# Patient Record
Sex: FEMALE | Race: WHITE | NOT HISPANIC OR LATINO | Employment: OTHER | URBAN - NONMETROPOLITAN AREA
[De-identification: names, ages, dates, MRNs, and addresses within clinical notes are randomized per-mention and may not be internally consistent; named-entity substitution may affect disease eponyms.]

---

## 2017-01-16 RX ORDER — LOSARTAN POTASSIUM 100 MG/1
TABLET ORAL
Qty: 30 TABLET | Refills: 0 | Status: SHIPPED | OUTPATIENT
Start: 2017-01-16 | End: 2017-01-20 | Stop reason: SDUPTHER

## 2017-01-16 RX ORDER — ATENOLOL 50 MG/1
TABLET ORAL
Qty: 30 TABLET | Refills: 0 | Status: SHIPPED | OUTPATIENT
Start: 2017-01-16 | End: 2017-01-20 | Stop reason: SDUPTHER

## 2017-01-20 ENCOUNTER — OFFICE VISIT (OUTPATIENT)
Dept: FAMILY MEDICINE CLINIC | Facility: CLINIC | Age: 77
End: 2017-01-20

## 2017-01-20 VITALS
WEIGHT: 130.2 LBS | HEART RATE: 74 BPM | TEMPERATURE: 98.2 F | SYSTOLIC BLOOD PRESSURE: 156 MMHG | DIASTOLIC BLOOD PRESSURE: 80 MMHG | HEIGHT: 60 IN | OXYGEN SATURATION: 97 % | BODY MASS INDEX: 25.56 KG/M2

## 2017-01-20 DIAGNOSIS — J06.9 ACUTE URI: ICD-10-CM

## 2017-01-20 DIAGNOSIS — K21.9 GASTROESOPHAGEAL REFLUX DISEASE, ESOPHAGITIS PRESENCE NOT SPECIFIED: ICD-10-CM

## 2017-01-20 DIAGNOSIS — Z12.11 COLON CANCER SCREENING: ICD-10-CM

## 2017-01-20 DIAGNOSIS — I10 ESSENTIAL HYPERTENSION: ICD-10-CM

## 2017-01-20 DIAGNOSIS — E03.9 ACQUIRED HYPOTHYROIDISM: ICD-10-CM

## 2017-01-20 DIAGNOSIS — E78.2 MIXED HYPERLIPIDEMIA: ICD-10-CM

## 2017-01-20 DIAGNOSIS — J43.9 PULMONARY EMPHYSEMA, UNSPECIFIED EMPHYSEMA TYPE (HCC): Primary | ICD-10-CM

## 2017-01-20 PROCEDURE — 99214 OFFICE O/P EST MOD 30 MIN: CPT | Performed by: PHYSICIAN ASSISTANT

## 2017-01-20 RX ORDER — CEFDINIR 300 MG/1
300 CAPSULE ORAL 2 TIMES DAILY
Qty: 20 CAPSULE | Refills: 0 | Status: SHIPPED | OUTPATIENT
Start: 2017-01-20 | End: 2017-05-01

## 2017-01-20 RX ORDER — GUAIFENESIN AND DEXTROMETHORPHAN HYDROBROMIDE 600; 30 MG/1; MG/1
1 TABLET, EXTENDED RELEASE ORAL 2 TIMES DAILY
Qty: 45 TABLET | Refills: 0 | Status: SHIPPED | OUTPATIENT
Start: 2017-01-20 | End: 2017-05-01

## 2017-01-20 RX ORDER — RANITIDINE 150 MG/1
150 TABLET ORAL 2 TIMES DAILY
Qty: 60 TABLET | Refills: 5 | Status: SHIPPED | OUTPATIENT
Start: 2017-01-20 | End: 2017-05-01

## 2017-01-20 RX ORDER — TRIAMCINOLONE ACETONIDE 55 UG/1
SPRAY, METERED NASAL
Qty: 1 BOTTLE | Refills: 11 | Status: ON HOLD | OUTPATIENT
Start: 2017-01-20 | End: 2023-01-06

## 2017-01-20 RX ORDER — ATENOLOL 50 MG/1
50 TABLET ORAL DAILY
Qty: 30 TABLET | Refills: 5 | Status: SHIPPED | OUTPATIENT
Start: 2017-01-20 | End: 2017-08-22 | Stop reason: SDUPTHER

## 2017-01-20 RX ORDER — LOSARTAN POTASSIUM 100 MG/1
100 TABLET ORAL DAILY
Qty: 30 TABLET | Refills: 5 | Status: SHIPPED | OUTPATIENT
Start: 2017-01-20 | End: 2017-08-22 | Stop reason: SDUPTHER

## 2017-01-20 NOTE — PATIENT INSTRUCTIONS
76 YEAR OLD FEMALE WHO PRESENTS TODAY IN FOLLOW UP OF HTN, GERD, SPECIALIST APPT. BP TODAY IS ELEVATED, HOWEVER, SHE HAS BEEN TAKING OTC MUCINEX WITH DECONGESTANT AND WAS OUT OF BP MEDICATION UNTIL YESTERDAY. I ASKED THAT SHE STOP CURRENT MUCINEX AND START MUCINEX DM. SHE SHOULD TAKE MEDICATION AS DIRECTED AND MONITOR BP. TO CALL OR RETURN IF BP REMAINS ELEVATED. 9-1-1 TO ER IF CARDIAC OR NEUROLOGICAL SYMPTOMS/SIGNS. PATIENT REPORTS HER GERD HAS BEEN WORSENING. SHE IS TAKING OMEPRAZOLE AS NEEDED. I DISCUSSED THE POTENTIAL RISKS OF PPI. I WILL HAVE HER USE ZANTAC 150 MG TWICE DAILY AS NEEDED. SHE IS ALSO DUE FOR COLONOSCOPY THIS YEAR. I WILL REFER FOR EGD AND COLONOSCOPY.     PATIENT IS TO CONTINUE FOLLOW UP WITH ENDOCRINOLOGY AND TAKING MEDICATIONS AS PRESCRIBED. SHE HAS NOT BEEN TAKING CRESTOR. SHE HAS NOT CHANGED DIET. PATIENT WAS CONCERNED ABOUT AE OF MEDICATION. SHE WAS CONCERNED ABOUT ALZHEIMER'S MOST. I DISCUSSED RISKS AND BENEFITS OF CRESTOR. PATIENT WILL TAKE AS PRESCRIBED. SHE WILL HAVE LABS WITH ENDOCRINOLOGY FOLLOW UP.     SHE HAS HAD URI WITH SINUS CONGESTION AND COUGH FOR 2 WEEKS. I WILL HAVE HER STOP CURRENT MUCINEX AND TAKE MUCINEX DM TWICE DAILY, NASACORT 2 SPRAYS IN EACH NOSTRIL ONCE DAILY, AND OMNICEF TWICE DAILY. TO FOLLOW UP IF NO IMPROVEMENT OR WORSENING.     PATIENT TO FOLLOW UP HERE IN 6 MONTHS OR SOONER IF NEEDED.

## 2017-01-20 NOTE — MR AVS SNAPSHOT
Dafne M Tyra   1/20/2017 2:45 PM   Office Visit    Dept Phone:  [phone]   Encounter #:  [id number]    Provider:  KAJAL Wood   Department:  [facility]                Your Full Care Plan              Today's Medication Changes          These changes are accurate as of: 1/20/17  3:58 PM.  If you have any questions, ask your nurse or doctor.               New Medication(s)Ordered:     cefdinir 300 MG capsule   Commonly known as:  OMNICEF   Take 1 capsule by mouth 2 (Two) Times a Day.       guaifenesin-dextromethorphan  MG tablet sustained-release 12 hour tablet   Take 1 tablet by mouth 2 (Two) Times a Day.       raNITIdine 150 MG tablet   Commonly known as:  ZANTAC   Take 1 tablet by mouth 2 (Two) Times a Day.       Triamcinolone Acetonide 55 MCG/ACT nasal inhaler   Commonly known as:  NASACORT AQ   2 SPRAYS IN EACH NOSTRIL ONCE DAILY         Medication(s)that have changed:     atenolol 50 MG tablet   Commonly known as:  TENORMIN   Take 1 tablet by mouth Daily.   What changed:  See the new instructions.       losartan 100 MG tablet   Commonly known as:  COZAAR   Take 1 tablet by mouth Daily.   What changed:  See the new instructions.         Stop taking medication(s)listed here:     PARoxetine 10 MG tablet   Commonly known as:  PAXIL                Where to Get Your Medications      These medications were sent to [facility] [address] [place] [address] - [address] [facility] - [phone]  - [phone]   [address] [facility] [address]     Phone:  [phone]     atenolol 50 MG tablet    cefdinir 300 MG capsule    guaifenesin-dextromethorphan  MG tablet sustained-release 12 hour tablet    losartan 100 MG tablet    raNITIdine 150 MG tablet    Triamcinolone Acetonide 55 MCG/ACT nasal inhaler                  Your Updated Medication List          This list is accurate as of: 1/20/17  3:58 PM.  Always use your most recent  med list.                HERNAN THYROID 30 MG tablet   Generic drug:  Thyroid   TAKE ONE TABLET BY MOUTH ONCE DAILY       atenolol 50 MG tablet   Commonly known as:  TENORMIN   Take 1 tablet by mouth Daily.       budesonide-formoterol 160-4.5 MCG/ACT inhaler   Commonly known as:  SYMBICORT       cefdinir 300 MG capsule   Commonly known as:  OMNICEF   Take 1 capsule by mouth 2 (Two) Times a Day.       ergocalciferol 04511 UNITS capsule   Commonly known as:  DRISDOL   Take 1 capsule by mouth 1 (One) Time Per Week.       guaifenesin-dextromethorphan  MG tablet sustained-release 12 hour tablet   Take 1 tablet by mouth 2 (Two) Times a Day.       losartan 100 MG tablet   Commonly known as:  COZAAR   Take 1 tablet by mouth Daily.       raNITIdine 150 MG tablet   Commonly known as:  ZANTAC   Take 1 tablet by mouth 2 (Two) Times a Day.       rosuvastatin 20 MG tablet   Commonly known as:  CRESTOR   Take 1 tablet by mouth Every Night.       tiotropium 18 MCG per inhalation capsule   Commonly known as:  SPIRIVA       Triamcinolone Acetonide 55 MCG/ACT nasal inhaler   Commonly known as:  NASACORT AQ   2 SPRAYS IN EACH NOSTRIL ONCE DAILY               We Performed the Following     Ambulatory Referral to General Surgery       You Were Diagnosed With        Codes Comments    Pulmonary emphysema, unspecified emphysema type    -  Primary ICD-10-CM: J43.9  ICD-9-CM: 492.8     Mixed hyperlipidemia     ICD-10-CM: E78.2  ICD-9-CM: 272.2     Acquired hypothyroidism     ICD-10-CM: E03.9  ICD-9-CM: 244.9     Essential hypertension     ICD-10-CM: I10  ICD-9-CM: 401.9     Gastroesophageal reflux disease, esophagitis presence not specified     ICD-10-CM: K21.9  ICD-9-CM: 530.81     Acute URI     ICD-10-CM: J06.9  ICD-9-CM: 465.9     Colon cancer screening     ICD-10-CM: Z12.11  ICD-9-CM: V76.51       Instructions     None    Patient Instructions History      Upcoming Appointments     Visit Type Date Time Department    OFFICE VISIT  2017  2:45 PM [facility]    [facility] 2017  1:00 PM [facility]    OFFICE VISIT 2017  1:40 PM [facility]    [facility] 2017  1:00 PM [facility]    OFFICE VISIT 2017  2:20 PM [facility]      MyChart Signup     [facility] [employer] allows you to send messages to your doctor, view your test results, renew your prescriptions, schedule appointments, and more. To sign up, go to [employer] and click on the Sign Up Now link in the New User? box. Enter your [employer] Activation Code exactly as it appears below along with the last four digits of your Social Security Number and your Date of Birth () to complete the sign-up process. If you do not sign up before the expiration date, you must request a new code.    [employer] Activation Code: [email or web address]  Expires: 2/3/2017  3:58 PM    If you have questions, you can email [email or web address] or call [phone] to talk to our [employer] staff. Remember, [employer] is NOT to be used for urgent needs. For medical emergencies, dial 911.               Other Info from Your Visit           Your Appointments     2017  1:00 PM EST   [facility]    [address]  [facility] [address] [phone]            2017  1:40 PM EST   Office Visit with ANTONELLA Murphy   [facility] ENDOCRINOLOGY (--)    [address]  [facility] [address] [phone]           Arrive 15 minutes prior to appointment.            2017  1:00 PM EDT   [facility], [facility]    [address]  [facility] [address]   [phone]            2017  2:20 PM EDT   Office Visit with Keyanna Ochoa MD   [facility] ENDOCRINOLOGY (--)    [facility] [address]  "[place] [address]   [phone]           Arrive 15 minutes prior to appointment.              Allergies     Diphenhydramine Hcl (Sleep)      Lansoprazole      NAUSEA      Reason for Visit     Hyperlipidemia     Sinusitis           Vital Signs     Blood Pressure Pulse Temperature Height Weight Oxygen Saturation    156/80 (BP Location: Right arm) 74 98.2 °F (36.8 °C) 60\" (152.4 cm) 130 lb 3.2 oz (59.1 kg) 97%    Body Mass Index Smoking Status                25.43 kg/m2 Never Smoker          Problems and Diagnoses Noted     Acquired underactive thyroid    Acid reflux disease    High cholesterol or triglycerides    High blood pressure    Emphysema    Acute upper respiratory infection        Screen for colon cancer            "

## 2017-01-20 NOTE — PROGRESS NOTES
Subjective   Dafne Mary is a 76 y.o. female.  HERE TODAY TO FOLLOW UP HYPERTENSION AND CHOLESTEROL MEDICATION.  ALSO HAVING SOME SINUS ISSUES.    History of Present Illness     WAS OUT OF BP MEDICATION FOR A COUPLE DAYS. RESTARTED YESTERDAY.   TAKING MUCINEX FOR CONGESTION AND COUGH WITH DECONGESTANT. STARTED WITH CONGESTION X 2 WEEKS- HEADACHE WITH FACIAL PRESSURE TO THE LEFT EAR. SNEEZING. SOME COUGHING. OXYGEN IS A LITTLE LOWER BY 1-2 POINTS. NO SOA. NO CP. NO FEVER. NO V/D. STARTED WITH ST- RESOLVED NOW. INCREASED HOARSENESS.     FOLLOW UP WITH ENDOCRINOLOGY NEXT MONTHS. HE STARTED ON CRESTOR - PATIENT DOES NOT WANT TO TAKE CRESTOR- HAS ONLY HAD 1 RX- TAKING EVERY 3-4 DAYS.     REFLUX- IN [place] VISITING SON, WORSENING. GOT STOMACH FLU AND WAS VERY SICK X 3 DAYS. DOESN'T USUALLY GET REFLUX BUT HAD REFLUX THAT CAME UP LIKE A FAUCET. HAD COLONOSCOPY 2007- NORMAL.     The following portions of the patient's history were reviewed and updated as appropriate: allergies, current medications, past family history, past medical history, past social history, past surgical history and problem list.    Review of Systems   HENT: Positive for congestion and sinus pressure.    Neurological: Positive for headaches.       Objective   Physical Exam   Constitutional: She is oriented to person, place, and time. Vital signs are normal. She appears well-developed and well-nourished.   HENT:   Head: Normocephalic and atraumatic.   Right Ear: Tympanic membrane, external ear and ear canal normal.   Left Ear: Tympanic membrane, external ear and ear canal normal.   Nose: Nose normal.   Mouth/Throat: Uvula is midline, oropharynx is clear and moist and mucous membranes are normal.   Eyes: Conjunctivae are normal.   Neck: Neck supple.   Cardiovascular: Normal rate, regular rhythm, normal heart sounds and intact distal pulses.  Exam reveals no gallop and no friction rub.    No murmur heard.  Pulmonary/Chest: Effort normal and breath sounds  normal. No respiratory distress. She has no wheezes. She has no rhonchi. She has no rales.   Musculoskeletal: She exhibits no edema or deformity.   Neurological: She is alert and oriented to person, place, and time.   Skin: Skin is warm and dry.   Psychiatric: She has a normal mood and affect. Her speech is normal and behavior is normal. Judgment and thought content normal. Cognition and memory are normal.   Nursing note and vitals reviewed.      Assessment/Plan   Dafne was seen today for hyperlipidemia and sinusitis.    Diagnoses and all orders for this visit:    Pulmonary emphysema, unspecified emphysema type    Mixed hyperlipidemia    Acquired hypothyroidism    Essential hypertension  -     losartan (COZAAR) 100 MG tablet; Take 1 tablet by mouth Daily.  -     atenolol (TENORMIN) 50 MG tablet; Take 1 tablet by mouth Daily.    Gastroesophageal reflux disease, esophagitis presence not specified  -     raNITIdine (ZANTAC) 150 MG tablet; Take 1 tablet by mouth 2 (Two) Times a Day.  -     Ambulatory Referral to General Surgery    Acute URI  -     guaifenesin-dextromethorphan (MUCINEX DM)  MG tablet sustained-release 12 hour tablet; Take 1 tablet by mouth 2 (Two) Times a Day.  -     cefdinir (OMNICEF) 300 MG capsule; Take 1 capsule by mouth 2 (Two) Times a Day.  -     Triamcinolone Acetonide (NASACORT AQ) 55 MCG/ACT nasal inhaler; 2 SPRAYS IN EACH NOSTRIL ONCE DAILY    Colon cancer screening  -     Ambulatory Referral to General Surgery      Patient Instructions   76 YEAR OLD FEMALE WHO PRESENTS TODAY IN FOLLOW UP OF HTN, GERD, SPECIALIST APPT. BP TODAY IS ELEVATED, HOWEVER, SHE HAS BEEN TAKING OTC MUCINEX WITH DECONGESTANT AND WAS OUT OF BP MEDICATION UNTIL YESTERDAY. I ASKED THAT SHE STOP CURRENT MUCINEX AND START MUCINEX DM. SHE SHOULD TAKE MEDICATION AS DIRECTED AND MONITOR BP. TO CALL OR RETURN IF BP REMAINS ELEVATED. 9-1-1 TO ER IF CARDIAC OR NEUROLOGICAL SYMPTOMS/SIGNS. PATIENT REPORTS HER GERD HAS BEEN  WORSENING. SHE IS TAKING OMEPRAZOLE AS NEEDED. I DISCUSSED THE POTENTIAL RISKS OF PPI. I WILL HAVE HER USE ZANTAC 150 MG TWICE DAILY AS NEEDED. SHE IS ALSO DUE FOR COLONOSCOPY THIS YEAR. I WILL REFER FOR EGD AND COLONOSCOPY.     PATIENT IS TO CONTINUE FOLLOW UP WITH ENDOCRINOLOGY AND TAKING MEDICATIONS AS PRESCRIBED. SHE HAS NOT BEEN TAKING CRESTOR. SHE HAS NOT CHANGED DIET. PATIENT WAS CONCERNED ABOUT AE OF MEDICATION. SHE WAS CONCERNED ABOUT ALZHEIMER'S MOST. I DISCUSSED RISKS AND BENEFITS OF CRESTOR. PATIENT WILL TAKE AS PRESCRIBED. SHE WILL HAVE LABS WITH ENDOCRINOLOGY FOLLOW UP.     SHE HAS HAD URI WITH SINUS CONGESTION AND COUGH FOR 2 WEEKS. I WILL HAVE HER STOP CURRENT MUCINEX AND TAKE MUCINEX DM TWICE DAILY, NASACORT 2 SPRAYS IN EACH NOSTRIL ONCE DAILY, AND OMNICEF TWICE DAILY. TO FOLLOW UP IF NO IMPROVEMENT OR WORSENING.     PATIENT TO FOLLOW UP HERE IN 6 MONTHS OR SOONER IF NEEDED.

## 2017-01-31 RX ORDER — THYROID 30 MG/1
TABLET ORAL
Qty: 30 TABLET | Refills: 0 | OUTPATIENT
Start: 2017-01-31

## 2017-02-01 RX ORDER — THYROID 30 MG/1
TABLET ORAL
Qty: 30 TABLET | Refills: 0 | OUTPATIENT
Start: 2017-02-01

## 2017-02-03 DIAGNOSIS — E78.2 MIXED HYPERLIPIDEMIA: Primary | ICD-10-CM

## 2017-02-03 DIAGNOSIS — I10 ESSENTIAL HYPERTENSION: ICD-10-CM

## 2017-02-03 DIAGNOSIS — R53.82 CHRONIC FATIGUE: ICD-10-CM

## 2017-02-03 DIAGNOSIS — E03.9 ACQUIRED HYPOTHYROIDISM: ICD-10-CM

## 2017-02-03 RX ORDER — THYROID 30 MG/1
30 TABLET ORAL DAILY
Qty: 30 TABLET | Refills: 2 | Status: SHIPPED | OUTPATIENT
Start: 2017-02-03 | End: 2017-05-08 | Stop reason: SDUPTHER

## 2017-03-03 ENCOUNTER — RESULTS ENCOUNTER (OUTPATIENT)
Dept: ENDOCRINOLOGY | Age: 77
End: 2017-03-03

## 2017-03-03 DIAGNOSIS — R53.82 CHRONIC FATIGUE: ICD-10-CM

## 2017-03-03 DIAGNOSIS — E03.9 ACQUIRED HYPOTHYROIDISM: ICD-10-CM

## 2017-03-03 DIAGNOSIS — I10 ESSENTIAL HYPERTENSION: ICD-10-CM

## 2017-03-03 DIAGNOSIS — E78.2 MIXED HYPERLIPIDEMIA: ICD-10-CM

## 2017-03-21 RX ORDER — PAROXETINE 10 MG/1
TABLET, FILM COATED ORAL
Qty: 90 TABLET | Refills: 0 | Status: SHIPPED | OUTPATIENT
Start: 2017-03-21 | End: 2017-08-09 | Stop reason: SDUPTHER

## 2017-05-01 ENCOUNTER — OFFICE VISIT (OUTPATIENT)
Dept: FAMILY MEDICINE CLINIC | Facility: CLINIC | Age: 77
End: 2017-05-01

## 2017-05-01 VITALS
OXYGEN SATURATION: 96 % | DIASTOLIC BLOOD PRESSURE: 86 MMHG | HEART RATE: 73 BPM | SYSTOLIC BLOOD PRESSURE: 140 MMHG | HEIGHT: 60 IN | TEMPERATURE: 99.2 F | WEIGHT: 128 LBS | BODY MASS INDEX: 25.13 KG/M2

## 2017-05-01 DIAGNOSIS — I10 ESSENTIAL HYPERTENSION: Primary | ICD-10-CM

## 2017-05-01 PROCEDURE — 99213 OFFICE O/P EST LOW 20 MIN: CPT | Performed by: PHYSICIAN ASSISTANT

## 2017-05-01 RX ORDER — OMEPRAZOLE 20 MG/1
20 CAPSULE, DELAYED RELEASE ORAL DAILY
COMMUNITY

## 2017-05-01 RX ORDER — AMLODIPINE BESYLATE 5 MG/1
5 TABLET ORAL DAILY
Qty: 30 TABLET | Refills: 0 | Status: SHIPPED | OUTPATIENT
Start: 2017-05-01 | End: 2017-05-31 | Stop reason: SDUPTHER

## 2017-05-08 RX ORDER — THYROID 30 MG/1
TABLET ORAL
Qty: 30 TABLET | Refills: 0 | Status: SHIPPED | OUTPATIENT
Start: 2017-05-08 | End: 2017-06-07 | Stop reason: SDUPTHER

## 2017-05-31 DIAGNOSIS — I10 ESSENTIAL HYPERTENSION: ICD-10-CM

## 2017-05-31 RX ORDER — AMLODIPINE BESYLATE 5 MG/1
TABLET ORAL
Qty: 30 TABLET | Refills: 3 | Status: SHIPPED | OUTPATIENT
Start: 2017-05-31 | End: 2017-10-16 | Stop reason: SDUPTHER

## 2017-06-01 DIAGNOSIS — E55.9 VITAMIN D DEFICIENCY: Primary | ICD-10-CM

## 2017-06-01 DIAGNOSIS — R53.82 CHRONIC FATIGUE: ICD-10-CM

## 2017-06-01 DIAGNOSIS — E78.2 MIXED HYPERLIPIDEMIA: Primary | ICD-10-CM

## 2017-06-01 DIAGNOSIS — E03.9 ACQUIRED HYPOTHYROIDISM: ICD-10-CM

## 2017-06-02 ENCOUNTER — OFFICE VISIT (OUTPATIENT)
Dept: FAMILY MEDICINE CLINIC | Facility: CLINIC | Age: 77
End: 2017-06-02

## 2017-06-02 VITALS
HEIGHT: 60 IN | DIASTOLIC BLOOD PRESSURE: 64 MMHG | WEIGHT: 127.8 LBS | OXYGEN SATURATION: 97 % | BODY MASS INDEX: 25.09 KG/M2 | HEART RATE: 68 BPM | TEMPERATURE: 97.4 F | SYSTOLIC BLOOD PRESSURE: 122 MMHG

## 2017-06-02 DIAGNOSIS — H91.90 HEARING LOSS, UNSPECIFIED LATERALITY: ICD-10-CM

## 2017-06-02 DIAGNOSIS — Z23 NEED FOR DIPHTHERIA-TETANUS-PERTUSSIS (TDAP) VACCINE: ICD-10-CM

## 2017-06-02 DIAGNOSIS — Z00.00 MEDICARE ANNUAL WELLNESS VISIT, SUBSEQUENT: Primary | ICD-10-CM

## 2017-06-02 DIAGNOSIS — Z11.59 NEED FOR HEPATITIS C SCREENING TEST: ICD-10-CM

## 2017-06-02 DIAGNOSIS — H93.19 TINNITUS, UNSPECIFIED LATERALITY: ICD-10-CM

## 2017-06-02 PROCEDURE — G0439 PPPS, SUBSEQ VISIT: HCPCS | Performed by: PHYSICIAN ASSISTANT

## 2017-06-02 NOTE — PROGRESS NOTES
QUICK REFERENCE INFORMATION:  The ABCs of the Annual Wellness Visit    Subsequent Medicare Wellness Visit    HEALTH RISK ASSESSMENT    1940    Recent Hospitalizations:  No hospitalization(s) within the last year..        Current Medical Providers:  Patient Care Team:  KAJAL Wood as PCP - General  Javier Nolan MD as Consulting Physician (Pulmonary Disease)        Smoking Status:  History   Smoking Status   • Never Smoker   Smokeless Tobacco   • Never Used       Alcohol Consumption:  History   Alcohol Use No       Depression Screen:   PHQ-9 Depression Screening 6/2/2017   Little interest or pleasure in doing things 0   Feeling down, depressed, or hopeless 0   Trouble falling or staying asleep, or sleeping too much 0   Feeling tired or having little energy 3   Poor appetite or overeating 0   Feeling bad about yourself - or that you are a failure or have let yourself or your family down 0   Trouble concentrating on things, such as reading the newspaper or watching television 1   Moving or speaking so slowly that other people could have noticed. Or the opposite - being so fidgety or restless that you have been moving around a lot more than usual 0   Thoughts that you would be better off dead, or of hurting yourself in some way 0   PHQ-9 Total Score 4   If you checked off any problems, how difficult have these problems made it for you to do your work, take care of things at home, or get along with other people? Not difficult at all       Health Habits and Functional and Cognitive Screening:  Functional & Cognitive Status 6/2/2017   Do you have difficulty preparing food and eating? No   Do you have difficulty bathing yourself? No   Do you have difficulty getting dressed? No   Do you have difficulty using the toilet? No   Do you have difficulty moving around from place to place? No   In the past year have you fallen or experienced a near fall? No   Do you need help using the phone?  No   Are you deaf or  do you have serious difficulty hearing?  No   Do you need help with transportation? No   Do you need help shopping? No   Do you need help preparing meals?  No   Do you need help with housework?  No   Do you need help with laundry? No   Do you need help taking your medications? No   Do you need help managing money? No   Do you have difficulty concentrating, remembering or making decisions? Yes       Health Habits  Current Diet: Well Balanced Diet  Dental Exam: Up to date  Eye Exam: Not up to date  Exercise (times per week): 4 times per week  Current Exercise Activities Include: Gardening      Does the patient have evidence of cognitive impairment? No    Aspirin use counseling: Start ASA 81 mg daily       Recent Lab Results:  CMP:  Lab Results   Component Value Date    GLU 94 10/17/2016    BUN 27 (H) 10/17/2016    CREATININE 0.88 10/17/2016    EGFRIFNONA 63 10/17/2016    EGFRIFAFRI 76 10/17/2016    BCR 30.7 (H) 10/17/2016     10/17/2016    K 4.3 10/17/2016    CO2 24.5 10/17/2016    CALCIUM 9.8 10/17/2016    PROTENTOTREF 6.6 10/17/2016    ALBUMIN 4.60 10/17/2016    LABGLOBREF 2.0 10/17/2016    LABIL2 2.3 10/17/2016    BILITOT 0.4 10/17/2016    ALKPHOS 107 10/17/2016    AST 27 10/17/2016    ALT 20 10/17/2016     Lipid Panel:  Lab Results   Component Value Date    CHLPL 264 (H) 10/17/2016    TRIG 126 10/17/2016    HDL 73 (H) 10/17/2016    VLDL 25.2 10/17/2016     (H) 10/17/2016     HbA1c:       Visual Acuity:  No exam data present    Age-appropriate Screening Schedule:  Refer to the list below for future screening recommendations based on patient's age, sex and/or medical conditions. Orders for these recommended tests are listed in the plan section. The patient has been provided with a written plan.    Health Maintenance   Topic Date Due   • TDAP/TD VACCINES (1 - Tdap) 12/08/1959   • COLONOSCOPY  04/04/2016   • ZOSTER VACCINE  04/04/2016   • INFLUENZA VACCINE  08/01/2017   • LIPID PANEL  10/17/2017   •  MAMMOGRAM  08/01/2018   • DXA SCAN  08/01/2018   • PNEUMOCOCCAL VACCINES (65+ LOW/MEDIUM RISK)  Completed        Subjective   History of Present Illness    Dafne Mary is a 76 y.o. female who presents for an Subsequent Wellness Visit.    LAST COLONOSCOPY - 2007 DR OCTAVIA STOCK. WAS REFERRED IN January TO DR HAMEED. HAS PAPERWORK- HAS NOT SENT IN YET.   LAST MAMMOGRAM- 8/2016  LAST DEXA- 8/2016  [facility]. NO HX ABNORMAL PAP. SA 1 PARTNER X COUPLE YEARS. NO VERY ACTIVE.   LAST TD- > 10 YEARS.   PREVNAR AND PNEUMOVAX. MOST RECENT INJECTION 11/2016  ZOSTAVAX- HAD HERE 11/2014.     GETTING TOOTH PULLED NEXT WEEK- BROKE OFF INSIDE A CROWN.     PATIENT WITH MOOD ISSUES- POSSIBLE ANXIETY PER DR GOMEZ. TAKING AT NIGHT. IF GET ANXIOUS IN MIDDLE OF DAY- TAKES PAXIL EARLY. FEELS LIKE ALWAYS TIRED. HARD TO DO THE THINGS NEEDS TO DO IN THE DAY DUE TO INCREASED FATIGUE. SEEING DR BARTHOLOMEW- APPT 6/6 FOR LABS AND 6/20 FOR MD APPT. ALSO SEEING DR JOYNER. HAS NOT HAD SLEEP STUDY.     HAS TINNITUS. HAS NOT SEEN ENT.     The following portions of the patient's history were reviewed and updated as appropriate: allergies, current medications, past family history, past medical history, past social history, past surgical history and problem list.    Outpatient Medications Prior to Visit   Medication Sig Dispense Refill   • amLODIPine (NORVASC) 5 MG tablet TAKE ONE TABLET BY MOUTH ONCE DAILY 30 tablet 3   • ARMOUR THYROID 30 MG tablet TAKE ONE TABLET BY MOUTH ONCE DAILY 30 tablet 0   • atenolol (TENORMIN) 50 MG tablet Take 1 tablet by mouth Daily. 30 tablet 5   • budesonide-formoterol (SYMBICORT) 160-4.5 MCG/ACT inhaler Inhale 2 puffs 2 (two) times a day.     • ergocalciferol (DRISDOL) 43296 UNITS capsule Take 1 capsule by mouth 1 (One) Time Per Week. 13 capsule 3   • losartan (COZAAR) 100 MG tablet Take 1 tablet by mouth Daily. 30 tablet 5   • omeprazole (priLOSEC) 20 MG capsule Take 20 mg by mouth  Daily.     • PARoxetine (PAXIL) 10 MG tablet TAKE ONE TABLET BY MOUTH ONCE DAILY 90 tablet 0   • tiotropium (SPIRIVA) 18 MCG per inhalation capsule Place 1 capsule into inhaler and inhale 1 (one) time daily.     • Triamcinolone Acetonide (NASACORT AQ) 55 MCG/ACT nasal inhaler 2 SPRAYS IN EACH NOSTRIL ONCE DAILY 1 bottle 11   • rosuvastatin (CRESTOR) 20 MG tablet Take 1 tablet by mouth Every Night. 30 tablet 11     No facility-administered medications prior to visit.        Patient Active Problem List   Diagnosis   • Anxiety   • Arteriosclerosis of both carotid arteries   • Chronic interstitial cystitis   • Cough   • Pulmonary emphysema   • Gastroesophageal reflux disease   • Fibromyalgia   • Headache   • Hematuria   • Hoarseness   • Hyperlipidemia   • Hypertension   • Acquired hypothyroidism   • Muscle spasms of both lower extremities   • Palpitations   • Shortness of breath   • Postmenopausal osteoporosis   • Chronic fatigue   • Hair loss disorder       Advance Care Planning:  has NO advance directive - information provided to the patient today    Identification of Risk Factors:  Risk factors include: cardiovascular risk, depression, cognitive impairment and hearing limitations.    Review of Systems   Constitutional: Positive for fatigue.   HENT: Positive for hearing loss.    Psychiatric/Behavioral: The patient is nervous/anxious.    All other systems reviewed and are negative.      Compared to one year ago, the patient feels her physical health is the same.  Compared to one year ago, the patient feels her mental health is the same.    Objective     Physical Exam   Constitutional: She is oriented to person, place, and time. She appears well-developed and well-nourished. No distress.   HENT:   Head: Normocephalic and atraumatic.   Right Ear: Hearing, tympanic membrane, external ear and ear canal normal.   Left Ear: Hearing, tympanic membrane, external ear and ear canal normal.   Nose: Nose normal.   Mouth/Throat:  "Oropharynx is clear and moist.   Eyes: Conjunctivae, EOM and lids are normal. Pupils are equal, round, and reactive to light.   Neck: Neck supple. No JVD present. Carotid bruit is not present. No tracheal deviation present. No thyroid mass and no thyromegaly present.   Cardiovascular: Normal rate, regular rhythm, normal heart sounds and intact distal pulses.  Exam reveals no gallop and no friction rub.    No murmur heard.  Pulses:       Radial pulses are 2+ on the right side, and 2+ on the left side.        Posterior tibial pulses are 2+ on the right side, and 2+ on the left side.   Pulmonary/Chest: Effort normal and breath sounds normal. No respiratory distress. She has no wheezes. She has no rhonchi. She has no rales.   Abdominal: Soft. Normal aorta and bowel sounds are normal. She exhibits no distension and no abdominal bruit. There is no hepatosplenomegaly. There is no tenderness. There is no rigidity, no rebound and no guarding. No hernia.   Musculoskeletal: Normal range of motion. She exhibits no edema, tenderness or deformity.   Normal strength.   Lymphadenopathy:     She has no cervical adenopathy.   Neurological: She is alert and oriented to person, place, and time. She has normal strength and normal reflexes. She displays normal reflexes. No cranial nerve deficit or sensory deficit. She exhibits normal muscle tone. Coordination and gait normal.   Skin: Skin is warm and dry. No rash noted. She is not diaphoretic. No erythema.   Psychiatric: She has a normal mood and affect. Her speech is normal and behavior is normal. Judgment and thought content normal. Cognition and memory are normal.       Vitals:    06/02/17 1415   BP: 122/64   Pulse: 68   Temp: 97.4 °F (36.3 °C)   TempSrc: Oral   SpO2: 97%   Weight: 127 lb 12.8 oz (58 kg)   Height: 60\" (152.4 cm)   PainSc:   4   PainLoc: Leg  Comment: BILATERAL LEGS ACHING       Body mass index is 24.96 kg/(m^2).  Discussed the patient's BMI with her. The BMI is above " average; BMI management plan is completed.    Assessment/Plan   Patient Self-Management and Personalized Health Advice  The patient has been provided with information about: prevention of cardiac or vascular disease, designing advance directives and mental health concerns and preventive services including:   · Advance directive, Colorectal cancer screening, colonoscopy referral placed, TdaP vaccine.    Visit Diagnoses:    ICD-10-CM ICD-9-CM   1. Medicare annual wellness visit, subsequent Z00.00 V70.0   2. Need for diphtheria-tetanus-pertussis (Tdap) vaccine Z23 V06.1   3. Hearing loss, unspecified laterality H91.90 389.9   4. Tinnitus, unspecified laterality H93.19 388.30   5. Need for hepatitis C screening test Z11.59 V73.89       Orders Placed This Encounter   Procedures   • Hepatitis C Antibody   • Ambulatory Referral to ENT (Otolaryngology)     Referral Priority:   Routine     Referral Type:   Consultation     Referral Reason:   Specialty Services Required     Requested Specialty:   Otolaryngology     Number of Visits Requested:   1       Outpatient Encounter Prescriptions as of 6/2/2017   Medication Sig Dispense Refill   • amLODIPine (NORVASC) 5 MG tablet TAKE ONE TABLET BY MOUTH ONCE DAILY 30 tablet 3   • ARMOUR THYROID 30 MG tablet TAKE ONE TABLET BY MOUTH ONCE DAILY 30 tablet 0   • atenolol (TENORMIN) 50 MG tablet Take 1 tablet by mouth Daily. 30 tablet 5   • budesonide-formoterol (SYMBICORT) 160-4.5 MCG/ACT inhaler Inhale 2 puffs 2 (two) times a day.     • ergocalciferol (DRISDOL) 13854 UNITS capsule Take 1 capsule by mouth 1 (One) Time Per Week. 13 capsule 3   • losartan (COZAAR) 100 MG tablet Take 1 tablet by mouth Daily. 30 tablet 5   • omeprazole (priLOSEC) 20 MG capsule Take 20 mg by mouth Daily.     • PARoxetine (PAXIL) 10 MG tablet TAKE ONE TABLET BY MOUTH ONCE DAILY 90 tablet 0   • tiotropium (SPIRIVA) 18 MCG per inhalation capsule Place 1 capsule into inhaler and inhale 1 (one) time daily.     •  Triamcinolone Acetonide (NASACORT AQ) 55 MCG/ACT nasal inhaler 2 SPRAYS IN EACH NOSTRIL ONCE DAILY 1 bottle 11   • rosuvastatin (CRESTOR) 20 MG tablet Take 1 tablet by mouth Every Night. 30 tablet 11     No facility-administered encounter medications on file as of 2017.        Reviewed use of high risk medication in the elderly: yes  Reviewed for potential of harmful drug interactions in the elderly: yes    Follow Up:  Return in about 3 months (around 2017).     An After Visit Summary and PPPS with all of these plans were given to the patient.   Patient Instructions     76 YEAR OLD FEMALE WHO PRESENTS TODAY FOR AWV AND BP RECHECK. BP IS NORMAL TODAY- CONTINUE REGIMEN. SHE HAS BEEN REFERRED FOR COLONOSCOPY- SHE WILL RETURN PACKET TO HAVE THIS PERFORMED. TDAP SENT TO PHARMACY TO UPDATE. PATIENT IS UP TO DATE ON MAMMOGRAM UNTIL 2017 AND DEXA UNTIL 2018. UP TO DATE ON ALL OTHER TESTING. SHE HAS TINNITUS AND HEARING LOSS- I WILL REFER TO ENT FOR EVALUATION AND HEARING SCREENING. PATIENT NEEDS HEP C SCREEN. I WILL GIVE ORDER TO HAVE PERFORMED WITH OTHER LABS NEXT WEEK.     PATIENT HAS SIGNIFICANT ANXIETY, WHICH SHE REPORTS IS CONTROLLED ON MEDICATION. SHE ALSO HAS SIGNIFICANT FATIGUE, WHICH SHE REPORTS IS LIMITING DAILY ACTIVITIES. SHE WILL FOLLOW UP WITH ENDOCRINOLOGY THIS MONTH. IF NOT THYROID OR ENDOCRINE RELATED, SHE WILL FOLLOW UP WITH DR JOYNER FOR CONSIDERATION OF SLEEP STUDY.     FOLLOW UP HERE IN 3 MONTHS OR SOONER IF NEEDED PENDING SYMPTOMS.       Medicare Wellness  Personal Prevention Plan of Service     Date of Office Visit:  2017  Encounter Provider:  KAJAL Wood  Place of Service:  [facility]  Patient Name: Dafne Mary  :  1940    As part of the Medicare Wellness portion of your visit today, we are providing you with this personalized preventive plan of services (PPPS). This plan is based upon recommendations of the [place]  Preventive Services Task Force (USPSTF) and the Advisory Committee on Immunization Practices (ACIP).    This lists the preventive care services that should be considered, and provides dates of when you are due. Items listed as completed are up-to-date and do not require any further intervention.    Health Maintenance   Topic Date Due   • TDAP/TD VACCINES (1 - Tdap) 12/08/1959   • COLONOSCOPY  04/04/2016   • ZOSTER VACCINE  04/04/2016   • INFLUENZA VACCINE  08/01/2017   • LIPID PANEL  10/17/2017   • MAMMOGRAM  08/01/2018   • DXA SCAN  08/01/2018   • PNEUMOCOCCAL VACCINES (65+ LOW/MEDIUM RISK)  Completed       Orders Placed This Encounter   Procedures   • Hepatitis C Antibody   • Ambulatory Referral to ENT (Otolaryngology)     Referral Priority:   Routine     Referral Type:   Consultation     Referral Reason:   Specialty Services Required     Requested Specialty:   Otolaryngology     Number of Visits Requested:   1       Return in about 3 months (around 9/2/2017).

## 2017-06-02 NOTE — PATIENT INSTRUCTIONS
76 YEAR OLD FEMALE WHO PRESENTS TODAY FOR AWV AND BP RECHECK. BP IS NORMAL TODAY- CONTINUE REGIMEN. SHE HAS BEEN REFERRED FOR COLONOSCOPY- SHE WILL RETURN PACKET TO HAVE THIS PERFORMED. TDAP SENT TO PHARMACY TO UPDATE. PATIENT IS UP TO DATE ON MAMMOGRAM UNTIL 2017 AND DEXA UNTIL 2018. UP TO DATE ON ALL OTHER TESTING. SHE HAS TINNITUS AND HEARING LOSS- I WILL REFER TO ENT FOR EVALUATION AND HEARING SCREENING. PATIENT NEEDS HEP C SCREEN. I WILL GIVE ORDER TO HAVE PERFORMED WITH OTHER LABS NEXT WEEK.     PATIENT HAS SIGNIFICANT ANXIETY, WHICH SHE REPORTS IS CONTROLLED ON MEDICATION. SHE ALSO HAS SIGNIFICANT FATIGUE, WHICH SHE REPORTS IS LIMITING DAILY ACTIVITIES. SHE WILL FOLLOW UP WITH ENDOCRINOLOGY THIS MONTH. IF NOT THYROID OR ENDOCRINE RELATED, SHE WILL FOLLOW UP WITH DR JOYNER FOR CONSIDERATION OF SLEEP STUDY.     FOLLOW UP HERE IN 3 MONTHS OR SOONER IF NEEDED PENDING SYMPTOMS.       Medicare Wellness  Personal Prevention Plan of Service     Date of Office Visit:  2017  Encounter Provider:  KAJAL Wood  Place of Service:  [facility]  Patient Name: Dafne Mary  :  1940    As part of the Medicare Wellness portion of your visit today, we are providing you with this personalized preventive plan of services (PPPS). This plan is based upon recommendations of the [place] Preventive Services Task Force (USPSTF) and the Advisory Committee on Immunization Practices (ACIP).    This lists the preventive care services that should be considered, and provides dates of when you are due. Items listed as completed are up-to-date and do not require any further intervention.    Health Maintenance   Topic Date Due   • TDAP/TD VACCINES (1 - Tdap) 1959   • COLONOSCOPY  2016   • ZOSTER VACCINE  2016   • INFLUENZA VACCINE  2017   • LIPID PANEL  10/17/2017   • MAMMOGRAM  2018   • DXA SCAN  2018   • PNEUMOCOCCAL VACCINES (65+ LOW/MEDIUM  RISK)  Completed       Orders Placed This Encounter   Procedures   • Hepatitis C Antibody   • Ambulatory Referral to ENT (Otolaryngology)     Referral Priority:   Routine     Referral Type:   Consultation     Referral Reason:   Specialty Services Required     Requested Specialty:   Otolaryngology     Number of Visits Requested:   1       Return in about 3 months (around 9/2/2017).

## 2017-06-06 ENCOUNTER — LAB (OUTPATIENT)
Dept: ENDOCRINOLOGY | Age: 77
End: 2017-06-06

## 2017-06-06 DIAGNOSIS — E03.9 ACQUIRED HYPOTHYROIDISM: ICD-10-CM

## 2017-06-06 DIAGNOSIS — E78.2 MIXED HYPERLIPIDEMIA: ICD-10-CM

## 2017-06-06 DIAGNOSIS — E55.9 VITAMIN D DEFICIENCY: ICD-10-CM

## 2017-06-06 DIAGNOSIS — R53.82 CHRONIC FATIGUE: ICD-10-CM

## 2017-06-07 LAB — 25(OH)D3+25(OH)D2 SERPL-MCNC: 52.2 NG/ML (ref 30–100)

## 2017-06-08 RX ORDER — THYROID 30 MG/1
TABLET ORAL
Qty: 30 TABLET | Refills: 0 | Status: SHIPPED | OUTPATIENT
Start: 2017-06-08 | End: 2017-06-20 | Stop reason: SDUPTHER

## 2017-06-16 LAB
ACTH PLAS-MCNC: 13.4 PG/ML (ref 7.2–63.3)
ALBUMIN SERPL-MCNC: 4.6 G/DL (ref 3.5–5.2)
ALBUMIN/GLOB SERPL: 1.8 G/DL
ALP SERPL-CCNC: 96 U/L (ref 39–117)
ALT SERPL-CCNC: 17 U/L (ref 1–33)
AST SERPL-CCNC: 26 U/L (ref 1–32)
BILIRUB SERPL-MCNC: 0.8 MG/DL (ref 0.1–1.2)
BUN SERPL-MCNC: 20 MG/DL (ref 8–23)
BUN/CREAT SERPL: 19.6 (ref 7–25)
CALCIUM SERPL-MCNC: 9.8 MG/DL (ref 8.6–10.5)
CHLORIDE SERPL-SCNC: 101 MMOL/L (ref 98–107)
CHOLEST SERPL-MCNC: 282 MG/DL (ref 0–200)
CO2 SERPL-SCNC: 25.4 MMOL/L (ref 22–29)
CORTIS SERPL-MCNC: 10.8 UG/DL
CREAT SERPL-MCNC: 1.02 MG/DL (ref 0.57–1)
GLOBULIN SER CALC-MCNC: 2.5 GM/DL
GLUCOSE SERPL-MCNC: 83 MG/DL (ref 65–99)
HDLC SERPL-MCNC: 80 MG/DL (ref 40–60)
LDLC SERPL CALC-MCNC: 182 MG/DL (ref 0–100)
POTASSIUM SERPL-SCNC: 4.2 MMOL/L (ref 3.5–5.2)
PROT SERPL-MCNC: 7.1 G/DL (ref 6–8.5)
REQUEST PROBLEM: NORMAL
SODIUM SERPL-SCNC: 142 MMOL/L (ref 136–145)
T3FREE SERPL-MCNC: 2.8 PG/ML (ref 2–4.4)
T4 FREE SERPL-MCNC: 0.78 NG/DL (ref 0.93–1.7)
T4 SERPL-MCNC: 5.96 MCG/DL (ref 4.5–11.7)
THYROGLOB AB SERPL-ACNC: 3.6 IU/ML (ref 0–0.9)
THYROGLOB SERPL-MCNC: 235 NG/ML
TRIGL SERPL-MCNC: 102 MG/DL (ref 0–150)
TSH SERPL DL<=0.005 MIU/L-ACNC: 1.25 MIU/ML (ref 0.27–4.2)
URATE SERPL-MCNC: 5.1 MG/DL (ref 2.4–5.7)
VLDLC SERPL CALC-MCNC: 20.4 MG/DL (ref 5–40)

## 2017-06-20 ENCOUNTER — OFFICE VISIT (OUTPATIENT)
Dept: ENDOCRINOLOGY | Age: 77
End: 2017-06-20

## 2017-06-20 VITALS
BODY MASS INDEX: 25.52 KG/M2 | WEIGHT: 130 LBS | RESPIRATION RATE: 16 BRPM | HEIGHT: 60 IN | SYSTOLIC BLOOD PRESSURE: 128 MMHG | DIASTOLIC BLOOD PRESSURE: 78 MMHG

## 2017-06-20 DIAGNOSIS — E78.2 MIXED HYPERLIPIDEMIA: ICD-10-CM

## 2017-06-20 DIAGNOSIS — E03.9 ACQUIRED HYPOTHYROIDISM: Primary | ICD-10-CM

## 2017-06-20 DIAGNOSIS — R53.82 CHRONIC FATIGUE: ICD-10-CM

## 2017-06-20 DIAGNOSIS — M81.0 POSTMENOPAUSAL OSTEOPOROSIS: ICD-10-CM

## 2017-06-20 DIAGNOSIS — I10 ESSENTIAL HYPERTENSION: ICD-10-CM

## 2017-06-20 DIAGNOSIS — I65.23 ARTERIOSCLEROSIS OF BOTH CAROTID ARTERIES: ICD-10-CM

## 2017-06-20 PROCEDURE — 99214 OFFICE O/P EST MOD 30 MIN: CPT | Performed by: INTERNAL MEDICINE

## 2017-06-20 RX ORDER — LEVOTHYROXINE AND LIOTHYRONINE 9.5; 2.25 UG/1; UG/1
15 TABLET ORAL DAILY
Qty: 30 TABLET | Refills: 5 | Status: SHIPPED | OUTPATIENT
Start: 2017-06-20 | End: 2017-12-26 | Stop reason: SDUPTHER

## 2017-06-20 RX ORDER — ERGOCALCIFEROL 1.25 MG/1
50000 CAPSULE ORAL WEEKLY
Qty: 13 CAPSULE | Refills: 3 | Status: SHIPPED | OUTPATIENT
Start: 2017-06-20 | End: 2018-06-20

## 2017-06-20 RX ORDER — THYROID 30 MG/1
30 TABLET ORAL DAILY
Qty: 30 TABLET | Refills: 5 | Status: SHIPPED | OUTPATIENT
Start: 2017-06-20 | End: 2018-01-29 | Stop reason: SDUPTHER

## 2017-06-20 RX ORDER — ROSUVASTATIN CALCIUM 20 MG/1
20 TABLET, COATED ORAL NIGHTLY
Qty: 30 TABLET | Refills: 11 | Status: SHIPPED | OUTPATIENT
Start: 2017-06-20 | End: 2018-03-08 | Stop reason: SINTOL

## 2017-06-20 NOTE — PROGRESS NOTES
"Subjective   Dafne Mary is a 76 y.o. female seen for follow up for hypothyroidism, lab review. She is having body aches, fatigue, and cold intolerance.     History of Present Illness this is a 76-year-old female known patient with hypothyroidism as well as vitamin D deficiency and hypertension with dyslipidemia with atherosclerotic cardiovascular disease.  She is complaining of body ache and chronic fatigue as well as cold intolerance.    /78  Resp 16  Ht 60\" (152.4 cm)  Wt 130 lb (59 kg)  BMI 25.39 kg/m2     Allergies   Allergen Reactions   • Diphenhydramine Hcl (Sleep)    • Lansoprazole      NAUSEA   • Lisinopril Cough       Current Outpatient Prescriptions:   •  amLODIPine (NORVASC) 5 MG tablet, TAKE ONE TABLET BY MOUTH ONCE DAILY, Disp: 30 tablet, Rfl: 3  •  ARMOUR THYROID 30 MG tablet, TAKE ONE TABLET BY MOUTH ONCE DAILY, Disp: 30 tablet, Rfl: 0  •  atenolol (TENORMIN) 50 MG tablet, Take 1 tablet by mouth Daily., Disp: 30 tablet, Rfl: 5  •  budesonide-formoterol (SYMBICORT) 160-4.5 MCG/ACT inhaler, Inhale 2 puffs 2 (two) times a day., Disp: , Rfl:   •  ergocalciferol (DRISDOL) 40022 UNITS capsule, Take 1 capsule by mouth 1 (One) Time Per Week., Disp: 13 capsule, Rfl: 3  •  losartan (COZAAR) 100 MG tablet, Take 1 tablet by mouth Daily., Disp: 30 tablet, Rfl: 5  •  omeprazole (priLOSEC) 20 MG capsule, Take 20 mg by mouth Daily., Disp: , Rfl:   •  PARoxetine (PAXIL) 10 MG tablet, TAKE ONE TABLET BY MOUTH ONCE DAILY, Disp: 90 tablet, Rfl: 0  •  rosuvastatin (CRESTOR) 20 MG tablet, Take 1 tablet by mouth Every Night., Disp: 30 tablet, Rfl: 11  •  tiotropium (SPIRIVA) 18 MCG per inhalation capsule, Place 1 capsule into inhaler and inhale 1 (one) time daily., Disp: , Rfl:   •  Triamcinolone Acetonide (NASACORT AQ) 55 MCG/ACT nasal inhaler, 2 SPRAYS IN EACH NOSTRIL ONCE DAILY, Disp: 1 bottle, Rfl: 11    The following portions of the patient's history were reviewed and updated as appropriate: allergies, " current medications, past family history, past medical history, past social history, past surgical history and problem list.    Review of Systems   Constitutional: Positive for fatigue.   HENT: Negative.    Eyes: Negative.    Respiratory: Negative.    Cardiovascular: Negative.    Gastrointestinal: Negative.    Endocrine: Positive for cold intolerance.   Genitourinary: Negative.    Musculoskeletal: Negative.    Skin: Negative.    Allergic/Immunologic: Negative.    Neurological: Negative.    Hematological: Negative.    Psychiatric/Behavioral: Negative.        Objective   Physical Exam   Constitutional: She is oriented to person, place, and time. She appears well-developed and well-nourished. No distress.   HENT:   Head: Normocephalic and atraumatic.   Right Ear: External ear normal.   Left Ear: External ear normal.   Nose: Nose normal.   Mouth/Throat: Oropharynx is clear and moist. No oropharyngeal exudate.   Eyes: Conjunctivae and EOM are normal. Pupils are equal, round, and reactive to light. Right eye exhibits no discharge. Left eye exhibits no discharge. No scleral icterus.   Neck: Normal range of motion. Neck supple. No JVD present. No tracheal deviation present. No thyromegaly present.   Healed the scar of the thyroidectomy in lower neck.   Cardiovascular: Normal rate, regular rhythm, normal heart sounds and intact distal pulses.  Exam reveals no gallop and no friction rub.    No murmur heard.  Pulmonary/Chest: Effort normal and breath sounds normal. No stridor. No respiratory distress. She has no wheezes. She has no rales. She exhibits no tenderness.   Abdominal: Soft. Bowel sounds are normal. She exhibits no distension and no mass. There is no tenderness. There is no rebound and no guarding. No hernia.   Musculoskeletal: Normal range of motion. She exhibits no edema, tenderness or deformity.   Lymphadenopathy:     She has no cervical adenopathy.   Neurological: She is alert and oriented to person, place, and  time. She has normal reflexes. She displays normal reflexes. No cranial nerve deficit. She exhibits normal muscle tone. Coordination normal.   Skin: Skin is warm and dry. No rash noted. She is not diaphoretic. No erythema. No pallor.   Psychiatric: She has a normal mood and affect. Her behavior is normal. Judgment and thought content normal.   Nursing note and vitals reviewed.     Results for orders placed or performed in visit on 06/06/17   Vitamin D 25 Hydroxy   Result Value Ref Range    25 Hydroxy, Vitamin D 52.2 30.0 - 100.0 ng/mL   Comprehensive Metabolic Panel   Result Value Ref Range    Glucose 83 65 - 99 mg/dL    BUN 20 8 - 23 mg/dL    Creatinine 1.02 (H) 0.57 - 1.00 mg/dL    eGFR Non African Am 53 (L) >60 mL/min/1.73    eGFR African Am 64 >60 mL/min/1.73    BUN/Creatinine Ratio 19.6 7.0 - 25.0    Sodium 142 136 - 145 mmol/L    Potassium 4.2 3.5 - 5.2 mmol/L    Chloride 101 98 - 107 mmol/L    Total CO2 25.4 22.0 - 29.0 mmol/L    Calcium 9.8 8.6 - 10.5 mg/dL    Total Protein 7.1 6.0 - 8.5 g/dL    Albumin 4.60 3.50 - 5.20 g/dL    Globulin 2.5 gm/dL    A/G Ratio 1.8 g/dL    Total Bilirubin 0.8 0.1 - 1.2 mg/dL    Alkaline Phosphatase 96 39 - 117 U/L    AST (SGOT) 26 1 - 32 U/L    ALT (SGPT) 17 1 - 33 U/L   Lipid Panel   Result Value Ref Range    Total Cholesterol 282 (H) 0 - 200 mg/dL    Triglycerides 102 0 - 150 mg/dL    HDL Cholesterol 80 (H) 40 - 60 mg/dL    VLDL Cholesterol 20.4 5 - 40 mg/dL    LDL Cholesterol  182 (H) 0 - 100 mg/dL   T3, Free   Result Value Ref Range    T3, Free 2.8 2.0 - 4.4 pg/mL   T4 & TSH ([facility])   Result Value Ref Range    TSH 1.250 0.270 - 4.200 mIU/mL    T4, Total 5.96 4.50 - 11.70 mcg/dL   T4, Free   Result Value Ref Range    Free T4 0.78 (L) 0.93 - 1.70 ng/dL   Cortisol   Result Value Ref Range    Cortisol 10.8 ug/dL   ACTH   Result Value Ref Range    ACTH 13.4 7.2 - 63.3 pg/mL   Uric Acid   Result Value Ref Range    Uric Acid 5.1 2.4 - 5.7 mg/dL   Thyroglobulin With Anti-TG    Result Value Ref Range    Thyroglobulin Ab 3.6 (H) 0.0 - 0.9 IU/mL   Thyroglobulin By MALIK   Result Value Ref Range    Thyroglobulin (TG-MALIK) 235 (H) ng/mL   Request Problem   Result Value Ref Range    Request Problem CANCELED            Assessment/Plan   Diagnoses and all orders for this visit:    Acquired hypothyroidism  -     T3, Free; Future  -     T4 & TSH ([facility]); Future  -     T4, Free; Future  -     Thyroglobulin With Anti-TG; Future  -     Uric Acid; Future  -     Vitamin D 25 Hydroxy; Future  -     Comprehensive Metabolic Panel; Future  -     Lipid Panel; Future    Arteriosclerosis of both carotid arteries  -     T3, Free; Future  -     T4 & TSH ([facility]); Future  -     T4, Free; Future  -     Thyroglobulin With Anti-TG; Future  -     Uric Acid; Future  -     Vitamin D 25 Hydroxy; Future  -     Comprehensive Metabolic Panel; Future  -     Lipid Panel; Future    Mixed hyperlipidemia  -     T3, Free; Future  -     T4 & TSH ([facility]); Future  -     T4, Free; Future  -     Thyroglobulin With Anti-TG; Future  -     Uric Acid; Future  -     Vitamin D 25 Hydroxy; Future  -     Comprehensive Metabolic Panel; Future  -     Lipid Panel; Future    Essential hypertension  -     T3, Free; Future  -     T4 & TSH ([facility]); Future  -     T4, Free; Future  -     Thyroglobulin With Anti-TG; Future  -     Uric Acid; Future  -     Vitamin D 25 Hydroxy; Future  -     Comprehensive Metabolic Panel; Future  -     Lipid Panel; Future    Chronic fatigue  -     T3, Free; Future  -     T4 & TSH ([facility]); Future  -     T4, Free; Future  -     Thyroglobulin With Anti-TG; Future  -     Uric Acid; Future  -     Vitamin D 25 Hydroxy; Future  -     Comprehensive Metabolic Panel; Future  -     Lipid Panel; Future    Postmenopausal osteoporosis  -     T3, Free; Future  -     T4 & TSH ([facility]); Future  -     T4, Free; Future  -     Thyroglobulin With Anti-TG; Future  -     Uric Acid; Future  -     Vitamin D 25 Hydroxy; Future  -      Comprehensive Metabolic Panel; Future  -     Lipid Panel; Future    Other orders  -     thyroid (ARMOUR) 15 MG tablet; Take 1 tablet by mouth Daily.  -     ARMOUR THYROID 30 MG tablet; Take 1 tablet by mouth Daily.  -     ergocalciferol (DRISDOL) 71942 UNITS capsule; Take 1 capsule by mouth 1 (One) Time Per Week.  -     rosuvastatin (CRESTOR) 20 MG tablet; Take 1 tablet by mouth Every Night.               His summary I saw and examined this 76-year-old female for above-mentioned problems.  I reviewed her laboratory evaluation of 06/06/2017 and provided her with a hard copy of it.  Aside from the fact that she has dyslipidemia with a cholesterol of 282 and the LDL level of 182 she is clinically and metabolically stable.  I mentioned to her the importance of the being compliant with her medications including taking Crestor given the fact that she has history of atherosclerotic heart disease.  We will also increase her dose of [place] thyroid to 45 mg daily.  She will continue rest of her medications and will see Ms. Tiffanie Boland in 6 months or sooner if needed with laboratory evaluation prior to her office visit.

## 2017-08-09 RX ORDER — PAROXETINE 10 MG/1
TABLET, FILM COATED ORAL
Qty: 90 TABLET | Refills: 0 | Status: SHIPPED | OUTPATIENT
Start: 2017-08-09 | End: 2017-11-04 | Stop reason: SDUPTHER

## 2017-08-22 DIAGNOSIS — I10 ESSENTIAL HYPERTENSION: ICD-10-CM

## 2017-08-22 RX ORDER — ATENOLOL 50 MG/1
TABLET ORAL
Qty: 30 TABLET | Refills: 1 | Status: SHIPPED | OUTPATIENT
Start: 2017-08-22 | End: 2017-10-10 | Stop reason: SDUPTHER

## 2017-08-22 RX ORDER — LOSARTAN POTASSIUM 100 MG/1
TABLET ORAL
Qty: 30 TABLET | Refills: 1 | Status: SHIPPED | OUTPATIENT
Start: 2017-08-22 | End: 2017-10-27 | Stop reason: SDUPTHER

## 2017-09-12 ENCOUNTER — OFFICE VISIT (OUTPATIENT)
Dept: FAMILY MEDICINE CLINIC | Facility: CLINIC | Age: 77
End: 2017-09-12

## 2017-09-12 VITALS
OXYGEN SATURATION: 98 % | TEMPERATURE: 98 F | BODY MASS INDEX: 25.32 KG/M2 | HEIGHT: 60 IN | HEART RATE: 71 BPM | WEIGHT: 129 LBS | SYSTOLIC BLOOD PRESSURE: 120 MMHG | DIASTOLIC BLOOD PRESSURE: 68 MMHG

## 2017-09-12 DIAGNOSIS — M62.838 MUSCLE SPASMS OF BOTH LOWER EXTREMITIES: Primary | ICD-10-CM

## 2017-09-12 DIAGNOSIS — G89.29 CHRONIC BILATERAL LOW BACK PAIN WITHOUT SCIATICA: ICD-10-CM

## 2017-09-12 DIAGNOSIS — M54.50 CHRONIC BILATERAL LOW BACK PAIN WITHOUT SCIATICA: ICD-10-CM

## 2017-09-12 DIAGNOSIS — R82.90 ABNORMAL URINALYSIS: ICD-10-CM

## 2017-09-12 DIAGNOSIS — M51.36 DEGENERATIVE DISC DISEASE, LUMBAR: ICD-10-CM

## 2017-09-12 DIAGNOSIS — M79.7 FIBROMYALGIA: ICD-10-CM

## 2017-09-12 LAB
BILIRUB BLD-MCNC: NEGATIVE MG/DL
CLARITY, POC: CLEAR
COLOR UR: YELLOW
GLUCOSE UR STRIP-MCNC: NEGATIVE MG/DL
KETONES UR QL: NEGATIVE
LEUKOCYTE EST, POC: ABNORMAL
NITRITE UR-MCNC: NEGATIVE MG/ML
PH UR: 7 [PH] (ref 5–8)
PROT UR STRIP-MCNC: NEGATIVE MG/DL
RBC # UR STRIP: NEGATIVE /UL
SP GR UR: 1.01 (ref 1–1.03)
UROBILINOGEN UR QL: NORMAL

## 2017-09-12 PROCEDURE — 81003 URINALYSIS AUTO W/O SCOPE: CPT | Performed by: PHYSICIAN ASSISTANT

## 2017-09-12 PROCEDURE — 99213 OFFICE O/P EST LOW 20 MIN: CPT | Performed by: PHYSICIAN ASSISTANT

## 2017-09-12 RX ORDER — BACLOFEN 10 MG/1
10 TABLET ORAL 3 TIMES DAILY PRN
Qty: 30 TABLET | Refills: 0 | Status: SHIPPED | OUTPATIENT
Start: 2017-09-12 | End: 2019-04-09

## 2017-09-12 NOTE — PROGRESS NOTES
"Subjective   Dafne Mary is a 76 y.o. female seen today for low back pain, past history of bulging disc in low back and had epidural injections 10 years ago     History of Present Illness   1ST TIME BACK HURT WAS ABOUT 10-11 YEARS AGO. HAD EPIDURAL INJECTIONS AT [facility] AND WAS BETTER FOR SEVERAL YEARS. LAST BACK TROUBLE ABOUT 6-7 YEARS AGO. WAS SENT TO PAIN MANAGEMENT- HAD MRI- WANTED EPIDURAL INJECTIONS AGAIN AND CHANGED PCP AND NEVER WENT BACK. IF MORE GARDENING, INCREASED PAIN.  PRIOR LOWER BACK PAIN WITH PAIN DOWN LEG TO FOOT.     CURRENTLY, PAIN IS MORE SEVERE THAT CANNOT WORK DUST MOP WITHOUT HAVING TO SIT DOWN SEVERAL TIMES. PAIN IS NOW HIGHER THAN PRIOR PAIN- IN WAIST AREA. BURNING PAIN. 7/10. PAIN IN BILATERAL LEGS- WHOLE LEG- ACHING WORSE WHILE TRYING TO GO TO SLEEP. SOMETIMES LEGS \"TRY TO GIVE OUT\". NO NUMBNESS OR TINGLING. NO LOSS OF CONTROL OF BOWEL OR BLADDER.     WOULD PREFER TO WAIT FOR MRI FOR PHYSICAL THERAPY - PREVIOUSLY AS UNABLE TO DO DUE TO INCREASED PAIN.    The following portions of the patient's history were reviewed and updated as appropriate: allergies, current medications, past family history, past medical history, past social history, past surgical history and problem list.    Review of Systems   Musculoskeletal: Positive for back pain.   All other systems reviewed and are negative.      Objective   Physical Exam   Constitutional: She is oriented to person, place, and time. She appears well-developed and well-nourished.   HENT:   Head: Normocephalic and atraumatic.   Right Ear: External ear normal.   Left Ear: External ear normal.   Cardiovascular: Normal rate, regular rhythm, normal heart sounds and intact distal pulses.    Pulmonary/Chest: Effort normal and breath sounds normal. She has no wheezes. She has no rales.   Musculoskeletal: Normal range of motion. She exhibits no edema or deformity.        Lumbar back: She exhibits spasm (AS NOTED IN RED). She exhibits no tenderness, no " bony tenderness, no swelling, no edema and normal pulse.        Back:    Neurological: She is alert and oriented to person, place, and time. She has normal strength.   Reflex Scores:       Patellar reflexes are 2+ on the right side and 2+ on the left side.       Achilles reflexes are 2+ on the right side and 2+ on the left side.  Psychiatric: She has a normal mood and affect. Her speech is normal and behavior is normal. Judgment and thought content normal. Cognition and memory are normal.   Nursing note and vitals reviewed.      Assessment/Plan   Dafne was seen today for low back pain.    Diagnoses and all orders for this visit:    Muscle spasms of both lower extremities  -     Cancel: MRI Lumbar Spine Without Contrast  -     MRI Lumbar Spine Without Contrast  -     baclofen (LIORESAL) 10 MG tablet; Take 1 tablet by mouth 3 (Three) Times a Day As Needed for Muscle Spasms.    Fibromyalgia  -     Cancel: MRI Lumbar Spine Without Contrast  -     MRI Lumbar Spine Without Contrast  -     baclofen (LIORESAL) 10 MG tablet; Take 1 tablet by mouth 3 (Three) Times a Day As Needed for Muscle Spasms.    Chronic bilateral low back pain without sciatica  -     Cancel: MRI Lumbar Spine Without Contrast  -     MRI Lumbar Spine Without Contrast  -     POC Urinalysis Dipstick, Automated  -     baclofen (LIORESAL) 10 MG tablet; Take 1 tablet by mouth 3 (Three) Times a Day As Needed for Muscle Spasms.    Degenerative disc disease, lumbar  -     Cancel: MRI Lumbar Spine Without Contrast  -     MRI Lumbar Spine Without Contrast  -     baclofen (LIORESAL) 10 MG tablet; Take 1 tablet by mouth 3 (Three) Times a Day As Needed for Muscle Spasms.    Abnormal urinalysis  -     Urine Culture      Patient Instructions   76 YEAR OLD FEMALE WHO PRESENTS TODAY IN FOLLOW UP OF LOW BACK PAIN. SHE HAD LOW BACK PAIN INITIALLY 10-11 YEARS AGO WITH MRI WITH DDD WITH EPIDURAL INJECTIONS THEN AGAIN ABOUT 6-7 YEARS AGO WITH MRI AND CONSIDERATION OF  INJECTIONS BUT NEVER RECEIVED. PATIENT STARTED AGAIN WITH BACK PAIN RECENTLY WITH LEG PAIN AND HEAVINESS. SHE WOULD LIKE TO WAIT ON PHYSICAL THERAPY, AS THIS WORSENED IN THE PAST. I WILL REFER FOR MRI LUMBAR SPINE AND GIVE BACLOFEN IF NEEDED. I WILL ALSO CHECK URINE AND REFER FOR MRI LUMBAR SPINE. FOLLOW UP IF ANY WORSENING OR NEW SYMPTOMS. TO ER ASAP IF DEVELOPS LOSS OF CONTROL OF BOWEL OR BLADDER.

## 2017-09-15 LAB
BACTERIA UR CULT: ABNORMAL
BACTERIA UR CULT: ABNORMAL
OTHER ANTIBIOTIC SUSC ISLT: ABNORMAL

## 2017-09-18 ENCOUNTER — HOSPITAL ENCOUNTER (OUTPATIENT)
Dept: MRI IMAGING | Facility: HOSPITAL | Age: 77
Discharge: HOME | End: 2017-09-18

## 2017-09-18 DIAGNOSIS — N30.00 ACUTE CYSTITIS WITHOUT HEMATURIA: Primary | ICD-10-CM

## 2017-09-18 RX ORDER — NITROFURANTOIN 25; 75 MG/1; MG/1
100 CAPSULE ORAL 2 TIMES DAILY
Qty: 14 CAPSULE | Refills: 0 | Status: SHIPPED | OUTPATIENT
Start: 2017-09-18 | End: 2017-12-15 | Stop reason: SDUPTHER

## 2017-09-19 NOTE — PATIENT INSTRUCTIONS
76 YEAR OLD FEMALE WHO PRESENTS TODAY IN FOLLOW UP OF LOW BACK PAIN. SHE HAD LOW BACK PAIN INITIALLY 10-11 YEARS AGO WITH MRI WITH DDD WITH EPIDURAL INJECTIONS THEN AGAIN ABOUT 6-7 YEARS AGO WITH MRI AND CONSIDERATION OF INJECTIONS BUT NEVER RECEIVED. PATIENT STARTED AGAIN WITH BACK PAIN RECENTLY WITH LEG PAIN AND HEAVINESS. SHE WOULD LIKE TO WAIT ON PHYSICAL THERAPY, AS THIS WORSENED IN THE PAST. I WILL REFER FOR MRI LUMBAR SPINE AND GIVE BACLOFEN IF NEEDED. I WILL ALSO CHECK URINE AND REFER FOR MRI LUMBAR SPINE. FOLLOW UP IF ANY WORSENING OR NEW SYMPTOMS. TO ER ASAP IF DEVELOPS LOSS OF CONTROL OF BOWEL OR BLADDER.

## 2017-09-22 ENCOUNTER — HOSPITAL ENCOUNTER (OUTPATIENT)
Dept: MRI IMAGING | Facility: HOSPITAL | Age: 77
End: 2017-09-22

## 2017-09-27 ENCOUNTER — HOSPITAL ENCOUNTER (OUTPATIENT)
Dept: MRI IMAGING | Facility: HOSPITAL | Age: 77
Discharge: HOME | End: 2017-09-27

## 2017-10-03 ENCOUNTER — TELEPHONE (OUTPATIENT)
Dept: FAMILY MEDICINE CLINIC | Facility: CLINIC | Age: 77
End: 2017-10-03

## 2017-10-03 NOTE — TELEPHONE ENCOUNTER
Patient called in regards to the Atenolol. There is a national shortage and will need this changed to something else. She would like it sent to [facility] in [place].

## 2017-10-09 DIAGNOSIS — I10 ESSENTIAL HYPERTENSION: ICD-10-CM

## 2017-10-09 RX ORDER — ATENOLOL 50 MG/1
TABLET ORAL
Qty: 30 TABLET | Refills: 1 | OUTPATIENT
Start: 2017-10-09

## 2017-10-10 DIAGNOSIS — I10 ESSENTIAL HYPERTENSION: ICD-10-CM

## 2017-10-10 RX ORDER — ATENOLOL 50 MG/1
50 TABLET ORAL DAILY
Qty: 30 TABLET | Refills: 0 | Status: SHIPPED | OUTPATIENT
Start: 2017-10-10 | End: 2017-12-07 | Stop reason: SDUPTHER

## 2017-10-16 DIAGNOSIS — I10 ESSENTIAL HYPERTENSION: ICD-10-CM

## 2017-10-16 RX ORDER — AMLODIPINE BESYLATE 5 MG/1
TABLET ORAL
Qty: 30 TABLET | Refills: 0 | Status: SHIPPED | OUTPATIENT
Start: 2017-10-16 | End: 2017-11-22 | Stop reason: SDUPTHER

## 2017-10-27 DIAGNOSIS — I10 ESSENTIAL HYPERTENSION: ICD-10-CM

## 2017-10-27 RX ORDER — LOSARTAN POTASSIUM 100 MG/1
TABLET ORAL
Qty: 30 TABLET | Refills: 1 | Status: SHIPPED | OUTPATIENT
Start: 2017-10-27 | End: 2018-01-09 | Stop reason: SDUPTHER

## 2017-11-06 RX ORDER — PAROXETINE 10 MG/1
TABLET, FILM COATED ORAL
Qty: 90 TABLET | Refills: 0 | Status: SHIPPED | OUTPATIENT
Start: 2017-11-06 | End: 2018-02-09 | Stop reason: SDUPTHER

## 2017-11-22 DIAGNOSIS — E55.9 VITAMIN D DEFICIENCY: ICD-10-CM

## 2017-11-22 DIAGNOSIS — I10 ESSENTIAL HYPERTENSION: ICD-10-CM

## 2017-11-22 DIAGNOSIS — E03.9 ACQUIRED HYPOTHYROIDISM: Primary | ICD-10-CM

## 2017-11-22 RX ORDER — AMLODIPINE BESYLATE 5 MG/1
TABLET ORAL
Qty: 30 TABLET | Refills: 0 | Status: SHIPPED | OUTPATIENT
Start: 2017-11-22 | End: 2018-01-09 | Stop reason: SDUPTHER

## 2017-12-07 DIAGNOSIS — I10 ESSENTIAL HYPERTENSION: ICD-10-CM

## 2017-12-08 RX ORDER — ATENOLOL 50 MG/1
TABLET ORAL
Qty: 30 TABLET | Refills: 2 | Status: SHIPPED | OUTPATIENT
Start: 2017-12-08 | End: 2018-04-18 | Stop reason: SDUPTHER

## 2017-12-12 ENCOUNTER — HOSPITAL ENCOUNTER (OUTPATIENT)
Dept: MRI IMAGING | Facility: HOSPITAL | Age: 77
Discharge: HOME | End: 2017-12-12
Admitting: PHYSICIAN ASSISTANT

## 2017-12-12 PROCEDURE — 72148 MRI LUMBAR SPINE W/O DYE: CPT

## 2017-12-13 ENCOUNTER — RESULTS ENCOUNTER (OUTPATIENT)
Dept: ENDOCRINOLOGY | Age: 77
End: 2017-12-13

## 2017-12-13 DIAGNOSIS — I65.23 ARTERIOSCLEROSIS OF BOTH CAROTID ARTERIES: ICD-10-CM

## 2017-12-13 DIAGNOSIS — R53.82 CHRONIC FATIGUE: ICD-10-CM

## 2017-12-13 DIAGNOSIS — M81.0 POSTMENOPAUSAL OSTEOPOROSIS: ICD-10-CM

## 2017-12-13 DIAGNOSIS — E78.2 MIXED HYPERLIPIDEMIA: ICD-10-CM

## 2017-12-13 DIAGNOSIS — E03.9 ACQUIRED HYPOTHYROIDISM: ICD-10-CM

## 2017-12-13 DIAGNOSIS — I10 ESSENTIAL HYPERTENSION: ICD-10-CM

## 2017-12-15 ENCOUNTER — TELEPHONE (OUTPATIENT)
Dept: FAMILY MEDICINE CLINIC | Facility: CLINIC | Age: 77
End: 2017-12-15

## 2017-12-15 ENCOUNTER — OFFICE VISIT (OUTPATIENT)
Dept: FAMILY MEDICINE CLINIC | Facility: CLINIC | Age: 77
End: 2017-12-15

## 2017-12-15 VITALS
HEIGHT: 60 IN | DIASTOLIC BLOOD PRESSURE: 74 MMHG | WEIGHT: 133 LBS | OXYGEN SATURATION: 95 % | HEART RATE: 77 BPM | TEMPERATURE: 98.7 F | BODY MASS INDEX: 26.11 KG/M2 | SYSTOLIC BLOOD PRESSURE: 121 MMHG

## 2017-12-15 DIAGNOSIS — M51.36 DEGENERATIVE DISC DISEASE, LUMBAR: ICD-10-CM

## 2017-12-15 DIAGNOSIS — R31.9 HEMATURIA, UNSPECIFIED TYPE: ICD-10-CM

## 2017-12-15 DIAGNOSIS — R30.0 DYSURIA: ICD-10-CM

## 2017-12-15 DIAGNOSIS — N39.0 URINARY TRACT INFECTION WITH HEMATURIA, SITE UNSPECIFIED: Primary | ICD-10-CM

## 2017-12-15 DIAGNOSIS — G89.29 CHRONIC BILATERAL LOW BACK PAIN WITHOUT SCIATICA: Primary | ICD-10-CM

## 2017-12-15 DIAGNOSIS — M43.16 SPONDYLOLISTHESIS OF LUMBAR REGION: ICD-10-CM

## 2017-12-15 DIAGNOSIS — M47.816 LUMBAR SPONDYLOSIS: ICD-10-CM

## 2017-12-15 DIAGNOSIS — R31.9 URINARY TRACT INFECTION WITH HEMATURIA, SITE UNSPECIFIED: Primary | ICD-10-CM

## 2017-12-15 DIAGNOSIS — N30.91 CYSTITIS WITH HEMATURIA: ICD-10-CM

## 2017-12-15 DIAGNOSIS — M54.50 CHRONIC BILATERAL LOW BACK PAIN WITHOUT SCIATICA: Primary | ICD-10-CM

## 2017-12-15 DIAGNOSIS — M48.061 SPINAL STENOSIS OF LUMBAR REGION, UNSPECIFIED WHETHER NEUROGENIC CLAUDICATION PRESENT: ICD-10-CM

## 2017-12-15 DIAGNOSIS — R05.8 DRY COUGH: Primary | ICD-10-CM

## 2017-12-15 DIAGNOSIS — R82.998 LEUKOCYTES IN URINE: ICD-10-CM

## 2017-12-15 PROBLEM — N30.00 ACUTE CYSTITIS WITHOUT HEMATURIA: Status: ACTIVE | Noted: 2017-12-15

## 2017-12-15 LAB
BILIRUB BLD-MCNC: NEGATIVE MG/DL
CLARITY, POC: ABNORMAL
COLOR UR: ABNORMAL
GLUCOSE UR STRIP-MCNC: NEGATIVE MG/DL
KETONES UR QL: NEGATIVE
LEUKOCYTE EST, POC: ABNORMAL
NITRITE UR-MCNC: POSITIVE MG/ML
PH UR: 5.5 [PH] (ref 5–8)
PROT UR STRIP-MCNC: NEGATIVE MG/DL
RBC # UR STRIP: ABNORMAL /UL
SP GR UR: 1.02 (ref 1–1.03)
UROBILINOGEN UR QL: NORMAL

## 2017-12-15 PROCEDURE — 81003 URINALYSIS AUTO W/O SCOPE: CPT | Performed by: NURSE PRACTITIONER

## 2017-12-15 PROCEDURE — 99213 OFFICE O/P EST LOW 20 MIN: CPT | Performed by: NURSE PRACTITIONER

## 2017-12-15 RX ORDER — SULFAMETHOXAZOLE AND TRIMETHOPRIM 800; 160 MG/1; MG/1
1 TABLET ORAL 2 TIMES DAILY
Qty: 20 TABLET | Refills: 0 | Status: SHIPPED | OUTPATIENT
Start: 2017-12-15 | End: 2018-02-20

## 2017-12-15 RX ORDER — NITROFURANTOIN 25; 75 MG/1; MG/1
100 CAPSULE ORAL 2 TIMES DAILY
Qty: 14 CAPSULE | Refills: 0 | Status: SHIPPED | OUTPATIENT
Start: 2017-12-15 | End: 2017-12-15 | Stop reason: CLARIF

## 2017-12-15 RX ORDER — PHENAZOPYRIDINE HYDROCHLORIDE 200 MG/1
200 TABLET, FILM COATED ORAL 3 TIMES DAILY PRN
Qty: 30 TABLET | Refills: 1 | Status: SHIPPED | OUTPATIENT
Start: 2017-12-15 | End: 2018-05-30

## 2017-12-15 NOTE — PROGRESS NOTES
Subjective   Dafne Mary is a 77 y.o. female. Patient is being seen today for urinary burning and a cough.     History of Present Illness 77 yr old white female here for sudden onset urinary burning that has been constant for 24 hrs. TX with nothing. Nothing makes better or worse. Rates discomfort as 9.  Also sudden onset of cough that is tickling and dry, constant for 2 weeks. Nothing makes better and laying down makes worse. TX with drinking more water. Rates on a 1-10 scale as  4.  The following portions of the patient's history were reviewed and updated as appropriate: allergies, current medications, past family history, past medical history, past social history, past surgical history and problem list.    Review of Systems   Respiratory: Positive for cough.    Genitourinary:        Urinary burning.   All other systems reviewed and are negative.      Objective   Physical Exam   Constitutional: She is oriented to person, place, and time. She appears well-developed and well-nourished.   HENT:   Head: Normocephalic and atraumatic.   Eyes: EOM are normal. Pupils are equal, round, and reactive to light.   Neck: Normal range of motion. Neck supple.   Cardiovascular: Normal rate, regular rhythm and normal heart sounds.    Pulmonary/Chest: Effort normal and breath sounds normal.   Coughing frequently, dry. Tight in bronchus area   Abdominal: Soft. Bowel sounds are normal.   Genitourinary:   Genitourinary Comments: Urine specimen tinted green with foul smell she complains of pain with urination. States she has had frequent urinary tract infection since the age of 18.   Musculoskeletal: Normal range of motion.   Neurological: She is alert and oriented to person, place, and time.   Skin: Skin is warm and dry.   Psychiatric: She has a normal mood and affect. Her behavior is normal. Judgment and thought content normal.   Nursing note and vitals reviewed.      Assessment/Plan   Dafne was seen today for urinary burning and  cough.    Diagnoses and all orders for this visit:    Dry cough    Dysuria  -     phenazopyridine (PYRIDIUM) 200 MG tablet; Take 1 tablet by mouth 3 (Three) Times a Day As Needed for bladder spasms.    Hematuria, unspecified type  -     POCT urinalysis dipstick, automated    Leukocytes in urine  -     POCT urinalysis dipstick, automated    Cystitis with hematuria  -     nitrofurantoin, macrocrystal-monohydrate, (MACROBID) 100 MG capsule; Take 1 capsule by mouth 2 (Two) Times a Day.    In office UA positive for blood, nitrites, leukocytes, and turbid in nature. Will treat her urinary tract infection with MicroBid 100 mg capsule 1 capsule twice a day for 14 days. Will prescribe Pyridium to help control the discomfort in her bladder. Patient to return upon completion of medication to make sure that we have eradicated the bacteria. Only needs a urine test. Sending urine to lab for further study. Encourage frequent sips of fluid during the waking hours. To follow-up if needed with [facility]. Patient does not want a cough syrup for her cough. Will call our office on Monday if she changes her mind

## 2017-12-15 NOTE — TELEPHONE ENCOUNTER
[facility], Nitrofurantin (Marcrobid) not covered by her insurance needs something else

## 2017-12-19 NOTE — TELEPHONE ENCOUNTER
Left detailed message. Did not specify on what medication or pharmacy the replaced medication was sent to.

## 2017-12-20 LAB
APPEARANCE UR: ABNORMAL
BACTERIA #/AREA URNS HPF: ABNORMAL /[HPF]
BACTERIA UR CULT: ABNORMAL
BACTERIA UR CULT: ABNORMAL
BILIRUB UR QL STRIP: NEGATIVE
COLOR UR: YELLOW
EPI CELLS #/AREA URNS HPF: ABNORMAL /HPF
GLUCOSE UR QL: NEGATIVE
HGB UR QL STRIP: NEGATIVE
KETONES UR QL STRIP: NEGATIVE
LEUKOCYTE ESTERASE UR QL STRIP: ABNORMAL
MICRO URNS: ABNORMAL
MUCOUS THREADS URNS QL MICRO: PRESENT
NITRITE UR QL STRIP: POSITIVE
OTHER ANTIBIOTIC SUSC ISLT: ABNORMAL
PH UR STRIP: 5.5 [PH] (ref 5–7.5)
PROT UR QL STRIP: NEGATIVE
RBC #/AREA URNS HPF: ABNORMAL /HPF
SP GR UR: 1.02 (ref 1–1.03)
URINALYSIS REFLEX: ABNORMAL
UROBILINOGEN UR STRIP-MCNC: 0.2 MG/DL (ref 0.2–1)
WBC #/AREA URNS HPF: >30 /HPF

## 2017-12-22 ENCOUNTER — RESULTS ENCOUNTER (OUTPATIENT)
Dept: ENDOCRINOLOGY | Age: 77
End: 2017-12-22

## 2017-12-22 DIAGNOSIS — E55.9 VITAMIN D DEFICIENCY: ICD-10-CM

## 2017-12-22 DIAGNOSIS — E03.9 ACQUIRED HYPOTHYROIDISM: ICD-10-CM

## 2017-12-27 ENCOUNTER — TELEPHONE (OUTPATIENT)
Dept: FAMILY MEDICINE CLINIC | Facility: CLINIC | Age: 77
End: 2017-12-27

## 2017-12-27 DIAGNOSIS — R30.0 DYSURIA: Primary | ICD-10-CM

## 2017-12-27 PROCEDURE — 81003 URINALYSIS AUTO W/O SCOPE: CPT | Performed by: PHYSICIAN ASSISTANT

## 2017-12-27 RX ORDER — THYROID,PORK 15 MG
TABLET ORAL
Qty: 30 TABLET | Refills: 5 | Status: SHIPPED | OUTPATIENT
Start: 2017-12-27 | End: 2018-07-24 | Stop reason: SDUPTHER

## 2017-12-27 NOTE — TELEPHONE ENCOUNTER
Patient states her insurance would not pay for Septra last week and Nitrofurantoin was called in instead for UTI, states you told her the medication would be for 10 days and it was only for 7 days and she is hurting when she urinates, wants to know if and few more days of antibiotics can be sent in?

## 2017-12-31 NOTE — TELEPHONE ENCOUNTER
IT LOOKS LIKE DOROTHY SENT IN MACROBID (NITROFURANTOIN) AT [facility]. IT IS ONLY A 7 DAY RX. PER URINE CULTURE, UTI SUSCEPTIBLE TO MACROBID. THIS SHOULD CURE UTI. PER DOROTHY'S NOTE, SHE IS TO SUBMIT URINE SAMPLE FOR CURE. I HOPE THIS WAS ALREADY ADDRESSED- IT LOOKED WE WERE TRYING TO REACH PATIENT REGARDING LAB RESULTS.

## 2018-01-03 LAB
BILIRUB BLD-MCNC: NEGATIVE MG/DL
CLARITY, POC: ABNORMAL
COLOR UR: YELLOW
GLUCOSE UR STRIP-MCNC: NEGATIVE MG/DL
KETONES UR QL: NEGATIVE
LEUKOCYTE EST, POC: ABNORMAL
NITRITE UR-MCNC: POSITIVE MG/ML
PH UR: 6 [PH] (ref 5–8)
PROT UR STRIP-MCNC: NEGATIVE MG/DL
RBC # UR STRIP: ABNORMAL /UL
SP GR UR: 1.01 (ref 1–1.03)
UROBILINOGEN UR QL: NORMAL

## 2018-01-03 RX ORDER — CIPROFLOXACIN 500 MG/1
500 TABLET, FILM COATED ORAL EVERY 12 HOURS SCHEDULED
Qty: 14 TABLET | Refills: 0 | Status: SHIPPED | OUTPATIENT
Start: 2018-01-03 | End: 2018-02-20

## 2018-01-06 LAB
BACTERIA UR CULT: ABNORMAL
BACTERIA UR CULT: ABNORMAL
OTHER ANTIBIOTIC SUSC ISLT: ABNORMAL

## 2018-01-09 DIAGNOSIS — I10 ESSENTIAL HYPERTENSION: ICD-10-CM

## 2018-01-09 NOTE — TELEPHONE ENCOUNTER
TOLD HER WE WOULD REFILL FOR 1 MONTH ONLY UNTIL SEEN. SHE STATES THAT SHE IS TAKING BOTH OF THESE.

## 2018-01-09 NOTE — TELEPHONE ENCOUNTER
PT INFORMED THAT SHE MUST BE SEEN. SHE SAID THAT SHE WILL CALL BACK TO SCHEDULE. I TOLD HER SHE HAS TO SCHEDULE WITHIN A COUPLE WEEKS SO WE CAN GET HER IN WITHIN THIS MONTH FOR FUTURE REFILLS. PT UNDERSTOOD.

## 2018-01-10 RX ORDER — LOSARTAN POTASSIUM 100 MG/1
TABLET ORAL
Qty: 30 TABLET | Refills: 1 | Status: SHIPPED | OUTPATIENT
Start: 2018-01-10 | End: 2018-03-26 | Stop reason: SDUPTHER

## 2018-01-10 RX ORDER — AMLODIPINE BESYLATE 5 MG/1
TABLET ORAL
Qty: 30 TABLET | Refills: 0 | Status: SHIPPED | OUTPATIENT
Start: 2018-01-10 | End: 2018-02-20 | Stop reason: SDUPTHER

## 2018-01-29 RX ORDER — THYROID 30 MG/1
TABLET ORAL
Qty: 30 TABLET | Refills: 5 | Status: SHIPPED | OUTPATIENT
Start: 2018-01-29 | End: 2018-07-24 | Stop reason: SDUPTHER

## 2018-02-09 RX ORDER — PAROXETINE 10 MG/1
TABLET, FILM COATED ORAL
Qty: 30 TABLET | Refills: 0 | Status: SHIPPED | OUTPATIENT
Start: 2018-02-09 | End: 2018-03-19 | Stop reason: SDUPTHER

## 2018-02-13 ENCOUNTER — APPOINTMENT (OUTPATIENT)
Dept: GENERAL RADIOLOGY | Facility: HOSPITAL | Age: 78
End: 2018-02-13

## 2018-02-13 ENCOUNTER — HOSPITAL ENCOUNTER (EMERGENCY)
Facility: HOSPITAL | Age: 78
Discharge: HOME | End: 2018-02-13
Attending: EMERGENCY MEDICINE | Admitting: EMERGENCY MEDICINE

## 2018-02-13 VITALS
TEMPERATURE: 98.5 F | RESPIRATION RATE: 22 BRPM | SYSTOLIC BLOOD PRESSURE: 142 MMHG | WEIGHT: 130 LBS | OXYGEN SATURATION: 98 % | HEIGHT: 60 IN | HEART RATE: 75 BPM | DIASTOLIC BLOOD PRESSURE: 86 MMHG | BODY MASS INDEX: 25.52 KG/M2

## 2018-02-13 DIAGNOSIS — R07.9 CHEST PAIN OF UNKNOWN ETIOLOGY: Primary | ICD-10-CM

## 2018-02-13 LAB
ALBUMIN SERPL-MCNC: 3.8 G/DL (ref 3.5–5.2)
ALBUMIN/GLOB SERPL: 1.2 G/DL
ALP SERPL-CCNC: 117 U/L (ref 39–117)
ALT SERPL W P-5'-P-CCNC: 17 U/L (ref 1–33)
ANION GAP SERPL CALCULATED.3IONS-SCNC: 10.3 MMOL/L
AST SERPL-CCNC: 21 U/L (ref 1–32)
BASOPHILS # BLD AUTO: 0.05 10*3/MM3 (ref 0–0.2)
BASOPHILS NFR BLD AUTO: 0.5 % (ref 0–1.5)
BILIRUB SERPL-MCNC: 0.4 MG/DL (ref 0.1–1.2)
BUN BLD-MCNC: 15 MG/DL (ref 8–23)
BUN/CREAT SERPL: 17.9 (ref 7–25)
CALCIUM SPEC-SCNC: 9.2 MG/DL (ref 8.6–10.5)
CHLORIDE SERPL-SCNC: 100 MMOL/L (ref 98–107)
CO2 SERPL-SCNC: 28.7 MMOL/L (ref 22–29)
CREAT BLD-MCNC: 0.84 MG/DL (ref 0.57–1)
DEPRECATED RDW RBC AUTO: 41.6 FL (ref 37–54)
EOSINOPHIL # BLD AUTO: 0.3 10*3/MM3 (ref 0–0.7)
EOSINOPHIL NFR BLD AUTO: 2.7 % (ref 0.3–6.2)
ERYTHROCYTE [DISTWIDTH] IN BLOOD BY AUTOMATED COUNT: 12.6 % (ref 11.7–13)
GFR SERPL CREATININE-BSD FRML MDRD: 66 ML/MIN/1.73
GLOBULIN UR ELPH-MCNC: 3.1 GM/DL
GLUCOSE BLD-MCNC: 111 MG/DL (ref 65–99)
HCT VFR BLD AUTO: 41 % (ref 35.6–45.5)
HGB BLD-MCNC: 13.4 G/DL (ref 11.9–15.5)
IMM GRANULOCYTES # BLD: 0.05 10*3/MM3 (ref 0–0.03)
IMM GRANULOCYTES NFR BLD: 0.5 % (ref 0–0.5)
INR PPP: 0.91 (ref 0.9–1.1)
LYMPHOCYTES # BLD AUTO: 1.04 10*3/MM3 (ref 0.9–4.8)
LYMPHOCYTES NFR BLD AUTO: 9.4 % (ref 19.6–45.3)
MCH RBC QN AUTO: 29.7 PG (ref 26.9–32)
MCHC RBC AUTO-ENTMCNC: 32.7 G/DL (ref 32.4–36.3)
MCV RBC AUTO: 90.9 FL (ref 80.5–98.2)
MONOCYTES # BLD AUTO: 0.85 10*3/MM3 (ref 0.2–1.2)
MONOCYTES NFR BLD AUTO: 7.7 % (ref 5–12)
NEUTROPHILS # BLD AUTO: 8.8 10*3/MM3 (ref 1.9–8.1)
NEUTROPHILS NFR BLD AUTO: 79.2 % (ref 42.7–76)
PLATELET # BLD AUTO: 226 10*3/MM3 (ref 140–500)
PMV BLD AUTO: 9.8 FL (ref 6–12)
POTASSIUM BLD-SCNC: 3.8 MMOL/L (ref 3.5–5.2)
PROT SERPL-MCNC: 6.9 G/DL (ref 6–8.5)
PROTHROMBIN TIME: 12.1 SECONDS (ref 11.7–14.2)
RBC # BLD AUTO: 4.51 10*6/MM3 (ref 3.9–5.2)
SODIUM BLD-SCNC: 139 MMOL/L (ref 136–145)
TROPONIN T SERPL-MCNC: <0.01 NG/ML (ref 0–0.03)
WBC NRBC COR # BLD: 11.09 10*3/MM3 (ref 4.5–10.7)

## 2018-02-13 PROCEDURE — 80053 COMPREHEN METABOLIC PANEL: CPT | Performed by: EMERGENCY MEDICINE

## 2018-02-13 PROCEDURE — 99285 EMERGENCY DEPT VISIT HI MDM: CPT

## 2018-02-13 PROCEDURE — 85610 PROTHROMBIN TIME: CPT | Performed by: EMERGENCY MEDICINE

## 2018-02-13 PROCEDURE — 93005 ELECTROCARDIOGRAM TRACING: CPT

## 2018-02-13 PROCEDURE — 85025 COMPLETE CBC W/AUTO DIFF WBC: CPT | Performed by: EMERGENCY MEDICINE

## 2018-02-13 PROCEDURE — 93010 ELECTROCARDIOGRAM REPORT: CPT | Performed by: INTERNAL MEDICINE

## 2018-02-13 PROCEDURE — 71046 X-RAY EXAM CHEST 2 VIEWS: CPT

## 2018-02-13 PROCEDURE — 84484 ASSAY OF TROPONIN QUANT: CPT | Performed by: EMERGENCY MEDICINE

## 2018-02-13 RX ORDER — ASPIRIN 325 MG
325 TABLET ORAL ONCE
Status: COMPLETED | OUTPATIENT
Start: 2018-02-13 | End: 2018-02-13

## 2018-02-13 RX ORDER — BENZONATATE 200 MG/1
200 CAPSULE ORAL 3 TIMES DAILY PRN
Qty: 20 CAPSULE | Refills: 0 | Status: SHIPPED | OUTPATIENT
Start: 2018-02-13 | End: 2018-05-30

## 2018-02-13 RX ADMIN — ASPIRIN 325 MG: 325 TABLET ORAL at 09:26

## 2018-02-13 NOTE — DISCHARGE INSTRUCTIONS
Cardiac-Specific Troponin I and T Test  Why am I having this test?  You may have this test if you have experienced chest pain. The test can be used to determine if you have had a heart attack or injury to heart (cardiac) muscle. This test can also help predict the possibility of future heart attacks.  This test measures the concentration of cardiac-specific troponin in your blood. Troponins are proteins that help muscles contract. There are three forms of troponin, including troponins C, I, and T. The types of troponins I and T that are found in cardiac muscle are different from the troponins I and T that are found in skeletal muscle. Therefore, testing can be done for cardiac-specific troponins I and T. These types of troponin are normally present in very small quantities in the blood. When there is damage to heart muscle cells, cardiac troponins I and T are released into circulation. The more damage there is, the greater the concentration of troponins I and T. When a person has a heart attack, levels of troponin can become elevated in the blood within 3-4 hours after injury and may remain elevated for 10-14 days.  What kind of sample is taken?  A blood sample is required for this test. It is usually collected by inserting a needle into a vein. Usually, an initial blood sample is collected, and then another blood sample is collected 12 hours later. After these samples, you will have your blood tested daily for 3-5 days. You might also have it tested weekly for 5-6 weeks.  How do I prepare for this test?  There is no preparation required for this test. However, be aware that you will need to make arrangements to have your blood collected frequently.  What are the reference ranges?  Reference values are considered healthy values established after testing a large group of healthy people. Reference values may vary among different people, labs, and hospitals. It is your responsibility to obtain your test results. Ask  the lab or department performing the test when and how you will get your results.  Reference values for cardiac troponins are as follows:  · Cardiac troponin T: less than 0.1 ng/mL.  · Cardiac troponin I: less than 0.03 ng/mL.  What do the results mean?  Troponin values above the reference values may indicate:  · Injury to the heart muscle.  · Heart attack.  Talk with your health care provider to discuss your results, treatment options, and if necessary, the need for more tests. Talk with your health care provider if you have any questions about your results.  Talk with your health care provider to discuss your results, treatment options, and if necessary, the need for more tests. Talk with your health care provider if you have any questions about your results.  This information is not intended to replace advice given to you by your health care provider. Make sure you discuss any questions you have with your health care provider.  Document Released: 01/20/2006 Document Revised: 08/22/2017 Document Reviewed: 05/13/2015  [employer] Interactive Patient Education © 2017 [employer]

## 2018-02-13 NOTE — ED PROVIDER NOTES
EMERGENCY DEPARTMENT ENCOUNTER    CHIEF COMPLAINT  Chief Complaint: chest pain  History given by: Patient  History limited by: N/A  Room Number: 37/37  PMD: KAJAL Wood      HPI:  Pt is a 77 y.o. female who presents complaining of waxing and waning central CP which began at 0300.  Pt describes the pain as 'burning' feeling she has never felt before, and states it woke her up from sleeping.  Pt states the pain radiates to the center of her back.  Pt states her pain is currently mild and does not worsen with exertion.  Pt also c/o dry cough,nausea, and HA.   Pt reports she has previously gotten a stress test performed.  Pt reports she drinks occasionally and denies smoking.       Duration/Onset/Timin hours  Location: central chest  Radiation: central back  Quality: burning  Intensity/Severity: moderate  Associated Symptoms: dry cough, HA, nausea  Aggravating or Alleviating Factors: none  Previous Episodes: none      PAST MEDICAL HISTORY  Active Ambulatory Problems     Diagnosis Date Noted   • Anxiety 2016   • Arteriosclerosis of both carotid arteries 2016   • Chronic interstitial cystitis 2016   • Cough 2016   • Pulmonary emphysema 2016   • Gastroesophageal reflux disease 2016   • Fibromyalgia 2016   • Headache 2016   • Hematuria 2016   • Hoarseness 2016   • Hyperlipidemia 2016   • Hypertension 2016   • Acquired hypothyroidism 2016   • Muscle spasms of both lower extremities 2016   • Palpitations 2016   • Shortness of breath 2016   • Postmenopausal osteoporosis 10/17/2016   • Chronic fatigue 10/17/2016   • 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/17/2016   • Dysuria 12/15/2017   • Dry cough 12/15/2017   • Leukocytes in urine 12/15/2017   • Cystitis with hematuria 12/15/2017   • Acute cystitis without hematuria 12/15/2017     Resolved Ambulatory Problems     Diagnosis Date Noted   • No Resolved Ambulatory Problems     Past Medical  History:   Diagnosis Date   • Depression    • GERD (gastroesophageal reflux disease)    • Hyperlipidemia    • Hypertension    • Hypothyroidism        PAST SURGICAL HISTORY  Past Surgical History:   Procedure Laterality Date   • PARATHYROIDECTOMY         FAMILY HISTORY  Family History   Problem Relation Age of Onset   • Alzheimer's disease Mother    • Lung disease Father    • Alcohol abuse Father    • Heart disease Brother    • Hypertension Brother    • Lung disease Brother    • Alcohol abuse Brother    • Cancer Brother      LUNG  WAS A SMOKER   • Thyroid disease Maternal Grandmother        SOCIAL HISTORY  Social History     Social History   • Marital status:      Spouse name: N/A   • Number of children: N/A   • Years of education: N/A     Occupational History   • Not on file.     Social History Main Topics   • Smoking status: Never Smoker   • Smokeless tobacco: Never Used   • Alcohol use No   • Drug use: No   • Sexual activity: Not on file     Other Topics Concern   • Not on file     Social History Narrative   • No narrative on file       ALLERGIES  Diphenhydramine hcl (sleep); Lansoprazole; and Lisinopril    REVIEW OF SYSTEMS  Review of Systems   Constitutional: Negative.  Negative for chills and fever.   HENT: Negative for sore throat.    Eyes: Negative.    Respiratory: Positive for cough (dry). Negative for shortness of breath.    Cardiovascular: Positive for chest pain (radiates to back).   Gastrointestinal: Positive for nausea. Negative for blood in stool, diarrhea and vomiting.   Genitourinary: Negative.  Negative for dysuria.   Musculoskeletal: Negative for back pain (CP radiates to back).   Skin: Negative.  Negative for rash.   Neurological: Positive for headaches.       PHYSICAL EXAM  ED Triage Vitals   Temp Heart Rate Resp BP SpO2   02/13/18 0833 02/13/18 0832 02/13/18 0832 02/13/18 0832 02/13/18 0832   98.5 °F (36.9 °C) 88 18 126/80 98 %      Temp src Heart Rate Source Patient Position BP Location  FiO2 (%)   02/13/18 0833 -- -- -- --   Oral           Physical Exam   Constitutional: No distress.   HENT:   Head: Normocephalic and atraumatic.   Mouth/Throat: Oropharynx is clear and moist.   Eyes:   Unremarkable   Cardiovascular: Normal rate and regular rhythm.    Pulmonary/Chest: Breath sounds normal. No respiratory distress.   Abdominal: There is tenderness (mild) in the epigastric area.   Musculoskeletal: She exhibits no edema or tenderness.   Neurological: She is alert.   Skin: No rash noted.   Nursing note and vitals reviewed.      LAB RESULTS  Lab Results (last 24 hours)     Procedure Component Value Units Date/Time    CBC & Differential [[id number]] Collected:  02/13/18 0920    Specimen:  Blood Updated:  02/13/18 0932    Narrative:       The following orders were created for panel order CBC & Differential.  Procedure                               Abnormality         Status                     ---------                               -----------         ------                     CBC Auto Differential[[id number]]        Abnormal            Final result                 Please view results for these tests on the individual orders.    Comprehensive Metabolic Panel [[id number]]  (Abnormal) Collected:  02/13/18 0920    Specimen:  Blood Updated:  02/13/18 0959     Glucose 111 (H) mg/dL      BUN 15 mg/dL      Creatinine 0.84 mg/dL      Sodium 139 mmol/L      Potassium 3.8 mmol/L      Chloride 100 mmol/L      CO2 28.7 mmol/L      Calcium 9.2 mg/dL      Total Protein 6.9 g/dL      Albumin 3.80 g/dL      ALT (SGPT) 17 U/L      AST (SGOT) 21 U/L      Alkaline Phosphatase 117 U/L      Total Bilirubin 0.4 mg/dL      eGFR Non African Amer 66 mL/min/1.73      Globulin 3.1 gm/dL      A/G Ratio 1.2 g/dL      BUN/Creatinine Ratio 17.9     Anion Gap 10.3 mmol/L     Narrative:       The MDRD GFR formula is only valid for adults with stable renal function between ages 18 and 70.    Protime-INR [[id number]]  (Normal) Collected:   02/13/18 0920    Specimen:  Blood Updated:  02/13/18 0944     Protime 12.1 Seconds      INR 0.91    Troponin [[id number]]  (Normal) Collected:  02/13/18 0920    Specimen:  Blood Updated:  02/13/18 0959     Troponin T <0.010 ng/mL     Narrative:       Troponin T Reference Ranges:  Less than 0.03 ng/mL:    Negative for AMI  0.03 to 0.09 ng/mL:      Indeterminant for AMI  Greater than 0.09 ng/mL: Positive for AMI    CBC Auto Differential [[id number]]  (Abnormal) Collected:  02/13/18 0920    Specimen:  Blood Updated:  02/13/18 0932     WBC 11.09 (H) 10*3/mm3      RBC 4.51 10*6/mm3      Hemoglobin 13.4 g/dL      Hematocrit 41.0 %      MCV 90.9 fL      MCH 29.7 pg      MCHC 32.7 g/dL      RDW 12.6 %      RDW-SD 41.6 fl      MPV 9.8 fL      Platelets 226 10*3/mm3      Neutrophil % 79.2 (H) %      Lymphocyte % 9.4 (L) %      Monocyte % 7.7 %      Eosinophil % 2.7 %      Basophil % 0.5 %      Immature Grans % 0.5 %      Neutrophils, Absolute 8.80 (H) 10*3/mm3      Lymphocytes, Absolute 1.04 10*3/mm3      Monocytes, Absolute 0.85 10*3/mm3      Eosinophils, Absolute 0.30 10*3/mm3      Basophils, Absolute 0.05 10*3/mm3      Immature Grans, Absolute 0.05 (H) 10*3/mm3           I ordered the above labs and reviewed the results    RADIOLOGY  XR Chest 2 View   Final Result   1. No acute disease       This report was finalized on 2/13/2018 10:19 AM by Dr. Ian Amaro MD.          CXR shows NAD.     I ordered the above noted radiological studies. Interpreted by radiologist.   Reviewed by me in PACS.       PROCEDURES  Procedures    EKG           EKG time: 0843  Rhythm/Rate: S, 70  P waves and LA: normal  QRS, axis: normal   ST and T waves: Lateral ST depression    Interpreted Contemporaneously by me, independently viewed  No prior for comparison.      PROGRESS AND CONSULTS  ED Course   Comment By Time   9:11 AM  78 yo with wax/waning CP (burning) off and on since 3am this AM.  EKG sl abnormal w/o old to compare.  Pt concerned for  possible PNA. Balaji Ferguson MD 02/13 0945     0909   Ordered ASA for pt's CP.  Ordered CXR, CMP, Protime-INR, Troponin, CBC.      1024  Rechecked pt, who is resting comfortably.  Discussed unremarkable lab work including Troponin, and CXR which shows NAD and rules out pneumonia.  Discussed that her EKG is slightly abnormal, but it is difficult to determine if the CP is cardiac related since there is no prior for comparison. Discussed options to admit for cardiac workup, f/u with cardiologist, or stay for repeat troponin in the ED.  Pt understands and agrees with the plan, all questions answered.    1039  Rechecked pt, who is resting comfortably.  Pt reports she decided to go home and take Aleeve for the CP.  Dicussed plan for discharge with f/u with cardiologist and Rx for cough medication.  Pt understands and agrees with the plan, all questions answered.    I have reviewed with the patient the findings of the EKG and labs.  We have discussed that the diagnosis of chest pain is difficult and inexact at times.  We discussed inpatient vs outpatient workup and have decided on further workup as an outpatient.  It is understood that the current evaluation does not rule out 100% a serious cardiac etiology.  I have stressed the importance to return to the ER for worsening symptoms and the need to follow up promptly with the referrals I have given.        MEDICAL DECISION MAKING  Results were reviewed/discussed with the patient and they were also made aware of online access. Pt also made aware that some labs, such as cultures, will not be resulted during ER visit and follow up with PMD is necessary.     MDM  Number of Diagnoses or Management Options  Chest pain of unknown etiology:      Amount and/or Complexity of Data Reviewed  Clinical lab tests: ordered and reviewed (Troponin <0.010.  CBC: WBC: 11.09.  CMP Creatinine 00.84.  Protime-INR unremarkable.)  Tests in the radiology section of CPT®: ordered and reviewed (CXR  shows NAD.)  Tests in the medicine section of CPT®: ordered and reviewed (See in procedure note.)  Decide to obtain previous medical records or to obtain history from someone other than the patient: yes  Review and summarize past medical records: yes (Pt received a negative stress test in 2014.)  Independent visualization of images, tracings, or specimens: yes    Patient Progress  Patient progress: stable         DIAGNOSIS  Final diagnoses:   Chest pain of unknown etiology       DISPOSITION  DISCHARGE    Patient discharged in stable condition.    Reviewed implications of results, diagnosis, meds, responsibility to follow up, warning signs and symptoms of possible worsening, potential complications and reasons to return to ER.     Patient/Family voiced understanding of above instructions.    Discussed plan for discharge, as there is no emergent indication for admission.  Pt/family is agreeable and understands need for follow up and repeat testing.  Pt is aware that discharge does not mean that nothing is wrong but it indicates no emergency is present that requires admission and they must continue care with follow-up as given below or physician of their choice.     FOLLOW-UP  KAJAL Wood  [address] [id number] [phone]          [facility] CARDIOLOGY  [address]  [place] [address] [phone]  In 2 days  Call for Appointment         Medication List      New Prescriptions          benzonatate 200 MG capsule   Commonly known as:  TESSALON   Take 1 capsule by mouth 3 (Three) Times a Day As Needed for Cough.         Stop          budesonide-formoterol 160-4.5 MCG/ACT inhaler   Commonly known as:  SYMBICORT       ciprofloxacin 500 MG tablet   Commonly known as:  CIPRO       rosuvastatin 20 MG tablet   Commonly known as:  CRESTOR       sulfamethoxazole-trimethoprim 800-160 MG per tablet   Commonly known as:  BACTRIM DS,SEPTRA DS                Latest Documented Vital Signs:  As of 10:43 AM  BP- 167/91 HR- 84 Temp- 98.5 °F (36.9 °C) (Oral) O2 sat- 97%    --  Documentation assistance provided by maria luisa Tovar for Dr. Ferguson.  Information recorded by the scribe was done at my direction and has been verified and validated by me.     Ingrid Tovar  02/13/18 1044       Balaji Ferguson MD  02/13/18 1049       Balaji Ferguson MD  02/13/18 1111

## 2018-02-14 ENCOUNTER — TELEPHONE (OUTPATIENT)
Dept: FAMILY MEDICINE CLINIC | Facility: CLINIC | Age: 78
End: 2018-02-14

## 2018-02-14 ENCOUNTER — TELEPHONE (OUTPATIENT)
Dept: SOCIAL WORK | Facility: HOSPITAL | Age: 78
End: 2018-02-14

## 2018-02-14 NOTE — TELEPHONE ENCOUNTER
Patient was seen in the ER yesterday for chest pain.  She states they ruled out everything cardiac but offered to keep her and do a stress test and she refused.  Once she got home she started with extreme diarrhea and vomiting.  She wants to know if there is anything she can take to help with the nausea and diarrhea.  She has not vomited since 7am.

## 2018-02-14 NOTE — TELEPHONE ENCOUNTER
ED f/u phone call: states that she is having abdominal pain, w/ N/V/D since last night. Advised to contact PCP, and reminded re RTER as necessary. No other questions/concerns

## 2018-02-14 NOTE — TELEPHONE ENCOUNTER
Dietary restriction- NO DAIRY OR GREASY FOODS FOR 1-2 WEEKS. CLEAR LIQUID DIET FOR 24-72 HOURS- AVOID RED PRODUCTS (NO RED GATORADE OR JELLO). INCREASE FLUID INTAKE WITH CLEAR FLUIDS. ADVANCE DIET TO BRAT DIET AS TOLERATED. (BANANAS, RICE OR PASTA-PLAIN, APPLES, TOAST OR CRACKERS- PLAIN) ONLY ADVANCE DIET BEYOND BRAT DIET AS TOLERATED. IF NO IMPROVEMENT, WORSENING, NEW SYMPTOMS, INABILITY TO TOLERATE INCREASED FLUIDS / INTAKE, OR INTRACTABLE VOMITING, TO BE SEEN IMMEDIATELY HERE, URGENT CARE OR ER.

## 2018-02-20 ENCOUNTER — OFFICE VISIT (OUTPATIENT)
Dept: FAMILY MEDICINE CLINIC | Facility: CLINIC | Age: 78
End: 2018-02-20

## 2018-02-20 VITALS
SYSTOLIC BLOOD PRESSURE: 124 MMHG | DIASTOLIC BLOOD PRESSURE: 62 MMHG | RESPIRATION RATE: 16 BRPM | HEART RATE: 78 BPM | TEMPERATURE: 98 F | HEIGHT: 60 IN | OXYGEN SATURATION: 98 % | WEIGHT: 132.8 LBS | BODY MASS INDEX: 26.07 KG/M2

## 2018-02-20 DIAGNOSIS — R07.89 ATYPICAL CHEST PAIN: ICD-10-CM

## 2018-02-20 DIAGNOSIS — I10 ESSENTIAL HYPERTENSION: ICD-10-CM

## 2018-02-20 DIAGNOSIS — A08.4 VIRAL GASTROENTERITIS: ICD-10-CM

## 2018-02-20 DIAGNOSIS — H69.80 DYSFUNCTION OF EUSTACHIAN TUBE, UNSPECIFIED LATERALITY: Primary | ICD-10-CM

## 2018-02-20 PROCEDURE — 99214 OFFICE O/P EST MOD 30 MIN: CPT | Performed by: FAMILY MEDICINE

## 2018-02-20 RX ORDER — AMLODIPINE BESYLATE 5 MG/1
TABLET ORAL
Qty: 30 TABLET | Refills: 0 | Status: SHIPPED | OUTPATIENT
Start: 2018-02-20 | End: 2018-03-05

## 2018-02-20 NOTE — PATIENT INSTRUCTIONS
Eustachian Tube Dysfunction  The eustachian tube connects the middle ear to the back of the nose. It regulates air pressure in the middle ear by allowing air to move between the ear and nose. It also helps to drain fluid from the middle ear space. When the eustachian tube does not function properly, air pressure, fluid, or both can build up in the middle ear.  Eustachian tube dysfunction can affect one or both ears.  What are the causes?  This condition happens when the eustachian tube becomes blocked or cannot open normally. This may result from:  · Ear infections.  · Colds and other upper respiratory infections.  · Allergies.  · Irritation, such as from cigarette smoke or acid from the stomach coming up into the esophagus (gastroesophageal reflux).  · Sudden changes in air pressure, such as from descending in an airplane.  · Abnormal growths in the nose or throat, such as nasal polyps, tumors, or enlarged tissue at the back of the throat (adenoids).  What increases the risk?  This condition may be more likely to develop in people who smoke and people who are overweight. Eustachian tube dysfunction may also be more likely to develop in children, especially children who have:  · Certain birth defects of the mouth, such as cleft palate.  · Large tonsils and adenoids.  What are the signs or symptoms?  Symptoms of this condition may include:  · A feeling of fullness in the ear.  · Ear pain.  · Clicking or popping noises in the ear.  · Ringing in the ear.  · Hearing loss.  · Loss of balance.  Symptoms may get worse when the air pressure around you changes, such as when you travel to an area of high elevation or fly on an airplane.  How is this diagnosed?  This condition may be diagnosed based on:  · Your symptoms.  · A physical exam of your ear, nose, and throat.  · Tests, such as those that measure:  ¨ The movement of your eardrum (tympanogram).  ¨ Your hearing (audiometry).  How is this treated?  Treatment depends on  "the cause and severity of your condition. If your symptoms are mild, you may be able to relieve your symptoms by moving air into (\"popping\") your ears. If you have symptoms of fluid in your ears, treatment may include:  · Decongestants.  · Antihistamines.  · Nasal sprays or ear drops that contain medicines that reduce swelling (steroids).  In some cases, you may need to have a procedure to drain the fluid in your eardrum (myringotomy). In this procedure, a small tube is placed in the eardrum to:  · Drain the fluid.  · Restore the air in the middle ear space.  Follow these instructions at home:  · Take over-the-counter and prescription medicines only as told by your health care provider.  · Use techniques to help pop your ears as recommended by your health care provider. These may include:  ¨ Chewing gum.  ¨ Yawning.  ¨ Frequent, forceful swallowing.  ¨ Closing your mouth, holding your nose closed, and gently blowing as if you are trying to blow air out of your nose.  · Do not do any of the following until your health care provider approves:  ¨ Travel to high altitudes.  ¨ Fly in airplanes.  ¨ Work in a pressurized cabin or room.  ¨ Scuba dive.  · Keep your ears dry. Dry your ears completely after showering or bathing.  · Do not smoke.  · Keep all follow-up visits as told by your health care provider. This is important.  Contact a health care provider if:  · Your symptoms do not go away after treatment.  · Your symptoms come back after treatment.  · You are unable to pop your ears.  · You have:  ¨ A fever.  ¨ Pain in your ear.  ¨ Pain in your head or neck.  ¨ Fluid draining from your ear.  · Your hearing suddenly changes.  · You become very dizzy.  · You lose your balance.  This information is not intended to replace advice given to you by your health care provider. Make sure you discuss any questions you have with your health care provider.  Document Released: 01/13/2017 Document Revised: 05/25/2017 Document " Reviewed: 01/06/2016  [employer] Interactive Patient Education © 2017 [employer]

## 2018-02-20 NOTE — PROGRESS NOTES
Subjective   Dafne Mary is a 77 y.o. female. Presents today for follow up from [facility] ED on 2/13/2018.    History of Present Illness     New patient to me and new problem.    Patient is here today to be seen regarding her recent [facility] emergency Department visit on February 13, 2018.  She was seen because she was having some substernal chest pain that was occurring and staying right where was.  She said it was about a 5 out of 10.  However at the time she was having other symptoms including headache, body aches, joint pains, and shaking.  She felt like she was having the flu and asked them to check her although they did not.  They cleared her for chest pain and sent her home.  The next day she started having a lot of nausea and diarrhea going along with her viral illness theory.  Since then she's been feeling much better.  No more chest pain.  She is having some congestion in her face and ears.    The ER records were reviewed by me.    The following portions of the patient's history were reviewed and updated as appropriate: allergies, current medications, past family history, past medical history, past social history, past surgical history and problem list.    Review of Systems   Constitutional: Negative for activity change, appetite change, fatigue and fever.   HENT: Positive for congestion and ear pain. Negative for ear discharge, facial swelling, postnasal drip, rhinorrhea and sore throat.    Eyes: Negative for discharge and itching.   Respiratory: Negative for cough, chest tightness and shortness of breath.    Cardiovascular: Negative for chest pain and palpitations.   Gastrointestinal: Negative for abdominal distention, constipation, diarrhea and nausea.   Musculoskeletal: Negative for arthralgias and back pain.   Neurological: Negative for dizziness, weakness, light-headedness and numbness.   Psychiatric/Behavioral: Negative for decreased concentration and dysphoric mood. The patient is not  nervous/anxious.        Objective   Physical Exam   Constitutional: She appears well-developed and well-nourished. No distress.   HENT:   Head: Normocephalic and atraumatic.   Right Ear: External ear normal.   Left Ear: External ear normal.   Nose: Nose normal.   Mouth/Throat: Oropharynx is clear and moist. No oropharyngeal exudate.   Eyes: Conjunctivae are normal. Right eye exhibits no discharge. Left eye exhibits no discharge.   Neck: Normal range of motion. Neck supple.   Cardiovascular: Normal rate, regular rhythm, normal heart sounds and intact distal pulses.  Exam reveals no gallop and no friction rub.    No murmur heard.  Pulmonary/Chest: Effort normal and breath sounds normal. She has no wheezes. She has no rales.   Abdominal: Soft. Bowel sounds are normal. She exhibits no distension. There is no tenderness.   Lymphadenopathy:     She has no cervical adenopathy.   Skin: Skin is warm and dry. No rash noted.   Nursing note and vitals reviewed.      Assessment/Plan   Dafne was seen today for follow-up.    Diagnoses and all orders for this visit:    Dysfunction of Eustachian tube, unspecified laterality    Viral gastroenteritis    Atypical chest pain    ER records reviewed by me  Further dysfunction recommended that she go back to using her Flonase once daily.  Did a demonstration for her in the office of how to use the spray effectively.  The viral gastroenteritis atypical chest pain have resolved and should no longer be an issue.  Asked her to call us if she starts having chest pain again.

## 2018-03-05 ENCOUNTER — OFFICE VISIT (OUTPATIENT)
Dept: FAMILY MEDICINE CLINIC | Facility: CLINIC | Age: 78
End: 2018-03-05

## 2018-03-05 VITALS
TEMPERATURE: 97.8 F | HEIGHT: 60 IN | DIASTOLIC BLOOD PRESSURE: 62 MMHG | OXYGEN SATURATION: 98 % | WEIGHT: 130 LBS | SYSTOLIC BLOOD PRESSURE: 110 MMHG | BODY MASS INDEX: 25.52 KG/M2 | HEART RATE: 73 BPM | RESPIRATION RATE: 16 BRPM

## 2018-03-05 DIAGNOSIS — E78.5 HYPERLIPIDEMIA, UNSPECIFIED HYPERLIPIDEMIA TYPE: ICD-10-CM

## 2018-03-05 DIAGNOSIS — E03.9 HYPOTHYROIDISM, UNSPECIFIED TYPE: ICD-10-CM

## 2018-03-05 DIAGNOSIS — E55.9 VITAMIN D DEFICIENCY: ICD-10-CM

## 2018-03-05 DIAGNOSIS — E53.8 VITAMIN B12 DEFICIENCY: ICD-10-CM

## 2018-03-05 DIAGNOSIS — I10 BENIGN ESSENTIAL HTN: Primary | ICD-10-CM

## 2018-03-05 LAB
25(OH)D3+25(OH)D2 SERPL-MCNC: 42.8 NG/ML (ref 30–100)
BUN SERPL-MCNC: 15 MG/DL (ref 8–23)
BUN/CREAT SERPL: 16.7 (ref 7–25)
CALCIUM SERPL-MCNC: 10.1 MG/DL (ref 8.6–10.5)
CHLORIDE SERPL-SCNC: 102 MMOL/L (ref 98–107)
CHOLEST SERPL-MCNC: 290 MG/DL (ref 0–200)
CO2 SERPL-SCNC: 27.2 MMOL/L (ref 22–29)
CREAT SERPL-MCNC: 0.9 MG/DL (ref 0.57–1)
GFR SERPLBLD CREATININE-BSD FMLA CKD-EPI: 61 ML/MIN/1.73
GFR SERPLBLD CREATININE-BSD FMLA CKD-EPI: 74 ML/MIN/1.73
GLUCOSE SERPL-MCNC: 88 MG/DL (ref 65–99)
HDLC SERPL-MCNC: 69 MG/DL (ref 40–60)
LDLC SERPL CALC-MCNC: 193 MG/DL (ref 0–100)
POTASSIUM SERPL-SCNC: 4.4 MMOL/L (ref 3.5–5.2)
SODIUM SERPL-SCNC: 143 MMOL/L (ref 136–145)
TRIGL SERPL-MCNC: 140 MG/DL (ref 0–150)
TSH SERPL DL<=0.005 MIU/L-ACNC: 3 MIU/ML (ref 0.27–4.2)
VIT B12 SERPL-MCNC: 1002 PG/ML (ref 211–946)
VLDLC SERPL CALC-MCNC: 28 MG/DL (ref 5–40)

## 2018-03-05 PROCEDURE — 99214 OFFICE O/P EST MOD 30 MIN: CPT | Performed by: FAMILY MEDICINE

## 2018-03-05 NOTE — PATIENT INSTRUCTIONS
Hypertension  Hypertension is another name for high blood pressure. High blood pressure forces your heart to work harder to pump blood. This can cause problems over time.  There are two numbers in a blood pressure reading. There is a top number (systolic) over a bottom number (diastolic). It is best to have a blood pressure below 120/80. Healthy choices can help lower your blood pressure. You may need medicine to help lower your blood pressure if:  · Your blood pressure cannot be lowered with healthy choices.  · Your blood pressure is higher than 130/80.  Follow these instructions at home:  Eating and drinking   · If directed, follow the DASH eating plan. This diet includes:  ¨ Filling half of your plate at each meal with fruits and vegetables.  ¨ Filling one quarter of your plate at each meal with whole grains. Whole grains include whole wheat pasta, brown rice, and whole grain bread.  ¨ Eating or drinking low-fat dairy products, such as skim milk or low-fat yogurt.  ¨ Filling one quarter of your plate at each meal with low-fat (lean) proteins. Low-fat proteins include fish, skinless chicken, eggs, beans, and tofu.  ¨ Avoiding fatty meat, cured and processed meat, or chicken with skin.  ¨ Avoiding premade or processed food.  · Eat less than 1,500 mg of salt (sodium) a day.  · Limit alcohol use to no more than 1 drink a day for nonpregnant women and 2 drinks a day for men. One drink equals 12 oz of beer, 5 oz of wine, or 1½ oz of hard liquor.  Lifestyle   · Work with your doctor to stay at a healthy weight or to lose weight. Ask your doctor what the best weight is for you.  · Get at least 30 minutes of exercise that causes your heart to beat faster (aerobic exercise) most days of the week. This may include walking, swimming, or biking.  · Get at least 30 minutes of exercise that strengthens your muscles (resistance exercise) at least 3 days a week. This may include lifting weights or pilates.  · Do not use any  products that contain nicotine or tobacco. This includes cigarettes and e-cigarettes. If you need help quitting, ask your doctor.  · Check your blood pressure at home as told by your doctor.  · Keep all follow-up visits as told by your doctor. This is important.  Medicines   · Take over-the-counter and prescription medicines only as told by your doctor. Follow directions carefully.  · Do not skip doses of blood pressure medicine. The medicine does not work as well if you skip doses. Skipping doses also puts you at risk for problems.  · Ask your doctor about side effects or reactions to medicines that you should watch for.  Contact a doctor if:  · You think you are having a reaction to the medicine you are taking.  · You have headaches that keep coming back (recurring).  · You feel dizzy.  · You have swelling in your ankles.  · You have trouble with your vision.  Get help right away if:  · You get a very bad headache.  · You start to feel confused.  · You feel weak or numb.  · You feel faint.  · You get very bad pain in your:  ¨ Chest.  ¨ Belly (abdomen).  · You throw up (vomit) more than once.  · You have trouble breathing.  Summary  · Hypertension is another name for high blood pressure.  · Making healthy choices can help lower blood pressure. If your blood pressure cannot be controlled with healthy choices, you may need to take medicine.  This information is not intended to replace advice given to you by your health care provider. Make sure you discuss any questions you have with your health care provider.  Document Released: 06/05/2009 Document Revised: 11/15/2017 Document Reviewed: 11/15/2017  [employer] Interactive Patient Education © 2017 [employer]

## 2018-03-05 NOTE — PROGRESS NOTES
Subjective   Dafne Mary is a 77 y.o. female. Presents today for med check/refills and establishment of care with Dr. Dc.  HTN, Vit D/B12, Hypothyroidism, hyperlipidemia.      History of Present Illness     New patient to me  HTN - overall this is been stable.  She is taking amlodipine 5 mg and losartan 100 mg.  She is not having any side effects that she knows of.  Her blood pressure has come down from systolic 140s to systolic 110s over 96 diastolic to 70s.  She says occasionally she is feeling a little lightheaded.  No falls.  No chest pain, shortness of breath, lower extremity edema, blurred vision.    Vitamin D/vitamin B12 deficiency-she is unsure of the stability of this.  She's not been taking the supplement because she couldn't find it.  She can't remember the values of the labs that were drawn.  She needs these redrawn so she can determine what supplement she needs.  It's been about 6 months since the last time she had these drawn.  She was getting this from an endocrinologist but she is not currently seeing an endocrinologist anymore.    Hypothyroidism-stable as far she knows.  She is taking the [place] Thyroid medication.  She needs a lab draw of her TSH to see where she is as far as the medication goes.  She takes her medication she doesn't have any symptoms of hypothyroidism.    Hyperlipidemia-she was on Crestor for this.  She stopped taking it because she heard a lot of things in the media about side effects and brain damage from the drugs.  She wants to see what her cholesterol is now and then decide if she wants to take another medication.  She tries to eat a somewhat healthy diet but it is a regular diet.    The following portions of the patient's history were reviewed and updated as appropriate: allergies, current medications, past family history, past medical history, past social history, past surgical history and problem list.    Review of Systems   Constitutional: Negative for activity  change, appetite change, fatigue and fever.   HENT: Negative for ear pain, facial swelling and sore throat.    Eyes: Negative for discharge and itching.   Respiratory: Negative for cough, chest tightness and shortness of breath.    Cardiovascular: Negative for chest pain and palpitations.   Gastrointestinal: Negative for abdominal distention and constipation.   Musculoskeletal: Negative for arthralgias and back pain.   Neurological: Negative for weakness and numbness.   Psychiatric/Behavioral: Negative for decreased concentration and dysphoric mood. The patient is not nervous/anxious.        Objective   Physical Exam   Constitutional: She is oriented to person, place, and time. She appears well-developed and well-nourished. No distress.   HENT:   Mouth/Throat: Oropharynx is clear and moist.   Eyes: Conjunctivae are normal. Right eye exhibits no discharge. Left eye exhibits no discharge.   Neck: Normal range of motion. Neck supple.   Cardiovascular: Normal rate, regular rhythm, normal heart sounds and intact distal pulses.  Exam reveals no gallop and no friction rub.    No murmur heard.  Pulmonary/Chest: Effort normal and breath sounds normal. She has no wheezes. She has no rales.   Abdominal: Soft. Bowel sounds are normal. She exhibits no distension. There is no tenderness.   Lymphadenopathy:     She has no cervical adenopathy.   Neurological: She is alert and oriented to person, place, and time.   Skin: Skin is warm and dry. No rash noted.   Psychiatric: She has a normal mood and affect. Her behavior is normal.   Nursing note and vitals reviewed.      Assessment/Plan   Dafne was seen today for med management and establish care.    Diagnoses and all orders for this visit:    Benign essential HTN  -     Basic metabolic panel    Hyperlipidemia, unspecified hyperlipidemia type  -     Lipid panel    Vitamin B12 deficiency  -     Vitamin B12    Vitamin D deficiency  -     Vitamin D 25 Hydroxy    Hypothyroidism,  unspecified type  -     TSH Rfx On Abnormal To Free T4     plan as above however will discontinue amlodipine 5 mg as her blood pressure was on the low side today.  According to JNC-8 she should be around 150/90 before considering increasing or starting new blood pressure medication.  We will keep the losartan 100 mg going, if she does well will stay with that.

## 2018-03-08 RX ORDER — PRAVASTATIN SODIUM 10 MG
20 TABLET ORAL NIGHTLY
Qty: 60 TABLET | Refills: 11 | Status: SHIPPED | OUTPATIENT
Start: 2018-03-08 | End: 2018-03-12 | Stop reason: SDUPTHER

## 2018-03-12 DIAGNOSIS — E78.5 HYPERLIPIDEMIA, UNSPECIFIED HYPERLIPIDEMIA TYPE: ICD-10-CM

## 2018-03-12 RX ORDER — PRAVASTATIN SODIUM 20 MG
20 TABLET ORAL NIGHTLY
Qty: 30 TABLET | Refills: 11 | Status: SHIPPED | OUTPATIENT
Start: 2018-03-12 | End: 2019-11-05

## 2018-03-19 RX ORDER — PAROXETINE 10 MG/1
TABLET, FILM COATED ORAL
Qty: 30 TABLET | Refills: 2 | Status: SHIPPED | OUTPATIENT
Start: 2018-03-19 | End: 2018-06-19 | Stop reason: SDUPTHER

## 2018-03-26 DIAGNOSIS — I10 ESSENTIAL HYPERTENSION: ICD-10-CM

## 2018-03-26 RX ORDER — LOSARTAN POTASSIUM 100 MG/1
TABLET ORAL
Qty: 30 TABLET | Refills: 1 | Status: SHIPPED | OUTPATIENT
Start: 2018-03-26 | End: 2018-04-12 | Stop reason: SDUPTHER

## 2018-04-12 DIAGNOSIS — I10 ESSENTIAL HYPERTENSION: ICD-10-CM

## 2018-04-12 RX ORDER — LOSARTAN POTASSIUM 100 MG/1
100 TABLET ORAL DAILY
Qty: 90 TABLET | Refills: 0 | Status: SHIPPED | OUTPATIENT
Start: 2018-04-12 | End: 2021-01-07

## 2018-04-18 DIAGNOSIS — I10 ESSENTIAL HYPERTENSION: ICD-10-CM

## 2018-04-18 RX ORDER — ATENOLOL 50 MG/1
TABLET ORAL
Qty: 30 TABLET | Refills: 2 | Status: SHIPPED | OUTPATIENT
Start: 2018-04-18 | End: 2018-07-06 | Stop reason: SDUPTHER

## 2018-05-30 ENCOUNTER — OFFICE VISIT (OUTPATIENT)
Dept: FAMILY MEDICINE CLINIC | Facility: CLINIC | Age: 78
End: 2018-05-30

## 2018-05-30 VITALS
WEIGHT: 130.6 LBS | SYSTOLIC BLOOD PRESSURE: 112 MMHG | HEART RATE: 70 BPM | HEIGHT: 60 IN | OXYGEN SATURATION: 98 % | BODY MASS INDEX: 25.64 KG/M2 | TEMPERATURE: 98 F | RESPIRATION RATE: 16 BRPM | DIASTOLIC BLOOD PRESSURE: 72 MMHG

## 2018-05-30 DIAGNOSIS — R53.83 OTHER FATIGUE: ICD-10-CM

## 2018-05-30 DIAGNOSIS — Z20.5 EXPOSURE TO HEPATITIS B: ICD-10-CM

## 2018-05-30 DIAGNOSIS — Z20.5 EXPOSURE TO HEPATITIS A: Primary | ICD-10-CM

## 2018-05-30 DIAGNOSIS — Z23 NEED FOR HEPATITIS B VACCINATION: ICD-10-CM

## 2018-05-30 DIAGNOSIS — Z23 NEED FOR HEPATITIS A VACCINATION: ICD-10-CM

## 2018-05-30 PROCEDURE — 90472 IMMUNIZATION ADMIN EACH ADD: CPT | Performed by: FAMILY MEDICINE

## 2018-05-30 PROCEDURE — 90471 IMMUNIZATION ADMIN: CPT | Performed by: FAMILY MEDICINE

## 2018-05-30 PROCEDURE — 99213 OFFICE O/P EST LOW 20 MIN: CPT | Performed by: FAMILY MEDICINE

## 2018-05-30 PROCEDURE — 90632 HEPA VACCINE ADULT IM: CPT | Performed by: FAMILY MEDICINE

## 2018-05-30 PROCEDURE — 90746 HEPB VACCINE 3 DOSE ADULT IM: CPT | Performed by: FAMILY MEDICINE

## 2018-05-30 NOTE — PROGRESS NOTES
Subjective   Dafne Mary is a 77 y.o. female. Presents today for Hepatitis A and B exposure from a family friend that tested positive for both, family friend is now admitted at [facility].  The patient is a little tired.    History of Present Illness      Exposure to Hep A and Hep B - Family friend hospitalized over the weekend with jaundice and determined to have both of these.  She did a goodbye kiss and was eating with him at [employer] and touching hands off and on.  No hx Hep A or Hep B vaccines. Also feeling a little more fatigued than usual.  Not sure if it is in her head or not.    The following portions of the patient's history were reviewed and updated as appropriate: allergies, current medications, past family history, past medical history, past social history, past surgical history and problem list.    Review of Systems   Constitutional: Positive for activity change and fatigue. Negative for appetite change, chills, diaphoresis, fever, unexpected weight gain and unexpected weight loss.       Objective   Physical Exam   Constitutional: She appears well-developed and well-nourished. No distress.   Cardiovascular: Normal rate, regular rhythm and normal heart sounds.    Pulmonary/Chest: Effort normal and breath sounds normal.   Skin: She is not diaphoretic.   Nursing note and vitals reviewed.        Assessment/Plan   Dafne was seen today for hepatitis a and hepatitis b.    Diagnoses and all orders for this visit:    Exposure to hepatitis A  -     Hepatitis A Antibody, IgM; Future  -     Hepatitis Panel, Acute; Future  -     Hepatitis A Antibody, IgM  -     Hepatitis Panel, Acute    Exposure to hepatitis B  -     Hepatitis Panel, Acute; Future  -     Hepatitis Panel, Acute    Other fatigue   -     Hepatitis Panel, Acute; Future  -     Hepatitis Panel, Acute    Need for hepatitis B vaccination  -     Hepatitis B Vaccine Adult IM    Need for hepatitis A vaccination  -     Hepatitis A Vaccine Adult (HAVRIX)  - Today  -     Hepatitis A Vaccine Adult  (HAVRIX) - Dose 2; Future  -     Hepatitis A Vaccine Adult  (HAVRIX) - Dose 2    Plan as above

## 2018-05-31 LAB
HAV IGM SERPL QL IA: NEGATIVE
HBV CORE IGM SERPL QL IA: NEGATIVE
HBV SURFACE AG SERPL QL IA: NEGATIVE
HCV AB S/CO SERPL IA: <0.1 S/CO RATIO (ref 0–0.9)

## 2018-06-19 RX ORDER — PAROXETINE 10 MG/1
TABLET, FILM COATED ORAL
Qty: 90 TABLET | Refills: 0 | Status: SHIPPED | OUTPATIENT
Start: 2018-06-19 | End: 2018-09-24 | Stop reason: SDUPTHER

## 2018-07-03 RX ORDER — THYROID,PORK 15 MG
TABLET ORAL
Qty: 30 TABLET | Refills: 5 | OUTPATIENT
Start: 2018-07-03

## 2018-07-06 DIAGNOSIS — I10 ESSENTIAL HYPERTENSION: ICD-10-CM

## 2018-07-06 RX ORDER — ATENOLOL 50 MG/1
TABLET ORAL
Qty: 30 TABLET | Refills: 2 | Status: SHIPPED | OUTPATIENT
Start: 2018-07-06 | End: 2018-07-06 | Stop reason: SDUPTHER

## 2018-07-06 RX ORDER — ATENOLOL 50 MG/1
50 TABLET ORAL DAILY
Qty: 90 TABLET | Refills: 0 | Status: SHIPPED | OUTPATIENT
Start: 2018-07-06 | End: 2019-03-25 | Stop reason: SDUPTHER

## 2018-07-16 RX ORDER — THYROID,PORK 15 MG
TABLET ORAL
Qty: 30 TABLET | Refills: 5 | OUTPATIENT
Start: 2018-07-16

## 2018-07-24 ENCOUNTER — OFFICE VISIT (OUTPATIENT)
Dept: FAMILY MEDICINE CLINIC | Facility: CLINIC | Age: 78
End: 2018-07-24

## 2018-07-24 VITALS
SYSTOLIC BLOOD PRESSURE: 114 MMHG | HEIGHT: 60 IN | WEIGHT: 130.2 LBS | DIASTOLIC BLOOD PRESSURE: 74 MMHG | RESPIRATION RATE: 16 BRPM | TEMPERATURE: 97.3 F | OXYGEN SATURATION: 98 % | BODY MASS INDEX: 25.56 KG/M2 | HEART RATE: 68 BPM

## 2018-07-24 DIAGNOSIS — E03.9 HYPOTHYROIDISM, UNSPECIFIED TYPE: Primary | ICD-10-CM

## 2018-07-24 DIAGNOSIS — E78.2 MIXED HYPERLIPIDEMIA: ICD-10-CM

## 2018-07-24 DIAGNOSIS — I10 ESSENTIAL HYPERTENSION: ICD-10-CM

## 2018-07-24 PROCEDURE — 99214 OFFICE O/P EST MOD 30 MIN: CPT | Performed by: FAMILY MEDICINE

## 2018-07-24 RX ORDER — THYROID,PORK 15 MG
15 TABLET ORAL DAILY
Qty: 90 TABLET | Refills: 3 | Status: SHIPPED | OUTPATIENT
Start: 2018-07-24 | End: 2019-08-08 | Stop reason: SDUPTHER

## 2018-07-24 RX ORDER — THYROID 30 MG/1
30 TABLET ORAL DAILY
Qty: 90 TABLET | Refills: 3 | Status: SHIPPED | OUTPATIENT
Start: 2018-07-24 | End: 2019-08-08 | Stop reason: SDUPTHER

## 2018-07-24 NOTE — PROGRESS NOTES
Subjective   Dafne Mary is a 77 y.o. female. Presents today for medication management for hypertension, hypothyroidism, and hyperlipidemia.     History of Present Illness     Hypothyroidism-stable.  Patient says she was told by the [facility] pharmacy that she had to come and see me to check and to get more medicine.  Her medicine is Armothyroid.  She takes a 30 mg and a 15 mg and this is been working well for her.  Her last lab was back in March and it was normal.  She is not having any side effects from medicine.  She is having some fatigue but says she is just rather deal with that.    Hypertension-stable.  She is concerned maybe it's a little too controlled.  She is not having any symptoms but she notices that her readings have been a little low.  She would like to come down some on the medication.  She takes losartan and atenolol.  She cannot remember the dosage of her losartan but her atenolol is 50 mg daily.    Hyperlipidemia-stable.  She says she is trying to use healthy diet as possible.  She is taking her medication for that.  She takes pravastatin daily.  Her last labs were back in March and they were fairly stable.    The following portions of the patient's history were reviewed and updated as appropriate: allergies, current medications, past family history, past medical history, past social history, past surgical history and problem list.    Review of Systems   Constitutional: Positive for fatigue. Negative for activity change, appetite change and fever.   HENT: Negative for ear pain, facial swelling and sore throat.    Eyes: Negative for discharge and itching.   Respiratory: Negative for cough, chest tightness and shortness of breath.    Cardiovascular: Negative for chest pain and palpitations.   Gastrointestinal: Negative for abdominal distention and constipation.   Endocrine: Negative for polydipsia, polyphagia and polyuria.   Genitourinary: Negative for difficulty urinating and flank pain.    Musculoskeletal: Negative for arthralgias and back pain.   Skin: Negative for color change, rash and wound.   Allergic/Immunologic: Negative for environmental allergies and food allergies.   Neurological: Negative for weakness and numbness.   Hematological: Negative for adenopathy. Does not bruise/bleed easily.   Psychiatric/Behavioral: Negative for decreased concentration and dysphoric mood. The patient is not nervous/anxious.        Objective   Physical Exam   Constitutional: She appears well-developed and well-nourished. No distress.   Neck: Neck supple.   Cardiovascular: Normal rate, regular rhythm and normal heart sounds.    Pulmonary/Chest: Effort normal and breath sounds normal.   Lymphadenopathy:     She has no cervical adenopathy.   Skin: Skin is warm. Capillary refill takes less than 2 seconds. She is not diaphoretic.   Nursing note and vitals reviewed.      Assessment/Plan   Dafne was seen today for med management.    Diagnoses and all orders for this visit:    Hypothyroidism, unspecified type  -     ARMOUR THYROID 30 MG tablet; Take 1 tablet by mouth Daily.  -     ARMOUR THYROID 15 MG tablet; Take 1 tablet by mouth Daily.    Essential hypertension    Mixed hyperlipidemia    Refilled [place] thyroid     Will get the dosage for the losartan and try to decrease by half and keep atenolol the same unless losartan is easily stopped.    No change to pravastatin at this time.      Dragon transcription disclaimer     Much of this encounter note is an electronic transcription/translation of spoken language to printed text. The electronic translation of spoken language may permit erroneous, or at times, nonsensical words or phrases to be inadvertently transcribed. Although I have reviewed the note for such errors, some may still exist.

## 2018-07-24 NOTE — PATIENT INSTRUCTIONS
Hypothyroidism  Hypothyroidism is a disorder of the thyroid. The thyroid is a large gland that is located in the lower front of the neck. The thyroid releases hormones that control how the body works. With hypothyroidism, the thyroid does not make enough of these hormones.  What are the causes?  Causes of hypothyroidism may include:  · Viral infections.  · Pregnancy.  · Your own defense system (immune system) attacking your thyroid.  · Certain medicines.  · Birth defects.  · Past radiation treatments to your head or neck.  · Past treatment with radioactive iodine.  · Past surgical removal of part or all of your thyroid.  · Problems with the gland that is located in the center of your brain (pituitary).    What are the signs or symptoms?  Signs and symptoms of hypothyroidism may include:  · Feeling as though you have no energy (lethargy).  · Inability to tolerate cold.  · Weight gain that is not explained by a change in diet or exercise habits.  · Dry skin.  · Coarse hair.  · Menstrual irregularity.  · Slowing of thought processes.  · Constipation.  · Sadness or depression.    How is this diagnosed?  Your health care provider may diagnose hypothyroidism with blood tests and ultrasound tests.  How is this treated?  Hypothyroidism is treated with medicine that replaces the hormones that your body does not make. After you begin treatment, it may take several weeks for symptoms to go away.  Follow these instructions at home:  · Take medicines only as directed by your health care provider.  · If you start taking any new medicines, tell your health care provider.  · Keep all follow-up visits as directed by your health care provider. This is important. As your condition improves, your dosage needs may change. You will need to have blood tests regularly so that your health care provider can watch your condition.  Contact a health care provider if:  · Your symptoms do not get better with treatment.  · You are taking thyroid  replacement medicine and:  ? You sweat excessively.  ? You have tremors.  ? You feel anxious.  ? You lose weight rapidly.  ? You cannot tolerate heat.  ? You have emotional swings.  ? You have diarrhea.  ? You feel weak.  Get help right away if:  · You develop chest pain.  · You develop an irregular heartbeat.  · You develop a rapid heartbeat.  This information is not intended to replace advice given to you by your health care provider. Make sure you discuss any questions you have with your health care provider.  Document Released: 12/18/2006 Document Revised: 05/25/2017 Document Reviewed: 05/05/2015  [employer] Interactive Patient Education © 2018 [employer] Inc.

## 2018-07-31 ENCOUNTER — TELEPHONE (OUTPATIENT)
Dept: FAMILY MEDICINE CLINIC | Facility: CLINIC | Age: 78
End: 2018-07-31

## 2018-07-31 NOTE — TELEPHONE ENCOUNTER
Patient called stating she no longer wants to see her endo specialist, Dr. Ochoa, because she don't think he does much for her. Was hoping you would be okay with refilling her armour thyroid medication.    Okay to refill?

## 2018-09-24 RX ORDER — PAROXETINE 10 MG/1
TABLET, FILM COATED ORAL
Qty: 90 TABLET | Refills: 0 | Status: SHIPPED | OUTPATIENT
Start: 2018-09-24 | End: 2019-04-09

## 2018-09-26 RX ORDER — PAROXETINE 10 MG/1
TABLET, FILM COATED ORAL
Qty: 90 TABLET | Refills: 0 | Status: SHIPPED | OUTPATIENT
Start: 2018-09-26 | End: 2019-03-25 | Stop reason: SDUPTHER

## 2018-10-03 ENCOUNTER — TELEPHONE (OUTPATIENT)
Dept: FAMILY MEDICINE CLINIC | Facility: CLINIC | Age: 78
End: 2018-10-03

## 2018-10-03 RX ORDER — LOSARTAN POTASSIUM 100 MG/1
100 TABLET ORAL DAILY
Qty: 90 TABLET | Refills: 1 | Status: SHIPPED | OUTPATIENT
Start: 2018-10-03 | End: 2019-03-20 | Stop reason: SDUPTHER

## 2018-10-03 NOTE — TELEPHONE ENCOUNTER
Received a refill request from [facility] pharmacy for Losartan 100 mg tablets- one tablet daily qty 90. Okay to refill?

## 2018-11-14 ENCOUNTER — OFFICE VISIT (OUTPATIENT)
Dept: FAMILY MEDICINE CLINIC | Facility: CLINIC | Age: 78
End: 2018-11-14

## 2018-11-14 VITALS
SYSTOLIC BLOOD PRESSURE: 132 MMHG | WEIGHT: 133.2 LBS | RESPIRATION RATE: 16 BRPM | HEIGHT: 60 IN | OXYGEN SATURATION: 98 % | BODY MASS INDEX: 26.15 KG/M2 | HEART RATE: 80 BPM | DIASTOLIC BLOOD PRESSURE: 84 MMHG | TEMPERATURE: 97.8 F

## 2018-11-14 DIAGNOSIS — N30.01 ACUTE CYSTITIS WITH HEMATURIA: Primary | ICD-10-CM

## 2018-11-14 LAB
BILIRUB BLD-MCNC: NEGATIVE MG/DL
CLARITY, POC: ABNORMAL
COLOR UR: YELLOW
GLUCOSE UR STRIP-MCNC: NEGATIVE MG/DL
KETONES UR QL: NEGATIVE
LEUKOCYTE EST, POC: ABNORMAL
NITRITE UR-MCNC: NEGATIVE MG/ML
PH UR: 6 [PH] (ref 5–8)
PROT UR STRIP-MCNC: ABNORMAL MG/DL
RBC # UR STRIP: ABNORMAL /UL
SP GR UR: 1.01 (ref 1–1.03)
UROBILINOGEN UR QL: NORMAL

## 2018-11-14 PROCEDURE — 81003 URINALYSIS AUTO W/O SCOPE: CPT | Performed by: FAMILY MEDICINE

## 2018-11-14 PROCEDURE — 99213 OFFICE O/P EST LOW 20 MIN: CPT | Performed by: FAMILY MEDICINE

## 2018-11-14 RX ORDER — NITROFURANTOIN 25; 75 MG/1; MG/1
100 CAPSULE ORAL EVERY 12 HOURS SCHEDULED
Qty: 10 CAPSULE | Refills: 0 | Status: SHIPPED | OUTPATIENT
Start: 2018-11-14 | End: 2018-11-19

## 2018-11-14 NOTE — PATIENT INSTRUCTIONS
Urinary Tract Infection, Adult  A urinary tract infection (UTI) is an infection of any part of the urinary tract. The urinary tract includes the:  · Kidneys.  · Ureters.  · Bladder.  · Urethra.    These organs make, store, and get rid of pee (urine) in the body.  Follow these instructions at home:  · Take over-the-counter and prescription medicines only as told by your doctor.  · If you were prescribed an antibiotic medicine, take it as told by your doctor. Do not stop taking the antibiotic even if you start to feel better.  · Avoid the following drinks:  ? Alcohol.  ? Caffeine.  ? Tea.  ? Carbonated drinks.  · Drink enough fluid to keep your pee clear or pale yellow.  · Keep all follow-up visits as told by your doctor. This is important.  · Make sure to:  ? Empty your bladder often and completely. Do not to hold pee for long periods of time.  ? Empty your bladder before and after sex.  ? Wipe from front to back after a bowel movement if you are female. Use each tissue one time when you wipe.  Contact a doctor if:  · You have back pain.  · You have a fever.  · You feel sick to your stomach (nauseous).  · You throw up (vomit).  · Your symptoms do not get better after 3 days.  · Your symptoms go away and then come back.  Get help right away if:  · You have very bad back pain.  · You have very bad lower belly (abdominal) pain.  · You are throwing up and cannot keep down any medicines or water.  This information is not intended to replace advice given to you by your health care provider. Make sure you discuss any questions you have with your health care provider.  Document Released: 06/05/2009 Document Revised: 05/25/2017 Document Reviewed: 11/07/2016  [employer] Interactive Patient Education © 2018 [employer]

## 2018-11-14 NOTE — PROGRESS NOTES
Subjective   Dafne Mary is a 77 y.o. female. Presents today to be evaluated for UTI symptoms.     History of Present Illness     Urinary Tract Infection  Patient complains of burning with urination, frequency and suprapubic pressure. She has had symptoms for 2 days. Patient also complains of none. Patient denies back pain, congestion, cough, fever, headache, rhinitis, sorethroat, stomach ache and vaginal discharge. Patient does have a history of recurrent UTI. Patient does not have a history of pyelonephritis.       The following portions of the patient's history were reviewed and updated as appropriate: allergies, current medications, past family history, past medical history, past social history, past surgical history and problem list.    Review of Systems   Genitourinary: Positive for difficulty urinating, dysuria and frequency.       Objective   Physical Exam   Constitutional: She appears well-developed and well-nourished. No distress.   Cardiovascular: Normal rate, regular rhythm and normal heart sounds.   Pulmonary/Chest: Effort normal and breath sounds normal.   Abdominal: Soft. Bowel sounds are normal.   Skin: She is not diaphoretic.   Nursing note and vitals reviewed.    Urine POC +Leuks, blood, SG 1.010.  No nitrites    Assessment/Plan   Dafne was seen today for difficulty urinating.    Diagnoses and all orders for this visit:    Acute cystitis with hematuria  -     POCT urinalysis dipstick, automated  -     nitrofurantoin, macrocrystal-monohydrate, (MACROBID) 100 MG capsule; Take 1 capsule by mouth Every 12 (Twelve) Hours for 5 days.

## 2018-11-28 ENCOUNTER — OFFICE VISIT (OUTPATIENT)
Dept: FAMILY MEDICINE CLINIC | Facility: CLINIC | Age: 78
End: 2018-11-28

## 2018-11-28 VITALS
OXYGEN SATURATION: 97 % | SYSTOLIC BLOOD PRESSURE: 134 MMHG | TEMPERATURE: 97.8 F | WEIGHT: 133 LBS | HEIGHT: 60 IN | HEART RATE: 71 BPM | DIASTOLIC BLOOD PRESSURE: 84 MMHG | RESPIRATION RATE: 16 BRPM | BODY MASS INDEX: 26.11 KG/M2

## 2018-11-28 DIAGNOSIS — E03.9 HYPOTHYROIDISM, UNSPECIFIED TYPE: Primary | ICD-10-CM

## 2018-11-28 PROCEDURE — 99213 OFFICE O/P EST LOW 20 MIN: CPT | Performed by: FAMILY MEDICINE

## 2018-11-28 NOTE — PROGRESS NOTES
Amita Mary is a 77 y.o. female. Presents today for medication management for hypothyroidism.     History of Present Illness     Amita Mary is a 77 y.o. female who presents for follow up of hypothyroidism. Current symptoms: none. Patient denies change in energy level, diarrhea, heat / cold intolerance, nervousness, palpitations and weight changes. Symptoms have stabilized.  Needs thyroid checked due to using [place] thyroid.    The following portions of the patient's history were reviewed and updated as appropriate: allergies, current medications, past family history, past medical history, past social history, past surgical history and problem list.    Review of Systems   Constitutional: Negative for chills and fatigue.   Respiratory: Negative for shortness of breath and wheezing.    Cardiovascular: Negative for chest pain and palpitations.       Objective   Physical Exam   Constitutional: She appears well-developed and well-nourished. No distress.   Neck: Neck supple.   Cardiovascular: Normal rate, regular rhythm and normal heart sounds.   Pulmonary/Chest: Effort normal and breath sounds normal.   Lymphadenopathy:     She has no cervical adenopathy.   Skin: Skin is warm. Capillary refill takes less than 2 seconds.   Nursing note and vitals reviewed.      Assessment/Plan   Dafne was seen today for med management.    Diagnoses and all orders for this visit:    Hypothyroidism, unspecified type  -     TSH Rfx On Abnormal To Free T4       We'll check labs and adjust meds as needed.  See back in Spring for routine labs.

## 2018-11-28 NOTE — PATIENT INSTRUCTIONS
Hypothyroidism  Hypothyroidism is a disorder of the thyroid. The thyroid is a large gland that is located in the lower front of the neck. The thyroid releases hormones that control how the body works. With hypothyroidism, the thyroid does not make enough of these hormones.  What are the causes?  Causes of hypothyroidism may include:  · Viral infections.  · Pregnancy.  · Your own defense system (immune system) attacking your thyroid.  · Certain medicines.  · Birth defects.  · Past radiation treatments to your head or neck.  · Past treatment with radioactive iodine.  · Past surgical removal of part or all of your thyroid.  · Problems with the gland that is located in the center of your brain (pituitary).    What are the signs or symptoms?  Signs and symptoms of hypothyroidism may include:  · Feeling as though you have no energy (lethargy).  · Inability to tolerate cold.  · Weight gain that is not explained by a change in diet or exercise habits.  · Dry skin.  · Coarse hair.  · Menstrual irregularity.  · Slowing of thought processes.  · Constipation.  · Sadness or depression.    How is this diagnosed?  Your health care provider may diagnose hypothyroidism with blood tests and ultrasound tests.  How is this treated?  Hypothyroidism is treated with medicine that replaces the hormones that your body does not make. After you begin treatment, it may take several weeks for symptoms to go away.  Follow these instructions at home:  · Take medicines only as directed by your health care provider.  · If you start taking any new medicines, tell your health care provider.  · Keep all follow-up visits as directed by your health care provider. This is important. As your condition improves, your dosage needs may change. You will need to have blood tests regularly so that your health care provider can watch your condition.  Contact a health care provider if:  · Your symptoms do not get better with treatment.  · You are taking thyroid  replacement medicine and:  ? You sweat excessively.  ? You have tremors.  ? You feel anxious.  ? You lose weight rapidly.  ? You cannot tolerate heat.  ? You have emotional swings.  ? You have diarrhea.  ? You feel weak.  Get help right away if:  · You develop chest pain.  · You develop an irregular heartbeat.  · You develop a rapid heartbeat.  This information is not intended to replace advice given to you by your health care provider. Make sure you discuss any questions you have with your health care provider.  Document Released: 12/18/2006 Document Revised: 05/25/2017 Document Reviewed: 05/05/2015  [employer] Interactive Patient Education © 2018 [employer]

## 2018-11-29 LAB — TSH SERPL DL<=0.005 MIU/L-ACNC: 1.74 MIU/ML (ref 0.27–4.2)

## 2019-02-13 DIAGNOSIS — I10 ESSENTIAL HYPERTENSION: ICD-10-CM

## 2019-02-13 RX ORDER — ATENOLOL 50 MG/1
TABLET ORAL
Qty: 30 TABLET | Refills: 0 | Status: SHIPPED | OUTPATIENT
Start: 2019-02-13 | End: 2019-03-25 | Stop reason: SDUPTHER

## 2019-03-13 ENCOUNTER — OFFICE VISIT (OUTPATIENT)
Dept: FAMILY MEDICINE CLINIC | Facility: CLINIC | Age: 79
End: 2019-03-13

## 2019-03-13 VITALS
SYSTOLIC BLOOD PRESSURE: 128 MMHG | HEART RATE: 80 BPM | HEIGHT: 60 IN | BODY MASS INDEX: 26 KG/M2 | RESPIRATION RATE: 16 BRPM | WEIGHT: 132.4 LBS | DIASTOLIC BLOOD PRESSURE: 78 MMHG | OXYGEN SATURATION: 96 % | TEMPERATURE: 98.2 F

## 2019-03-13 DIAGNOSIS — S43.401A SPRAIN OF RIGHT SHOULDER, UNSPECIFIED SHOULDER SPRAIN TYPE, INITIAL ENCOUNTER: Primary | ICD-10-CM

## 2019-03-13 PROCEDURE — 99213 OFFICE O/P EST LOW 20 MIN: CPT | Performed by: FAMILY MEDICINE

## 2019-03-13 NOTE — PATIENT INSTRUCTIONS
Shoulder Sprain  A shoulder sprain is a partial or complete tear in one of the tough, fiber-like tissues (ligaments) in the shoulder. The ligaments in the shoulder help to hold the shoulder in place.  What are the causes?  This condition may be caused by:  · A fall.  · A hit to the shoulder.  · A twist of the arm.    What increases the risk?  This condition is more likely to develop in:  · People who play sports.  · People who have problems with balance or coordination.    What are the signs or symptoms?  Symptoms of this condition include:  · Pain when moving the shoulder.  · Limited ability to move the shoulder.  · Swelling and tenderness on top of the shoulder.  · Warmth in the shoulder.  · A change in the shape of the shoulder.  · Redness or bruising on the shoulder.    How is this diagnosed?  This condition is diagnosed with a physical exam. During the exam, you may be asked to do simple exercises with your shoulder. You may also have imaging tests, such as X-rays, MRI, or a CT scan. These tests can show how severe the sprain is.  How is this treated?  This condition may be treated with:  · Rest.  · Pain medicine.  · Ice.  · A sling or brace. This is used to keep the arm still while the shoulder is healing.  · Physical therapy or rehabilitation exercises. These help to improve the range of motion and strength of the shoulder.  · Surgery (rare). Surgery may be needed if the sprain caused a joint to become unstable. Surgery may also be needed to reduce pain.    Some people may develop ongoing shoulder pain or lose some range of motion in the shoulder. However, most people do not develop long-term problems.  Follow these instructions at home:  · Rest.  · Ask your health care provider when it is safe for you to drive if you have a sling or brace on your shoulder.  · Take over-the-counter and prescription medicines only as told by your health care provider.  · If directed, apply ice to the area:  ? Put ice in a  plastic bag.  ? Place a towel between your skin and the bag.  ? Leave the ice on for 20 minutes, 2-3 times per day.  · If you were given a shoulder sling or brace:  ? Wear it as told.  ? Remove it to shower or bathe.  ? Move your arm only as much as told by your health care provider, but keep your hand moving to prevent swelling.  · If you were shown how to do any exercises, do them as told by your health care provider.  · Keep all follow-up visits as told by your health care provider. This is important.  Contact a health care provider if:  · Your pain gets worse.  · Your pain is not relieved with medicines.  · You have increased redness or swelling.  Get help right away if:  · You have a fever.  · You cannot move your arm or shoulder.  · You develop severe numbness or tingling in your arm, hand, or fingers.  · Your arm, hand, or fingers turn blue, white, or gray and feel cold.  This information is not intended to replace advice given to you by your health care provider. Make sure you discuss any questions you have with your health care provider.  Document Released: 05/06/2010 Document Revised: 08/13/2017 Document Reviewed: 04/11/2016  [employer] Interactive Patient Education © 2019 [employer].

## 2019-03-13 NOTE — PROGRESS NOTES
Subjective   Dafne Mary is a 78 y.o. female. Presents today to be evaluated for right shoulder pain x 2 weeks.     History of Present Illness     Right shoulder pain-patient has been dealing with an injury to her right shoulder since about 4 days ago.  She says that she was out walking her dog and the dog yanked on the lesion and it pulled a area of her deltoid and proximal tricep..  She has been slowly rehabbing it on her own with movements and stretches and it seems like it starting to get better.  Initially it was not moving much at all.  But she is gotten some of her internal rotation, external rotation, forward flexion, abduction back to her.  She is just concerned and wants to make sure she does not have any type of rotator cuff tear.  She tried some topical cream but that did not seem to help very much.  It is worsened with certain activities and laying on it.    The following portions of the patient's history were reviewed and updated as appropriate: allergies, current medications, past family history, past medical history, past social history, past surgical history and problem list.    Review of Systems   Musculoskeletal:        Right shoulder pain       Objective   Physical Exam   Constitutional: She appears well-developed and well-nourished. No distress.   Cardiovascular: Normal rate, regular rhythm and normal heart sounds. Exam reveals no gallop and no friction rub.   No murmur heard.  Pulmonary/Chest: Effort normal and breath sounds normal. She has no wheezes. She has no rales.   Musculoskeletal:        Right shoulder: She exhibits decreased range of motion, tenderness, pain and spasm. She exhibits no bony tenderness and no swelling.        Left shoulder: Normal.        Right elbow: Normal.       Cervical back: Normal.   She is decreased to 90 degrees on forward flexion and abduction.  She is able to complete internal rotation but it is difficult for her.  Her empty can test is negative on the right  and left.  Strength is within normal limits.  There is some pain with palpation of the deltoid and the proximal tricep.   Skin: Skin is warm. Capillary refill takes less than 2 seconds. She is not diaphoretic.   Nursing note and vitals reviewed.      Assessment/Plan   Dafne was seen today for shoulder pain.    Diagnoses and all orders for this visit:    Sprain of right shoulder, unspecified shoulder sprain type, initial encounter    I recommend that she continue the rehab that she is doing at home with stretching, light resistance.  I expect that this will resolve almost completely within a week or 2.  I recommended that she give me a call if she has any more trouble beyond that period of time.  She may use any over-the-counter topicals or short-term Tylenol/ibuprofen as she wishes.  Follow-up as needed.

## 2019-03-20 RX ORDER — LOSARTAN POTASSIUM 100 MG/1
TABLET ORAL
Qty: 90 TABLET | Refills: 2 | Status: SHIPPED | OUTPATIENT
Start: 2019-03-20 | End: 2020-03-03

## 2019-03-25 DIAGNOSIS — I10 ESSENTIAL HYPERTENSION: ICD-10-CM

## 2019-03-25 RX ORDER — ATENOLOL 50 MG/1
TABLET ORAL
Qty: 14 TABLET | Refills: 0 | Status: SHIPPED | OUTPATIENT
Start: 2019-03-25 | End: 2019-04-09

## 2019-03-25 RX ORDER — PAROXETINE 10 MG/1
TABLET, FILM COATED ORAL
Qty: 14 TABLET | Refills: 0 | Status: SHIPPED | OUTPATIENT
Start: 2019-03-25 | End: 2019-04-09

## 2019-04-09 ENCOUNTER — OFFICE VISIT (OUTPATIENT)
Dept: FAMILY MEDICINE CLINIC | Facility: CLINIC | Age: 79
End: 2019-04-09

## 2019-04-09 VITALS
HEIGHT: 60 IN | HEART RATE: 75 BPM | OXYGEN SATURATION: 96 % | TEMPERATURE: 97.8 F | BODY MASS INDEX: 25.8 KG/M2 | SYSTOLIC BLOOD PRESSURE: 132 MMHG | RESPIRATION RATE: 16 BRPM | WEIGHT: 131.4 LBS | DIASTOLIC BLOOD PRESSURE: 84 MMHG

## 2019-04-09 DIAGNOSIS — M79.7 FIBROMYALGIA: ICD-10-CM

## 2019-04-09 DIAGNOSIS — M62.838 MUSCLE SPASMS OF BOTH LOWER EXTREMITIES: ICD-10-CM

## 2019-04-09 DIAGNOSIS — M54.50 CHRONIC BILATERAL LOW BACK PAIN WITHOUT SCIATICA: ICD-10-CM

## 2019-04-09 DIAGNOSIS — F41.1 GENERALIZED ANXIETY DISORDER: ICD-10-CM

## 2019-04-09 DIAGNOSIS — M51.36 DEGENERATIVE DISC DISEASE, LUMBAR: ICD-10-CM

## 2019-04-09 DIAGNOSIS — L23.7 POISON IVY DERMATITIS: ICD-10-CM

## 2019-04-09 DIAGNOSIS — I10 ESSENTIAL HYPERTENSION: ICD-10-CM

## 2019-04-09 DIAGNOSIS — G89.29 CHRONIC BILATERAL LOW BACK PAIN WITHOUT SCIATICA: ICD-10-CM

## 2019-04-09 DIAGNOSIS — M54.42 ACUTE LEFT-SIDED LOW BACK PAIN WITH LEFT-SIDED SCIATICA: Primary | ICD-10-CM

## 2019-04-09 PROCEDURE — 99214 OFFICE O/P EST MOD 30 MIN: CPT | Performed by: FAMILY MEDICINE

## 2019-04-09 RX ORDER — CYCLOBENZAPRINE HCL 5 MG
5 TABLET ORAL 3 TIMES DAILY PRN
Qty: 30 TABLET | Refills: 0 | Status: SHIPPED | OUTPATIENT
Start: 2019-04-09 | End: 2019-11-05

## 2019-04-09 RX ORDER — PAROXETINE 10 MG/1
10 TABLET, FILM COATED ORAL DAILY
Qty: 90 TABLET | Refills: 3 | Status: SHIPPED | OUTPATIENT
Start: 2019-04-09 | End: 2020-04-10

## 2019-04-09 RX ORDER — PREDNISONE 50 MG/1
50 TABLET ORAL DAILY
Qty: 5 TABLET | Refills: 0 | Status: SHIPPED | OUTPATIENT
Start: 2019-04-09 | End: 2019-07-15

## 2019-04-09 RX ORDER — ATENOLOL 50 MG/1
50 TABLET ORAL DAILY
Qty: 90 TABLET | Refills: 3 | Status: SHIPPED | OUTPATIENT
Start: 2019-04-09 | End: 2020-05-19

## 2019-04-09 NOTE — PATIENT INSTRUCTIONS
Contact Dermatitis  Dermatitis is redness, soreness, and swelling (inflammation) of the skin. Contact dermatitis is a reaction to certain substances that touch the skin. You either touched something that irritated your skin, or you have allergies to something you touched.  Follow these instructions at home:  Skin Care  · Moisturize your skin as needed.  · Apply cool compresses to the affected areas.  · Try taking a bath with:  ? Epsom salts. Follow the instructions on the package. You can get these at a pharmacy or grocery store.  ? Baking soda. Pour a small amount into the bath as told by your doctor.  ? Colloidal oatmeal. Follow the instructions on the package. You can get this at a pharmacy or grocery store.  · Try applying baking soda paste to your skin. Stir water into baking soda until it looks like paste.  · Do not scratch your skin.  · Bathe less often.  · Bathe in lukewarm water. Avoid using hot water.  Medicines  · Take or apply over-the-counter and prescription medicines only as told by your doctor.  · If you were prescribed an antibiotic medicine, take or apply your antibiotic as told by your doctor. Do not stop taking the antibiotic even if your condition starts to get better.  General instructions  · Keep all follow-up visits as told by your doctor. This is important.  · Avoid the substance that caused your reaction. If you do not know what caused it, keep a journal to try to track what caused it. Write down:  ? What you eat.  ? What cosmetic products you use.  ? What you drink.  ? What you wear in the affected area. This includes jewelry.  · If you were given a bandage (dressing), take care of it as told by your doctor. This includes when to change and remove it.  Contact a doctor if:  · You do not get better with treatment.  · Your condition gets worse.  · You have signs of infection such as:  ? Swelling.  ? Tenderness.  ? Redness.  ? Soreness.  ? Warmth.  · You have a fever.  · You have new  symptoms.  Get help right away if:  · You have a very bad headache.  · You have neck pain.  · Your neck is stiff.  · You throw up (vomit).  · You feel very sleepy.  · You see red streaks coming from the affected area.  · Your bone or joint underneath the affected area becomes painful after the skin has healed.  · The affected area turns darker.  · You have trouble breathing.  This information is not intended to replace advice given to you by your health care provider. Make sure you discuss any questions you have with your health care provider.  Document Released: 10/15/2010 Document Revised: 08/01/2018 Document Reviewed: 05/04/2016  [employer] Interactive Patient Education © 2019 [employer].

## 2019-04-09 NOTE — PROGRESS NOTES
Subjective   Dafne Mary is a 78 y.o. female. Presents today to be evaluated for back pain and poison ivy x 2 days.     History of Present Illness     Patient was working in her garden when she pulled her back out and she is been having back pain goes down her left side causing sciatica.  At the same time she noticed she was getting some irritation in her cheeks and her wrists and saw a rash appearing.  Since then she has been itching and noticing that she is been a little swollen under her eyes.  She thinks she must have gotten in contact with poison ivy.  She has been taking a little bit of betamethasone cream and putting it on her arms but is not been applying it to her face.  She has not been doing anything for the back pain yet.    The following portions of the patient's history were reviewed and updated as appropriate: allergies, current medications, past family history, past medical history, past social history, past surgical history and problem list.    Review of Systems   Constitutional: Negative for fatigue and fever.   Respiratory: Negative for shortness of breath and wheezing.    Cardiovascular: Negative for chest pain and palpitations.   Gastrointestinal: Negative for constipation and nausea.   Musculoskeletal: Positive for arthralgias, back pain and myalgias.   Skin: Positive for rash.       Objective   Physical Exam   Constitutional: She appears well-developed and well-nourished. No distress.   Cardiovascular: Normal rate, regular rhythm and normal heart sounds. Exam reveals no gallop and no friction rub.   No murmur heard.  Pulmonary/Chest: Effort normal and breath sounds normal. She has no wheezes. She has no rales.   Musculoskeletal:        Cervical back: Normal.        Thoracic back: Normal.        Lumbar back: She exhibits decreased range of motion, tenderness, pain and spasm. She exhibits no bony tenderness.   Skin: Skin is warm. Capillary refill takes less than 2 seconds. Rash noted. She is  not diaphoretic.   Papular erythematous rash on the cheeks of the face and the wrists bilaterally   Nursing note and vitals reviewed.      Assessment/Plan   Dafne was seen today for back pain and poison ivy.    Diagnoses and all orders for this visit:    Acute left-sided low back pain with left-sided sciatica  -     cyclobenzaprine (FLEXERIL) 5 MG tablet; Take 1 tablet by mouth 3 (Three) Times a Day As Needed for Muscle Spasms.  -     predniSONE (DELTASONE) 50 MG tablet; Take 1 tablet by mouth Daily.    Poison ivy dermatitis  -     predniSONE (DELTASONE) 50 MG tablet; Take 1 tablet by mouth Daily.    Essential hypertension  -     atenolol (TENORMIN) 50 MG tablet; Take 1 tablet by mouth Daily.    Generalized anxiety disorder  -     PARoxetine (PAXIL) 10 MG tablet; Take 1 tablet by mouth Daily.    Muscle spasms of both lower extremities    Fibromyalgia    Chronic bilateral low back pain without sciatica    Degenerative disc disease, lumbar    I gave her some Flexeril for the muscle spasms and prednisone for 5 days to help with the poison ivy dermatitis and the acute low back pain.  I also went ahead and refilled her atenolol and peroxide teen.  We did not have time to discuss these but everything looked okay in the chart and will get blood work and things next time she is here.

## 2019-07-15 ENCOUNTER — OFFICE VISIT (OUTPATIENT)
Dept: FAMILY MEDICINE CLINIC | Facility: CLINIC | Age: 79
End: 2019-07-15

## 2019-07-15 VITALS
DIASTOLIC BLOOD PRESSURE: 86 MMHG | TEMPERATURE: 98.9 F | HEIGHT: 60 IN | BODY MASS INDEX: 26.35 KG/M2 | OXYGEN SATURATION: 98 % | SYSTOLIC BLOOD PRESSURE: 130 MMHG | WEIGHT: 134.2 LBS | HEART RATE: 79 BPM | RESPIRATION RATE: 16 BRPM

## 2019-07-15 DIAGNOSIS — N30.01 ACUTE CYSTITIS WITH HEMATURIA: Primary | ICD-10-CM

## 2019-07-15 DIAGNOSIS — G89.29 CHRONIC RIGHT SHOULDER PAIN: ICD-10-CM

## 2019-07-15 DIAGNOSIS — M25.511 CHRONIC RIGHT SHOULDER PAIN: ICD-10-CM

## 2019-07-15 LAB
BILIRUB BLD-MCNC: NEGATIVE MG/DL
CLARITY, POC: CLEAR
COLOR UR: YELLOW
GLUCOSE UR STRIP-MCNC: NEGATIVE MG/DL
KETONES UR QL: NEGATIVE
LEUKOCYTE EST, POC: ABNORMAL
NITRITE UR-MCNC: NEGATIVE MG/ML
PH UR: 6.5 [PH] (ref 5–8)
PROT UR STRIP-MCNC: NEGATIVE MG/DL
RBC # UR STRIP: ABNORMAL /UL
SP GR UR: 1.01 (ref 1–1.03)
UROBILINOGEN UR QL: NORMAL

## 2019-07-15 PROCEDURE — 99214 OFFICE O/P EST MOD 30 MIN: CPT | Performed by: FAMILY MEDICINE

## 2019-07-15 PROCEDURE — 81003 URINALYSIS AUTO W/O SCOPE: CPT | Performed by: FAMILY MEDICINE

## 2019-07-15 RX ORDER — MELOXICAM 15 MG/1
15 TABLET ORAL DAILY
Qty: 30 TABLET | Refills: 1 | Status: SHIPPED | OUTPATIENT
Start: 2019-07-15 | End: 2019-11-05

## 2019-07-15 RX ORDER — PHENAZOPYRIDINE HYDROCHLORIDE 100 MG/1
100 TABLET, FILM COATED ORAL 3 TIMES DAILY PRN
Qty: 9 TABLET | Refills: 0 | Status: SHIPPED | OUTPATIENT
Start: 2019-07-15 | End: 2019-07-18

## 2019-07-15 RX ORDER — NITROFURANTOIN 25; 75 MG/1; MG/1
100 CAPSULE ORAL 2 TIMES DAILY
Qty: 10 CAPSULE | Refills: 0 | Status: SHIPPED | OUTPATIENT
Start: 2019-07-15 | End: 2019-07-20

## 2019-07-15 NOTE — PATIENT INSTRUCTIONS
Urinary Tract Infection, Adult  A urinary tract infection (UTI) is an infection of any part of the urinary tract. The urinary tract includes the:  · Kidneys.  · Ureters.  · Bladder.  · Urethra.    These organs make, store, and get rid of pee (urine) in the body.  Follow these instructions at home:  · Take over-the-counter and prescription medicines only as told by your doctor.  · If you were prescribed an antibiotic medicine, take it as told by your doctor. Do not stop taking the antibiotic even if you start to feel better.  · Avoid the following drinks:  ? Alcohol.  ? Caffeine.  ? Tea.  ? Carbonated drinks.  · Drink enough fluid to keep your pee clear or pale yellow.  · Keep all follow-up visits as told by your doctor. This is important.  · Make sure to:  ? Empty your bladder often and completely. Do not to hold pee for long periods of time.  ? Empty your bladder before and after sex.  ? Wipe from front to back after a bowel movement if you are female. Use each tissue one time when you wipe.  Contact a doctor if:  · You have back pain.  · You have a fever.  · You feel sick to your stomach (nauseous).  · You throw up (vomit).  · Your symptoms do not get better after 3 days.  · Your symptoms go away and then come back.  Get help right away if:  · You have very bad back pain.  · You have very bad lower belly (abdominal) pain.  · You are throwing up and cannot keep down any medicines or water.  This information is not intended to replace advice given to you by your health care provider. Make sure you discuss any questions you have with your health care provider.  Document Released: 06/05/2009 Document Revised: 06/12/2018 Document Reviewed: 11/07/2016  [employer] Interactive Patient Education © 2019 [employer]      Shoulder Pain  Many things can cause shoulder pain, including:  · An injury to the area.  · Overuse of the shoulder.  · Arthritis.    The source of the pain can be:  · Inflammation.  · An injury to the  shoulder joint.  · An injury to a tendon, ligament, or bone.    Follow these instructions at home:  Take these actions to help with your pain:  · Squeeze a soft ball or a foam pad as much as possible. This helps to keep the shoulder from swelling. It also helps to strengthen the arm.  · Take over-the-counter and prescription medicines only as told by your health care provider.  · If directed, apply ice to the area:  ? Put ice in a plastic bag.  ? Place a towel between your skin and the bag.  ? Leave the ice on for 20 minutes, 2-3 times per day. Stop applying ice if it does not help with the pain.  · If you were given a shoulder sling or immobilizer:  ? Wear it as told.  ? Remove it to shower or bathe.  ? Move your arm as little as possible, but keep your hand moving to prevent swelling.    Contact a health care provider if:  · Your pain gets worse.  · Your pain is not relieved with medicines.  · New pain develops in your arm, hand, or fingers.  Get help right away if:  · Your arm, hand, or fingers:  ? Tingle.  ? Become numb.  ? Become swollen.  ? Become painful.  ? Turn white or blue.  This information is not intended to replace advice given to you by your health care provider. Make sure you discuss any questions you have with your health care provider.  Document Released: 09/27/2006 Document Revised: 08/13/2017 Document Reviewed: 04/11/2016  [employer] Interactive Patient Education © 2019 [employer].

## 2019-07-15 NOTE — PROGRESS NOTES
Subjective   Dafne Mary is a 78 y.o. female. Presents today to be evaluated for possible UTI. Also would like to discuss right shoulder pain.     History of Present Illness     Urinary Tract Infection  Patient complains of abnormal smelling urine, dysuria and urgency. She has had symptoms for 3 days. Patient also complains of none. Patient denies back pain and vaginal discharge. Patient does not have a history of recurrent UTI. Patient does not have a history of pyelonephritis.  Tends to do fine on Macrobid.    Shoulder Pain  Patient complains of right shoulder pain. The symptoms began about a month ago. Aggravating factors: no known event. Pain is located in the right glenohumeral region. Discomfort is described as aching and sharp/stabbing. Symptoms are exacerbated by overhead movements and lying on the shoulder. Evaluation to date: none. Therapy to date includes: rest, avoidance of offending activity and home exercises which are somewhat effective.      The following portions of the patient's history were reviewed and updated as appropriate: allergies, current medications, past family history, past medical history, past social history, past surgical history and problem list.    Review of Systems   Genitourinary: Positive for dysuria.   Musculoskeletal:        Shoulder pain (right)       Objective   Physical Exam   Constitutional: She appears well-developed and well-nourished. No distress.   Cardiovascular: Normal rate, regular rhythm and normal heart sounds. Exam reveals no gallop and no friction rub.   No murmur heard.  Pulmonary/Chest: Effort normal and breath sounds normal. She has no wheezes. She has no rales.   Musculoskeletal:        Right shoulder: She exhibits decreased range of motion, tenderness, crepitus, pain and spasm. She exhibits no effusion and no deformity.        Left shoulder: Normal.        Right elbow: Normal.  Skin: Skin is warm. Capillary refill takes less than 2 seconds. She is not  diaphoretic.   Nursing note and vitals reviewed.      Assessment/Plan   Dafne was seen today for dysuria and shoulder pain.    Diagnoses and all orders for this visit:    Acute cystitis with hematuria  -     nitrofurantoin, macrocrystal-monohydrate, (MACROBID) 100 MG capsule; Take 1 capsule by mouth 2 (Two) Times a Day for 5 days.  -     phenazopyridine (PYRIDIUM) 100 MG tablet; Take 1 tablet by mouth 3 (Three) Times a Day As Needed for bladder spasms for up to 3 days.  -     POCT urinalysis dipstick, automated    Chronic right shoulder pain  -     meloxicam (MOBIC) 15 MG tablet; Take 1 tablet by mouth Daily.  -     Ambulatory Referral to Physical Therapy Evaluate and treat    Plan as above.  If things do not get better with shoulder than we can consider doing an injection next time I see her.

## 2019-08-08 DIAGNOSIS — E03.9 HYPOTHYROIDISM, UNSPECIFIED TYPE: ICD-10-CM

## 2019-08-08 RX ORDER — THYROID 30 MG/1
TABLET ORAL
Qty: 30 TABLET | Refills: 0 | Status: SHIPPED | OUTPATIENT
Start: 2019-08-08 | End: 2019-09-11 | Stop reason: SDUPTHER

## 2019-08-08 RX ORDER — THYROID,PORK 15 MG
TABLET ORAL
Qty: 30 TABLET | Refills: 0 | Status: SHIPPED | OUTPATIENT
Start: 2019-08-08 | End: 2019-09-11 | Stop reason: SDUPTHER

## 2019-09-11 DIAGNOSIS — E03.9 HYPOTHYROIDISM, UNSPECIFIED TYPE: ICD-10-CM

## 2019-09-12 RX ORDER — THYROID,PORK 15 MG
TABLET ORAL
Qty: 30 TABLET | Refills: 0 | Status: SHIPPED | OUTPATIENT
Start: 2019-09-12 | End: 2019-10-15 | Stop reason: SDUPTHER

## 2019-09-12 RX ORDER — THYROID 30 MG/1
TABLET ORAL
Qty: 30 TABLET | Refills: 0 | Status: SHIPPED | OUTPATIENT
Start: 2019-09-12 | End: 2019-10-15 | Stop reason: SDUPTHER

## 2019-10-15 DIAGNOSIS — E03.9 HYPOTHYROIDISM, UNSPECIFIED TYPE: ICD-10-CM

## 2019-10-15 RX ORDER — THYROID,PORK 15 MG
TABLET ORAL
Qty: 14 TABLET | Refills: 0 | Status: SHIPPED | OUTPATIENT
Start: 2019-10-15 | End: 2019-11-11 | Stop reason: SDUPTHER

## 2019-10-15 RX ORDER — THYROID 30 MG/1
TABLET ORAL
Qty: 14 TABLET | Refills: 0 | Status: SHIPPED | OUTPATIENT
Start: 2019-10-15 | End: 2019-11-11 | Stop reason: SDUPTHER

## 2019-11-05 ENCOUNTER — OFFICE VISIT (OUTPATIENT)
Dept: FAMILY MEDICINE CLINIC | Facility: CLINIC | Age: 79
End: 2019-11-05

## 2019-11-05 VITALS
WEIGHT: 133.6 LBS | HEART RATE: 81 BPM | DIASTOLIC BLOOD PRESSURE: 82 MMHG | RESPIRATION RATE: 16 BRPM | BODY MASS INDEX: 26.23 KG/M2 | OXYGEN SATURATION: 98 % | TEMPERATURE: 97.8 F | HEIGHT: 60 IN | SYSTOLIC BLOOD PRESSURE: 142 MMHG

## 2019-11-05 DIAGNOSIS — E03.9 HYPOTHYROIDISM, UNSPECIFIED TYPE: Primary | ICD-10-CM

## 2019-11-05 DIAGNOSIS — I10 ESSENTIAL HYPERTENSION: ICD-10-CM

## 2019-11-05 DIAGNOSIS — Z23 NEED FOR INFLUENZA VACCINATION: ICD-10-CM

## 2019-11-05 PROCEDURE — 90653 IIV ADJUVANT VACCINE IM: CPT | Performed by: FAMILY MEDICINE

## 2019-11-05 PROCEDURE — G0008 ADMIN INFLUENZA VIRUS VAC: HCPCS | Performed by: FAMILY MEDICINE

## 2019-11-05 PROCEDURE — 99214 OFFICE O/P EST MOD 30 MIN: CPT | Performed by: FAMILY MEDICINE

## 2019-11-05 NOTE — PATIENT INSTRUCTIONS
Hypertension  Hypertension is another name for high blood pressure. High blood pressure forces your heart to work harder to pump blood. This can cause problems over time.  There are two numbers in a blood pressure reading. There is a top number (systolic) over a bottom number (diastolic). It is best to have a blood pressure below 120/80. Healthy choices can help lower your blood pressure. You may need medicine to help lower your blood pressure if:  · Your blood pressure cannot be lowered with healthy choices.  · Your blood pressure is higher than 130/80.  Follow these instructions at home:  Eating and drinking    · If directed, follow the DASH eating plan. This diet includes:  ? Filling half of your plate at each meal with fruits and vegetables.  ? Filling one quarter of your plate at each meal with whole grains. Whole grains include whole wheat pasta, brown rice, and whole grain bread.  ? Eating or drinking low-fat dairy products, such as skim milk or low-fat yogurt.  ? Filling one quarter of your plate at each meal with low-fat (lean) proteins. Low-fat proteins include fish, skinless chicken, eggs, beans, and tofu.  ? Avoiding fatty meat, cured and processed meat, or chicken with skin.  ? Avoiding premade or processed food.  · Eat less than 1,500 mg of salt (sodium) a day.  · Limit alcohol use to no more than 1 drink a day for nonpregnant women and 2 drinks a day for men. One drink equals 12 oz of beer, 5 oz of wine, or 1½ oz of hard liquor.  Lifestyle  · Work with your doctor to stay at a healthy weight or to lose weight. Ask your doctor what the best weight is for you.  · Get at least 30 minutes of exercise that causes your heart to beat faster (aerobic exercise) most days of the week. This may include walking, swimming, or biking.  · Get at least 30 minutes of exercise that strengthens your muscles (resistance exercise) at least 3 days a week. This may include lifting weights or pilates.  · Do not use any  products that contain nicotine or tobacco. This includes cigarettes and e-cigarettes. If you need help quitting, ask your doctor.  · Check your blood pressure at home as told by your doctor.  · Keep all follow-up visits as told by your doctor. This is important.  Medicines  · Take over-the-counter and prescription medicines only as told by your doctor. Follow directions carefully.  · Do not skip doses of blood pressure medicine. The medicine does not work as well if you skip doses. Skipping doses also puts you at risk for problems.  · Ask your doctor about side effects or reactions to medicines that you should watch for.  Contact a doctor if:  · You think you are having a reaction to the medicine you are taking.  · You have headaches that keep coming back (recurring).  · You feel dizzy.  · You have swelling in your ankles.  · You have trouble with your vision.  Get help right away if:  · You get a very bad headache.  · You start to feel confused.  · You feel weak or numb.  · You feel faint.  · You get very bad pain in your:  ? Chest.  ? Belly (abdomen).  · You throw up (vomit) more than once.  · You have trouble breathing.  Summary  · Hypertension is another name for high blood pressure.  · Making healthy choices can help lower blood pressure. If your blood pressure cannot be controlled with healthy choices, you may need to take medicine.  This information is not intended to replace advice given to you by your health care provider. Make sure you discuss any questions you have with your health care provider.  Document Released: 06/05/2009 Document Revised: 11/15/2017 Document Reviewed: 11/15/2017  [employer] Interactive Patient Education © 2019 [employer]

## 2019-11-05 NOTE — PROGRESS NOTES
Subjective   Dafne Mary is a 78 y.o. female. Presents today for medication management for hypothyroidism, hypertension, anxiety.    History of Present Illness     Hypothyroidism - Stable.  Patient taking [place] thyroid 30mg and a 15mg.  Patient denying any symptoms of hypothyroidism such as cold intolerance, constipation, mood swings.  Endorses some fatigue.    Hypertension - stable.  Patient taking medication as prescribed.  Denies chest pain, shortness of breath, headache, lower extremity edema.  Patient is taking atenolol, losartan.    Anxiety - stable.  Taking Paxil 10 mg.  No depressive thoughts currently.  She is having some fatigue.    The following portions of the patient's history were reviewed and updated as appropriate: allergies, current medications, past family history, past medical history, past social history, past surgical history and problem list.    Review of Systems   Constitutional: Negative for fatigue and fever.   Respiratory: Negative for shortness of breath and wheezing.    Cardiovascular: Negative for chest pain and palpitations.   Gastrointestinal: Negative for constipation and nausea.   All other systems reviewed and are negative.      Objective   Physical Exam   Constitutional: She appears well-developed and well-nourished. No distress.   Cardiovascular: Normal rate, regular rhythm and normal heart sounds. Exam reveals no gallop and no friction rub.   No murmur heard.  Pulmonary/Chest: Effort normal and breath sounds normal. She has no wheezes. She has no rales.   Skin: Skin is warm. Capillary refill takes less than 2 seconds. She is not diaphoretic.   Psychiatric: She has a normal mood and affect. Her speech is normal and behavior is normal. Judgment and thought content normal. Cognition and memory are normal. She is attentive.   Nursing note and vitals reviewed.      Assessment/Plan   Dafne was seen today for med management.    Diagnoses and all orders for this  visit:    Hypothyroidism, unspecified type  -     TSH Rfx On Abnormal To Free T4    Essential hypertension  -     Comprehensive Metabolic Panel    Need for influenza vaccination  -     Fluad Quad >65 years (1740-7050)      We will get labs as above to monitor the hypothyroidism and for monitoring of the essential hypertension.  Also gave the patient a flu shot today.  Plan on see him back in about 6 months.

## 2019-11-06 LAB
ALBUMIN SERPL-MCNC: 4.4 G/DL (ref 3.5–5.2)
ALBUMIN/GLOB SERPL: 1.9 G/DL
ALP SERPL-CCNC: 120 U/L (ref 39–117)
ALT SERPL-CCNC: 18 U/L (ref 1–33)
AST SERPL-CCNC: 23 U/L (ref 1–32)
BILIRUB SERPL-MCNC: 0.4 MG/DL (ref 0.2–1.2)
BUN SERPL-MCNC: 24 MG/DL (ref 8–23)
BUN/CREAT SERPL: 25.8 (ref 7–25)
CALCIUM SERPL-MCNC: 9.3 MG/DL (ref 8.6–10.5)
CHLORIDE SERPL-SCNC: 105 MMOL/L (ref 98–107)
CO2 SERPL-SCNC: 25.7 MMOL/L (ref 22–29)
CREAT SERPL-MCNC: 0.93 MG/DL (ref 0.57–1)
GLOBULIN SER CALC-MCNC: 2.3 GM/DL
GLUCOSE SERPL-MCNC: 83 MG/DL (ref 65–99)
POTASSIUM SERPL-SCNC: 4.2 MMOL/L (ref 3.5–5.2)
PROT SERPL-MCNC: 6.7 G/DL (ref 6–8.5)
SODIUM SERPL-SCNC: 145 MMOL/L (ref 136–145)
TSH SERPL DL<=0.005 MIU/L-ACNC: 2.96 UIU/ML (ref 0.27–4.2)

## 2019-11-11 DIAGNOSIS — E03.9 HYPOTHYROIDISM, UNSPECIFIED TYPE: ICD-10-CM

## 2019-11-11 RX ORDER — THYROID 30 MG/1
30 TABLET ORAL DAILY
Qty: 30 TABLET | Refills: 5 | Status: SHIPPED | OUTPATIENT
Start: 2019-11-11 | End: 2020-05-19

## 2019-11-11 RX ORDER — THYROID,PORK 15 MG
15 TABLET ORAL DAILY
Qty: 30 TABLET | Refills: 5 | Status: SHIPPED | OUTPATIENT
Start: 2019-11-11 | End: 2020-05-19

## 2020-03-03 RX ORDER — LOSARTAN POTASSIUM 100 MG/1
TABLET ORAL
Qty: 90 TABLET | Refills: 0 | Status: SHIPPED | OUTPATIENT
Start: 2020-03-03 | End: 2020-06-12

## 2020-04-10 DIAGNOSIS — F41.1 GENERALIZED ANXIETY DISORDER: ICD-10-CM

## 2020-04-10 RX ORDER — PAROXETINE 10 MG/1
TABLET, FILM COATED ORAL
Qty: 90 TABLET | Refills: 0 | Status: SHIPPED | OUTPATIENT
Start: 2020-04-10 | End: 2020-07-07

## 2020-04-14 ENCOUNTER — TELEPHONE (OUTPATIENT)
Dept: FAMILY MEDICINE CLINIC | Facility: CLINIC | Age: 80
End: 2020-04-14

## 2020-04-14 ENCOUNTER — OFFICE VISIT (OUTPATIENT)
Dept: FAMILY MEDICINE CLINIC | Facility: CLINIC | Age: 80
End: 2020-04-14

## 2020-04-14 DIAGNOSIS — L24.1 IRRITANT CONTACT DERMATITIS DUE TO OILS: Primary | ICD-10-CM

## 2020-04-14 PROCEDURE — G2025 DIS SITE TELE SVCS RHC/FQHC: HCPCS | Performed by: FAMILY MEDICINE

## 2020-04-14 RX ORDER — PREDNISONE 20 MG/1
TABLET ORAL
Qty: 36 TABLET | Refills: 0 | Status: SHIPPED | OUTPATIENT
Start: 2020-04-14 | End: 2020-05-04

## 2020-04-14 NOTE — PROGRESS NOTES
Subjective   Dafne Mary is a 79 y.o. female.  Presents via telephone room for possible poison ivy dermatitis.    The patient has consented to be on this video camera/phone call for the visit today.    History of Present Illness     Patient comes in today thinking that she was exposed to poison ivy.  She was out in the garden yesterday and got tangled up in some poison ivy.  She says she has a starting of the erythematous itchy rash in the inner parts of her arms on both sides, legs, and part of her face.  She has not treated it with anything.  The itchiness is getting worse and so is the erythema and vesicles.    The following portions of the patient's history were reviewed and updated as appropriate: allergies, current medications, past family history, past medical history, past social history, past surgical history and problem list.    Review of Systems   Constitutional: Negative for fatigue and fever.   Respiratory: Negative for shortness of breath and wheezing.    Cardiovascular: Negative for chest pain and palpitations.   Gastrointestinal: Negative for constipation and nausea.   Skin: Positive for rash.       Objective   Physical Exam   N/A    Assessment/Plan   Diagnoses and all orders for this visit:    Irritant contact dermatitis due to oils  -     predniSONE (DELTASONE) 20 MG tablet; Take 3 tablets by mouth Daily for 7 days, THEN 1.5 tablets Daily for 7 days, THEN 1 tablet Daily for 3 days, THEN 0.5 tablets Daily for 3 days.      Plan as above for her widespread dermatitis from poison ivy.    I spent approximately 15 minutes on the phone and the 10 minutes doing research and determining the correct taper for the patient for steroids.

## 2020-04-14 NOTE — TELEPHONE ENCOUNTER
Patient called and stated that she has come into contact with poison ivy and has it on different spots on her legs, is covered from wrist to shoulder on both arms, and also has some on her face. She stated she was very allergic to it and wanted to know if Dr. Dc would be able to call some medication in for it to the [facility] on [facility] in [place].    Please advise.  [phone]

## 2020-05-19 DIAGNOSIS — I10 ESSENTIAL HYPERTENSION: ICD-10-CM

## 2020-05-19 DIAGNOSIS — E03.9 HYPOTHYROIDISM, UNSPECIFIED TYPE: ICD-10-CM

## 2020-05-19 RX ORDER — THYROID,PORK 15 MG
TABLET ORAL
Qty: 90 TABLET | Refills: 0 | Status: SHIPPED | OUTPATIENT
Start: 2020-05-19 | End: 2020-08-20

## 2020-05-19 RX ORDER — THYROID 30 MG/1
TABLET ORAL
Qty: 90 TABLET | Refills: 0 | Status: SHIPPED | OUTPATIENT
Start: 2020-05-19 | End: 2020-08-20

## 2020-05-19 RX ORDER — ATENOLOL 50 MG/1
TABLET ORAL
Qty: 90 TABLET | Refills: 0 | Status: SHIPPED | OUTPATIENT
Start: 2020-05-19 | End: 2020-08-20

## 2020-06-12 RX ORDER — LOSARTAN POTASSIUM 100 MG/1
TABLET ORAL
Qty: 90 TABLET | Refills: 0 | Status: SHIPPED | OUTPATIENT
Start: 2020-06-12 | End: 2020-06-15

## 2020-06-15 RX ORDER — LOSARTAN POTASSIUM 100 MG/1
TABLET ORAL
Qty: 90 TABLET | Refills: 0 | Status: SHIPPED | OUTPATIENT
Start: 2020-06-15 | End: 2021-01-07

## 2020-07-07 DIAGNOSIS — F41.1 GENERALIZED ANXIETY DISORDER: ICD-10-CM

## 2020-07-07 RX ORDER — PAROXETINE 10 MG/1
TABLET, FILM COATED ORAL
Qty: 90 TABLET | Refills: 0 | Status: SHIPPED | OUTPATIENT
Start: 2020-07-07 | End: 2020-10-12

## 2020-08-19 DIAGNOSIS — E03.9 HYPOTHYROIDISM, UNSPECIFIED TYPE: ICD-10-CM

## 2020-08-19 DIAGNOSIS — I10 ESSENTIAL HYPERTENSION: ICD-10-CM

## 2020-08-20 RX ORDER — ATENOLOL 50 MG/1
TABLET ORAL
Qty: 90 TABLET | Refills: 0 | Status: SHIPPED | OUTPATIENT
Start: 2020-08-20 | End: 2020-12-04

## 2020-08-20 RX ORDER — THYROID,PORK 15 MG
TABLET ORAL
Qty: 90 TABLET | Refills: 0 | Status: SHIPPED | OUTPATIENT
Start: 2020-08-20 | End: 2020-12-04

## 2020-08-20 RX ORDER — THYROID 30 MG/1
TABLET ORAL
Qty: 90 TABLET | Refills: 0 | Status: SHIPPED | OUTPATIENT
Start: 2020-08-20 | End: 2020-12-04

## 2020-10-12 DIAGNOSIS — F41.1 GENERALIZED ANXIETY DISORDER: ICD-10-CM

## 2020-10-12 RX ORDER — PAROXETINE 10 MG/1
TABLET, FILM COATED ORAL
Qty: 90 TABLET | Refills: 0 | Status: SHIPPED | OUTPATIENT
Start: 2020-10-12 | End: 2020-12-27 | Stop reason: SDUPTHER

## 2020-12-01 DIAGNOSIS — E03.9 HYPOTHYROIDISM, UNSPECIFIED TYPE: ICD-10-CM

## 2020-12-01 DIAGNOSIS — I10 ESSENTIAL HYPERTENSION: ICD-10-CM

## 2020-12-01 NOTE — TELEPHONE ENCOUNTER
Needs appt and to decide if she will follow [facility] or establish with Tiffanie or Sintia Carter [place] to schedule

## 2020-12-04 RX ORDER — ATENOLOL 50 MG/1
TABLET ORAL
Qty: 90 TABLET | Refills: 0 | Status: SHIPPED | OUTPATIENT
Start: 2020-12-04 | End: 2021-03-02

## 2020-12-04 RX ORDER — THYROID,PORK 15 MG
TABLET ORAL
Qty: 90 TABLET | Refills: 0 | Status: SHIPPED | OUTPATIENT
Start: 2020-12-04 | End: 2021-03-02

## 2020-12-04 RX ORDER — THYROID 30 MG/1
TABLET ORAL
Qty: 90 TABLET | Refills: 0 | Status: SHIPPED | OUTPATIENT
Start: 2020-12-04 | End: 2021-03-02

## 2020-12-04 NOTE — TELEPHONE ENCOUNTER
Patient has decided to switch to you. Has appointment scheduled for 12/16/2020. She is completely out of medication. Can you go ahead and fill until she is seen?

## 2020-12-16 ENCOUNTER — OFFICE VISIT (OUTPATIENT)
Dept: FAMILY MEDICINE CLINIC | Facility: CLINIC | Age: 80
End: 2020-12-16

## 2020-12-16 VITALS
RESPIRATION RATE: 16 BRPM | HEART RATE: 71 BPM | SYSTOLIC BLOOD PRESSURE: 130 MMHG | WEIGHT: 132 LBS | OXYGEN SATURATION: 99 % | TEMPERATURE: 98.2 F | BODY MASS INDEX: 25.91 KG/M2 | HEIGHT: 60 IN | DIASTOLIC BLOOD PRESSURE: 80 MMHG

## 2020-12-16 DIAGNOSIS — I65.23 ARTERIOSCLEROSIS OF BOTH CAROTID ARTERIES: ICD-10-CM

## 2020-12-16 DIAGNOSIS — E53.8 B12 DEFICIENCY: ICD-10-CM

## 2020-12-16 DIAGNOSIS — J43.9 PULMONARY EMPHYSEMA, UNSPECIFIED EMPHYSEMA TYPE (HCC): ICD-10-CM

## 2020-12-16 DIAGNOSIS — F41.1 GENERALIZED ANXIETY DISORDER: ICD-10-CM

## 2020-12-16 DIAGNOSIS — I10 BENIGN HYPERTENSION: Primary | ICD-10-CM

## 2020-12-16 DIAGNOSIS — E03.9 ACQUIRED HYPOTHYROIDISM: ICD-10-CM

## 2020-12-16 DIAGNOSIS — E78.2 MIXED HYPERLIPIDEMIA: ICD-10-CM

## 2020-12-16 DIAGNOSIS — E55.9 VITAMIN D DEFICIENCY: ICD-10-CM

## 2020-12-16 DIAGNOSIS — M81.0 POSTMENOPAUSAL OSTEOPOROSIS: ICD-10-CM

## 2020-12-16 DIAGNOSIS — M79.7 FIBROMYALGIA: ICD-10-CM

## 2020-12-16 DIAGNOSIS — K21.9 GASTROESOPHAGEAL REFLUX DISEASE, UNSPECIFIED WHETHER ESOPHAGITIS PRESENT: ICD-10-CM

## 2020-12-16 PROBLEM — N30.00 ACUTE CYSTITIS WITHOUT HEMATURIA: Status: RESOLVED | Noted: 2017-12-15 | Resolved: 2020-12-16

## 2020-12-16 PROBLEM — N30.91 CYSTITIS WITH HEMATURIA: Status: RESOLVED | Noted: 2017-12-15 | Resolved: 2020-12-16

## 2020-12-16 PROBLEM — R82.998 LEUKOCYTES IN URINE: Status: RESOLVED | Noted: 2017-12-15 | Resolved: 2020-12-16

## 2020-12-16 PROCEDURE — 99214 OFFICE O/P EST MOD 30 MIN: CPT | Performed by: PHYSICIAN ASSISTANT

## 2020-12-16 RX ORDER — MULTIPLE VITAMINS W/ MINERALS TAB 9MG-400MCG
1 TAB ORAL DAILY
COMMUNITY
End: 2022-12-08

## 2020-12-16 RX ORDER — ASPIRIN 81 MG/1
81 TABLET, CHEWABLE ORAL DAILY
COMMUNITY

## 2020-12-16 NOTE — PROGRESS NOTES
Subjective   Dafne Mary is a 80 y.o. female who presents today as a new patient to establish care and follow up of hypertension, hyperlipidemia, hypothyroidism, moods, emphysema, GERD, allergic rhinitis, carotid stenosis.     History of Present Illness     Previous PCP- last appt 2020 for contact dermatitis. Last labs 2019.     Hypertension- has been on Atenolol and Losartan and BP has been stable   Hyperlipidemia- was on medication in the past.   Hypothyroidism- [place] thyroid 45 mg- she reports she is on [place] thyroid a 30 mg and 15 mg once daily.   Vitamin D deficiency- taking calcium, vit D, vitamin K daily  B12 deficiency- taking B12 500 mcg daily    Moods- Paxil- reports sometimes she increases to 2 tablets to help with increased stress and uncontrolled moods.     Emphysema- never a smoker - father and brothers. Trelegy helps- has been on for 2 years and albuterol as needed. Dr Nolan- seen a couple months ago and follow up in 6 months.    GERD- controlled on Prilosec without breakthrough symptoms.     Allergies- Nasacort as needed.   Carotid stenosis- last carotid duplex  was ok but prior had stenosis noted on duplex.     Brother with MI at 64 or 65.   Mother lived until age 87 and  of dementia. Active     Past Medical History:   Diagnosis Date   • Depression    • GERD (gastroesophageal reflux disease)    • Hyperlipidemia    • Hypertension    • Hypothyroidism      Past Surgical History:   Procedure Laterality Date   • PARATHYROIDECTOMY       Social History     Socioeconomic History   • Marital status:      Spouse name: Not on file   • Number of children: Not on file   • Years of education: Not on file   • Highest education level: Not on file   Tobacco Use   • Smoking status: Never Smoker   • Smokeless tobacco: Never Used   Substance and Sexual Activity   • Alcohol use: Yes     Comment: every now and then   • Drug use: No      Family History   Problem Relation Age of Onset   •  Alzheimer's disease Mother    • Lung disease Father    • Alcohol abuse Father    • Heart disease Brother    • Hypertension Brother    • Lung disease Brother    • Alcohol abuse Brother    • Cancer Brother         LUNG  WAS A SMOKER   • Thyroid disease Maternal Grandmother         The following portions of the patient's history were reviewed and updated as appropriate: allergies, current medications, past family history, past medical history, past social history, past surgical history and problem list.    Review of Systems   Constitutional: Negative.    HENT: Negative.    Respiratory: Positive for cough (chronic) and shortness of breath (chronic).    Cardiovascular: Negative.    Gastrointestinal: Negative.    Genitourinary: Negative.    Neurological: Negative.    Psychiatric/Behavioral: Positive for dysphoric mood. The patient is nervous/anxious.        Objective   Vitals:    12/16/20 1348   BP: 130/80   Pulse: 71   Resp: 16   Temp: 98.2 °F (36.8 °C)   SpO2: 99%     Body mass index is 25.78 kg/m².    Physical Exam  Vitals signs and nursing note reviewed.   Constitutional:       Appearance: She is well-developed.   HENT:      Head: Normocephalic and atraumatic.      Right Ear: External ear normal.      Left Ear: External ear normal.      Nose: Nose normal.   Eyes:      General: Lids are normal.      Conjunctiva/sclera: Conjunctivae normal.   Neck:      Musculoskeletal: Neck supple.      Vascular: No carotid bruit.   Cardiovascular:      Rate and Rhythm: Normal rate and regular rhythm.      Heart sounds: Normal heart sounds. No murmur. No friction rub. No gallop.    Pulmonary:      Effort: Pulmonary effort is normal. No respiratory distress.      Breath sounds: Normal breath sounds. No wheezing, rhonchi or rales.   Musculoskeletal:         General: No deformity.   Skin:     General: Skin is warm and dry.   Neurological:      Mental Status: She is alert and oriented to person, place, and time.      Gait: Gait normal.    Psychiatric:         Speech: Speech normal.         Behavior: Behavior normal.         Thought Content: Thought content normal.         Judgment: Judgment normal.         Assessment/Plan   Diagnoses and all orders for this visit:    1. Benign hypertension (Primary)  -     CBC & Differential  -     Comprehensive Metabolic Panel    2. Mixed hyperlipidemia  -     CBC & Differential  -     Comprehensive Metabolic Panel  -     CK  -     Lipid Panel With LDL / HDL Ratio    3. Acquired hypothyroidism  -     CBC & Differential  -     TSH  -     T4, free  -     T3, Free    4. Vitamin D deficiency  -     Vitamin D 25 Hydroxy    5. B12 deficiency  -     Vitamin B12 & Folate    6. Generalized anxiety disorder    7. Arteriosclerosis of both carotid arteries  -     Duplex Carotid Ultrasound CAR    8. Pulmonary emphysema, unspecified emphysema type (CMS/HCC)    9. Gastroesophageal reflux disease, unspecified whether esophagitis present    10. Fibromyalgia    11. Postmenopausal osteoporosis        Assessment and Plan  Previous PCP was Dr Dc, however, he is not longer in practice at this office. She is establishing care with me today. Last appt was 4/2020 for contact dermatitis with last routine labs 11/2019. Patient will have fasting labs. Call if no results in 1 week. Stability of conditions, plan, follow up, and further ecommendations pending labs. Follow up in 3-6 months if labs are stable.     · Hypertension- Blood pressure is borderline today. Continue Atenolol 50 mg once daily and Losartan 100 mg once daily. Monitor BP daily- varying the times, and call if BP remains elevated.   · Hyperlipidemia- Await labs for further recommendations.   · Hypothyroidism- Stable. Continue [place] thyroid 45 mg. Further recommendations pending labs.    · Vitamin D deficiency- Continue calcium, vit D, vitamin K daily. Dosing recommendations pending labs.   · B12 deficiency-Continue B12 500 mcg daily. Further recommendations pending labs.    · Major depressive disorder- I will increase Paxil to 10 mg twice daily or 20 mg once daily. She can continue with the 2nd dose only as needed if this is helping but if she is having to take the second dose often, she should start taking 20 mg every day.    · Emphysema- Continue Trelegy daily and albuterol as needed and follow up with Dr Nolan.    · GERD- Symptoms are controlled. Continue Prilosec 20 mg once daily. To see GI if significant breakthrough symptoms  · Allergic rhinitis- Stable. Continue Nasacort as needed.   · Carotid stenosis- Patient is overdue for carotid duplex. I will refer for this today.

## 2020-12-19 LAB
25(OH)D3+25(OH)D2 SERPL-MCNC: 37.4 NG/ML (ref 30–100)
ALBUMIN SERPL-MCNC: 4.2 G/DL (ref 3.5–5.2)
ALBUMIN/GLOB SERPL: 1.5 G/DL
ALP SERPL-CCNC: 134 U/L (ref 39–117)
ALT SERPL-CCNC: 20 U/L (ref 1–33)
AST SERPL-CCNC: 25 U/L (ref 1–32)
BASOPHILS # BLD AUTO: 0.09 10*3/MM3 (ref 0–0.2)
BASOPHILS NFR BLD AUTO: 1.5 % (ref 0–1.5)
BILIRUB SERPL-MCNC: 0.6 MG/DL (ref 0–1.2)
BUN SERPL-MCNC: 20 MG/DL (ref 8–23)
BUN/CREAT SERPL: 18.5 (ref 7–25)
CALCIUM SERPL-MCNC: 9.3 MG/DL (ref 8.6–10.5)
CHLORIDE SERPL-SCNC: 103 MMOL/L (ref 98–107)
CHOLEST SERPL-MCNC: 266 MG/DL (ref 0–200)
CK SERPL-CCNC: 66 U/L (ref 20–180)
CO2 SERPL-SCNC: 29.9 MMOL/L (ref 22–29)
CREAT SERPL-MCNC: 1.08 MG/DL (ref 0.57–1)
EOSINOPHIL # BLD AUTO: 0.27 10*3/MM3 (ref 0–0.4)
EOSINOPHIL NFR BLD AUTO: 4.6 % (ref 0.3–6.2)
ERYTHROCYTE [DISTWIDTH] IN BLOOD BY AUTOMATED COUNT: 12.5 % (ref 12.3–15.4)
FOLATE SERPL-MCNC: 9.55 NG/ML (ref 4.78–24.2)
GLOBULIN SER CALC-MCNC: 2.8 GM/DL
GLUCOSE SERPL-MCNC: 84 MG/DL (ref 65–99)
HCT VFR BLD AUTO: 40.7 % (ref 34–46.6)
HDLC SERPL-MCNC: 77 MG/DL (ref 40–60)
HGB BLD-MCNC: 13.7 G/DL (ref 12–15.9)
IMM GRANULOCYTES # BLD AUTO: 0.03 10*3/MM3 (ref 0–0.05)
IMM GRANULOCYTES NFR BLD AUTO: 0.5 % (ref 0–0.5)
LDLC SERPL CALC-MCNC: 161 MG/DL (ref 0–100)
LDLC/HDLC SERPL: 2.05 {RATIO}
LYMPHOCYTES # BLD AUTO: 1.33 10*3/MM3 (ref 0.7–3.1)
LYMPHOCYTES NFR BLD AUTO: 22.7 % (ref 19.6–45.3)
MCH RBC QN AUTO: 29.5 PG (ref 26.6–33)
MCHC RBC AUTO-ENTMCNC: 33.7 G/DL (ref 31.5–35.7)
MCV RBC AUTO: 87.7 FL (ref 79–97)
MONOCYTES # BLD AUTO: 0.55 10*3/MM3 (ref 0.1–0.9)
MONOCYTES NFR BLD AUTO: 9.4 % (ref 5–12)
NEUTROPHILS # BLD AUTO: 3.58 10*3/MM3 (ref 1.7–7)
NEUTROPHILS NFR BLD AUTO: 61.3 % (ref 42.7–76)
NRBC BLD AUTO-RTO: 0 /100 WBC (ref 0–0.2)
PLATELET # BLD AUTO: 279 10*3/MM3 (ref 140–450)
POTASSIUM SERPL-SCNC: 4.1 MMOL/L (ref 3.5–5.2)
PROT SERPL-MCNC: 7 G/DL (ref 6–8.5)
RBC # BLD AUTO: 4.64 10*6/MM3 (ref 3.77–5.28)
SODIUM SERPL-SCNC: 142 MMOL/L (ref 136–145)
T3FREE SERPL-MCNC: 2.5 PG/ML (ref 2–4.4)
T4 FREE SERPL-MCNC: 0.74 NG/DL (ref 0.93–1.7)
TRIGL SERPL-MCNC: 157 MG/DL (ref 0–150)
TSH SERPL DL<=0.005 MIU/L-ACNC: 2.62 UIU/ML (ref 0.27–4.2)
VIT B12 SERPL-MCNC: 635 PG/ML (ref 211–946)
VLDLC SERPL CALC-MCNC: 28 MG/DL (ref 5–40)
WBC # BLD AUTO: 5.85 10*3/MM3 (ref 3.4–10.8)

## 2020-12-27 RX ORDER — PAROXETINE 10 MG/1
10 TABLET, FILM COATED ORAL 2 TIMES DAILY
Qty: 180 TABLET | Refills: 1 | Status: SHIPPED | OUTPATIENT
Start: 2020-12-27 | End: 2021-01-07

## 2020-12-28 ENCOUNTER — APPOINTMENT (OUTPATIENT)
Dept: CARDIOLOGY | Facility: HOSPITAL | Age: 80
End: 2020-12-28

## 2020-12-29 ENCOUNTER — TELEPHONE (OUTPATIENT)
Dept: FAMILY MEDICINE CLINIC | Facility: CLINIC | Age: 80
End: 2020-12-29

## 2020-12-29 NOTE — TELEPHONE ENCOUNTER
Elham from [facility] Clinical [facility] Review needing a prior authorization completed so patients PARoxetine (PAXIL) 10 MG tablet can be filled.    Please call Elham at [phone].  Ref # [id number]

## 2020-12-31 DIAGNOSIS — E78.2 MIXED HYPERLIPIDEMIA: Primary | ICD-10-CM

## 2020-12-31 RX ORDER — ATORVASTATIN CALCIUM 20 MG/1
20 TABLET, FILM COATED ORAL NIGHTLY
Qty: 30 TABLET | Refills: 2 | Status: SHIPPED | OUTPATIENT
Start: 2020-12-31 | End: 2021-04-05

## 2021-01-06 DIAGNOSIS — F41.1 GENERALIZED ANXIETY DISORDER: ICD-10-CM

## 2021-01-07 RX ORDER — PAROXETINE 10 MG/1
TABLET, FILM COATED ORAL
Qty: 90 TABLET | Refills: 0 | Status: SHIPPED | OUTPATIENT
Start: 2021-01-07 | End: 2021-04-05

## 2021-01-07 RX ORDER — LOSARTAN POTASSIUM 100 MG/1
TABLET ORAL
Qty: 90 TABLET | Refills: 0 | Status: SHIPPED | OUTPATIENT
Start: 2021-01-07 | End: 2021-04-05

## 2021-03-02 DIAGNOSIS — I10 ESSENTIAL HYPERTENSION: ICD-10-CM

## 2021-03-02 DIAGNOSIS — E03.9 HYPOTHYROIDISM, UNSPECIFIED TYPE: ICD-10-CM

## 2021-03-02 RX ORDER — THYROID,PORK 15 MG
TABLET ORAL
Qty: 90 TABLET | Refills: 0 | Status: SHIPPED | OUTPATIENT
Start: 2021-03-02 | End: 2021-06-12

## 2021-03-02 RX ORDER — ATENOLOL 50 MG/1
TABLET ORAL
Qty: 90 TABLET | Refills: 0 | Status: SHIPPED | OUTPATIENT
Start: 2021-03-02 | End: 2021-06-12

## 2021-03-02 RX ORDER — THYROID 30 MG/1
TABLET ORAL
Qty: 90 TABLET | Refills: 0 | Status: SHIPPED | OUTPATIENT
Start: 2021-03-02 | End: 2021-06-12

## 2021-03-02 NOTE — TELEPHONE ENCOUNTER
Patient is due for 3-month follow-up with me the middle of this month.  Please schedule her 3-month follow-up from her last visit, fasting

## 2021-04-04 DIAGNOSIS — E78.2 MIXED HYPERLIPIDEMIA: ICD-10-CM

## 2021-04-04 DIAGNOSIS — F41.1 GENERALIZED ANXIETY DISORDER: ICD-10-CM

## 2021-04-05 RX ORDER — ATORVASTATIN CALCIUM 20 MG/1
TABLET, FILM COATED ORAL
Qty: 90 TABLET | Refills: 0 | Status: SHIPPED | OUTPATIENT
Start: 2021-04-05 | End: 2021-05-06 | Stop reason: SDUPTHER

## 2021-04-05 RX ORDER — PAROXETINE 10 MG/1
TABLET, FILM COATED ORAL
Qty: 90 TABLET | Refills: 0 | Status: SHIPPED | OUTPATIENT
Start: 2021-04-05 | End: 2021-07-02

## 2021-04-05 RX ORDER — LOSARTAN POTASSIUM 100 MG/1
TABLET ORAL
Qty: 90 TABLET | Refills: 0 | Status: SHIPPED | OUTPATIENT
Start: 2021-04-05 | End: 2021-07-19

## 2021-04-05 NOTE — TELEPHONE ENCOUNTER
As noted 3/2/2021 for refill, she was due for 3-month follow-up with me in March.  Please schedule her 3-month follow-up with me, fasting.

## 2021-04-06 ENCOUNTER — TELEPHONE (OUTPATIENT)
Dept: FAMILY MEDICINE CLINIC | Facility: CLINIC | Age: 81
End: 2021-04-06

## 2021-04-06 NOTE — TELEPHONE ENCOUNTER
Caller: Dafne Mary    Relationship to patient: Self    Best call back number: [phone]    Patient is needing: PATIENT SCHEDULED APPOINTMENT FOR 4/12/2021

## 2021-04-12 ENCOUNTER — OFFICE VISIT (OUTPATIENT)
Dept: FAMILY MEDICINE CLINIC | Facility: CLINIC | Age: 81
End: 2021-04-12

## 2021-04-12 DIAGNOSIS — E78.2 MIXED HYPERLIPIDEMIA: ICD-10-CM

## 2021-04-12 DIAGNOSIS — K21.9 GASTROESOPHAGEAL REFLUX DISEASE, UNSPECIFIED WHETHER ESOPHAGITIS PRESENT: ICD-10-CM

## 2021-04-12 DIAGNOSIS — E53.8 B12 DEFICIENCY: ICD-10-CM

## 2021-04-12 DIAGNOSIS — J43.9 PULMONARY EMPHYSEMA, UNSPECIFIED EMPHYSEMA TYPE (HCC): ICD-10-CM

## 2021-04-12 DIAGNOSIS — F41.1 GENERALIZED ANXIETY DISORDER: ICD-10-CM

## 2021-04-12 DIAGNOSIS — J30.89 NON-SEASONAL ALLERGIC RHINITIS, UNSPECIFIED TRIGGER: ICD-10-CM

## 2021-04-12 DIAGNOSIS — M79.606 PAIN OF LOWER EXTREMITY, UNSPECIFIED LATERALITY: ICD-10-CM

## 2021-04-12 DIAGNOSIS — I10 BENIGN HYPERTENSION: Primary | ICD-10-CM

## 2021-04-12 DIAGNOSIS — M79.7 FIBROMYALGIA: ICD-10-CM

## 2021-04-12 DIAGNOSIS — E55.9 VITAMIN D DEFICIENCY: ICD-10-CM

## 2021-04-12 DIAGNOSIS — R74.8 ELEVATED ALKALINE PHOSPHATASE LEVEL: ICD-10-CM

## 2021-04-12 DIAGNOSIS — N28.9 ABNORMAL RENAL FUNCTION: ICD-10-CM

## 2021-04-12 DIAGNOSIS — I65.23 ARTERIOSCLEROSIS OF BOTH CAROTID ARTERIES: ICD-10-CM

## 2021-04-12 DIAGNOSIS — E03.9 ACQUIRED HYPOTHYROIDISM: ICD-10-CM

## 2021-04-12 DIAGNOSIS — F33.0 MAJOR DEPRESSIVE DISORDER, RECURRENT EPISODE, MILD DEGREE (HCC): ICD-10-CM

## 2021-04-12 PROCEDURE — G2025 DIS SITE TELE SVCS RHC/FQHC: HCPCS | Performed by: PHYSICIAN ASSISTANT

## 2021-04-12 NOTE — PROGRESS NOTES
Subjective   Dafne Mary is a 80 y.o. female who is being evaluated by telephone visit today in follow up of initial appt, hypertension, hyperlipidemia, hypothyroidism, vitamin D deficiency, B12 deficiency, abnormal renal function, elevated alk phos, moods, emphysema, GERD, allergic rhinitis, and carotid stenosis.     History of Present Illness   You have chosen to receive care through a telephone visit. Do you consent to use a telephone visit for your medical care today? Yes    Has had both Covid 19 vaccines. Did well with them. Completed 3/23/2021.     Leg pain- This morning, woke up, pain down her leg. Does not feel like her normal sciatica and in the other leg. Reports mid- leg around the knee- feels like the whole leg- cannot tell if front, back, medial, or lateral. No injury or activity change. Has varicose veins in that leg. No edema, erythema, or heat.     Hypertension- has been on Atenolol and Losartan and BP has been stable. No BP cuff that is accurate. Feeling ok. Tired but does not think from BP.   Hyperlipidemia- was on medication in the past. Restarted Lipitor 12/2020 and has done well- tolerated without AE.   Hypothyroidism- [place] thyroid 45 mg- she reports she is on [place] thyroid a 30 mg and 15 mg once daily.   Vitamin D deficiency- taking calcium, vit D3 5000 IU 4-5 x weekly, vitamin K daily and   B12 deficiency- taking B12 1000 mcg 4-5 x weekly  Abnormal renal function- NSAIDS- None- stopped Aleve. Tylenol only-    Elevated alkaline phosphatase- no symptoms- GI or bone.     Moods- Paxil- reports sometimes she increases to 2 tablets to help with increased stress and uncontrolled moods.   Fibromyalgia- has been stable. Using Tylenol only as needed. Leg pain now and some fatigue but no other worsening pain/ symptoms.     Emphysema- never a smoker - father and brothers. Trelegy helps- has been on for 2 years and albuterol as needed. Dr Nolan- seen a couple months ago and follow up in 6 months.     Allergies- Nasacort as needed.   GERD- controlled on Prilosec without breakthrough symptoms.     Carotid stenosis- last carotid duplex 2016 was ok but prior had stenosis noted on duplex.     Brother with MI at 64 or 65.   Mother lived until age 87 and  of dementia. Active      The following portions of the patient's history were reviewed and updated as appropriate: allergies, current medications, past family history, past medical history, past social history, past surgical history and problem list.    Review of Systems   Constitutional: Positive for fatigue.   HENT: Negative.    Respiratory: Positive for cough (chronic) and shortness of breath (chronic).    Cardiovascular: Negative.    Gastrointestinal: Negative.    Genitourinary: Negative.    Musculoskeletal:        Leg pain   Neurological: Negative.    Psychiatric/Behavioral: Positive for dysphoric mood. The patient is nervous/anxious.        Objective   There were no vitals filed for this visit.  There is no height or weight on file to calculate BMI.    Physical Exam  NA    Assessment/Plan   Diagnoses and all orders for this visit:    1. Benign hypertension (Primary)  -     Comprehensive Metabolic Panel    2. Mixed hyperlipidemia  -     Comprehensive Metabolic Panel  -     CK  -     Lipid Panel With LDL / HDL Ratio    3. Acquired hypothyroidism  -     TSH  -     T4, free  -     T3, Free    4. Vitamin D deficiency  -     Comprehensive Metabolic Panel  -     Vitamin D 25 Hydroxy    5. B12 deficiency  -     CBC & Differential  -     Vitamin B12 & Folate    6. Abnormal renal function  -     Comprehensive Metabolic Panel    7. Elevated alkaline phosphatase level  -     Comprehensive Metabolic Panel  -     Alkaline Phosphatase, Isoenzymes    8. Major depressive disorder, recurrent episode, mild degree (CMS/HCC)    9. Generalized anxiety disorder    10. Fibromyalgia    11. Pulmonary emphysema, unspecified emphysema type (CMS/HCC)    12. Non-seasonal allergic  rhinitis, unspecified trigger    13. Gastroesophageal reflux disease, unspecified whether esophagitis present  -     Comprehensive Metabolic Panel  -     Magnesium    14. Arteriosclerosis of both carotid arteries    15. Pain of lower extremity, unspecified laterality  -     CK  -     Magnesium        Assessment and Plan  Patient will have fasting labs. Call if no results in 1 week. Stability of conditions, plan, follow up, and further ecommendations pending labs. Follow up in 3-6 months if labs are stable.     · Hypertension- Blood pressure has been borderline. Continue Atenolol 50 mg once daily and Losartan 100 mg once daily. Monitor BP daily- varying the times, and call if BP remains elevated.   · Hyperlipidemia- Continue Lipitor 20 mg at bedtime. Await labs for further recommendations.   · Hypothyroidism- Thyroid was stable 12/2020. Continue [place] thyroid 45 mg. Further recommendations pending labs.    · Vitamin D deficiency- Continue calcium, vit D3 5000 IU, vitamin K daily 4-5 x weekly. Dosing recommendations pending labs.   · B12 deficiency-Continue B12 1000 mcg 4-5 x weekly. Further recommendations pending labs.   · Abnormal renal function- Avoid NSAIDS and ensure proper hydration. I will continue to monitor and make recommendations. Consideration of nephrology if needed.  · Elevated alkaline phosphatase- I will check alk phos isoenzymes and make further recommendations.   · Major depressive disorder with anxiety-Moods are more stable. Continue Paxil to 10 mg twice daily or 20 mg once daily. She can continue with the 2nd dose only as needed if this is helping but if she is having to take the second dose often, she should start taking 20 mg every day.    · Emphysema- Noticed slight increase in SOA with walking into the pharmacy at [facility] recently. This is not something she notices daily but with walking longer distance. Continue Trelegy daily and albuterol as needed and follow up with Dr Nolan. Follow up  here or with pulmonology sooner than next appt if continued worsening or new or changing symptoms.  · Allergic rhinitis- Stable. Continue Nasacort as needed.   · GERD- Symptoms are controlled. Continue Prilosec 20 mg once daily. To see GI if significant breakthrough symptoms  · Carotid stenosis- Patient is overdue for carotid duplex. I referred 12/2020. I will have them contact her to schedule testing since Covid 19 pandemic has improved some and she has completed her vaccine series > 2 weeks ago.  · Leg pain-  I advised patient watch closely. To be seen ASAP if persistent or worsening pain or if she develops edema, erythema, or heat. Consideration of venous duplex if persistent, worsening, new, or changing symptoms.     About 25 minutes spent reviewing the patient's chart and telephone visit, medical decision-making, and treatment plan.

## 2021-04-19 LAB
25(OH)D3+25(OH)D2 SERPL-MCNC: 55.2 NG/ML (ref 30–100)
ALBUMIN SERPL-MCNC: 4.5 G/DL (ref 3.5–5.2)
ALBUMIN/GLOB SERPL: 1.8 G/DL
ALP BONE CFR SERPL: 23 % (ref 14–68)
ALP INTEST CFR SERPL: 21 % (ref 0–18)
ALP LIVER CFR SERPL: 56 % (ref 18–85)
ALP SERPL-CCNC: 138 U/L (ref 39–117)
ALT SERPL-CCNC: 24 U/L (ref 1–33)
AST SERPL-CCNC: 31 U/L (ref 1–32)
BASOPHILS # BLD AUTO: 0.1 10*3/MM3 (ref 0–0.2)
BASOPHILS NFR BLD AUTO: 1.4 % (ref 0–1.5)
BILIRUB SERPL-MCNC: 0.6 MG/DL (ref 0–1.2)
BUN SERPL-MCNC: 18 MG/DL (ref 8–23)
BUN/CREAT SERPL: 17.5 (ref 7–25)
CALCIUM SERPL-MCNC: 9.8 MG/DL (ref 8.6–10.5)
CHLORIDE SERPL-SCNC: 105 MMOL/L (ref 98–107)
CHOLEST SERPL-MCNC: 201 MG/DL (ref 0–200)
CK SERPL-CCNC: 75 U/L (ref 20–180)
CO2 SERPL-SCNC: 27.6 MMOL/L (ref 22–29)
CREAT SERPL-MCNC: 1.03 MG/DL (ref 0.57–1)
EOSINOPHIL # BLD AUTO: 0.38 10*3/MM3 (ref 0–0.4)
EOSINOPHIL NFR BLD AUTO: 5.3 % (ref 0.3–6.2)
ERYTHROCYTE [DISTWIDTH] IN BLOOD BY AUTOMATED COUNT: 12.3 % (ref 12.3–15.4)
FOLATE SERPL-MCNC: 13.1 NG/ML (ref 4.78–24.2)
GLOBULIN SER CALC-MCNC: 2.5 GM/DL
GLUCOSE SERPL-MCNC: 87 MG/DL (ref 65–99)
HCT VFR BLD AUTO: 38.1 % (ref 34–46.6)
HDLC SERPL-MCNC: 76 MG/DL (ref 40–60)
HGB BLD-MCNC: 13.2 G/DL (ref 12–15.9)
IMM GRANULOCYTES # BLD AUTO: 0.05 10*3/MM3 (ref 0–0.05)
IMM GRANULOCYTES NFR BLD AUTO: 0.7 % (ref 0–0.5)
LDLC SERPL CALC-MCNC: 104 MG/DL (ref 0–100)
LDLC/HDLC SERPL: 1.32 {RATIO}
LYMPHOCYTES # BLD AUTO: 1.83 10*3/MM3 (ref 0.7–3.1)
LYMPHOCYTES NFR BLD AUTO: 25.4 % (ref 19.6–45.3)
MAGNESIUM SERPL-MCNC: 2.2 MG/DL (ref 1.6–2.4)
MCH RBC QN AUTO: 30.3 PG (ref 26.6–33)
MCHC RBC AUTO-ENTMCNC: 34.6 G/DL (ref 31.5–35.7)
MCV RBC AUTO: 87.6 FL (ref 79–97)
MONOCYTES # BLD AUTO: 0.66 10*3/MM3 (ref 0.1–0.9)
MONOCYTES NFR BLD AUTO: 9.2 % (ref 5–12)
NEUTROPHILS # BLD AUTO: 4.18 10*3/MM3 (ref 1.7–7)
NEUTROPHILS NFR BLD AUTO: 58 % (ref 42.7–76)
NRBC BLD AUTO-RTO: 0 /100 WBC (ref 0–0.2)
PLATELET # BLD AUTO: 280 10*3/MM3 (ref 140–450)
POTASSIUM SERPL-SCNC: 4.4 MMOL/L (ref 3.5–5.2)
PROT SERPL-MCNC: 7 G/DL (ref 6–8.5)
RBC # BLD AUTO: 4.35 10*6/MM3 (ref 3.77–5.28)
SODIUM SERPL-SCNC: 142 MMOL/L (ref 136–145)
T3FREE SERPL-MCNC: 4.8 PG/ML (ref 2–4.4)
T4 FREE SERPL-MCNC: 0.86 NG/DL (ref 0.93–1.7)
TRIGL SERPL-MCNC: 123 MG/DL (ref 0–150)
TSH SERPL DL<=0.005 MIU/L-ACNC: 2.52 UIU/ML (ref 0.27–4.2)
VIT B12 SERPL-MCNC: 858 PG/ML (ref 211–946)
VLDLC SERPL CALC-MCNC: 21 MG/DL (ref 5–40)
WBC # BLD AUTO: 7.2 10*3/MM3 (ref 3.4–10.8)

## 2021-05-06 DIAGNOSIS — E55.9 VITAMIN D DEFICIENCY: ICD-10-CM

## 2021-05-06 DIAGNOSIS — E78.2 MIXED HYPERLIPIDEMIA: ICD-10-CM

## 2021-05-06 DIAGNOSIS — R74.8 ELEVATED ALKALINE PHOSPHATASE LEVEL: ICD-10-CM

## 2021-05-06 DIAGNOSIS — I10 BENIGN HYPERTENSION: Primary | ICD-10-CM

## 2021-05-06 DIAGNOSIS — K21.9 GASTROESOPHAGEAL REFLUX DISEASE, UNSPECIFIED WHETHER ESOPHAGITIS PRESENT: ICD-10-CM

## 2021-05-06 DIAGNOSIS — E53.8 B12 DEFICIENCY: ICD-10-CM

## 2021-05-06 DIAGNOSIS — N28.9 ABNORMAL RENAL FUNCTION: ICD-10-CM

## 2021-05-06 DIAGNOSIS — E03.9 ACQUIRED HYPOTHYROIDISM: ICD-10-CM

## 2021-05-06 RX ORDER — ATORVASTATIN CALCIUM 40 MG/1
40 TABLET, FILM COATED ORAL NIGHTLY
Qty: 90 TABLET | Refills: 0 | Status: SHIPPED | OUTPATIENT
Start: 2021-05-06 | End: 2021-08-09

## 2021-05-17 ENCOUNTER — OFFICE VISIT (OUTPATIENT)
Dept: FAMILY MEDICINE CLINIC | Facility: CLINIC | Age: 81
End: 2021-05-17

## 2021-05-17 ENCOUNTER — TELEPHONE (OUTPATIENT)
Dept: CARDIOLOGY | Facility: CLINIC | Age: 81
End: 2021-05-17

## 2021-05-17 VITALS
OXYGEN SATURATION: 97 % | SYSTOLIC BLOOD PRESSURE: 124 MMHG | BODY MASS INDEX: 26.5 KG/M2 | HEART RATE: 68 BPM | WEIGHT: 135 LBS | DIASTOLIC BLOOD PRESSURE: 86 MMHG | RESPIRATION RATE: 18 BRPM | TEMPERATURE: 96.9 F | HEIGHT: 60 IN

## 2021-05-17 DIAGNOSIS — R94.31 ABNORMAL EKG: ICD-10-CM

## 2021-05-17 DIAGNOSIS — R00.2 PALPITATIONS: Primary | ICD-10-CM

## 2021-05-17 DIAGNOSIS — R06.02 SHORTNESS OF BREATH: ICD-10-CM

## 2021-05-17 PROCEDURE — 93005 ELECTROCARDIOGRAM TRACING: CPT | Performed by: PHYSICIAN ASSISTANT

## 2021-05-17 PROCEDURE — 99214 OFFICE O/P EST MOD 30 MIN: CPT | Performed by: PHYSICIAN ASSISTANT

## 2021-05-17 NOTE — TELEPHONE ENCOUNTER
Sintia RDZ called office today to have patients EKG from today compared to EKG 2018 and see if she needed to be seen. Patient was seen in her office today for palpitations and increase SOA over th last month. Both EKG's presented to Dr Hanson who thought that she should be seen but does not feel it to be urgent enough for CEC/ED.  Called Sintia RDZ and gave her information above and told her that our schedulers would be in touch with patient and get her in this week.    Savita can you please contact patient and get her scheduled for this week per Dr Hanson request. She will be a new patient.    Thanks  Hillary

## 2021-05-18 ENCOUNTER — TELEPHONE (OUTPATIENT)
Dept: FAMILY MEDICINE CLINIC | Facility: CLINIC | Age: 81
End: 2021-05-18

## 2021-05-18 NOTE — TELEPHONE ENCOUNTER
Please contact [facility].  I spoke with Hillary at [facility] who states she spoke with Dr. Hanson and that he would see her this week.

## 2021-05-18 NOTE — TELEPHONE ENCOUNTER
Caller: Dafne Mary    Relationship to patient: Self    Best call back number: [phone]    Patient is needing: PATIENT WANTS MACO MENDES TO KNOW THE CARDIOLOGIST CAN NOT SEE HER UNTIL 6/1/2021. PLEASE CALL PATIENT BACK TO DISCUSS FURTHER OPTIONS.

## 2021-05-18 NOTE — TELEPHONE ENCOUNTER
The schedule for Dr Hanson is booked, with his first new patient appointment available on June 1st.  Someone has already spoke with Ms Mary and she is scheduled for June 1st.    Please let me know if there is anything else I can do.    Thank you  Savita JESSICA

## 2021-05-19 NOTE — TELEPHONE ENCOUNTER
I spoke with Hillary this morning she said she will contact scheduling and see why they pushed her out so far and also MD and call pt and us back.

## 2021-05-20 ENCOUNTER — OFFICE VISIT (OUTPATIENT)
Dept: CARDIOLOGY | Facility: CLINIC | Age: 81
End: 2021-05-20

## 2021-05-20 VITALS
BODY MASS INDEX: 26.42 KG/M2 | WEIGHT: 134.6 LBS | HEIGHT: 60 IN | SYSTOLIC BLOOD PRESSURE: 124 MMHG | DIASTOLIC BLOOD PRESSURE: 84 MMHG | HEART RATE: 66 BPM

## 2021-05-20 DIAGNOSIS — I73.9 PERIPHERAL ARTERIAL DISEASE (HCC): ICD-10-CM

## 2021-05-20 DIAGNOSIS — R00.2 PALPITATIONS: Primary | ICD-10-CM

## 2021-05-20 DIAGNOSIS — R94.31 ABNORMAL EKG: ICD-10-CM

## 2021-05-20 DIAGNOSIS — I79.8 OTHER DISORDERS OF ARTERIES, ARTERIOLES AND CAPILLARIES IN DISEASES CLASSIFIED ELSEWHERE (HCC): ICD-10-CM

## 2021-05-20 PROCEDURE — 93000 ELECTROCARDIOGRAM COMPLETE: CPT | Performed by: INTERNAL MEDICINE

## 2021-05-20 PROCEDURE — 99204 OFFICE O/P NEW MOD 45 MIN: CPT | Performed by: INTERNAL MEDICINE

## 2021-05-20 NOTE — TELEPHONE ENCOUNTER
Called patient to confirm her appointment with [facility] - phone just rings, no voicemail. Her appointment is today at [phone] I will continue to follow her chart to make sure she is there

## 2021-05-20 NOTE — PROGRESS NOTES
[facility]      Patient Name: Dafne Mary  :1940  Age: 80 y.o.  Encounter Provider:  Jimmy Hanson Jr, MD      Chief Complaint:   Chief Complaint   Patient presents with   • Shortness of Breath         HPI  Dafne Mary is a 80 y.o. female with past medical history of hypertension, dyslipidemia, peripheral arterial disease who presents for evaluation of palpitations and abnormal EKG. Patient has a history of COPD and interestingly is a lifelong non-smoker. She has significant exertional dyspnea which has been chronic and stable for many years. She does think that its been slightly worse over the last 12 months and occasionally has pleuritic chest discomfort. She had a stress test in  which showed no evidence of of myocardial ischemia. She notes NYHA II symptoms but denies orthopnea, PND or edema. She follows with Dr. Stacy for emphysema which she states is presumed secondary to severe secondhand exposure. No previous cardiac history. Ultrasound carotid showed mild nonobstructive atherosclerotic disease in  with no recent follow-up. Sintia Chatterjee ordered a repeat ultrasound carotid in December but she was afraid to go get it due to ongoing public health issues. She continues to be abstinent from tobacco, drinks socially and denies illicit drug use. No family history of premature coronary artery disease or sudden cardiac death. She does note palpitations with occasional dizziness but no episodes of syncope. She was seen by Sintia Chatterjee this week and found to have nonspecific interventricular conduction delay which was a significant change from EKG in 2018. Given the constellation of symptoms and objective findings I have been asked to see the patient for evaluation.      The following portions of the patient's history were reviewed and updated as appropriate: allergies, current medications, past family history, past medical history, past social history, past surgical  "history and problem list.      Review of Systems   Constitutional: Negative for chills and fever.   HENT: Negative for hoarse voice and sore throat.    Eyes: Negative for double vision and photophobia.   Cardiovascular: Positive for chest pain, dyspnea on exertion and palpitations. Negative for leg swelling, near-syncope, orthopnea, paroxysmal nocturnal dyspnea and syncope.   Respiratory: Negative for cough and wheezing.    Skin: Negative for poor wound healing and rash.   Musculoskeletal: Negative for arthritis and joint swelling.   Gastrointestinal: Negative for bloating, abdominal pain, hematemesis and hematochezia.   Neurological: Negative for dizziness and focal weakness.   Psychiatric/Behavioral: Negative for depression and suicidal ideas.       OBJECTIVE:   Vital Signs  Vitals:    05/20/21 1202   BP: 124/84   Pulse: 66     Estimated body mass index is 26.29 kg/m² as calculated from the following:    Height as of this encounter: 152.4 cm (60\").    Weight as of this encounter: 61.1 kg (134 lb 9.6 oz).    Vitals reviewed.   Constitutional:       Appearance: Healthy appearance. Not in distress.   Neck:      Vascular: No JVR. JVD normal.   Pulmonary:      Effort: Pulmonary effort is normal.      Breath sounds: No wheezing. No rhonchi. No rales.      Comments: Diminished breath sounds bibasilar zones  Chest:      Chest wall: Not tender to palpatation.   Cardiovascular:      PMI at left midclavicular line. Normal rate. Regular rhythm. Normal S1. Normal S2.      Murmurs: There is no murmur.      No gallop. No click. No rub.   Pulses:     Intact distal pulses.   Edema:     Peripheral edema absent.   Abdominal:      General: Bowel sounds are normal.      Palpations: Abdomen is soft.      Tenderness: There is no abdominal tenderness.   Musculoskeletal: Normal range of motion.         General: No tenderness. Skin:     General: Skin is warm and dry.   Neurological:      General: No focal deficit present.      Mental " Status: Alert and oriented to person, place and time.           ECG 12 Lead    Date/Time: 5/20/2021 3:03 PM  Performed by: Jimmy Hanson Jr., MD  Authorized by: Jimmy Hanson Jr., MD   Comparison: compared with previous ECG from 2/13/2018  Comparison to previous ECG: IVCD now present  Rhythm: sinus rhythm  Conduction: non-specific intraventricular conduction delay  QRS axis: left    Clinical impression: abnormal EKG                  ASSESSMENT:     Abnormal EKG  Palpitations  Exertional dyspnea  COPD  Hypertension  Dyslipidemia  Peripheral arterial disease    PLAN OF CARE:     1. Abnormal EKG -given palpitations and exertional dyspnea we will check an echocardiogram and Holter monitor.  2. Atypical chest pain -clinical story is consistent with pleuritic pain secondary to COPD. We will follow diagnostic testing for further treatment recommendations.  3. Exertional dyspnea -as above  4. Peripheral arterial disease -needs surveillance ultrasound carotid  5. COPD -following with pulmonary  6. Hypertension well-controlled today. Twice daily blood pressure log. Sodium restricted diet is counseled. -   7. Dyslipidemia -continue statin    Return to clinic 3 months             Discharge Medications          Accurate as of May 20, 2021 12:05 PM. If you have any questions, ask your nurse or doctor.            Continue These Medications      Instructions Start Date   albuterol 2 MG/5ML syrup  Commonly known as: PROVENTIL,VENTOLIN   2 mg, Oral, 3 Times Daily      [place] Thyroid 30 MG tablet  Generic drug: Thyroid   TAKE 1 TABLET BY MOUTH ONCE DAILY . APPOINTMENT REQUIRED FOR FUTURE REFILLS      [place] Thyroid 15 MG tablet  Generic drug: thyroid   TAKE 1 TABLET BY MOUTH ONCE DAILY . APPOINTMENT REQUIRED FOR FUTURE REFILLS      aspirin 81 MG chewable tablet   81 mg, Oral, Daily      atenolol 50 MG tablet  Commonly known as: TENORMIN   TAKE 1 TABLET BY MOUTH ONCE DAILY . APPOINTMENT REQUIRED FOR FUTURE REFILLS      atorvastatin  40 MG tablet  Commonly known as: LIPITOR   40 mg, Oral, Nightly      Calcium-Vitamin D-Vitamin K 500-1000-40 MG-UNT-MCG chewable tablet   Oral      Cyanocobalamin 500 MCG chewable tablet   Oral      losartan 100 MG tablet  Commonly known as: COZAAR   Take 1 tablet by mouth once daily      multivitamin with minerals tablet tablet   1 tablet, Oral, Daily      omeprazole 20 MG capsule  Commonly known as: priLOSEC   20 mg, Oral, Daily      PARoxetine 10 MG tablet  Commonly known as: PAXIL   TAKE 1 TABLET BY MOUTH ONCE DAILY . APPOINTMENT REQUIRED FOR FUTURE REFILLS      [place] Ellipta 100-62.5-25 MCG/INH inhaler  Generic drug: Fluticasone-Umeclidin-Vilant   1 puff, Inhalation, Daily      Triamcinolone Acetonide 55 MCG/ACT nasal inhaler  Commonly known as: Nasacort AQ   2 SPRAYS IN EACH NOSTRIL ONCE DAILY      VITAMIN D (CHOLECALCIFEROL) PO   Oral         Stop These Medications    SELENIUM PO  Stopped by: Jimmy Hanson Jr, MD            Thank you for allowing me to participate in the care of your patient,      Sincerely,   Jimmy Hanson MD  [facility]  05/20/21  12:05 EDT

## 2021-05-26 DIAGNOSIS — I79.8 OTHER DISORDERS OF ARTERIES, ARTERIOLES AND CAPILLARIES IN DISEASES CLASSIFIED ELSEWHERE (HCC): ICD-10-CM

## 2021-05-26 DIAGNOSIS — I73.9 PERIPHERAL ARTERIAL DISEASE (HCC): Primary | ICD-10-CM

## 2021-06-11 DIAGNOSIS — I10 ESSENTIAL HYPERTENSION: ICD-10-CM

## 2021-06-11 DIAGNOSIS — E03.9 HYPOTHYROIDISM, UNSPECIFIED TYPE: ICD-10-CM

## 2021-06-12 RX ORDER — ATENOLOL 50 MG/1
TABLET ORAL
Qty: 90 TABLET | Refills: 0 | Status: SHIPPED | OUTPATIENT
Start: 2021-06-12 | End: 2021-09-14

## 2021-06-12 RX ORDER — THYROID,PORK 15 MG
TABLET ORAL
Qty: 90 TABLET | Refills: 0 | Status: SHIPPED | OUTPATIENT
Start: 2021-06-12 | End: 2021-09-14

## 2021-06-12 RX ORDER — THYROID 30 MG/1
TABLET ORAL
Qty: 90 TABLET | Refills: 0 | Status: SHIPPED | OUTPATIENT
Start: 2021-06-12 | End: 2021-09-14

## 2021-06-14 ENCOUNTER — HOSPITAL ENCOUNTER (OUTPATIENT)
Dept: CARDIOLOGY | Facility: HOSPITAL | Age: 81
Discharge: HOME | End: 2021-06-14
Admitting: INTERNAL MEDICINE

## 2021-06-14 VITALS
BODY MASS INDEX: 26.31 KG/M2 | DIASTOLIC BLOOD PRESSURE: 84 MMHG | SYSTOLIC BLOOD PRESSURE: 124 MMHG | HEART RATE: 68 BPM | HEIGHT: 60 IN | WEIGHT: 134 LBS

## 2021-06-14 DIAGNOSIS — R94.31 ABNORMAL EKG: ICD-10-CM

## 2021-06-14 PROCEDURE — 93306 TTE W/DOPPLER COMPLETE: CPT

## 2021-06-14 PROCEDURE — 93306 TTE W/DOPPLER COMPLETE: CPT | Performed by: INTERNAL MEDICINE

## 2021-06-15 ENCOUNTER — TELEPHONE (OUTPATIENT)
Dept: CARDIOLOGY | Facility: CLINIC | Age: 81
End: 2021-06-15

## 2021-06-15 LAB
AORTIC ARCH: 2.1 CM
BH CV ECHO MEAS - ACS: 1.2 CM
BH CV ECHO MEAS - AO MAX PG (FULL): 8.6 MMHG
BH CV ECHO MEAS - AO MAX PG: 10.3 MMHG
BH CV ECHO MEAS - AO MEAN PG (FULL): 4.2 MMHG
BH CV ECHO MEAS - AO MEAN PG: 4.9 MMHG
BH CV ECHO MEAS - AO ROOT AREA (BSA CORRECTED): 2.1
BH CV ECHO MEAS - AO ROOT AREA: 8.7 CM^2
BH CV ECHO MEAS - AO ROOT DIAM: 3.3 CM
BH CV ECHO MEAS - AO V2 MAX: 160.2 CM/SEC
BH CV ECHO MEAS - AO V2 MEAN: 99.7 CM/SEC
BH CV ECHO MEAS - AO V2 VTI: 39 CM
BH CV ECHO MEAS - AVA(I,A): 1.1 CM^2
BH CV ECHO MEAS - AVA(I,D): 1.1 CM^2
BH CV ECHO MEAS - AVA(V,A): 1.3 CM^2
BH CV ECHO MEAS - AVA(V,D): 1.3 CM^2
BH CV ECHO MEAS - BSA(HAYCOCK): 1.6 M^2
BH CV ECHO MEAS - BSA: 1.6 M^2
BH CV ECHO MEAS - BZI_BMI: 26.2 KILOGRAMS/M^2
BH CV ECHO MEAS - BZI_METRIC_HEIGHT: 152.4 CM
BH CV ECHO MEAS - BZI_METRIC_WEIGHT: 60.8 KG
BH CV ECHO MEAS - EDV(MOD-SP2): 46 ML
BH CV ECHO MEAS - EDV(MOD-SP4): 50 ML
BH CV ECHO MEAS - EDV(TEICH): 97.6 ML
BH CV ECHO MEAS - EF(CUBED): 58.2 %
BH CV ECHO MEAS - EF(MOD-BP): 52.7 %
BH CV ECHO MEAS - EF(MOD-SP2): 52.2 %
BH CV ECHO MEAS - EF(MOD-SP4): 52 %
BH CV ECHO MEAS - EF(TEICH): 49.9 %
BH CV ECHO MEAS - ESV(MOD-SP2): 22 ML
BH CV ECHO MEAS - ESV(MOD-SP4): 24 ML
BH CV ECHO MEAS - ESV(TEICH): 48.9 ML
BH CV ECHO MEAS - FS: 25.2 %
BH CV ECHO MEAS - IVS/LVPW: 1
BH CV ECHO MEAS - IVSD: 1 CM
BH CV ECHO MEAS - LAT PEAK E' VEL: 6.4 CM/SEC
BH CV ECHO MEAS - LV DIASTOLIC VOL/BSA (35-75): 31.8 ML/M^2
BH CV ECHO MEAS - LV MASS(C)D: 156.9 GRAMS
BH CV ECHO MEAS - LV MASS(C)DI: 99.7 GRAMS/M^2
BH CV ECHO MEAS - LV MAX PG: 1.7 MMHG
BH CV ECHO MEAS - LV MEAN PG: 0.68 MMHG
BH CV ECHO MEAS - LV SYSTOLIC VOL/BSA (12-30): 15.2 ML/M^2
BH CV ECHO MEAS - LV V1 MAX: 64.3 CM/SEC
BH CV ECHO MEAS - LV V1 MEAN: 36.1 CM/SEC
BH CV ECHO MEAS - LV V1 VTI: 13.2 CM
BH CV ECHO MEAS - LVIDD: 4.6 CM
BH CV ECHO MEAS - LVIDS: 3.4 CM
BH CV ECHO MEAS - LVLD AP2: 6.1 CM
BH CV ECHO MEAS - LVLD AP4: 6 CM
BH CV ECHO MEAS - LVLS AP2: 5.2 CM
BH CV ECHO MEAS - LVLS AP4: 5.2 CM
BH CV ECHO MEAS - LVOT AREA (M): 3.1 CM^2
BH CV ECHO MEAS - LVOT AREA: 3.3 CM^2
BH CV ECHO MEAS - LVOT DIAM: 2 CM
BH CV ECHO MEAS - LVPWD: 0.97 CM
BH CV ECHO MEAS - MED PEAK E' VEL: 6.5 CM/SEC
BH CV ECHO MEAS - MR MAX PG: 72.6 MMHG
BH CV ECHO MEAS - MR MAX VEL: 426 CM/SEC
BH CV ECHO MEAS - MV A DUR: 0.12 SEC
BH CV ECHO MEAS - MV A MAX VEL: 62.1 CM/SEC
BH CV ECHO MEAS - MV DEC SLOPE: 317.7 CM/SEC^2
BH CV ECHO MEAS - MV DEC TIME: 0.25 SEC
BH CV ECHO MEAS - MV E MAX VEL: 84.4 CM/SEC
BH CV ECHO MEAS - MV E/A: 1.4
BH CV ECHO MEAS - MV MAX PG: 5.2 MMHG
BH CV ECHO MEAS - MV MEAN PG: 1.7 MMHG
BH CV ECHO MEAS - MV P1/2T MAX VEL: 80.1 CM/SEC
BH CV ECHO MEAS - MV P1/2T: 73.9 MSEC
BH CV ECHO MEAS - MV V2 MAX: 114 CM/SEC
BH CV ECHO MEAS - MV V2 MEAN: 62.2 CM/SEC
BH CV ECHO MEAS - MV V2 VTI: 27 CM
BH CV ECHO MEAS - MVA P1/2T LCG: 2.7 CM^2
BH CV ECHO MEAS - MVA(P1/2T): 3 CM^2
BH CV ECHO MEAS - MVA(VTI): 1.6 CM^2
BH CV ECHO MEAS - PA MAX PG (FULL): 0.16 MMHG
BH CV ECHO MEAS - PA MAX PG: 3.3 MMHG
BH CV ECHO MEAS - PA V2 MAX: 90.5 CM/SEC
BH CV ECHO MEAS - PULM A REVS DUR: 0.11 SEC
BH CV ECHO MEAS - PULM A REVS VEL: 24.4 CM/SEC
BH CV ECHO MEAS - PULM DIAS VEL: 40.3 CM/SEC
BH CV ECHO MEAS - PULM S/D: 1.4
BH CV ECHO MEAS - PULM SYS VEL: 54.8 CM/SEC
BH CV ECHO MEAS - PVA(V,A): 2.5 CM^2
BH CV ECHO MEAS - PVA(V,D): 2.5 CM^2
BH CV ECHO MEAS - QP/QS: 1.1
BH CV ECHO MEAS - RAP SYSTOLE: 3 MMHG
BH CV ECHO MEAS - RV MAX PG: 3.1 MMHG
BH CV ECHO MEAS - RV MEAN PG: 1.5 MMHG
BH CV ECHO MEAS - RV V1 MAX: 88.3 CM/SEC
BH CV ECHO MEAS - RV V1 MEAN: 57.7 CM/SEC
BH CV ECHO MEAS - RV V1 VTI: 18.7 CM
BH CV ECHO MEAS - RVOT AREA: 2.6 CM^2
BH CV ECHO MEAS - RVOT DIAM: 1.8 CM
BH CV ECHO MEAS - RVSP: 33.4 MMHG
BH CV ECHO MEAS - SI(AO): 214.2 ML/M^2
BH CV ECHO MEAS - SI(CUBED): 36.1 ML/M^2
BH CV ECHO MEAS - SI(LVOT): 27.4 ML/M^2
BH CV ECHO MEAS - SI(MOD-SP2): 15.2 ML/M^2
BH CV ECHO MEAS - SI(MOD-SP4): 16.5 ML/M^2
BH CV ECHO MEAS - SI(TEICH): 30.9 ML/M^2
BH CV ECHO MEAS - SUP REN AO DIAM: 1.7 CM
BH CV ECHO MEAS - SV(AO): 337.2 ML
BH CV ECHO MEAS - SV(CUBED): 56.8 ML
BH CV ECHO MEAS - SV(LVOT): 43.2 ML
BH CV ECHO MEAS - SV(MOD-SP2): 24 ML
BH CV ECHO MEAS - SV(MOD-SP4): 26 ML
BH CV ECHO MEAS - SV(RVOT): 47.9 ML
BH CV ECHO MEAS - SV(TEICH): 48.7 ML
BH CV ECHO MEAS - TAPSE (>1.6): 2 CM
BH CV ECHO MEAS - TR MAX VEL: 275.5 CM/SEC
BH CV ECHO MEASUREMENTS AVERAGE E/E' RATIO: 13.09
BH CV XLRA - RV BASE: 2.7 CM
BH CV XLRA - RV LENGTH: 5.6 CM
BH CV XLRA - RV MID: 1.9 CM
BH CV XLRA - TDI S': 9.8 CM/SEC
LEFT ATRIUM VOLUME INDEX: 28 ML/M2
LV EF 2D ECHO EST: 53 %
MAXIMAL PREDICTED HEART RATE: 140 BPM
SINUS: 3.5 CM
STJ: 2.7 CM
STRESS TARGET HR: 119 BPM

## 2021-06-15 NOTE — TELEPHONE ENCOUNTER
Called to speak about echo results.  No answer or ability to leave message.  We will try back tomorrow.

## 2021-06-17 ENCOUNTER — TELEPHONE (OUTPATIENT)
Dept: CARDIOLOGY | Facility: CLINIC | Age: 81
End: 2021-06-17

## 2021-06-17 NOTE — TELEPHONE ENCOUNTER
Spoke to daughter about results as I have called the patient twice and she did not answer her phone.  No indication for further testing I will see her next scheduled visit.  Wall motion abnormalities quoted on echo report are consistent with septal wall motion abnormality secondary to interventricular conduction delay.  No ischemic changes seen on echo.

## 2021-07-01 DIAGNOSIS — F41.1 GENERALIZED ANXIETY DISORDER: ICD-10-CM

## 2021-07-02 RX ORDER — PAROXETINE 10 MG/1
TABLET, FILM COATED ORAL
Qty: 90 TABLET | Refills: 0 | Status: SHIPPED | OUTPATIENT
Start: 2021-07-02 | End: 2021-10-01

## 2021-07-19 RX ORDER — LOSARTAN POTASSIUM 100 MG/1
TABLET ORAL
Qty: 30 TABLET | Refills: 0 | Status: SHIPPED | OUTPATIENT
Start: 2021-07-19 | End: 2021-08-23

## 2021-08-09 ENCOUNTER — OFFICE VISIT (OUTPATIENT)
Dept: FAMILY MEDICINE CLINIC | Facility: CLINIC | Age: 81
End: 2021-08-09

## 2021-08-09 VITALS
HEIGHT: 60 IN | DIASTOLIC BLOOD PRESSURE: 82 MMHG | SYSTOLIC BLOOD PRESSURE: 132 MMHG | BODY MASS INDEX: 25.91 KG/M2 | RESPIRATION RATE: 20 BRPM | HEART RATE: 66 BPM | TEMPERATURE: 96 F | WEIGHT: 132 LBS

## 2021-08-09 DIAGNOSIS — M54.41 RIGHT-SIDED LOW BACK PAIN WITH RIGHT-SIDED SCIATICA, UNSPECIFIED CHRONICITY: Primary | ICD-10-CM

## 2021-08-09 PROCEDURE — 99213 OFFICE O/P EST LOW 20 MIN: CPT | Performed by: NURSE PRACTITIONER

## 2021-08-09 NOTE — PROGRESS NOTES
"Chief Complaint  Back Pain (lower back on right side. started hurting really bad 4 days ago. ) and Hip Pain (right leg and goes down the leg. )    Subjective          Dafnepiyush Mary presents to [facility]  History of Present Illness  This is a 80-year-old female patient here today with complaints of low right-sided back pain.  She reports she was working out in her yard going up and down ladder, doing house chores and noticed episodes of back pain on Thursday.  She reports the pain is getting better but is scared when she does normal activity the pain will worsen.  She describes the pain as stabbing and migrating down right leg.  She denies any numbness or tingling.  She did have an old prescription of compound medication that was prescribed by Dr. Dc in the past and is using that with some relief.  She is requesting physical therapy.  She is also considered epidurals due to her chronic back pain in the past .       Objective   Vital Signs:   /82   Pulse 66   Temp 96 °F (35.6 °C)   Resp 20   Ht 152 cm (59.84\")   Wt 59.9 kg (132 lb)   BMI 25.91 kg/m²     Physical Exam  Vitals and nursing note reviewed.   HENT:      Head: Normocephalic.      Nose: Nose normal.   Eyes:      Pupils: Pupils are equal, round, and reactive to light.   Cardiovascular:      Rate and Rhythm: Normal rate and regular rhythm.      Pulses: Normal pulses.      Heart sounds: Normal heart sounds.   Pulmonary:      Effort: Pulmonary effort is normal. No respiratory distress.      Breath sounds: Normal breath sounds. No wheezing or rales.   Abdominal:      General: Bowel sounds are normal. There is no distension.      Tenderness: There is no abdominal tenderness. There is no right CVA tenderness or left CVA tenderness.   Musculoskeletal:         General: No swelling.      Cervical back: Neck supple.      Right lower leg: No edema.      Left lower leg: No edema.   Skin:     General: Skin is warm and dry.    "   Capillary Refill: Capillary refill takes less than 2 seconds.   Neurological:      Mental Status: She is alert and oriented to person, place, and time.   Psychiatric:         Mood and Affect: Mood normal.        Result Review :                 Assessment and Plan    Diagnoses and all orders for this visit:    1. Right-sided low back pain with right-sided sciatica, unspecified chronicity (Primary)  -     Ambulatory Referral to Physical Therapy Evaluate and treat    I will send an order for physical therapy today.  I will refill her compound cream through her pharmacy.  If she chooses to try pain management for injectable she will let us know or call back if pain is not improved    Follow Up   No follow-ups on file.  Patient was given instructions and counseling regarding her condition or for health maintenance advice. Please see specific information pulled into the AVS if appropriate.

## 2021-08-12 ENCOUNTER — DOCUMENTATION (OUTPATIENT)
Dept: FAMILY MEDICINE CLINIC | Facility: CLINIC | Age: 81
End: 2021-08-12

## 2021-08-12 NOTE — PROGRESS NOTES
Attempting to reach patient to let her know there is a refill on her compound cream. Also if she is needing pain management I spoke with [facility] in [place] and they do offer injections

## 2021-08-23 RX ORDER — LOSARTAN POTASSIUM 100 MG/1
TABLET ORAL
Qty: 30 TABLET | Refills: 0 | Status: SHIPPED | OUTPATIENT
Start: 2021-08-23 | End: 2021-09-27 | Stop reason: SDUPTHER

## 2021-09-13 DIAGNOSIS — E03.9 HYPOTHYROIDISM, UNSPECIFIED TYPE: ICD-10-CM

## 2021-09-13 DIAGNOSIS — I10 ESSENTIAL HYPERTENSION: ICD-10-CM

## 2021-09-14 RX ORDER — THYROID,PORK 15 MG
TABLET ORAL
Qty: 30 TABLET | Refills: 0 | Status: SHIPPED | OUTPATIENT
Start: 2021-09-14 | End: 2021-10-28 | Stop reason: SDUPTHER

## 2021-09-14 RX ORDER — ATENOLOL 50 MG/1
TABLET ORAL
Qty: 30 TABLET | Refills: 0 | Status: SHIPPED | OUTPATIENT
Start: 2021-09-14 | End: 2021-10-13 | Stop reason: SDUPTHER

## 2021-09-14 RX ORDER — THYROID 30 MG/1
TABLET ORAL
Qty: 30 TABLET | Refills: 0 | Status: SHIPPED | OUTPATIENT
Start: 2021-09-14 | End: 2021-10-28 | Stop reason: SDUPTHER

## 2021-09-27 RX ORDER — LOSARTAN POTASSIUM 100 MG/1
TABLET ORAL
Qty: 30 TABLET | Refills: 0 | OUTPATIENT
Start: 2021-09-27

## 2021-09-27 RX ORDER — LOSARTAN POTASSIUM 100 MG/1
100 TABLET ORAL DAILY
Qty: 30 TABLET | Refills: 0 | Status: SHIPPED | OUTPATIENT
Start: 2021-09-27 | End: 2021-11-07

## 2021-09-27 NOTE — TELEPHONE ENCOUNTER
Caller: Dafne Mary    Relationship: Self    Medication requested (name and dosage):    losartan (COZAAR) 100 MG tablet     Pharmacy where request should be sent: [facility] [address] [place] [address] - [phone]  - [phone]   [phone]    Additional details provided by patient: PATIENT STATES PHARMACY ADVISED HER THAT [facility] DENIED MEDICATION DUE TO HER NEEDING AN APPOINTMENT. PATIENT STATES SHE DOES NOT NEED AN APPOINTMENT AND DOES NOT KNOW WHY [facility] IS DENYING HER MEDICATION WHEN [facility] PRESCRIBES IT AND SHE HAS BEEN ON IT FOR 20 YEARS. PATIENT HAS A ONE DAY SUPPLY    Best call back number: [phone]    Does the patient have less than a 3 day supply:  [x] Yes  [] No    Jg Rosen Rep   09/27/21 13:08 EDT

## 2021-10-01 DIAGNOSIS — F41.1 GENERALIZED ANXIETY DISORDER: ICD-10-CM

## 2021-10-01 RX ORDER — PAROXETINE 10 MG/1
TABLET, FILM COATED ORAL
Qty: 90 TABLET | Refills: 0 | Status: SHIPPED | OUTPATIENT
Start: 2021-10-01 | End: 2022-01-06

## 2021-10-12 DIAGNOSIS — E03.9 HYPOTHYROIDISM, UNSPECIFIED TYPE: ICD-10-CM

## 2021-10-12 DIAGNOSIS — I10 ESSENTIAL HYPERTENSION: ICD-10-CM

## 2021-10-12 RX ORDER — THYROID 30 MG/1
TABLET ORAL
Qty: 30 TABLET | Refills: 0 | OUTPATIENT
Start: 2021-10-12

## 2021-10-12 RX ORDER — THYROID,PORK 15 MG
TABLET ORAL
Qty: 30 TABLET | Refills: 0 | OUTPATIENT
Start: 2021-10-12

## 2021-10-12 RX ORDER — ATENOLOL 50 MG/1
TABLET ORAL
Qty: 30 TABLET | Refills: 0 | OUTPATIENT
Start: 2021-10-12

## 2021-10-12 NOTE — TELEPHONE ENCOUNTER
Last ov:05/17/21  Last refill:09/14/21 1 month and told appt needed  Last lab:04/15/21  Next appt U/A tried to schedule could not reach pt and unable to lmom

## 2021-10-13 DIAGNOSIS — I10 ESSENTIAL HYPERTENSION: ICD-10-CM

## 2021-10-13 RX ORDER — ATENOLOL 50 MG/1
50 TABLET ORAL DAILY
Qty: 30 TABLET | Refills: 0 | Status: SHIPPED | OUTPATIENT
Start: 2021-10-13 | End: 2021-10-28 | Stop reason: SDUPTHER

## 2021-10-13 NOTE — TELEPHONE ENCOUNTER
She cannot go without her atenolol. We need to try to reach her and at least refill 7 days of atenolol.

## 2021-10-22 DIAGNOSIS — I10 ESSENTIAL HYPERTENSION: ICD-10-CM

## 2021-10-22 DIAGNOSIS — E03.9 HYPOTHYROIDISM, UNSPECIFIED TYPE: ICD-10-CM

## 2021-10-22 RX ORDER — THYROID,PORK 15 MG
TABLET ORAL
Qty: 30 TABLET | Refills: 0 | OUTPATIENT
Start: 2021-10-22

## 2021-10-22 RX ORDER — THYROID 30 MG/1
TABLET ORAL
Qty: 30 TABLET | Refills: 0 | OUTPATIENT
Start: 2021-10-22

## 2021-10-22 RX ORDER — ATENOLOL 50 MG/1
TABLET ORAL
Qty: 30 TABLET | Refills: 0 | OUTPATIENT
Start: 2021-10-22

## 2021-10-28 DIAGNOSIS — I10 ESSENTIAL HYPERTENSION: ICD-10-CM

## 2021-10-28 DIAGNOSIS — E03.9 HYPOTHYROIDISM, UNSPECIFIED TYPE: ICD-10-CM

## 2021-10-28 RX ORDER — THYROID,PORK 15 MG
15 TABLET ORAL DAILY
Qty: 10 TABLET | Refills: 0 | Status: SHIPPED | OUTPATIENT
Start: 2021-10-28 | End: 2021-11-07

## 2021-10-28 RX ORDER — ATENOLOL 50 MG/1
50 TABLET ORAL DAILY
Qty: 10 TABLET | Refills: 0 | Status: SHIPPED | OUTPATIENT
Start: 2021-10-28 | End: 2021-11-07

## 2021-10-28 RX ORDER — THYROID 30 MG/1
30 TABLET ORAL DAILY
Qty: 10 TABLET | Refills: 0 | Status: SHIPPED | OUTPATIENT
Start: 2021-10-28 | End: 2021-11-07

## 2021-10-29 ENCOUNTER — HOSPITAL ENCOUNTER (OUTPATIENT)
Dept: CARDIOLOGY | Facility: HOSPITAL | Age: 81
Discharge: HOME | End: 2021-10-29

## 2021-10-29 ENCOUNTER — HOSPITAL ENCOUNTER (OUTPATIENT)
Dept: CT IMAGING | Facility: HOSPITAL | Age: 81
Discharge: HOME | End: 2021-10-29

## 2021-10-29 ENCOUNTER — OFFICE VISIT (OUTPATIENT)
Dept: FAMILY MEDICINE CLINIC | Facility: CLINIC | Age: 81
End: 2021-10-29

## 2021-10-29 VITALS
TEMPERATURE: 97.1 F | BODY MASS INDEX: 25.91 KG/M2 | HEIGHT: 60 IN | SYSTOLIC BLOOD PRESSURE: 138 MMHG | RESPIRATION RATE: 18 BRPM | HEART RATE: 80 BPM | WEIGHT: 132 LBS | DIASTOLIC BLOOD PRESSURE: 82 MMHG | OXYGEN SATURATION: 93 %

## 2021-10-29 VITALS
BODY MASS INDEX: 25.52 KG/M2 | SYSTOLIC BLOOD PRESSURE: 139 MMHG | HEIGHT: 60 IN | TEMPERATURE: 100 F | HEART RATE: 91 BPM | WEIGHT: 130 LBS | DIASTOLIC BLOOD PRESSURE: 76 MMHG | OXYGEN SATURATION: 95 %

## 2021-10-29 DIAGNOSIS — E03.9 ACQUIRED HYPOTHYROIDISM: ICD-10-CM

## 2021-10-29 DIAGNOSIS — N28.9 ABNORMAL RENAL FUNCTION: ICD-10-CM

## 2021-10-29 DIAGNOSIS — I65.23 ARTERIOSCLEROSIS OF BOTH CAROTID ARTERIES: ICD-10-CM

## 2021-10-29 DIAGNOSIS — R06.02 SHORTNESS OF BREATH: ICD-10-CM

## 2021-10-29 DIAGNOSIS — I10 BENIGN HYPERTENSION: Primary | ICD-10-CM

## 2021-10-29 DIAGNOSIS — R74.8 ELEVATED ALKALINE PHOSPHATASE LEVEL: ICD-10-CM

## 2021-10-29 DIAGNOSIS — M54.6 ACUTE MIDLINE THORACIC BACK PAIN: ICD-10-CM

## 2021-10-29 DIAGNOSIS — E78.2 MIXED HYPERLIPIDEMIA: ICD-10-CM

## 2021-10-29 DIAGNOSIS — R06.09 DOE (DYSPNEA ON EXERTION): ICD-10-CM

## 2021-10-29 DIAGNOSIS — K21.9 GASTROESOPHAGEAL REFLUX DISEASE, UNSPECIFIED WHETHER ESOPHAGITIS PRESENT: ICD-10-CM

## 2021-10-29 DIAGNOSIS — M79.7 FIBROMYALGIA: ICD-10-CM

## 2021-10-29 DIAGNOSIS — E55.9 VITAMIN D DEFICIENCY: ICD-10-CM

## 2021-10-29 DIAGNOSIS — J30.89 NON-SEASONAL ALLERGIC RHINITIS, UNSPECIFIED TRIGGER: ICD-10-CM

## 2021-10-29 DIAGNOSIS — E53.8 B12 DEFICIENCY: ICD-10-CM

## 2021-10-29 DIAGNOSIS — F41.1 GENERALIZED ANXIETY DISORDER: ICD-10-CM

## 2021-10-29 DIAGNOSIS — F33.0 MAJOR DEPRESSIVE DISORDER, RECURRENT EPISODE, MILD DEGREE (HCC): ICD-10-CM

## 2021-10-29 DIAGNOSIS — R07.2 PRECORDIAL PAIN: ICD-10-CM

## 2021-10-29 DIAGNOSIS — R07.9 CHEST PAIN, UNSPECIFIED TYPE: ICD-10-CM

## 2021-10-29 DIAGNOSIS — J43.9 PULMONARY EMPHYSEMA, UNSPECIFIED EMPHYSEMA TYPE (HCC): ICD-10-CM

## 2021-10-29 DIAGNOSIS — R00.2 PALPITATIONS: ICD-10-CM

## 2021-10-29 LAB
ALBUMIN SERPL-MCNC: 4.4 G/DL (ref 3.5–5.2)
ALBUMIN/GLOB SERPL: 1.6 G/DL
ALP SERPL-CCNC: 137 U/L (ref 39–117)
ALT SERPL W P-5'-P-CCNC: 19 U/L (ref 1–33)
ANION GAP SERPL CALCULATED.3IONS-SCNC: 11.4 MMOL/L (ref 5–15)
AST SERPL-CCNC: 26 U/L (ref 1–32)
BASOPHILS # BLD AUTO: 0.08 10*3/MM3 (ref 0–0.2)
BASOPHILS NFR BLD AUTO: 1.4 % (ref 0–1.5)
BILIRUB SERPL-MCNC: 0.6 MG/DL (ref 0–1.2)
BUN SERPL-MCNC: 19 MG/DL (ref 8–23)
BUN/CREAT SERPL: 17.9 (ref 7–25)
CALCIUM SPEC-SCNC: 8.9 MG/DL (ref 8.6–10.5)
CHLORIDE SERPL-SCNC: 103 MMOL/L (ref 98–107)
CO2 SERPL-SCNC: 23.6 MMOL/L (ref 22–29)
CREAT SERPL-MCNC: 1.06 MG/DL (ref 0.57–1)
D DIMER PPP FEU-MCNC: 1.22 MCGFEU/ML (ref 0–0.49)
DEPRECATED RDW RBC AUTO: 39.9 FL (ref 37–54)
EOSINOPHIL # BLD AUTO: 0.25 10*3/MM3 (ref 0–0.4)
EOSINOPHIL NFR BLD AUTO: 4.3 % (ref 0.3–6.2)
ERYTHROCYTE [DISTWIDTH] IN BLOOD BY AUTOMATED COUNT: 12.2 % (ref 12.3–15.4)
GFR SERPL CREATININE-BSD FRML MDRD: 50 ML/MIN/1.73
GLOBULIN UR ELPH-MCNC: 2.8 GM/DL
GLUCOSE SERPL-MCNC: 116 MG/DL (ref 65–99)
HCT VFR BLD AUTO: 40.7 % (ref 34–46.6)
HGB BLD-MCNC: 12.9 G/DL (ref 12–15.9)
IMM GRANULOCYTES # BLD AUTO: 0.04 10*3/MM3 (ref 0–0.05)
IMM GRANULOCYTES NFR BLD AUTO: 0.7 % (ref 0–0.5)
LYMPHOCYTES # BLD AUTO: 0.58 10*3/MM3 (ref 0.7–3.1)
LYMPHOCYTES NFR BLD AUTO: 9.9 % (ref 19.6–45.3)
MCH RBC QN AUTO: 28.5 PG (ref 26.6–33)
MCHC RBC AUTO-ENTMCNC: 31.7 G/DL (ref 31.5–35.7)
MCV RBC AUTO: 89.8 FL (ref 79–97)
MONOCYTES # BLD AUTO: 0.5 10*3/MM3 (ref 0.1–0.9)
MONOCYTES NFR BLD AUTO: 8.5 % (ref 5–12)
NEUTROPHILS NFR BLD AUTO: 4.42 10*3/MM3 (ref 1.7–7)
NEUTROPHILS NFR BLD AUTO: 75.2 % (ref 42.7–76)
NRBC BLD AUTO-RTO: 0 /100 WBC (ref 0–0.2)
NT-PROBNP SERPL-MCNC: 1062 PG/ML (ref 0–1800)
PLATELET # BLD AUTO: 284 10*3/MM3 (ref 140–450)
PMV BLD AUTO: 9.2 FL (ref 6–12)
POTASSIUM SERPL-SCNC: 3.6 MMOL/L (ref 3.5–5.2)
PROT SERPL-MCNC: 7.2 G/DL (ref 6–8.5)
RBC # BLD AUTO: 4.53 10*6/MM3 (ref 3.77–5.28)
SODIUM SERPL-SCNC: 138 MMOL/L (ref 136–145)
TROPONIN T SERPL-MCNC: <0.01 NG/ML (ref 0–0.03)
WBC # BLD AUTO: 5.87 10*3/MM3 (ref 3.4–10.8)

## 2021-10-29 PROCEDURE — 71275 CT ANGIOGRAPHY CHEST: CPT

## 2021-10-29 PROCEDURE — 93005 ELECTROCARDIOGRAM TRACING: CPT | Performed by: PHYSICIAN ASSISTANT

## 2021-10-29 PROCEDURE — 84484 ASSAY OF TROPONIN QUANT: CPT | Performed by: INTERNAL MEDICINE

## 2021-10-29 PROCEDURE — 80053 COMPREHEN METABOLIC PANEL: CPT

## 2021-10-29 PROCEDURE — 83880 ASSAY OF NATRIURETIC PEPTIDE: CPT | Performed by: INTERNAL MEDICINE

## 2021-10-29 PROCEDURE — 99214 OFFICE O/P EST MOD 30 MIN: CPT | Performed by: PHYSICIAN ASSISTANT

## 2021-10-29 PROCEDURE — 36415 COLL VENOUS BLD VENIPUNCTURE: CPT

## 2021-10-29 PROCEDURE — 0 IOPAMIDOL PER 1 ML: Performed by: INTERNAL MEDICINE

## 2021-10-29 PROCEDURE — 99214 OFFICE O/P EST MOD 30 MIN: CPT | Performed by: INTERNAL MEDICINE

## 2021-10-29 PROCEDURE — 94760 N-INVAS EAR/PLS OXIMETRY 1: CPT

## 2021-10-29 PROCEDURE — 85379 FIBRIN DEGRADATION QUANT: CPT | Performed by: INTERNAL MEDICINE

## 2021-10-29 PROCEDURE — 85025 COMPLETE CBC W/AUTO DIFF WBC: CPT

## 2021-10-29 RX ORDER — NITROGLYCERIN 0.4 MG/1
0.4 TABLET SUBLINGUAL
Status: DISCONTINUED | OUTPATIENT
Start: 2021-10-29 | End: 2022-05-11

## 2021-10-29 RX ORDER — SODIUM CHLORIDE 0.9 % (FLUSH) 0.9 %
10 SYRINGE (ML) INJECTION AS NEEDED
Status: DISCONTINUED | OUTPATIENT
Start: 2021-10-29 | End: 2022-05-11

## 2021-10-29 RX ADMIN — IOPAMIDOL 95 ML: 755 INJECTION, SOLUTION INTRAVENOUS at 17:25

## 2021-10-29 NOTE — PROGRESS NOTES
[facility]      Patient Name: Dafne Mary  :1940  Age: 80 y.o.  Encounter Provider:  MICHAEL SCALES      Chief Complaint:   Chief Complaint   Patient presents with   • Shortness of Breath   • Chest Pain         Shortness of Breath  Associated symptoms include chest pain. Pertinent negatives include no abdominal pain, fever, leg swelling, orthopnea, PND, rash, sore throat, syncope or wheezing.   Chest Pain   Associated symptoms include palpitations and shortness of breath. Pertinent negatives include no abdominal pain, cough, dizziness, fever, near-syncope, orthopnea, PND or syncope.     Dafne Mary is a 80 y.o. female with past medical history of hypertension, dyslipidemia, peripheral arterial disease who presents for evaluation of palpitations and abnormal EKG. Patient has a history of COPD and interestingly is a lifelong non-smoker. She has significant exertional dyspnea which has been chronic and stable for many years. She does think that its been slightly worse over the last 12 months and occasionally has pleuritic chest discomfort. She had a stress test in  which showed no evidence of of myocardial ischemia. She notes NYHA II symptoms but denies orthopnea, PND or edema. She follows with Dr. Stacy for emphysema which she states is presumed secondary to severe secondhand exposure. No previous cardiac history. Ultrasound carotid showed mild nonobstructive atherosclerotic disease in  with no recent follow-up. Sintia Chatterjee ordered a repeat ultrasound carotid in December but she was afraid to go get it due to ongoing public health issues. She continues to be abstinent from tobacco, drinks socially and denies illicit drug use. No family history of premature coronary artery disease or sudden cardiac death. She does note palpitations with occasional dizziness but no episodes of syncope. She was seen by Sintia Chatterjee this week and found to have nonspecific interventricular  conduction delay which was a significant change from EKG in 2018. Given the constellation of symptoms and objective findings I have been asked to see the patient for evaluation.    Patient had Holter monitor performed after last clinic visit with 1 of 6 beat run of SVT and no sustained arrhythmias.  Echocardiogram with low normal function and grade 2 diastolic dysfunction.  There were no reported regional wall motion abnormalities however after personal review of images this looks mostly septals secondary to interventricular conduction delay.  Patient was supposed to follow-up in August but things got delayed and she was scheduled to see me later in the month.  She notes an increase in exertional dyspnea.  She had some chest pressure about a week ago when folding laundry.  She states she has this about once a month over the past 12 to 18 months.  She denies orthopnea, PND or edema.  Limited frequency of palpitations and no dizziness or syncope.    The following portions of the patient's history were reviewed and updated as appropriate: allergies, current medications, past family history, past medical history, past social history, past surgical history and problem list.      Review of Systems   Constitutional: Negative for chills and fever.   HENT: Negative for hoarse voice and sore throat.    Eyes: Negative for double vision and photophobia.   Cardiovascular: Positive for chest pain, dyspnea on exertion and palpitations. Negative for leg swelling, near-syncope, orthopnea, paroxysmal nocturnal dyspnea and syncope.   Respiratory: Positive for shortness of breath. Negative for cough and wheezing.    Skin: Negative for poor wound healing and rash.   Musculoskeletal: Negative for arthritis and joint swelling.   Gastrointestinal: Negative for bloating, abdominal pain, hematemesis and hematochezia.   Neurological: Negative for dizziness and focal weakness.   Psychiatric/Behavioral: Negative for depression and suicidal  "ideas.     ROS was reviewed, updated and amended when necessary.    OBJECTIVE:   Vital Signs  Vitals:    10/29/21 1444   BP: 139/76   Pulse: 91   Temp: 100 °F (37.8 °C)   SpO2: 95%     Estimated body mass index is 25.39 kg/m² as calculated from the following:    Height as of this encounter: 152.4 cm (60\").    Weight as of this encounter: 59 kg (130 lb).    Vitals reviewed.   Constitutional:       Appearance: Healthy appearance. Not in distress.   Neck:      Vascular: No JVR. JVD normal.   Pulmonary:      Effort: Pulmonary effort is normal.      Breath sounds: No wheezing. No rhonchi. No rales.      Comments: Diminished breath sounds bibasilar zones  Chest:      Chest wall: Not tender to palpatation.   Cardiovascular:      PMI at left midclavicular line. Normal rate. Regular rhythm. Normal S1. Normal S2.      Murmurs: There is no murmur.      No gallop. No click. No rub.   Pulses:     Intact distal pulses.   Edema:     Peripheral edema absent.   Abdominal:      General: Bowel sounds are normal.      Palpations: Abdomen is soft.      Tenderness: There is no abdominal tenderness.   Musculoskeletal: Normal range of motion.         General: No tenderness. Skin:     General: Skin is warm and dry.   Neurological:      General: No focal deficit present.      Mental Status: Alert and oriented to person, place and time.       Physical exam was reviewed, updated and amended when necessary.    Procedures          ASSESSMENT:     Abnormal EKG  Palpitations  Exertional dyspnea  COPD  Hypertension  Dyslipidemia  Peripheral arterial disease    PLAN OF CARE:     1. Exertional dyspnea -patient with significant emphysema followed by pulmonary.  Chest pressure has typical and atypical characteristics.  Elevated D-dimer today with no recent imaging in the system.  Plan for stat CTA.  If no evidence for pulmonary embolism we will plan for a pharmacological nuclear stress study on Monday morning.  Patient has been advised to return to the " ER should she have escalation of frequency and intensity of symptoms.  2. Peripheral arterial disease -needs surveillance ultrasound carotid  3. COPD -following with pulmonary  4. Hypertension fair control today. Twice daily blood pressure log. Sodium restricted diet is counseled. -   5. Dyslipidemia -continue statin    Return to clinic 3 months             Discharge Medications      ASK your doctor about these medications      Instructions Start Date   albuterol 2 MG/5ML syrup  Commonly known as: PROVENTIL,VENTOLIN   2 mg, Oral, 3 Times Daily      [place] Thyroid 15 MG tablet  Generic drug: thyroid   15 mg, Oral, Daily      [place] Thyroid 30 MG tablet  Generic drug: Thyroid   30 mg, Oral, Daily      aspirin 81 MG chewable tablet   81 mg, Oral, Daily      atenolol 50 MG tablet  Commonly known as: TENORMIN   50 mg, Oral, Daily      Calcium-Vitamin D-Vitamin K 500-1000-40 MG-UNT-MCG chewable tablet   Oral      Cyanocobalamin 500 MCG chewable tablet   Oral      losartan 100 MG tablet  Commonly known as: COZAAR   100 mg, Oral, Daily      multivitamin with minerals tablet tablet   1 tablet, Oral, Daily      omeprazole 20 MG capsule  Commonly known as: priLOSEC   20 mg, Oral, Daily      PARoxetine 10 MG tablet  Commonly known as: PAXIL   TAKE 1 TABLET BY MOUTH ONCE DAILY . APPOINTMENT REQUIRED FOR FUTURE REFILLS      [place] Ellipta 100-62.5-25 MCG/INH inhaler  Generic drug: Fluticasone-Umeclidin-Vilant   1 puff, Inhalation, Daily      [place] Ellipta 200-62.5-25 MCG/INH inhaler  Generic drug: Fluticasone-Umeclidin-Vilant   INHALE 1 PUFF ONCE DAILY . APPOINTMENT REQUIRED FOR FUTURE REFILLS      Triamcinolone Acetonide 55 MCG/ACT nasal inhaler  Commonly known as: Nasacort AQ   2 SPRAYS IN EACH NOSTRIL ONCE DAILY      VITAMIN D (CHOLECALCIFEROL) PO   Oral             Thank you for allowing me to participate in the care of your patient,      Sincerely,   Jmimy Hanson MD  [facility]  10/29/21  15:30 EDT

## 2021-10-31 LAB
25(OH)D3+25(OH)D2 SERPL-MCNC: 44.3 NG/ML (ref 30–100)
ALBUMIN SERPL-MCNC: 4.2 G/DL (ref 3.7–4.7)
ALBUMIN/GLOB SERPL: 1.5 {RATIO} (ref 1.2–2.2)
ALP SERPL-CCNC: 152 IU/L (ref 44–121)
ALT SERPL-CCNC: 20 IU/L (ref 0–32)
APPEARANCE UR: CLEAR
AST SERPL-CCNC: 27 IU/L (ref 0–40)
BACTERIA #/AREA URNS HPF: ABNORMAL /[HPF]
BACTERIA UR CULT: NORMAL
BACTERIA UR CULT: NORMAL
BASOPHILS # BLD AUTO: 0.1 X10E3/UL (ref 0–0.2)
BASOPHILS NFR BLD AUTO: 1 %
BILIRUB SERPL-MCNC: 0.7 MG/DL (ref 0–1.2)
BILIRUB UR QL STRIP: NEGATIVE
BUN SERPL-MCNC: 19 MG/DL (ref 8–27)
BUN/CREAT SERPL: 19 (ref 12–28)
CALCIUM SERPL-MCNC: 9.1 MG/DL (ref 8.7–10.3)
CASTS URNS QL MICRO: ABNORMAL /LPF
CHLORIDE SERPL-SCNC: 101 MMOL/L (ref 96–106)
CHOLEST SERPL-MCNC: 251 MG/DL (ref 100–199)
CK SERPL-CCNC: 69 U/L (ref 32–182)
CO2 SERPL-SCNC: 22 MMOL/L (ref 20–29)
COLOR UR: YELLOW
CREAT SERPL-MCNC: 0.99 MG/DL (ref 0.57–1)
EOSINOPHIL # BLD AUTO: 0.3 X10E3/UL (ref 0–0.4)
EOSINOPHIL NFR BLD AUTO: 4 %
EPI CELLS #/AREA URNS HPF: ABNORMAL /HPF (ref 0–10)
ERYTHROCYTE [DISTWIDTH] IN BLOOD BY AUTOMATED COUNT: 11.8 % (ref 11.7–15.4)
FOLATE SERPL-MCNC: 11.8 NG/ML
GLOBULIN SER CALC-MCNC: 2.8 G/DL (ref 1.5–4.5)
GLUCOSE SERPL-MCNC: 88 MG/DL (ref 65–99)
GLUCOSE UR QL: NEGATIVE
HCT VFR BLD AUTO: 42 % (ref 34–46.6)
HDLC SERPL-MCNC: 65 MG/DL
HGB BLD-MCNC: 13.6 G/DL (ref 11.1–15.9)
HGB UR QL STRIP: NEGATIVE
IMM GRANULOCYTES # BLD AUTO: 0.1 X10E3/UL (ref 0–0.1)
IMM GRANULOCYTES NFR BLD AUTO: 1 %
KETONES UR QL STRIP: NEGATIVE
LDLC SERPL CALC-MCNC: 161 MG/DL (ref 0–99)
LDLC/HDLC SERPL: 2.5 RATIO (ref 0–3.2)
LEUKOCYTE ESTERASE UR QL STRIP: ABNORMAL
LYMPHOCYTES # BLD AUTO: 0.6 X10E3/UL (ref 0.7–3.1)
LYMPHOCYTES NFR BLD AUTO: 9 %
MCH RBC QN AUTO: 28.2 PG (ref 26.6–33)
MCHC RBC AUTO-ENTMCNC: 32.4 G/DL (ref 31.5–35.7)
MCV RBC AUTO: 87 FL (ref 79–97)
MICRO URNS: ABNORMAL
MONOCYTES # BLD AUTO: 0.6 X10E3/UL (ref 0.1–0.9)
MONOCYTES NFR BLD AUTO: 9 %
NEUTROPHILS # BLD AUTO: 4.8 X10E3/UL (ref 1.4–7)
NEUTROPHILS NFR BLD AUTO: 76 %
NITRITE UR QL STRIP: POSITIVE
PH UR STRIP: 6 [PH] (ref 5–7.5)
PLATELET # BLD AUTO: 306 X10E3/UL (ref 150–450)
POTASSIUM SERPL-SCNC: 4.2 MMOL/L (ref 3.5–5.2)
PROT SERPL-MCNC: 7 G/DL (ref 6–8.5)
PROT UR QL STRIP: ABNORMAL
RBC # BLD AUTO: 4.82 X10E6/UL (ref 3.77–5.28)
RBC #/AREA URNS HPF: ABNORMAL /HPF (ref 0–2)
SODIUM SERPL-SCNC: 139 MMOL/L (ref 134–144)
SP GR UR: 1.02 (ref 1–1.03)
T3FREE SERPL-MCNC: 3.7 PG/ML (ref 2–4.4)
T4 FREE SERPL-MCNC: 0.78 NG/DL (ref 0.82–1.77)
TRIGL SERPL-MCNC: 142 MG/DL (ref 0–149)
TSH SERPL DL<=0.005 MIU/L-ACNC: 2.17 UIU/ML (ref 0.45–4.5)
URINALYSIS REFLEX: ABNORMAL
UROBILINOGEN UR STRIP-MCNC: 0.2 MG/DL (ref 0.2–1)
VIT B12 SERPL-MCNC: 705 PG/ML (ref 232–1245)
VLDLC SERPL CALC-MCNC: 25 MG/DL (ref 5–40)
WBC # BLD AUTO: 6.3 X10E3/UL (ref 3.4–10.8)
WBC #/AREA URNS HPF: ABNORMAL /HPF (ref 0–5)

## 2021-11-01 ENCOUNTER — HOSPITAL ENCOUNTER (OUTPATIENT)
Dept: CARDIOLOGY | Facility: HOSPITAL | Age: 81
Discharge: HOME | End: 2021-11-01

## 2021-11-05 ENCOUNTER — HOSPITAL ENCOUNTER (OUTPATIENT)
Dept: CARDIOLOGY | Facility: HOSPITAL | Age: 81
Discharge: HOME | End: 2021-11-05
Admitting: INTERNAL MEDICINE

## 2021-11-05 VITALS — WEIGHT: 132.28 LBS | BODY MASS INDEX: 25.97 KG/M2 | HEIGHT: 60 IN

## 2021-11-05 LAB
BH CV NUCLEAR PRIOR STUDY: 3
BH CV REST NUCLEAR ISOTOPE DOSE: 54 MCI
BH CV STRESS BP STAGE 1: NORMAL
BH CV STRESS COMMENTS STAGE 1: NORMAL
BH CV STRESS DOSE REGADENOSON STAGE 1: 0.4
BH CV STRESS DURATION MIN STAGE 1: 0
BH CV STRESS DURATION SEC STAGE 1: 10
BH CV STRESS HR STAGE 1: 94
BH CV STRESS NUCLEAR ISOTOPE DOSE: 54 MCI
BH CV STRESS PROTOCOL 1: NORMAL
BH CV STRESS RECOVERY BP: NORMAL MMHG
BH CV STRESS RECOVERY HR: 85 BPM
BH CV STRESS STAGE 1: 1
LV EF NUC BP: 58 %
MAXIMAL PREDICTED HEART RATE: 140 BPM
PERCENT MAX PREDICTED HR: 67.14 %
STRESS BASELINE BP: NORMAL MMHG
STRESS BASELINE HR: 81 BPM
STRESS PERCENT HR: 79 %
STRESS POST EXERCISE DUR SEC: 10 SEC
STRESS POST PEAK BP: NORMAL MMHG
STRESS POST PEAK HR: 94 BPM
STRESS TARGET HR: 119 BPM

## 2021-11-05 PROCEDURE — 0 RUBIDIUM CHLORIDE: Performed by: INTERNAL MEDICINE

## 2021-11-05 PROCEDURE — 78492 MYOCRD IMG PET MLT RST&STRS: CPT | Performed by: INTERNAL MEDICINE

## 2021-11-05 PROCEDURE — A9555 RB82 RUBIDIUM: HCPCS | Performed by: INTERNAL MEDICINE

## 2021-11-05 PROCEDURE — 93018 CV STRESS TEST I&R ONLY: CPT | Performed by: INTERNAL MEDICINE

## 2021-11-05 PROCEDURE — 93016 CV STRESS TEST SUPVJ ONLY: CPT | Performed by: INTERNAL MEDICINE

## 2021-11-05 PROCEDURE — 25010000002 REGADENOSON 0.4 MG/5ML SOLUTION: Performed by: INTERNAL MEDICINE

## 2021-11-05 PROCEDURE — 78492 MYOCRD IMG PET MLT RST&STRS: CPT

## 2021-11-05 PROCEDURE — 93017 CV STRESS TEST TRACING ONLY: CPT

## 2021-11-05 RX ADMIN — REGADENOSON 0.4 MG: 0.08 INJECTION, SOLUTION INTRAVENOUS at 13:15

## 2021-11-06 DIAGNOSIS — E03.9 HYPOTHYROIDISM, UNSPECIFIED TYPE: ICD-10-CM

## 2021-11-06 DIAGNOSIS — I10 ESSENTIAL HYPERTENSION: ICD-10-CM

## 2021-11-07 RX ORDER — THYROID 30 MG/1
TABLET ORAL
Qty: 10 TABLET | Refills: 0 | Status: SHIPPED | OUTPATIENT
Start: 2021-11-07 | End: 2021-11-21

## 2021-11-07 RX ORDER — ATENOLOL 50 MG/1
TABLET ORAL
Qty: 10 TABLET | Refills: 0 | Status: SHIPPED | OUTPATIENT
Start: 2021-11-07 | End: 2021-11-21

## 2021-11-07 RX ORDER — LOSARTAN POTASSIUM 100 MG/1
TABLET ORAL
Qty: 30 TABLET | Refills: 0 | Status: SHIPPED | OUTPATIENT
Start: 2021-11-07 | End: 2021-12-13

## 2021-11-07 RX ORDER — THYROID,PORK 15 MG
TABLET ORAL
Qty: 10 TABLET | Refills: 0 | Status: SHIPPED | OUTPATIENT
Start: 2021-11-07 | End: 2021-11-21

## 2021-11-18 DIAGNOSIS — I10 ESSENTIAL HYPERTENSION: ICD-10-CM

## 2021-11-18 DIAGNOSIS — E03.9 HYPOTHYROIDISM, UNSPECIFIED TYPE: ICD-10-CM

## 2021-11-21 RX ORDER — THYROID 30 MG/1
30 TABLET ORAL DAILY
Qty: 90 TABLET | Refills: 0 | Status: SHIPPED | OUTPATIENT
Start: 2021-11-21 | End: 2022-03-24

## 2021-11-21 RX ORDER — ATENOLOL 50 MG/1
TABLET ORAL
Qty: 90 TABLET | Refills: 0 | Status: SHIPPED | OUTPATIENT
Start: 2021-11-21 | End: 2022-02-28

## 2021-11-21 RX ORDER — THYROID,PORK 15 MG
15 TABLET ORAL DAILY
Qty: 90 TABLET | Refills: 0 | Status: SHIPPED | OUTPATIENT
Start: 2021-11-21 | End: 2022-03-24

## 2021-11-21 RX ORDER — THYROID 30 MG/1
TABLET ORAL
Qty: 30 TABLET | Refills: 0 | Status: SHIPPED | OUTPATIENT
Start: 2021-11-21 | End: 2021-11-21 | Stop reason: SDUPTHER

## 2021-11-21 RX ORDER — THYROID,PORK 15 MG
TABLET ORAL
Qty: 30 TABLET | Refills: 0 | Status: SHIPPED | OUTPATIENT
Start: 2021-11-21 | End: 2021-11-21 | Stop reason: SDUPTHER

## 2021-11-30 ENCOUNTER — OFFICE VISIT (OUTPATIENT)
Dept: CARDIOLOGY | Facility: CLINIC | Age: 81
End: 2021-11-30

## 2021-11-30 VITALS
DIASTOLIC BLOOD PRESSURE: 80 MMHG | HEIGHT: 60 IN | BODY MASS INDEX: 26.03 KG/M2 | HEART RATE: 70 BPM | SYSTOLIC BLOOD PRESSURE: 130 MMHG | WEIGHT: 132.6 LBS

## 2021-11-30 DIAGNOSIS — I73.9 PERIPHERAL ARTERIAL DISEASE (HCC): Primary | ICD-10-CM

## 2021-11-30 PROCEDURE — 99213 OFFICE O/P EST LOW 20 MIN: CPT | Performed by: INTERNAL MEDICINE

## 2021-11-30 NOTE — PROGRESS NOTES
[facility]      Patient Name: Dafne Mary  :1940  Age: 80 y.o.  Encounter Provider:  Jimmy Hanson Jr, MD      Chief Complaint: Follow-up atypical chest pain      HPI  Dafne Mary is a 80 y.o. female with past medical history of hypertension, dyslipidemia, peripheral arterial disease who presents for evaluation of palpitations and abnormal EKG. Patient has a history of COPD and interestingly is a lifelong non-smoker. She has significant exertional dyspnea which has been chronic and stable for many years. She does think that its been slightly worse over the last 12 months and occasionally has pleuritic chest discomfort. She had a stress test in  which showed no evidence of of myocardial ischemia. She notes NYHA II symptoms but denies orthopnea, PND or edema. She follows with Dr. Stacy for emphysema which she states is presumed secondary to severe secondhand exposure. No previous cardiac history. Ultrasound carotid showed mild nonobstructive atherosclerotic disease in  with no recent follow-up. Sintia Chatterjee ordered a repeat ultrasound carotid in December but she was afraid to go get it due to ongoing public health issues. She continues to be abstinent from tobacco, drinks socially and denies illicit drug use. No family history of premature coronary artery disease or sudden cardiac death. She does note palpitations with occasional dizziness but no episodes of syncope. She was seen by Sintia Chatterjee this week and found to have nonspecific interventricular conduction delay which was a significant change from EKG in 2018. Given the constellation of symptoms and objective findings I have been asked to see the patient for evaluation.    After being seen in the [facility] the patient had a CTA chest for abnormal D-dimer value.  This was negative for pulmonary embolism or aortic pathology.  Patient was sent for stress study for evaluation of atypical chest pain.  This was negative  "for myocardial ischemia.  No further symptoms.  Exertional dyspnea improved since the change to her Trelegy medication.  She notes chronic stable dyspnea on exertion.  No orthopnea, PND or edema.    The following portions of the patient's history were reviewed and updated as appropriate: allergies, current medications, past family history, past medical history, past social history, past surgical history and problem list.      Review of Systems   Constitutional: Negative for chills and fever.   HENT: Negative for hoarse voice and sore throat.    Eyes: Negative for double vision and photophobia.   Cardiovascular: Positive for dyspnea on exertion. Negative for chest pain, leg swelling, near-syncope, orthopnea, palpitations, paroxysmal nocturnal dyspnea and syncope.   Respiratory: Negative for cough and wheezing.    Skin: Negative for poor wound healing and rash.   Musculoskeletal: Negative for arthritis and joint swelling.   Gastrointestinal: Negative for bloating, abdominal pain, hematemesis and hematochezia.   Neurological: Negative for dizziness and focal weakness.   Psychiatric/Behavioral: Negative for depression and suicidal ideas.       OBJECTIVE:   Vital Signs  Vitals:    11/30/21 1456   BP: 130/80   Pulse: 70     Estimated body mass index is 25.9 kg/m² as calculated from the following:    Height as of this encounter: 152.4 cm (60\").    Weight as of this encounter: 60.1 kg (132 lb 9.6 oz).    Vitals reviewed.   Constitutional:       Appearance: Healthy appearance. Not in distress.   Neck:      Vascular: No JVR. JVD normal.   Pulmonary:      Effort: Pulmonary effort is normal.      Breath sounds: No wheezing. No rhonchi. No rales.      Comments: Diminished breath sounds bibasilar zones  Chest:      Chest wall: Not tender to palpatation.   Cardiovascular:      PMI at left midclavicular line. Normal rate. Regular rhythm. Normal S1. Normal S2.      Murmurs: There is no murmur.      No gallop. No click. No rub. "   Pulses:     Intact distal pulses.   Edema:     Peripheral edema absent.   Abdominal:      General: Bowel sounds are normal.      Palpations: Abdomen is soft.      Tenderness: There is no abdominal tenderness.   Musculoskeletal: Normal range of motion.         General: No tenderness. Skin:     General: Skin is warm and dry.   Neurological:      General: No focal deficit present.      Mental Status: Alert and oriented to person, place and time.     Physical exam was reviewed, updated and amended when necessary.    Procedures          ASSESSMENT:     Abnormal EKG  Palpitations  Exertional dyspnea  COPD  Hypertension  Dyslipidemia  Peripheral arterial disease    PLAN OF CARE:     1. Abnormal EKG -testing with no evidence of myocardial ischemia.  No significant valvular heart disease with normal left ventricular ejection fraction on echocardiogram.  Patient does have grade 2 diastolic dysfunction may have an element of diastolic heart failure.  Symptoms improved with change to pulmonary medications.  All cardiac issues are stable currently.  I will see the patient as needed.  2. Atypical chest pain -clinical story is consistent with pleuritic pain secondary to COPD.   3. Exertional dyspnea -as above  4. COPD -following with pulmonary  5. Hypertension well-controlled today. Twice daily blood pressure log. Sodium restricted diet is counseled.   6. Dyslipidemia -continue statin    Cardiovascular issues are currently stable. I will see the patient as needed.             Discharge Medications          Accurate as of November 30, 2021  2:59 PM. If you have any questions, ask your nurse or doctor.            Changes to Medications      Instructions Start Date   Triamcinolone Acetonide 55 MCG/ACT nasal inhaler  Commonly known as: Nasacort AQ  What changed:   · when to take this  · reasons to take this   2 SPRAYS IN EACH NOSTRIL ONCE DAILY         Continue These Medications      Instructions Start Date   albuterol 2 MG/5ML  syrup  Commonly known as: PROVENTIL,VENTOLIN   2 mg, Oral, 3 Times Daily      [place] Thyroid 15 MG tablet  Generic drug: thyroid   15 mg, Oral, Daily      [place] Thyroid 30 MG tablet  Generic drug: Thyroid   30 mg, Oral, Daily      aspirin 81 MG chewable tablet   81 mg, Oral, Daily      atenolol 50 MG tablet  Commonly known as: TENORMIN   TAKE 1 TABLET BY MOUTH ONCE DAILY . APPOINTMENT REQUIRED FOR FUTURE REFILLS      Calcium-Vitamin D-Vitamin K 500-1000-40 MG-UNT-MCG chewable tablet   Oral      Cyanocobalamin 500 MCG chewable tablet   Oral      losartan 100 MG tablet  Commonly known as: COZAAR   Take 1 tablet by mouth once daily      multivitamin with minerals tablet tablet   1 tablet, Oral, Daily      omeprazole 20 MG capsule  Commonly known as: priLOSEC   20 mg, Oral, Daily      PARoxetine 10 MG tablet  Commonly known as: PAXIL   TAKE 1 TABLET BY MOUTH ONCE DAILY . APPOINTMENT REQUIRED FOR FUTURE REFILLS      Trelegy Ellipta 100-62.5-25 MCG/INH inhaler  Generic drug: Fluticasone-Umeclidin-Vilant   1 puff, Inhalation, Daily      Trelegy Ellipta 200-62.5-25 MCG/INH inhaler  Generic drug: Fluticasone-Umeclidin-Vilant   INHALE 1 PUFF ONCE DAILY . APPOINTMENT REQUIRED FOR FUTURE REFILLS      VITAMIN D (CHOLECALCIFEROL) PO   Oral             Thank you for allowing me to participate in the care of your patient,      Sincerely,   Jimmy Hanson MD  [facility]  11/30/21  14:59 EST

## 2021-12-13 RX ORDER — LOSARTAN POTASSIUM 100 MG/1
TABLET ORAL
Qty: 90 TABLET | Refills: 0 | Status: SHIPPED | OUTPATIENT
Start: 2021-12-13 | End: 2022-03-24

## 2021-12-14 DIAGNOSIS — E78.2 MIXED HYPERLIPIDEMIA: Primary | ICD-10-CM

## 2021-12-14 RX ORDER — ATORVASTATIN CALCIUM 20 MG/1
20 TABLET, FILM COATED ORAL NIGHTLY
Qty: 90 TABLET | Refills: 0 | Status: SHIPPED | OUTPATIENT
Start: 2021-12-14 | End: 2022-03-17

## 2022-01-06 DIAGNOSIS — F41.1 GENERALIZED ANXIETY DISORDER: ICD-10-CM

## 2022-01-06 RX ORDER — PAROXETINE 10 MG/1
TABLET, FILM COATED ORAL
Qty: 90 TABLET | Refills: 0 | Status: SHIPPED | OUTPATIENT
Start: 2022-01-06 | End: 2022-01-10

## 2022-01-10 DIAGNOSIS — F41.1 GENERALIZED ANXIETY DISORDER: ICD-10-CM

## 2022-01-10 RX ORDER — PAROXETINE 10 MG/1
TABLET, FILM COATED ORAL
Qty: 90 TABLET | Refills: 0 | Status: SHIPPED | OUTPATIENT
Start: 2022-01-10 | End: 2022-06-10

## 2022-01-10 RX ORDER — LEVOTHYROXINE AND LIOTHYRONINE 9.5; 2.25 UG/1; UG/1
45 TABLET ORAL DAILY
COMMUNITY
Start: 2021-06-12 | End: 2022-05-11 | Stop reason: SDUPTHER

## 2022-02-28 DIAGNOSIS — I10 ESSENTIAL HYPERTENSION: ICD-10-CM

## 2022-02-28 RX ORDER — ATENOLOL 50 MG/1
TABLET ORAL
Qty: 30 TABLET | Refills: 0 | Status: SHIPPED | OUTPATIENT
Start: 2022-02-28 | End: 2022-03-24

## 2022-02-28 NOTE — TELEPHONE ENCOUNTER
Last ov 10/29/21  Patient will have fasting labs. Call if no results in 1 week. Stability of conditions, plan, follow up, and further ecommendations pending labs. Follow up in 3-6 months if labs are stable.      · Hypertension- Blood pressure has been borderline. Continue Atenolol 50 mg once daily and Losartan 100 mg once daily. Monitor BP daily- varying the times, and call if BP remains >135/85. She will also see cardiology today for recommendations      Last lab 10/29/21    phos was more elevated.      Cholesterol was up about 50 pints again and bad cholesterol remains 60-90 points elevated. Please see if she is taking Lipitor. Also- see if she has taken any other cholesterol medications in the past.      Thyroid was borderline but ok.      Please let me know about cholesterol medications. Also, please schedule her for follow up with me in 2-3/2022, fasting.

## 2022-03-16 DIAGNOSIS — E78.2 MIXED HYPERLIPIDEMIA: ICD-10-CM

## 2022-03-17 RX ORDER — ATORVASTATIN CALCIUM 20 MG/1
TABLET, FILM COATED ORAL
Qty: 90 TABLET | Refills: 0 | Status: SHIPPED | OUTPATIENT
Start: 2022-03-17 | End: 2022-07-14

## 2022-03-24 DIAGNOSIS — I10 ESSENTIAL HYPERTENSION: ICD-10-CM

## 2022-03-24 DIAGNOSIS — E03.9 HYPOTHYROIDISM, UNSPECIFIED TYPE: ICD-10-CM

## 2022-03-24 RX ORDER — THYROID 30 MG/1
TABLET ORAL
Qty: 30 TABLET | Refills: 0 | Status: SHIPPED | OUTPATIENT
Start: 2022-03-24 | End: 2022-04-26

## 2022-03-24 RX ORDER — THYROID,PORK 15 MG
TABLET ORAL
Qty: 30 TABLET | Refills: 0 | Status: SHIPPED | OUTPATIENT
Start: 2022-03-24 | End: 2022-04-26

## 2022-03-24 RX ORDER — ATENOLOL 50 MG/1
TABLET ORAL
Qty: 30 TABLET | Refills: 0 | Status: SHIPPED | OUTPATIENT
Start: 2022-03-24 | End: 2022-05-09

## 2022-03-24 RX ORDER — LOSARTAN POTASSIUM 100 MG/1
TABLET ORAL
Qty: 30 TABLET | Refills: 0 | Status: SHIPPED | OUTPATIENT
Start: 2022-03-24 | End: 2022-05-02

## 2022-03-24 NOTE — TELEPHONE ENCOUNTER
Last ov 10/29/21    Patient will have fasting labs. Call if no results in 1 week. Stability of conditions, plan, follow up, and further ecommendations pending labs. Follow up in 3-6 months if labs are stable.     · Hypothyroidism- Thyroid with decreased FT4 and increased FT3 but normal TSH 5/2021. Continue [place] thyroid 45 mg. Further recommendations pending labs.        Last lab 10/29/21    Alk phos was more elevated.      Cholesterol was up about 50 pints again and bad cholesterol remains 60-90 points elevated. Please see if she is taking Lipitor. Also- see if she has taken any other cholesterol medications in the past.      Thyroid was borderline but ok.      Please let me know about cholesterol medications. Also, please schedule her for follow up with me in 2-3/2022, fasting.

## 2022-04-25 DIAGNOSIS — E03.9 HYPOTHYROIDISM, UNSPECIFIED TYPE: ICD-10-CM

## 2022-04-26 RX ORDER — THYROID,PORK 15 MG
TABLET ORAL
Qty: 14 TABLET | Refills: 0 | Status: ON HOLD | OUTPATIENT
Start: 2022-04-26 | End: 2022-05-04 | Stop reason: SDUPTHER

## 2022-04-26 RX ORDER — THYROID 30 MG/1
TABLET ORAL
Qty: 14 TABLET | Refills: 0 | Status: ON HOLD | OUTPATIENT
Start: 2022-04-26 | End: 2022-05-04 | Stop reason: SDUPTHER

## 2022-05-02 RX ORDER — LOSARTAN POTASSIUM 100 MG/1
TABLET ORAL
Qty: 14 TABLET | Refills: 0 | Status: SHIPPED | OUTPATIENT
Start: 2022-05-02 | End: 2022-05-11 | Stop reason: SDUPTHER

## 2022-05-02 NOTE — TELEPHONE ENCOUNTER
Last ov 10/29/21    Patient will have fasting labs. Call if no results in 1 week. Stability of conditions, plan, follow up, and further ecommendations pending labs. Follow up in 3-6 months if labs are stable.        Last lab 10/29/21

## 2022-05-03 ENCOUNTER — APPOINTMENT (OUTPATIENT)
Dept: CT IMAGING | Facility: HOSPITAL | Age: 82
End: 2022-05-03

## 2022-05-03 ENCOUNTER — APPOINTMENT (OUTPATIENT)
Dept: GENERAL RADIOLOGY | Facility: HOSPITAL | Age: 82
End: 2022-05-03

## 2022-05-03 ENCOUNTER — TELEPHONE (OUTPATIENT)
Dept: FAMILY MEDICINE CLINIC | Facility: CLINIC | Age: 82
End: 2022-05-03

## 2022-05-03 ENCOUNTER — HOSPITAL ENCOUNTER (INPATIENT)
Facility: HOSPITAL | Age: 82
LOS: 4 days | Discharge: HOME | End: 2022-05-07
Attending: EMERGENCY MEDICINE | Admitting: INTERNAL MEDICINE

## 2022-05-03 DIAGNOSIS — I50.9 ACUTE CONGESTIVE HEART FAILURE, UNSPECIFIED HEART FAILURE TYPE: ICD-10-CM

## 2022-05-03 DIAGNOSIS — U07.1 COVID-19 VIRUS INFECTION: ICD-10-CM

## 2022-05-03 DIAGNOSIS — J96.01 ACUTE RESPIRATORY FAILURE WITH HYPOXIA AND HYPERCAPNIA: Primary | ICD-10-CM

## 2022-05-03 DIAGNOSIS — J96.02 ACUTE RESPIRATORY FAILURE WITH HYPOXIA AND HYPERCAPNIA: Primary | ICD-10-CM

## 2022-05-03 LAB
ALBUMIN SERPL-MCNC: 4.5 G/DL (ref 3.5–5.2)
ALBUMIN/GLOB SERPL: 1.6 G/DL
ALP SERPL-CCNC: 118 U/L (ref 39–117)
ALT SERPL W P-5'-P-CCNC: 38 U/L (ref 1–33)
ANION GAP SERPL CALCULATED.3IONS-SCNC: 13.8 MMOL/L (ref 5–15)
ARTERIAL PATENCY WRIST A: POSITIVE
AST SERPL-CCNC: 46 U/L (ref 1–32)
ATMOSPHERIC PRESS: 750.2 MMHG
B PARAPERT DNA SPEC QL NAA+PROBE: NOT DETECTED
B PERT DNA SPEC QL NAA+PROBE: NOT DETECTED
BASE EXCESS BLDA CALC-SCNC: -1.7 MMOL/L (ref 0–2)
BASOPHILS # BLD AUTO: 0.1 10*3/MM3 (ref 0–0.2)
BASOPHILS NFR BLD AUTO: 0.5 % (ref 0–1.5)
BDY SITE: ABNORMAL
BILIRUB SERPL-MCNC: 0.4 MG/DL (ref 0–1.2)
BUN SERPL-MCNC: 16 MG/DL (ref 8–23)
BUN/CREAT SERPL: 16.3 (ref 7–25)
C PNEUM DNA NPH QL NAA+NON-PROBE: NOT DETECTED
CALCIUM SPEC-SCNC: 9.1 MG/DL (ref 8.6–10.5)
CHLORIDE SERPL-SCNC: 104 MMOL/L (ref 98–107)
CO2 SERPL-SCNC: 24.2 MMOL/L (ref 22–29)
CREAT SERPL-MCNC: 0.98 MG/DL (ref 0.57–1)
DEPRECATED RDW RBC AUTO: 39.7 FL (ref 37–54)
EGFRCR SERPLBLD CKD-EPI 2021: 58.1 ML/MIN/1.73
EOSINOPHIL # BLD AUTO: 0.21 10*3/MM3 (ref 0–0.4)
EOSINOPHIL NFR BLD AUTO: 1 % (ref 0.3–6.2)
ERYTHROCYTE [DISTWIDTH] IN BLOOD BY AUTOMATED COUNT: 12.1 % (ref 12.3–15.4)
FLUAV SUBTYP SPEC NAA+PROBE: NOT DETECTED
FLUBV RNA ISLT QL NAA+PROBE: NOT DETECTED
GLOBULIN UR ELPH-MCNC: 2.8 GM/DL
GLUCOSE BLDC GLUCOMTR-MCNC: 110 MG/DL (ref 70–130)
GLUCOSE BLDC GLUCOMTR-MCNC: 84 MG/DL (ref 70–130)
GLUCOSE SERPL-MCNC: 154 MG/DL (ref 65–99)
HADV DNA SPEC NAA+PROBE: NOT DETECTED
HCO3 BLDA-SCNC: 26.6 MMOL/L (ref 22–28)
HCOV 229E RNA SPEC QL NAA+PROBE: NOT DETECTED
HCOV HKU1 RNA SPEC QL NAA+PROBE: NOT DETECTED
HCOV NL63 RNA SPEC QL NAA+PROBE: NOT DETECTED
HCOV OC43 RNA SPEC QL NAA+PROBE: NOT DETECTED
HCT VFR BLD AUTO: 41.6 % (ref 34–46.6)
HGB BLD-MCNC: 13.9 G/DL (ref 12–15.9)
HMPV RNA NPH QL NAA+NON-PROBE: NOT DETECTED
HPIV1 RNA ISLT QL NAA+PROBE: NOT DETECTED
HPIV2 RNA SPEC QL NAA+PROBE: NOT DETECTED
HPIV3 RNA NPH QL NAA+PROBE: NOT DETECTED
HPIV4 P GENE NPH QL NAA+PROBE: NOT DETECTED
IMM GRANULOCYTES # BLD AUTO: 0.18 10*3/MM3 (ref 0–0.05)
IMM GRANULOCYTES NFR BLD AUTO: 0.9 % (ref 0–0.5)
INHALED O2 CONCENTRATION: 100 %
LYMPHOCYTES # BLD AUTO: 4.81 10*3/MM3 (ref 0.7–3.1)
LYMPHOCYTES NFR BLD AUTO: 23.8 % (ref 19.6–45.3)
M PNEUMO IGG SER IA-ACNC: NOT DETECTED
MCH RBC QN AUTO: 30.3 PG (ref 26.6–33)
MCHC RBC AUTO-ENTMCNC: 33.4 G/DL (ref 31.5–35.7)
MCV RBC AUTO: 90.6 FL (ref 79–97)
MODALITY: ABNORMAL
MONOCYTES # BLD AUTO: 1.66 10*3/MM3 (ref 0.1–0.9)
MONOCYTES NFR BLD AUTO: 8.2 % (ref 5–12)
NEUTROPHILS NFR BLD AUTO: 13.29 10*3/MM3 (ref 1.7–7)
NEUTROPHILS NFR BLD AUTO: 65.6 % (ref 42.7–76)
NRBC BLD AUTO-RTO: 0 /100 WBC (ref 0–0.2)
NT-PROBNP SERPL-MCNC: 2972 PG/ML (ref 0–1800)
O2 A-A PPRESDIFF RESPIRATORY: 0.6 MMHG
PCO2 BLDA: 61.5 MM HG (ref 35–45)
PEEP RESPIRATORY: 5 CM[H2O]
PH BLDA: 7.24 PH UNITS (ref 7.35–7.45)
PLATELET # BLD AUTO: 315 10*3/MM3 (ref 140–450)
PMV BLD AUTO: 9.4 FL (ref 6–12)
PO2 BLDA: 418.5 MM HG (ref 80–100)
POTASSIUM SERPL-SCNC: 4.5 MMOL/L (ref 3.5–5.2)
PROCALCITONIN SERPL-MCNC: 0.05 NG/ML (ref 0–0.25)
PROT SERPL-MCNC: 7.3 G/DL (ref 6–8.5)
QT INTERVAL: 392 MS
RBC # BLD AUTO: 4.59 10*6/MM3 (ref 3.77–5.28)
RHINOVIRUS RNA SPEC NAA+PROBE: DETECTED
RSV RNA NPH QL NAA+NON-PROBE: NOT DETECTED
SAO2 % BLDCOA: 99.9 % (ref 92–99)
SARS-COV-2 RNA NPH QL NAA+NON-PROBE: DETECTED
SET MECH RESP RATE: 16
SODIUM SERPL-SCNC: 142 MMOL/L (ref 136–145)
T4 FREE SERPL-MCNC: 0.73 NG/DL (ref 0.93–1.7)
TOTAL RATE: 17 BREATHS/MINUTE
TROPONIN T SERPL-MCNC: 0.01 NG/ML (ref 0–0.03)
TROPONIN T SERPL-MCNC: <0.01 NG/ML (ref 0–0.03)
TSH SERPL DL<=0.05 MIU/L-ACNC: 7.51 UIU/ML (ref 0.27–4.2)
VENTILATOR MODE: AC
VT ON VENT VENT: 450 ML
WBC NRBC COR # BLD: 20.25 10*3/MM3 (ref 3.4–10.8)

## 2022-05-03 PROCEDURE — 25010000002 CEFTRIAXONE PER 250 MG: Performed by: INTERNAL MEDICINE

## 2022-05-03 PROCEDURE — 5A1945Z RESPIRATORY VENTILATION, 24-96 CONSECUTIVE HOURS: ICD-10-PCS | Performed by: INTERNAL MEDICINE

## 2022-05-03 PROCEDURE — 94002 VENT MGMT INPAT INIT DAY: CPT

## 2022-05-03 PROCEDURE — 83880 ASSAY OF NATRIURETIC PEPTIDE: CPT | Performed by: EMERGENCY MEDICINE

## 2022-05-03 PROCEDURE — 25010000002 SUCCINYLCHOLINE PER 20 MG: Performed by: EMERGENCY MEDICINE

## 2022-05-03 PROCEDURE — 0BH17EZ INSERTION OF ENDOTRACHEAL AIRWAY INTO TRACHEA, VIA NATURAL OR ARTIFICIAL OPENING: ICD-10-PCS | Performed by: INTERNAL MEDICINE

## 2022-05-03 PROCEDURE — 25010000002 AZITHROMYCIN PER 500 MG: Performed by: INTERNAL MEDICINE

## 2022-05-03 PROCEDURE — 94761 N-INVAS EAR/PLS OXIMETRY MLT: CPT

## 2022-05-03 PROCEDURE — 84439 ASSAY OF FREE THYROXINE: CPT | Performed by: INTERNAL MEDICINE

## 2022-05-03 PROCEDURE — 25010000002 ENOXAPARIN PER 10 MG: Performed by: INTERNAL MEDICINE

## 2022-05-03 PROCEDURE — 25010000002 PROPOFOL 10 MG/ML EMULSION: Performed by: EMERGENCY MEDICINE

## 2022-05-03 PROCEDURE — 74018 RADEX ABDOMEN 1 VIEW: CPT

## 2022-05-03 PROCEDURE — 25010000002 PROPOFOL 10 MG/ML EMULSION

## 2022-05-03 PROCEDURE — 36600 WITHDRAWAL OF ARTERIAL BLOOD: CPT

## 2022-05-03 PROCEDURE — 84145 PROCALCITONIN (PCT): CPT | Performed by: INTERNAL MEDICINE

## 2022-05-03 PROCEDURE — 94799 UNLISTED PULMONARY SVC/PX: CPT

## 2022-05-03 PROCEDURE — 94640 AIRWAY INHALATION TREATMENT: CPT

## 2022-05-03 PROCEDURE — 85025 COMPLETE CBC W/AUTO DIFF WBC: CPT | Performed by: EMERGENCY MEDICINE

## 2022-05-03 PROCEDURE — 82565 ASSAY OF CREATININE: CPT

## 2022-05-03 PROCEDURE — 0 IOPAMIDOL PER 1 ML: Performed by: EMERGENCY MEDICINE

## 2022-05-03 PROCEDURE — 84443 ASSAY THYROID STIM HORMONE: CPT | Performed by: INTERNAL MEDICINE

## 2022-05-03 PROCEDURE — 25010000002 FUROSEMIDE PER 20 MG: Performed by: INTERNAL MEDICINE

## 2022-05-03 PROCEDURE — 25010000002 METHYLPREDNISOLONE PER 40 MG: Performed by: INTERNAL MEDICINE

## 2022-05-03 PROCEDURE — 93005 ELECTROCARDIOGRAM TRACING: CPT | Performed by: EMERGENCY MEDICINE

## 2022-05-03 PROCEDURE — 84484 ASSAY OF TROPONIN QUANT: CPT | Performed by: INTERNAL MEDICINE

## 2022-05-03 PROCEDURE — 93010 ELECTROCARDIOGRAM REPORT: CPT | Performed by: INTERNAL MEDICINE

## 2022-05-03 PROCEDURE — 71045 X-RAY EXAM CHEST 1 VIEW: CPT

## 2022-05-03 PROCEDURE — 82962 GLUCOSE BLOOD TEST: CPT

## 2022-05-03 PROCEDURE — 82803 BLOOD GASES ANY COMBINATION: CPT

## 2022-05-03 PROCEDURE — 94760 N-INVAS EAR/PLS OXIMETRY 1: CPT

## 2022-05-03 PROCEDURE — 0202U NFCT DS 22 TRGT SARS-COV-2: CPT | Performed by: EMERGENCY MEDICINE

## 2022-05-03 PROCEDURE — 84484 ASSAY OF TROPONIN QUANT: CPT | Performed by: EMERGENCY MEDICINE

## 2022-05-03 PROCEDURE — 31500 INSERT EMERGENCY AIRWAY: CPT

## 2022-05-03 PROCEDURE — 25010000002 FUROSEMIDE PER 20 MG: Performed by: EMERGENCY MEDICINE

## 2022-05-03 PROCEDURE — 25010000002 FENTANYL CITRATE (PF) 50 MCG/ML SOLUTION: Performed by: INTERNAL MEDICINE

## 2022-05-03 PROCEDURE — 71275 CT ANGIOGRAPHY CHEST: CPT

## 2022-05-03 PROCEDURE — 80053 COMPREHEN METABOLIC PANEL: CPT | Performed by: EMERGENCY MEDICINE

## 2022-05-03 PROCEDURE — 99291 CRITICAL CARE FIRST HOUR: CPT

## 2022-05-03 PROCEDURE — 70450 CT HEAD/BRAIN W/O DYE: CPT

## 2022-05-03 RX ORDER — CALCIUM GLUCONATE 20 MG/ML
1 INJECTION, SOLUTION INTRAVENOUS AS NEEDED
Status: DISCONTINUED | OUTPATIENT
Start: 2022-05-03 | End: 2022-05-07 | Stop reason: HOSPADM

## 2022-05-03 RX ORDER — NITROGLYCERIN 20 MG/100ML
INJECTION INTRAVENOUS
Status: ACTIVE
Start: 2022-05-03 | End: 2022-05-04

## 2022-05-03 RX ORDER — IPRATROPIUM BROMIDE AND ALBUTEROL SULFATE 2.5; .5 MG/3ML; MG/3ML
3 SOLUTION RESPIRATORY (INHALATION) EVERY 6 HOURS PRN
Status: DISCONTINUED | OUTPATIENT
Start: 2022-05-03 | End: 2022-05-07 | Stop reason: HOSPADM

## 2022-05-03 RX ORDER — ATORVASTATIN CALCIUM 20 MG/1
20 TABLET, FILM COATED ORAL NIGHTLY
Status: DISCONTINUED | OUTPATIENT
Start: 2022-05-03 | End: 2022-05-07 | Stop reason: HOSPADM

## 2022-05-03 RX ORDER — MAGNESIUM SULFATE HEPTAHYDRATE 40 MG/ML
2 INJECTION, SOLUTION INTRAVENOUS AS NEEDED
Status: DISCONTINUED | OUTPATIENT
Start: 2022-05-03 | End: 2022-05-07 | Stop reason: HOSPADM

## 2022-05-03 RX ORDER — ONDANSETRON 4 MG/1
4 TABLET, FILM COATED ORAL EVERY 6 HOURS PRN
Status: DISCONTINUED | OUTPATIENT
Start: 2022-05-03 | End: 2022-05-07 | Stop reason: HOSPADM

## 2022-05-03 RX ORDER — BISACODYL 5 MG/1
5 TABLET, DELAYED RELEASE ORAL DAILY PRN
Status: DISCONTINUED | OUTPATIENT
Start: 2022-05-03 | End: 2022-05-07 | Stop reason: HOSPADM

## 2022-05-03 RX ORDER — SODIUM CHLORIDE 0.9 % (FLUSH) 0.9 %
10 SYRINGE (ML) INJECTION EVERY 12 HOURS SCHEDULED
Status: DISCONTINUED | OUTPATIENT
Start: 2022-05-03 | End: 2022-05-07 | Stop reason: HOSPADM

## 2022-05-03 RX ORDER — ENOXAPARIN SODIUM 100 MG/ML
40 INJECTION SUBCUTANEOUS EVERY 24 HOURS
Status: DISCONTINUED | OUTPATIENT
Start: 2022-05-03 | End: 2022-05-06

## 2022-05-03 RX ORDER — PAROXETINE 10 MG/1
10 TABLET, FILM COATED ORAL DAILY
Status: DISCONTINUED | OUTPATIENT
Start: 2022-05-03 | End: 2022-05-04

## 2022-05-03 RX ORDER — MAGNESIUM SULFATE HEPTAHYDRATE 40 MG/ML
4 INJECTION, SOLUTION INTRAVENOUS AS NEEDED
Status: DISCONTINUED | OUTPATIENT
Start: 2022-05-03 | End: 2022-05-07 | Stop reason: HOSPADM

## 2022-05-03 RX ORDER — PANTOPRAZOLE SODIUM 40 MG/1
40 TABLET, DELAYED RELEASE ORAL EVERY MORNING
Status: DISCONTINUED | OUTPATIENT
Start: 2022-05-04 | End: 2022-05-04

## 2022-05-03 RX ORDER — BISACODYL 10 MG
10 SUPPOSITORY, RECTAL RECTAL DAILY PRN
Status: DISCONTINUED | OUTPATIENT
Start: 2022-05-03 | End: 2022-05-07 | Stop reason: HOSPADM

## 2022-05-03 RX ORDER — ACETAMINOPHEN 650 MG/1
650 SUPPOSITORY RECTAL EVERY 4 HOURS PRN
Status: DISCONTINUED | OUTPATIENT
Start: 2022-05-03 | End: 2022-05-07 | Stop reason: HOSPADM

## 2022-05-03 RX ORDER — PROPOFOL 10 MG/ML
VIAL (ML) INTRAVENOUS
Status: DISCONTINUED
Start: 2022-05-03 | End: 2022-05-03 | Stop reason: WASHOUT

## 2022-05-03 RX ORDER — SUCCINYLCHOLINE CHLORIDE 20 MG/ML
1.5 INJECTION INTRAMUSCULAR; INTRAVENOUS ONCE
Status: COMPLETED | OUTPATIENT
Start: 2022-05-03 | End: 2022-05-03

## 2022-05-03 RX ORDER — NICOTINE POLACRILEX 4 MG
15 LOZENGE BUCCAL
Status: DISCONTINUED | OUTPATIENT
Start: 2022-05-03 | End: 2022-05-07 | Stop reason: HOSPADM

## 2022-05-03 RX ORDER — DEXTROSE MONOHYDRATE 25 G/50ML
25 INJECTION, SOLUTION INTRAVENOUS
Status: DISCONTINUED | OUTPATIENT
Start: 2022-05-03 | End: 2022-05-07 | Stop reason: HOSPADM

## 2022-05-03 RX ORDER — ACETAMINOPHEN 325 MG/1
650 TABLET ORAL EVERY 4 HOURS PRN
Status: DISCONTINUED | OUTPATIENT
Start: 2022-05-03 | End: 2022-05-07 | Stop reason: HOSPADM

## 2022-05-03 RX ORDER — AMOXICILLIN 250 MG
2 CAPSULE ORAL 2 TIMES DAILY
Status: DISCONTINUED | OUTPATIENT
Start: 2022-05-03 | End: 2022-05-07 | Stop reason: HOSPADM

## 2022-05-03 RX ORDER — POLYETHYLENE GLYCOL 3350 17 G/17G
17 POWDER, FOR SOLUTION ORAL DAILY PRN
Status: DISCONTINUED | OUTPATIENT
Start: 2022-05-03 | End: 2022-05-07 | Stop reason: HOSPADM

## 2022-05-03 RX ORDER — FUROSEMIDE 10 MG/ML
80 INJECTION INTRAMUSCULAR; INTRAVENOUS ONCE
Status: COMPLETED | OUTPATIENT
Start: 2022-05-03 | End: 2022-05-03

## 2022-05-03 RX ORDER — PROPOFOL 10 MG/ML
VIAL (ML) INTRAVENOUS
Status: COMPLETED
Start: 2022-05-03 | End: 2022-05-03

## 2022-05-03 RX ORDER — POTASSIUM CHLORIDE 750 MG/1
40 TABLET, FILM COATED, EXTENDED RELEASE ORAL AS NEEDED
Status: DISCONTINUED | OUTPATIENT
Start: 2022-05-03 | End: 2022-05-07 | Stop reason: HOSPADM

## 2022-05-03 RX ORDER — LEVOTHYROXINE AND LIOTHYRONINE 19; 4.5 UG/1; UG/1
15 TABLET ORAL DAILY
Status: DISCONTINUED | OUTPATIENT
Start: 2022-05-03 | End: 2022-05-04

## 2022-05-03 RX ORDER — FUROSEMIDE 10 MG/ML
80 INJECTION INTRAMUSCULAR; INTRAVENOUS
Status: DISCONTINUED | OUTPATIENT
Start: 2022-05-03 | End: 2022-05-05

## 2022-05-03 RX ORDER — IPRATROPIUM BROMIDE AND ALBUTEROL SULFATE 2.5; .5 MG/3ML; MG/3ML
3 SOLUTION RESPIRATORY (INHALATION)
Status: DISCONTINUED | OUTPATIENT
Start: 2022-05-03 | End: 2022-05-07 | Stop reason: HOSPADM

## 2022-05-03 RX ORDER — POTASSIUM CHLORIDE 7.45 MG/ML
10 INJECTION INTRAVENOUS
Status: DISCONTINUED | OUTPATIENT
Start: 2022-05-03 | End: 2022-05-07 | Stop reason: HOSPADM

## 2022-05-03 RX ORDER — SODIUM CHLORIDE 0.9 % (FLUSH) 0.9 %
10 SYRINGE (ML) INJECTION AS NEEDED
Status: DISCONTINUED | OUTPATIENT
Start: 2022-05-03 | End: 2022-05-07 | Stop reason: HOSPADM

## 2022-05-03 RX ORDER — ETOMIDATE 2 MG/ML
0.3 INJECTION INTRAVENOUS ONCE
Status: COMPLETED | OUTPATIENT
Start: 2022-05-03 | End: 2022-05-03

## 2022-05-03 RX ORDER — METHYLPREDNISOLONE SODIUM SUCCINATE 40 MG/ML
40 INJECTION, POWDER, LYOPHILIZED, FOR SOLUTION INTRAMUSCULAR; INTRAVENOUS EVERY 8 HOURS
Status: DISCONTINUED | OUTPATIENT
Start: 2022-05-03 | End: 2022-05-04

## 2022-05-03 RX ORDER — ONDANSETRON 2 MG/ML
4 INJECTION INTRAMUSCULAR; INTRAVENOUS EVERY 6 HOURS PRN
Status: DISCONTINUED | OUTPATIENT
Start: 2022-05-03 | End: 2022-05-07 | Stop reason: HOSPADM

## 2022-05-03 RX ORDER — POTASSIUM CHLORIDE 1.5 G/1.77G
40 POWDER, FOR SOLUTION ORAL AS NEEDED
Status: DISCONTINUED | OUTPATIENT
Start: 2022-05-03 | End: 2022-05-07 | Stop reason: HOSPADM

## 2022-05-03 RX ORDER — FENTANYL CITRATE 50 UG/ML
50 INJECTION, SOLUTION INTRAMUSCULAR; INTRAVENOUS
Status: DISCONTINUED | OUTPATIENT
Start: 2022-05-03 | End: 2022-05-05

## 2022-05-03 RX ADMIN — PROPOFOL 50 MCG/KG/MIN: 10 INJECTION, EMULSION INTRAVENOUS at 17:15

## 2022-05-03 RX ADMIN — AZITHROMYCIN DIHYDRATE 500 MG: 500 INJECTION, POWDER, LYOPHILIZED, FOR SOLUTION INTRAVENOUS at 15:40

## 2022-05-03 RX ADMIN — IPRATROPIUM BROMIDE AND ALBUTEROL SULFATE 3 ML: .5; 3 SOLUTION RESPIRATORY (INHALATION) at 15:20

## 2022-05-03 RX ADMIN — METHYLPREDNISOLONE SODIUM SUCCINATE 40 MG: 40 INJECTION, POWDER, FOR SOLUTION INTRAMUSCULAR; INTRAVENOUS at 22:31

## 2022-05-03 RX ADMIN — METHYLPREDNISOLONE SODIUM SUCCINATE 40 MG: 40 INJECTION, POWDER, FOR SOLUTION INTRAMUSCULAR; INTRAVENOUS at 14:53

## 2022-05-03 RX ADMIN — FUROSEMIDE 80 MG: 10 INJECTION, SOLUTION INTRAMUSCULAR; INTRAVENOUS at 13:56

## 2022-05-03 RX ADMIN — FENTANYL CITRATE 50 MCG: 0.05 INJECTION, SOLUTION INTRAMUSCULAR; INTRAVENOUS at 20:02

## 2022-05-03 RX ADMIN — ETOMIDATE 20 MG: 40 INJECTION, SOLUTION INTRAVENOUS at 12:42

## 2022-05-03 RX ADMIN — PROPOFOL: 10 INJECTION, EMULSION INTRAVENOUS at 12:49

## 2022-05-03 RX ADMIN — ENOXAPARIN SODIUM 40 MG: 100 INJECTION SUBCUTANEOUS at 14:53

## 2022-05-03 RX ADMIN — FENTANYL CITRATE 50 MCG: 0.05 INJECTION, SOLUTION INTRAMUSCULAR; INTRAVENOUS at 15:59

## 2022-05-03 RX ADMIN — REMIFENTANIL HYDROCHLORIDE 0.1 MCG/KG/MIN: 1 INJECTION, POWDER, LYOPHILIZED, FOR SOLUTION INTRAVENOUS at 21:11

## 2022-05-03 RX ADMIN — IOPAMIDOL 90 ML: 755 INJECTION, SOLUTION INTRAVENOUS at 13:11

## 2022-05-03 RX ADMIN — PROPOFOL 50 MCG/KG/MIN: 10 INJECTION, EMULSION INTRAVENOUS at 20:18

## 2022-05-03 RX ADMIN — FUROSEMIDE 80 MG: 10 INJECTION, SOLUTION INTRAMUSCULAR; INTRAVENOUS at 17:26

## 2022-05-03 RX ADMIN — IPRATROPIUM BROMIDE AND ALBUTEROL SULFATE 3 ML: .5; 3 SOLUTION RESPIRATORY (INHALATION) at 20:04

## 2022-05-03 RX ADMIN — PROPOFOL 20 MCG/KG/MIN: 10 INJECTION, EMULSION INTRAVENOUS at 12:53

## 2022-05-03 RX ADMIN — FENTANYL CITRATE 50 MCG: 0.05 INJECTION, SOLUTION INTRAMUSCULAR; INTRAVENOUS at 14:50

## 2022-05-03 RX ADMIN — IPRATROPIUM BROMIDE AND ALBUTEROL SULFATE 3 ML: .5; 3 SOLUTION RESPIRATORY (INHALATION) at 23:31

## 2022-05-03 RX ADMIN — CEFTRIAXONE 1 G: 1 INJECTION, POWDER, FOR SOLUTION INTRAMUSCULAR; INTRAVENOUS at 14:53

## 2022-05-03 RX ADMIN — SUCCINYLCHOLINE CHLORIDE 100 MG: 20 INJECTION, SOLUTION INTRAMUSCULAR; INTRAVENOUS; PARENTERAL at 12:42

## 2022-05-03 RX ADMIN — ATORVASTATIN CALCIUM 20 MG: 20 TABLET, FILM COATED ORAL at 21:59

## 2022-05-03 RX ADMIN — Medication 10 ML: at 21:00

## 2022-05-03 NOTE — PLAN OF CARE
Goal Outcome Evaluation:    Pt transfer from ED around 1700 on Vent. Continue propofol drip. Pt follows simple commands. External catheter in place. Vital stable. Will keep monitoring.

## 2022-05-03 NOTE — TELEPHONE ENCOUNTER
Tried calling patients 3 times in a row. Phone keeps ringing and ringing and will not let me leave a vm.

## 2022-05-03 NOTE — H&P
"[facility]    CC: UTO    HPI:  Mrs. Mary is a 80yo female with COPD fev1 57% follows with Dr. Nolan in our office.  She has been  Having some gradually increasing shortness of breath worse on exertion for a few days now.  It has been fairly mild and she was still going about quite  Few activities including going on the [place] of [place] the other day.  Her son reports she had some increased \"hacking as well\".  She then had sudden onset of increased shortness of breath, severe today and she was brought to the ER where she has been intubated so history is obtained from the chart and from son and daughter at bedside.  I reviewed last note and last PFT in our system as per Dr. Nolan.     Past Medical History:   Diagnosis Date   • Depression    • Emphysema lung (HCC)    • GERD (gastroesophageal reflux disease)    • Hyperlipidemia    • Hypertension    • Hypothyroidism      Social History     Socioeconomic History   • Marital status:    Tobacco Use   • Smoking status: Never Smoker   • Smokeless tobacco: Never Used   Vaping Use   • Vaping Use: Never used   Substance and Sexual Activity   • Alcohol use: Yes     Comment: \"occ\"; caffeine use- tea   • Drug use: No   • Sexual activity: Defer     Family History   Problem Relation Age of Onset   • Alzheimer's disease Mother    • Lung disease Father    • Alcohol abuse Father    • Heart disease Brother    • Hypertension Brother    • Lung disease Brother    • Alcohol abuse Brother    • Cancer Brother         LUNG  WAS A SMOKER   • Thyroid disease Maternal Grandmother      MEDS: reviewed as per chart  ALL: list of 4, mainly side effects not true allegies, as per chart  ROS: UTO    Vital Sign Min/Max for last 24 hours  Temp  Min: 97.1 °F (36.2 °C)  Max: 97.1 °F (36.2 °C)   BP  Min: 116/67  Max: 212/155   Pulse  Min: 72  Max: 109   Resp  Min: 16  Max: 28   SpO2  Min: 54 %  Max: 100 %   Flow (L/min)  Min: 15  Max: 15   Weight  Min: 60.1 kg (132 lb 7.9 oz)  " Max: 60.1 kg (132 lb 7.9 oz)     GEN:   appears ill, sedated on vent  HEENT: PERRL, normal sclera, mmm, no jvd, trachea midline, neck supple  CHEST: decreased bilat, no wheezes, + crackles, no use of accessory muscles  CV: tachy, no m/g/r  ABD: soft, nt, nd +bs, no hepatosplenomegaly  EXT: no c/c/e  SKIN: no rashes, no xanthomas, nl turgor  LYMPH: no palpable cervical or supraclavicular lymphadenopathy  NEURO: CN 2-12 intact and symmetric bilaterally  PSYCH: deferred due to sedation and ventilation  MSK: no kyphoscoliosis, moves all 4 extremities    Labs: 5/3: reviewed:  7.243/61/418  Glucose 154  Bun 16  Cr 0.98  Na 42  Bicarb 24  Alt 38  ast 46  Alk phos 118  probnp 2972  Trop 0.01  CXR: 5/3: pulm vasc congestion, et tube in place  CT angio: smallish bilateral effusions some bilateral infiltrates    A/P:  1. Acute hypercapnic and hypoxemic respiratory failure -- maintain vent support,   2. Acute on chronic diastolic chf -- repeat echo. Diuresis  3. COPD -- possibly with ae -- steorids and bronchodilators  Can't/ r/o pneumonia -- antibiotics for now  4. Bronchitis -- acute on chronic?  5. Mild elevation of liver enzymes -- monitor  6. Hyperglycemia -- ssi  7. Hypothyroidism -- synthroid  8. htn  9. Hyperlipidemia    CC 35 mins.

## 2022-05-03 NOTE — ED PROVIDER NOTES
EMERGENCY DEPARTMENT ENCOUNTER    Room Number:  15/15  PCP: Sintia Chatterjee PA  Historian: EMS  History Limited By: Confusion      HPI  Chief Complaint: Shortness of breath  Context: Dafne Mayr is a 81 y.o. female who presents to the ED c/o shortness of breath.  EMS states they were called for shortness of breath and chest pain.  Gave her aspirin nitroglycerin.  On arrival here patient is markedly hypoxic.  Patient short of breath.  Patient unable to give any history.  Quite confused.              MEDICAL RECORD REVIEW    COPD, reflux          PAST MEDICAL HISTORY  Active Ambulatory Problems     Diagnosis Date Noted   • Anxiety 07/26/2016   • Arteriosclerosis of both carotid arteries 07/26/2016   • Chronic interstitial cystitis 07/26/2016   • Cough 07/26/2016   • Pulmonary emphysema (HCC) 07/26/2016   • Gastroesophageal reflux disease 07/26/2016   • Fibromyalgia 07/26/2016   • Headache 07/26/2016   • Hematuria 07/26/2016   • Hoarseness 07/26/2016   • Hyperlipidemia 07/26/2016   • Benign hypertension 07/26/2016   • Acquired hypothyroidism 07/26/2016   • Muscle spasms of both lower extremities 07/26/2016   • Palpitations 07/26/2016   • Shortness of breath 07/26/2016   • Postmenopausal osteoporosis 10/17/2016   • Chronic fatigue 10/17/2016   • 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/17/2016   • Dysuria 12/15/2017   • Dry cough 12/15/2017   • Spondylolysis, lumbar region 06/08/2012     Resolved Ambulatory Problems     Diagnosis Date Noted   • Leukocytes in urine 12/15/2017   • Cystitis with hematuria 12/15/2017   • Acute cystitis without hematuria 12/15/2017     Past Medical History:   Diagnosis Date   • Depression    • Emphysema lung (HCC)    • GERD (gastroesophageal reflux disease)    • Hypertension    • Hypothyroidism          PAST SURGICAL HISTORY  Past Surgical History:   Procedure Laterality Date   • PARATHYROIDECTOMY      Patient reports thyroidectomy partial not a para thyroidectomy.   • THYROIDECTOMY, PARTIAL      1984  "        FAMILY HISTORY  Family History   Problem Relation Age of Onset   • Alzheimer's disease Mother    • Lung disease Father    • Alcohol abuse Father    • Heart disease Brother    • Hypertension Brother    • Lung disease Brother    • Alcohol abuse Brother    • Cancer Brother         LUNG  WAS A SMOKER   • Thyroid disease Maternal Grandmother          SOCIAL HISTORY  Social History     Socioeconomic History   • Marital status:    Tobacco Use   • Smoking status: Never Smoker   • Smokeless tobacco: Never Used   Vaping Use   • Vaping Use: Never used   Substance and Sexual Activity   • Alcohol use: Yes     Comment: \"occ\"; caffeine use- tea   • Drug use: No   • Sexual activity: Defer         ALLERGIES  Diphenhydramine hcl, Diphenhydramine hcl (sleep), Lansoprazole, and Lisinopril        REVIEW OF SYSTEMS  Review of Systems         PHYSICAL EXAM  ED Triage Vitals   Temp Heart Rate Resp BP SpO2   05/03/22 1231 05/03/22 1231 05/03/22 1231 05/03/22 1233 05/03/22 1231   97.1 °F (36.2 °C) 109 28 (!) 212/155 (!) 54 %      Temp src Heart Rate Source Patient Position BP Location FiO2 (%)   -- -- -- -- --              Physical Exam  Vitals and nursing note reviewed.   Constitutional:       General: She is not in acute distress.     Comments: Patient quite confused.  Cannot answer questions or follow commands   HENT:      Head: Normocephalic and atraumatic.   Eyes:      Pupils: Pupils are equal, round, and reactive to light.   Cardiovascular:      Rate and Rhythm: Regular rhythm. Tachycardia present.      Heart sounds: Normal heart sounds.   Pulmonary:      Effort: Pulmonary effort is normal. No respiratory distress.      Breath sounds: Decreased breath sounds present.   Abdominal:      Palpations: Abdomen is soft.      Tenderness: There is no abdominal tenderness. There is no guarding or rebound.   Musculoskeletal:         General: Normal range of motion.      Cervical back: Normal range of motion and neck supple. "   Skin:     General: Skin is warm and dry.      Findings: No rash.   Neurological:      Mental Status: She is oriented to person, place, and time.      Sensory: Sensation is intact.   Psychiatric:         Mood and Affect: Mood and affect normal.       Patient was wearing a face mask when I entered the room and they continued to wear a mask throughout their stay in the ED.  I wore PPE, including  gloves, face mask with shield or face mask with goggles whenever I was in the room with patient.       LAB RESULTS  Recent Results (from the past 24 hour(s))   Comprehensive Metabolic Panel    Collection Time: 05/03/22 12:50 PM    Specimen: Blood   Result Value Ref Range    Glucose 154 (H) 65 - 99 mg/dL    BUN 16 8 - 23 mg/dL    Creatinine 0.98 0.57 - 1.00 mg/dL    Sodium 142 136 - 145 mmol/L    Potassium 4.5 3.5 - 5.2 mmol/L    Chloride 104 98 - 107 mmol/L    CO2 24.2 22.0 - 29.0 mmol/L    Calcium 9.1 8.6 - 10.5 mg/dL    Total Protein 7.3 6.0 - 8.5 g/dL    Albumin 4.50 3.50 - 5.20 g/dL    ALT (SGPT) 38 (H) 1 - 33 U/L    AST (SGOT) 46 (H) 1 - 32 U/L    Alkaline Phosphatase 118 (H) 39 - 117 U/L    Total Bilirubin 0.4 0.0 - 1.2 mg/dL    Globulin 2.8 gm/dL    A/G Ratio 1.6 g/dL    BUN/Creatinine Ratio 16.3 7.0 - 25.0    Anion Gap 13.8 5.0 - 15.0 mmol/L    eGFR 58.1 (L) >60.0 mL/min/1.73   BNP    Collection Time: 05/03/22 12:50 PM    Specimen: Blood   Result Value Ref Range    proBNP 2,972.0 (H) 0.0 - 1,800.0 pg/mL   Troponin    Collection Time: 05/03/22 12:50 PM    Specimen: Blood   Result Value Ref Range    Troponin T <0.010 0.000 - 0.030 ng/mL   CBC Auto Differential    Collection Time: 05/03/22 12:50 PM    Specimen: Blood   Result Value Ref Range    WBC 20.25 (H) 3.40 - 10.80 10*3/mm3    RBC 4.59 3.77 - 5.28 10*6/mm3    Hemoglobin 13.9 12.0 - 15.9 g/dL    Hematocrit 41.6 34.0 - 46.6 %    MCV 90.6 79.0 - 97.0 fL    MCH 30.3 26.6 - 33.0 pg    MCHC 33.4 31.5 - 35.7 g/dL    RDW 12.1 (L) 12.3 - 15.4 %    RDW-SD 39.7 37.0 - 54.0  fl    MPV 9.4 6.0 - 12.0 fL    Platelets 315 140 - 450 10*3/mm3    Neutrophil % 65.6 42.7 - 76.0 %    Lymphocyte % 23.8 19.6 - 45.3 %    Monocyte % 8.2 5.0 - 12.0 %    Eosinophil % 1.0 0.3 - 6.2 %    Basophil % 0.5 0.0 - 1.5 %    Immature Grans % 0.9 (H) 0.0 - 0.5 %    Neutrophils, Absolute 13.29 (H) 1.70 - 7.00 10*3/mm3    Lymphocytes, Absolute 4.81 (H) 0.70 - 3.10 10*3/mm3    Monocytes, Absolute 1.66 (H) 0.10 - 0.90 10*3/mm3    Eosinophils, Absolute 0.21 0.00 - 0.40 10*3/mm3    Basophils, Absolute 0.10 0.00 - 0.20 10*3/mm3    Immature Grans, Absolute 0.18 (H) 0.00 - 0.05 10*3/mm3    nRBC 0.0 0.0 - 0.2 /100 WBC   Blood Gas, Arterial -    Collection Time: 05/03/22  1:31 PM    Specimen: Arterial Blood   Result Value Ref Range    Site Arterial: left radial     Ortiz's Test Positive     pH, Arterial 7.243 (C) 7.350 - 7.450 pH units    pCO2, Arterial 61.5 (C) 35.0 - 45.0 mm Hg    pO2, Arterial 418.5 (H) 80.0 - 100.0 mm Hg    HCO3, Arterial 26.6 22.0 - 28.0 mmol/L    Base Excess, Arterial -1.7 (L) 0.0 - 2.0 mmol/L    O2 Saturation Calculated 99.9 (H) 92.0 - 99.0 %    A-a Gradiant 0.6 mmHg    Barometric Pressure for Blood Gas 750.2 mmHg    Modality Adult Vent     FIO2 100 %    Ventilator Mode AC     Set Tidal Volume 450     Set Mech Resp Rate 16     Rate 17 Breaths/minute    PEEP 5    ECG 12 Lead    Collection Time: 05/03/22  1:43 PM   Result Value Ref Range    QT Interval 392 ms       Ordered the above labs and reviewed the results.        RADIOLOGY  CT Head Without Contrast   Final Result       Unremarkable CT scan of the head. In specific, the etiology of the   patient's altered mental status is not further elucidated on this   examination; and if further assessment is required, one could obtain an   MRI of the brain for follow-up.               Radiation dose reduction techniques were utilized, including automated   exposure control and exposure modulation based on body size.       This report was finalized on  5/3/2022 1:51 PM by Dr. Antoine Álvarez M.D.          CT Angiogram Chest   Final Result      XR Chest 1 View   Final Result   Cardiomegaly and pulmonary vascular congestion with hazy   opacity at the right mid to lower lung that may reflect edema and/or   pneumonia, follow-up recommended.       This report was finalized on 5/3/2022 1:03 PM by Dr. Ned Guzman M.D.          XR Chest 1 View   Final Result   Endotracheal intubation.       This report was finalized on 5/3/2022 1:05 PM by Dr. Ned Guzman M.D.               Ordered the above noted radiological studies. Reviewed by me in PACS.          PROCEDURES  Critical Care  Performed by: Milo Dixon MD  Authorized by: Milo Dixon MD     Critical care provider statement:     Critical care time (minutes):  45    Critical care time was exclusive of:  Separately billable procedures and treating other patients    Critical care was necessary to treat or prevent imminent or life-threatening deterioration of the following conditions:  Respiratory failure    Critical care was time spent personally by me on the following activities:  Ordering and performing treatments and interventions, ordering and review of laboratory studies, ordering and review of radiographic studies, discussions with consultants and discussions with primary provider  Intubation    Date/Time: 5/3/2022 2:43 PM  Performed by: Milo Dixon MD  Authorized by: Milo Dixon MD     Consent:     Consent obtained:  Verbal and emergent situation    Consent given by:  Patient and healthcare agent    Risks discussed:  Aspiration    Alternatives discussed:  No treatment  Pre-procedure details:     Indication: failure to protect airway and predicted clinical deterioration      Patient status:  Altered mental status    Induction agents:  Etomidate    Paralytics:  Succinylcholine  Procedure details:     Preoxygenation:  Nonrebreather mask    Intubation method:  Oral    Intubation  technique: video assisted      Laryngoscope blade:  Arredondo 4    Tube size (mm):  7.5    Tube type:  Cuffed    Number of attempts:  1    Tube visualized through cords: yes    Placement assessment:     Tube secured with:  ETT narayanan    Placement verification: equal breath sounds and ETCO2 detector            EKG:          EKG time: 1343  Rhythm/Rate: Normal sinus rhythm 75  P waves and FL: Normal P waves  QRS, axis: Normal Q waves  ST and T waves: ST segment changes diffusely    Interpreted Contemporaneously by me, independently viewed  Unchanged compared to prior 2/13/2018        MEDICATIONS GIVEN IN ER  Medications   nitroglycerin (TRIDIL) 200 mcg/mL infusion  - ADS Override Pull (  Not Given 5/3/22 1357)   propofol (DIPRIVAN) infusion 10 mg/mL 100 mL (40 mcg/kg/min × 60.1 kg Intravenous Rate/Dose Change 5/3/22 1350)   dextrose (GLUTOSE) oral gel 15 g (has no administration in time range)   dextrose (D50W) (25 g/50 mL) IV injection 25 g (has no administration in time range)   glucagon (human recombinant) (GLUCAGEN DIAGNOSTIC) injection 1 mg (has no administration in time range)   sodium chloride 0.9 % flush 10 mL (has no administration in time range)   sodium chloride 0.9 % flush 10 mL (has no administration in time range)   Enoxaparin Sodium (LOVENOX) syringe 40 mg (has no administration in time range)   acetaminophen (TYLENOL) tablet 650 mg (has no administration in time range)     Or   acetaminophen (TYLENOL) suppository 650 mg (has no administration in time range)   potassium chloride (K-DUR,KLOR-CON) ER tablet 40 mEq (has no administration in time range)     Or   potassium chloride (KLOR-CON) packet 40 mEq (has no administration in time range)     Or   potassium chloride 10 mEq in 100 mL IVPB (has no administration in time range)   Magnesium Sulfate 2 gram Bolus, followed by 8 gram infusion (total Mg dose 10 grams)- Mg less than or equal to 1mg/dL (has no administration in time range)     Or   Magnesium Sulfate  2 gram / 50mL Infusion (GIVE X 3 BAGS TO EQUAL 6GM TOTAL DOSE) - Mg 1.1 - 1.5 mg/dl (has no administration in time range)     Or   Magnesium Sulfate 4 gram infusion- Mg 1.6-1.9 mg/dL (has no administration in time range)   potassium phosphate 45 mmol in sodium chloride 0.9 % 500 mL infusion (has no administration in time range)     Or   potassium phosphate 30 mmol in sodium chloride 0.9 % 250 mL infusion (has no administration in time range)     Or   potassium phosphate 15 mmol in sodium chloride 0.9 % 100 mL infusion (has no administration in time range)     Or   sodium phosphates 40 mmol in sodium chloride 0.9 % 500 mL IVPB (has no administration in time range)     Or   sodium phosphates 20 mmol in sodium chloride 0.9 % 250 mL IVPB (has no administration in time range)   calcium gluconate 1g/50ml 0.675% NaCl IV SOLN (has no administration in time range)     And   calcium gluconate 6 g in sodium chloride 0.9 % 500 mL IVPB (has no administration in time range)   ipratropium-albuterol (DUO-NEB) nebulizer solution 3 mL (has no administration in time range)   ipratropium-albuterol (DUO-NEB) nebulizer solution 3 mL (has no administration in time range)   insulin regular (humuLIN R,novoLIN R) injection 0-7 Units (has no administration in time range)   sennosides-docusate (PERICOLACE) 8.6-50 MG per tablet 2 tablet (has no administration in time range)     And   polyethylene glycol (MIRALAX) packet 17 g (has no administration in time range)     And   bisacodyl (DULCOLAX) EC tablet 5 mg (has no administration in time range)     And   bisacodyl (DULCOLAX) suppository 10 mg (has no administration in time range)   ondansetron (ZOFRAN) tablet 4 mg (has no administration in time range)     Or   ondansetron (ZOFRAN) injection 4 mg (has no administration in time range)   cefTRIAXone (ROCEPHIN) 1 g in sodium chloride 0.9 % 100 mL IVPB-VTB (has no administration in time range)   AZITHROMYCIN 500 MG/250 ML 0.9% NS IVPB (vial-mate)  (has no administration in time range)   methylPREDNISolone sodium succinate (SOLU-Medrol) injection 40 mg (has no administration in time range)   furosemide (LASIX) injection 80 mg (has no administration in time range)   fentaNYL citrate (PF) (SUBLIMAZE) injection 50 mcg (has no administration in time range)   etomidate (AMIDATE) injection 0.3 mg/kg (20 mg Intravenous Given 5/3/22 1242)   succinylcholine (ANECTINE) injection 1.5 mg/kg (100 mg Intravenous Given 5/3/22 1242)   iopamidol (ISOVUE-370) 76 % injection 100 mL (90 mL Intravenous Given by Other 5/3/22 1311)   furosemide (LASIX) injection 80 mg (80 mg Intravenous Given 5/3/22 1356)             PROGRESS AND CONSULTS  ED Course as of 05/03/22 1441   Tue May 03, 2022   1350 13:50 EDT  Patient presents with hypoxic and hypercarbic respiratory failure.  Patient was intubated on arrival.  Patient's chest x-ray and CT scan show evidence of congestive heart failure with no evidence of pneumonia or PE.  Has been given Lasix.  Patient discussed with family who states she has been having shortness of breath and chest pressure for the last few days.  Patient has been discussed with Dr. Woodruff who will admit for further eval. [SL]      ED Course User Index  [SL] Milo Dixon MD           MEDICAL DECISION MAKING      MDM  Number of Diagnoses or Management Options     Amount and/or Complexity of Data Reviewed  Clinical lab tests: reviewed and ordered (pH 7.2.  WBC 20)  Tests in the radiology section of CPT®: reviewed and ordered (Mild CHF.  CT chest with no evidence of PE)  Decide to obtain previous medical records or to obtain history from someone other than the patient: yes  Discuss the patient with other providers: yes (Discussed with Dr. Woodruff who will admit to ICU.)               DIAGNOSIS  Final diagnoses:   Acute respiratory failure with hypoxia and hypercapnia (HCC)   Acute congestive heart failure, unspecified heart failure type (HCC)            DISPOSITION  admit        Latest Documented Vital Signs:  As of 14:41 EDT  BP- 122/48 HR- 70 Temp- 97.1 °F (36.2 °C) O2 sat- 100%                         Milo Dixon MD  05/03/22 5505

## 2022-05-03 NOTE — ED NOTES
Pt called EMS for worsening of chronic SOA. Pt also states she has missed several of her BP doses. EMS found pt hypertensive and complaining of SOA & CP. EMS treated with 2 NTG & Aspirin.

## 2022-05-03 NOTE — TELEPHONE ENCOUNTER
Please find out more information about her symptoms-when symptoms started, any other symptoms-congestion, ear pain, sore throat, postnasal drainage, wheezing, shortness of air, oxygen saturations.  Please let me know more details about her symptoms.

## 2022-05-03 NOTE — PROGRESS NOTES
"Adult Nutrition  Assessment/PES    Patient Name:  Dafne Mary  YOB: 1940  MRN: [id number]  Admit Date:  5/3/2022    Assessment Date:  5/3/2022  Nutrition assessment triggered by TF consult.  EMR reviewed. Labs, meds reviewed.  Admitted with Acute hypercapnic and hypoxemic respiratory failure, +COVID.  Intubated/sedated.  TF order: Diabetisource goal rate @ 45 cc/hr. Fluid flushes 30 cc q 4 hours.  RD to follow.   Reason for Assessment     Row Name 05/03/22 1544          Reason for Assessment    Reason For Assessment physician consult;TF/PN     Diagnosis  Acute hypercapnic and hypoxemic respiratory failure; CHF, COPD, hyperglycemia, hypothyroid, HTN                Nutrition/Diet History     Row Name 05/03/22 1544          Nutrition/Diet History    Typical Intake (Food/Fluid/EN/PN) NPO     Factors Affecting Nutritional Intake respiratory difficulty/therapies                Anthropometrics     Row Name 05/03/22 1545 05/03/22 1244       Anthropometrics    Height  152.4 cm (60\")    Weight  60.1 kg (132 lb 7.9 oz)    Weight for Calculation 60.1 kg (132 lb 7.9 oz)                Labs/Tests/Procedures/Meds     Row Name 05/03/22 1546          Labs/Procedures/Meds    Lab Results Reviewed reviewed, pertinent     Lab Results Comments Glu            Diagnostic Tests/Procedures    Diagnostic Test/Procedure Reviewed reviewed, pertinent            Medications    Pertinent Medications Reviewed reviewed, pertinent     Pertinent Medications Comments lasix, insulin, solu-medrol, PPI, propofol                Physical Findings     Row Name 05/03/22 1545          Physical Findings    Overall Physical Appearance intubated/sedated                Estimated/Assessed Needs - Anthropometrics     Row Name 05/03/22 1545 05/03/22 1244       Anthropometrics    Height  152.4 cm (60\")    Weight  60.1 kg (132 lb 7.9 oz)    Weight for Calculation 60.1 kg (132 lb 7.9 oz)        Estimated/Assessed Needs    Additional Documentation " KCAL/KG (Group);Protein Requirements (Group);Fluid Requirements (Group)        KCAL/KG    KCAL/KG 25 Kcal/Kg (kcal);30 Kcal/Kg (kcal)     25 Kcal/Kg (kcal) 1502.5     30 Kcal/Kg (kcal) 1803        Protein Requirements    Weight Used For Protein Calculations 60.1 kg (132 lb 7.9 oz)     Est Protein Requirement Amount (gms/kg) 1.2 gm protein     Estimated Protein Requirements (gms/day) 72.12        Fluid Requirements    Fluid Requirements (mL/day)  9265-5525                Nutrition Prescription Ordered     Row Name 05/03/22 1545          Nutrition Prescription PO    Current PO Diet NPO                       Problem/Interventions:   Problem 1     Row Name 05/03/22 1546          Nutrition Diagnoses Problem 1    Problem 1 Needs Alternate Route     Etiology (related to) Medical Diagnosis     Pulmonary/Critical Care A/C respiratory failure     Signs/Symptoms (evidenced by) NPO                      Intervention Goal     Row Name 05/03/22 1546          Intervention Goal    General Maintain nutrition;Nutrition support treatment     TF/PN Inititiate TF/PN;Maintain TF/PN;Tolerate TF at goal     Weight Maintain weight                Nutrition Intervention     Row Name 05/03/22 1546          Nutrition Intervention    RD/Tech Action Follow Tx progress;Care plan reviewd;Recommend/ordered     Recommended/Ordered EN                Nutrition Prescription     Row Name 05/03/22 1546          Nutrition Prescription EN    Enteral Prescription Enteral begin/change;Enteral to supply     Enteral Route NG     Product Diabetisource     TF Delivery Method Continuous     Continuous TF Goal Rate (mL/hr) 45 mL/hr     Water flush (mL)  30 mL     Water Flush Frequency Every 4 hours     New EN Prescription Ordered? Yes            EN to Supply    Kcal/Day 1620 Kcal/Day     Kcal/Kg 26.9 Kcal/Kg     Kcal/Kg Weight Method Actual weight     Protein (gm/day) 64.8 gm/day     TF Free H2O (mL) 885.6 mL     Total Free H2O (mL/day) 1065.6 mL/day                 Education/Evaluation     Row Name 05/03/22 1548          Education    Education Will Instruct as appropriate            Monitor/Evaluation    Monitor Per protocol                 Electronically signed by:  Mellissa Mejia RD  05/03/22 15:49 EDT

## 2022-05-03 NOTE — TELEPHONE ENCOUNTER
Apparently patient did go to the ER.  She called EMS for worsening shortness of air, reported missing several doses of her blood pressure medication.  EMS found patient hypertensive and with chest pain and shortness of air.  They were treating with 2 mg nitroglycerin and aspirin.    We do not need to reach patient regarding her symptoms currently, however, we do need to get her scheduled for hospital follow-up once she is discharged.

## 2022-05-04 ENCOUNTER — APPOINTMENT (OUTPATIENT)
Dept: CARDIOLOGY | Facility: HOSPITAL | Age: 82
End: 2022-05-04

## 2022-05-04 LAB
ALBUMIN SERPL-MCNC: 3.8 G/DL (ref 3.5–5.2)
ALBUMIN/GLOB SERPL: 1.5 G/DL
ALP SERPL-CCNC: 94 U/L (ref 39–117)
ALT SERPL W P-5'-P-CCNC: 32 U/L (ref 1–33)
ANION GAP SERPL CALCULATED.3IONS-SCNC: 19.5 MMOL/L (ref 5–15)
AORTIC DIMENSIONLESS INDEX: 0.6 (DI)
ARTERIAL PATENCY WRIST A: POSITIVE
AST SERPL-CCNC: 34 U/L (ref 1–32)
ATMOSPHERIC PRESS: 748.5 MMHG
BASE EXCESS BLDA CALC-SCNC: 2.6 MMOL/L (ref 0–2)
BASOPHILS # BLD AUTO: 0.03 10*3/MM3 (ref 0–0.2)
BASOPHILS NFR BLD AUTO: 0.2 % (ref 0–1.5)
BDY SITE: ABNORMAL
BH CV ECHO MEAS - AO MAX PG: 15 MMHG
BH CV ECHO MEAS - AO MEAN PG: 7.9 MMHG
BH CV ECHO MEAS - AO V2 MAX: 193.9 CM/SEC
BH CV ECHO MEAS - AO V2 VTI: 26.4 CM
BH CV ECHO MEAS - AVA(I,D): 1.85 CM2
BH CV ECHO MEAS - CONTRAST EF 4CH: 52 CM2
BH CV ECHO MEAS - EDV(CUBED): 68.2 ML
BH CV ECHO MEAS - EDV(MOD-SP2): 44 ML
BH CV ECHO MEAS - EDV(MOD-SP4): 50 ML
BH CV ECHO MEAS - EF(MOD-BP): 51.8 %
BH CV ECHO MEAS - EF(MOD-SP2): 52.3 %
BH CV ECHO MEAS - EF(MOD-SP4): 52 %
BH CV ECHO MEAS - ESV(CUBED): 34.9 ML
BH CV ECHO MEAS - ESV(MOD-SP2): 21 ML
BH CV ECHO MEAS - ESV(MOD-SP4): 24 ML
BH CV ECHO MEAS - FS: 20 %
BH CV ECHO MEAS - IVS/LVPW: 1.07 CM
BH CV ECHO MEAS - IVSD: 1.15 CM
BH CV ECHO MEAS - LAT PEAK E' VEL: 6.1 CM/SEC
BH CV ECHO MEAS - LV MASS(C)D: 152.9 GRAMS
BH CV ECHO MEAS - LV MAX PG: 4.6 MMHG
BH CV ECHO MEAS - LV MEAN PG: 2.6 MMHG
BH CV ECHO MEAS - LV V1 MAX: 107.6 CM/SEC
BH CV ECHO MEAS - LV V1 VTI: 15.9 CM
BH CV ECHO MEAS - LVIDD: 4.1 CM
BH CV ECHO MEAS - LVIDS: 3.3 CM
BH CV ECHO MEAS - LVOT AREA: 3.1 CM2
BH CV ECHO MEAS - LVOT DIAM: 1.98 CM
BH CV ECHO MEAS - LVPWD: 1.07 CM
BH CV ECHO MEAS - MED PEAK E' VEL: 4.7 CM/SEC
BH CV ECHO MEAS - MV A MAX VEL: 94 CM/SEC
BH CV ECHO MEAS - MV DEC SLOPE: 377.1 CM/SEC2
BH CV ECHO MEAS - MV DEC TIME: 0.13 MSEC
BH CV ECHO MEAS - MV E MAX VEL: 85.2 CM/SEC
BH CV ECHO MEAS - MV E/A: 0.91
BH CV ECHO MEAS - MV MAX PG: 2.6 MMHG
BH CV ECHO MEAS - MV MEAN PG: 1.5 MMHG
BH CV ECHO MEAS - MV P1/2T: 68.1 MSEC
BH CV ECHO MEAS - MV V2 VTI: 17.9 CM
BH CV ECHO MEAS - MVA(P1/2T): 3.2 CM2
BH CV ECHO MEAS - MVA(VTI): 2.7 CM2
BH CV ECHO MEAS - PA ACC TIME: 0.07 SEC
BH CV ECHO MEAS - PA PR(ACCEL): 45.7 MMHG
BH CV ECHO MEAS - PA V2 MAX: 140.6 CM/SEC
BH CV ECHO MEAS - RAP SYSTOLE: 3 MMHG
BH CV ECHO MEAS - RV MAX PG: 7.7 MMHG
BH CV ECHO MEAS - RV V1 MAX: 138.7 CM/SEC
BH CV ECHO MEAS - RV V1 VTI: 19.9 CM
BH CV ECHO MEAS - RVSP: 35.5 MMHG
BH CV ECHO MEAS - SV(LVOT): 48.8 ML
BH CV ECHO MEAS - SV(MOD-SP2): 23 ML
BH CV ECHO MEAS - SV(MOD-SP4): 26 ML
BH CV ECHO MEAS - TAPSE (>1.6): 1.5 CM
BH CV ECHO MEAS - TR MAX PG: 32.5 MMHG
BH CV ECHO MEAS - TR MAX VEL: 285 CM/SEC
BH CV ECHO MEASUREMENTS AVERAGE E/E' RATIO: 15.78
BH CV VAS BP LEFT ARM: NORMAL MMHG
BH CV XLRA - RV BASE: 2.41 CM
BH CV XLRA - RV LENGTH: 4.9 CM
BH CV XLRA - RV MID: 1.69 CM
BILIRUB SERPL-MCNC: 0.5 MG/DL (ref 0–1.2)
BUN SERPL-MCNC: 18 MG/DL (ref 8–23)
BUN/CREAT SERPL: 15.3 (ref 7–25)
CALCIUM SPEC-SCNC: 8.6 MG/DL (ref 8.6–10.5)
CHLORIDE SERPL-SCNC: 100 MMOL/L (ref 98–107)
CO2 SERPL-SCNC: 21.5 MMOL/L (ref 22–29)
CREAT SERPL-MCNC: 1.18 MG/DL (ref 0.57–1)
DEPRECATED RDW RBC AUTO: 44 FL (ref 37–54)
EGFRCR SERPLBLD CKD-EPI 2021: 46.5 ML/MIN/1.73
EOSINOPHIL # BLD AUTO: 0 10*3/MM3 (ref 0–0.4)
EOSINOPHIL NFR BLD AUTO: 0 % (ref 0.3–6.2)
ERYTHROCYTE [DISTWIDTH] IN BLOOD BY AUTOMATED COUNT: 12.8 % (ref 12.3–15.4)
GLOBULIN UR ELPH-MCNC: 2.5 GM/DL
GLUCOSE BLDC GLUCOMTR-MCNC: 132 MG/DL (ref 70–130)
GLUCOSE BLDC GLUCOMTR-MCNC: 191 MG/DL (ref 70–130)
GLUCOSE SERPL-MCNC: 179 MG/DL (ref 65–99)
HCO3 BLDA-SCNC: 23.9 MMOL/L (ref 22–28)
HCT VFR BLD AUTO: 39.6 % (ref 34–46.6)
HGB BLD-MCNC: 12.6 G/DL (ref 12–15.9)
IMM GRANULOCYTES # BLD AUTO: 0.09 10*3/MM3 (ref 0–0.05)
IMM GRANULOCYTES NFR BLD AUTO: 0.7 % (ref 0–0.5)
INHALED O2 CONCENTRATION: 40 %
LEFT ATRIUM VOLUME INDEX: 33.5 ML/M2
LYMPHOCYTES # BLD AUTO: 0.35 10*3/MM3 (ref 0.7–3.1)
LYMPHOCYTES NFR BLD AUTO: 2.7 % (ref 19.6–45.3)
MAXIMAL PREDICTED HEART RATE: 139 BPM
MCH RBC QN AUTO: 29.5 PG (ref 26.6–33)
MCHC RBC AUTO-ENTMCNC: 31.8 G/DL (ref 31.5–35.7)
MCV RBC AUTO: 92.7 FL (ref 79–97)
MODALITY: ABNORMAL
MONOCYTES # BLD AUTO: 0.36 10*3/MM3 (ref 0.1–0.9)
MONOCYTES NFR BLD AUTO: 2.8 % (ref 5–12)
NEUTROPHILS NFR BLD AUTO: 12.08 10*3/MM3 (ref 1.7–7)
NEUTROPHILS NFR BLD AUTO: 93.6 % (ref 42.7–76)
NRBC BLD AUTO-RTO: 0.1 /100 WBC (ref 0–0.2)
O2 A-A PPRESDIFF RESPIRATORY: 0.5 MMHG
PCO2 BLDA: 27.4 MM HG (ref 35–45)
PEEP RESPIRATORY: 5 CM[H2O]
PH BLDA: 7.55 PH UNITS (ref 7.35–7.45)
PLATELET # BLD AUTO: 236 10*3/MM3 (ref 140–450)
PMV BLD AUTO: 9.5 FL (ref 6–12)
PO2 BLDA: 130.5 MM HG (ref 80–100)
POTASSIUM SERPL-SCNC: 3.1 MMOL/L (ref 3.5–5.2)
POTASSIUM SERPL-SCNC: 4.9 MMOL/L (ref 3.5–5.2)
PROT SERPL-MCNC: 6.3 G/DL (ref 6–8.5)
RBC # BLD AUTO: 4.27 10*6/MM3 (ref 3.77–5.28)
SAO2 % BLDCOA: 99.4 % (ref 92–99)
SET MECH RESP RATE: 22
SINUS: 3.1 CM
SODIUM SERPL-SCNC: 141 MMOL/L (ref 136–145)
STRESS TARGET HR: 118 BPM
TOTAL RATE: 22 BREATHS/MINUTE
TROPONIN T SERPL-MCNC: <0.01 NG/ML (ref 0–0.03)
TROPONIN T SERPL-MCNC: <0.01 NG/ML (ref 0–0.03)
VENTILATOR MODE: ABNORMAL
VT ON VENT VENT: 0.5 ML
WBC NRBC COR # BLD: 12.91 10*3/MM3 (ref 3.4–10.8)

## 2022-05-04 PROCEDURE — 94799 UNLISTED PULMONARY SVC/PX: CPT

## 2022-05-04 PROCEDURE — 25010000002 DEXAMETHASONE PER 1 MG: Performed by: INTERNAL MEDICINE

## 2022-05-04 PROCEDURE — 25010000002 PERFLUTREN (DEFINITY) 8.476 MG IN SODIUM CHLORIDE (PF) 0.9 % 10 ML INJECTION: Performed by: INTERNAL MEDICINE

## 2022-05-04 PROCEDURE — 82962 GLUCOSE BLOOD TEST: CPT

## 2022-05-04 PROCEDURE — 94664 DEMO&/EVAL PT USE INHALER: CPT

## 2022-05-04 PROCEDURE — 84132 ASSAY OF SERUM POTASSIUM: CPT | Performed by: INTERNAL MEDICINE

## 2022-05-04 PROCEDURE — 85025 COMPLETE CBC W/AUTO DIFF WBC: CPT | Performed by: INTERNAL MEDICINE

## 2022-05-04 PROCEDURE — 80053 COMPREHEN METABOLIC PANEL: CPT | Performed by: INTERNAL MEDICINE

## 2022-05-04 PROCEDURE — 93306 TTE W/DOPPLER COMPLETE: CPT

## 2022-05-04 PROCEDURE — 25010000002 PROPOFOL 10 MG/ML EMULSION: Performed by: EMERGENCY MEDICINE

## 2022-05-04 PROCEDURE — 84484 ASSAY OF TROPONIN QUANT: CPT | Performed by: INTERNAL MEDICINE

## 2022-05-04 PROCEDURE — 63710000001 INSULIN REGULAR HUMAN PER 5 UNITS: Performed by: INTERNAL MEDICINE

## 2022-05-04 PROCEDURE — 94760 N-INVAS EAR/PLS OXIMETRY 1: CPT

## 2022-05-04 PROCEDURE — 25010000002 FUROSEMIDE PER 20 MG: Performed by: INTERNAL MEDICINE

## 2022-05-04 PROCEDURE — XW033E5 INTRODUCTION OF REMDESIVIR ANTI-INFECTIVE INTO PERIPHERAL VEIN, PERCUTANEOUS APPROACH, NEW TECHNOLOGY GROUP 5: ICD-10-PCS | Performed by: INTERNAL MEDICINE

## 2022-05-04 PROCEDURE — 25010000002 ENOXAPARIN PER 10 MG: Performed by: INTERNAL MEDICINE

## 2022-05-04 PROCEDURE — 94003 VENT MGMT INPAT SUBQ DAY: CPT

## 2022-05-04 PROCEDURE — 93306 TTE W/DOPPLER COMPLETE: CPT | Performed by: INTERNAL MEDICINE

## 2022-05-04 PROCEDURE — 36600 WITHDRAWAL OF ARTERIAL BLOOD: CPT

## 2022-05-04 PROCEDURE — 25010000002 METHYLPREDNISOLONE PER 40 MG: Performed by: INTERNAL MEDICINE

## 2022-05-04 PROCEDURE — 25010000002 AZITHROMYCIN PER 500 MG: Performed by: INTERNAL MEDICINE

## 2022-05-04 PROCEDURE — 82803 BLOOD GASES ANY COMBINATION: CPT

## 2022-05-04 RX ORDER — DEXMEDETOMIDINE HYDROCHLORIDE 4 UG/ML
.2-1.5 INJECTION INTRAVENOUS
Status: DISCONTINUED | OUTPATIENT
Start: 2022-05-04 | End: 2022-05-05

## 2022-05-04 RX ORDER — LANSOPRAZOLE
30 KIT EVERY MORNING
Status: DISCONTINUED | OUTPATIENT
Start: 2022-05-05 | End: 2022-05-07 | Stop reason: HOSPADM

## 2022-05-04 RX ORDER — LEVOTHYROXINE AND LIOTHYRONINE 57; 13.5 UG/1; UG/1
45 TABLET ORAL DAILY
Status: DISCONTINUED | OUTPATIENT
Start: 2022-05-05 | End: 2022-05-07 | Stop reason: HOSPADM

## 2022-05-04 RX ORDER — DEXAMETHASONE SODIUM PHOSPHATE 10 MG/ML
6 INJECTION INTRAMUSCULAR; INTRAVENOUS DAILY
Status: DISCONTINUED | OUTPATIENT
Start: 2022-05-04 | End: 2022-05-07 | Stop reason: HOSPADM

## 2022-05-04 RX ADMIN — IPRATROPIUM BROMIDE AND ALBUTEROL SULFATE 3 ML: .5; 3 SOLUTION RESPIRATORY (INHALATION) at 07:49

## 2022-05-04 RX ADMIN — IPRATROPIUM BROMIDE AND ALBUTEROL SULFATE 3 ML: .5; 3 SOLUTION RESPIRATORY (INHALATION) at 19:58

## 2022-05-04 RX ADMIN — DEXMEDETOMIDINE HYDROCHLORIDE 1 MCG/KG/HR: 4 INJECTION INTRAVENOUS at 20:05

## 2022-05-04 RX ADMIN — DOCUSATE SODIUM 50MG AND SENNOSIDES 8.6MG 2 TABLET: 8.6; 5 TABLET, FILM COATED ORAL at 09:22

## 2022-05-04 RX ADMIN — FUROSEMIDE 80 MG: 10 INJECTION, SOLUTION INTRAMUSCULAR; INTRAVENOUS at 17:05

## 2022-05-04 RX ADMIN — PANTOPRAZOLE SODIUM 40 MG: 40 TABLET, DELAYED RELEASE ORAL at 06:21

## 2022-05-04 RX ADMIN — METHYLPREDNISOLONE SODIUM SUCCINATE 40 MG: 40 INJECTION, POWDER, FOR SOLUTION INTRAMUSCULAR; INTRAVENOUS at 05:20

## 2022-05-04 RX ADMIN — Medication 10 ML: at 20:06

## 2022-05-04 RX ADMIN — DEXMEDETOMIDINE HYDROCHLORIDE 0.5 MCG/KG/HR: 4 INJECTION INTRAVENOUS at 14:00

## 2022-05-04 RX ADMIN — DEXAMETHASONE SODIUM PHOSPHATE 6 MG: 10 INJECTION INTRAMUSCULAR; INTRAVENOUS at 11:23

## 2022-05-04 RX ADMIN — FUROSEMIDE 80 MG: 10 INJECTION, SOLUTION INTRAMUSCULAR; INTRAVENOUS at 09:23

## 2022-05-04 RX ADMIN — Medication 10 ML: at 09:23

## 2022-05-04 RX ADMIN — INSULIN HUMAN 2 UNITS: 100 INJECTION, SOLUTION PARENTERAL at 05:27

## 2022-05-04 RX ADMIN — POTASSIUM CHLORIDE 40 MEQ: 1.5 POWDER, FOR SOLUTION ORAL at 09:22

## 2022-05-04 RX ADMIN — ENOXAPARIN SODIUM 40 MG: 100 INJECTION SUBCUTANEOUS at 13:26

## 2022-05-04 RX ADMIN — PROPOFOL 15 MCG/KG/MIN: 10 INJECTION, EMULSION INTRAVENOUS at 05:13

## 2022-05-04 RX ADMIN — LEVOTHYROXINE, LIOTHYRONINE 15 MG: 19; 4.5 TABLET ORAL at 05:13

## 2022-05-04 RX ADMIN — REMIFENTANIL HYDROCHLORIDE 0.11 MCG/KG/MIN: 1 INJECTION, POWDER, LYOPHILIZED, FOR SOLUTION INTRAVENOUS at 05:56

## 2022-05-04 RX ADMIN — ATORVASTATIN CALCIUM 20 MG: 20 TABLET, FILM COATED ORAL at 20:05

## 2022-05-04 RX ADMIN — POTASSIUM CHLORIDE 40 MEQ: 1.5 POWDER, FOR SOLUTION ORAL at 13:25

## 2022-05-04 RX ADMIN — AZITHROMYCIN DIHYDRATE 500 MG: 500 INJECTION, POWDER, LYOPHILIZED, FOR SOLUTION INTRAVENOUS at 13:27

## 2022-05-04 RX ADMIN — ACETAMINOPHEN 650 MG: 325 TABLET ORAL at 15:12

## 2022-05-04 RX ADMIN — POTASSIUM CHLORIDE 40 MEQ: 1.5 POWDER, FOR SOLUTION ORAL at 05:13

## 2022-05-04 RX ADMIN — IPRATROPIUM BROMIDE AND ALBUTEROL SULFATE 3 ML: .5; 3 SOLUTION RESPIRATORY (INHALATION) at 11:17

## 2022-05-04 RX ADMIN — PERFLUTREN 2 ML: 6.52 INJECTION, SUSPENSION INTRAVENOUS at 13:23

## 2022-05-04 RX ADMIN — INSULIN HUMAN 2 UNITS: 100 INJECTION, SOLUTION PARENTERAL at 11:34

## 2022-05-04 NOTE — PLAN OF CARE
Goal Outcome Evaluation:           Progress: no change  Outcome Evaluation: Vitals stable. Temp max 100.4. Failed weaning trial. Tolerates tube feeds. Good urine output. Plan of care explained to patient and daughter. Will continue to monitor.

## 2022-05-04 NOTE — PROGRESS NOTES
[facility]     Mar/chart reviewed  Follow up acute hypercapnic and hypoxemic respiratory failure  Remains on vent unable to provide subjective    Vital Sign Min/Max for last 24 hours  Temp  Min: 97.1 °F (36.2 °C)  Max: 100.04 °F (37.8 °C)   BP  Min: 72/58  Max: 212/155   Pulse  Min: 63  Max: 112   Resp  Min: 14  Max: 28   SpO2  Min: 54 %  Max: 100 %   Flow (L/min)  Min: 15  Max: 15   Weight  Min: 60.1 kg (132 lb 7.9 oz)  Max: 60.1 kg (132 lb 7.9 oz)     Appears ill, sedated on vent   perrl, normal sclera  mmm, no jvd, trachea midline, neck supple,  chest decreased bilaterally,+ crackles, no wheezes,   rrr,   soft, nt, nd +bs,  no c/c/e  Skin warm, dry no rashes    Labs: 5/4: reviewed:  7.55/27/130  Glucose 179  Bun 18  Cr 1.18  Na 141  k 3.1  Bicarb 21.5  Wbc 12.9  hgb 12.6  plts 236  RPP: +covid and rhinovirus    A/P:  1. Acute hypercapnic and hypoxemic respiratory failure -- maintain vent support,   2. Acute on chronic diastolic chf -- repeat echo. Diuresis  3. COPD -- possibly with ae -- steorids and bronchodilators; appears trigger by viral infection -- will switch to dexamethasone given covid.    4. Bronchitis -- acute on chronic?  5. Mild elevation of liver enzymes -- monitor  6. Hyperglycemia -- ssi  7. Hypothyroidism -- synthroid  8. htn  9. Hyperlipidemia  10. COVID 19 infection -- dexamethasone, will add remdesivir  11. Rhinovirus -- supportive care    Weaning trial as able    CC 35 mins.

## 2022-05-05 ENCOUNTER — APPOINTMENT (OUTPATIENT)
Dept: GENERAL RADIOLOGY | Facility: HOSPITAL | Age: 82
End: 2022-05-05

## 2022-05-05 LAB
ALBUMIN SERPL-MCNC: 3.7 G/DL (ref 3.5–5.2)
ANION GAP SERPL CALCULATED.3IONS-SCNC: 15 MMOL/L (ref 5–15)
BUN SERPL-MCNC: 35 MG/DL (ref 8–23)
BUN/CREAT SERPL: 27.8 (ref 7–25)
CALCIUM SPEC-SCNC: 8.8 MG/DL (ref 8.6–10.5)
CHLORIDE SERPL-SCNC: 99 MMOL/L (ref 98–107)
CO2 SERPL-SCNC: 25 MMOL/L (ref 22–29)
CREAT SERPL-MCNC: 1.26 MG/DL (ref 0.57–1)
DEPRECATED RDW RBC AUTO: 43.2 FL (ref 37–54)
EGFRCR SERPLBLD CKD-EPI 2021: 43 ML/MIN/1.73
ERYTHROCYTE [DISTWIDTH] IN BLOOD BY AUTOMATED COUNT: 12.9 % (ref 12.3–15.4)
GLUCOSE BLDC GLUCOMTR-MCNC: 101 MG/DL (ref 70–130)
GLUCOSE BLDC GLUCOMTR-MCNC: 132 MG/DL (ref 70–130)
GLUCOSE BLDC GLUCOMTR-MCNC: 135 MG/DL (ref 70–130)
GLUCOSE BLDC GLUCOMTR-MCNC: 175 MG/DL (ref 70–130)
GLUCOSE SERPL-MCNC: 122 MG/DL (ref 65–99)
HCT VFR BLD AUTO: 40.9 % (ref 34–46.6)
HGB BLD-MCNC: 13.3 G/DL (ref 12–15.9)
MCH RBC QN AUTO: 29.6 PG (ref 26.6–33)
MCHC RBC AUTO-ENTMCNC: 32.5 G/DL (ref 31.5–35.7)
MCV RBC AUTO: 91.1 FL (ref 79–97)
PHOSPHATE SERPL-MCNC: 3.8 MG/DL (ref 2.5–4.5)
PLATELET # BLD AUTO: 245 10*3/MM3 (ref 140–450)
PMV BLD AUTO: 9.6 FL (ref 6–12)
POTASSIUM SERPL-SCNC: 4.7 MMOL/L (ref 3.5–5.2)
RBC # BLD AUTO: 4.49 10*6/MM3 (ref 3.77–5.28)
SODIUM SERPL-SCNC: 139 MMOL/L (ref 136–145)
WBC NRBC COR # BLD: 19.13 10*3/MM3 (ref 3.4–10.8)

## 2022-05-05 PROCEDURE — 25010000002 AZITHROMYCIN PER 500 MG: Performed by: INTERNAL MEDICINE

## 2022-05-05 PROCEDURE — 85027 COMPLETE CBC AUTOMATED: CPT | Performed by: INTERNAL MEDICINE

## 2022-05-05 PROCEDURE — 94799 UNLISTED PULMONARY SVC/PX: CPT

## 2022-05-05 PROCEDURE — 63710000001 INSULIN REGULAR HUMAN PER 5 UNITS: Performed by: INTERNAL MEDICINE

## 2022-05-05 PROCEDURE — 82962 GLUCOSE BLOOD TEST: CPT

## 2022-05-05 PROCEDURE — 94761 N-INVAS EAR/PLS OXIMETRY MLT: CPT

## 2022-05-05 PROCEDURE — 25010000002 DEXAMETHASONE PER 1 MG: Performed by: INTERNAL MEDICINE

## 2022-05-05 PROCEDURE — 94664 DEMO&/EVAL PT USE INHALER: CPT

## 2022-05-05 PROCEDURE — 80069 RENAL FUNCTION PANEL: CPT | Performed by: INTERNAL MEDICINE

## 2022-05-05 PROCEDURE — 25010000002 FUROSEMIDE PER 20 MG: Performed by: INTERNAL MEDICINE

## 2022-05-05 PROCEDURE — 25010000002 ENOXAPARIN PER 10 MG: Performed by: INTERNAL MEDICINE

## 2022-05-05 PROCEDURE — 94760 N-INVAS EAR/PLS OXIMETRY 1: CPT

## 2022-05-05 RX ORDER — FUROSEMIDE 20 MG/1
20 TABLET ORAL DAILY
Status: DISCONTINUED | OUTPATIENT
Start: 2022-05-05 | End: 2022-05-07 | Stop reason: HOSPADM

## 2022-05-05 RX ADMIN — AZITHROMYCIN DIHYDRATE 500 MG: 500 INJECTION, POWDER, LYOPHILIZED, FOR SOLUTION INTRAVENOUS at 13:09

## 2022-05-05 RX ADMIN — POTASSIUM CHLORIDE 40 MEQ: 10 TABLET, EXTENDED RELEASE ORAL at 08:08

## 2022-05-05 RX ADMIN — IPRATROPIUM BROMIDE AND ALBUTEROL SULFATE 3 ML: .5; 3 SOLUTION RESPIRATORY (INHALATION) at 11:53

## 2022-05-05 RX ADMIN — POTASSIUM CHLORIDE 40 MEQ: 1.5 POWDER, FOR SOLUTION ORAL at 13:07

## 2022-05-05 RX ADMIN — ACETAMINOPHEN 650 MG: 325 TABLET ORAL at 21:47

## 2022-05-05 RX ADMIN — IPRATROPIUM BROMIDE AND ALBUTEROL SULFATE 3 ML: .5; 3 SOLUTION RESPIRATORY (INHALATION) at 19:06

## 2022-05-05 RX ADMIN — ACETAMINOPHEN 650 MG: 325 TABLET ORAL at 13:07

## 2022-05-05 RX ADMIN — DEXAMETHASONE SODIUM PHOSPHATE 6 MG: 10 INJECTION INTRAMUSCULAR; INTRAVENOUS at 08:07

## 2022-05-05 RX ADMIN — ATORVASTATIN CALCIUM 20 MG: 20 TABLET, FILM COATED ORAL at 21:47

## 2022-05-05 RX ADMIN — FUROSEMIDE 80 MG: 10 INJECTION, SOLUTION INTRAMUSCULAR; INTRAVENOUS at 08:07

## 2022-05-05 RX ADMIN — Medication 10 ML: at 21:47

## 2022-05-05 RX ADMIN — Medication 10 ML: at 08:13

## 2022-05-05 RX ADMIN — INSULIN HUMAN 2 UNITS: 100 INJECTION, SOLUTION PARENTERAL at 00:16

## 2022-05-05 RX ADMIN — ENOXAPARIN SODIUM 40 MG: 100 INJECTION SUBCUTANEOUS at 13:09

## 2022-05-05 RX ADMIN — IPRATROPIUM BROMIDE AND ALBUTEROL SULFATE 3 ML: .5; 3 SOLUTION RESPIRATORY (INHALATION) at 00:10

## 2022-05-05 NOTE — PROGRESS NOTES
Discharge Planning Assessment  [facility]     Patient Name: Dafne Mary  MRN: [id number]  Today's Date: 5/5/2022    Admit Date: 5/3/2022     Discharge Needs Assessment     Row Name 05/05/22 1247       Living Environment    People in Home alone    Current Living Arrangements home    Potentially Unsafe Housing Conditions unable to assess    Primary Care Provided by self    Provides Primary Care For no one    Family Caregiver if Needed child(tiffanie), adult    Quality of Family Relationships supportive    Able to Return to Prior Arrangements yes       Resource/Environmental Concerns    Resource/Environmental Concerns none    Transportation Concerns none       Transition Planning    Patient/Family Anticipates Transition to home    Patient/Family Anticipated Services at Transition none    Transportation Anticipated family or friend will provide       Discharge Needs Assessment    Equipment Currently Used at Home none    Concerns to be Addressed discharge planning    Equipment Needed After Discharge none               Discharge Plan     Row Name 05/05/22 1247       Plan    Plan Comments IMM noted.  CCP spoke with patient’s son Balaji, [phone] via telephone.   CCP role explained and discharge planning discussed.  Face sheet verified.  Pt’s emergency contact is her daughter Felicia [phone].  She uses no DME to ambulate.   She is independent with ADL’s.  She lives in a one-story house alone.  She has no history of Home Health. She has not been to rehab.   Pt obtains her medications from [facility] pharmacy on [address].  Plan is home    CCP following for discharge needs May need oxygen at [place]  .    Row Name 05/05/22 1243       Plan    Plan Plan is home  Following for possible O2 needs              Continued Care and Services - Admitted Since 5/3/2022    Coordination has not been started for this encounter.          Demographic Summary    No documentation.                Functional Status    No documentation.                 Psychosocial    No documentation.                Abuse/Neglect    No documentation.                Legal    No documentation.                Substance Abuse    No documentation.                Patient Forms    No documentation.                   Yadi Wiggins RN

## 2022-05-05 NOTE — PLAN OF CARE
Goal Outcome Evaluation:  Plan of Care Reviewed With: patient        Progress: improving  Outcome Evaluation: VSS, remains RA although c/o SOA with activity. A&Ox4. No c/o pain. Ball catheter patency maintained. BMx1. Refuses bed alarm (educated pt on the use of call light).   Problem: Skin Injury Risk Increased  Goal: Skin Health and Integrity  Outcome: Ongoing, Progressing  Intervention: Optimize Skin Protection  Recent Flowsheet Documentation  Taken 5/5/2022 1810 by Jerrell Swain RN  Pressure Reduction Techniques: frequent weight shift encouraged  Head of Bed (HOB) Positioning: HOB elevated  Pressure Reduction Devices: positioning supports utilized  Skin Protection:   adhesive use limited   tubing/devices free from skin contact   transparent dressing maintained     Problem: Restraint, Nonbehavioral (Nonviolent)  Goal: Absence of Harm or Injury  Outcome: Ongoing, Progressing  Intervention: Implement Least Restrictive Safety Strategies  Recent Flowsheet Documentation  Taken 5/5/2022 1810 by Jerrell Swain RN  Medical Device Protection:   tubing secured   IV pole/bag removed from visual field  Intervention: Protect Dignity, Rights, and Personal Wellbeing  Recent Flowsheet Documentation  Taken 5/5/2022 1810 by Jerrell Swain RN  Trust Relationship/Rapport: care explained  Intervention: Protect Skin and Joint Integrity  Recent Flowsheet Documentation  Taken 5/5/2022 1810 by Jerrell Swain RN  Body Position: position changed independently  Range of Motion:   active ROM (range of motion) encouraged   ROM (range of motion) performed     Problem: Fall Injury Risk  Goal: Absence of Fall and Fall-Related Injury  Outcome: Ongoing, Progressing  Intervention: Identify and Manage Contributors  Recent Flowsheet Documentation  Taken 5/5/2022 1810 by Jerrell Swain RN  Medication Review/Management: medications reviewed  Intervention: Promote Injury-Free Environment  Recent Flowsheet  Documentation  Taken 5/5/2022 1810 by Jerrell Swain RN  Safety Promotion/Fall Prevention:   activity supervised   assistive device/personal items within reach   clutter free environment maintained   fall prevention program maintained   nonskid shoes/slippers when out of bed   room organization consistent   safety round/check completed   lighting adjusted     Problem: Adult Inpatient Plan of Care  Goal: Plan of Care Review  Outcome: Ongoing, Progressing  Flowsheets (Taken 5/5/2022 1819)  Progress: improving  Plan of Care Reviewed With: patient  Outcome Evaluation: VSS, remains RA although c/o SOA with activity. A&Ox4. No c/o pain. Ball catheter patency maintained. BMx1  Goal: Patient-Specific Goal (Individualized)  Outcome: Ongoing, Progressing  Goal: Absence of Hospital-Acquired Illness or Injury  Outcome: Ongoing, Progressing  Intervention: Identify and Manage Fall Risk  Recent Flowsheet Documentation  Taken 5/5/2022 1810 by Jerrell Swain RN  Safety Promotion/Fall Prevention:   activity supervised   assistive device/personal items within reach   clutter free environment maintained   fall prevention program maintained   nonskid shoes/slippers when out of bed   room organization consistent   safety round/check completed   lighting adjusted  Intervention: Prevent Skin Injury  Recent Flowsheet Documentation  Taken 5/5/2022 1810 by Jerrell Swain RN  Body Position: position changed independently  Skin Protection:   adhesive use limited   tubing/devices free from skin contact   transparent dressing maintained  Intervention: Prevent and Manage VTE (Venous Thromboembolism) Risk  Recent Flowsheet Documentation  Taken 5/5/2022 1810 by Jerrell Swain, RN  Activity Management:   activity encouraged   activity adjusted per tolerance  VTE Prevention/Management:   bilateral   dorsiflexion/plantar flexion performed  Range of Motion:   active ROM (range of motion) encouraged   ROM (range of motion)  performed  Intervention: Prevent Infection  Recent Flowsheet Documentation  Taken 5/5/2022 1810 by Jerrell Swain, RN  Infection Prevention:   visitors restricted/screened   single patient room provided   hand hygiene promoted   personal protective equipment utilized   rest/sleep promoted  Goal: Optimal Comfort and Wellbeing  Outcome: Ongoing, Progressing  Intervention: Provide Person-Centered Care  Recent Flowsheet Documentation  Taken 5/5/2022 1810 by Jerrell Swain RN  Trust Relationship/Rapport: care explained  Goal: Readiness for Transition of Care  Outcome: Ongoing, Progressing  Intervention: Mutually Develop Transition Plan  Recent Flowsheet Documentation  Taken 5/5/2022 1803 by Jerrell Swain, RN  Equipment Currently Used at Home: none     Problem: COPD (Chronic Obstructive Pulmonary Disease) Comorbidity  Goal: Maintenance of COPD Symptom Control  Outcome: Ongoing, Progressing  Intervention: Maintain COPD-Symptom Control  Recent Flowsheet Documentation  Taken 5/5/2022 1810 by Jerrell Swain, RN  Medication Review/Management: medications reviewed     Problem: Diabetes Comorbidity  Goal: Blood Glucose Level Within Targeted Range  Outcome: Ongoing, Progressing

## 2022-05-05 NOTE — PROGRESS NOTES
Adult Nutrition  Assessment/PES    Patient Name:  Dafne Mary  YOB: 1940  MRN: [id number]  Admit Date:  5/3/2022    Assessment Date:  5/5/2022    Comments:  Follow up note. Pt now extubated and diet advanced to Regular, Cardiac, Consistent Carb. Pt ate some bites of her lunch. Food pref's obtained. Will continue to follow per protocol.      Reason for Assessment     Row Name 05/05/22 1519          Reason for Assessment    Reason For Assessment follow-up protocol     Diagnosis --  extubated                Nutrition/Diet History     Row Name 05/05/22 1519          Nutrition/Diet History    Typical Intake (Food/Fluid/EN/PN) ate bites for lunch     Food Preferences chicken salad                    Physical Findings     Row Name 05/05/22 1519          Physical Findings    Overall Physical Appearance extubated                  Nutrition Prescription Ordered     Row Name 05/05/22 1519          Nutrition Prescription PO    Current PO Diet Regular     Common Modifiers Cardiac;Consistent Carbohydrate                       Problem/Interventions:   Problem 1     Row Name 05/05/22 1520          Nutrition Diagnoses Problem 1    Pulmonary/Critical Care Extubated     Signs/Symptoms (evidenced by) PO Intake                      Intervention Goal     Row Name 05/05/22 1520          Intervention Goal    General Maintain nutrition     PO Tolerate PO;Establish PO;PO intake (%)     PO Intake % 75 %     Weight Maintain weight                Nutrition Intervention     Row Name 05/05/22 1520          Nutrition Intervention    RD/Tech Action Follow Tx progress;Care plan reviewd                  Education/Evaluation     Row Name 05/05/22 1520          Monitor/Evaluation    Monitor Per protocol                 Electronically signed by:  Leisa Hansen RD  05/05/22 15:20 EDT

## 2022-05-05 NOTE — PLAN OF CARE
Goal Outcome Evaluation:      Pt transferred to 625 on RA, A&Ox4. Report called. Attempted to call pts daughter to update her on transfer and got generic voicemail. Pt refused dose 3/3 of Potassium replacement due to taste, educated pt on importance, pt refused at this time.

## 2022-05-05 NOTE — PROGRESS NOTES
[facility]      Mar/chart reviewed  Follow up acute hypercapnic and hypoxemic respiratory failure  Remains on vent unable to provide subjective  Suspect failed weaning trail due to agitation yesterday?  Switched sedation to precedex  Extubated today  she has some cough and is weak    Vital Sign Min/Max for last 24 hours  Temp  Min: 96.62 °F (35.9 °C)  Max: 100.4 °F (38 °C)   BP  Min: 74/37  Max: 149/83   Pulse  Min: 81  Max: 134   Resp  Min: 12  Max: 14   SpO2  Min: 94 %  Max: 100 %   Flow (L/min)  Min: 15  Max: 15   Weight  Min: 59.9 kg (132 lb)  Max: 59.9 kg (132 lb)   1932/2000    Appears ill, alert, oriented times three   perrl, normal sclera  mmm, no jvd, trachea midline, neck supple,  chest decreased bilaterally,+ crackles, no wheezes,   rrr,   soft, nt, nd +bs,  no c/c/e  Skin warm, dry no rashes    Labs: 5/5: reviewed:  Wbc 19  hgb 13.3  plts 245  Bmp pending    Echo: 5/3: reviewed: ef 51%;     A/P:  1. Acute hypercapnic and hypoxemic respiratory failure -- maintain vent support,   2. Acute on chronic diastolic chf -- good looks good, back off on diuresis  3. COPD -- possibly with ae -- steorids and bronchodilators; appears trigger by viral infection -- dexamethasone given covid.    4. Bronchitis -- acute on chronic?  5. Mild elevation of liver enzymes -- monitor  6. Hyperglycemia -- ssi  7. Hypothyroidism -- synthroid  8. htn  9. Hyperlipidemia  10. COVID 19 infection -- dexamethasone,   11. Rhinovirus -- supportive care    Weaning trial again today -->extubated doing well    CC 35 mins

## 2022-05-06 LAB
GLUCOSE BLDC GLUCOMTR-MCNC: 102 MG/DL (ref 70–130)
GLUCOSE BLDC GLUCOMTR-MCNC: 131 MG/DL (ref 70–130)
GLUCOSE BLDC GLUCOMTR-MCNC: 145 MG/DL (ref 70–130)
GLUCOSE BLDC GLUCOMTR-MCNC: 196 MG/DL (ref 70–130)

## 2022-05-06 PROCEDURE — 94761 N-INVAS EAR/PLS OXIMETRY MLT: CPT

## 2022-05-06 PROCEDURE — 82962 GLUCOSE BLOOD TEST: CPT

## 2022-05-06 PROCEDURE — 63710000001 INSULIN REGULAR HUMAN PER 5 UNITS: Performed by: INTERNAL MEDICINE

## 2022-05-06 PROCEDURE — 25010000002 ENOXAPARIN PER 10 MG: Performed by: INTERNAL MEDICINE

## 2022-05-06 PROCEDURE — 25010000002 AZITHROMYCIN PER 500 MG: Performed by: INTERNAL MEDICINE

## 2022-05-06 PROCEDURE — 25010000002 DEXAMETHASONE PER 1 MG: Performed by: INTERNAL MEDICINE

## 2022-05-06 PROCEDURE — 94799 UNLISTED PULMONARY SVC/PX: CPT

## 2022-05-06 PROCEDURE — 94664 DEMO&/EVAL PT USE INHALER: CPT

## 2022-05-06 RX ORDER — AZITHROMYCIN 250 MG/1
500 TABLET, FILM COATED ORAL
Status: DISCONTINUED | OUTPATIENT
Start: 2022-05-07 | End: 2022-05-07 | Stop reason: HOSPADM

## 2022-05-06 RX ORDER — ENOXAPARIN SODIUM 100 MG/ML
30 INJECTION SUBCUTANEOUS EVERY 24 HOURS
Status: DISCONTINUED | OUTPATIENT
Start: 2022-05-07 | End: 2022-05-07 | Stop reason: HOSPADM

## 2022-05-06 RX ADMIN — IPRATROPIUM BROMIDE AND ALBUTEROL SULFATE 3 ML: .5; 3 SOLUTION RESPIRATORY (INHALATION) at 01:39

## 2022-05-06 RX ADMIN — Medication 10 ML: at 09:01

## 2022-05-06 RX ADMIN — DEXAMETHASONE SODIUM PHOSPHATE 6 MG: 10 INJECTION INTRAMUSCULAR; INTRAVENOUS at 09:00

## 2022-05-06 RX ADMIN — ENOXAPARIN SODIUM 40 MG: 100 INJECTION SUBCUTANEOUS at 14:43

## 2022-05-06 RX ADMIN — LEVOTHYROXINE, LIOTHYRONINE 45 MG: 57; 13.5 TABLET ORAL at 06:12

## 2022-05-06 RX ADMIN — AZITHROMYCIN DIHYDRATE 500 MG: 500 INJECTION, POWDER, LYOPHILIZED, FOR SOLUTION INTRAVENOUS at 14:43

## 2022-05-06 RX ADMIN — INSULIN HUMAN 2 UNITS: 100 INJECTION, SOLUTION PARENTERAL at 16:21

## 2022-05-06 RX ADMIN — IPRATROPIUM BROMIDE AND ALBUTEROL SULFATE 3 ML: .5; 3 SOLUTION RESPIRATORY (INHALATION) at 12:30

## 2022-05-06 RX ADMIN — Medication 10 ML: at 21:19

## 2022-05-06 RX ADMIN — FUROSEMIDE 20 MG: 20 TABLET ORAL at 09:00

## 2022-05-06 RX ADMIN — ATORVASTATIN CALCIUM 20 MG: 20 TABLET, FILM COATED ORAL at 21:19

## 2022-05-06 RX ADMIN — IPRATROPIUM BROMIDE AND ALBUTEROL SULFATE 3 ML: .5; 3 SOLUTION RESPIRATORY (INHALATION) at 07:32

## 2022-05-06 RX ADMIN — IPRATROPIUM BROMIDE AND ALBUTEROL SULFATE 3 ML: .5; 3 SOLUTION RESPIRATORY (INHALATION) at 19:58

## 2022-05-06 RX ADMIN — LANSOPRAZOLE 30 MG: KIT at 06:12

## 2022-05-06 NOTE — CASE MANAGEMENT/SOCIAL WORK
Continued Stay Note  [facility]     Patient Name: Dafne Mary  MRN: [id number]  Today's Date: 5/6/2022    Admit Date: 5/3/2022     Discharge Plan     Row Name 05/06/22 1057       Plan    Plan PT eval pending    Patient/Family in Agreement with Plan yes    Plan Comments Patient transferred to . She is currently on room-air. PT eval requested through RN and is now pending. CCP to follow for any recommendations post PT eval. LEATHA, BROOKSW               Discharge Codes    No documentation.                     ELIE SANDERS

## 2022-05-06 NOTE — PROGRESS NOTES
[facility]      Mar/chart reviewed  Follow up acute hypercapnic and hypoxemic respiratory failure  Doing well,just weak and has mucous    Vital Sign Min/Max for last 24 hours  Temp  Min: 98.7 °F (37.1 °C)  Max: 100.4 °F (38 °C)   BP  Min: 127/69  Max: 169/81   Pulse  Min: 85  Max: 106   Resp  Min: 16  Max: 18   SpO2  Min: 94 %  Max: 97 %   No data recorded   Weight  Min: 59.6 kg (131 lb 8 oz)  Max: 59.6 kg (131 lb 8 oz)     Appears ill, alert, oriented times three   perrl, normal sclera  mmm, no jvd, trachea midline, neck supple,  chest decreased bilaterally,+ crackles, no wheezes,   rrr,   soft, nt, nd +bs,  no c/c/e  Skin warm, dry no rashes    A/P:  1. Acute hypercapnic and hypoxemic respiratory failure -- maintain vent support,   2. Acute on chronic diastolic chf -- good looks good, back off on diuresis  3. COPD -- possibly with ae -- steorids and bronchodilators; appears trigger by viral infection -- dexamethasone given covid.    4. Bronchitis -- acute on chronic?  5. Mild elevation of liver enzymes -- monitor  6. Hyperglycemia -- ssi  7. Hypothyroidism -- synthroid  8. htn  9. Hyperlipidemia  10. COVID 19 infection -- dexamethasone,   11. Rhinovirus -- supportive care    Likely home over weekend

## 2022-05-06 NOTE — CONSULTS
responded to spiritual care and advance care consult. Patient does not currently have an advance directive completed. Per pt request, nurse will bring copy of AD into room for patient to review and consider completing. Copy left with nurse,  did not enter patients room due to pt being covid positive.  did have prayer with patient over the telephone.

## 2022-05-06 NOTE — PLAN OF CARE
Goal Outcome Evaluation:  Plan of Care Reviewed With: patient, daughter  Progress: improving  Outcome Evaluation: VSS, afebrile, no c/o pain. Remains RA. Up to toilet stand-by assist. Ball cath out, pt voiding spontaneously. SR-ST on the monitor  Problem: Restraint, Nonbehavioral (Nonviolent)  Goal: Absence of Harm or Injury  Outcome: Ongoing, Progressing  Intervention: Implement Least Restrictive Safety Strategies  Recent Flowsheet Documentation  Taken 5/6/2022 1443 by Jerrell Swain RN  Medical Device Protection:  • IV pole/bag removed from visual field  • tubing secured  Taken 5/6/2022 1234 by Jerrell Swain RN  Medical Device Protection:  • IV pole/bag removed from visual field  • tubing secured  Intervention: Protect Skin and Joint Integrity  Recent Flowsheet Documentation  Taken 5/6/2022 1600 by Jerrell Swain RN  Body Position: position changed independently  Taken 5/6/2022 1443 by Jerrell Swain RN  Body Position: position changed independently  Range of Motion:  • active ROM (range of motion) encouraged  • ROM (range of motion) performed  Taken 5/6/2022 1234 by Jerrell Swain RN  Body Position: position changed independently  Taken 5/6/2022 1000 by Jerrell Swain RN  Body Position: position changed independently  Taken 5/6/2022 0900 by Jerrell Swain RN  Range of Motion:  • active ROM (range of motion) encouraged  • ROM (range of motion) performed     Problem: Skin Injury Risk Increased  Goal: Skin Health and Integrity  Outcome: Ongoing, Progressing  Intervention: Optimize Skin Protection  Recent Flowsheet Documentation  Taken 5/6/2022 1600 by Jerrell Swain RN  Head of Bed (HOB) Positioning: HOB elevated  Taken 5/6/2022 1443 by Jerrell Swain RN  Pressure Reduction Techniques: frequent weight shift encouraged  Head of Bed (HOB) Positioning: HOB elevated  Pressure Reduction Devices: positioning supports utilized  Skin Protection:  •  adhesive use limited  • tubing/devices free from skin contact  Taken 5/6/2022 1234 by Jerrell Swain RN  Head of Bed (HOB) Positioning: HOB elevated  Taken 5/6/2022 1000 by Jerrell Swain RN  Head of Bed (HOB) Positioning: HOB elevated  Taken 5/6/2022 0900 by Jerrell Swain RN  Pressure Reduction Techniques: frequent weight shift encouraged  Pressure Reduction Devices: positioning supports utilized  Skin Protection: adhesive use limited     Problem: Fall Injury Risk  Goal: Absence of Fall and Fall-Related Injury  Outcome: Ongoing, Progressing  Intervention: Identify and Manage Contributors  Recent Flowsheet Documentation  Taken 5/6/2022 1600 by Jerrell Swain RN  Medication Review/Management: medications reviewed  Taken 5/6/2022 1443 by Jerrell Swain RN  Medication Review/Management: medications reviewed  Taken 5/6/2022 1234 by Jerrell Swain RN  Medication Review/Management: medications reviewed  Taken 5/6/2022 1000 by Jerrell Swain RN  Medication Review/Management: medications reviewed  Taken 5/6/2022 0900 by Jerrell Swain RN  Medication Review/Management: medications reviewed  Intervention: Promote Injury-Free Environment  Recent Flowsheet Documentation  Taken 5/6/2022 1600 by Jerrell Swain RN  Safety Promotion/Fall Prevention:  • activity supervised  • assistive device/personal items within reach  • clutter free environment maintained  • nonskid shoes/slippers when out of bed  • safety round/check completed  • room organization consistent  Taken 5/6/2022 1443 by Jerrell Swain RN  Safety Promotion/Fall Prevention:  • activity supervised  • assistive device/personal items within reach  • clutter free environment maintained  • nonskid shoes/slippers when out of bed  • room organization consistent  • safety round/check completed  Taken 5/6/2022 1234 by Jerrell Swain RN  Safety Promotion/Fall Prevention:  • activity  supervised  • assistive device/personal items within reach  • clutter free environment maintained  • nonskid shoes/slippers when out of bed  • room organization consistent  • safety round/check completed  Taken 5/6/2022 1000 by Jerrell Swain, RN  Safety Promotion/Fall Prevention:  • activity supervised  • assistive device/personal items within reach  • clutter free environment maintained  • gait belt  • nonskid shoes/slippers when out of bed  • safety round/check completed  • room organization consistent     Problem: Adult Inpatient Plan of Care  Goal: Plan of Care Review  Outcome: Ongoing, Progressing  Flowsheets (Taken 5/6/2022 1644)  Progress: improving  Plan of Care Reviewed With:  • patient  • daughter  Outcome Evaluation: VSS, afebrile, no c/o pain. Remains RA. Up to toilet stand-by assist. Ball cath out, pt voiding spontaneously. SR on the monitor  Goal: Patient-Specific Goal (Individualized)  Outcome: Ongoing, Progressing  Goal: Absence of Hospital-Acquired Illness or Injury  Outcome: Ongoing, Progressing  Intervention: Identify and Manage Fall Risk  Recent Flowsheet Documentation  Taken 5/6/2022 1600 by Jerrell Swain, RN  Safety Promotion/Fall Prevention:  • activity supervised  • assistive device/personal items within reach  • clutter free environment maintained  • nonskid shoes/slippers when out of bed  • safety round/check completed  • room organization consistent  Taken 5/6/2022 1443 by Jerrell Swain, RN  Safety Promotion/Fall Prevention:  • activity supervised  • assistive device/personal items within reach  • clutter free environment maintained  • nonskid shoes/slippers when out of bed  • room organization consistent  • safety round/check completed  Taken 5/6/2022 1234 by Jerrell Swain, RN  Safety Promotion/Fall Prevention:  • activity supervised  • assistive device/personal items within reach  • clutter free environment maintained  • nonskid shoes/slippers when out of  bed  • room organization consistent  • safety round/check completed  Taken 5/6/2022 1000 by Jerrell Swain RN  Safety Promotion/Fall Prevention:  • activity supervised  • assistive device/personal items within reach  • clutter free environment maintained  • gait belt  • nonskid shoes/slippers when out of bed  • safety round/check completed  • room organization consistent  Intervention: Prevent Skin Injury  Recent Flowsheet Documentation  Taken 5/6/2022 1600 by Jerrell Swain RN  Body Position: position changed independently  Taken 5/6/2022 1443 by Jerrell Swain RN  Body Position: position changed independently  Skin Protection:  • adhesive use limited  • tubing/devices free from skin contact  Taken 5/6/2022 1234 by Jerrell Swain RN  Body Position: position changed independently  Taken 5/6/2022 1000 by Jerrell Swain RN  Body Position: position changed independently  Taken 5/6/2022 0900 by Jerrell Swain RN  Skin Protection: adhesive use limited  Intervention: Prevent and Manage VTE (Venous Thromboembolism) Risk  Recent Flowsheet Documentation  Taken 5/6/2022 1600 by Jerrell Swain RN  Activity Management: activity adjusted per tolerance  Taken 5/6/2022 1443 by Jerrell Swain RN  Activity Management: activity adjusted per tolerance  VTE Prevention/Management:  • bilateral  • dorsiflexion/plantar flexion performed  Range of Motion:  • active ROM (range of motion) encouraged  • ROM (range of motion) performed  Taken 5/6/2022 1234 by Jerrell Swain RN  Activity Management:  • activity adjusted per tolerance  • activity encouraged  Taken 5/6/2022 1000 by Jerrell Swain RN  Activity Management:  • activity encouraged  • activity adjusted per tolerance  Taken 5/6/2022 0900 by Jerrell Swain RN  VTE Prevention/Management:  • bilateral  • dorsiflexion/plantar flexion performed  Range of Motion:  • active ROM (range of motion)  encouraged  • ROM (range of motion) performed  Intervention: Prevent Infection  Recent Flowsheet Documentation  Taken 5/6/2022 1443 by Jerrell Swain RN  Infection Prevention:  • personal protective equipment utilized  • hand hygiene promoted  Goal: Optimal Comfort and Wellbeing  Outcome: Ongoing, Progressing  Goal: Readiness for Transition of Care  Outcome: Ongoing, Progressing     Problem: COPD (Chronic Obstructive Pulmonary Disease) Comorbidity  Goal: Maintenance of COPD Symptom Control  Outcome: Ongoing, Progressing  Intervention: Maintain COPD-Symptom Control  Recent Flowsheet Documentation  Taken 5/6/2022 1600 by Jerrell Swain RN  Medication Review/Management: medications reviewed  Taken 5/6/2022 1443 by Jerrell Swain RN  Medication Review/Management: medications reviewed  Taken 5/6/2022 1234 by Jerrell Swain RN  Medication Review/Management: medications reviewed  Taken 5/6/2022 1000 by Jerrell Swain RN  Medication Review/Management: medications reviewed  Taken 5/6/2022 0900 by Jerrell Swain RN  Medication Review/Management: medications reviewed     Problem: Diabetes Comorbidity  Goal: Blood Glucose Level Within Targeted Range  Outcome: Ongoing, Progressing

## 2022-05-07 ENCOUNTER — READMISSION MANAGEMENT (OUTPATIENT)
Dept: CALL CENTER | Facility: HOSPITAL | Age: 82
End: 2022-05-07

## 2022-05-07 VITALS
BODY MASS INDEX: 25.93 KG/M2 | SYSTOLIC BLOOD PRESSURE: 142 MMHG | OXYGEN SATURATION: 99 % | TEMPERATURE: 98.3 F | WEIGHT: 132.1 LBS | RESPIRATION RATE: 16 BRPM | HEART RATE: 100 BPM | HEIGHT: 60 IN | DIASTOLIC BLOOD PRESSURE: 69 MMHG

## 2022-05-07 PROBLEM — J96.02 ACUTE RESPIRATORY FAILURE WITH HYPOXIA AND HYPERCAPNIA (HCC): Status: RESOLVED | Noted: 2022-05-03 | Resolved: 2022-05-07

## 2022-05-07 PROBLEM — J96.01 ACUTE RESPIRATORY FAILURE WITH HYPOXIA AND HYPERCAPNIA: Status: RESOLVED | Noted: 2022-05-03 | Resolved: 2022-05-07

## 2022-05-07 LAB
ANION GAP SERPL CALCULATED.3IONS-SCNC: 17.2 MMOL/L (ref 5–15)
BUN SERPL-MCNC: 28 MG/DL (ref 8–23)
BUN/CREAT SERPL: 21.5 (ref 7–25)
CALCIUM SPEC-SCNC: 9.4 MG/DL (ref 8.6–10.5)
CHLORIDE SERPL-SCNC: 97 MMOL/L (ref 98–107)
CO2 SERPL-SCNC: 27.8 MMOL/L (ref 22–29)
CREAT SERPL-MCNC: 1.3 MG/DL (ref 0.57–1)
EGFRCR SERPLBLD CKD-EPI 2021: 41.4 ML/MIN/1.73
GLUCOSE BLDC GLUCOMTR-MCNC: 121 MG/DL (ref 70–130)
GLUCOSE BLDC GLUCOMTR-MCNC: 132 MG/DL (ref 70–130)
GLUCOSE SERPL-MCNC: 124 MG/DL (ref 65–99)
POTASSIUM SERPL-SCNC: 4.6 MMOL/L (ref 3.5–5.2)
SODIUM SERPL-SCNC: 142 MMOL/L (ref 136–145)

## 2022-05-07 PROCEDURE — 25010000002 DEXAMETHASONE PER 1 MG: Performed by: INTERNAL MEDICINE

## 2022-05-07 PROCEDURE — 80048 BASIC METABOLIC PNL TOTAL CA: CPT | Performed by: INTERNAL MEDICINE

## 2022-05-07 PROCEDURE — 94761 N-INVAS EAR/PLS OXIMETRY MLT: CPT

## 2022-05-07 PROCEDURE — 94664 DEMO&/EVAL PT USE INHALER: CPT

## 2022-05-07 PROCEDURE — 94799 UNLISTED PULMONARY SVC/PX: CPT

## 2022-05-07 PROCEDURE — 97530 THERAPEUTIC ACTIVITIES: CPT | Performed by: PHYSICAL THERAPIST

## 2022-05-07 PROCEDURE — 82962 GLUCOSE BLOOD TEST: CPT

## 2022-05-07 PROCEDURE — 97161 PT EVAL LOW COMPLEX 20 MIN: CPT | Performed by: PHYSICAL THERAPIST

## 2022-05-07 PROCEDURE — 94760 N-INVAS EAR/PLS OXIMETRY 1: CPT

## 2022-05-07 RX ORDER — BUDESONIDE, GLYCOPYRROLATE, AND FORMOTEROL FUMARATE 160; 9; 4.8 UG/1; UG/1; UG/1
2 AEROSOL, METERED RESPIRATORY (INHALATION) 2 TIMES DAILY
Qty: 1 EACH | Refills: 0 | Status: SHIPPED | OUTPATIENT
Start: 2022-05-07 | End: 2022-06-06

## 2022-05-07 RX ADMIN — IPRATROPIUM BROMIDE AND ALBUTEROL SULFATE 3 ML: .5; 3 SOLUTION RESPIRATORY (INHALATION) at 00:02

## 2022-05-07 RX ADMIN — DOCUSATE SODIUM 50MG AND SENNOSIDES 8.6MG 2 TABLET: 8.6; 5 TABLET, FILM COATED ORAL at 09:55

## 2022-05-07 RX ADMIN — LANSOPRAZOLE 30 MG: KIT at 06:43

## 2022-05-07 RX ADMIN — FUROSEMIDE 20 MG: 20 TABLET ORAL at 09:55

## 2022-05-07 RX ADMIN — IPRATROPIUM BROMIDE AND ALBUTEROL SULFATE 3 ML: .5; 3 SOLUTION RESPIRATORY (INHALATION) at 07:41

## 2022-05-07 RX ADMIN — LEVOTHYROXINE, LIOTHYRONINE 45 MG: 57; 13.5 TABLET ORAL at 06:43

## 2022-05-07 RX ADMIN — DEXAMETHASONE SODIUM PHOSPHATE 6 MG: 10 INJECTION INTRAMUSCULAR; INTRAVENOUS at 09:55

## 2022-05-07 RX ADMIN — Medication 10 ML: at 09:57

## 2022-05-07 NOTE — OUTREACH NOTE
Prep Survey    Flowsheet Row Responses   [facility] patient discharged from? [facility]   Is LACE score < 7 ? No   Emergency Room discharge w/ pulse ox? No   Eligibility [facility]   Date of Admission 05/03/22   Date of Discharge 05/07/22   Discharge Disposition Home or Self Care   Discharge diagnosis Acute hypercapnic and hypoxemic respiratory failure -- able to extubated quickly    Does the patient have one of the following disease processes/diagnoses(primary or secondary)? COVID-19   Does the patient have Home health ordered? No   Is there a DME ordered? No   Prep survey completed? Yes          ROSHAN SNYDER - Registered Nurse

## 2022-05-07 NOTE — THERAPY EVALUATION
Patient Name: Dafne Mary  : 1940    MRN: [id number]                              Today's Date: 2022       Admit Date: 5/3/2022    Visit Dx:     ICD-10-CM ICD-9-CM   1. Acute respiratory failure with hypoxia and hypercapnia (HCC)  J96.01 518.81    J96.02    2. Acute congestive heart failure, unspecified heart failure type (HCC)  I50.9 428.0   3. COVID-19 virus infection  U07.1 079.89     Patient Active Problem List   Diagnosis   • Anxiety   • Arteriosclerosis of both carotid arteries   • Chronic interstitial cystitis   • Cough   • Pulmonary emphysema (HCC)   • Gastroesophageal reflux disease   • Fibromyalgia   • Headache   • Hematuria   • Hoarseness   • Hyperlipidemia   • Benign hypertension   • Acquired hypothyroidism   • Muscle spasms of both lower extremities   • Palpitations   • Shortness of breath   • Postmenopausal osteoporosis   • Chronic fatigue   • Hair loss disorder   • Dysuria   • Dry cough   • Spondylolysis, lumbar region   • Acute respiratory failure with hypoxia and hypercapnia (HCC)     Past Medical History:   Diagnosis Date   • Depression    • Emphysema lung (HCC)    • GERD (gastroesophageal reflux disease)    • Hyperlipidemia    • Hypertension    • Hypothyroidism      Past Surgical History:   Procedure Laterality Date   • PARATHYROIDECTOMY      Patient reports thyroidectomy partial not a para thyroidectomy.   • THYROIDECTOMY, PARTIAL            General Information     Row Name 22 1115          Physical Therapy Time and Intention    Document Type evaluation  -     Mode of Treatment individual therapy;physical therapy  -     Row Name 22 1115          General Information    Patient Profile Reviewed yes  -LC     Prior Level of Function independent:;gait;transfer;ADL's  -     Existing Precautions/Restrictions fall  -     Barriers to Rehab none identified  -     Row Name 22 1115          Living Environment    People in Home alone  -     Row Name 22  1115          Home Main Entrance    Number of Stairs, Main Entrance three  -LC     Stair Railings, Main Entrance railings safe and in good condition  -     Row Name 05/07/22 1115          Cognition    Orientation Status (Cognition) oriented x 4  -     Row Name 05/07/22 1115          Safety Issues, Functional Mobility    Impairments Affecting Function (Mobility) strength  -           User Key  (r) = Recorded By, (t) = Taken By, (c) = Cosigned By    Initials Name Provider Type    Kp Clark, PT DPT Physical Therapist               Mobility     Row Name 05/07/22 1115          Bed Mobility    Bed Mobility bed mobility (all) activities  -     All Activities, [place] (Bed Mobility) modified independence  -     Assistive Device (Bed Mobility) bed rails  -     Row Name 05/07/22 1115          Sit-Stand Transfer    Sit-Stand [place] (Transfers) standby assist  -     Assistive Device (Sit-Stand Transfers) other (see comments)  HHA  -     Row Name 05/07/22 1115          Gait/Stairs (Locomotion)    [place] Level (Gait) standby assist;contact guard  -     Assistive Device (Gait) other (see comments)  HHA  -     Distance in Feet (Gait) 60  -     Deviations/Abnormal Patterns (Gait) gait speed decreased  -           User Key  (r) = Recorded By, (t) = Taken By, (c) = Cosigned By    Initials Name Provider Type    Kp Clark, PT DPT Physical Therapist               Obj/Interventions     Row Name 05/07/22 1116          Range of Motion Comprehensive    General Range of Motion no range of motion deficits identified  -     Row Name 05/07/22 1116          Strength Comprehensive (MMT)    Comment, General Manual Muscle Testing (MMT) Assessment BLE MMT grossly 4-/5  -           User Key  (r) = Recorded By, (t) = Taken By, (c) = Cosigned By    Initials Name Provider Type    Kp Clark, PT DPT Physical Therapist               Goals/Plan    No documentation.                 Clinical Impression     Row Name 05/07/22 1116          Pain    Pretreatment Pain Rating 0/10 - no pain  -LC     Posttreatment Pain Rating 0/10 - no pain  -LC     Pain Intervention(s) Medication (See MAR);Repositioned  -LC     Row Name 05/07/22 1116          Plan of Care Review    Plan of Care Reviewed With patient  -LC     Outcome Evaluation PT Eval completed. Pt AO x 4 in bed. Pt transferred supine to sit with mod independence and use of bed rails. Pt transferred sit to stand with SBA x 1 and HHA RUE. Pt ambulated 60 ft with CGA x 1 and HHA. pt returned to sit edge of bed and transfer sit to supine with mod indepen. Pt does not require skilled therapy at this time. She reports no dizziness and is back to baseline. Recommend DC home with assist.  -LC     Row Name 05/07/22 1116          Therapy Assessment/Plan (PT)    Patient/Family Therapy Goals Statement (PT) return home with assi  -LC     Criteria for Skilled Interventions Met (PT) no problems identified which require skilled intervention  -LC     Therapy Frequency (PT) evaluation only  -LC     Row Name 05/07/22 1116          Positioning and Restraints    Post Treatment Position bed  -LC     In Bed notified nsg;supine;exit alarm on;encouraged to call for assist;side rails up x2  -LC           User Key  (r) = Recorded By, (t) = Taken By, (c) = Cosigned By    Initials Name Provider Type    Kp Clark, PT DPT Physical Therapist               Outcome Measures     Row Name 05/07/22 1117          How much help from another person do you currently need...    Turning from your back to your side while in flat bed without using bedrails? 4  -LC     Moving from lying on back to sitting on the side of a flat bed without bedrails? 4  -LC     Moving to and from a bed to a chair (including a wheelchair)? 3  -LC     Standing up from a chair using your arms (e.g., wheelchair, bedside chair)? 3  -LC     Climbing 3-5 steps with a railing? 3  -LC     To walk in hospital  room? 3  -     AM-PAC 6 Clicks Score (PT) 20  -     Highest level of mobility 6 --> Walked 10 steps or more  -     Row Name 05/07/22 1117          Functional Assessment    Outcome Measure Options AM-PAC 6 Clicks Basic Mobility (PT)  -           User Key  (r) = Recorded By, (t) = Taken By, (c) = Cosigned By    Initials Name Provider Type     Kp Loza, PT DPT Physical Therapist                             Physical Therapy Education                 Title: PT OT SLP Therapies (Done)     Topic: Physical Therapy (Done)     Point: Mobility training (Done)     Learning Progress Summary           Patient Acceptance, E,D, DU,VU by  at 5/7/2022 1118                   Point: Home exercise program (Done)     Learning Progress Summary           Patient Acceptance, E,D, DU,VU by  at 5/7/2022 1118                   Point: Body mechanics (Done)     Learning Progress Summary           Patient Acceptance, E,D, DU,VU by  at 5/7/2022 1118                   Point: Precautions (Done)     Learning Progress Summary           Patient Acceptance, E,D, DU,VU by  at 5/7/2022 1118                               User Key     Initials Effective Dates Name Provider Type [facility] 07/02/20 -  Kp Loza, PT DPT Physical Therapist PT              PT Recommendation and Plan     Plan of Care Reviewed With: patient  Outcome Evaluation: PT Eval completed. Pt AO x 4 in bed. Pt transferred supine to sit with mod independence and use of bed rails. Pt transferred sit to stand with SBA x 1 and HHA RUE. Pt ambulated 60 ft with CGA x 1 and HHA. pt returned to sit edge of bed and transfer sit to supine with mod indepen. Pt does not require skilled therapy at this time. She reports no dizziness and is back to baseline. Recommend DC home with assist.     Time Calculation:    PT Charges     Row Name 05/07/22 1118             Time Calculation    Start Time 1023  -      Stop Time 1046  -      Time Calculation (min) 23 min  -       PT Received On 05/07/22  -LC              Time Calculation- PT    Total Timed Code Minutes- PT 23 minute(s)  -LC              Timed Charges    89466 - PT Therapeutic Activity Minutes 15  -LC              Total Minutes    Timed Charges Total Minutes 15  -LC       Total Minutes 15  -LC            User Key  (r) = Recorded By, (t) = Taken By, (c) = Cosigned By    Initials Name Provider Type     Kp Loza, PT DPT Physical Therapist              Therapy Charges for Today     Code Description Service Date Service Provider Modifiers Qty    48261107975  PT THERAPEUTIC ACT EA 15 MIN 5/7/2022 Kp Loza, PT DPT GP 1    97668763975 HC PT EVAL LOW COMPLEXITY 2 5/7/2022 Kp Loza, PT DPT GP 1          PT G-Codes  Outcome Measure Options: AM-PAC 6 Clicks Basic Mobility (PT)  AM-PAC 6 Clicks Score (PT): 20    Kp Loza PT DPT  5/7/2022

## 2022-05-07 NOTE — PLAN OF CARE
Problem: Restraint, Nonbehavioral (Nonviolent)  Goal: Absence of Harm or Injury  Outcome: Met   Goal Outcome Evaluation:  Plan of Care Reviewed With: patient           Outcome Evaluation: pt being dc'd via wc to family car to go home. dc instructions given with a clear understanding

## 2022-05-07 NOTE — PROGRESS NOTES
[facility]      Mar/chart reviewed  Follow up acute hypercapnic and hypoxemic respiratory failure  Doing well,just weak and has mucous    Vital Sign Min/Max for last 24 hours  Temp  Min: 98 °F (36.7 °C)  Max: 98.8 °F (37.1 °C)   BP  Min: 136/79  Max: 159/85   Pulse  Min: 95  Max: 115   Resp  Min: 16  Max: 18   SpO2  Min: 91 %  Max: 99 %   No data recorded   Weight  Min: 59.9 kg (132 lb 1.6 oz)  Max: 59.9 kg (132 lb 1.6 oz)     Appears ill, alert, oriented times three   perrl, normal sclera  mmm, no jvd, trachea midline, neck supple,  chest decreased bilaterally,+ crackles, no wheezes,   rrr,   soft, nt, nd +bs,  no c/c/e  Skin warm, dry no rashes    A/P:  1. Acute hypercapnic and hypoxemic respiratory failure -- maintain vent support,   2. Acute on chronic diastolic chf -- good looks good, back off on diuresis  3. COPD -- possibly with ae -- steorids and bronchodilators; appears trigger by viral infection -- dexamethasone given covid.    4. Bronchitis -- acute on chronic?  5. Mild elevation of liver enzymes -- monitor  6. Hyperglycemia -- ssi  7. Hypothyroidism -- synthroid  8. htn  9. Hyperlipidemia  10. COVID 19 infection -- dexamethasone,   11. Rhinovirus -- supportive care

## 2022-05-07 NOTE — PLAN OF CARE
Goal Outcome Evaluation:  Plan of Care Reviewed With: patient           Outcome Evaluation: PT Eval completed. Pt AO x 4 in bed. Pt transferred supine to sit with mod independence and use of bed rails. Pt transferred sit to stand with SBA x 1 and HHA RUE. Pt ambulated 60 ft with CGA x 1 and HHA. pt returned to sit edge of bed and transfer sit to supine with mod indepen. Pt does not require skilled therapy at this time. She reports no dizziness and is back to baseline. Recommend DC home with assist.    PPE: Patient was placed in face mask in first look. Patient was wearing facemask when I entered the room and throughout our encounter. I wore full protective equipment throughout this patient encounter including a face mask, and gloves. Hand hygiene was performed before donning protective equipment and after removal when leaving the room.

## 2022-05-09 ENCOUNTER — TRANSITIONAL CARE MANAGEMENT TELEPHONE ENCOUNTER (OUTPATIENT)
Dept: CALL CENTER | Facility: HOSPITAL | Age: 82
End: 2022-05-09

## 2022-05-09 DIAGNOSIS — I10 ESSENTIAL HYPERTENSION: ICD-10-CM

## 2022-05-09 RX ORDER — ATENOLOL 50 MG/1
TABLET ORAL
Qty: 30 TABLET | Refills: 0 | Status: SHIPPED | OUTPATIENT
Start: 2022-05-09 | End: 2022-06-10

## 2022-05-09 NOTE — CASE MANAGEMENT/SOCIAL WORK
Case Management Discharge Note      Final Note: Home         Selected Continued Care - Discharged on 5/7/2022 Admission date: 5/3/2022 - Discharge disposition: Home or Self Care    Destination    No services have been selected for the patient.              Durable Medical Equipment    No services have been selected for the patient.              Dialysis/Infusion    No services have been selected for the patient.              Home Medical Care    No services have been selected for the patient.              Therapy    No services have been selected for the patient.              Community Resources    No services have been selected for the patient.              Community & DME    No services have been selected for the patient.                  Transportation Services  Private: Car    Final Discharge Disposition Code: 01 - home or self-care

## 2022-05-09 NOTE — OUTREACH NOTE
Call Center TCM Note    Flowsheet Row Responses   [facility] patient discharged from? [place]   Does the patient have one of the following disease processes/diagnoses(primary or secondary)? COVID-19   COVID-19 underlying condition? COPD   TCM attempt successful? Yes   Call start time 1211   Call end time 1219   Discharge diagnosis Acute hypercapnic and hypoxemic respiratory failure -- able to extubated quickly    Meds reviewed with patient/caregiver? Yes   Is the patient having any side effects they believe may be caused by any medication additions or changes? No   Does the patient have all medications ordered at discharge? N/A   Is the patient taking all medications as directed (includes completed medication regime)? No   Medication comments States [facility] will not have medication in til tomorrow.   Does the patient have a primary care provider?  Yes   Does the patient have an appointment with their PCP or specialist within 7 days of discharge? Yes   Has the patient kept scheduled appointments due by today? N/A   Psychosocial issues? No   Did the patient receive a copy of their discharge instructions? Yes   Did the patient receive a copy of COVID-19 specific instructions? Yes   What is the patient's perception of their health status since discharge? Improving   Does the patient have any of the following symptoms? Cough, Shortness of breath   Nursing Interventions Nurse provided patient education   Pulse Ox monitoring Intermittent   Pulse Ox device source Patient   O2 Sat comments 95% on RA   O2 Sat: education provided Sat levels, Monitoring frequency, When to seek care   O2 Sat education comments If sats drop below 90% while at rest, she is to go to ED   Is the patient/caregiver able to teach back steps to recovery at home? Set small, achievable goals for return to baseline health, Rest and rebuild strength, gradually increase activity, Eat a well-balance diet, Make a list of questions for provider's  appointment   If the patient is a current smoker, are they able to teach back resources for cessation? Not a smoker   Is the patient/caregiver able to teach back the hierarchy of who to call/visit for symptoms/problems? PCP, Specialist, Home health nurse, Urgent Care, ED, 911 Yes   Is the patient able to teach back COPD zones? Yes   Nursing interventions Education provided on various zones   Patient reports what zone on this call? Yellow Zone   Yellow Zone Increased shortness of air, Increased or thicker phlegm/mucus, Using quick relief inhaler/nebulizer more often   Yellow interventions Continue to use daily medications, Use quick relief inhaler as ordered, Get plenty of rest, Call provider immediatly if symptoms do not improve   TCM call completed? Yes   Wrap up additional comments Has been dizzy today.  Having trouble focusing with her eyes but it has been better today.  She still very congested and wondering if she is ok to take mucinex.  Will message PCP office regarding this.  Also if has vision issues again to contact PCP office and make appt with her opthamologist.           Meagan Ariza LPN    5/9/2022, 12:22 EDT

## 2022-05-09 NOTE — PROGRESS NOTES
Ok to take Mucinex DM twice daily or Mucinex twice daily. She should avoid D products/ decongestants. To be seen sooner than 5/11/2022 appt here, ophthalmology, or ER if worsening, new, or changing symptoms.

## 2022-05-10 ENCOUNTER — READMISSION MANAGEMENT (OUTPATIENT)
Dept: CALL CENTER | Facility: HOSPITAL | Age: 82
End: 2022-05-10

## 2022-05-11 ENCOUNTER — READMISSION MANAGEMENT (OUTPATIENT)
Dept: CALL CENTER | Facility: HOSPITAL | Age: 82
End: 2022-05-11

## 2022-05-11 ENCOUNTER — OFFICE VISIT (OUTPATIENT)
Dept: FAMILY MEDICINE CLINIC | Facility: CLINIC | Age: 82
End: 2022-05-11

## 2022-05-11 VITALS
WEIGHT: 135 LBS | HEIGHT: 60 IN | DIASTOLIC BLOOD PRESSURE: 65 MMHG | OXYGEN SATURATION: 98 % | BODY MASS INDEX: 26.5 KG/M2 | HEART RATE: 76 BPM | SYSTOLIC BLOOD PRESSURE: 110 MMHG

## 2022-05-11 DIAGNOSIS — J43.9 PULMONARY EMPHYSEMA, UNSPECIFIED EMPHYSEMA TYPE: ICD-10-CM

## 2022-05-11 DIAGNOSIS — J01.00 ACUTE MAXILLARY SINUSITIS, RECURRENCE NOT SPECIFIED: ICD-10-CM

## 2022-05-11 DIAGNOSIS — R06.02 SHORTNESS OF BREATH: ICD-10-CM

## 2022-05-11 DIAGNOSIS — R00.2 PALPITATIONS: ICD-10-CM

## 2022-05-11 DIAGNOSIS — I50.33 ACUTE ON CHRONIC DIASTOLIC (CONGESTIVE) HEART FAILURE: Primary | ICD-10-CM

## 2022-05-11 LAB — CREAT BLDA-MCNC: 0.7 MG/DL (ref 0.6–1.3)

## 2022-05-11 PROCEDURE — 99214 OFFICE O/P EST MOD 30 MIN: CPT | Performed by: PHYSICIAN ASSISTANT

## 2022-05-11 RX ORDER — CEFDINIR 300 MG/1
300 CAPSULE ORAL 2 TIMES DAILY
Qty: 20 CAPSULE | Refills: 0 | Status: SHIPPED | OUTPATIENT
Start: 2022-05-11 | End: 2022-06-24 | Stop reason: ALTCHOICE

## 2022-05-11 RX ORDER — LEVOTHYROXINE AND LIOTHYRONINE 9.5; 2.25 UG/1; UG/1
15 TABLET ORAL DAILY
Qty: 30 TABLET | Refills: 0 | Status: SHIPPED | OUTPATIENT
Start: 2022-05-11 | End: 2022-06-20

## 2022-05-11 RX ORDER — GUAIFENESIN AND DEXTROMETHORPHAN HYDROBROMIDE 600; 30 MG/1; MG/1
1 TABLET, EXTENDED RELEASE ORAL 2 TIMES DAILY
Qty: 45 TABLET | Refills: 0 | Status: SHIPPED | OUTPATIENT
Start: 2022-05-11 | End: 2022-05-24 | Stop reason: SDUPTHER

## 2022-05-11 RX ORDER — LEVOTHYROXINE AND LIOTHYRONINE 19; 4.5 UG/1; UG/1
30 TABLET ORAL DAILY
Qty: 30 TABLET | Refills: 0 | Status: SHIPPED | OUTPATIENT
Start: 2022-05-11 | End: 2022-05-11 | Stop reason: SDUPTHER

## 2022-05-11 RX ORDER — TOBRAMYCIN AND DEXAMETHASONE 3; 1 MG/ML; MG/ML
SUSPENSION/ DROPS OPHTHALMIC
COMMUNITY
Start: 2022-02-28 | End: 2022-05-11

## 2022-05-11 RX ORDER — LOSARTAN POTASSIUM 100 MG/1
100 TABLET ORAL DAILY
Qty: 30 TABLET | Refills: 0 | Status: SHIPPED | OUTPATIENT
Start: 2022-05-11 | End: 2022-05-19

## 2022-05-11 RX ORDER — LEVOTHYROXINE AND LIOTHYRONINE 19; 4.5 UG/1; UG/1
TABLET ORAL
Qty: 30 TABLET | Refills: 0 | Status: SHIPPED | OUTPATIENT
Start: 2022-05-11 | End: 2022-06-20

## 2022-05-11 NOTE — OUTREACH NOTE
COVID-19 Week 1 Survey    Flowsheet Row Responses   [facility] [place] facility patient discharged from? [place]   Does the patient have one of the following disease processes/diagnoses(primary or secondary)? COVID-19   COVID-19 underlying condition? None   Call Number Call 3   Week 1 Call successful? No   Discharge diagnosis Acute hypercapnic and hypoxemic respiratory failure -- able to extubated quickly           VICENTE GARCIA - Registered Nurse

## 2022-05-17 ENCOUNTER — READMISSION MANAGEMENT (OUTPATIENT)
Dept: CALL CENTER | Facility: HOSPITAL | Age: 82
End: 2022-05-17

## 2022-05-17 NOTE — OUTREACH NOTE
COVID-19 Week 2 Survey    Flowsheet Row Responses   [facility] patient discharged from? [place]   Does the patient have one of the following disease processes/diagnoses(primary or secondary)? COVID-19   COVID-19 underlying condition? None   Call Number Call 1   COVID-19 Week 2: Call 1 attempt successful? Yes   Call start time 1337   Call end time 1339   Discharge diagnosis Acute hypercapnic and hypoxemic respiratory failure -- able to extubated quickly    Meds reviewed with patient/caregiver? Yes   Is the patient having any side effects they believe may be caused by any medication additions or changes? No   Does the patient have all medications ordered at discharge? Yes   Is the patient taking all medications as directed (includes completed medication regime)? Yes   Does the patient have a primary care provider?  Yes   Has the patient kept scheduled appointments due by today? Yes   Has home health visited the patient within 72 hours of discharge? N/A   Psychosocial issues? No   Did the patient receive a copy of their discharge instructions? Yes   Did the patient receive a copy of COVID-19 specific instructions? Yes   Nursing interventions Reviewed instructions with patient   What is the patient's perception of their health status since discharge? Improving   Does the patient have any of the following symptoms? None   Nursing Interventions Nurse provided patient education   Pulse Ox monitoring Intermittent   Pulse Ox device source Patient   O2 Sat comments 96-97%   O2 Sat: education provided Sat levels, Monitoring frequency, When to seek care   Is the patient/caregiver able to teach back steps to recovery at home? Set small, achievable goals for return to baseline health, Rest and rebuild strength, gradually increase activity, Eat a well-balance diet   If the patient is a current smoker, are they able to teach back resources for cessation? Not a smoker   Is the patient/caregiver able to teach back the  hierarchy of who to call/visit for symptoms/problems? PCP, Specialist, Home health nurse, Urgent Care, ED, 911 Yes   COVID-19 call completed? Yes          LENCHO GARCIA - Licensed Nurse

## 2022-05-19 RX ORDER — LOSARTAN POTASSIUM 100 MG/1
TABLET ORAL
Qty: 14 TABLET | Refills: 0 | Status: SHIPPED | OUTPATIENT
Start: 2022-05-19 | End: 2022-06-02

## 2022-05-24 ENCOUNTER — READMISSION MANAGEMENT (OUTPATIENT)
Dept: CALL CENTER | Facility: HOSPITAL | Age: 82
End: 2022-05-24

## 2022-05-24 ENCOUNTER — OFFICE VISIT (OUTPATIENT)
Dept: FAMILY MEDICINE CLINIC | Facility: CLINIC | Age: 82
End: 2022-05-24

## 2022-05-24 VITALS
BODY MASS INDEX: 26.7 KG/M2 | SYSTOLIC BLOOD PRESSURE: 142 MMHG | HEART RATE: 66 BPM | HEIGHT: 60 IN | TEMPERATURE: 97.1 F | OXYGEN SATURATION: 96 % | DIASTOLIC BLOOD PRESSURE: 82 MMHG | WEIGHT: 136 LBS

## 2022-05-24 DIAGNOSIS — R73.09 ELEVATED GLUCOSE: ICD-10-CM

## 2022-05-24 DIAGNOSIS — F41.1 GENERALIZED ANXIETY DISORDER: ICD-10-CM

## 2022-05-24 DIAGNOSIS — I10 BENIGN HYPERTENSION: Primary | ICD-10-CM

## 2022-05-24 DIAGNOSIS — M79.7 FIBROMYALGIA: ICD-10-CM

## 2022-05-24 DIAGNOSIS — E78.2 MIXED HYPERLIPIDEMIA: ICD-10-CM

## 2022-05-24 DIAGNOSIS — E53.8 B12 DEFICIENCY: ICD-10-CM

## 2022-05-24 DIAGNOSIS — J30.89 NON-SEASONAL ALLERGIC RHINITIS, UNSPECIFIED TRIGGER: ICD-10-CM

## 2022-05-24 DIAGNOSIS — I65.23 ARTERIOSCLEROSIS OF BOTH CAROTID ARTERIES: ICD-10-CM

## 2022-05-24 DIAGNOSIS — K21.9 GASTROESOPHAGEAL REFLUX DISEASE, UNSPECIFIED WHETHER ESOPHAGITIS PRESENT: ICD-10-CM

## 2022-05-24 DIAGNOSIS — E03.9 ACQUIRED HYPOTHYROIDISM: ICD-10-CM

## 2022-05-24 DIAGNOSIS — R74.8 ELEVATED ALKALINE PHOSPHATASE LEVEL: ICD-10-CM

## 2022-05-24 DIAGNOSIS — J01.00 ACUTE MAXILLARY SINUSITIS, RECURRENCE NOT SPECIFIED: ICD-10-CM

## 2022-05-24 DIAGNOSIS — F33.0 MAJOR DEPRESSIVE DISORDER, RECURRENT EPISODE, MILD DEGREE: ICD-10-CM

## 2022-05-24 DIAGNOSIS — E55.9 VITAMIN D DEFICIENCY: ICD-10-CM

## 2022-05-24 DIAGNOSIS — N28.9 ABNORMAL RENAL FUNCTION: ICD-10-CM

## 2022-05-24 DIAGNOSIS — J43.9 PULMONARY EMPHYSEMA, UNSPECIFIED EMPHYSEMA TYPE: ICD-10-CM

## 2022-05-24 DIAGNOSIS — H69.82 EUSTACHIAN TUBE DYSFUNCTION, LEFT: ICD-10-CM

## 2022-05-24 PROCEDURE — 99214 OFFICE O/P EST MOD 30 MIN: CPT | Performed by: PHYSICIAN ASSISTANT

## 2022-05-24 RX ORDER — GUAIFENESIN AND DEXTROMETHORPHAN HYDROBROMIDE 600; 30 MG/1; MG/1
1 TABLET, EXTENDED RELEASE ORAL 2 TIMES DAILY
Qty: 45 TABLET | Refills: 0 | Status: SHIPPED | OUTPATIENT
Start: 2022-05-24 | End: 2022-06-24 | Stop reason: ALTCHOICE

## 2022-05-24 RX ORDER — CLINDAMYCIN HYDROCHLORIDE 300 MG/1
300 CAPSULE ORAL 3 TIMES DAILY
Qty: 30 CAPSULE | Refills: 0 | Status: SHIPPED | OUTPATIENT
Start: 2022-05-24 | End: 2022-06-24 | Stop reason: ALTCHOICE

## 2022-05-24 RX ORDER — SACCHAROMYCES BOULARDII 250 MG
250 CAPSULE ORAL 2 TIMES DAILY
Qty: 60 CAPSULE | Refills: 0 | Status: SHIPPED | OUTPATIENT
Start: 2022-05-24

## 2022-05-24 NOTE — OUTREACH NOTE
COVID-19 Week 3 Survey    Flowsheet Row Responses   [facility] patient discharged from? [place]   Does the patient have one of the following disease processes/diagnoses(primary or secondary)? COVID-19   COVID-19 underlying condition? None   Call Number Call 1   COVID-19 Week 3: Call 1 attempt successful? Yes   Call start time 1610   Call end time 1612   Discharge diagnosis Acute hypercapnic and hypoxemic respiratory failure -- able to extubated quickly    Is patient permission given to speak with other caregiver? Yes   Person spoke with today (if not patient) and relationship Felicia Morocho reviewed with patient/caregiver? Yes   Is the patient having any side effects they believe may be caused by any medication additions or changes? No   Does the patient have all medications ordered at discharge? Yes   Is the patient taking all medications as directed (includes completed medication regime)? Yes   Does the patient have a primary care provider?  Yes   Does the patient have an appointment with their PCP or specialist within 7 days of discharge? Yes   Has the patient kept scheduled appointments due by today? Yes   Has home health visited the patient within 72 hours of discharge? N/A   Psychosocial issues? No   Did the patient receive a copy of their discharge instructions? Yes   Did the patient receive a copy of COVID-19 specific instructions? Yes   Nursing interventions Reviewed instructions with patient   What is the patient's perception of their health status since discharge? Improving   Does the patient have any of the following symptoms? None   Nursing Interventions Nurse provided patient education   Pulse Ox monitoring Intermittent   Pulse Ox device source Patient   O2 Sat comments 95-96%   O2 Sat: education provided Sat levels, Monitoring frequency, When to seek care   O2 Sat education comments If sats drop below 90% while at rest, she is to go to ED   Is the patient/caregiver able to teach back steps to  recovery at home? Set small, achievable goals for return to baseline health, Rest and rebuild strength, gradually increase activity, Eat a well-balance diet, Make a list of questions for provider's appointment   If the patient is a current smoker, are they able to teach back resources for cessation? Not a smoker   Is the patient/caregiver able to teach back the hierarchy of who to call/visit for symptoms/problems? PCP, Specialist, Home health nurse, Urgent Care, ED, 911 Yes   Wrap up additional comments Dizziness  is improved. Endurance is not great.          ROSHAN SNYDER - Registered Nurse

## 2022-05-25 LAB
25(OH)D3+25(OH)D2 SERPL-MCNC: 44.6 NG/ML (ref 30–100)
ALBUMIN SERPL-MCNC: 4.4 G/DL (ref 3.6–4.6)
ALBUMIN/GLOB SERPL: 2.2 {RATIO} (ref 1.2–2.2)
ALP SERPL-CCNC: 134 IU/L (ref 44–121)
ALT SERPL-CCNC: 20 IU/L (ref 0–32)
APPEARANCE UR: CLEAR
AST SERPL-CCNC: 25 IU/L (ref 0–40)
BACTERIA #/AREA URNS HPF: NORMAL /[HPF]
BASOPHILS # BLD AUTO: 0.1 X10E3/UL (ref 0–0.2)
BASOPHILS NFR BLD AUTO: 1 %
BILIRUB SERPL-MCNC: 0.6 MG/DL (ref 0–1.2)
BILIRUB UR QL STRIP: NEGATIVE
BUN SERPL-MCNC: 15 MG/DL (ref 8–27)
BUN/CREAT SERPL: 16 (ref 12–28)
CALCIUM SERPL-MCNC: 9.6 MG/DL (ref 8.7–10.3)
CASTS URNS QL MICRO: NORMAL /LPF
CHLORIDE SERPL-SCNC: 101 MMOL/L (ref 96–106)
CHOLEST SERPL-MCNC: 190 MG/DL (ref 100–199)
CK SERPL-CCNC: 45 U/L (ref 26–161)
CO2 SERPL-SCNC: 21 MMOL/L (ref 20–29)
COLOR UR: YELLOW
CREAT SERPL-MCNC: 0.93 MG/DL (ref 0.57–1)
EGFRCR SERPLBLD CKD-EPI 2021: 62 ML/MIN/1.73
EOSINOPHIL # BLD AUTO: 0.2 X10E3/UL (ref 0–0.4)
EOSINOPHIL NFR BLD AUTO: 4 %
EPI CELLS #/AREA URNS HPF: NORMAL /HPF (ref 0–10)
ERYTHROCYTE [DISTWIDTH] IN BLOOD BY AUTOMATED COUNT: 12.2 % (ref 11.7–15.4)
FOLATE SERPL-MCNC: 10.8 NG/ML
GLOBULIN SER CALC-MCNC: 2 G/DL (ref 1.5–4.5)
GLUCOSE SERPL-MCNC: 89 MG/DL (ref 65–99)
GLUCOSE UR QL STRIP: NEGATIVE
HBA1C MFR BLD: 5.4 % (ref 4.8–5.6)
HCT VFR BLD AUTO: 40.1 % (ref 34–46.6)
HDLC SERPL-MCNC: 74 MG/DL
HGB BLD-MCNC: 13 G/DL (ref 11.1–15.9)
HGB UR QL STRIP: NEGATIVE
IMM GRANULOCYTES # BLD AUTO: 0.1 X10E3/UL (ref 0–0.1)
IMM GRANULOCYTES NFR BLD AUTO: 1 %
KETONES UR QL STRIP: NEGATIVE
LDLC SERPL CALC-MCNC: 95 MG/DL (ref 0–99)
LDLC/HDLC SERPL: 1.3 RATIO (ref 0–3.2)
LEUKOCYTE ESTERASE UR QL STRIP: NEGATIVE
LYMPHOCYTES # BLD AUTO: 0.9 X10E3/UL (ref 0.7–3.1)
LYMPHOCYTES NFR BLD AUTO: 15 %
MCH RBC QN AUTO: 29.4 PG (ref 26.6–33)
MCHC RBC AUTO-ENTMCNC: 32.4 G/DL (ref 31.5–35.7)
MCV RBC AUTO: 91 FL (ref 79–97)
MICRO URNS: NORMAL
MICRO URNS: NORMAL
MONOCYTES # BLD AUTO: 0.5 X10E3/UL (ref 0.1–0.9)
MONOCYTES NFR BLD AUTO: 8 %
NEUTROPHILS # BLD AUTO: 4.3 X10E3/UL (ref 1.4–7)
NEUTROPHILS NFR BLD AUTO: 71 %
NITRITE UR QL STRIP: NEGATIVE
PH UR STRIP: 6 [PH] (ref 5–7.5)
PLATELET # BLD AUTO: 293 X10E3/UL (ref 150–450)
POTASSIUM SERPL-SCNC: 4.1 MMOL/L (ref 3.5–5.2)
PROT SERPL-MCNC: 6.4 G/DL (ref 6–8.5)
PROT UR QL STRIP: NEGATIVE
RBC # BLD AUTO: 4.42 X10E6/UL (ref 3.77–5.28)
RBC #/AREA URNS HPF: NORMAL /HPF (ref 0–2)
SODIUM SERPL-SCNC: 139 MMOL/L (ref 134–144)
SP GR UR STRIP: 1.01 (ref 1–1.03)
T3FREE SERPL-MCNC: 5 PG/ML (ref 2–4.4)
T4 FREE SERPL-MCNC: 1.05 NG/DL (ref 0.82–1.77)
TRIGL SERPL-MCNC: 119 MG/DL (ref 0–149)
TSH SERPL DL<=0.005 MIU/L-ACNC: 3.18 UIU/ML (ref 0.45–4.5)
URINALYSIS REFLEX: NORMAL
UROBILINOGEN UR STRIP-MCNC: 0.2 MG/DL (ref 0.2–1)
VIT B12 SERPL-MCNC: 1319 PG/ML (ref 232–1245)
VLDLC SERPL CALC-MCNC: 21 MG/DL (ref 5–40)
WBC # BLD AUTO: 6 X10E3/UL (ref 3.4–10.8)
WBC #/AREA URNS HPF: NORMAL /HPF (ref 0–5)

## 2022-05-31 ENCOUNTER — READMISSION MANAGEMENT (OUTPATIENT)
Dept: CALL CENTER | Facility: HOSPITAL | Age: 82
End: 2022-05-31

## 2022-05-31 NOTE — OUTREACH NOTE
COVID-19 Week 4 Survey    Flowsheet Row Responses   [facility] patient discharged from? [place]   Does the patient have one of the following disease processes/diagnoses(primary or secondary)? COVID-19   COVID-19 underlying condition? None   Call Number Call 1   COVID-19 Week 4: Call 1 attempt successful? Yes   Call start time 1213   Call end time 1216   Discharge diagnosis Acute hypercapnic and hypoxemic respiratory failure -- able to extubated quickly    Is patient permission given to speak with other caregiver? Yes   Meds reviewed with patient/caregiver? Yes   Is the patient having any side effects they believe may be caused by any medication additions or changes? No   Does the patient have all medications ordered at discharge? Yes   Is the patient taking all medications as directed (includes completed medication regime)? Yes   Has the patient kept scheduled appointments due by today? Yes   Comments Saw PCP   Is the patient still receiving Home Health Services? N/A   What is the patient's perception of their health status since discharge? Improving   Does the patient have any of the following symptoms? Cough, Shortness of breath  [Emphysema]   Nursing Interventions Nurse provided patient education   Pulse Ox monitoring Intermittent   Pulse Ox device source Patient   O2 Sat comments 97% on RA   O2 Sat: education provided Sat levels, Monitoring frequency, When to seek care   O2 Sat education comments If sats drop below 90% while at rest, she is to go to ED   Is the patient/caregiver able to teach back steps to recovery at home? Set small, achievable goals for return to baseline health, Rest and rebuild strength, gradually increase activity, Eat a well-balance diet, Make a list of questions for provider's appointment   If the patient is a current smoker, are they able to teach back resources for cessation? Not a smoker   Is the patient/caregiver able to teach back the hierarchy of who to call/visit for  symptoms/problems? PCP, Specialist, Home health nurse, Urgent Care, ED, 911 Yes   Is the patient interested in additional calls from an ambulatory ?  NOTE:  applies to high risk patients requiring additional follow-up. No   Did the patient feel the follow up calls were helpful during their recovery period? Yes   Was the number of calls appropriate? Yes   Does the patient have an Advance Directive or Living Will? No   Is the patient/caregiver familiar with Advance Care Planning? No   Would the patient like more information on Advance Care Planning? No   Wrap up additional comments Endurance is better, dizziness is gone, using flonase and abx.          ROSHAN E - Registered Nurse

## 2022-06-02 RX ORDER — LOSARTAN POTASSIUM 100 MG/1
TABLET ORAL
Qty: 14 TABLET | Refills: 0 | Status: SHIPPED | OUTPATIENT
Start: 2022-06-02 | End: 2022-06-10

## 2022-06-03 ENCOUNTER — TELEPHONE (OUTPATIENT)
Dept: CARDIOLOGY | Facility: CLINIC | Age: 82
End: 2022-06-03

## 2022-06-03 NOTE — TELEPHONE ENCOUNTER
Referral received from pcp to schedule a visit with SG.  Spoke to the patient.  She is wishing to transfer care as recommended from Dr. Hanson to Dr. Norman.       Dr. Hanson - is transfer of care to Dr. Norman approved??    Dr. Norman - is transfer of care from Dr. Hanson approved??    Please advise.

## 2022-06-10 DIAGNOSIS — I10 ESSENTIAL HYPERTENSION: ICD-10-CM

## 2022-06-10 DIAGNOSIS — F41.1 GENERALIZED ANXIETY DISORDER: ICD-10-CM

## 2022-06-10 RX ORDER — ATENOLOL 50 MG/1
TABLET ORAL
Qty: 90 TABLET | Refills: 1 | Status: SHIPPED | OUTPATIENT
Start: 2022-06-10 | End: 2022-12-05

## 2022-06-10 RX ORDER — PAROXETINE 10 MG/1
TABLET, FILM COATED ORAL
Qty: 90 TABLET | Refills: 1 | Status: SHIPPED | OUTPATIENT
Start: 2022-06-10 | End: 2022-11-22

## 2022-06-10 RX ORDER — LOSARTAN POTASSIUM 100 MG/1
TABLET ORAL
Qty: 90 TABLET | Refills: 1 | Status: SHIPPED | OUTPATIENT
Start: 2022-06-10 | End: 2022-11-18

## 2022-06-13 RX ORDER — LOSARTAN POTASSIUM 100 MG/1
100 TABLET ORAL DAILY
Qty: 90 TABLET | Refills: 1 | OUTPATIENT
Start: 2022-06-13

## 2022-06-20 RX ORDER — THYROID,PORK 15 MG
TABLET ORAL
Qty: 90 TABLET | Refills: 0 | Status: SHIPPED | OUTPATIENT
Start: 2022-06-20 | End: 2022-09-26

## 2022-06-20 RX ORDER — THYROID 30 MG/1
TABLET ORAL
Qty: 90 TABLET | Refills: 0 | Status: SHIPPED | OUTPATIENT
Start: 2022-06-20 | End: 2022-09-26

## 2022-06-21 RX ORDER — THYROID,PORK 15 MG
TABLET ORAL
Qty: 30 TABLET | Refills: 0 | OUTPATIENT
Start: 2022-06-21

## 2022-06-24 ENCOUNTER — OFFICE VISIT (OUTPATIENT)
Dept: CARDIOLOGY | Facility: CLINIC | Age: 82
End: 2022-06-24

## 2022-06-24 VITALS
OXYGEN SATURATION: 97 % | BODY MASS INDEX: 26.5 KG/M2 | SYSTOLIC BLOOD PRESSURE: 148 MMHG | HEIGHT: 60 IN | WEIGHT: 135 LBS | HEART RATE: 46 BPM | DIASTOLIC BLOOD PRESSURE: 92 MMHG

## 2022-06-24 DIAGNOSIS — J43.9 PULMONARY EMPHYSEMA, UNSPECIFIED EMPHYSEMA TYPE: ICD-10-CM

## 2022-06-24 DIAGNOSIS — R06.02 SHORTNESS OF BREATH: Primary | ICD-10-CM

## 2022-06-24 PROCEDURE — 99214 OFFICE O/P EST MOD 30 MIN: CPT | Performed by: INTERNAL MEDICINE

## 2022-06-24 PROCEDURE — 93000 ELECTROCARDIOGRAM COMPLETE: CPT | Performed by: INTERNAL MEDICINE

## 2022-06-24 RX ORDER — BUDESONIDE, GLYCOPYRROLATE, AND FORMOTEROL FUMARATE 160; 9; 4.8 UG/1; UG/1; UG/1
2 AEROSOL, METERED RESPIRATORY (INHALATION) 2 TIMES DAILY
COMMUNITY
Start: 2022-06-06

## 2022-06-24 NOTE — PROGRESS NOTES
Subjective:     Encounter Date:06/24/2022      Patient ID: Dafne Mary is a 81 y.o. female.    Chief Complaint:  History of Present Illness    This is a 81-year-old with moderate to severe COPD, emphysema, granulomatous lung disease, depression/anxiety, hypertension, hypothyroidism, who presents for an evaluation of CHF.    The patient was previously followed by Dr. Hanson.  She initially saw him in 5/2021 with complaints of palpitations and an abnormal EKG.  She reported stable dyspnea on exertion at the time.  She reportedly had a stress test in 2015 that was normal.  He recommended proceeding with an echocardiogram and a Holter monitor at that time.  These were performed in 6/2021.  Her echocardiogram showed normal left ventricular systolic function wall motion with an EF of 53%, grade 2 diastolic dysfunction, hypokinesis of the mid inferior, basal inferoseptal, mid inferoseptal, basal inferior and basal inferoseptal walls, and no significant valvular disease within normal right ventricular systolic pressure.  Holter monitor was benign.  It was felt that the septal wall motion abnormalities were due to interventricular conduction delay.    She was not seen back in the office until 10/2021 when she was seen by Dr. Hanson in the cardiac evaluation center.  She reported an increase in dyspnea on exertion and onset of chest pressure.  A D-dimer was performed that was elevated so a CT angiogram of the chest was performed that was negative for pulmonary embolism.  She returned for a perfusion stress test on 11/5/2021 which showed no evidence of ischemia.    She was seen last by Dr. Hanson in 11/2021 for routine follow-up.  No changes were made to her management and was felt that she could be seen as needed.      She was hospitalized in 5/2021 with acute hypercapnic and hypoxic respiratory failure.  She ended up requiring intubation on admission.  Her work-up was significant for a mildly elevated BNP.   Chest imaging showed evidence of pleural effusions and evidence of pulmonary edema.  In addition she was felt to have an excuse exacerbation of her COPD along with acute exacerbation of chronic bronchitis.  She was also positive for COVID-19 and rhinovirus.  She was diuresed and along with supportive treatment of her infections.   During that admission she underwent a repeat echocardiogram on 5/4/2022 which showed normal left ventricular systolic function with an EF of 52%, normal diastolic function, aortic valve calcification but no evidence of significant stenosis, mild mitral and tricuspid regurgitation, normal right ventricular systolic pressure, and again evidence of mid inferior, basal inferoseptal, mid inferoseptal, basal inferior, and basal inferoseptal hypokinesis.  Of note her heart rates were elevated around 130 bpm during that echocardiogram.    Since her discharge she is continue to have issues with dyspnea on exertion which has progressed some.  She has followed up both with Dr. Nolan and KAJAL Wood.  There was concerned that her CHF has not been adequately addressed so she was referred here for further evaluation by KAJAL Wood.    Outside of the dyspnea on exertion she denies any chest pain, lower extremity edema, palpitations, orthopnea, near-syncope or syncope.  She was not discharged on any diuretics following her recent admission.    Review of Systems   Constitutional: Negative for malaise/fatigue.   HENT: Negative for hearing loss, hoarse voice, nosebleeds and sore throat.    Eyes: Negative for pain.   Cardiovascular: Positive for dyspnea on exertion. Negative for chest pain, claudication, cyanosis, irregular heartbeat, leg swelling, near-syncope, orthopnea, palpitations, paroxysmal nocturnal dyspnea and syncope.   Respiratory: Negative for shortness of breath and snoring.    Endocrine: Negative for cold intolerance, heat intolerance, polydipsia, polyphagia and polyuria.   Skin:  Negative for itching and rash.   Musculoskeletal: Negative for arthritis, falls, joint pain, joint swelling, muscle cramps, muscle weakness and myalgias.   Gastrointestinal: Negative for constipation, diarrhea, dysphagia, heartburn, hematemesis, hematochezia, melena, nausea and vomiting.   Genitourinary: Negative for frequency, hematuria and hesitancy.   Neurological: Negative for excessive daytime sleepiness, dizziness, headaches, light-headedness, numbness and weakness.   Psychiatric/Behavioral: Negative for depression. The patient is not nervous/anxious.          Current Outpatient Medications:   •  albuterol (PROVENTIL,VENTOLIN) 2 MG/5ML syrup, Take 2 mg by mouth 3 (Three) Times a Day., Disp: , Rfl:   •  [place] Thyroid 15 MG tablet, Take 1 tablet by mouth once daily, Disp: 90 tablet, Rfl: 0  •  [place] Thyroid 30 MG tablet, Take 1 tablet by mouth once daily, Disp: 90 tablet, Rfl: 0  •  aspirin 81 MG chewable tablet, Chew 81 mg Daily., Disp: , Rfl:   •  atenolol (TENORMIN) 50 MG tablet, TAKE 1 TABLET BY MOUTH ONCE DAILY . APPOINTMENT REQUIRED FOR FUTURE REFILLS, Disp: 90 tablet, Rfl: 1  •  atorvastatin (LIPITOR) 20 MG tablet, TAKE 1 TABLET BY MOUTH ONCE DAILY AT NIGHT, Disp: 90 tablet, Rfl: 0  •  Breztri Aerosphere 160-9-4.8 MCG/ACT aerosol inhaler, , Disp: , Rfl:   •  Calcium-Vitamin D-Vitamin K 500-1000-40 MG-UNT-MCG chewable tablet, Chew., Disp: , Rfl:   •  Cyanocobalamin 500 MCG chewable tablet, Chew., Disp: , Rfl:   •  losartan (COZAAR) 100 MG tablet, TAKE 1 TABLET BY MOUTH ONCE DAILY . APPOINTMENT REQUIRED FOR FUTURE REFILLS, Disp: 90 tablet, Rfl: 1  •  multivitamin with minerals tablet tablet, Take 1 tablet by mouth Daily., Disp: , Rfl:   •  omeprazole (priLOSEC) 20 MG capsule, Take 20 mg by mouth Daily., Disp: , Rfl:   •  PARoxetine (PAXIL) 10 MG tablet, TAKE 1 TABLET BY MOUTH ONCE DAILY . APPOINTMENT REQUIRED FOR FUTURE REFILLS (Patient taking differently: Take 20 mg by mouth Every Morning.), Disp: 90  "tablet, Rfl: 1  •  saccharomyces boulardii (Florastor) 250 MG capsule, Take 1 capsule by mouth 2 (Two) Times a Day., Disp: 60 capsule, Rfl: 0  •  Spacer/Aero-Holding Chambers device, Use with inhaler daily as directed, Disp: 1 each, Rfl: 0  •  Triamcinolone Acetonide (NASACORT AQ) 55 MCG/ACT nasal inhaler, 2 SPRAYS IN EACH NOSTRIL ONCE DAILY (Patient taking differently: As Needed. 2 SPRAYS IN EACH NOSTRIL ONCE DAILY), Disp: 1 bottle, Rfl: 11  •  VITAMIN D, CHOLECALCIFEROL, PO, Take  by mouth., Disp: , Rfl:     Past Medical History:   Diagnosis Date   • Depression    • Emphysema lung (HCC)    • GERD (gastroesophageal reflux disease)    • Heart failure (HCC)    • Hyperlipidemia    • Hypertension    • Hypothyroidism        Past Surgical History:   Procedure Laterality Date   • PARATHYROIDECTOMY      Patient reports thyroidectomy partial not a para thyroidectomy.   • THYROIDECTOMY, PARTIAL      1984       Family History   Problem Relation Age of Onset   • Alzheimer's disease Mother    • Lung disease Father    • Alcohol abuse Father    • Heart disease Brother    • Hypertension Brother    • Lung disease Brother    • Alcohol abuse Brother    • Cancer Brother         LUNG  WAS A SMOKER   • Thyroid disease Maternal Grandmother        Social History     Tobacco Use   • Smoking status: Never Smoker   • Smokeless tobacco: Never Used   Vaping Use   • Vaping Use: Never used   Substance Use Topics   • Alcohol use: Yes     Comment: \"occ\"; caffeine use- tea   • Drug use: No         ECG 12 Lead    Date/Time: 6/24/2022 2:34 PM  Performed by: Nae Norman MD  Authorized by: Nae Norman MD   Comparison: compared with previous ECG   Rhythm: sinus rhythm  Comments: Non-specific mild repolarization abnormalities               Objective:     Visit Vitals  /92 (BP Location: Right arm, Patient Position: Sitting, Cuff Size: Adult)   Pulse (!) 46   Ht 152.4 cm (60\")   Wt 61.2 kg (135 lb)   SpO2 97%   BMI 26.37 kg/m²         " Constitutional:       Appearance: Normal appearance. Well-developed.   Eyes:      General: Lids are normal.      Conjunctiva/sclera: Conjunctivae normal.      Pupils: Pupils are equal, round, and reactive to light.   HENT:      Head: Normocephalic and atraumatic.   Neck:      Vascular: No carotid bruit or JVD.      Lymphadenopathy: No cervical adenopathy.   Pulmonary:      Effort: Pulmonary effort is normal.      Breath sounds: Normal breath sounds.   Cardiovascular:      Normal rate. Regular rhythm.      No gallop.   Pulses:     Radial: 2+ bilaterally.  Edema:     Peripheral edema absent.   Abdominal:      Palpations: Abdomen is soft.   Musculoskeletal:      Cervical back: Full passive range of motion without pain, normal range of motion and neck supple. Skin:     General: Skin is warm and dry.   Neurological:      Mental Status: Alert and oriented to person, place, and time.           Assessment:          Diagnosis Plan   1. Shortness of breath  Adult Transthoracic Echo Complete w/ Color, Spectral and Contrast if Necessary Per Protocol    ECG 12 Lead   2. Pulmonary emphysema, unspecified emphysema type (HCC)  ECG 12 Lead          Plan:        1.  History of diastolic congestive heart failure.  She has a history of grade 2 diastolic dysfunction noted on echocardiogram in 5/2021.  A repeat echocardiogram in 6/2022 showed normal diastolic function.  She is always had normal left ventricular systolic function.  At this point I am not convinced that she has a diagnosis of congestive heart failure.  I suspect the pulmonary edema noted during her recent admission secondary to more infections and resulting high output state  Since her diastolic function was normal during that admission.  However since she is having some progression in her dyspnea I recommended proceeding with a repeat echocardiogram to see if there would be any benefit in placing her on chronic diuretic therapy.  2.  Dyspnea on exertion.  Largely in part  due to her underlying lung disease.  However there is been some progression recently.  3.  Septal wall motion abnormalities.  Noted on her last couple of EKGs.  She had a stress test in 11/2021 that showed no evidence of ischemia.  Previously felt that the septal wall motion abnormalities was due to interventricular conduction delay although I do not see any significant evidence of this on her EKG tracings.  4.  Hypertension.  Well-controlled on current regimen medications.  5.  COPD  6.  Hypothyroidism  7.  Hyperlipidemia.    I will call and discuss results of her echocardiogram and determine further recommendations based on those results.

## 2022-07-13 DIAGNOSIS — E78.2 MIXED HYPERLIPIDEMIA: ICD-10-CM

## 2022-07-14 ENCOUNTER — APPOINTMENT (OUTPATIENT)
Dept: CARDIOLOGY | Facility: HOSPITAL | Age: 82
End: 2022-07-14

## 2022-07-14 RX ORDER — ATORVASTATIN CALCIUM 20 MG/1
TABLET, FILM COATED ORAL
Qty: 90 TABLET | Refills: 0 | Status: SHIPPED | OUTPATIENT
Start: 2022-07-14 | End: 2022-10-24

## 2022-07-25 ENCOUNTER — HOSPITAL ENCOUNTER (OUTPATIENT)
Dept: CARDIOLOGY | Facility: HOSPITAL | Age: 82
Discharge: HOME | End: 2022-07-25
Admitting: INTERNAL MEDICINE

## 2022-07-25 VITALS
DIASTOLIC BLOOD PRESSURE: 90 MMHG | HEART RATE: 69 BPM | BODY MASS INDEX: 26.5 KG/M2 | SYSTOLIC BLOOD PRESSURE: 166 MMHG | HEIGHT: 60 IN | WEIGHT: 135 LBS

## 2022-07-25 DIAGNOSIS — R06.02 SHORTNESS OF BREATH: ICD-10-CM

## 2022-07-25 LAB
AORTIC ARCH: 2.1 CM
ASCENDING AORTA: 3.2 CM
BH CV ECHO MEAS - ACS: 1.3 CM
BH CV ECHO MEAS - AO MAX PG: 12.3 MMHG
BH CV ECHO MEAS - AO MEAN PG: 6.2 MMHG
BH CV ECHO MEAS - AO ROOT DIAM: 3 CM
BH CV ECHO MEAS - AO V2 MAX: 175.4 CM/SEC
BH CV ECHO MEAS - AO V2 VTI: 39.5 CM
BH CV ECHO MEAS - AVA(I,D): 1.38 CM2
BH CV ECHO MEAS - EDV(CUBED): 78.5 ML
BH CV ECHO MEAS - EDV(MOD-SP2): 82 ML
BH CV ECHO MEAS - EDV(MOD-SP4): 64 ML
BH CV ECHO MEAS - EF(MOD-BP): 52.6 %
BH CV ECHO MEAS - EF(MOD-SP2): 51.2 %
BH CV ECHO MEAS - EF(MOD-SP4): 53.1 %
BH CV ECHO MEAS - ESV(CUBED): 32.5 ML
BH CV ECHO MEAS - ESV(MOD-SP2): 40 ML
BH CV ECHO MEAS - ESV(MOD-SP4): 30 ML
BH CV ECHO MEAS - FS: 25.4 %
BH CV ECHO MEAS - IVS/LVPW: 1.05 CM
BH CV ECHO MEAS - IVSD: 1.2 CM
BH CV ECHO MEAS - LAT PEAK E' VEL: 6.1 CM/SEC
BH CV ECHO MEAS - LV DIASTOLIC VOL/BSA (35-75): 40.5 CM2
BH CV ECHO MEAS - LV MASS(C)D: 178.3 GRAMS
BH CV ECHO MEAS - LV MAX PG: 1.95 MMHG
BH CV ECHO MEAS - LV MEAN PG: 1.05 MMHG
BH CV ECHO MEAS - LV SYSTOLIC VOL/BSA (12-30): 19 CM2
BH CV ECHO MEAS - LV V1 MAX: 69.8 CM/SEC
BH CV ECHO MEAS - LV V1 VTI: 17.1 CM
BH CV ECHO MEAS - LVIDD: 4.3 CM
BH CV ECHO MEAS - LVIDS: 3.2 CM
BH CV ECHO MEAS - LVOT AREA: 3.2 CM2
BH CV ECHO MEAS - LVOT DIAM: 2.01 CM
BH CV ECHO MEAS - LVPWD: 1.15 CM
BH CV ECHO MEAS - MED PEAK E' VEL: 4.5 CM/SEC
BH CV ECHO MEAS - MR MAX PG: 112.9 MMHG
BH CV ECHO MEAS - MR MAX VEL: 531.3 CM/SEC
BH CV ECHO MEAS - MV A DUR: 0.15 SEC
BH CV ECHO MEAS - MV A MAX VEL: 52.7 CM/SEC
BH CV ECHO MEAS - MV DEC SLOPE: 887.5 CM/SEC2
BH CV ECHO MEAS - MV DEC TIME: 0.14 MSEC
BH CV ECHO MEAS - MV E MAX VEL: 125 CM/SEC
BH CV ECHO MEAS - MV E/A: 2.37
BH CV ECHO MEAS - MV MAX PG: 10.5 MMHG
BH CV ECHO MEAS - MV MEAN PG: 2.7 MMHG
BH CV ECHO MEAS - MV P1/2T: 55.1 MSEC
BH CV ECHO MEAS - MV V2 VTI: 33.8 CM
BH CV ECHO MEAS - MVA(P1/2T): 4 CM2
BH CV ECHO MEAS - MVA(VTI): 1.61 CM2
BH CV ECHO MEAS - PA ACC TIME: 0.1 SEC
BH CV ECHO MEAS - PA PR(ACCEL): 36.2 MMHG
BH CV ECHO MEAS - PA V2 MAX: 101.8 CM/SEC
BH CV ECHO MEAS - PULM A REVS DUR: 0.18 SEC
BH CV ECHO MEAS - PULM A REVS VEL: 31.7 CM/SEC
BH CV ECHO MEAS - PULM DIAS VEL: 62.1 CM/SEC
BH CV ECHO MEAS - PULM S/D: 0.87
BH CV ECHO MEAS - PULM SYS VEL: 54 CM/SEC
BH CV ECHO MEAS - QP/QS: 0.85
BH CV ECHO MEAS - RAP SYSTOLE: 3 MMHG
BH CV ECHO MEAS - RV MAX PG: 2.7 MMHG
BH CV ECHO MEAS - RV V1 MAX: 82.3 CM/SEC
BH CV ECHO MEAS - RV V1 VTI: 19.4 CM
BH CV ECHO MEAS - RVOT DIAM: 1.74 CM
BH CV ECHO MEAS - RVSP: 45 MMHG
BH CV ECHO MEAS - SI(MOD-SP2): 26.6 ML/M2
BH CV ECHO MEAS - SI(MOD-SP4): 21.5 ML/M2
BH CV ECHO MEAS - SUP REN AO DIAM: 2 CM
BH CV ECHO MEAS - SV(LVOT): 54.4 ML
BH CV ECHO MEAS - SV(MOD-SP2): 42 ML
BH CV ECHO MEAS - SV(MOD-SP4): 34 ML
BH CV ECHO MEAS - SV(RVOT): 46.2 ML
BH CV ECHO MEAS - TAPSE (>1.6): 2.07 CM
BH CV ECHO MEAS - TR MAX PG: 41.6 MMHG
BH CV ECHO MEAS - TR MAX VEL: 322.4 CM/SEC
BH CV ECHO MEASUREMENTS AVERAGE E/E' RATIO: 23.58
BH CV XLRA - RV BASE: 2.6 CM
BH CV XLRA - RV LENGTH: 5.9 CM
BH CV XLRA - RV MID: 1.68 CM
BH CV XLRA - TDI S': 14.3 CM/SEC
LEFT ATRIUM VOLUME INDEX: 34.6 ML/M2
LV EF 2D ECHO EST: 53 %
MAXIMAL PREDICTED HEART RATE: 139 BPM
SINUS: 3.4 CM
STJ: 3.3 CM
STRESS TARGET HR: 118 BPM

## 2022-07-25 PROCEDURE — 93306 TTE W/DOPPLER COMPLETE: CPT

## 2022-07-25 PROCEDURE — 93306 TTE W/DOPPLER COMPLETE: CPT | Performed by: INTERNAL MEDICINE

## 2022-08-03 ENCOUNTER — TELEPHONE (OUTPATIENT)
Dept: FAMILY MEDICINE CLINIC | Facility: CLINIC | Age: 82
End: 2022-08-03

## 2022-08-05 ENCOUNTER — TELEPHONE (OUTPATIENT)
Dept: CARDIOLOGY | Facility: CLINIC | Age: 82
End: 2022-08-05

## 2022-08-05 ENCOUNTER — OFFICE VISIT (OUTPATIENT)
Dept: FAMILY MEDICINE CLINIC | Facility: CLINIC | Age: 82
End: 2022-08-05

## 2022-08-05 VITALS
HEART RATE: 58 BPM | HEIGHT: 60 IN | WEIGHT: 136.2 LBS | BODY MASS INDEX: 26.74 KG/M2 | OXYGEN SATURATION: 98 % | TEMPERATURE: 97.4 F | SYSTOLIC BLOOD PRESSURE: 146 MMHG | DIASTOLIC BLOOD PRESSURE: 88 MMHG

## 2022-08-05 DIAGNOSIS — I10 ESSENTIAL HYPERTENSION: Primary | ICD-10-CM

## 2022-08-05 PROBLEM — R94.6 ABNORMAL FINDING ON THYROID FUNCTION TEST: Status: ACTIVE | Noted: 2021-08-24

## 2022-08-05 PROBLEM — J38.00 VOCAL CORD PARALYSIS: Status: ACTIVE | Noted: 2021-08-24

## 2022-08-05 PROCEDURE — 99213 OFFICE O/P EST LOW 20 MIN: CPT | Performed by: PHYSICIAN ASSISTANT

## 2022-08-05 RX ORDER — FUROSEMIDE 20 MG/1
20 TABLET ORAL DAILY
Qty: 30 TABLET | Refills: 5 | Status: SHIPPED | OUTPATIENT
Start: 2022-08-05 | End: 2022-12-28 | Stop reason: ALTCHOICE

## 2022-08-05 RX ORDER — AMLODIPINE BESYLATE 2.5 MG/1
2.5 TABLET ORAL DAILY
Qty: 30 TABLET | Refills: 0 | Status: SHIPPED | OUTPATIENT
Start: 2022-08-05 | End: 2022-08-29

## 2022-08-05 RX ORDER — ERYTHROMYCIN 5 MG/G
OINTMENT OPHTHALMIC
COMMUNITY
Start: 2022-07-28 | End: 2022-11-01

## 2022-08-05 NOTE — PROGRESS NOTES
Subjective   Dafne Mary is a 81 y.o. female who presents today in follow up hypertension.     Hypothyroidism  Associated symptoms include chest pain, coughing (chronic) and fatigue. Pertinent negatives include no fever.      Hypertension- She was on Atenolol and Losartan. No BP cuff that is accurate. Feeling ok. Tired but does not think from BP. She was told to not take her Losartan prior to eyelid surgery. They were worried her BP would be too low. Her BP was 200s/ 100s. They had to give medication to get it down. Has been high but has not been able to take at home.     From 5/2022-   Hyperlipidemia- she reports feeling bad when she doubled medication with uncontrolled lipids.   · Restarted Lipitor 12/2020 and did well- tolerated without AE.   Hypothyroidism- [place] thyroid 45 mg- she is on [place] thyroid a 30 mg and 15 mg once daily.   Vitamin D deficiency- taking calcium, vit D3 5000 IU 4-5 x weekly   B12 deficiency- taking B12 1000 mcg 4-5 x weekly  Abnormal renal function- NSAIDS- None- stopped Aleve. Tylenol only- not very often. Every once in a while- less than once weekly alcohol use.   Elevated alkaline phosphatase- no symptoms- GI or bone.     Moods- Paxil- reports sometimes she increases to 2 tablets to help with increased stress and uncontrolled moods. This works well for her.   Fibromyalgia- has been stable. Using Tylenol only as needed. Leg pain and some fatigue but no other worsening pain/ symptoms.   Leg pain- no complaints today.   · 4/2021- she woke up with pain down her leg that did not feel like her normal sciatica and in the other leg. Mid- leg around the knee felt like the whole leg- she could not tell if anterior, posterior, medial, or lateral. No injury or activity change. Varicose veins in that leg. No edema, erythema, or heat.     Emphysema- never a smoker - father and brothers. Trelegy helps- has been on for 2 years and albuterol as needed. Dr Nolan- continues to follow. Exacerbation  with intubation 5/3/2022 with quick improvement and extubation. Positive for rhinovirus and Covid 19. She also had CHF at that time.   Allergies- Nasacort daily.   GERD- controlled on Prilosec without breakthrough symptoms. Not bothering at all.     Carotid stenosis- last carotid duplex 2016 was ok but prior had stenosis noted on duplex. Referred 12/2020 and 5/2021. She has not returned for recheck.   Palpitations- She has had flutter a couple times daily for years.   · She was seen 2015 for palpitations with echo and holter without the need for treatment.   · She has also noticed increased SOA spring 2021 after getting to work outside. She has chronic SOA with COPD, however, she had some increase from baseline. No CP, sweating, nausea with symptoms. She had palpitations here right before she had EKG but none while having tracing.   OSORIO, CP, thoracic back pain, intermittent palpitations, tachycardia- Started with decreased exercise tolerance last weekend. She took albuterol and increased her pulse up to 114 and took a while to go back down. Felt worse after the albuterol. She was folding clothes and was winded despite standing still and folding clothes. 15 steps to the bathroom and by the time she got back to the bed, she was panting and having a hard time breathing. It took a while but resolved. Sitting and resting, she was ok. She was having pain in her chest and across. Pain in midback/upper back  as well. No SOA laying down. She was sent to [facility] [place].     COPD exac, CHF, history of rhinovirus and Covid 19 infections with sinusitis- She still has sinus pressure and feeling a little off- no spinning dizziness or feeling faint but feels a little abnormal- almost like if you drank a glass of wine. No fever. Mainly head issue is bothering her.   No CP. SOA- still there and pretty bad. Pulmonology in 1 week.   · She has had both Covid 19 vaccines. Did well with them. Completed 3/23/2021.   · Patient was hospitalized  "5/3/2022-2022 for acute hypercapnic and hypoxemic respiratory failure, acute on chronic CHF, COPD with exacerbation by viral infection- rhinovirus and Covid 19 infection, bronchitis- acute on chronic, elevated LFT, hyperglycemia, hypothyroidism, htn, hyperlipidemia. Per discharge summary, she was having gradually increasing SOA on exertion for a few days. It was mild but her son reported she had some increased \"hacking as well\". She then had sudden onset SOA, called 9-- and was brought to ER intubated. She improved quickly. Increased coughing with Trelegy- advised she try Breztri. Follow up Dr Nolan 4 weeks.    · Labs- -blood gas- pCO2 61.5, pH7.243, WBC 20.25, elevated glucose, ALT, AST, BNP 2972, TSH 7.5, Free T4 low at 0.73, respiratory panel positive for rhinovirus and Covid 19. - WBC 12.91, improving LFT- elevated AST, blood gas- pH7.55, pCO2 improved to 27, - WBC 19, creatinine 1.26. - 1.3  · CXR- 5/3- enlarged heart, aorta tortuous, vascular congestion, hazy opacity right mid to lower lung- edema or pneumonia, possible right pleural effusion. CXR post intubation- heart enlarged, aorta tortuous, pulmonary vascular congestion, patchy pulmonary opacity- right mid to upper lung and both lower lobes possible infiltrate, ET tube in place.   · CTA chest 5/3/2022- cardiac enlargement, coronary artery calcifications and atherosclerosis of aorta, bilateral small pleural effusions, vague groundglass opacity- pulmonary edema, mild bronchial wall thickening with mucus plugging right- bronchitis. ET tube in place.   · CT head 5/3/2022- mucosal thickening left maxillary sinus and ethmoid air cells, martial opacification mastoid air cell bilaterally.   · KUB 5/3- following NG tube placement.       Brother with MI at 64 or 65.   Mother lived until age 87 and  of dementia. Active      The following portions of the patient's history were reviewed and updated as appropriate: allergies, current " medications, past family history, past medical history, past social history, past surgical history and problem list.    Review of Systems   Constitutional: Positive for fatigue. Negative for fever.   HENT: Positive for sinus pressure. Ear pain: ears popping.    Respiratory: Positive for cough (chronic) and shortness of breath (chronic - increased OSORIO).    Cardiovascular: Positive for chest pain and palpitations.   Gastrointestinal: Negative.    Genitourinary: Negative.    Musculoskeletal: Positive for back pain.        Leg pain   Neurological: Positive for dizziness.   Psychiatric/Behavioral: Positive for dysphoric mood. The patient is nervous/anxious.        Objective   Vitals:    08/05/22 0950   BP: 146/88   Pulse: 58   Temp: 97.4 °F (36.3 °C)   SpO2: 98%     Body mass index is 26.6 kg/m².    Physical Exam  Vitals and nursing note reviewed.   Constitutional:       General: She is not in acute distress.     Appearance: Normal appearance. She is well-developed.   HENT:      Head: Normocephalic and atraumatic.      Right Ear: External ear normal.      Left Ear: External ear normal.      Nose: Nose normal.   Eyes:      General: Lids are normal.      Conjunctiva/sclera: Conjunctivae normal.   Neck:      Vascular: No carotid bruit.   Cardiovascular:      Rate and Rhythm: Normal rate and regular rhythm.      Pulses: Normal pulses.      Heart sounds: Normal heart sounds. No murmur heard.    No friction rub. No gallop.   Pulmonary:      Effort: Pulmonary effort is normal. No respiratory distress.      Breath sounds: Normal breath sounds. No wheezing, rhonchi or rales.   Musculoskeletal:         General: No deformity.      Cervical back: Neck supple.   Skin:     General: Skin is warm and dry.   Neurological:      Mental Status: She is alert and oriented to person, place, and time.      Gait: Gait normal.   Psychiatric:         Speech: Speech normal.         Behavior: Behavior normal.         Thought Content: Thought content  normal.         Judgment: Judgment normal.     Procedures      Assessment & Plan   Diagnoses and all orders for this visit:    1. Essential hypertension (Primary)  -     amLODIPine (NORVASC) 2.5 MG tablet; Take 1 tablet by mouth Daily.  Dispense: 30 tablet; Refill: 0        Assessment and Plan  Patient is due for follow up with me end 9/2022 to beginning of 10/2022, fasting, with labs.     · Hypertension- Blood pressure is elevated and has been borderline or elevated in the past. Her blood pressure as very elevated prior to her eye surgery, however, they had her hold her Losartan. Continue Atenolol 50 mg once daily and Losartan 100 mg once daily. I will add Norvasc 2.5 mg once daily. Monitor BP 2-3 x daily and call or return with readings in 1-2 weeks. If persistent elevation > 135/85, increase to 5 mg once daily. Continue to monitor and submit log.      From 5/2022-   · Hyperlipidemia- Continue Lipitor 20 mg at bedtime. Await labs for further recommendations.   · Hypothyroidism- Thyroid was abnormal in the hospital but she was sick and required brief intubation. She had positive Covid 19 and Rhinovirus. I advised we recheck today to see if it improves with improvement in symptoms. Continue [place] thyroid 45 mg. Further recommendations pending labs.    · Vitamin D deficiency- Continue calcium, vit D3 5000 IU, vitamin K daily 4-5 x weekly. Dosing recommendations pending labs.   · B12 deficiency-Continue B12 1000 mcg 4-5 x weekly. Further recommendations pending labs.   · Abnormal renal function- Avoid NSAIDS and ensure proper hydration. I will continue to monitor and make recommendations. Consideration of nephrology if needed.  · Elevated alkaline phosphatase- We will consider further workup if persistent elevation.   · Major depressive disorder with anxiety-Moods are more stable. Continue Paxil to 10 mg twice daily or 20 mg once daily. She can continue with the 2nd dose only as needed if this is helping but if she  is having to take the second dose often, she should start taking 20 mg every day.    · Fibromyalgia- She has been stable. Follow up if uncontrolled symptoms.   · Emphysema- Continue Trelegy daily and albuterol as needed and follow up with Dr Nolan as directed with hospital discharge.  · Allergic rhinitis-  Continue Nasacort as needed. Symptoms are difficult to determine with recent URI with sinus congestion. I will treat and re-evaluate allergies at follow up.   · GERD- Symptoms are controlled. Continue Prilosec 20 mg once daily. To see GI if significant breakthrough symptoms  · Carotid stenosis- Patient is overdue for carotid duplex. I referred 12/2020 and 5/2021. She does need to schedule testing.  · OSORIO, CP, intermittent palpitations, CHF- She was seen by cardiology 5/2021 with further testing. However, she ha dyspnea on exertion. She also was admit to the hospital with CHF. She needs to be seen by cardiology in follow up.   · COPD exac, CHF, history of rhinovirus and Covid 19 infections with sinusitis- Patient had coughing and OSORIO then had sudden worsening 5/3/2022 and was taken by ambulance to ER. She required intubation and was quickly weaned with treatment. Patient was noted to have hypercapnic, hypoxic respiratory failure, COPD exacerbation, and CHF. Per note, COPD exacerbation and respiratory symptoms were from viral illness- positive for Rhinovirus and Covid 19. Per patient and family, she was advised this was CHF exacerbation. She did have viral symptoms with sinus congestion and noted congestion in sinuses on CT head in hospital. I treated with Omnicef, Mucinex, and Nasal steroid. Patient to follow up with pulmonology as advised with discharge. They did not advise cardiology follow up, however, with CHF and with patient being advised that was the main issue, I referred to cardiology for evaluation and treatment recommendations. She continues with some sinus pressure and off balance feeling. I will have  her continue Mucinex and nasal steroid and will treat with Clindamycin 300 mg 3 x daily x 10 days. I counseled her on the risks of repeated antibiotics and concern for C diff. She should ensure probiotics daily. She should be seen ASAP I worsening, new, or changing symptoms or if no resolution of symptoms.     I spent 20 minutes caring for Dafne Mary on this date of service. This time includes time spent by me in the following activities as necessary: preparing for the visit, reviewing tests, specialists records and previous visits, obtaining and/or reviewing a separately obtained history, performing a medically appropriate exam and/or evaluation, counseling and educating the patient, family, caregiver, referring and/or communicating with other healthcare professionals, documenting information in the medical record, independently interpreting results and communicating that information with the patient, family, caregiver, and developing a medically appropriate treatment plan with consideration of other conditions, medications, and treatments.

## 2022-08-05 NOTE — TELEPHONE ENCOUNTER
I reviewed the patient's echocardiogram.  Although it was reported that she had indeterminate diastolic function on my review it appears that she had abnormal diastolic function.  I reviewed the echocardiogram images with Dr. Hanson who agrees that she likely has at least grade 2 diastolic dysfunction.  Based on this finding along with the moderately elevated pulmonary pressures which is also a new finding I recommended starting a diuretic.  I called and discussed this with the patient and she is in agreement.  We will start her on 20 mg of furosemide daily.    Please see that this patient is scheduled for 3-month follow-up appointment with me.  Thanks

## 2022-08-23 ENCOUNTER — OFFICE VISIT (OUTPATIENT)
Dept: FAMILY MEDICINE CLINIC | Facility: CLINIC | Age: 82
End: 2022-08-23

## 2022-08-23 VITALS
HEIGHT: 60 IN | SYSTOLIC BLOOD PRESSURE: 126 MMHG | DIASTOLIC BLOOD PRESSURE: 62 MMHG | OXYGEN SATURATION: 96 % | HEART RATE: 69 BPM | WEIGHT: 134 LBS | TEMPERATURE: 97.1 F | BODY MASS INDEX: 26.31 KG/M2

## 2022-08-23 DIAGNOSIS — I10 ESSENTIAL HYPERTENSION: Primary | ICD-10-CM

## 2022-08-23 PROCEDURE — 99213 OFFICE O/P EST LOW 20 MIN: CPT | Performed by: PHYSICIAN ASSISTANT

## 2022-08-29 DIAGNOSIS — I10 ESSENTIAL HYPERTENSION: ICD-10-CM

## 2022-08-29 RX ORDER — AMLODIPINE BESYLATE 2.5 MG/1
TABLET ORAL
Qty: 30 TABLET | Refills: 0 | Status: SHIPPED | OUTPATIENT
Start: 2022-08-29 | End: 2022-10-04

## 2022-09-16 ENCOUNTER — TELEPHONE (OUTPATIENT)
Dept: FAMILY MEDICINE CLINIC | Facility: CLINIC | Age: 82
End: 2022-09-16

## 2022-09-16 NOTE — TELEPHONE ENCOUNTER
Pt said her symptoms of back pain got worse last week and she wants to go back to physical therapy before going to get an MRI.   She refused to tell me details when I suggested that she may be seen in the office and said that she didn't have to come in last time.

## 2022-09-16 NOTE — TELEPHONE ENCOUNTER
Caller: Dafne Mary    Relationship: Self    Best call back number: [phone]    What orders are you requesting (i.e. lab or imaging): PHYSICAL THERAPY AND DRY NEEDLING    In what timeframe would the patient need to come in: 9/21/22    Where will you receive your lab/imaging services: [facility]    Additional notes: PATIENT REQUESTS ORDERS TO BEGIN PHYSICAL THERAPY TO HELP TREAT HER BACK PAIN. PLEASE AND ADVISE IF NECESSARY

## 2022-09-16 NOTE — TELEPHONE ENCOUNTER
Please contact the patient to see what is going on with her back pain, when this started, when it worsened, more information about her symptoms.  She may need evaluation depending on circumstances.

## 2022-09-20 NOTE — TELEPHONE ENCOUNTER
It looks like the last time she was referred to PT, it was by Tiffanie Aranda, and she was seen in the office for evaluation. I have not evaluated her for back pain. I would recommend follow up with me in office. If her insurance does not require a referral to PT, she can call them for appt if they have seen her in the past and are willing to see her. I would prefer to evaluate prior to a referral, as I do not see where I have evaluated her for this in the past.

## 2022-09-26 RX ORDER — THYROID 30 MG/1
TABLET ORAL
Qty: 90 TABLET | Refills: 0 | Status: SHIPPED | OUTPATIENT
Start: 2022-09-26 | End: 2023-01-04

## 2022-09-26 RX ORDER — THYROID,PORK 15 MG
TABLET ORAL
Qty: 90 TABLET | Refills: 0 | Status: SHIPPED | OUTPATIENT
Start: 2022-09-26 | End: 2023-01-04

## 2022-10-04 DIAGNOSIS — I10 BENIGN HYPERTENSION: Primary | ICD-10-CM

## 2022-10-04 DIAGNOSIS — E55.9 VITAMIN D DEFICIENCY: ICD-10-CM

## 2022-10-04 DIAGNOSIS — R74.8 ELEVATED ALKALINE PHOSPHATASE LEVEL: ICD-10-CM

## 2022-10-04 DIAGNOSIS — E03.9 ACQUIRED HYPOTHYROIDISM: ICD-10-CM

## 2022-10-04 DIAGNOSIS — I10 ESSENTIAL HYPERTENSION: ICD-10-CM

## 2022-10-04 DIAGNOSIS — R73.09 ELEVATED GLUCOSE: ICD-10-CM

## 2022-10-04 DIAGNOSIS — N28.9 ABNORMAL RENAL FUNCTION: ICD-10-CM

## 2022-10-04 DIAGNOSIS — E78.2 MIXED HYPERLIPIDEMIA: ICD-10-CM

## 2022-10-04 RX ORDER — AMLODIPINE BESYLATE 2.5 MG/1
TABLET ORAL
Qty: 30 TABLET | Refills: 1 | Status: SHIPPED | OUTPATIENT
Start: 2022-10-04 | End: 2022-12-05 | Stop reason: SDUPTHER

## 2022-10-06 ENCOUNTER — OFFICE VISIT (OUTPATIENT)
Dept: FAMILY MEDICINE CLINIC | Facility: CLINIC | Age: 82
End: 2022-10-06

## 2022-10-06 VITALS
WEIGHT: 137 LBS | DIASTOLIC BLOOD PRESSURE: 68 MMHG | RESPIRATION RATE: 16 BRPM | TEMPERATURE: 97 F | SYSTOLIC BLOOD PRESSURE: 110 MMHG | HEART RATE: 67 BPM | OXYGEN SATURATION: 97 % | BODY MASS INDEX: 26.9 KG/M2 | HEIGHT: 60 IN

## 2022-10-06 DIAGNOSIS — M47.896 OTHER OSTEOARTHRITIS OF SPINE, LUMBAR REGION: ICD-10-CM

## 2022-10-06 DIAGNOSIS — G89.29 CHRONIC BILATERAL LOW BACK PAIN WITH LEFT-SIDED SCIATICA: ICD-10-CM

## 2022-10-06 DIAGNOSIS — M54.42 CHRONIC BILATERAL LOW BACK PAIN WITH LEFT-SIDED SCIATICA: ICD-10-CM

## 2022-10-06 DIAGNOSIS — M47.819 FACET ARTHROPATHY: ICD-10-CM

## 2022-10-06 DIAGNOSIS — M43.16 SPONDYLOLISTHESIS, LUMBAR REGION: ICD-10-CM

## 2022-10-06 DIAGNOSIS — M51.36 DEGENERATIVE DISC DISEASE, LUMBAR: Primary | ICD-10-CM

## 2022-10-06 DIAGNOSIS — M48.061 SPINAL STENOSIS OF LUMBAR REGION WITHOUT NEUROGENIC CLAUDICATION: ICD-10-CM

## 2022-10-06 PROCEDURE — 99213 OFFICE O/P EST LOW 20 MIN: CPT | Performed by: PHYSICIAN ASSISTANT

## 2022-10-06 RX ORDER — BACLOFEN 10 MG/1
10 TABLET ORAL NIGHTLY PRN
Qty: 30 TABLET | Refills: 0 | Status: SHIPPED | OUTPATIENT
Start: 2022-10-06 | End: 2022-11-29

## 2022-10-06 NOTE — PROGRESS NOTES
Subjective   Dafne Mary is a 81 y.o. female who presents todayfor evaluation of back pain.     Hypothyroidism  Associated symptoms include chest pain, coughing (chronic) and fatigue. Pertinent negatives include no fever.      Low back pain- pain across the middle of her back- where waist is. She reports pain down into the left hip. Aching, throbbing, stabbing at times but most of the time, spasm pain in center of back. 3-10/10. She can get to the point of being uncomfortable in bed. Her back is bothering worse than breathing when she is outside doing things. She has had improvement with PT and dry needling in the past. She has not had the pain down the leg again but does not want it to progress. Patient usually has worsening at the end of summer after working in the yard all summer.   Not having numbness, tingling in LE> a few times has had leg weakness but not recently. Hard to walk up steps because of pain and weakness- cannot put all her weight on 1 foot. She has to walk up both feet on a step. No saddle anesethia or new or changing GI/ symptoms.   Patient was seen by Dr. Dc 4/2019 for back pain going down her left side.  She was given prednisone for 5 days and Flexeril.  Patient was seen by ANTONELLA Jiménez 8/9/2021 for right low back pain and right hip pain going down the right leg.  She was working in her yard, going up and down a ladder and doing household chores with increased pain.  She had stabbing pain going down her right leg but denied numbness or tingling.  She wanted to consider physical therapy and epidural ejections.  With Dr Shultz- she had MRI and had pain down the leg to her toes and had to have epidurals. They worked for a long time. Then stopped going there and came here.   MRI lumbar spine 2017.         Hypertension- she reports her BP is still up and down. She initially started   She was on Atenolol and Losartan. No BP cuff that is accurate. Feeling ok. Tired but does not think  from BP. She was told to not take her Losartan prior to eyelid surgery. They were worried her BP would be too low. Her BP was 200s/ 100s. They had to give medication to get it down. Has been high but has not been able to take at home.     From 5/2022-   Hyperlipidemia- she reports feeling bad when she doubled medication with uncontrolled lipids.   · Restarted Lipitor 12/2020 and did well- tolerated without AE.   Hypothyroidism- Kinsman thyroid 45 mg- she is on Kinsman thyroid a 30 mg and 15 mg once daily.   Vitamin D deficiency- taking calcium, vit D3 5000 IU 4-5 x weekly   B12 deficiency- taking B12 1000 mcg 4-5 x weekly  Abnormal renal function- NSAIDS- None- stopped Aleve. Tylenol only- not very often. Every once in a while- less than once weekly alcohol use.   Elevated alkaline phosphatase- no symptoms- GI or bone.     Moods- Paxil- reports sometimes she increases to 2 tablets to help with increased stress and uncontrolled moods. This works well for her.   Fibromyalgia- has been stable. Using Tylenol only as needed. Leg pain and some fatigue but no other worsening pain/ symptoms.   Leg pain- no complaints today.   · 4/2021- she woke up with pain down her leg that did not feel like her normal sciatica and in the other leg. Mid- leg around the knee felt like the whole leg- she could not tell if anterior, posterior, medial, or lateral. No injury or activity change. Varicose veins in that leg. No edema, erythema, or heat.     Emphysema- never a smoker - father and brothers. Trelegy helps- has been on for 2 years and albuterol as needed. Dr Nolan- continues to follow. Exacerbation with intubation 5/3/2022 with quick improvement and extubation. Positive for rhinovirus and Covid 19. She also had CHF at that time.   Allergies- Nasacort daily.   GERD- controlled on Prilosec without breakthrough symptoms. Not bothering at all.     Carotid stenosis- last carotid duplex 2016 was ok but prior had stenosis noted on duplex.  Referred 12/2020 and 5/2021. She has not returned for recheck.   Palpitations- She has had flutter a couple times daily for years.   · She was seen 2015 for palpitations with echo and holter without the need for treatment.   · She has also noticed increased SOA spring 2021 after getting to work outside. She has chronic SOA with COPD, however, she had some increase from baseline. No CP, sweating, nausea with symptoms. She had palpitations here right before she had EKG but none while having tracing.   OSORIO, CP, thoracic back pain, intermittent palpitations, tachycardia- Started with decreased exercise tolerance last weekend. She took albuterol and increased her pulse up to 114 and took a while to go back down. Felt worse after the albuterol. She was folding clothes and was winded despite standing still and folding clothes. 15 steps to the bathroom and by the time she got back to the bed, she was panting and having a hard time breathing. It took a while but resolved. Sitting and resting, she was ok. She was having pain in her chest and across. Pain in midback/upper back  as well. No SOA laying down. She was sent to [facility].     COPD exac, CHF, history of rhinovirus and Covid 19 infections with sinusitis- She still has sinus pressure and feeling a little off- no spinning dizziness or feeling faint but feels a little abnormal- almost like if you drank a glass of wine. No fever. Mainly head issue is bothering her.   No CP. SOA- still there and pretty bad. Pulmonology in 1 week.   · She has had both Covid 19 vaccines. Did well with them. Completed 3/23/2021.   · Patient was hospitalized 5/3/2022-5/7/2022 for acute hypercapnic and hypoxemic respiratory failure, acute on chronic CHF, COPD with exacerbation by viral infection- rhinovirus and Covid 19 infection, bronchitis- acute on chronic, elevated LFT, hyperglycemia, hypothyroidism, htn, hyperlipidemia. Per discharge summary, she was having gradually increasing SOA on exertion  "for a few days. It was mild but her son reported she had some increased \"hacking as well\". She then had sudden onset SOA, called -- and was brought to ER intubated. She improved quickly. Increased coughing with Trelegy- advised she try Breztri. Follow up Dr Nolan 4 weeks.    · Labs- -blood gas- pCO2 61.5, pH7.243, WBC 20.25, elevated glucose, ALT, AST, BNP 2972, TSH 7.5, Free T4 low at 0.73, respiratory panel positive for rhinovirus and Covid 19. - WBC 12.91, improving LFT- elevated AST, blood gas- pH7.55, pCO2 improved to 27, - WBC 19, creatinine 1.26. - 1.3  · CXR- 5/3- enlarged heart, aorta tortuous, vascular congestion, hazy opacity right mid to lower lung- edema or pneumonia, possible right pleural effusion. CXR post intubation- heart enlarged, aorta tortuous, pulmonary vascular congestion, patchy pulmonary opacity- right mid to upper lung and both lower lobes possible infiltrate, ET tube in place.   · CTA chest 5/3/2022- cardiac enlargement, coronary artery calcifications and atherosclerosis of aorta, bilateral small pleural effusions, vague groundglass opacity- pulmonary edema, mild bronchial wall thickening with mucus plugging right- bronchitis. ET tube in place.   · CT head 5/3/2022- mucosal thickening left maxillary sinus and ethmoid air cells, martial opacification mastoid air cell bilaterally.   · KUB 5/3- following NG tube placement.       Brother with MI at 64 or 65.   Mother lived until age 87 and  of dementia. Active      The following portions of the patient's history were reviewed and updated as appropriate: allergies, current medications, past family history, past medical history, past social history, past surgical history and problem list.    Review of Systems   Constitutional: Positive for fatigue. Negative for fever.   HENT: Positive for sinus pressure. Ear pain: ears popping.    Respiratory: Positive for cough (chronic) and shortness of breath (chronic - increased OSORIO).  "   Cardiovascular: Positive for chest pain and palpitations.   Gastrointestinal: Negative.    Genitourinary: Negative.    Musculoskeletal: Positive for back pain.        Leg pain   Neurological: Positive for dizziness.   Psychiatric/Behavioral: Positive for dysphoric mood. The patient is nervous/anxious.        Objective   Vitals:    10/06/22 1028   BP: 110/68   Pulse: 67   Resp: 16   Temp: 97 °F (36.1 °C)   SpO2: 97%     Body mass index is 26.76 kg/m².    Physical Exam  Vitals and nursing note reviewed.   Constitutional:       General: She is not in acute distress.     Appearance: Normal appearance. She is well-developed.   HENT:      Head: Normocephalic and atraumatic.      Right Ear: External ear normal.      Left Ear: External ear normal.      Nose: Nose normal.   Eyes:      General: Lids are normal.      Conjunctiva/sclera: Conjunctivae normal.   Neck:      Vascular: No carotid bruit.   Cardiovascular:      Rate and Rhythm: Normal rate and regular rhythm.      Pulses: Normal pulses.      Heart sounds: Normal heart sounds. No murmur heard.    No friction rub. No gallop.   Pulmonary:      Effort: Pulmonary effort is normal. No respiratory distress.      Breath sounds: Normal breath sounds. No wheezing, rhonchi or rales.   Musculoskeletal:         General: No deformity.      Cervical back: Neck supple.   Skin:     General: Skin is warm and dry.   Neurological:      Mental Status: She is alert and oriented to person, place, and time.      Gait: Gait normal.   Psychiatric:         Speech: Speech normal.         Behavior: Behavior normal.         Thought Content: Thought content normal.         Judgment: Judgment normal.     Procedures      Assessment & Plan   Diagnoses and all orders for this visit:    1. Degenerative disc disease, lumbar (Primary)  -     Ambulatory Referral to Physical Therapy  -     baclofen (LIORESAL) 10 MG tablet; Take 1 tablet by mouth At Night As Needed for Muscle Spasms.  Dispense: 30 tablet;  Refill: 0    2. Spinal stenosis of lumbar region without neurogenic claudication  -     Ambulatory Referral to Physical Therapy  -     baclofen (LIORESAL) 10 MG tablet; Take 1 tablet by mouth At Night As Needed for Muscle Spasms.  Dispense: 30 tablet; Refill: 0    3. Facet arthropathy  -     Ambulatory Referral to Physical Therapy  -     baclofen (LIORESAL) 10 MG tablet; Take 1 tablet by mouth At Night As Needed for Muscle Spasms.  Dispense: 30 tablet; Refill: 0    4. Other osteoarthritis of spine, lumbar region  -     Ambulatory Referral to Physical Therapy  -     baclofen (LIORESAL) 10 MG tablet; Take 1 tablet by mouth At Night As Needed for Muscle Spasms.  Dispense: 30 tablet; Refill: 0    5. Spondylolisthesis, lumbar region  -     Ambulatory Referral to Physical Therapy  -     baclofen (LIORESAL) 10 MG tablet; Take 1 tablet by mouth At Night As Needed for Muscle Spasms.  Dispense: 30 tablet; Refill: 0    6. Chronic bilateral low back pain with left-sided sciatica  -     Ambulatory Referral to Physical Therapy  -     baclofen (LIORESAL) 10 MG tablet; Take 1 tablet by mouth At Night As Needed for Muscle Spasms.  Dispense: 30 tablet; Refill: 0        Assessment and Plan  Patient is due for follow up with me end 9/2022 to beginning of 10/2022, fasting, with labs.     · Hypertension- Blood pressure is elevated and has been borderline or elevated in the past. Her blood pressure as very elevated prior to her eye surgery, however, they had her hold her Losartan. Continue Atenolol 50 mg once daily and Losartan 100 mg once daily. I will add Norvasc 2.5 mg once daily. Monitor BP 2-3 x daily and call or return with readings in 1-2 weeks. If persistent elevation > 135/85, increase to 5 mg once daily. Continue to monitor and submit log.      From 5/2022-   · Hyperlipidemia- Continue Lipitor 20 mg at bedtime. Await labs for further recommendations.   · Hypothyroidism- Thyroid was abnormal in the hospital but she was sick and  required brief intubation. She had positive Covid 19 and Rhinovirus. I advised we recheck today to see if it improves with improvement in symptoms. Continue [place]en thyroid 45 mg. Further recommendations pending labs.    · Vitamin D deficiency- Continue calcium, vit D3 5000 IU, vitamin K daily 4-5 x weekly. Dosing recommendations pending labs.   · B12 deficiency-Continue B12 1000 mcg 4-5 x weekly. Further recommendations pending labs.   · Abnormal renal function- Avoid NSAIDS and ensure proper hydration. I will continue to monitor and make recommendations. Consideration of nephrology if needed.  · Elevated alkaline phosphatase- We will consider further workup if persistent elevation.   · Major depressive disorder with anxiety-Moods are more stable. Continue Paxil to 10 mg twice daily or 20 mg once daily. She can continue with the 2nd dose only as needed if this is helping but if she is having to take the second dose often, she should start taking 20 mg every day.    · Fibromyalgia- She has been stable. Follow up if uncontrolled symptoms.   · Emphysema- Continue Trelegy daily and albuterol as needed and follow up with Dr Nolan as directed with hospital discharge.  · Allergic rhinitis-  Continue Nasacort as needed. Symptoms are difficult to determine with recent URI with sinus congestion. I will treat and re-evaluate allergies at follow up.   · GERD- Symptoms are controlled. Continue Prilosec 20 mg once daily. To see GI if significant breakthrough symptoms  · Carotid stenosis- Patient is overdue for carotid duplex. I referred 12/2020 and 5/2021. She does need to schedule testing.  · OSORIO, CP, intermittent palpitations, CHF- She was seen by cardiology 5/2021 with further testing. However, she ha dyspnea on exertion. She also was admit to the hospital with CHF. She needs to be seen by cardiology in follow up.   · COPD exac, CHF, history of rhinovirus and Covid 19 infections with sinusitis- Patient had coughing and OSORIO  then had sudden worsening 5/3/2022 and was taken by ambulance to ER. She required intubation and was quickly weaned with treatment. Patient was noted to have hypercapnic, hypoxic respiratory failure, COPD exacerbation, and CHF. Per note, COPD exacerbation and respiratory symptoms were from viral illness- positive for Rhinovirus and Covid 19. Per patient and family, she was advised this was CHF exacerbation. She did have viral symptoms with sinus congestion and noted congestion in sinuses on CT head in hospital. I treated with Omnicef, Mucinex, and Nasal steroid. Patient to follow up with pulmonology as advised with discharge. They did not advise cardiology follow up, however, with CHF and with patient being advised that was the main issue, I referred to cardiology for evaluation and treatment recommendations. She continues with some sinus pressure and off balance feeling. I will have her continue Mucinex and nasal steroid and will treat with Clindamycin 300 mg 3 x daily x 10 days. I counseled her on the risks of repeated antibiotics and concern for C diff. She should ensure probiotics daily. She should be seen ASAP I worsening, new, or changing symptoms or if no resolution of symptoms.     I spent 30 minutes caring for Dafne Mary on this date of service. This time includes time spent by me in the following activities as necessary: preparing for the visit, reviewing tests, specialists records and previous visits, obtaining and/or reviewing a separately obtained history, performing a medically appropriate exam and/or evaluation, counseling and educating the patient, family, caregiver, referring and/or communicating with other healthcare professionals, documenting information in the medical record, independently interpreting results and communicating that information with the patient, family, caregiver, and developing a medically appropriate treatment plan with consideration of other conditions, medications, and  treatments.

## 2022-10-07 LAB
25(OH)D3+25(OH)D2 SERPL-MCNC: 49.4 NG/ML (ref 30–100)
ALBUMIN SERPL-MCNC: 4.2 G/DL (ref 3.5–5.2)
ALBUMIN/GLOB SERPL: 2.3 G/DL
ALP SERPL-CCNC: 127 U/L (ref 39–117)
ALT SERPL-CCNC: 20 U/L (ref 1–33)
APPEARANCE UR: CLEAR
AST SERPL-CCNC: 25 U/L (ref 1–32)
BACTERIA #/AREA URNS HPF: ABNORMAL /HPF
BACTERIA UR CULT: ABNORMAL
BACTERIA UR CULT: ABNORMAL
BASOPHILS # BLD AUTO: 0.07 10*3/MM3 (ref 0–0.2)
BASOPHILS NFR BLD AUTO: 1.1 % (ref 0–1.5)
BILIRUB SERPL-MCNC: 0.5 MG/DL (ref 0–1.2)
BILIRUB UR QL STRIP: NEGATIVE
BUN SERPL-MCNC: 19 MG/DL (ref 8–23)
BUN/CREAT SERPL: 18.8 (ref 7–25)
CALCIUM SERPL-MCNC: 9.6 MG/DL (ref 8.6–10.5)
CASTS URNS QL MICRO: ABNORMAL /LPF
CHLORIDE SERPL-SCNC: 106 MMOL/L (ref 98–107)
CHOLEST SERPL-MCNC: 167 MG/DL (ref 0–200)
CK SERPL-CCNC: 67 U/L (ref 20–180)
CO2 SERPL-SCNC: 28.1 MMOL/L (ref 22–29)
COLOR UR: YELLOW
CREAT SERPL-MCNC: 1.01 MG/DL (ref 0.57–1)
EGFRCR SERPLBLD CKD-EPI 2021: 56 ML/MIN/1.73
EOSINOPHIL # BLD AUTO: 0.26 10*3/MM3 (ref 0–0.4)
EOSINOPHIL NFR BLD AUTO: 4.1 % (ref 0.3–6.2)
EPI CELLS #/AREA URNS HPF: ABNORMAL /HPF (ref 0–10)
ERYTHROCYTE [DISTWIDTH] IN BLOOD BY AUTOMATED COUNT: 12 % (ref 12.3–15.4)
GLOBULIN SER CALC-MCNC: 1.8 GM/DL
GLUCOSE SERPL-MCNC: 93 MG/DL (ref 65–99)
GLUCOSE UR QL STRIP: NEGATIVE
HCT VFR BLD AUTO: 37.7 % (ref 34–46.6)
HDLC SERPL-MCNC: 73 MG/DL (ref 40–60)
HGB BLD-MCNC: 12.6 G/DL (ref 12–15.9)
HGB UR QL STRIP: NEGATIVE
IMM GRANULOCYTES # BLD AUTO: 0.03 10*3/MM3 (ref 0–0.05)
IMM GRANULOCYTES NFR BLD AUTO: 0.5 % (ref 0–0.5)
KETONES UR QL STRIP: NEGATIVE
LDLC SERPL CALC-MCNC: 82 MG/DL (ref 0–100)
LDLC/HDLC SERPL: 1.11 {RATIO}
LEUKOCYTE ESTERASE UR QL STRIP: ABNORMAL
LYMPHOCYTES # BLD AUTO: 1.12 10*3/MM3 (ref 0.7–3.1)
LYMPHOCYTES NFR BLD AUTO: 17.7 % (ref 19.6–45.3)
MCH RBC QN AUTO: 29.5 PG (ref 26.6–33)
MCHC RBC AUTO-ENTMCNC: 33.4 G/DL (ref 31.5–35.7)
MCV RBC AUTO: 88.3 FL (ref 79–97)
MICRO URNS: ABNORMAL
MONOCYTES # BLD AUTO: 0.57 10*3/MM3 (ref 0.1–0.9)
MONOCYTES NFR BLD AUTO: 9 % (ref 5–12)
NEUTROPHILS # BLD AUTO: 4.27 10*3/MM3 (ref 1.7–7)
NEUTROPHILS NFR BLD AUTO: 67.6 % (ref 42.7–76)
NITRITE UR QL STRIP: POSITIVE
OTHER ANTIBIOTIC SUSC ISLT: ABNORMAL
PH UR STRIP: 6 [PH] (ref 5–7.5)
PLATELET # BLD AUTO: 288 10*3/MM3 (ref 140–450)
POTASSIUM SERPL-SCNC: 4.6 MMOL/L (ref 3.5–5.2)
PROT SERPL-MCNC: 6 G/DL (ref 6–8.5)
PROT UR QL STRIP: NEGATIVE
RBC # BLD AUTO: 4.27 10*6/MM3 (ref 3.77–5.28)
RBC #/AREA URNS HPF: ABNORMAL /HPF (ref 0–2)
SODIUM SERPL-SCNC: 144 MMOL/L (ref 136–145)
SP GR UR STRIP: 1.01 (ref 1–1.03)
T3FREE SERPL-MCNC: 4.1 PG/ML (ref 2–4.4)
T4 FREE SERPL-MCNC: 0.74 NG/DL (ref 0.93–1.7)
TRIGL SERPL-MCNC: 64 MG/DL (ref 0–150)
TSH SERPL DL<=0.005 MIU/L-ACNC: 1.83 UIU/ML (ref 0.27–4.2)
URINALYSIS REFLEX: ABNORMAL
UROBILINOGEN UR STRIP-MCNC: 0.2 MG/DL (ref 0.2–1)
VLDLC SERPL CALC-MCNC: 12 MG/DL (ref 5–40)
WBC # BLD AUTO: 6.32 10*3/MM3 (ref 3.4–10.8)
WBC #/AREA URNS HPF: ABNORMAL /HPF (ref 0–5)

## 2022-10-22 DIAGNOSIS — E78.2 MIXED HYPERLIPIDEMIA: ICD-10-CM

## 2022-10-24 RX ORDER — ATORVASTATIN CALCIUM 20 MG/1
20 TABLET, FILM COATED ORAL NIGHTLY
Qty: 90 TABLET | Refills: 0 | Status: SHIPPED | OUTPATIENT
Start: 2022-10-24 | End: 2023-02-09 | Stop reason: SDUPTHER

## 2022-10-25 DIAGNOSIS — G89.29 CHRONIC BILATERAL LOW BACK PAIN WITH LEFT-SIDED SCIATICA: Primary | ICD-10-CM

## 2022-10-25 DIAGNOSIS — N39.0 URINARY TRACT INFECTION WITHOUT HEMATURIA, SITE UNSPECIFIED: ICD-10-CM

## 2022-10-25 DIAGNOSIS — M54.42 CHRONIC BILATERAL LOW BACK PAIN WITH LEFT-SIDED SCIATICA: Primary | ICD-10-CM

## 2022-10-25 RX ORDER — DOXYCYCLINE HYCLATE 100 MG/1
100 CAPSULE ORAL 2 TIMES DAILY
Qty: 14 CAPSULE | Refills: 0 | Status: SHIPPED | OUTPATIENT
Start: 2022-10-25 | End: 2022-11-01

## 2022-11-01 ENCOUNTER — OFFICE VISIT (OUTPATIENT)
Dept: FAMILY MEDICINE CLINIC | Facility: CLINIC | Age: 82
End: 2022-11-01

## 2022-11-01 VITALS
RESPIRATION RATE: 16 BRPM | OXYGEN SATURATION: 96 % | TEMPERATURE: 96.9 F | SYSTOLIC BLOOD PRESSURE: 118 MMHG | DIASTOLIC BLOOD PRESSURE: 70 MMHG | HEART RATE: 67 BPM

## 2022-11-01 DIAGNOSIS — I65.23 ARTERIOSCLEROSIS OF BOTH CAROTID ARTERIES: ICD-10-CM

## 2022-11-01 DIAGNOSIS — R42 DIZZINESS: Primary | ICD-10-CM

## 2022-11-01 DIAGNOSIS — E03.9 ACQUIRED HYPOTHYROIDISM: ICD-10-CM

## 2022-11-01 DIAGNOSIS — N39.0 URINARY TRACT INFECTION WITHOUT HEMATURIA, SITE UNSPECIFIED: ICD-10-CM

## 2022-11-01 PROCEDURE — 99214 OFFICE O/P EST MOD 30 MIN: CPT | Performed by: PHYSICIAN ASSISTANT

## 2022-11-01 NOTE — PROGRESS NOTES
Subjective   Dafne Mary is a 81 y.o. female who presents today for evaluation of episodes of dizziness.     Hypothyroidism  Associated symptoms include chest pain, coughing (chronic) and fatigue. Pertinent negatives include no fever.   Dizziness  Associated symptoms include chest pain, coughing (chronic) and fatigue. Pertinent negatives include no fever.      She is not feeling like she has any urinary symptoms and did nto start antibiotics.     She has had some dizziness- intermittent- sometimes when 1st standing up. She had an episode yesterday and 1 week ago that she felt like she was going to faint, weakness, and legs were shaking. Both times, improved with sitting down. She has had no associated CP, SOA, palp, numbness, tingling, speech issues, confusion, speech symptoms. She reports her legs were shaking uncontrollably but stopped with sitting. She had water with improvement. No spinning dizziness. Back still hurting and has to sit down when doing certain things but overall improved some.           Low back pain-   pain across the middle of her back- where waist is. She reports pain down into the left hip. Aching, throbbing, stabbing at times but most of the time, spasm pain in center of back. 3-10/10. She can get to the point of being uncomfortable in bed. Her back is bothering worse than breathing when she is outside doing things. She has had improvement with PT and dry needling in the past. She has not had the pain down the leg again but does not want it to progress. Patient usually has worsening at the end of summer after working in the yard all summer.   Not having numbness, tingling in LE> a few times has had leg weakness but not recently. Hard to walk up steps because of pain and weakness- cannot put all her weight on 1 foot. She has to walk up both feet on a step. No saddle anesethia or new or changing GI/ symptoms.   Patient was seen by Dr. Dc 4/2019 for back pain going down her left side.   She was given prednisone for 5 days and Flexeril.  Patient was seen by ANTONELLA Jiménez 8/9/2021 for right low back pain and right hip pain going down the right leg.  She was working in her yard, going up and down a ladder and doing household chores with increased pain.  She had stabbing pain going down her right leg but denied numbness or tingling.  She wanted to consider physical therapy and epidural ejections.  With Dr Shultz- she had MRI and had pain down the leg to her toes and had to have epidurals. They worked for a long time. Then stopped going there and came here.   MRI lumbar spine 2017.         Hypertension- blood pressure not getting low and only occasionally 150. Otherwise, has been better.   she reports her BP is still up and down. She initially started   She was on Atenolol and Losartan. No BP cuff that is accurate. Feeling ok. Tired but does not think from BP. She was told to not take her Losartan prior to eyelid surgery. They were worried her BP would be too low. Her BP was 200s/ 100s. They had to give medication to get it down. Has been high but has not been able to take at home.     From 5/2022-   Hyperlipidemia- she reports feeling bad when she doubled medication with uncontrolled lipids.   · Restarted Lipitor 12/2020 and did well- tolerated without AE.   Hypothyroidism- [place] thyroid 45 mg- she is on [place] thyroid a 30 mg and 15 mg once daily.   Vitamin D deficiency- taking calcium, vit D3 5000 IU 4-5 x weekly   B12 deficiency- taking B12 1000 mcg 4-5 x weekly  Abnormal renal function- NSAIDS- None- stopped Aleve. Tylenol only- not very often. Every once in a while- less than once weekly alcohol use.   Elevated alkaline phosphatase- no symptoms- GI or bone.     Moods- Paxil- reports sometimes she increases to 2 tablets to help with increased stress and uncontrolled moods. This works well for her.   Fibromyalgia- has been stable. Using Tylenol only as needed. Leg pain and some fatigue but  no other worsening pain/ symptoms.   Leg pain- no complaints today.   · 4/2021- she woke up with pain down her leg that did not feel like her normal sciatica and in the other leg. Mid- leg around the knee felt like the whole leg- she could not tell if anterior, posterior, medial, or lateral. No injury or activity change. Varicose veins in that leg. No edema, erythema, or heat.     Emphysema- never a smoker - father and brothers. Trelechris helps- has been on for 2 years and albuterol as needed. Dr Nolan- continues to follow. Exacerbation with intubation 5/3/2022 with quick improvement and extubation. Positive for rhinovirus and Covid 19. She also had CHF at that time.   Allergies- Nasacort daily.   GERD- controlled on Prilosec without breakthrough symptoms. Not bothering at all.     Carotid stenosis- last carotid duplex 2016 was ok but prior had stenosis noted on duplex. Referred 12/2020 and 5/2021. She has not returned for recheck.   Palpitations- She has had flutter a couple times daily for years.   · She was seen 2015 for palpitations with echo and holter without the need for treatment.   · She has also noticed increased SOA spring 2021 after getting to work outside. She has chronic SOA with COPD, however, she had some increase from baseline. No CP, sweating, nausea with symptoms. She had palpitations here right before she had EKG but none while having tracing.   OSORIO, CP, thoracic back pain, intermittent palpitations, tachycardia- Started with decreased exercise tolerance last weekend. She took albuterol and increased her pulse up to 114 and took a while to go back down. Felt worse after the albuterol. She was folding clothes and was winded despite standing still and folding clothes. 15 steps to the bathroom and by the time she got back to the bed, she was panting and having a hard time breathing. It took a while but resolved. Sitting and resting, she was ok. She was having pain in her chest and across. Pain in  "midback/upper back  as well. No SOA laying down. [place] [facility] [place]     COPD exac, CHF, history of rhinovirus and Covid 19 infections with sinusitis- She still has sinus pressure and feeling a little off- no spinning dizziness or feeling faint but feels a little abnormal- almost like if you drank a glass of wine. No fever. Mainly head issue is bothering her.   No CP. SOA- still there and pretty bad. Pulmonology in 1 week.   · She has had both Covid 19 vaccines. Did well with them. Completed 3/23/2021.   · Patient was hospitalized 5/3/2022-5/7/2022 for acute hypercapnic and hypoxemic respiratory failure, acute on chronic CHF, COPD with exacerbation by viral infection- rhinovirus and Covid 19 infection, bronchitis- acute on chronic, elevated LFT, hyperglycemia, hypothyroidism, htn, hyperlipidemia. Per discharge summary, she was having gradually increasing SOA on exertion for a few days. It was mild but her son reported she had some increased \"hacking as well\". She then had sudden onset SOA, called 9-1-1 and was brought to ER intubated. She improved quickly. Increased coughing with Trelegy- advised she try Breztri. Follow up Dr Nolan 4 weeks.    · Labs- 5/20-blood gas- pCO2 61.5, pH7.243, WBC 20.25, elevated glucose, ALT, AST, BNP 2972, TSH 7.5, Free T4 low at 0.73, respiratory panel positive for rhinovirus and Covid 19. 5/4- WBC 12.91, improving LFT- elevated AST, blood gas- pH7.55, pCO2 improved to 27, 5/5- WBC 19, creatinine 1.26. 5/7- 1.3  · CXR- 5/3- enlarged heart, aorta tortuous, vascular congestion, hazy opacity right mid to lower lung- edema or pneumonia, possible right pleural effusion. CXR post intubation- heart enlarged, aorta tortuous, pulmonary vascular congestion, patchy pulmonary opacity- right mid to upper lung and both lower lobes possible infiltrate, ET tube in place.   · CTA chest 5/3/2022- cardiac enlargement, coronary artery calcifications and atherosclerosis of aorta, bilateral small pleural " effusions, vague groundglass opacity- pulmonary edema, mild bronchial wall thickening with mucus plugging right- bronchitis. ET tube in place.   · CT head 5/3/2022- mucosal thickening left maxillary sinus and ethmoid air cells, martial opacification mastoid air cell bilaterally.   · KUB 5/3- following NG tube placement.       Brother with MI at 64 or 65.   Mother lived until age 87 and  of dementia. Active      The following portions of the patient's history were reviewed and updated as appropriate: allergies, current medications, past family history, past medical history, past social history, past surgical history and problem list.    Review of Systems   Constitutional: Positive for fatigue. Negative for fever.   HENT: Positive for sinus pressure. Ear pain: ears popping.    Respiratory: Positive for cough (chronic) and shortness of breath (chronic - increased OSORIO).    Cardiovascular: Positive for chest pain and palpitations.   Gastrointestinal: Negative.    Genitourinary: Negative.    Musculoskeletal: Positive for back pain.        Leg pain   Neurological: Positive for dizziness.   Psychiatric/Behavioral: Positive for dysphoric mood. The patient is nervous/anxious.        Objective   Vitals:    22 1345   BP: 118/70   Pulse: 67   Resp: 16   Temp: 96.9 °F (36.1 °C)   SpO2: 96%     There is no height or weight on file to calculate BMI.    Physical Exam  Vitals and nursing note reviewed.   Constitutional:       General: She is not in acute distress.     Appearance: Normal appearance. She is well-developed.   HENT:      Head: Normocephalic and atraumatic.      Right Ear: External ear normal.      Left Ear: External ear normal.      Nose: Nose normal.   Eyes:      General: Lids are normal.      Conjunctiva/sclera: Conjunctivae normal.   Neck:      Vascular: No carotid bruit.   Cardiovascular:      Rate and Rhythm: Normal rate and regular rhythm.      Pulses: Normal pulses.      Heart sounds: Normal heart  sounds. No murmur heard.    No friction rub. No gallop.   Pulmonary:      Effort: Pulmonary effort is normal. No respiratory distress.      Breath sounds: Normal breath sounds. No wheezing, rhonchi or rales.   Musculoskeletal:         General: No deformity.      Cervical back: Neck supple.   Skin:     General: Skin is warm and dry.   Neurological:      Mental Status: She is alert and oriented to person, place, and time.      Gait: Gait normal.   Psychiatric:         Speech: Speech normal.         Behavior: Behavior normal.         Thought Content: Thought content normal.         Judgment: Judgment normal.           Assessment & Plan   Diagnoses and all orders for this visit:    1. Dizziness (Primary)  -     CBC & Differential  -     Comprehensive Metabolic Panel  -     TSH  -     T4, free  -     T3, Free  -     Urinalysis With Culture If Indicated -  -     Magnesium  -     Duplex Carotid Ultrasound CAR; Future    2. Urinary tract infection without hematuria, site unspecified  -     Urinalysis With Culture If Indicated -    3. Acquired hypothyroidism  -     TSH  -     T4, free  -     T3, Free    4. Arteriosclerosis of both carotid arteries  -     Duplex Carotid Ultrasound CAR; Future    Other orders  -     Microscopic Examination -  -     Urine culture, Comprehensive - ,        Assessment and Plan  Patient is due for follow up with me end 9/2022 to beginning of 10/2022, fasting, with labs.     · Hypertension- Blood pressure is elevated and has been borderline or elevated in the past. Her blood pressure as very elevated prior to her eye surgery, however, they had her hold her Losartan. Continue Atenolol 50 mg once daily and Losartan 100 mg once daily. I will add Norvasc 2.5 mg once daily. Monitor BP 2-3 x daily and call or return with readings in 1-2 weeks. If persistent elevation > 135/85, increase to 5 mg once daily. Continue to monitor and submit log.      From 5/2022-   · Hyperlipidemia- Continue Lipitor 20 mg at  bedtime. Await labs for further recommendations.   · Hypothyroidism- Thyroid was abnormal in the hospital but she was sick and required brief intubation. She had positive Covid 19 and Rhinovirus. I advised we recheck today to see if it improves with improvement in symptoms. Continue [place] thyroid 45 mg. Further recommendations pending labs.    · Vitamin D deficiency- Continue calcium, vit D3 5000 IU, vitamin K daily 4-5 x weekly. Dosing recommendations pending labs.   · B12 deficiency-Continue B12 1000 mcg 4-5 x weekly. Further recommendations pending labs.   · Abnormal renal function- Avoid NSAIDS and ensure proper hydration. I will continue to monitor and make recommendations. Consideration of nephrology if needed.  · Elevated alkaline phosphatase- We will consider further workup if persistent elevation.   · Major depressive disorder with anxiety-Moods are more stable. Continue Paxil to 10 mg twice daily or 20 mg once daily. She can continue with the 2nd dose only as needed if this is helping but if she is having to take the second dose often, she should start taking 20 mg every day.    · Fibromyalgia- She has been stable. Follow up if uncontrolled symptoms.   · Emphysema- Continue Trelegy daily and albuterol as needed and follow up with Dr Nolan as directed with hospital discharge.  · Allergic rhinitis-  Continue Nasacort as needed. Symptoms are difficult to determine with recent URI with sinus congestion. I will treat and re-evaluate allergies at follow up.   · GERD- Symptoms are controlled. Continue Prilosec 20 mg once daily. To see GI if significant breakthrough symptoms  · Carotid stenosis- Patient is overdue for carotid duplex. I referred 12/2020 and 5/2021. She does need to schedule testing.  · OSORIO, CP, intermittent palpitations, CHF- She was seen by cardiology 5/2021 with further testing. However, she ha dyspnea on exertion. She also was admit to the hospital with CHF. She needs to be seen by cardiology  in follow up.   · COPD exac, CHF, history of rhinovirus and Covid 19 infections with sinusitis- Patient had coughing and OSORIO then had sudden worsening 5/3/2022 and was taken by ambulance to ER. She required intubation and was quickly weaned with treatment. Patient was noted to have hypercapnic, hypoxic respiratory failure, COPD exacerbation, and CHF. Per note, COPD exacerbation and respiratory symptoms were from viral illness- positive for Rhinovirus and Covid 19. Per patient and family, she was advised this was CHF exacerbation. She did have viral symptoms with sinus congestion and noted congestion in sinuses on CT head in hospital. I treated with Omnicef, Mucinex, and Nasal steroid. Patient to follow up with pulmonology as advised with discharge. They did not advise cardiology follow up, however, with CHF and with patient being advised that was the main issue, I referred to cardiology for evaluation and treatment recommendations. She continues with some sinus pressure and off balance feeling. I will have her continue Mucinex and nasal steroid and will treat with Clindamycin 300 mg 3 x daily x 10 days. I counseled her on the risks of repeated antibiotics and concern for C diff. She should ensure probiotics daily. She should be seen ASAP I worsening, new, or changing symptoms or if no resolution of symptoms.     I spent 30 minutes caring for Dafne Mary on this date of service. This time includes time spent by me in the following activities as necessary: preparing for the visit, reviewing tests, specialists records and previous visits, obtaining and/or reviewing a separately obtained history, performing a medically appropriate exam and/or evaluation, counseling and educating the patient, family, caregiver, referring and/or communicating with other healthcare professionals, documenting information in the medical record, independently interpreting results and communicating that information with the patient, family,  caregiver, and developing a medically appropriate treatment plan with consideration of other conditions, medications, and treatments.

## 2022-11-05 LAB
ALBUMIN SERPL-MCNC: 4.4 G/DL (ref 3.6–4.6)
ALBUMIN/GLOB SERPL: 1.9 {RATIO} (ref 1.2–2.2)
ALP SERPL-CCNC: 153 IU/L (ref 44–121)
ALT SERPL-CCNC: 18 IU/L (ref 0–32)
APPEARANCE UR: CLEAR
AST SERPL-CCNC: 28 IU/L (ref 0–40)
BACTERIA #/AREA URNS HPF: ABNORMAL /[HPF]
BACTERIA UR CULT: ABNORMAL
BACTERIA UR CULT: ABNORMAL
BASOPHILS # BLD AUTO: 0.1 X10E3/UL (ref 0–0.2)
BASOPHILS NFR BLD AUTO: 1 %
BILIRUB SERPL-MCNC: 0.6 MG/DL (ref 0–1.2)
BILIRUB UR QL STRIP: NEGATIVE
BUN SERPL-MCNC: 24 MG/DL (ref 8–27)
BUN/CREAT SERPL: 24 (ref 12–28)
CALCIUM SERPL-MCNC: 9.5 MG/DL (ref 8.7–10.3)
CASTS URNS QL MICRO: ABNORMAL /LPF
CHLORIDE SERPL-SCNC: 103 MMOL/L (ref 96–106)
CO2 SERPL-SCNC: 25 MMOL/L (ref 20–29)
COLOR UR: YELLOW
CREAT SERPL-MCNC: 1.01 MG/DL (ref 0.57–1)
EGFRCR SERPLBLD CKD-EPI 2021: 56 ML/MIN/1.73
EOSINOPHIL # BLD AUTO: 0.3 X10E3/UL (ref 0–0.4)
EOSINOPHIL NFR BLD AUTO: 3 %
EPI CELLS #/AREA URNS HPF: ABNORMAL /HPF (ref 0–10)
ERYTHROCYTE [DISTWIDTH] IN BLOOD BY AUTOMATED COUNT: 12.3 % (ref 11.7–15.4)
GLOBULIN SER CALC-MCNC: 2.3 G/DL (ref 1.5–4.5)
GLUCOSE SERPL-MCNC: 85 MG/DL (ref 70–99)
GLUCOSE UR QL STRIP: NEGATIVE
HCT VFR BLD AUTO: 40.7 % (ref 34–46.6)
HGB BLD-MCNC: 13.1 G/DL (ref 11.1–15.9)
HGB UR QL STRIP: NEGATIVE
IMM GRANULOCYTES # BLD AUTO: 0.1 X10E3/UL (ref 0–0.1)
IMM GRANULOCYTES NFR BLD AUTO: 1 %
KETONES UR QL STRIP: NEGATIVE
LEUKOCYTE ESTERASE UR QL STRIP: ABNORMAL
LYMPHOCYTES # BLD AUTO: 1.5 X10E3/UL (ref 0.7–3.1)
LYMPHOCYTES NFR BLD AUTO: 18 %
MAGNESIUM SERPL-MCNC: 2.2 MG/DL (ref 1.6–2.3)
MCH RBC QN AUTO: 28.7 PG (ref 26.6–33)
MCHC RBC AUTO-ENTMCNC: 32.2 G/DL (ref 31.5–35.7)
MCV RBC AUTO: 89 FL (ref 79–97)
MICRO URNS: ABNORMAL
MONOCYTES # BLD AUTO: 0.8 X10E3/UL (ref 0.1–0.9)
MONOCYTES NFR BLD AUTO: 10 %
NEUTROPHILS # BLD AUTO: 5.5 X10E3/UL (ref 1.4–7)
NEUTROPHILS NFR BLD AUTO: 67 %
NITRITE UR QL STRIP: POSITIVE
OTHER ANTIBIOTIC SUSC ISLT: ABNORMAL
PH UR STRIP: 6 [PH] (ref 5–7.5)
PLATELET # BLD AUTO: 282 X10E3/UL (ref 150–450)
POTASSIUM SERPL-SCNC: 4.9 MMOL/L (ref 3.5–5.2)
PROT SERPL-MCNC: 6.7 G/DL (ref 6–8.5)
PROT UR QL STRIP: NEGATIVE
RBC # BLD AUTO: 4.56 X10E6/UL (ref 3.77–5.28)
RBC #/AREA URNS HPF: ABNORMAL /HPF (ref 0–2)
SODIUM SERPL-SCNC: 144 MMOL/L (ref 134–144)
SP GR UR STRIP: 1.01 (ref 1–1.03)
T3FREE SERPL-MCNC: 3.3 PG/ML (ref 2–4.4)
T4 FREE SERPL-MCNC: 0.76 NG/DL (ref 0.82–1.77)
TSH SERPL DL<=0.005 MIU/L-ACNC: 1.74 UIU/ML (ref 0.45–4.5)
URINALYSIS REFLEX: ABNORMAL
UROBILINOGEN UR STRIP-MCNC: 0.2 MG/DL (ref 0.2–1)
WBC # BLD AUTO: 8.2 X10E3/UL (ref 3.4–10.8)
WBC #/AREA URNS HPF: ABNORMAL /HPF (ref 0–5)

## 2022-11-10 DIAGNOSIS — N39.0 URINARY TRACT INFECTION WITHOUT HEMATURIA, SITE UNSPECIFIED: Primary | ICD-10-CM

## 2022-11-11 ENCOUNTER — TELEPHONE (OUTPATIENT)
Dept: FAMILY MEDICINE CLINIC | Facility: CLINIC | Age: 82
End: 2022-11-11

## 2022-11-11 NOTE — TELEPHONE ENCOUNTER
Caller: Dafne Mary    Relationship to patient: Self    Best call back number: [phone]    Patient is needing: PATIENT IS RETURNING CALL TO DISCUSS RESULTS OF RECENT LABS. PLEASE ADVISE.

## 2022-11-15 ENCOUNTER — HOSPITAL ENCOUNTER (OUTPATIENT)
Dept: CARDIOLOGY | Facility: HOSPITAL | Age: 82
Discharge: HOME | End: 2022-11-15

## 2022-11-17 ENCOUNTER — OFFICE VISIT (OUTPATIENT)
Dept: CARDIOLOGY | Facility: CLINIC | Age: 82
End: 2022-11-17

## 2022-11-17 VITALS
HEIGHT: 60 IN | HEART RATE: 70 BPM | BODY MASS INDEX: 27.09 KG/M2 | WEIGHT: 138 LBS | DIASTOLIC BLOOD PRESSURE: 76 MMHG | OXYGEN SATURATION: 99 % | SYSTOLIC BLOOD PRESSURE: 122 MMHG

## 2022-11-17 DIAGNOSIS — J43.9 PULMONARY EMPHYSEMA, UNSPECIFIED EMPHYSEMA TYPE: ICD-10-CM

## 2022-11-17 DIAGNOSIS — I50.32 CHRONIC DIASTOLIC CONGESTIVE HEART FAILURE: ICD-10-CM

## 2022-11-17 DIAGNOSIS — R06.02 SHORTNESS OF BREATH: ICD-10-CM

## 2022-11-17 DIAGNOSIS — R42 POSITIONAL LIGHTHEADEDNESS: Primary | ICD-10-CM

## 2022-11-17 DIAGNOSIS — I10 BENIGN HYPERTENSION: ICD-10-CM

## 2022-11-17 DIAGNOSIS — E78.2 MIXED HYPERLIPIDEMIA: ICD-10-CM

## 2022-11-17 PROCEDURE — 99214 OFFICE O/P EST MOD 30 MIN: CPT | Performed by: INTERNAL MEDICINE

## 2022-11-17 PROCEDURE — 93000 ELECTROCARDIOGRAM COMPLETE: CPT | Performed by: INTERNAL MEDICINE

## 2022-11-17 NOTE — PROGRESS NOTES
Subjective:     Encounter Date:11/17/2022      Patient ID: Dafne Mary is a 81 y.o. female.    Chief Complaint:  History of Present Illness    This is a 81-year-old with moderate to severe COPD, emphysema, granulomatous lung disease, depression/anxiety, hypertension, hypothyroidism, chronic diastolic congestive heart failure, who presents for follow up.      She returns today for follow-up.  She does not believe that the furosemide made a difference 1 where the other.  Overall her breathing is fairly stable.  She denies any chest pain, palpitation, orthopnea, near-syncope or syncope, or lower extremity edema.    Her primary complaint today is positional lightheadedness.  She reports that she had her blood pressure checked sitting then standing at [place] office and there was noted to be a 20 mmHg drop per the patient.  She continues to have symptoms and has been checking her blood pressures at home and notes similar findings.  Her blood pressures while seated or supine have been normal.    Prior History:  The patient was previously followed by Dr. Hanson.  She initially saw him in 5/2021 with complaints of palpitations and an abnormal EKG.  She reported stable dyspnea on exertion at the time.  She reportedly had a stress test in 2015 that was normal.  He recommended proceeding with an echocardiogram and a Holter monitor at that time.  These were performed in 6/2021.  Her echocardiogram showed normal left ventricular systolic function wall motion with an EF of 53%, grade 2 diastolic dysfunction, hypokinesis of the mid inferior, basal inferoseptal, mid inferoseptal, basal inferior and basal inferoseptal walls, and no significant valvular disease within normal right ventricular systolic pressure.  Holter monitor was benign.  It was felt that the septal wall motion abnormalities were due to interventricular conduction delay.     She was not seen back in the office until 10/2021 when she was seen by  Dr. Hanson in the cardiac evaluation center.  She reported an increase in dyspnea on exertion and onset of chest pressure.  A D-dimer was performed that was elevated so a CT angiogram of the chest was performed that was negative for pulmonary embolism.  She returned for a perfusion stress test on 11/5/2021 which showed no evidence of ischemia.     She was seen last by Dr. Hanson in 11/2021 for routine follow-up.  No changes were made to her management and was felt that she could be seen as needed.       She was hospitalized in 5/2021 with acute hypercapnic and hypoxic respiratory failure.  She ended up requiring intubation on admission.  Her work-up was significant for a mildly elevated BNP.  Chest imaging showed evidence of pleural effusions and evidence of pulmonary edema.  In addition she was felt to have an excuse exacerbation of her COPD along with acute exacerbation of chronic bronchitis.  She was also positive for COVID-19 and rhinovirus.  She was diuresed and along with supportive treatment of her infections.   During that admission she underwent a repeat echocardiogram on 5/4/2022 which showed normal left ventricular systolic function with an EF of 52%, normal diastolic function, aortic valve calcification but no evidence of significant stenosis, mild mitral and tricuspid regurgitation, normal right ventricular systolic pressure, and again evidence of mid inferior, basal inferoseptal, mid inferoseptal, basal inferior, and basal inferoseptal hypokinesis.  Of note her heart rates were elevated around 130 bpm during that echocardiogram.     Following her discharge she continued to have issues with dyspnea on exertion which was progressing.  There was concerned that her CHF has not been adequately addressed so she was referred here for further evaluation by KAJAL Wood.  I recommended proceeding with a repeat echocardiogram which was performed on 7/25/2022 and showed normal left ventricular systolic  function wall motion with an EF of 53%, basal inferoseptal and inferior wall hypokinesis, mildly dilated left atrium, mild to moderate mitral valve regurgitation, moderately elevated right ventricular systolic pressure of 45 mmHg.  Reviewed the images with Dr. Hanson and we felt that the patient actually had at least grade 2 diastolic dysfunction.  Based on that finding along with evidence of moderate pulmonary hypertension I recommended starting furosemide 20 mg daily.    Review of Systems   Constitutional: Negative for malaise/fatigue.   HENT: Negative for hearing loss, hoarse voice, nosebleeds and sore throat.    Eyes: Negative for pain.   Cardiovascular: Positive for dyspnea on exertion. Negative for chest pain, claudication, cyanosis, irregular heartbeat, leg swelling, near-syncope, orthopnea, palpitations, paroxysmal nocturnal dyspnea and syncope.   Respiratory: Negative for shortness of breath and snoring.    Endocrine: Negative for cold intolerance, heat intolerance, polydipsia, polyphagia and polyuria.   Skin: Negative for itching and rash.   Musculoskeletal: Negative for arthritis, falls, joint pain, joint swelling, muscle cramps, muscle weakness and myalgias.   Gastrointestinal: Negative for constipation, diarrhea, dysphagia, heartburn, hematemesis, hematochezia, melena, nausea and vomiting.   Genitourinary: Negative for frequency, hematuria and hesitancy.   Neurological: Positive for light-headedness. Negative for excessive daytime sleepiness, dizziness, headaches, numbness and weakness.   Psychiatric/Behavioral: Negative for depression. The patient is not nervous/anxious.          Current Outpatient Medications:   •  albuterol (PROVENTIL,VENTOLIN) 2 MG/5ML syrup, Take 2 mg by mouth 3 (Three) Times a Day., Disp: , Rfl:   •  amLODIPine (NORVASC) 2.5 MG tablet, Take 1 tablet by mouth once daily, Disp: 30 tablet, Rfl: 1  •  [place] Thyroid 15 MG tablet, Take 1 tablet by mouth once daily, Disp: 90 tablet,  Rfl: 0  •  [place] Thyroid 30 MG tablet, Take 1 tablet by mouth once daily, Disp: 90 tablet, Rfl: 0  •  aspirin 81 MG chewable tablet, Chew 81 mg Daily., Disp: , Rfl:   •  atenolol (TENORMIN) 50 MG tablet, TAKE 1 TABLET BY MOUTH ONCE DAILY . APPOINTMENT REQUIRED FOR FUTURE REFILLS, Disp: 90 tablet, Rfl: 1  •  atorvastatin (LIPITOR) 20 MG tablet, Take 1 tablet by mouth Every Night., Disp: 90 tablet, Rfl: 0  •  baclofen (LIORESAL) 10 MG tablet, Take 1 tablet by mouth At Night As Needed for Muscle Spasms., Disp: 30 tablet, Rfl: 0  •  Breztri Aerosphere 160-9-4.8 MCG/ACT aerosol inhaler, , Disp: , Rfl:   •  Calcium-Vitamin D-Vitamin K 500-1000-40 MG-UNT-MCG chewable tablet, Chew., Disp: , Rfl:   •  Cyanocobalamin 500 MCG chewable tablet, Chew., Disp: , Rfl:   •  furosemide (LASIX) 20 MG tablet, Take 1 tablet by mouth Daily., Disp: 30 tablet, Rfl: 5  •  losartan (COZAAR) 100 MG tablet, TAKE 1 TABLET BY MOUTH ONCE DAILY . APPOINTMENT REQUIRED FOR FUTURE REFILLS, Disp: 90 tablet, Rfl: 1  •  multivitamin with minerals tablet tablet, Take 1 tablet by mouth Daily., Disp: , Rfl:   •  omeprazole (priLOSEC) 20 MG capsule, Take 20 mg by mouth Daily., Disp: , Rfl:   •  PARoxetine (PAXIL) 10 MG tablet, TAKE 1 TABLET BY MOUTH ONCE DAILY . APPOINTMENT REQUIRED FOR FUTURE REFILLS (Patient taking differently: Take 20 mg by mouth Every Morning.), Disp: 90 tablet, Rfl: 1  •  saccharomyces boulardii (Florastor) 250 MG capsule, Take 1 capsule by mouth 2 (Two) Times a Day., Disp: 60 capsule, Rfl: 0  •  Spacer/Aero-Holding Chambers device, Use with inhaler daily as directed, Disp: 1 each, Rfl: 0  •  Triamcinolone Acetonide (NASACORT AQ) 55 MCG/ACT nasal inhaler, 2 SPRAYS IN EACH NOSTRIL ONCE DAILY (Patient taking differently: As Needed. 2 SPRAYS IN EACH NOSTRIL ONCE DAILY), Disp: 1 bottle, Rfl: 11  •  VITAMIN D, CHOLECALCIFEROL, PO, Take  by mouth., Disp: , Rfl:     Past Medical History:   Diagnosis Date   • Chronic diastolic congestive heart  "failure (HCC) 11/17/2022   • Depression    • Emphysema lung (HCC)    • GERD (gastroesophageal reflux disease)    • Heart failure (HCC)    • Hyperlipidemia    • Hypertension    • Hypothyroidism        Past Surgical History:   Procedure Laterality Date   • EYE SURGERY Left    • PARATHYROIDECTOMY      Patient reports thyroidectomy partial not a para thyroidectomy.   • THYROIDECTOMY, PARTIAL      1984       Family History   Problem Relation Age of Onset   • Alzheimer's disease Mother    • Lung disease Father    • Alcohol abuse Father    • Heart disease Brother    • Hypertension Brother    • Lung disease Brother    • Alcohol abuse Brother    • Cancer Brother         LUNG  WAS A SMOKER   • Thyroid disease Maternal Grandmother        Social History     Tobacco Use   • Smoking status: Never   • Smokeless tobacco: Never   Vaping Use   • Vaping Use: Never used   Substance Use Topics   • Alcohol use: Yes     Comment: \"occ\"; caffeine use- tea   • Drug use: No         ECG 12 Lead    Date/Time: 11/17/2022 2:50 PM  Performed by: Nae Norman MD  Authorized by: Nae Norman MD   Comparison: compared with previous ECG   Similar to previous ECG  Rhythm: sinus rhythm  Comments: Inferolateral repolarization abnormalities               Objective:     Visit Vitals  /76 (BP Location: Left arm, Patient Position: Sitting)   Pulse 70   Ht 152.4 cm (60\")   Wt 62.6 kg (138 lb)   SpO2 99%   BMI 26.95 kg/m²         Constitutional:       Appearance: Normal appearance. Well-developed.   Eyes:      General: Lids are normal.      Conjunctiva/sclera: Conjunctivae normal.      Pupils: Pupils are equal, round, and reactive to light.   HENT:      Head: Normocephalic and atraumatic.   Neck:      Vascular: No carotid bruit or JVD.      Lymphadenopathy: No cervical adenopathy.   Pulmonary:      Effort: Pulmonary effort is normal.      Breath sounds: Normal breath sounds.   Cardiovascular:      Normal rate. Regular rhythm.      No gallop. "   Pulses:     Radial: 2+ bilaterally.  Edema:     Peripheral edema absent.   Abdominal:      Palpations: Abdomen is soft.   Musculoskeletal:      Cervical back: Full passive range of motion without pain, normal range of motion and neck supple. Skin:     General: Skin is warm and dry.   Neurological:      Mental Status: Alert and oriented to person, place, and time.             Assessment:          Diagnosis Plan   1. Positional lightheadedness        2. Benign hypertension        3. Mixed hyperlipidemia        4. Chronic diastolic congestive heart failure (HCC)        5. Pulmonary emphysema, unspecified emphysema type (HCC)        6. Shortness of breath               Plan:       1.  Positional lightheadedness.  Suspected orthostatic hypotension.  Could be due to amlodipine or furosemide.  Discussed options and we decided to have her start with holding the amlodipine.  I asked her to continue monitoring her blood pressures and notify me if her blood pressures increase off the medication and/or her symptoms improve.  If it does not seem to make a difference with her symptoms and her blood pressures rise we will have her resume the amlodipine and consider holding the furosemide.  If her blood pressures increase but her symptoms improve I will have her stay off of the amlodipine and consider increasing atenolol in its place.  2.  Hypertension.  Plan as per #1.  Otherwise continue current dose of atenolol and losartan for now.  3.  Chronic diastolic congestive heart failure.  On furosemide 20 mg daily.  Patient does not feel that the diuretic has made any difference in her symptoms.  This could either be because she is not on enough diuretic  or that this is not responsible for her symptoms.  I am hesitant to increase her furosemide further in light of #1.  We will discontinue the furosemide at current dose.  4.  Hyperlipidemia  5.  COPD    The patient will call with an update on her symptoms in the next 1 to 2 weeks.   Otherwise we will see her back again in 3 months.

## 2022-11-18 RX ORDER — LOSARTAN POTASSIUM 100 MG/1
TABLET ORAL
Qty: 90 TABLET | Refills: 0 | Status: SHIPPED | OUTPATIENT
Start: 2022-11-18 | End: 2023-03-30

## 2022-11-21 ENCOUNTER — TELEPHONE (OUTPATIENT)
Dept: CARDIOLOGY | Facility: CLINIC | Age: 82
End: 2022-11-21

## 2022-11-21 DIAGNOSIS — F41.1 GENERALIZED ANXIETY DISORDER: ICD-10-CM

## 2022-11-21 NOTE — TELEPHONE ENCOUNTER
[phone]    Pt called stating since her last OV when she was taken off her amlodipine and now her BP is 164/91.  She states she thinks she needs another atenolol.    Tried to contact pt re: symptoms.  No answer.    [facility] RMA

## 2022-11-22 ENCOUNTER — HOSPITAL ENCOUNTER (OUTPATIENT)
Dept: CARDIOLOGY | Facility: HOSPITAL | Age: 82
Discharge: HOME | End: 2022-11-22
Admitting: PHYSICIAN ASSISTANT

## 2022-11-22 ENCOUNTER — TELEPHONE (OUTPATIENT)
Dept: FAMILY MEDICINE CLINIC | Facility: CLINIC | Age: 82
End: 2022-11-22

## 2022-11-22 DIAGNOSIS — R42 DIZZINESS: ICD-10-CM

## 2022-11-22 DIAGNOSIS — I65.23 ARTERIOSCLEROSIS OF BOTH CAROTID ARTERIES: ICD-10-CM

## 2022-11-22 LAB
BH CV XLRA MEAS LEFT DIST CCA EDV: 14.9 CM/SEC
BH CV XLRA MEAS LEFT DIST CCA PSV: 51.3 CM/SEC
BH CV XLRA MEAS LEFT DIST ICA EDV: -29.2 CM/SEC
BH CV XLRA MEAS LEFT DIST ICA PSV: -91.9 CM/SEC
BH CV XLRA MEAS LEFT ICA/CCA RATIO: 1.79
BH CV XLRA MEAS LEFT MID ICA EDV: -26 CM/SEC
BH CV XLRA MEAS LEFT MID ICA PSV: -88 CM/SEC
BH CV XLRA MEAS LEFT PROX CCA EDV: 11.4 CM/SEC
BH CV XLRA MEAS LEFT PROX CCA PSV: 51.3 CM/SEC
BH CV XLRA MEAS LEFT PROX ECA EDV: -9.8 CM/SEC
BH CV XLRA MEAS LEFT PROX ECA PSV: -83.5 CM/SEC
BH CV XLRA MEAS LEFT PROX ICA EDV: 17.2 CM/SEC
BH CV XLRA MEAS LEFT PROX ICA PSV: 60 CM/SEC
BH CV XLRA MEAS LEFT PROX SCLA PSV: 119.2 CM/SEC
BH CV XLRA MEAS LEFT VERTEBRAL A EDV: -11.9 CM/SEC
BH CV XLRA MEAS LEFT VERTEBRAL A PSV: -41.7 CM/SEC
BH CV XLRA MEAS RIGHT DIST CCA EDV: 15.1 CM/SEC
BH CV XLRA MEAS RIGHT DIST CCA PSV: 54.6 CM/SEC
BH CV XLRA MEAS RIGHT DIST ICA EDV: -22.5 CM/SEC
BH CV XLRA MEAS RIGHT DIST ICA PSV: -68.9 CM/SEC
BH CV XLRA MEAS RIGHT ICA/CCA RATIO: 1.67
BH CV XLRA MEAS RIGHT MID ICA EDV: -25.8 CM/SEC
BH CV XLRA MEAS RIGHT MID ICA PSV: -91.1 CM/SEC
BH CV XLRA MEAS RIGHT PROX CCA EDV: 15.8 CM/SEC
BH CV XLRA MEAS RIGHT PROX CCA PSV: 58.4 CM/SEC
BH CV XLRA MEAS RIGHT PROX ECA EDV: -10.3 CM/SEC
BH CV XLRA MEAS RIGHT PROX ECA PSV: -90.9 CM/SEC
BH CV XLRA MEAS RIGHT PROX ICA EDV: 18.6 CM/SEC
BH CV XLRA MEAS RIGHT PROX ICA PSV: 54.3 CM/SEC
BH CV XLRA MEAS RIGHT PROX SCLA PSV: 109.3 CM/SEC
BH CV XLRA MEAS RIGHT VERTEBRAL A EDV: 6.8 CM/SEC
BH CV XLRA MEAS RIGHT VERTEBRAL A PSV: 26.4 CM/SEC
LEFT ARM BP: NORMAL MMHG
MAXIMAL PREDICTED HEART RATE: 139 BPM
RIGHT ARM BP: NORMAL MMHG
STRESS TARGET HR: 118 BPM

## 2022-11-22 PROCEDURE — 93880 EXTRACRANIAL BILAT STUDY: CPT

## 2022-11-22 RX ORDER — PAROXETINE 10 MG/1
TABLET, FILM COATED ORAL
Qty: 90 TABLET | Refills: 0 | Status: SHIPPED | OUTPATIENT
Start: 2022-11-22 | End: 2023-01-24

## 2022-11-22 NOTE — TELEPHONE ENCOUNTER
Please see conversation copied from telephone note from cardiology.  They have asked that she start taking atenolol 50 mg twice daily and give it about a week to see if this helps her blood pressure before considering trying something else since stopping the amlodipine did help her dizziness per note.      Jaky Graves MA     TC    11:40 AM  Note    [phone]     Pt called stating since her last OV when she was taken off her amlodipine and now her BP is 164/91.  She states she thinks she needs another atenolol.     Tried to contact pt re: symptoms.  No answer.     [facility]        November 22, 2022       TC    7:31 AM  Jaky Graves MA routed this conversation to Nae Norman MD Gondi, Sreedevi, MD  to Jaky Graves MA       9:47 AM  Note    I would like to find out if her dizziness is better of the amlodipine before I make any further adjustments to her medications.              TC    9:48 AM  Jaky Graves MA contacted Dafne Mary Tina, MA     TC     9:50 AM  Note    Patient states her dizziness has stopped.  But she states her BP is high.  She states she took another atenolol on 11/21 and it did not help much.       Her BP this am was 171/93.       [facility]             TC    9:50 AM  Jaky Graves MA routed this conversation to Nae Norman MD Gondi, Sreedevi, MD  to Jaky Graves MA       9:52 AM  Note    Asked her to start taking the atenolol 50 mg twice a day.  I asked her to give it about a week to see if this helps her blood pressures before we consider trying something else.        Jaky Graves MA     TC     9:55 AM  Note    Pt notified and voiced understanding. [facility]

## 2022-11-22 NOTE — TELEPHONE ENCOUNTER
I would like to find out if her dizziness is better of the amlodipine before I make any further adjustments to her medications.

## 2022-11-22 NOTE — TELEPHONE ENCOUNTER
Asked her to start taking the atenolol 50 mg twice a day.  I asked her to give it about a week to see if this helps her blood pressures before we consider trying something else.

## 2022-11-22 NOTE — TELEPHONE ENCOUNTER
Patient states her dizziness has stopped.  But she states her BP is high.  She states she took another atenolol on 11/21 and it did not help much.      Her BP this am was 171/93.      [facility]

## 2022-11-22 NOTE — TELEPHONE ENCOUNTER
When she saw Cardiology last visit and she stopped  Amlodipine because of dizziness to see if that is the culprit. Patient said her BP is going back up and is concerned. She did sent them a message yesterday but hasn't heard back.   I told her to call back at 1pm here if she doesn't hear from them.     Beckie

## 2022-11-23 ENCOUNTER — TELEPHONE (OUTPATIENT)
Dept: FAMILY MEDICINE CLINIC | Facility: CLINIC | Age: 82
End: 2022-11-23

## 2022-11-23 NOTE — TELEPHONE ENCOUNTER
Patient called and stated that her cardiologist took her off amlodipine and did not tell her anything to do except to call her if her BP was high. Yesterday her BP was high. She stated that she could not get a hold of them and so lobito took another Altenol. She said that the Cardiologist office called her back and told her that she can take 2-3 Altenol a day through the weekend in order to get it down . Lobito stated that she did not want to take 2-3 pills. She stated that if it continues to run high she is going to start her amlodipine again. She stated that she would rather be dizzy then have high BP and something happen. She wanted me to let you know.

## 2022-11-25 NOTE — TELEPHONE ENCOUNTER
They advised to take Atenolol twice daily and send log to them. I would advise she contact them prior to restarting Amlodipine.

## 2022-11-28 NOTE — TELEPHONE ENCOUNTER
Spoke to patient.   She said she couldn't get in touch with Cardiology and wanted to schedule an apt with Sintia. Scheduled for tomorrow    Beckie

## 2022-11-29 ENCOUNTER — OFFICE VISIT (OUTPATIENT)
Dept: FAMILY MEDICINE CLINIC | Facility: CLINIC | Age: 82
End: 2022-11-29

## 2022-11-29 VITALS
SYSTOLIC BLOOD PRESSURE: 130 MMHG | RESPIRATION RATE: 18 BRPM | DIASTOLIC BLOOD PRESSURE: 68 MMHG | OXYGEN SATURATION: 97 % | TEMPERATURE: 98.2 F | HEART RATE: 72 BPM

## 2022-11-29 DIAGNOSIS — R06.02 SHORTNESS OF BREATH: Primary | ICD-10-CM

## 2022-11-29 DIAGNOSIS — I10 BENIGN HYPERTENSION: ICD-10-CM

## 2022-11-29 DIAGNOSIS — N39.0 URINARY TRACT INFECTION WITHOUT HEMATURIA, SITE UNSPECIFIED: ICD-10-CM

## 2022-11-29 DIAGNOSIS — I50.32 CHRONIC DIASTOLIC CONGESTIVE HEART FAILURE: ICD-10-CM

## 2022-11-29 PROCEDURE — 99213 OFFICE O/P EST LOW 20 MIN: CPT | Performed by: PHYSICIAN ASSISTANT

## 2022-12-02 LAB
ALBUMIN SERPL-MCNC: 4.4 G/DL (ref 3.6–4.6)
ALBUMIN/GLOB SERPL: 1.8 {RATIO} (ref 1.2–2.2)
ALP SERPL-CCNC: 148 IU/L (ref 44–121)
ALT SERPL-CCNC: 16 IU/L (ref 0–32)
APPEARANCE UR: ABNORMAL
AST SERPL-CCNC: 24 IU/L (ref 0–40)
BACTERIA #/AREA URNS HPF: ABNORMAL /[HPF]
BACTERIA UR CULT: ABNORMAL
BACTERIA UR CULT: ABNORMAL
BASOPHILS # BLD AUTO: 0.1 X10E3/UL (ref 0–0.2)
BASOPHILS NFR BLD AUTO: 1 %
BILIRUB SERPL-MCNC: 0.6 MG/DL (ref 0–1.2)
BILIRUB UR QL STRIP: NEGATIVE
BUN SERPL-MCNC: 15 MG/DL (ref 8–27)
BUN/CREAT SERPL: 14 (ref 12–28)
CALCIUM SERPL-MCNC: 9.1 MG/DL (ref 8.7–10.3)
CASTS URNS QL MICRO: ABNORMAL /LPF
CHLORIDE SERPL-SCNC: 100 MMOL/L (ref 96–106)
CO2 SERPL-SCNC: 23 MMOL/L (ref 20–29)
COLOR UR: YELLOW
CREAT SERPL-MCNC: 1.06 MG/DL (ref 0.57–1)
EGFRCR SERPLBLD CKD-EPI 2021: 53 ML/MIN/1.73
EOSINOPHIL # BLD AUTO: 0.5 X10E3/UL (ref 0–0.4)
EOSINOPHIL NFR BLD AUTO: 5 %
EPI CELLS #/AREA URNS HPF: ABNORMAL /HPF (ref 0–10)
ERYTHROCYTE [DISTWIDTH] IN BLOOD BY AUTOMATED COUNT: 11.9 % (ref 11.7–15.4)
GLOBULIN SER CALC-MCNC: 2.5 G/DL (ref 1.5–4.5)
GLUCOSE SERPL-MCNC: 87 MG/DL (ref 70–99)
GLUCOSE UR QL STRIP: NEGATIVE
HCT VFR BLD AUTO: 38.6 % (ref 34–46.6)
HGB BLD-MCNC: 12.8 G/DL (ref 11.1–15.9)
HGB UR QL STRIP: NEGATIVE
IMM GRANULOCYTES # BLD AUTO: 0.1 X10E3/UL (ref 0–0.1)
IMM GRANULOCYTES NFR BLD AUTO: 1 %
KETONES UR QL STRIP: NEGATIVE
LEUKOCYTE ESTERASE UR QL STRIP: ABNORMAL
LYMPHOCYTES # BLD AUTO: 1.2 X10E3/UL (ref 0.7–3.1)
LYMPHOCYTES NFR BLD AUTO: 12 %
MCH RBC QN AUTO: 29 PG (ref 26.6–33)
MCHC RBC AUTO-ENTMCNC: 33.2 G/DL (ref 31.5–35.7)
MCV RBC AUTO: 87 FL (ref 79–97)
MICRO URNS: ABNORMAL
MONOCYTES # BLD AUTO: 0.8 X10E3/UL (ref 0.1–0.9)
MONOCYTES NFR BLD AUTO: 8 %
NEUTROPHILS # BLD AUTO: 7.2 X10E3/UL (ref 1.4–7)
NEUTROPHILS NFR BLD AUTO: 73 %
NITRITE UR QL STRIP: NEGATIVE
NT-PROBNP SERPL-MCNC: 557 PG/ML (ref 0–738)
OTHER ANTIBIOTIC SUSC ISLT: ABNORMAL
PH UR STRIP: 6 [PH] (ref 5–7.5)
PLATELET # BLD AUTO: 317 X10E3/UL (ref 150–450)
POTASSIUM SERPL-SCNC: 3.5 MMOL/L (ref 3.5–5.2)
PROT SERPL-MCNC: 6.9 G/DL (ref 6–8.5)
PROT UR QL STRIP: NEGATIVE
RBC # BLD AUTO: 4.42 X10E6/UL (ref 3.77–5.28)
RBC #/AREA URNS HPF: ABNORMAL /HPF (ref 0–2)
SODIUM SERPL-SCNC: 141 MMOL/L (ref 134–144)
SP GR UR STRIP: 1.01 (ref 1–1.03)
UROBILINOGEN UR STRIP-MCNC: 0.2 MG/DL (ref 0.2–1)
WBC # BLD AUTO: 9.8 X10E3/UL (ref 3.4–10.8)
WBC #/AREA URNS HPF: >30 /HPF (ref 0–5)

## 2022-12-05 DIAGNOSIS — I10 ESSENTIAL HYPERTENSION: ICD-10-CM

## 2022-12-05 RX ORDER — AMLODIPINE BESYLATE 2.5 MG/1
2.5 TABLET ORAL DAILY
Qty: 90 TABLET | Refills: 0 | Status: SHIPPED | OUTPATIENT
Start: 2022-12-05 | End: 2022-12-05

## 2022-12-05 RX ORDER — NITROFURANTOIN 25; 75 MG/1; MG/1
100 CAPSULE ORAL 2 TIMES DAILY
Qty: 14 CAPSULE | Refills: 0 | Status: ON HOLD | OUTPATIENT
Start: 2022-12-05 | End: 2023-01-06

## 2022-12-05 RX ORDER — CARVEDILOL 12.5 MG/1
12.5 TABLET ORAL 2 TIMES DAILY
Qty: 60 TABLET | Refills: 5 | Status: ON HOLD | OUTPATIENT
Start: 2022-12-05 | End: 2023-01-07

## 2022-12-05 NOTE — TELEPHONE ENCOUNTER
TO TO REFILL FOR 1 MONTH UNTIL APPT.    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

## 2022-12-07 ENCOUNTER — APPOINTMENT (OUTPATIENT)
Dept: GENERAL RADIOLOGY | Facility: HOSPITAL | Age: 82
End: 2022-12-07

## 2022-12-07 ENCOUNTER — TELEPHONE (OUTPATIENT)
Dept: FAMILY MEDICINE CLINIC | Facility: CLINIC | Age: 82
End: 2022-12-07

## 2022-12-07 PROCEDURE — 71045 X-RAY EXAM CHEST 1 VIEW: CPT

## 2022-12-07 PROCEDURE — 99285 EMERGENCY DEPT VISIT HI MDM: CPT

## 2022-12-07 PROCEDURE — 93005 ELECTROCARDIOGRAM TRACING: CPT | Performed by: EMERGENCY MEDICINE

## 2022-12-07 PROCEDURE — 0202U NFCT DS 22 TRGT SARS-COV-2: CPT | Performed by: EMERGENCY MEDICINE

## 2022-12-07 PROCEDURE — 93005 ELECTROCARDIOGRAM TRACING: CPT

## 2022-12-07 PROCEDURE — 93010 ELECTROCARDIOGRAM REPORT: CPT | Performed by: STUDENT IN AN ORGANIZED HEALTH CARE EDUCATION/TRAINING PROGRAM

## 2022-12-07 NOTE — TELEPHONE ENCOUNTER
Spoke to Sintia.    Patient asked for Steroids and Sintia said she needed to be seen in person. We don't have any openings left this afternoon and no provider in the office tomorrow bc of the Flu. She recommends UC or [facility]

## 2022-12-08 ENCOUNTER — APPOINTMENT (OUTPATIENT)
Dept: CT IMAGING | Facility: HOSPITAL | Age: 82
End: 2022-12-08

## 2022-12-08 ENCOUNTER — HOSPITAL ENCOUNTER (OUTPATIENT)
Facility: HOSPITAL | Age: 82
LOS: 1 days | Discharge: HOME | End: 2022-12-09
Attending: EMERGENCY MEDICINE | Admitting: HOSPITALIST

## 2022-12-08 DIAGNOSIS — R06.09 EXERTIONAL DYSPNEA: ICD-10-CM

## 2022-12-08 DIAGNOSIS — J44.1 COPD WITH ACUTE EXACERBATION: Primary | ICD-10-CM

## 2022-12-08 DIAGNOSIS — R09.02 HYPOXEMIA: ICD-10-CM

## 2022-12-08 LAB
ALBUMIN SERPL-MCNC: 4.1 G/DL (ref 3.5–5.2)
ALBUMIN/GLOB SERPL: 1.4 G/DL
ALP SERPL-CCNC: 135 U/L (ref 39–117)
ALT SERPL W P-5'-P-CCNC: 11 U/L (ref 1–33)
ANION GAP SERPL CALCULATED.3IONS-SCNC: 11.4 MMOL/L (ref 5–15)
AST SERPL-CCNC: 18 U/L (ref 1–32)
B PARAPERT DNA SPEC QL NAA+PROBE: NOT DETECTED
B PERT DNA SPEC QL NAA+PROBE: NOT DETECTED
BASOPHILS # BLD AUTO: 0.08 10*3/MM3 (ref 0–0.2)
BASOPHILS NFR BLD AUTO: 1.2 % (ref 0–1.5)
BILIRUB SERPL-MCNC: 0.4 MG/DL (ref 0–1.2)
BUN SERPL-MCNC: 14 MG/DL (ref 8–23)
BUN/CREAT SERPL: 15.4 (ref 7–25)
C PNEUM DNA NPH QL NAA+NON-PROBE: NOT DETECTED
CALCIUM SPEC-SCNC: 9.3 MG/DL (ref 8.6–10.5)
CHLORIDE SERPL-SCNC: 102 MMOL/L (ref 98–107)
CO2 SERPL-SCNC: 28.6 MMOL/L (ref 22–29)
CREAT SERPL-MCNC: 0.91 MG/DL (ref 0.57–1)
DEPRECATED RDW RBC AUTO: 38.4 FL (ref 37–54)
EGFRCR SERPLBLD CKD-EPI 2021: 63.1 ML/MIN/1.73
EOSINOPHIL # BLD AUTO: 0.47 10*3/MM3 (ref 0–0.4)
EOSINOPHIL NFR BLD AUTO: 7.3 % (ref 0.3–6.2)
ERYTHROCYTE [DISTWIDTH] IN BLOOD BY AUTOMATED COUNT: 11.8 % (ref 12.3–15.4)
FLUAV SUBTYP SPEC NAA+PROBE: NOT DETECTED
FLUBV RNA ISLT QL NAA+PROBE: NOT DETECTED
GLOBULIN UR ELPH-MCNC: 2.9 GM/DL
GLUCOSE SERPL-MCNC: 140 MG/DL (ref 65–99)
HADV DNA SPEC NAA+PROBE: NOT DETECTED
HCOV 229E RNA SPEC QL NAA+PROBE: NOT DETECTED
HCOV HKU1 RNA SPEC QL NAA+PROBE: NOT DETECTED
HCOV NL63 RNA SPEC QL NAA+PROBE: NOT DETECTED
HCOV OC43 RNA SPEC QL NAA+PROBE: NOT DETECTED
HCT VFR BLD AUTO: 37.6 % (ref 34–46.6)
HGB BLD-MCNC: 12.2 G/DL (ref 12–15.9)
HMPV RNA NPH QL NAA+NON-PROBE: NOT DETECTED
HPIV1 RNA ISLT QL NAA+PROBE: NOT DETECTED
HPIV2 RNA SPEC QL NAA+PROBE: NOT DETECTED
HPIV3 RNA NPH QL NAA+PROBE: NOT DETECTED
HPIV4 P GENE NPH QL NAA+PROBE: NOT DETECTED
IMM GRANULOCYTES # BLD AUTO: 0.04 10*3/MM3 (ref 0–0.05)
IMM GRANULOCYTES NFR BLD AUTO: 0.6 % (ref 0–0.5)
LYMPHOCYTES # BLD AUTO: 0.75 10*3/MM3 (ref 0.7–3.1)
LYMPHOCYTES NFR BLD AUTO: 11.6 % (ref 19.6–45.3)
M PNEUMO IGG SER IA-ACNC: NOT DETECTED
MCH RBC QN AUTO: 28.9 PG (ref 26.6–33)
MCHC RBC AUTO-ENTMCNC: 32.4 G/DL (ref 31.5–35.7)
MCV RBC AUTO: 89.1 FL (ref 79–97)
MONOCYTES # BLD AUTO: 0.51 10*3/MM3 (ref 0.1–0.9)
MONOCYTES NFR BLD AUTO: 7.9 % (ref 5–12)
NEUTROPHILS NFR BLD AUTO: 4.63 10*3/MM3 (ref 1.7–7)
NEUTROPHILS NFR BLD AUTO: 71.4 % (ref 42.7–76)
NRBC BLD AUTO-RTO: 0 /100 WBC (ref 0–0.2)
NT-PROBNP SERPL-MCNC: 569 PG/ML (ref 0–1800)
PLATELET # BLD AUTO: 292 10*3/MM3 (ref 140–450)
PMV BLD AUTO: 8.9 FL (ref 6–12)
POTASSIUM SERPL-SCNC: 3.3 MMOL/L (ref 3.5–5.2)
PROT SERPL-MCNC: 7 G/DL (ref 6–8.5)
QT INTERVAL: 381 MS
RBC # BLD AUTO: 4.22 10*6/MM3 (ref 3.77–5.28)
RHINOVIRUS RNA SPEC NAA+PROBE: NOT DETECTED
RSV RNA NPH QL NAA+NON-PROBE: NOT DETECTED
SARS-COV-2 RNA NPH QL NAA+NON-PROBE: NOT DETECTED
SODIUM SERPL-SCNC: 142 MMOL/L (ref 136–145)
TROPONIN T SERPL-MCNC: <0.01 NG/ML (ref 0–0.03)
WBC NRBC COR # BLD: 6.48 10*3/MM3 (ref 3.4–10.8)

## 2022-12-08 PROCEDURE — 94799 UNLISTED PULMONARY SVC/PX: CPT

## 2022-12-08 PROCEDURE — 25010000002 METHYLPREDNISOLONE PER 40 MG: Performed by: HOSPITALIST

## 2022-12-08 PROCEDURE — 0 IOPAMIDOL PER 1 ML: Performed by: EMERGENCY MEDICINE

## 2022-12-08 PROCEDURE — 80053 COMPREHEN METABOLIC PANEL: CPT | Performed by: EMERGENCY MEDICINE

## 2022-12-08 PROCEDURE — 25010000002 METHYLPREDNISOLONE PER 125 MG: Performed by: EMERGENCY MEDICINE

## 2022-12-08 PROCEDURE — 94761 N-INVAS EAR/PLS OXIMETRY MLT: CPT

## 2022-12-08 PROCEDURE — 96374 THER/PROPH/DIAG INJ IV PUSH: CPT

## 2022-12-08 PROCEDURE — 94640 AIRWAY INHALATION TREATMENT: CPT

## 2022-12-08 PROCEDURE — 96376 TX/PRO/DX INJ SAME DRUG ADON: CPT

## 2022-12-08 PROCEDURE — 85025 COMPLETE CBC W/AUTO DIFF WBC: CPT | Performed by: EMERGENCY MEDICINE

## 2022-12-08 PROCEDURE — 84484 ASSAY OF TROPONIN QUANT: CPT | Performed by: EMERGENCY MEDICINE

## 2022-12-08 PROCEDURE — 71275 CT ANGIOGRAPHY CHEST: CPT

## 2022-12-08 PROCEDURE — 94664 DEMO&/EVAL PT USE INHALER: CPT

## 2022-12-08 PROCEDURE — 83880 ASSAY OF NATRIURETIC PEPTIDE: CPT | Performed by: EMERGENCY MEDICINE

## 2022-12-08 RX ORDER — POTASSIUM CHLORIDE 750 MG/1
40 TABLET, FILM COATED, EXTENDED RELEASE ORAL EVERY 4 HOURS
Status: COMPLETED | OUTPATIENT
Start: 2022-12-08 | End: 2022-12-08

## 2022-12-08 RX ORDER — LEVOTHYROXINE AND LIOTHYRONINE 19; 4.5 UG/1; UG/1
15 TABLET ORAL DAILY
Status: DISCONTINUED | OUTPATIENT
Start: 2022-12-09 | End: 2022-12-09 | Stop reason: HOSPADM

## 2022-12-08 RX ORDER — FUROSEMIDE 20 MG/1
20 TABLET ORAL DAILY
Status: DISCONTINUED | OUTPATIENT
Start: 2022-12-09 | End: 2022-12-09 | Stop reason: HOSPADM

## 2022-12-08 RX ORDER — CALCIUM CARBONATE 200(500)MG
2 TABLET,CHEWABLE ORAL 2 TIMES DAILY PRN
Status: DISCONTINUED | OUTPATIENT
Start: 2022-12-08 | End: 2022-12-09 | Stop reason: HOSPADM

## 2022-12-08 RX ORDER — LOSARTAN POTASSIUM 100 MG/1
100 TABLET ORAL DAILY
Status: DISCONTINUED | OUTPATIENT
Start: 2022-12-09 | End: 2022-12-09 | Stop reason: HOSPADM

## 2022-12-08 RX ORDER — NITROGLYCERIN 0.4 MG/1
0.4 TABLET SUBLINGUAL
Status: DISCONTINUED | OUTPATIENT
Start: 2022-12-08 | End: 2022-12-09 | Stop reason: HOSPADM

## 2022-12-08 RX ORDER — SACCHAROMYCES BOULARDII 250 MG
250 CAPSULE ORAL 2 TIMES DAILY
Status: DISCONTINUED | OUTPATIENT
Start: 2022-12-08 | End: 2022-12-09 | Stop reason: HOSPADM

## 2022-12-08 RX ORDER — BUDESONIDE 1 MG/2ML
1 INHALANT ORAL
Status: DISCONTINUED | OUTPATIENT
Start: 2022-12-08 | End: 2022-12-09 | Stop reason: HOSPADM

## 2022-12-08 RX ORDER — ONDANSETRON 2 MG/ML
4 INJECTION INTRAMUSCULAR; INTRAVENOUS EVERY 6 HOURS PRN
Status: DISCONTINUED | OUTPATIENT
Start: 2022-12-08 | End: 2022-12-09 | Stop reason: HOSPADM

## 2022-12-08 RX ORDER — IPRATROPIUM BROMIDE AND ALBUTEROL SULFATE 2.5; .5 MG/3ML; MG/3ML
3 SOLUTION RESPIRATORY (INHALATION)
Status: DISCONTINUED | OUTPATIENT
Start: 2022-12-08 | End: 2022-12-09 | Stop reason: HOSPADM

## 2022-12-08 RX ORDER — ONDANSETRON 4 MG/1
4 TABLET, FILM COATED ORAL EVERY 6 HOURS PRN
Status: DISCONTINUED | OUTPATIENT
Start: 2022-12-08 | End: 2022-12-09 | Stop reason: HOSPADM

## 2022-12-08 RX ORDER — ACETAMINOPHEN 160 MG/5ML
650 SOLUTION ORAL EVERY 4 HOURS PRN
Status: DISCONTINUED | OUTPATIENT
Start: 2022-12-08 | End: 2022-12-09 | Stop reason: HOSPADM

## 2022-12-08 RX ORDER — METHYLPREDNISOLONE SODIUM SUCCINATE 125 MG/2ML
60 INJECTION, POWDER, LYOPHILIZED, FOR SOLUTION INTRAMUSCULAR; INTRAVENOUS EVERY 12 HOURS
Status: DISCONTINUED | OUTPATIENT
Start: 2022-12-08 | End: 2022-12-08

## 2022-12-08 RX ORDER — METHYLPREDNISOLONE SODIUM SUCCINATE 40 MG/ML
40 INJECTION, POWDER, LYOPHILIZED, FOR SOLUTION INTRAMUSCULAR; INTRAVENOUS EVERY 12 HOURS
Status: DISCONTINUED | OUTPATIENT
Start: 2022-12-08 | End: 2022-12-09 | Stop reason: HOSPADM

## 2022-12-08 RX ORDER — PANTOPRAZOLE SODIUM 40 MG/1
40 TABLET, DELAYED RELEASE ORAL EVERY MORNING
Status: DISCONTINUED | OUTPATIENT
Start: 2022-12-09 | End: 2022-12-09 | Stop reason: HOSPADM

## 2022-12-08 RX ORDER — IPRATROPIUM BROMIDE AND ALBUTEROL SULFATE 2.5; .5 MG/3ML; MG/3ML
3 SOLUTION RESPIRATORY (INHALATION) EVERY 4 HOURS PRN
Status: DISCONTINUED | OUTPATIENT
Start: 2022-12-08 | End: 2022-12-08

## 2022-12-08 RX ORDER — LEVOTHYROXINE AND LIOTHYRONINE 19; 4.5 UG/1; UG/1
30 TABLET ORAL DAILY
Status: DISCONTINUED | OUTPATIENT
Start: 2022-12-09 | End: 2022-12-09 | Stop reason: HOSPADM

## 2022-12-08 RX ORDER — CARVEDILOL 6.25 MG/1
12.5 TABLET ORAL 2 TIMES DAILY
Status: DISCONTINUED | OUTPATIENT
Start: 2022-12-08 | End: 2022-12-09 | Stop reason: HOSPADM

## 2022-12-08 RX ORDER — SODIUM CHLORIDE 0.9 % (FLUSH) 0.9 %
10 SYRINGE (ML) INJECTION AS NEEDED
Status: DISCONTINUED | OUTPATIENT
Start: 2022-12-08 | End: 2022-12-09 | Stop reason: HOSPADM

## 2022-12-08 RX ORDER — SODIUM CHLORIDE 9 MG/ML
40 INJECTION, SOLUTION INTRAVENOUS AS NEEDED
Status: DISCONTINUED | OUTPATIENT
Start: 2022-12-08 | End: 2022-12-09 | Stop reason: HOSPADM

## 2022-12-08 RX ORDER — ASPIRIN 81 MG/1
81 TABLET, CHEWABLE ORAL DAILY
Status: DISCONTINUED | OUTPATIENT
Start: 2022-12-09 | End: 2022-12-09 | Stop reason: HOSPADM

## 2022-12-08 RX ORDER — SODIUM CHLORIDE 0.9 % (FLUSH) 0.9 %
10 SYRINGE (ML) INJECTION EVERY 12 HOURS SCHEDULED
Status: DISCONTINUED | OUTPATIENT
Start: 2022-12-08 | End: 2022-12-09 | Stop reason: HOSPADM

## 2022-12-08 RX ORDER — METHYLPREDNISOLONE SODIUM SUCCINATE 125 MG/2ML
125 INJECTION, POWDER, LYOPHILIZED, FOR SOLUTION INTRAMUSCULAR; INTRAVENOUS ONCE
Status: COMPLETED | OUTPATIENT
Start: 2022-12-08 | End: 2022-12-08

## 2022-12-08 RX ORDER — ACETAMINOPHEN 650 MG/1
650 SUPPOSITORY RECTAL EVERY 4 HOURS PRN
Status: DISCONTINUED | OUTPATIENT
Start: 2022-12-08 | End: 2022-12-09 | Stop reason: HOSPADM

## 2022-12-08 RX ORDER — IPRATROPIUM BROMIDE AND ALBUTEROL SULFATE 2.5; .5 MG/3ML; MG/3ML
3 SOLUTION RESPIRATORY (INHALATION)
Status: DISCONTINUED | OUTPATIENT
Start: 2022-12-08 | End: 2022-12-08

## 2022-12-08 RX ORDER — PAROXETINE 10 MG/1
10 TABLET, FILM COATED ORAL DAILY
Status: DISCONTINUED | OUTPATIENT
Start: 2022-12-09 | End: 2022-12-09

## 2022-12-08 RX ORDER — ATORVASTATIN CALCIUM 20 MG/1
20 TABLET, FILM COATED ORAL NIGHTLY
Status: DISCONTINUED | OUTPATIENT
Start: 2022-12-08 | End: 2022-12-09 | Stop reason: HOSPADM

## 2022-12-08 RX ORDER — IPRATROPIUM BROMIDE AND ALBUTEROL SULFATE 2.5; .5 MG/3ML; MG/3ML
3 SOLUTION RESPIRATORY (INHALATION) ONCE
Status: COMPLETED | OUTPATIENT
Start: 2022-12-08 | End: 2022-12-08

## 2022-12-08 RX ORDER — ACETAMINOPHEN 325 MG/1
650 TABLET ORAL EVERY 4 HOURS PRN
Status: DISCONTINUED | OUTPATIENT
Start: 2022-12-08 | End: 2022-12-09 | Stop reason: HOSPADM

## 2022-12-08 RX ORDER — ARFORMOTEROL TARTRATE 15 UG/2ML
15 SOLUTION RESPIRATORY (INHALATION)
Status: DISCONTINUED | OUTPATIENT
Start: 2022-12-08 | End: 2022-12-09 | Stop reason: HOSPADM

## 2022-12-08 RX ADMIN — IOPAMIDOL 95 ML: 755 INJECTION, SOLUTION INTRAVENOUS at 03:09

## 2022-12-08 RX ADMIN — METHYLPREDNISOLONE SODIUM SUCCINATE 40 MG: 40 INJECTION, POWDER, FOR SOLUTION INTRAMUSCULAR; INTRAVENOUS at 17:21

## 2022-12-08 RX ADMIN — POTASSIUM CHLORIDE 40 MEQ: 750 TABLET, EXTENDED RELEASE ORAL at 17:20

## 2022-12-08 RX ADMIN — IPRATROPIUM BROMIDE AND ALBUTEROL SULFATE 3 ML: 2.5; .5 SOLUTION RESPIRATORY (INHALATION) at 15:11

## 2022-12-08 RX ADMIN — IPRATROPIUM BROMIDE AND ALBUTEROL SULFATE 3 ML: 2.5; .5 SOLUTION RESPIRATORY (INHALATION) at 19:00

## 2022-12-08 RX ADMIN — ACETAMINOPHEN 650 MG: 325 TABLET, FILM COATED ORAL at 20:58

## 2022-12-08 RX ADMIN — METHYLPREDNISOLONE SODIUM SUCCINATE 125 MG: 125 INJECTION, POWDER, FOR SOLUTION INTRAMUSCULAR; INTRAVENOUS at 03:00

## 2022-12-08 RX ADMIN — IPRATROPIUM BROMIDE AND ALBUTEROL SULFATE 3 ML: 2.5; .5 SOLUTION RESPIRATORY (INHALATION) at 23:50

## 2022-12-08 RX ADMIN — IPRATROPIUM BROMIDE AND ALBUTEROL SULFATE 3 ML: 2.5; .5 SOLUTION RESPIRATORY (INHALATION) at 11:19

## 2022-12-08 RX ADMIN — IPRATROPIUM BROMIDE AND ALBUTEROL SULFATE 3 ML: 2.5; .5 SOLUTION RESPIRATORY (INHALATION) at 07:52

## 2022-12-08 RX ADMIN — IPRATROPIUM BROMIDE AND ALBUTEROL SULFATE 3 ML: .5; 3 SOLUTION RESPIRATORY (INHALATION) at 03:53

## 2022-12-08 RX ADMIN — POTASSIUM CHLORIDE 40 MEQ: 750 TABLET, EXTENDED RELEASE ORAL at 13:03

## 2022-12-08 NOTE — H&P
HISTORY AND PHYSICAL   [facility]        Date of Admission: 2022  Patient Identification:  Name: Dafne Mary  Age: 82 y.o.  Sex: female  :  1940  MRN: [id number]                     Primary Care Physician: Sintia Chatterjee PA    Chief Complaint: Shortness of breath    History of Present Illness:   Mrs Mary is a wonderfully pleasant 82-year-old female who came to the hospital due to complaints of shortness of breath.  She has known COPD and states she is not on supplemental oxygen.  She was worried about possible viral illness though her RVP is negative but some of her symptoms seem as if they could have been provoked by viral etiology.  She denies any fever or chills.  She is not really had any productive cough just progressive shortness of breath worsening.  Chest x-ray imaging led to CT angiogram and there is no complicating features such as lobar pneumonia or PE.  It does demonstrate some intralobular thickening which is likely related to her emphysema.  She reports oxygen dropping into the 80s while at home and with exertion.  She has had this nonproductive cough as well as chest tightness but denies any overt chest pain.  Denies any nausea vomiting abdominal pain diarrhea or dysuria.    Past Medical History:  Past Medical History:   Diagnosis Date   • Chronic diastolic congestive heart failure (HCC) 2022   • Depression    • Emphysema lung (HCC)    • GERD (gastroesophageal reflux disease)    • Heart failure (HCC)    • Hyperlipidemia    • Hypertension    • Hypothyroidism      Past Surgical History:  Past Surgical History:   Procedure Laterality Date   • EYE SURGERY Left    • PARATHYROIDECTOMY      Patient reports thyroidectomy partial not a para thyroidectomy.   • THYROIDECTOMY, PARTIAL      1984      Home Meds:  (Not in a hospital admission)      Allergies:  Allergies   Allergen Reactions   • Lansoprazole Nausea Only     NAUSEA  NAUSEA  NAUSEA   • Diphenhydramine Hcl  "(Sleep) Irritability   • Lisinopril Cough     Immunizations:  Immunization History   Administered Date(s) Administered   • COVID-19 (MODERNA) 1st, 2nd, 3rd Dose Only 2021, 2021, 2022   • Fluad Quad 65+ 2019   • Fluzone High Dose =>65 Years ([employer] ONLY) 2016, 2018, 2022   • Hepatitis A 2018   • Hepatitis B Vaccine Adult IM 2018   • Pneumococcal, Unspecified 2016   • Tdap 07/10/2017     Social History:   Social History     Social History Narrative   • Not on file     Social History     Socioeconomic History   • Marital status:    Tobacco Use   • Smoking status: Never   • Smokeless tobacco: Never   Vaping Use   • Vaping Use: Never used   Substance and Sexual Activity   • Alcohol use: Yes     Comment: \"occ\"; caffeine use- tea   • Drug use: No   • Sexual activity: Defer       Family History:  Family History   Problem Relation Age of Onset   • Alzheimer's disease Mother    • Lung disease Father    • Alcohol abuse Father    • Heart disease Brother    • Hypertension Brother    • Lung disease Brother    • Alcohol abuse Brother    • Cancer Brother         LUNG  WAS A SMOKER   • Thyroid disease Maternal Grandmother         Review of Systems  See history of present illness and past medical history.  Patient denies fever chills night sweats.  Denies any headache dizziness or loss of consciousness.  Admits to cough mainly dry with associated shortness of breath.  Denies chest pain palpitations hemoptysis abdominal pain diarrhea nausea vomiting dysuria bruising bleeding or focal loss of function.  Remainder of ROS is negative.    Objective:  T Max 24 hrs: Temp (24hrs), Av.5 °F (36.4 °C), Min:97.5 °F (36.4 °C), Max:97.5 °F (36.4 °C)    Vitals Ranges:   Temp:  [97.5 °F (36.4 °C)] 97.5 °F (36.4 °C)  Heart Rate:  [] 82  Resp:  [16-20] 18  BP: (135-166)/(73-96) 135/76      Exam:  /76 (BP Location: Right arm, Patient Position: Lying)   Pulse 82   " "Temp 97.5 °F (36.4 °C)   Resp 18   Ht 152.4 cm (60\")   Wt 65.1 kg (143 lb 8.3 oz)   SpO2 98%   BMI 28.03 kg/m²     General Appearance:    Alert, cooperative, nontoxic, alert and oriented x3, seen in ER room 24 with no family present at bedside.  Case discussed with managing RN   Head:    Normocephalic, without obvious abnormality, atraumatic   Eyes:    PERRL, conjunctivae/corneas clear, EOM's intact, both eyes   Ears:    Normal external ear canals, both ears   Nose:   Nares normal, septum midline, mucosa normal, no drainage    or sinus tenderness   Throat:   Lips, mucosa, and tongue normal   Neck:   Supple, no JVD       Lungs:    Moving pretty good air to her bases with some bilateral rhonchi as well as a mild apical wheeze to auscultation bilaterally, respirations unlabored with conversation   Chest Wall:    No tenderness or deformity    Heart:    Regular rate and rhythm, S1 and S2 normal   Abdomen:     Soft, nontender, bowel sounds active all four quadrants   Extremities:  Moving all, no cyanosis or edema   Pulses:   2+ and symmetric all extremities           Neurologic:   CNII-XII intact, normal strength without focal deficits      .    Data Review:  Labs in chart were reviewed.             Imaging Results (All)     Procedure Component Value Units Date/Time    CT Angiogram Chest [[id number]] Collected: 12/08/22 0352     Updated: 12/08/22 0352    Narrative:        Patient: YANG DELAROSA  Time Out: 03:52  Exam(s): CTA CHEST     EXAM:    CT Angiography Chest With Intravenous Contrast    CLINICAL HISTORY:     Reason for exam: soa.    TECHNIQUE:    Axial computed tomographic angiography images of the chest with   intravenous contrast.  CTDI is 2.49 mGy and DLP is 90.4 mGy-cm.  This CT   exam was performed according to the principle of ALARA (As Low As   Reasonably Achievable) by using one or more of the following dose   reduction techniques: automated exposure control, adjustment of the mA   and or kV according to " patient size, and or use of iterative   reconstruction technique.    3D and MIP reconstructed images were created and reviewed.    COMPARISON:    No relevant prior studies available.    FINDINGS:    Limitations:  There is respiratory artifact, particularly at the lung   bases, which degrades image quality on multiple image slices.    Pulmonary arteries:  Accounting for limitations with respiratory   artifact, there is no evidence for pulmonary embolism.    Aorta:  No acute findings.  No thoracic aortic aneurysm.    Lungs:  No definite focal airspace consolidation noted.  Subtle areas   of central increased attenuation with questionable regions of   interlobular septal thickening are involving both lungs which are   somewhat hyperexpanded.    Pleural space:  Unremarkable.  No significant effusion.  No   pneumothorax.    Heart:  The cardiac chambers are borderline prominent.  No evidence for   right heart strain.  Coronary artery calcification in the LAD is of   uncertain clinical significance in patients of this age group.  No   pericardial effusion.    Bones joints: No acute osseous abnormality.  Mild levoscoliosis of the   thoracolumbar region.    Soft tissues:  Unremarkable.    Lymph nodes:  Unremarkable.  No enlarged lymph nodes.    IMPRESSION:       1.  Accounting for limitations with respiratory artifact, there is no   evidence for pulmonary embolism.  2.  No definite focal airspace consolidation noted.  Subtle areas of   central increased attenuation with questionable regions of interlobular   septal thickening are involving both lungs which are somewhat   hyperexpanded.  The appearance is not consistent with atelectasis and   subtle atypical infection, to include viral or atypical bacterial   etiologies or subtle interstitial edema may be present.  No pleural   effusion or pneumothorax.      Impression:          Electronically signed by Rock Solorio MD on 12-08-22 at 0352    XR Chest 1 View [[id number]]  Collected: 12/08/22 0020     Updated: 12/08/22 0026    Narrative:      Chest radiograph     HISTORY:Shortness of air, cough     TECHNIQUE: Two PA and lateral radiographs     COMPARISON:Chest radiograph 05/03/2022 and CTA chest 05/03/2022       Impression:      FINDINGS AND IMPRESSION:  No new pulmonary consolidation, pleural effusion or pneumothorax is  seen. Asymmetric opacification within the medial aspect of the right  lung base is present, as seen since 02/13/2018. Cardiac silhouette is  mildly enlarged.     This report was finalized on 12/8/2022 12:23 AM by Dr. Arturo Shi M.D.               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       Plan:    COPD with acute exacerbation with no aspects of pneumonia or complicating feature on CT other than some intralobular thickening likely related to her emphysema   -I think this patient cough is early and often were dealing with more of a mild to moderate exacerbation   -Normally not on oxygen at home and currently on 2 L nasal cannula   -Maximize nebulized meds as well as IV steroids    Hypothyroidism continue thyroid meds    Hypertension continue medication/stable    Replace K with 2 doses today    GERD on PPI so no additional GI prophylaxis needed    SCDs for DVT prophylaxis      Further recommendations to follow as clinical course unfolds    Beto Marquez MD  12/8/2022  11:42 EST

## 2022-12-08 NOTE — ED NOTES
"Nursing report ED to floor  Dafne Mary  82 y.o.  female    HPI :   Chief Complaint   Patient presents with    Shortness of Breath    Cough       Admitting doctor:   Beto Marquez MD    Admitting diagnosis:   The primary encounter diagnosis was COPD with acute exacerbation (HCC). Diagnoses of Exertional dyspnea and Hypoxemia were also pertinent to this visit.    Code status:   Current Code Status       Date Active Code Status Order ID Comments User Context       12/8/2022 0651 CPR (Attempt to Resuscitate) [id number]  Maricarmen Gambino APRN ED        Question Answer    Code Status (Patient has no pulse and is not breathing) CPR (Attempt to Resuscitate)    Medical Interventions (Patient has pulse or is breathing) Full Support                    Allergies:   Lansoprazole, Diphenhydramine hcl (sleep), and Lisinopril    Intake and Output  No intake or output data in the 24 hours ending 12/08/22 1710    Weight:       12/08/22  0801   Weight: 65.1 kg (143 lb 8.3 oz)       Most recent vitals:   Vitals:    12/08/22 0650 12/08/22 0752 12/08/22 0801 12/08/22 1119   BP:   135/76    BP Location:   Right arm    Patient Position:   Lying    Pulse: 89 87 89 82   Resp:  18 17 18   Temp:       SpO2: 98% 100% 99% 98%   Weight:   65.1 kg (143 lb 8.3 oz)    Height:   152.4 cm (60\")        Active LDAs/IV Access:   Lines, Drains & Airways       Active LDAs       Name Placement date Placement time Site Days    Peripheral IV 12/08/22 0127 Left Antecubital 12/08/22  0127  Antecubital  less than 1                    Labs (abnormal labs have a star):   Labs Reviewed   COMPREHENSIVE METABOLIC PANEL - Abnormal; Notable for the following components:       Result Value    Glucose 140 (*)     Potassium 3.3 (*)     Alkaline Phosphatase 135 (*)     All other components within normal limits    Narrative:     GFR Normal >60  Chronic Kidney Disease <60  Kidney Failure <15    The GFR formula is only valid for adults with stable renal function " between ages 18 and 70.   CBC WITH AUTO DIFFERENTIAL - Abnormal; Notable for the following components:    RDW 11.8 (*)     Lymphocyte % 11.6 (*)     Eosinophil % 7.3 (*)     Immature Grans % 0.6 (*)     Eosinophils, Absolute 0.47 (*)     All other components within normal limits   RESPIRATORY PANEL PCR W/ COVID-19 (SARS-COV-2) [facility]ILMA/JORGE/[facility] IN-HOUSE, NP SWAB IN [place], 3-4 HR TAT - Normal    Narrative:     In the setting of a positive respiratory panel with a viral infection PLUS a negative procalcitonin without other underlying concern for bacterial infection, consider observing off antibiotics or discontinuation of antibiotics and continue supportive care. If the respiratory panel is positive for atypical bacterial infection (Bordetella pertussis, Chlamydophila pneumoniae, or Mycoplasma pneumoniae), consider antibiotic de-escalation to target atypical bacterial infection.   TROPONIN (IN-HOUSE) - Normal    Narrative:     Troponin T Reference Range:  <= 0.03 ng/mL-   Negative for AMI  >0.03 ng/mL-     Abnormal for myocardial necrosis.  Clinicians would have to utilize clinical acumen, EKG, Troponin and serial changes to determine if it is an Acute Myocardial Infarction or myocardial injury due to an underlying chronic condition.       Results may be falsely decreased if patient taking Biotin.     BNP (IN-HOUSE) - Normal    Narrative:     Among patients with dyspnea, NT-proBNP is highly sensitive for the detection of acute congestive heart failure. In addition NT-proBNP of <300 pg/ml effectively rules out acute congestive heart failure with 99% negative predictive value.    Results may be falsely decreased if patient taking Biotin.     CBC AND DIFFERENTIAL    Narrative:     The following orders were created for panel order CBC & Differential.  Procedure                               Abnormality         Status                     ---------                               -----------         ------                      CBC Auto Differential[[id number]]        Abnormal            Final result                 Please view results for these tests on the individual orders.       EKG:   ECG 12 Lead Dyspnea   Final Result   HEART RATE= 88  bpm   RR Interval= 682  ms   MN Interval= 183  ms   P Horizontal Axis= 8  deg   P Front Axis= 73  deg   QRSD Interval= 142  ms   QT Interval= 381  ms   QRS Axis= -74  deg   T Wave Axis= 77  deg   - ABNORMAL ECG -   Sinus rhythm   Multiple ventricular premature complexes   Nonspecific IVCD with LAD   Left ventricular hypertrophy   When compared with ECG of 03-May-2022 13:43:14,   New conduction abnormality   Electronically Signed By: Marito Shay ([facility]) 08-Dec-2022 08:00:06   Date and Time of Study: 2022-12-07 23:36:55          Meds given in ED:   Medications   sodium chloride 0.9 % flush 10 mL (has no administration in time range)   sodium chloride 0.9 % flush 10 mL (10 mL Intravenous Not Given 12/8/22 0820)   sodium chloride 0.9 % flush 10 mL (has no administration in time range)   sodium chloride 0.9 % infusion 40 mL (has no administration in time range)   nitroglycerin (NITROSTAT) SL tablet 0.4 mg (has no administration in time range)   acetaminophen (TYLENOL) tablet 650 mg (has no administration in time range)     Or   acetaminophen (TYLENOL) 160 MG/5ML solution 650 mg (has no administration in time range)     Or   acetaminophen (TYLENOL) suppository 650 mg (has no administration in time range)   ondansetron (ZOFRAN) tablet 4 mg (has no administration in time range)     Or   ondansetron (ZOFRAN) injection 4 mg (has no administration in time range)   calcium carbonate (TUMS) chewable tablet 500 mg (200 mg elemental) (has no administration in time range)   ipratropium-albuterol (DUO-NEB) nebulizer solution 3 mL (3 mL Nebulization Given 12/8/22 1511)   methylPREDNISolone sodium succinate (SOLU-Medrol) injection 40 mg (has no administration in time range)   potassium chloride (K-DUR,KLOR-CON)  "ER tablet 40 mEq (40 mEq Oral Given 12/8/22 1303)   ipratropium-albuterol (DUO-NEB) nebulizer solution 3 mL (3 mL Nebulization Given 12/8/22 0353)   methylPREDNISolone sodium succinate (SOLU-Medrol) injection 125 mg (125 mg Intravenous Given 12/8/22 0300)   iopamidol (ISOVUE-370) 76 % injection 100 mL (95 mL Intravenous Given 12/8/22 0309)       Imaging results:  XR Chest 1 View    Result Date: 12/8/2022  FINDINGS AND IMPRESSION: No new pulmonary consolidation, pleural effusion or pneumothorax is seen. Asymmetric opacification within the medial aspect of the right lung base is present, as seen since 02/13/2018. Cardiac silhouette is mildly enlarged.  This report was finalized on 12/8/2022 12:23 AM by Dr. Arturo Shi M.D.      CT Angiogram Chest    Result Date: 12/8/2022  Electronically signed by Rock Solorio MD on 12-08-22 at 0352     Ambulatory status:   -Independent    Social issues:   Social History     Socioeconomic History    Marital status:    Tobacco Use    Smoking status: Never    Smokeless tobacco: Never   Vaping Use    Vaping Use: Never used   Substance and Sexual Activity    Alcohol use: Yes     Comment: \"occ\"; caffeine use- tea    Drug use: No    Sexual activity: Defer       NIH Stroke Scale:   NA     Nursing report ED to floor:    "

## 2022-12-08 NOTE — ED PROVIDER NOTES
EMERGENCY DEPARTMENT ENCOUNTER    CHIEF COMPLAINT  Chief Complaint: Shortness of breath  History given by: Patient  History limited by: None  Room Number: 15/15  PMD: Sintia Chatterjee PA      HPI:  Pt is a 82 y.o. female who presents complaining of shortness of breath that has been present now for approximately 1 week.  She does have a history of COPD but is not oxygen dependent.  She reports that the symptoms are made much worse by exertion and improved by rest.  She states that her oxygen saturations will fall to the upper 80s with any type of exertion.  She also complains of a nonproductive cough as well as some chest tightness.  She denies overt chest pain, back pain, fever/chills, abdominal pain, or nausea and vomiting.    Duration: Ongoing for the past week  Onset: Gradual  Location: Pulmonary  Radiation: None  Quality: Respiratory distress/shortness of breath  Intensity/Severity: Moderate  Progression: Worsening  Associated Symptoms: Cough, chest tightness  Aggravating Factors: Exertion  Alleviating Factors: Rest  Previous Episodes: Yes, history of COPD/emphysema  Treatment before arrival: Home albuterol without improvement    PAST MEDICAL HISTORY  Active Ambulatory Problems     Diagnosis Date Noted   • Anxiety 07/26/2016   • Arteriosclerosis of both carotid arteries 07/26/2016   • Chronic interstitial cystitis 07/26/2016   • Cough 07/26/2016   • Pulmonary emphysema (HCC) 07/26/2016   • Gastroesophageal reflux disease 07/26/2016   • Fibromyalgia 07/26/2016   • Headache 07/26/2016   • Hematuria 07/26/2016   • Hoarseness 07/26/2016   • Hyperlipidemia 06/08/2012   • Benign hypertension 07/26/2016   • Postoperative hypothyroidism 06/08/2012   • Muscle spasms of both lower extremities 07/26/2016   • Palpitations 07/26/2016   • Shortness of breath 07/26/2016   • Postmenopausal osteoporosis 10/17/2016   • Chronic fatigue 10/17/2016   • 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/17/2016   • Dysuria 12/15/2017   • Dry cough 12/15/2017  "  • Spondylolysis, lumbar region 06/08/2012   • Vocal cord paralysis 08/24/2021   • Abnormal finding on thyroid function test 08/24/2021   • Positional lightheadedness 11/17/2022   • Chronic diastolic congestive heart failure (HCC) 11/17/2022     Resolved Ambulatory Problems     Diagnosis Date Noted   • Leukocytes in urine 12/15/2017   • Cystitis with hematuria 12/15/2017   • Acute cystitis without hematuria 12/15/2017   • Acute respiratory failure with hypoxia and hypercapnia (HCC) 05/03/2022     Past Medical History:   Diagnosis Date   • Depression    • Emphysema lung (HCC)    • GERD (gastroesophageal reflux disease)    • Heart failure (HCC)    • Hypertension    • Hypothyroidism        PAST SURGICAL HISTORY  Past Surgical History:   Procedure Laterality Date   • EYE SURGERY Left    • PARATHYROIDECTOMY      Patient reports thyroidectomy partial not a para thyroidectomy.   • THYROIDECTOMY, PARTIAL      1984       FAMILY HISTORY  Family History   Problem Relation Age of Onset   • Alzheimer's disease Mother    • Lung disease Father    • Alcohol abuse Father    • Heart disease Brother    • Hypertension Brother    • Lung disease Brother    • Alcohol abuse Brother    • Cancer Brother         LUNG  WAS A SMOKER   • Thyroid disease Maternal Grandmother        SOCIAL HISTORY  Social History     Socioeconomic History   • Marital status:    Tobacco Use   • Smoking status: Never   • Smokeless tobacco: Never   Vaping Use   • Vaping Use: Never used   Substance and Sexual Activity   • Alcohol use: Yes     Comment: \"occ\"; caffeine use- tea   • Drug use: No   • Sexual activity: Defer       ALLERGIES  Lansoprazole, Diphenhydramine hcl (sleep), and Lisinopril    REVIEW OF SYSTEMS  Review of Systems   Constitutional: Negative for fever.   HENT: Negative for sore throat.    Eyes: Negative.    Respiratory: Positive for cough, chest tightness and shortness of breath.    Cardiovascular: Negative for chest pain.   Gastrointestinal: " Negative for abdominal pain, diarrhea and vomiting.   Genitourinary: Negative for dysuria.   Musculoskeletal: Negative for neck pain.   Skin: Negative for rash.   Allergic/Immunologic: Negative.    Neurological: Negative for weakness, numbness and headaches.   Hematological: Negative.    Psychiatric/Behavioral: Negative.    All other systems reviewed and are negative.      PHYSICAL EXAM  ED Triage Vitals [12/07/22 2119]   Temp Heart Rate Resp BP SpO2   97.5 °F (36.4 °C) 99 20 160/85 94 %      Temp src Heart Rate Source Patient Position BP Location FiO2 (%)   -- -- -- -- --       Physical Exam  Vitals and nursing note reviewed.   Constitutional:       General: She is not in acute distress.  HENT:      Head: Normocephalic and atraumatic.   Eyes:      Extraocular Movements: EOM normal.      Pupils: Pupils are equal, round, and reactive to light.   Cardiovascular:      Rate and Rhythm: Normal rate and regular rhythm.      Heart sounds: Normal heart sounds.   Pulmonary:      Effort: Pulmonary effort is normal. No respiratory distress.      Breath sounds: Normal breath sounds.   Abdominal:      Palpations: Abdomen is soft.      Tenderness: There is no abdominal tenderness. There is no guarding or rebound.   Musculoskeletal:         General: No edema. Normal range of motion.      Cervical back: Normal range of motion and neck supple.   Skin:     General: Skin is warm and dry.      Findings: No rash.   Neurological:      Mental Status: She is alert and oriented to person, place, and time.      Sensory: Sensation is intact.      Motor: Motor strength is normal.   Psychiatric:         Mood and Affect: Mood and affect normal.         LAB RESULTS  Lab Results (last 24 hours)     Procedure Component Value Units Date/Time    Respiratory Panel PCR w/COVID-19(SARS-CoV-2) [facility]/GILMA/JORGE/PAD/COR/MAD/HAWA In-House, NP Swab in UTM/VTM, 3-4 HR TAT - Swab, Nasopharynx [[id number]]  (Normal) Collected: 12/07/22 3985    Specimen: Swab from  Nasopharynx Updated: 12/08/22 0025     ADENOVIRUS, PCR Not Detected     Coronavirus 229E Not Detected     Coronavirus HKU1 Not Detected     Coronavirus NL63 Not Detected     Coronavirus OC43 Not Detected     COVID19 Not Detected     Human Metapneumovirus Not Detected     Human Rhinovirus/Enterovirus Not Detected     Influenza A PCR Not Detected     Influenza B PCR Not Detected     Parainfluenza Virus 1 Not Detected     Parainfluenza Virus 2 Not Detected     Parainfluenza Virus 3 Not Detected     Parainfluenza Virus 4 Not Detected     RSV, PCR Not Detected     Bordetella pertussis pcr Not Detected     Bordetella parapertussis PCR Not Detected     Chlamydophila pneumoniae PCR Not Detected     Mycoplasma pneumo by PCR Not Detected    Narrative:      In the setting of a positive respiratory panel with a viral infection PLUS a negative procalcitonin without other underlying concern for bacterial infection, consider observing off antibiotics or discontinuation of antibiotics and continue supportive care. If the respiratory panel is positive for atypical bacterial infection (Bordetella pertussis, Chlamydophila pneumoniae, or Mycoplasma pneumoniae), consider antibiotic de-escalation to target atypical bacterial infection.    CBC & Differential [[id number]]  (Abnormal) Collected: 12/08/22 0128    Specimen: Blood Updated: 12/08/22 0139    Narrative:      The following orders were created for panel order CBC & Differential.  Procedure                               Abnormality         Status                     ---------                               -----------         ------                     CBC Auto Differential[[id number]]        Abnormal            Final result                 Please view results for these tests on the individual orders.    Comprehensive Metabolic Panel [[id number]]  (Abnormal) Collected: 12/08/22 0128    Specimen: Blood Updated: 12/08/22 0228     Glucose 140 mg/dL      BUN 14 mg/dL      Creatinine 0.91  mg/dL      Sodium 142 mmol/L      Potassium 3.3 mmol/L      Chloride 102 mmol/L      CO2 28.6 mmol/L      Calcium 9.3 mg/dL      Total Protein 7.0 g/dL      Albumin 4.10 g/dL      ALT (SGPT) 11 U/L      AST (SGOT) 18 U/L      Alkaline Phosphatase 135 U/L      Total Bilirubin 0.4 mg/dL      Globulin 2.9 gm/dL      A/G Ratio 1.4 g/dL      BUN/Creatinine Ratio 15.4     Anion Gap 11.4 mmol/L      eGFR 63.1 mL/min/1.73      Comment: National Kidney Foundation and American Society of Nephrology (ASN) Task Force recommended calculation based on the Chronic Kidney Disease Epidemiology Collaboration (CKD-EPI) equation refit without adjustment for race.       Narrative:      GFR Normal >60  Chronic Kidney Disease <60  Kidney Failure <15    The GFR formula is only valid for adults with stable renal function between ages 18 and 70.    Troponin [[id number]]  (Normal) Collected: 12/08/22 0128    Specimen: Blood Updated: 12/08/22 0227     Troponin T <0.010 ng/mL     Narrative:      Troponin T Reference Range:  <= 0.03 ng/mL-   Negative for AMI  >0.03 ng/mL-     Abnormal for myocardial necrosis.  Clinicians would have to utilize clinical acumen, EKG, Troponin and serial changes to determine if it is an Acute Myocardial Infarction or myocardial injury due to an underlying chronic condition.       Results may be falsely decreased if patient taking Biotin.      BNP [[id number]]  (Normal) Collected: 12/08/22 0128    Specimen: Blood Updated: 12/08/22 0210     proBNP 569.0 pg/mL     Narrative:      Among patients with dyspnea, NT-proBNP is highly sensitive for the detection of acute congestive heart failure. In addition NT-proBNP of <300 pg/ml effectively rules out acute congestive heart failure with 99% negative predictive value.    Results may be falsely decreased if patient taking Biotin.      CBC Auto Differential [[id number]]  (Abnormal) Collected: 12/08/22 0128    Specimen: Blood Updated: 12/08/22 0139     WBC 6.48 10*3/mm3      RBC  4.22 10*6/mm3      Hemoglobin 12.2 g/dL      Hematocrit 37.6 %      MCV 89.1 fL      MCH 28.9 pg      MCHC 32.4 g/dL      RDW 11.8 %      RDW-SD 38.4 fl      MPV 8.9 fL      Platelets 292 10*3/mm3      Neutrophil % 71.4 %      Lymphocyte % 11.6 %      Monocyte % 7.9 %      Eosinophil % 7.3 %      Basophil % 1.2 %      Immature Grans % 0.6 %      Neutrophils, Absolute 4.63 10*3/mm3      Lymphocytes, Absolute 0.75 10*3/mm3      Monocytes, Absolute 0.51 10*3/mm3      Eosinophils, Absolute 0.47 10*3/mm3      Basophils, Absolute 0.08 10*3/mm3      Immature Grans, Absolute 0.04 10*3/mm3      nRBC 0.0 /100 WBC           I ordered the above labs and reviewed the results    RADIOLOGY  CT Angiogram Chest   Final Result         Electronically signed by Rock Solorio MD on 12-08-22 at 0352      XR Chest 1 View   Final Result   FINDINGS AND IMPRESSION:   No new pulmonary consolidation, pleural effusion or pneumothorax is   seen. Asymmetric opacification within the medial aspect of the right   lung base is present, as seen since 02/13/2018. Cardiac silhouette is   mildly enlarged.       This report was finalized on 12/8/2022 12:23 AM by Dr. Arturo Shi M.D.               I ordered the above noted radiological studies. Interpreted by radiologist.  Reviewed by me in PACS.       PROCEDURES  Procedures  EKG          EKG time: 0005  Rhythm/Rate: NSR, 64  P waves and NH: nml  QRS, axis: nml, nml   ST and T waves: nml     Interpreted Contemporaneously by me, independently viewed  unchanged compared to prior 12/6/22      PROGRESS AND CONSULTS  ED Course as of 12/08/22 0534   Thu Dec 08, 2022   0423 On reevaluation, the patient continues on 2 L of oxygen by nasal cannula with oxygen saturations of 98%.  She actually reports that following treatment, she is feeling quite a bit better.  Given the increased oxygen demand as well as oxygen saturations of 86% following ambulation, we will admit her to the hospital for COPD  exacerbation.  The patient is in agreement with that plan and all questions have been answered. [BM]   0532 Case discussed with ANTONELLA Dinh for [facility], who agrees to admit the patient to the hospital for Dr. Griffin. [BM]      ED Course User Index  [BM] Sachin Arredondo MD     The patient was wearing a facemask upon entrance into the room and remained in such throughout their visit.  I was wearing PPE including a facemask, eye protection, as well as gloves at any point entering the room and throughout the visit      MEDICAL DECISION MAKING  Results were reviewed/discussed with the patient and they were also made aware of online access. Pt also made aware that some labs, such as cultures, will not be resulted during ER visit and follow up with PMD is necessary.     MDM  Number of Diagnoses or Management Options  COPD with acute exacerbation (HCC)  Exertional dyspnea  Hypoxemia  Diagnosis management comments: Differential diagnosis includes but is not limited to COPD exacerbation, viral upper respiratory infection, COVID-19, congestive heart failure exacerbation, pneumonia, pneumothorax, acute coronary syndrome, or pulmonary embolism.       Amount and/or Complexity of Data Reviewed  Clinical lab tests: ordered and reviewed  Tests in the radiology section of CPT®: ordered and reviewed  Tests in the medicine section of CPT®: ordered and reviewed  Review and summarize past medical records: yes (Upon medical records review, the patient was last seen and evaluated and admitted to the hospital on 5/3/2022.  Records show that she was treated for COVID-19 with hypoxemic respiratory failure)  Discuss the patient with other providers: yes (ANTONELLA Dinh for [facility], who will admit the patient for Dr. Griffin)  Independent visualization of images, tracings, or specimens: yes (Chest x-ray reviewed by myself and interpreted as no edema or infiltrate  CTA chest shows no pulmonary embolism or infiltrate)            DIAGNOSIS  Final diagnoses:   COPD with acute exacerbation (HCC)   Exertional dyspnea   Hypoxemia       DISPOSITION  ADMISSION    Discussed treatment plan and reason for admission with pt/family and admitting physician.  Pt/family voiced understanding of the plan for admission for further testing/treatment as needed.         Latest Documented Vital Signs:  As of 05:34 EST  BP- 161/96 HR- 89 Temp- 97.5 °F (36.4 °C) O2 sat- 99%         Sachin Arredondo MD  12/08/22 0536

## 2022-12-08 NOTE — ED TRIAGE NOTES
Pt to ER from home via [facility] (incident # [id number]) with c/o cough and soa for approx 2 days. Pt reports hx of similar back in may when she was dx with rhinovirus. Pt denies any known sick contacts.        Pt masked in triage, staff in appropriate ppe.

## 2022-12-08 NOTE — ED NOTES
Pt to restroom on room air, complains of shortness of breath on exertion. Pt placed on monitor back in room, 86% on room air after ambulation. Pt placed on 2L NC with positive response: sats 98% on 2L

## 2022-12-08 NOTE — CASE MANAGEMENT/SOCIAL WORK
Discharge Planning Assessment  [facility]     Patient Name: Dafne Mary  MRN: [id number]  Today's Date: 12/8/2022    Admit Date: 12/8/2022    Plan: plans to return home- [facility]CP will follow for o2 needs   Discharge Needs Assessment     Row Name 12/08/22 1501       Living Environment    People in Home alone    Current Living Arrangements home    Primary Care Provided by self    Provides Primary Care For no one    Family Caregiver if Needed child(tiffanie), adult    Family Caregiver Names daughter, Felicia and son, Charisse    Quality of Family Relationships helpful;involved;supportive    Able to Return to Prior Arrangements yes       Resource/Environmental Concerns    Resource/Environmental Concerns none    Transportation Concerns none       Transition Planning    Patient/Family Anticipates Transition to home    Patient/Family Anticipated Services at Transition none       Discharge Needs Assessment    Readmission Within the Last 30 Days no previous admission in last 30 days    Equipment Currently Used at Home none    Concerns to be Addressed no discharge needs identified;denies needs/concerns at this time               Discharge Plan     Row Name 12/08/22 1503       Plan    Plan plans to return home- [facility] will follow for o2 needs    Patient/Family in Agreement with Plan yes    Plan Comments Spoke with patient at bedside.  Introduced self and explained role.  Facesheet verified.  Patient lives alone in a single story house.  There are several steps to enter, but none inside.  She is IADLS and drives.  She does not use any DME and does not have a HH or SNF history. Patient currently on 2L o2.  If o2 is needed at dc, patient agreeable to using [facility].  At dc patient plans to return home and family will transport.  [facility]P will follow. Karin JUAN RN              Continued Care and Services - Admitted Since 12/8/2022    Coordination has not been started for this encounter.          Demographic Summary     Row Name 12/08/22 1500        General Information    Admission Type inpatient    Arrived From emergency department    Required Notices Provided Important Message from Medicare    Referral Source admission list    Reason for Consult discharge planning    Preferred Language English               Functional Status     Row Name 12/08/22 1501       Functional Status    Usual Activity Tolerance good    Current Activity Tolerance good       Functional Status, IADL    Medications independent    Meal Preparation independent    Housekeeping independent    Laundry independent    Shopping independent       Mental Status    General Appearance WDL WDL               Psychosocial    No documentation.                Abuse/Neglect    No documentation.                Legal    No documentation.                Substance Abuse    No documentation.                Patient Forms    No documentation.                   Karin Good RN

## 2022-12-08 NOTE — PROGRESS NOTES
Clinical Pharmacy Services: Medication History    Dafne Mary is a 82 y.o. female presenting to [facility] for COPD with acute exacerbation (HCC) [J44.1]    She  has a past medical history of Chronic diastolic congestive heart failure (HCC) (11/17/2022), Depression, Emphysema lung (HCC), GERD (gastroesophageal reflux disease), Heart failure (HCC), Hyperlipidemia, Hypertension, and Hypothyroidism.    Allergies as of 12/07/2022 - Reviewed 12/07/2022   Allergen Reaction Noted   • Lansoprazole Nausea Only 12/19/2012   • Diphenhydramine hcl (sleep) Irritability 07/27/2016   • Lisinopril Cough 05/01/2017       Medication information was obtained from: pharmacy  Pharmacy and Phone Number: [email or web address]    Prior to Admission Medications     Prescriptions Last Dose Informant Patient Reported? Taking?    [place] Thyroid 15 MG tablet   No Yes    Take 1 tablet by mouth once daily    [place] Thyroid 30 MG tablet   No Yes    Take 1 tablet by mouth once daily    aspirin 81 MG chewable tablet   Yes Yes    Chew 81 mg Daily.    atorvastatin (LIPITOR) 20 MG tablet   No Yes    Take 1 tablet by mouth Every Night.    Breztri Aerosphere 160-9-4.8 MCG/ACT aerosol inhaler   Yes Yes    2 puffs 2 (Two) Times a Day.    carvedilol (COREG) 12.5 MG tablet   No Yes    Take 1 tablet by mouth 2 (Two) Times a Day.    furosemide (LASIX) 20 MG tablet   No Yes    Take 1 tablet by mouth Daily.    losartan (COZAAR) 100 MG tablet   No Yes    Take 1 tablet by mouth once daily    nitrofurantoin, macrocrystal-monohydrate, (Macrobid) 100 MG capsule   No Yes    Take 1 capsule by mouth 2 (Two) Times a Day.    Patient taking differently:  Take 100 mg by mouth 2 (Two) Times a Day. 12/5 x 7 days    omeprazole (priLOSEC) 20 MG capsule   Yes Yes    Take 20 mg by mouth Daily.    PARoxetine (PAXIL) 10 MG tablet   No Yes    Take 1 tablet by mouth once daily    saccharomyces boulardii (Florastor) 250 MG capsule   No No    Take 1 capsule by mouth 2 (Two) Times a  Day.    Triamcinolone Acetonide (NASACORT AQ) 55 MCG/ACT nasal inhaler   No No    2 SPRAYS IN EACH NOSTRIL ONCE DAILY    Patient taking differently:  As Needed. 2 SPRAYS IN EACH NOSTRIL ONCE DAILY            Medication notes: Pharmacy confirmed SIG of inhalers, total dose of armor thyroid is 45mg/     This medication list is complete to the best of my knowledge as of 12/8/2022    Please call if questions.    Sharan Milner, PharmD   12/8/2022 16:26 EST

## 2022-12-09 ENCOUNTER — READMISSION MANAGEMENT (OUTPATIENT)
Dept: CALL CENTER | Facility: HOSPITAL | Age: 82
End: 2022-12-09

## 2022-12-09 VITALS
HEIGHT: 60 IN | BODY MASS INDEX: 28.18 KG/M2 | WEIGHT: 143.52 LBS | OXYGEN SATURATION: 97 % | TEMPERATURE: 98.2 F | RESPIRATION RATE: 16 BRPM | DIASTOLIC BLOOD PRESSURE: 79 MMHG | HEART RATE: 87 BPM | SYSTOLIC BLOOD PRESSURE: 146 MMHG

## 2022-12-09 LAB
ANION GAP SERPL CALCULATED.3IONS-SCNC: 14.8 MMOL/L (ref 5–15)
BUN SERPL-MCNC: 21 MG/DL (ref 8–23)
BUN/CREAT SERPL: 20.4 (ref 7–25)
CALCIUM SPEC-SCNC: 9.8 MG/DL (ref 8.6–10.5)
CHLORIDE SERPL-SCNC: 103 MMOL/L (ref 98–107)
CO2 SERPL-SCNC: 22.2 MMOL/L (ref 22–29)
CREAT SERPL-MCNC: 1.03 MG/DL (ref 0.57–1)
EGFRCR SERPLBLD CKD-EPI 2021: 54.4 ML/MIN/1.73
GLUCOSE SERPL-MCNC: 134 MG/DL (ref 65–99)
MAGNESIUM SERPL-MCNC: 2.2 MG/DL (ref 1.6–2.4)
POTASSIUM SERPL-SCNC: 4.7 MMOL/L (ref 3.5–5.2)
SODIUM SERPL-SCNC: 140 MMOL/L (ref 136–145)

## 2022-12-09 PROCEDURE — 96376 TX/PRO/DX INJ SAME DRUG ADON: CPT

## 2022-12-09 PROCEDURE — 94664 DEMO&/EVAL PT USE INHALER: CPT

## 2022-12-09 PROCEDURE — G0378 HOSPITAL OBSERVATION PER HR: HCPCS

## 2022-12-09 PROCEDURE — 94618 PULMONARY STRESS TESTING: CPT

## 2022-12-09 PROCEDURE — 80048 BASIC METABOLIC PNL TOTAL CA: CPT | Performed by: HOSPITALIST

## 2022-12-09 PROCEDURE — 83735 ASSAY OF MAGNESIUM: CPT | Performed by: HOSPITALIST

## 2022-12-09 PROCEDURE — 94761 N-INVAS EAR/PLS OXIMETRY MLT: CPT

## 2022-12-09 PROCEDURE — 25010000002 METHYLPREDNISOLONE PER 40 MG: Performed by: HOSPITALIST

## 2022-12-09 PROCEDURE — 94799 UNLISTED PULMONARY SVC/PX: CPT

## 2022-12-09 PROCEDURE — 94760 N-INVAS EAR/PLS OXIMETRY 1: CPT

## 2022-12-09 RX ORDER — PAROXETINE 10 MG/1
10 TABLET, FILM COATED ORAL DAILY
Status: DISCONTINUED | OUTPATIENT
Start: 2022-12-09 | End: 2022-12-09 | Stop reason: HOSPADM

## 2022-12-09 RX ORDER — PREDNISONE 20 MG/1
TABLET ORAL
Qty: 12 TABLET | Refills: 0 | Status: SHIPPED | OUTPATIENT
Start: 2022-12-09 | End: 2022-12-17

## 2022-12-09 RX ADMIN — ATORVASTATIN CALCIUM 20 MG: 20 TABLET, FILM COATED ORAL at 00:03

## 2022-12-09 RX ADMIN — ANTACID TABLETS 2 TABLET: 500 TABLET, CHEWABLE ORAL at 00:03

## 2022-12-09 RX ADMIN — CARVEDILOL 12.5 MG: 6.25 TABLET, FILM COATED ORAL at 00:03

## 2022-12-09 RX ADMIN — LEVOTHYROXINE, LIOTHYRONINE 30 MG: 19; 4.5 TABLET ORAL at 09:50

## 2022-12-09 RX ADMIN — PAROXETINE HYDROCHLORIDE 10 MG: 10 TABLET, FILM COATED ORAL at 00:38

## 2022-12-09 RX ADMIN — ASPIRIN 81 MG: 81 TABLET, CHEWABLE ORAL at 09:49

## 2022-12-09 RX ADMIN — Medication 250 MG: at 00:38

## 2022-12-09 RX ADMIN — LEVOTHYROXINE, LIOTHYRONINE 15 MG: 19; 4.5 TABLET ORAL at 09:50

## 2022-12-09 RX ADMIN — ACETAMINOPHEN 650 MG: 325 TABLET, FILM COATED ORAL at 09:48

## 2022-12-09 RX ADMIN — IPRATROPIUM BROMIDE AND ALBUTEROL SULFATE 3 ML: 2.5; .5 SOLUTION RESPIRATORY (INHALATION) at 11:19

## 2022-12-09 RX ADMIN — METHYLPREDNISOLONE SODIUM SUCCINATE 40 MG: 40 INJECTION, POWDER, FOR SOLUTION INTRAMUSCULAR; INTRAVENOUS at 03:26

## 2022-12-09 RX ADMIN — BUDESONIDE 1 MG: 1 INHALANT ORAL at 07:39

## 2022-12-09 RX ADMIN — ARFORMOTEROL TARTRATE 15 MCG: 15 SOLUTION RESPIRATORY (INHALATION) at 07:38

## 2022-12-09 RX ADMIN — CARVEDILOL 12.5 MG: 6.25 TABLET, FILM COATED ORAL at 09:49

## 2022-12-09 RX ADMIN — Medication 10 ML: at 00:05

## 2022-12-09 RX ADMIN — FUROSEMIDE 20 MG: 20 TABLET ORAL at 09:49

## 2022-12-09 RX ADMIN — Medication 10 ML: at 09:51

## 2022-12-09 RX ADMIN — PANTOPRAZOLE SODIUM 40 MG: 40 TABLET, DELAYED RELEASE ORAL at 06:06

## 2022-12-09 NOTE — DISCHARGE SUMMARY
PHYSICIAN DISCHARGE SUMMARY                                                                        [facility]    Patient Identification:  Name: Dafne Mary  Age: 82 y.o.  Sex: female  :  1940  MRN: [id number]  Primary Care Physician: Sintia Chatterjee PA    Admit date: 2022  Discharge date and time: 2022     Discharged Condition: good    Discharge Diagnoses:  COPD with acute exacerbation (HCC)    Anxiety    Pulmonary emphysema (HCC)    Gastroesophageal reflux disease    Fibromyalgia    Benign hypertension    Postoperative hypothyroidism         Hospital Course:  Pleasant 82-year-old female with COPD presents with increasing shortness of air and dry cough.  Please H&P for full details.  On presentation she is reported to have bilateral rhonchi and apical wheezes.  O2 saturations were running as low as 84%.  Chest x-ray as well as CBC showed no evidence of any underlying bacterial pneumonia.  The patient was admitted and continue on with aggressive bronchodilators as well as IV steroids.  She improved significantly overnight.  Today she is feeling much better.  Still with a dry cough.  She notes a bit of a dull headache this morning.  Potassium is little on the low side on presentation she did receive 2 doses yesterday.  I am awaiting repeat potassium levels.  Medically she appears stable and can be switched to oral steroids and discharge at this point.  We will check walking oximetry prior to discharge.    Walking O2 sats are in good range.  Follow-up labs show normal electrolytes.  Okay for discharge.      Consults:     Consults     Date and Time Order Name Status Description    2022  4:31 AM [facility] (on-call MD unless specified) Details              Discharge Exam:  Afebrile vital signs stable.  Well-developed well-nourished female no apparent distress.  Lungs presently clear to auscultation good air movement.  O2  saturation 94% on room air at rest.  Heart regular rate and rhythm.  Extremities no clubbing cyanosis edema.  Alert oriented conversant cooperative and pleasant.     Disposition:  Home    Patient Instructions:      Discharge Medications      ASK your doctor about these medications      Instructions Start Date   [place] Thyroid 15 MG tablet  Generic drug: thyroid   Take 1 tablet by mouth once daily      [place] Thyroid 30 MG tablet  Generic drug: Thyroid   Take 1 tablet by mouth once daily      aspirin 81 MG chewable tablet   81 mg, Oral, Daily      atorvastatin 20 MG tablet  Commonly known as: LIPITOR   20 mg, Oral, Nightly      Breztri Aerosphere 160-9-4.8 MCG/ACT aerosol inhaler  Generic drug: Budeson-Glycopyrrol-Formoterol   2 puffs, 2 Times Daily      carvedilol 12.5 MG tablet  Commonly known as: COREG   12.5 mg, Oral, 2 Times Daily      furosemide 20 MG tablet  Commonly known as: LASIX   20 mg, Oral, Daily      losartan 100 MG tablet  Commonly known as: COZAAR   Take 1 tablet by mouth once daily      nitrofurantoin (macrocrystal-monohydrate) 100 MG capsule  Commonly known as: Macrobid   100 mg, Oral, 2 Times Daily      omeprazole 20 MG capsule  Commonly known as: priLOSEC   20 mg, Oral, Daily      PARoxetine 10 MG tablet  Commonly known as: PAXIL   Take 1 tablet by mouth once daily      saccharomyces boulardii 250 MG capsule  Commonly known as: Florastor   250 mg, Oral, 2 Times Daily      Triamcinolone Acetonide 55 MCG/ACT nasal inhaler  Commonly known as: Nasacort AQ   2 SPRAYS IN EACH NOSTRIL ONCE DAILY             Future Appointments   Date Time Provider Department Center   1/6/2023 11:15 AM Sintia Chatterjee PA [facility]   2/20/2023  3:00 PM Shelbie Good APRN [facility]       Discharge Order (From admission, onward)    None            Total time spent discharging patient including evaluation,post hospitalization follow up,  medication and post hospitalization instructions and education total  time exceeds 30 minutes.    Signed:  Semaj Kwon MD  12/9/2022  12:56 EST    EMR Dragon/Transcription disclaimer:   Much of this encounter note is an electronic transcription/translation of spoken language to printed text. The electronic translation of spoken language may permit erroneous, or at times, nonsensical words or phrases to be inadvertently transcribed; Although I have reviewed the note for such errors, some may still exist.

## 2022-12-09 NOTE — NURSING NOTE
Patient discharged and IV removed and instructions given to pt with no further questions or concerns.

## 2022-12-09 NOTE — CASE MANAGEMENT/SOCIAL WORK
Case Management Discharge Note      Final Note: Home via family, no additional CCP needs. Yamilka RN/CCP         Selected Continued Care - Admitted Since 12/8/2022     Destination    No services have been selected for the patient.              Durable Medical Equipment    No services have been selected for the patient.              Dialysis/Infusion    No services have been selected for the patient.              Home Medical Care    No services have been selected for the patient.              Therapy    No services have been selected for the patient.              Community Resources    No services have been selected for the patient.              Community & DME    No services have been selected for the patient.                       Final Discharge Disposition Code: 01 - home or self-care

## 2022-12-09 NOTE — THERAPY EVALUATION
Exercise Oximetry    Patient Name:Dafne Mary   MRN: [id number]   Date: 12/09/22             ROOM AIR BASELINE   SpO2%    97   Heart Rate    Blood Pressure      EXERCISE ON ROOM AIR SpO2% EXERCISE ON O2 @ LPM SpO2%   1 MINUTE     97 1 MINUTE    2 MINUTES     95 2 MINUTES    3 MINUTES     94 3 MINUTES    4 MINUTES     94 4 MINUTES    5 MINUTES     93 5 MINUTES    6 MINUTES     93 6 MINUTES               Distance Walked   Distance Walked   Dyspnea (Alla Scale)   Dyspnea (Alla Scale)   Fatigue (Alla Scale)   Fatigue (Alla Scale)   SpO2% Post Exercise   SpO2% Post Exercise   HR Post Exercise   HR Post Exercise   Time to Recovery   Time to Recovery     Comments:     Used forehead probe for good measure. Pt walked well in halls.     Thank you   liv FELIX

## 2022-12-10 NOTE — OUTREACH NOTE
Prep Survey    Flowsheet Row Responses   [facility] facility patient discharged from? [place]   Is LACE score < 7 ? No   Emergency Room discharge w/ pulse ox? No   Eligibility [facility]   Date of Admission 12/08/22   Date of Discharge 12/09/22   Discharge Disposition Home or Self Care   Discharge diagnosis COPD with acute exacerbation, pulmonary emphysema, fibromyalgia   Does the patient have one of the following disease processes/diagnoses(primary or secondary)? COPD   Does the patient have Home health ordered? No   Is there a DME ordered? No   Prep survey completed? Yes          CONSUELO GARCIA - Registered Nurse

## 2022-12-12 ENCOUNTER — TRANSITIONAL CARE MANAGEMENT TELEPHONE ENCOUNTER (OUTPATIENT)
Dept: CALL CENTER | Facility: HOSPITAL | Age: 82
End: 2022-12-12

## 2022-12-12 NOTE — OUTREACH NOTE
Call Center TCM Note    Flowsheet Row Responses   [facility] [place] patient discharged from? [place]   Does the patient have one of the following disease processes/diagnoses(primary or secondary)? COPD   TCM attempt successful? Yes   Call start time 0829   Call end time 0833   Meds reviewed with patient/caregiver? Yes   Is the patient having any side effects they believe may be caused by any medication additions or changes? No   Does the patient have all medications ordered at discharge? Yes   Is the patient taking all medications as directed (includes completed medication regime)? Yes   Does the patient have an appointment with their PCP within 7 days of discharge? No   Nursing Interventions Patient declined scheduling/rescheduling appointment at this time   Has home health visited the patient within 72 hours of discharge? N/A   Pulse Ox monitoring Intermittent   Pulse Ox device source Patient   O2 Sat: education provided Sat levels   Psychosocial issues? No   Did the patient receive a copy of their discharge instructions? Yes   Nursing interventions Reviewed instructions with patient   What is the patient's perception of their health status since discharge? Improving   Nursing Interventions Nurse provided patient education   If the patient is a current smoker, are they able to teach back resources for cessation? Not a smoker   Is the patient/caregiver able to teach back the hierarchy of who to call/visit for symptoms/problems? PCP, Specialist, Home health nurse, Urgent Care, ED, 911 Yes   Is the patient able to teach back COPD zones? Yes   Nursing interventions Education provided on various zones   Patient reports what zone on this call? Green Zone   Green Zone Reports doing well, Breathing without shortness of breath, Usual activity and exercise level, Usual amounts of cough and phlegm/mucous, Slept well last night, Appetite is good   Green Zone interventions: Take daily medications, Continue regular  exercise/diet plan   TCM call completed? Yes   Call end time 0833   Would this patient benefit from a Referral to [facility] Social Work? No   Is the patient interested in additional calls from an ambulatory ?  NOTE:  applies to high risk patients requiring additional follow-up. No          Andie White LPN    12/12/2022, 08:35 EST

## 2022-12-16 ENCOUNTER — TELEPHONE (OUTPATIENT)
Dept: CARDIOLOGY | Facility: CLINIC | Age: 82
End: 2022-12-16

## 2022-12-16 NOTE — TELEPHONE ENCOUNTER
"Marlene received messaged on voicemail from Dafne Mary requesting callback due to shortness of breath.    Called Dafne Mary for additional information.  Patient reports that she is short of breath with minimal exertion (after walking 15-20ft), stating that it feels like she \"ran a mile\".  Patient also reports chest tightness, though she thinks the chest tightness is related to her shortness of breath.  Shortness of breath and chest tightness improve after resting about 3 minutes.  Oxygen saturation is in the low 90's with activity, HR is 100's.  This has been occurring for about a month now.    Patient denies chest pain, lower extremity or abdominal swelling, and does not endorse orthopnea.    Patient was admitted from 12/7-12/9, with COPD exacerbation.  Patient reports no real change or improvement in symptoms even after hospitalization.  Patient stated she thought she may be weak/deconditioned.  Educated patient on pulmonary rehab and it's purpose for patient's with COPD.  Encouraged patient to discuss symptoms and eligibility for pulmonary rehab with pulmonology.  Patient stated she will do this.    /84 (12/15)  /89 (today)  Weight: 133 lbs    Cardiac Meds  Carvedilol 12.5 mg, BID  Losartan 100mg, daily  Atorvastatin 20 mg, daily  Furosemide 20 mg, daily  (Patient was previously on amlodipine, however this was discontinued due to dizziness.  Patient reports dizziness has gone away since stopping amlodipine.)     AVS for hospital stay recommended 1 week follow up with Dr. Norman.  Patient scheduled to see Dr. Norman on 12/22.    Encouraged patient to go to ED for worsening symptoms.  Patient verbalized understanding.    Please let me know if there is anything else you would like me to do for this patient.    Thank you,  Jacklyn Marinelli RN  Triage Nurse [facility]  "

## 2022-12-16 NOTE — TELEPHONE ENCOUNTER
Reviewed recommendations with Dafne Mary and the patient verbalized understanding of the recommendations.    Thank you,  Jacklyn Marinelli RN  Triage Nurse G

## 2022-12-19 ENCOUNTER — READMISSION MANAGEMENT (OUTPATIENT)
Dept: CALL CENTER | Facility: HOSPITAL | Age: 82
End: 2022-12-19

## 2022-12-19 NOTE — OUTREACH NOTE
COPD/PN Week 2 Survey    Flowsheet Row Responses   [facility] patient discharged from? [place]   Does the patient have one of the following disease processes/diagnoses(primary or secondary)? COPD   Week 2 attempt successful? Yes   Call start time 1231   Call end time 1245   Meds reviewed with patient/caregiver? Yes   Is the patient having any side effects they believe may be caused by any medication additions or changes? No   Does the patient have all medications ordered at discharge? Yes   Is the patient taking all medications as directed (includes completed medication regime)? Yes   Medication comments Finished antibiotics and prednisone a little less than a week ago.   Comments regarding appointments Cardiologist on 12/22/22. Encouraged to call PCP for immediate evaluation of increased OSORIO. States has pulmonology appt.   Does the patient have a primary care provider?  Yes   Does the patient have an appointment with their PCP or specialist within 7 days of discharge? Yes   Has the patient kept scheduled appointments due by today? N/A   Has home health visited the patient within 72 hours of discharge? N/A   Pulse Ox monitoring Intermittent   Pulse Ox device source Patient   O2 Sat comments 88% after activity, 95% at rest.   Psychosocial issues? No   What is the patient's perception of their health status since discharge? Worsening  [States OSORIO has worsened since finishing antibiotics/prednisone. States also has occasional tightening of upper chest-denies any tightening currently.]   Nursing Interventions Nurse provided patient education, Advised patient to call provider   Is the patient/caregiver able to teach back the hierarchy of who to call/visit for symptoms/problems? PCP, Specialist, Home health nurse, Urgent Care, ED, 911 Yes   Is the patient able to teach back COPD zones? No   Nursing interventions Education provided on various zones   Patient reports what zone on this call? Yellow Zone   Yellow  Zone More breathless than usual, I have less energy for my daily activities   Yellow interventions Continue taking daily medications, Use quick relief inhaler, Start an oral corticosteroid if ordered, Start antibiotic if ordered, Get plenty of rest, Call provider immediately if symptoms don't improve: they may indicate that an adjustment in medication or oxygen therapy is needed   Quick Relief Inhaler Usage Has used Albuterol inhaler twice today.   NAME DOSE AND DURATION OF CORTICOSTERIOD Breztri   NAME DOSE AND DURATION ANTIBIOTIC Has finished antibiotic.   Week 2 call completed? Yes   Is the patient interested in additional calls from an ambulatory ?  NOTE:  applies to high risk patients requiring additional follow-up. No   Wrap up additional comments Patient states has felt slightly worse over past few days with increased OSORIO. States has also had occasional tightness of upper chest. Advised to notify PCP immediately for evaluation. Voiced understanding.          ULKE DOCKERY - Registered Nurse

## 2022-12-21 ENCOUNTER — TELEPHONE (OUTPATIENT)
Dept: CARDIOLOGY | Facility: CLINIC | Age: 82
End: 2022-12-21

## 2022-12-21 NOTE — TELEPHONE ENCOUNTER
Caller: Felicia Delarosa    Relationship: Emergency Contact    Best call back number: [phone]    What is the best time to reach you: ANYTIME    Who are you requesting to speak with (clinical staff, provider,  specific staff member): CLINICAL         What was the call regarding: DAUGHTER CALLED TO TELL DR HADDAD  THAT HER MOTHER YANG DELAROSA IS IN [facility] FOR CARDIA EVENT        Do you require a callback: IF YOU NEED TO

## 2022-12-28 ENCOUNTER — OFFICE VISIT (OUTPATIENT)
Dept: CARDIOLOGY | Facility: CLINIC | Age: 82
End: 2022-12-28

## 2022-12-28 ENCOUNTER — READMISSION MANAGEMENT (OUTPATIENT)
Dept: CALL CENTER | Facility: HOSPITAL | Age: 82
End: 2022-12-28

## 2022-12-28 VITALS
HEIGHT: 60 IN | SYSTOLIC BLOOD PRESSURE: 120 MMHG | HEART RATE: 78 BPM | BODY MASS INDEX: 26.7 KG/M2 | WEIGHT: 136 LBS | DIASTOLIC BLOOD PRESSURE: 80 MMHG

## 2022-12-28 DIAGNOSIS — I65.23 ARTERIOSCLEROSIS OF BOTH CAROTID ARTERIES: ICD-10-CM

## 2022-12-28 DIAGNOSIS — I50.32 CHRONIC DIASTOLIC CONGESTIVE HEART FAILURE: ICD-10-CM

## 2022-12-28 DIAGNOSIS — R00.2 PALPITATIONS: ICD-10-CM

## 2022-12-28 DIAGNOSIS — E78.2 MIXED HYPERLIPIDEMIA: Primary | ICD-10-CM

## 2022-12-28 DIAGNOSIS — I10 BENIGN HYPERTENSION: ICD-10-CM

## 2022-12-28 PROCEDURE — 93000 ELECTROCARDIOGRAM COMPLETE: CPT | Performed by: NURSE PRACTITIONER

## 2022-12-28 PROCEDURE — 99214 OFFICE O/P EST MOD 30 MIN: CPT | Performed by: NURSE PRACTITIONER

## 2022-12-28 NOTE — PROGRESS NOTES
Subjective:     Encounter Date:12/28/2022      Patient ID: Dafne Mary is a 82 y.o. female.    Chief Complaint:follow up HTN, HLD HFpEF  History of Present Illness  This is a 83 y/o female who follows with Dr. Norman and is new to me today. She has a pmhx of moderate to severe COPD, emphysema, granulomatous lung disease, depression/anxiety, hypertension, hypothyroidism and chronic diastolic CHF. In 2021 she began seeing Dr. Hanson for palpitations and abnormal EKG. An echocardiogram showed normal left ventricular systolic function, EF 53%, grade 2 diastolic dysfunction, and hypokinesis of the mid inferior, basal inferoseptal, mid inferoseptal, basal inferior and basal inferoseptal walls, no significant valvular disease. Holter monitor was benign. She had a stress perfusion study in November 2021 that showed no evidence of ischemia.     She was hospitalized in May 2022 for respiratory failure requiring intubation. She was found to have pleural effusions, pulmonary edema, acute COPD exacerbation as well as COVID-19 and rhinovirus. A repeat echocardiogram at that time showed normal left ventricular systolic function, EF 52%, normal diastolic function, aortic valve calcification but no evidence of significant stenosis, mild mitral and tricuspid regurgitation, and mid inferior, basal inferoseptal, mid inferoseptal, basal inferior, and basal inferoseptal hypokinesis.    After discharge, she continued to have dyspnea. A repeat echocardiogram was performed in July 2022 that showed normal left ventricular systolic function, EF 53%, at least grade 2 diastolic dysfunction, basal inferoseptal and inferior wall hypokinesis, mildly dilated left atrium, mild to moderate mitral valve regurgitation, moderately elevated RVSP of 45 mmHg.   She was started on furosemide 20 mg daily.    She was then seen in November in follow up by Dr. Norman. She was having positional lightheadedness which was suspected to be orthostatic  hypotension. Her amlodipine was stopped along with furosemide and she did not feel that the furosemide made a difference. She reported back that her blood pressure was increasing and she was instructed to increase atenolol to 50 mg BID. She continued to report blood pressure problems and ultimately ended up on carvedilol 12.5 mg BID.    She is here today for a follow up visit. She was recently hospitalized in early December for an acute COPD exacerbation. She then ended up being admitted to [facility] ICU for acute respiratory failure. She was discharged on 12/22. Since discharge, she doesn't feel like she has gotten any better. However, she was discharged home on an antibiotic which she was not able to  until yesterday due to the weather. She was also sent home with a prescription for albuterol neb treatments but has not been able to get a nebulizer machine yet. She denies any fever and has a non-productive cough. In regards to her heart, she continues to have some dizziness. While hospitalized, she was started back on amlodipine and furosemide. She stopped taking at discharge. She felt like the dizziness had improved prior to the hospitalization with stopping these. She denies any chest pain or worsening shortness of breath. She denies any swelling in her lower extremities, orthopnea or PND. Her biggest concern is her heart rate going into the low 100s with ambulation. She says it returns to normal quickly with rest. I reviewed her blood pressure log and her BP has been very good, remaining on average around 120/70. She does have a couple of lower readings of 102/60 and she does feel a little dizzy when it is this low. Overall, her BP has been stable though.    I have reviewed and updated as appropriate allergies, current medications, past family history, past medical history, past surgical history and problem list.    Review of Systems   Constitutional: Negative for fever, malaise/fatigue, weight gain  and weight loss.   HENT: Negative for congestion, hoarse voice and sore throat.    Eyes: Negative for blurred vision and double vision.   Cardiovascular: Positive for dyspnea on exertion and palpitations. Negative for chest pain, leg swelling, orthopnea and syncope.   Respiratory: Positive for cough. Negative for shortness of breath and wheezing.    Gastrointestinal: Negative for abdominal pain, hematemesis, hematochezia and melena.   Genitourinary: Negative for dysuria and hematuria.   Neurological: Negative for dizziness, headaches, light-headedness and numbness.   Psychiatric/Behavioral: Negative for depression. The patient is nervous/anxious.          Current Outpatient Medications:   •  [place] Thyroid 15 MG tablet, Take 1 tablet by mouth once daily, Disp: 90 tablet, Rfl: 0  •  [place] Thyroid 30 MG tablet, Take 1 tablet by mouth once daily, Disp: 90 tablet, Rfl: 0  •  aspirin 81 MG chewable tablet, Chew 81 mg Daily., Disp: , Rfl:   •  atorvastatin (LIPITOR) 20 MG tablet, Take 1 tablet by mouth Every Night., Disp: 90 tablet, Rfl: 0  •  Breztri Aerosphere 160-9-4.8 MCG/ACT aerosol inhaler, 2 puffs 2 (Two) Times a Day., Disp: , Rfl:   •  carvedilol (COREG) 12.5 MG tablet, Take 1 tablet by mouth 2 (Two) Times a Day., Disp: 60 tablet, Rfl: 5  •  furosemide (LASIX) 20 MG tablet, Take 1 tablet by mouth Daily., Disp: 30 tablet, Rfl: 5  •  losartan (COZAAR) 100 MG tablet, Take 1 tablet by mouth once daily, Disp: 90 tablet, Rfl: 0  •  nitrofurantoin, macrocrystal-monohydrate, (Macrobid) 100 MG capsule, Take 1 capsule by mouth 2 (Two) Times a Day. (Patient taking differently: Take 100 mg by mouth 2 (Two) Times a Day. 12/5 x 7 days), Disp: 14 capsule, Rfl: 0  •  omeprazole (priLOSEC) 20 MG capsule, Take 20 mg by mouth Daily., Disp: , Rfl:   •  PARoxetine (PAXIL) 10 MG tablet, Take 1 tablet by mouth once daily, Disp: 90 tablet, Rfl: 0  •  saccharomyces boulardii (Florastor) 250 MG capsule, Take 1 capsule by mouth 2 (Two)  "Times a Day., Disp: 60 capsule, Rfl: 0  •  Triamcinolone Acetonide (NASACORT AQ) 55 MCG/ACT nasal inhaler, 2 SPRAYS IN EACH NOSTRIL ONCE DAILY (Patient taking differently: As Needed. 2 SPRAYS IN EACH NOSTRIL ONCE DAILY), Disp: 1 bottle, Rfl: 11    Past Medical History:   Diagnosis Date   • Chronic diastolic congestive heart failure (HCC) 11/17/2022   • Depression    • Emphysema lung (HCC)    • GERD (gastroesophageal reflux disease)    • Heart failure (HCC)    • Hyperlipidemia    • Hypertension    • Hypothyroidism        Past Surgical History:   Procedure Laterality Date   • EYE SURGERY Left    • PARATHYROIDECTOMY      Patient reports thyroidectomy partial not a para thyroidectomy.   • THYROIDECTOMY, PARTIAL      1984       Family History   Problem Relation Age of Onset   • Alzheimer's disease Mother    • Lung disease Father    • Alcohol abuse Father    • Heart disease Brother    • Hypertension Brother    • Lung disease Brother    • Alcohol abuse Brother    • Cancer Brother         LUNG  WAS A SMOKER   • Thyroid disease Maternal Grandmother        Social History     Tobacco Use   • Smoking status: Never   • Smokeless tobacco: Never   Vaping Use   • Vaping Use: Never used   Substance Use Topics   • Alcohol use: Yes     Comment: \"occ\"; caffeine use- tea   • Drug use: No         ECG 12 Lead    Date/Time: 12/28/2022 11:42 AM  Performed by: Shelbie Good APRN  Authorized by: Shelbie Good APRN   Comparison: compared with previous ECG from 12/7/2022  Similar to previous ECG  Rhythm: sinus rhythm  Conduction: non-specific intraventricular conduction delay  Comments: No significant change from previous EKG               Objective:     Visit Vitals  /80 (BP Location: Right arm, Patient Position: Sitting, Cuff Size: Adult)   Pulse 78   Ht 152.4 cm (60\")   Wt 61.7 kg (136 lb)   BMI 26.56 kg/m²             Physical Exam  Constitutional:       Appearance: Normal appearance. She is normal weight.   HENT:      Head: " Normocephalic.   Neck:      Vascular: No carotid bruit.   Cardiovascular:      Rate and Rhythm: Normal rate and regular rhythm.      Chest Wall: PMI is not displaced.      Pulses: Normal pulses.           Radial pulses are 2+ on the right side and 2+ on the left side.        Posterior tibial pulses are 2+ on the right side and 2+ on the left side.      Heart sounds: Normal heart sounds. No murmur heard.    No friction rub. No gallop.   Pulmonary:      Effort: Pulmonary effort is normal.      Breath sounds: Normal breath sounds.   Abdominal:      General: Bowel sounds are normal. There is no distension.      Palpations: Abdomen is soft.   Musculoskeletal:      Right lower leg: No edema.      Left lower leg: No edema.   Skin:     General: Skin is warm and dry.      Capillary Refill: Capillary refill takes less than 2 seconds.   Neurological:      Mental Status: She is alert and oriented to person, place, and time.   Psychiatric:         Mood and Affect: Mood normal.         Behavior: Behavior normal.         Thought Content: Thought content normal.          Lab Review:   Lipid Panel    Lipid Panel 5/24/22 10/4/22   Total Cholesterol 190 167   Triglycerides 119 64   HDL Cholesterol 74 73 (A)   VLDL Cholesterol 21 12   LDL Cholesterol  95 82   LDL/HDL Ratio 1.3 1.11   (A) Abnormal value       Comments are available for some flowsheets but are not being displayed.               Cardiac Procedures:   1. Echocardiogram (performed at [facility]) 12/20/22:         Left ventricle size is normal, normal LV wall thickeness, EF 50-55%, normal right ventricle structure and function, mild tricuspid regurgitation, RVSP 45 mmHg.    2.   Echocardiogram 7/25/22:  • Calculated left ventricular EF = 52.6% Estimated left ventricular EF = 53% Estimated left ventricular EF was in agreement with the calculated left ventricular EF. Left ventricular systolic function is normal. Normal left ventricular cavity size and wall thickness noted. There  are wall motion abnormalities as noted below Left ventricular diastolic function was indeterminate.  • The following segments are hypokinetic: basal inferoseptal and basal inferior. All other segments are normal.  • Left atrial volume is mildly increased.  • No aortic valve regurgitation or stenosis is present. The aortic valve is abnormal in structure. There is severe calcification of the aortic valve  • Severe mitral annular calcification is present. There is moderate, bileaflet mitral valve thickening present. Mild to moderate mitral valve regurgitation is present. No significant mitral valve stenosis is present.  • Tricuspid valve not well visualized. Trace tricuspid valve regurgitation is present. Estimated right ventricular systolic pressure from tricuspid regurgitation is moderately elevated (45-55 mmHg). Calculated right ventricular systolic pressure from tricuspid regurgitation is 45 mmHg.  • There is suggestion of atrial shunting by color-flow imaging subcostal views. Saline injection was not performed. We will contact the patient to return to address further.     3.  Echocardiogram 5/4/22:  • Calculated left ventricular EF = 51.8% Estimated left ventricular EF was in agreement with the calculated left ventricular EF. Left ventricular systolic function is normal.  • Left ventricular diastolic function was normal.  • Left atrial volume is mildly increased.  • There is calcification of the aortic valve.  • Aortic valve area is 1.85 cm2.  • Aortic valve maximum pressure gradient is 15 mmHg. Aortic valve mean pressure gradient is 7.9 mmHg.  • Mild mitral valve regurgitation is present  • Mild tricuspid valve regurgitation is present  • Estimated right ventricular systolic pressure from tricuspid regurgitation is normal (<35 mmHg).  • The following left ventricular wall segments are hypokinetic: mid inferior, basal inferoseptal, mid inferoseptal, basal inferior and basal inferoseptal.     4.  Nuclear stress  test 11/5/21:  • Left ventricular ejection fraction is normal. (Calculated EF = 58%).  • Myocardial perfusion imaging indicates a normal myocardial perfusion study with no evidence of ischemia.  • Impressions are consistent with a low risk study.       Assessment:         Diagnoses and all orders for this visit:    1. Mixed hyperlipidemia (Primary)    2. Benign hypertension    3. Palpitations    4. Chronic diastolic congestive heart failure (HCC)    5. Arteriosclerosis of both carotid arteries    Other orders  -     ECG 12 Lead            Plan:       1. Tachycardia: patient reports episodes of heart rate in the low 100s when she is ambulating. Her O2 sat is above 93% when this occurs. Heart rate returns to normal very quickly when she rests. I reassured her this is a normal response of the body given her COPD. She is on carvedilol 12.5 mg BID. I would not recommend increasing at this time as she does have some blood pressure readings that are on the lower side of normal.   2. HTN: well controlled on current regimen of carvedilol 12.5 mg BID and losartan 100mg daily. She was recently restarted on amlodipine and furosemide during hospitalization but she did not continue once she got home. BP is well controlled without so I would not add back at this point.  3. Dizziness: thought to be secondary to hypotension. Amlodipine and furosemide have been stopped again. Will monitor for improvement in dizziness.  4. Chronic diastolic CHF: appears euvolemic on exam and no complaints of symptoms suggesting volume overload  5. HLD  6. COPD: follows with Dr. Nolan next week. Recommend that he address the missing information needed to obtain the nebulizer and neb treatments.    Thank you for allowing me to participate in this patient's care. Please call with any questions or concerns. Ms. Mary will follow up with Dr. Norman in about 6 weeks.          Your medication list          Accurate as of December 28, 2022 11:43 AM. If you  have any questions, ask your nurse or doctor.            CHANGE how you take these medications      Instructions Last Dose Given Next Dose Due   nitrofurantoin (macrocrystal-monohydrate) 100 MG capsule  Commonly known as: Macrobid  What changed: additional instructions      Take 1 capsule by mouth 2 (Two) Times a Day.       Triamcinolone Acetonide 55 MCG/ACT nasal inhaler  Commonly known as: Nasacort AQ  What changed:   · when to take this  · reasons to take this      2 SPRAYS IN EACH NOSTRIL ONCE DAILY          CONTINUE taking these medications      Instructions Last Dose Given Next Dose Due   [place] Thyroid 15 MG tablet  Generic drug: thyroid      Take 1 tablet by mouth once daily       [place] Thyroid 30 MG tablet  Generic drug: Thyroid      Take 1 tablet by mouth once daily       aspirin 81 MG chewable tablet      Chew 81 mg Daily.       atorvastatin 20 MG tablet  Commonly known as: LIPITOR      Take 1 tablet by mouth Every Night.       Breztri Aerosphere 160-9-4.8 MCG/ACT aerosol inhaler  Generic drug: Budeson-Glycopyrrol-Formoterol      2 puffs 2 (Two) Times a Day.       carvedilol 12.5 MG tablet  Commonly known as: COREG      Take 1 tablet by mouth 2 (Two) Times a Day.       furosemide 20 MG tablet  Commonly known as: LASIX      Take 1 tablet by mouth Daily.       losartan 100 MG tablet  Commonly known as: COZAAR      Take 1 tablet by mouth once daily       omeprazole 20 MG capsule  Commonly known as: priLOSEC      Take 20 mg by mouth Daily.       PARoxetine 10 MG tablet  Commonly known as: PAXIL      Take 1 tablet by mouth once daily       saccharomyces boulardii 250 MG capsule  Commonly known as: Florastor      Take 1 capsule by mouth 2 (Two) Times a Day.                Shelbie Good, ANTONELLA  12/28/22  8:13 AM EST

## 2022-12-28 NOTE — OUTREACH NOTE
COPD/PN Week 3 Survey    Flowsheet Row Responses   [facility] facility patient discharged from? [facility]   Does the patient have one of the following disease processes/diagnoses(primary or secondary)? COPD   Week 3 attempt successful? No   Unsuccessful attempts Attempt 1          NORMAN Euceda Registered Nurse

## 2023-01-03 ENCOUNTER — READMISSION MANAGEMENT (OUTPATIENT)
Dept: CALL CENTER | Facility: HOSPITAL | Age: 83
End: 2023-01-03
Payer: MEDICARE

## 2023-01-03 DIAGNOSIS — I10 ESSENTIAL HYPERTENSION: ICD-10-CM

## 2023-01-03 NOTE — OUTREACH NOTE
COPD/PN Week 4 Survey    Flowsheet Row Responses   [facility] patient discharged from? [facility]   Does the patient have one of the following disease processes/diagnoses(primary or secondary)? COPD   Week 4 attempt successful? Yes   Call start time [id number]   Call end time 1656   Discharge diagnosis COPD with acute exacerbation, pulmonary emphysema, fibromyalgia   Meds reviewed with patient/caregiver? Yes   Is the patient having any side effects they believe may be caused by any medication additions or changes? No   Is the patient taking all medications as directed (includes completed medication regime)? Yes   Has the patient kept scheduled appointments due by today? Yes   Is the patient still receiving Home Health Services? N/A   Pulse Ox monitoring Intermittent   Pulse Ox device source Patient   O2 Sat: education provided Sat levels, Monitoring frequency, When to seek care   O2 Sat education comments If 90% or lower and stays there, call 911.   Psychosocial issues? No   What is the patient's perception of their health status since discharge? Improving   Nursing Interventions Nurse provided patient education   Are the patient's immunizations up to date?  Yes   Nursing interventions Educated on importance of maintaining up to date immunizations as advised by provider   If the patient is a current smoker, are they able to teach back resources for cessation? Not a smoker   Is the patient/caregiver able to teach back the hierarchy of who to call/visit for symptoms/problems? PCP, Specialist, Home health nurse, Urgent Care, ED, 911 Yes   Additional teach back comments She sees pulm tomorrow.   Is the patient able to teach back COPD zones? Yes   Nursing interventions Education provided on various zones   Patient reports what zone on this call? Yellow Zone   Green Zone Reports doing well, Breathing without shortness of breath   Yellow Zone I have less energy for my daily activities, Using quick relief inhaler/nebulizer  more often   Yellow interventions Continue taking daily medications, Use quick relief inhaler   Week 4 call completed? Yes   Would the patient like one additional call? No   Graduated Yes   Is the patient interested in additional calls from an ambulatory ?  NOTE:  applies to high risk patients requiring additional follow-up. No   Did the patient feel the follow up calls were helpful during their recovery period? Yes   Was the number of calls appropriate? Yes   Wrap up additional comments She is waiting on Dr. Stacy prior to getting neb txs.          ROSHAN JESSICA - Registered Nurse

## 2023-01-04 RX ORDER — ATENOLOL 50 MG/1
TABLET ORAL
Qty: 90 TABLET | Refills: 0 | OUTPATIENT
Start: 2023-01-04

## 2023-01-04 RX ORDER — THYROID,PORK 15 MG
TABLET ORAL
Qty: 90 TABLET | Refills: 0 | Status: SHIPPED | OUTPATIENT
Start: 2023-01-04

## 2023-01-04 RX ORDER — THYROID 30 MG/1
TABLET ORAL
Qty: 90 TABLET | Refills: 0 | Status: SHIPPED | OUTPATIENT
Start: 2023-01-04

## 2023-01-04 NOTE — TELEPHONE ENCOUNTER
I am filling armour thyroid. However, she will need to discuss and have blood pressure medications filled by cardiology.

## 2023-01-06 ENCOUNTER — OFFICE VISIT (OUTPATIENT)
Dept: FAMILY MEDICINE CLINIC | Facility: CLINIC | Age: 83
End: 2023-01-06
Payer: MEDICARE

## 2023-01-06 ENCOUNTER — APPOINTMENT (OUTPATIENT)
Dept: GENERAL RADIOLOGY | Facility: HOSPITAL | Age: 83
DRG: 202 | End: 2023-01-06
Payer: MEDICARE

## 2023-01-06 ENCOUNTER — HOSPITAL ENCOUNTER (INPATIENT)
Facility: HOSPITAL | Age: 83
LOS: 6 days | Discharge: HOME HEALTH | DRG: 202 | End: 2023-01-13
Attending: STUDENT IN AN ORGANIZED HEALTH CARE EDUCATION/TRAINING PROGRAM | Admitting: HOSPITALIST
Payer: MEDICARE

## 2023-01-06 VITALS
HEART RATE: 96 BPM | BODY MASS INDEX: 30.92 KG/M2 | OXYGEN SATURATION: 96 % | WEIGHT: 133.6 LBS | SYSTOLIC BLOOD PRESSURE: 162 MMHG | TEMPERATURE: 98.4 F | HEIGHT: 55 IN | DIASTOLIC BLOOD PRESSURE: 76 MMHG

## 2023-01-06 DIAGNOSIS — J44.1 CHRONIC OBSTRUCTIVE PULMONARY DISEASE WITH ACUTE EXACERBATION: Primary | ICD-10-CM

## 2023-01-06 DIAGNOSIS — I10 BENIGN HYPERTENSION: ICD-10-CM

## 2023-01-06 DIAGNOSIS — R53.1 WEAKNESS: ICD-10-CM

## 2023-01-06 DIAGNOSIS — J44.1 COPD WITH ACUTE EXACERBATION: ICD-10-CM

## 2023-01-06 DIAGNOSIS — I50.32 CHRONIC DIASTOLIC CONGESTIVE HEART FAILURE: ICD-10-CM

## 2023-01-06 DIAGNOSIS — R06.02 SHORTNESS OF BREATH: ICD-10-CM

## 2023-01-06 DIAGNOSIS — J43.9 PULMONARY EMPHYSEMA, UNSPECIFIED EMPHYSEMA TYPE: ICD-10-CM

## 2023-01-06 DIAGNOSIS — R53.82 CHRONIC FATIGUE: ICD-10-CM

## 2023-01-06 DIAGNOSIS — J96.01 ACUTE RESPIRATORY FAILURE WITH HYPOXIA: Primary | ICD-10-CM

## 2023-01-06 DIAGNOSIS — R05.9 COUGH, UNSPECIFIED TYPE: ICD-10-CM

## 2023-01-06 DIAGNOSIS — R09.02 HYPOXIA: ICD-10-CM

## 2023-01-06 PROBLEM — J44.9 COPD (CHRONIC OBSTRUCTIVE PULMONARY DISEASE): Status: ACTIVE | Noted: 2023-01-06

## 2023-01-06 PROBLEM — E66.01 MORBID OBESITY WITH BMI OF 70 AND OVER, ADULT: Status: ACTIVE | Noted: 2023-01-06

## 2023-01-06 PROBLEM — E03.9 HYPOTHYROID: Status: ACTIVE | Noted: 2023-01-06

## 2023-01-06 LAB
DEPRECATED RDW RBC AUTO: 38.1 FL (ref 37–54)
ERYTHROCYTE [DISTWIDTH] IN BLOOD BY AUTOMATED COUNT: 12.4 % (ref 12.3–15.4)
EXPIRATION DATE: NORMAL
FLUAV AG UPPER RESP QL IA.RAPID: NOT DETECTED
FLUBV AG UPPER RESP QL IA.RAPID: NOT DETECTED
HCT VFR BLD AUTO: 36.9 % (ref 34–46.6)
HGB BLD-MCNC: 12.5 G/DL (ref 12–15.9)
INTERNAL CONTROL: NORMAL
Lab: NORMAL
MCH RBC QN AUTO: 28.9 PG (ref 26.6–33)
MCHC RBC AUTO-ENTMCNC: 33.9 G/DL (ref 31.5–35.7)
MCV RBC AUTO: 85.4 FL (ref 79–97)
PLATELET # BLD AUTO: 322 10*3/MM3 (ref 140–450)
PMV BLD AUTO: 8.6 FL (ref 6–12)
RBC # BLD AUTO: 4.32 10*6/MM3 (ref 3.77–5.28)
SARS-COV-2 AG UPPER RESP QL IA.RAPID: NOT DETECTED
WBC NRBC COR # BLD: 4.41 10*3/MM3 (ref 3.4–10.8)

## 2023-01-06 PROCEDURE — 94799 UNLISTED PULMONARY SVC/PX: CPT

## 2023-01-06 PROCEDURE — G0378 HOSPITAL OBSERVATION PER HR: HCPCS

## 2023-01-06 PROCEDURE — 71045 X-RAY EXAM CHEST 1 VIEW: CPT

## 2023-01-06 PROCEDURE — 25010000002 METHYLPREDNISOLONE PER 125 MG: Performed by: NURSE PRACTITIONER

## 2023-01-06 PROCEDURE — 99214 OFFICE O/P EST MOD 30 MIN: CPT | Performed by: PHYSICIAN ASSISTANT

## 2023-01-06 PROCEDURE — 87428 SARSCOV & INF VIR A&B AG IA: CPT | Performed by: PHYSICIAN ASSISTANT

## 2023-01-06 PROCEDURE — 85027 COMPLETE CBC AUTOMATED: CPT | Performed by: NURSE PRACTITIONER

## 2023-01-06 RX ORDER — NICOTINE POLACRILEX 4 MG
15 LOZENGE BUCCAL
Status: DISCONTINUED | OUTPATIENT
Start: 2023-01-06 | End: 2023-01-13 | Stop reason: HOSPADM

## 2023-01-06 RX ORDER — ALBUTEROL SULFATE 2.5 MG/3ML
2.5 SOLUTION RESPIRATORY (INHALATION) EVERY 4 HOURS PRN
Status: DISCONTINUED | OUTPATIENT
Start: 2023-01-06 | End: 2023-01-13 | Stop reason: HOSPADM

## 2023-01-06 RX ORDER — NITROGLYCERIN 0.4 MG/1
0.4 TABLET SUBLINGUAL
Status: DISCONTINUED | OUTPATIENT
Start: 2023-01-06 | End: 2023-01-13 | Stop reason: HOSPADM

## 2023-01-06 RX ORDER — INSULIN LISPRO 100 [IU]/ML
0-7 INJECTION, SOLUTION INTRAVENOUS; SUBCUTANEOUS
Status: DISCONTINUED | OUTPATIENT
Start: 2023-01-07 | End: 2023-01-13 | Stop reason: HOSPADM

## 2023-01-06 RX ORDER — BUDESONIDE AND FORMOTEROL FUMARATE DIHYDRATE 160; 4.5 UG/1; UG/1
2 AEROSOL RESPIRATORY (INHALATION)
Status: DISCONTINUED | OUTPATIENT
Start: 2023-01-06 | End: 2023-01-13 | Stop reason: HOSPADM

## 2023-01-06 RX ORDER — AMLODIPINE BESYLATE 2.5 MG/1
2.5 TABLET ORAL DAILY
Status: ON HOLD | COMMUNITY
End: 2023-01-13 | Stop reason: SDUPTHER

## 2023-01-06 RX ORDER — ATENOLOL 50 MG/1
50 TABLET ORAL DAILY
COMMUNITY
End: 2023-01-17 | Stop reason: SDUPTHER

## 2023-01-06 RX ORDER — METHYLPREDNISOLONE SODIUM SUCCINATE 125 MG/2ML
62.5 INJECTION, POWDER, LYOPHILIZED, FOR SOLUTION INTRAMUSCULAR; INTRAVENOUS DAILY
Status: DISCONTINUED | OUTPATIENT
Start: 2023-01-07 | End: 2023-01-07

## 2023-01-06 RX ORDER — SODIUM CHLORIDE 0.9 % (FLUSH) 0.9 %
10 SYRINGE (ML) INJECTION EVERY 12 HOURS SCHEDULED
Status: DISCONTINUED | OUTPATIENT
Start: 2023-01-06 | End: 2023-01-13 | Stop reason: HOSPADM

## 2023-01-06 RX ORDER — FLUTICASONE PROPIONATE 50 MCG
2 SPRAY, SUSPENSION (ML) NASAL DAILY
COMMUNITY

## 2023-01-06 RX ORDER — SODIUM CHLORIDE 9 MG/ML
40 INJECTION, SOLUTION INTRAVENOUS AS NEEDED
Status: DISCONTINUED | OUTPATIENT
Start: 2023-01-06 | End: 2023-01-13 | Stop reason: HOSPADM

## 2023-01-06 RX ORDER — GUAIFENESIN 600 MG/1
1200 TABLET, EXTENDED RELEASE ORAL EVERY 12 HOURS SCHEDULED
Status: DISCONTINUED | OUTPATIENT
Start: 2023-01-06 | End: 2023-01-13 | Stop reason: HOSPADM

## 2023-01-06 RX ORDER — IPRATROPIUM BROMIDE AND ALBUTEROL SULFATE 2.5; .5 MG/3ML; MG/3ML
3 SOLUTION RESPIRATORY (INHALATION)
Status: DISCONTINUED | OUTPATIENT
Start: 2023-01-06 | End: 2023-01-13 | Stop reason: HOSPADM

## 2023-01-06 RX ORDER — BENZONATATE 100 MG/1
200 CAPSULE ORAL 3 TIMES DAILY PRN
Status: DISCONTINUED | OUTPATIENT
Start: 2023-01-06 | End: 2023-01-13 | Stop reason: HOSPADM

## 2023-01-06 RX ORDER — METHYLPREDNISOLONE SODIUM SUCCINATE 125 MG/2ML
80 INJECTION, POWDER, LYOPHILIZED, FOR SOLUTION INTRAMUSCULAR; INTRAVENOUS ONCE
Status: COMPLETED | OUTPATIENT
Start: 2023-01-06 | End: 2023-01-06

## 2023-01-06 RX ORDER — SODIUM CHLORIDE 0.9 % (FLUSH) 0.9 %
10 SYRINGE (ML) INJECTION AS NEEDED
Status: DISCONTINUED | OUTPATIENT
Start: 2023-01-06 | End: 2023-01-13 | Stop reason: HOSPADM

## 2023-01-06 RX ORDER — ALBUTEROL SULFATE 2.5 MG/3ML
2.5 SOLUTION RESPIRATORY (INHALATION)
Status: DISCONTINUED | OUTPATIENT
Start: 2023-01-06 | End: 2023-01-06 | Stop reason: SDUPTHER

## 2023-01-06 RX ORDER — DEXTROSE MONOHYDRATE 25 G/50ML
25 INJECTION, SOLUTION INTRAVENOUS
Status: DISCONTINUED | OUTPATIENT
Start: 2023-01-06 | End: 2023-01-13 | Stop reason: HOSPADM

## 2023-01-06 RX ORDER — LABETALOL HYDROCHLORIDE 5 MG/ML
10 INJECTION, SOLUTION INTRAVENOUS EVERY 6 HOURS PRN
Status: DISCONTINUED | OUTPATIENT
Start: 2023-01-06 | End: 2023-01-13 | Stop reason: HOSPADM

## 2023-01-06 RX ADMIN — Medication 10 ML: at 22:05

## 2023-01-06 RX ADMIN — BUDESONIDE AND FORMOTEROL FUMARATE DIHYDRATE 2 PUFF: 160; 4.5 AEROSOL RESPIRATORY (INHALATION) at 23:31

## 2023-01-06 RX ADMIN — METHYLPREDNISOLONE SODIUM SUCCINATE 80 MG: 125 INJECTION, POWDER, FOR SOLUTION INTRAMUSCULAR; INTRAVENOUS at 22:04

## 2023-01-06 RX ADMIN — LABETALOL HYDROCHLORIDE 10 MG: 5 INJECTION, SOLUTION INTRAVENOUS at 23:04

## 2023-01-06 RX ADMIN — IPRATROPIUM BROMIDE AND ALBUTEROL SULFATE 3 ML: 2.5; .5 SOLUTION RESPIRATORY (INHALATION) at 20:44

## 2023-01-06 RX ADMIN — GUAIFENESIN 1200 MG: 600 TABLET, EXTENDED RELEASE ORAL at 21:52

## 2023-01-06 NOTE — PROGRESS NOTES
Subjective   Dafne Mary is a 82 y.o. female who presents today for COPD, URI, follow up of hospitalization with intubation for respiratory failure.     Hypertension  Associated symptoms include chest pain, palpitations and shortness of breath (chronic - increased OSORIO).   Dizziness  Associated symptoms include chest pain, coughing (chronic) and fatigue. Pertinent negatives include no fever.   Hypothyroidism  Associated symptoms include chest pain, coughing (chronic) and fatigue. Pertinent negatives include no fever.   COPD  She complains of cough (chronic) and shortness of breath (chronic - increased OSORIO). Associated symptoms include chest pain. Pertinent negatives include no fever. Ear pain: ears popping.       Patient was hospitalized 12/8/2022 through 12/9/2022 for COPD with acute exacerbation, pulmonary emphysema, anxiety, GERD, fibromyalgia, hypertension, hypothyroidism.      I have reviewed and discussed with patient hospital notes, including H&P, labs, imaging, consult notes, and discharge summary. Per discharge summary, she presented to the ER with increasing shortness of air and dry cough.  She was reported to have bilateral rhonchi and apical wheezing, oxygen saturations as low as 84%, chest x-ray and CBC without and for evidence of underlying bacterial pneumonia.  Patient was admitted and continued with aggressive bronchodilators as well as IV steroids.  She improved overnight, felt better, and was discharged.  She did have a dull headache in the morning.  Potassium is on the low side and received 2 doses of potassium at the hospital.  Walking oxygen saturation were in good range.    Patient was hospitalized for acute respiratory failure with hypoxia, acute exacerbation COPD, severe sepsis, NSTEMI type II, MALATHI, lactic acidosis, hypertension, history of follow-up failure with preserved EF.    I have reviewed and discussed with patient hospital notes, including H&P, labs, imaging, consult notes, and  discharge summary. Per discharge summary, she presented to [facility] with shortness of air, was given 2 nebulizer treatments without improvement, and was in severe distress-unable to verbalize.  She was subsequently intubated.  WBC 27, lactic acid 3, high-sensitivity troponin 209, chest x-ray unremarkable.  Given 1 L crystalloid there and initiated heparin GGT and transferred to [facility] and admitted to MICU for further care.  Diagnosed with atypical pneumonia-strep pneumo Legionella negative.  Wean to extubate, continue ceftriaxone and stop azithromycin.  Stop heparin GGT-troponin downtrending, stop trending.  Monitor renal function, avoid nephrotoxic agents, hold antihypertensives, follow-up echo advised start Symbicort and Spiriva and ordered Chmeil nebulizer machine.  Follow-up outpatient cardiology for stress test and outpatient pulmonology physician for PFT.  · CT chest with extubation and right IJ central line removed, regions of groundglass opacity and patchy consolidation particularly within the upper lobes, lingular atelectasis or scar, fine reticular opacities within bilateral lower lobes, calcified pulmonary granulomas, tiny pulmonary nodule detected middle lobe-3 mm, additional scattered sub-5 mm nodules within the lungs.  Small foci of mucus are evident in the trachea-thought to be related to recent intubation.  Diffuse thickening of large airways present particularly toward the lung bases.  Trace right pleural effusion, calcified and enlarged hilar mediastinal lymph nodes, cardiac chambers are enlarged, moderate calcified coronary arthrosclerosis.  Thoracic aorta is tortuous-mild atherosclerosis, mild calcification aortic leaflets, main pulmonary artery dilated-can be seen in pulmonary hypertension.  Atrophy and fatty infiltration right-sided rotator cuff muscle bellies, asymmetric hypertrophy of the right compared to the left thyroid lobe.  Please correlate for previous left  hemithyroidectomy.  Calcified splenic granulomas, calcified hepatic granulomas, dependent increased density within the gallbladder suggesting tiny gallstones or sludge.  Osteoarthritis shoulder right greater than left, S shaped scoliosis of the spine and osteophyte formation.  Indicates underlying interstitial lung disease.    Seen by cardiology in follow up 12/28/2022- tachycardia- heart rate low 100s when ambulating. Oxygen sat > 93%. Heart rate returns to normal when rests. On Coreg 12.5 mg twice daily. Continue losartan 100 mg daily- recently on amlodipine and furosemide during hospitalization but did not continue at home.     She reports she has started with worsening symptoms in the last several days to week.  She has wheezing, congestion, coughing all day, exhaustion, anxiety, oxygen saturation 94 to 95% at rest but decreases to about 88% with ambulation.  She reports she is struggling significantly-now feeling hot and clammy, headache, chest pain, and cannot walk and breathe at the same time.  She is extremely shaky.    Follow up with Dr Nolan- 1/4/2023. Did not qualify for oxygen. No changes. On Breztri and has albuterol but no nebulizer      Emphysema- never a smoker - father and brothers. Fletcher helps- has been on for 2 years and albuterol as needed. Dr Nolan- continues to follow. Exacerbation with intubation 5/3/2022 and 12/2022. Positive for rhinovirus and Covid 19 with initial intubation but unsure . She also had CHF at that time.   Allergies- Nasacort daily.   GERD- controlled on Prilosec without breakthrough symptoms. Not bothering at all.     Carotid stenosis- last carotid duplex 2016 was ok but prior had stenosis noted on duplex. Referred 12/2020 and 5/2021. She has not returned for recheck.   Palpitations- She has had flutter a couple times daily for years.   · She was seen 2015 for palpitations with echo and holter without the need for treatment.   · She has also noticed increased SOA spring  2021 after getting to work outside. She has chronic SOA with COPD, however, she had some increase from baseline. No CP, sweating, nausea with symptoms. She had palpitations here right before she had EKG but none while having tracing.   OSORIO, CP, thoracic back pain, intermittent palpitations, tachycardia- Started with decreased exercise tolerance last weekend. She took albuterol and increased her pulse up to 114 and took a while to go back down. Felt worse after the albuterol. She was folding clothes and was winded despite standing still and folding clothes. 15 steps to the bathroom and by the time she got back to the bed, she was panting and having a hard time breathing. It took a while but resolved. Sitting and resting, she was ok. She was having pain in her chest and across. Pain in midback/upper back  as well. No SOA laying down. [place] [facility] [place]     COPD exac, CHF, history of rhinovirus and Covid 19 infections with sinusitis- She still has sinus pressure and feeling a little off- no spinning dizziness or feeling faint but feels a little abnormal- almost like if you drank a glass of wine. No fever. Mainly head issue is bothering her.   No CP. SOA- still there and pretty bad. Pulmonology in 1 week.   · She has had both Covid 19 vaccines. Did well with them. Completed 3/23/2021.   · Patient was hospitalized 5/3/2022-5/7/2022 for acute hypercapnic and hypoxemic respiratory failure, acute on chronic CHF, COPD with exacerbation by viral infection- rhinovirus and Covid 19 infection, bronchitis- acute on chronic, elevated LFT, hyperglycemia, hypothyroidism, htn, hyperlipidemia. Per discharge summary, she was having gradually increasing SOA on exertion for a few days. It was mild but her son reported she had some increased \"hacking as well\". She then had sudden onset SOA, called 9-1-1 and was brought to ER intubated. She improved quickly. Increased coughing with Trelegy- advised she try Breztri. Follow up Dr Nolan 4  weeks.    · Labs- -blood gas- pCO2 61.5, pH7.243, WBC 20.25, elevated glucose, ALT, AST, BNP 2972, TSH 7.5, Free T4 low at 0.73, respiratory panel positive for rhinovirus and Covid 19. - WBC 12.91, improving LFT- elevated AST, blood gas- pH7.55, pCO2 improved to 27, - WBC 19, creatinine 1.26. - 1.3  · CXR- 5/3- enlarged heart, aorta tortuous, vascular congestion, hazy opacity right mid to lower lung- edema or pneumonia, possible right pleural effusion. CXR post intubation- heart enlarged, aorta tortuous, pulmonary vascular congestion, patchy pulmonary opacity- right mid to upper lung and both lower lobes possible infiltrate, ET tube in place.   · CTA chest 5/3/2022- cardiac enlargement, coronary artery calcifications and atherosclerosis of aorta, bilateral small pleural effusions, vague groundglass opacity- pulmonary edema, mild bronchial wall thickening with mucus plugging right- bronchitis. ET tube in place.   · CT head 5/3/2022- mucosal thickening left maxillary sinus and ethmoid air cells, martial opacification mastoid air cell bilaterally.   · KUB 5/3- following NG tube placement.       Brother with MI at 64 or 65.   Mother lived until age 87 and  of dementia. Active      The following portions of the patient's history were reviewed and updated as appropriate: allergies, current medications, past family history, past medical history, past social history, past surgical history and problem list.    Review of Systems   Constitutional: Positive for fatigue. Negative for fever.   HENT: Positive for sinus pressure. Ear pain: ears popping.    Respiratory: Positive for cough (chronic) and shortness of breath (chronic - increased OSORIO).    Cardiovascular: Positive for chest pain and palpitations.   Gastrointestinal: Negative.    Genitourinary: Negative.    Musculoskeletal: Positive for back pain.        Leg pain   Neurological: Positive for dizziness.   Psychiatric/Behavioral: Positive for dysphoric  mood. The patient is nervous/anxious.        Objective   Vitals:    01/06/23 1215   BP: 162/76   Pulse: 96   Temp:    SpO2:      Body mass index is 168.34 kg/m².    Physical Exam  Vitals and nursing note reviewed.   Constitutional:       Appearance: She is well-developed. She is ill-appearing.   HENT:      Head: Normocephalic and atraumatic.      Right Ear: External ear normal.      Left Ear: External ear normal.      Nose: Nose normal.   Eyes:      General: Lids are normal.      Conjunctiva/sclera: Conjunctivae normal.   Neck:      Vascular: No carotid bruit.   Cardiovascular:      Rate and Rhythm: Normal rate and regular rhythm.      Pulses: Normal pulses.      Heart sounds: Normal heart sounds. No murmur heard.    No friction rub. No gallop.   Pulmonary:      Effort: Tachypnea and accessory muscle usage present.      Breath sounds: Decreased breath sounds, wheezing and rhonchi present.   Musculoskeletal:         General: No deformity.      Cervical back: Neck supple.   Skin:     General: Skin is warm and dry.   Neurological:      Mental Status: She is alert and oriented to person, place, and time.      Gait: Gait normal.   Psychiatric:         Speech: Speech normal.         Behavior: Behavior normal.         Thought Content: Thought content normal.           Assessment & Plan   Diagnoses and all orders for this visit:    1. Acute respiratory failure with hypoxia (HCC) (Primary)    2. COPD with acute exacerbation (HCC)    3. Pulmonary emphysema, unspecified emphysema type (HCC)    4. Hypoxia    5. Weakness    6. Cough, unspecified type  -     POCT SARS-CoV-2 Antigen SATISH + Flu        Assessment and Plan  Patient with complicated respiratory history.  She has severe COPD/emphysema with multiple exacerbations.  Patient had coughing and OSORIO then had sudden worsening 5/3/2022 and was taken by ambulance to ER. She required intubation and was quickly weaned with treatment. Patient was noted to have hypercapnic, hypoxic  respiratory failure, COPD exacerbation, and CHF. Per note, COPD exacerbation and respiratory symptoms were from viral illness- positive for Rhinovirus and Covid 19. Per patient and family, she was advised this was CHF exacerbation.    She developed shortness of air and was hospitalized 12/8/2022 through 12/9/2022 at [facility] for hypoxia and shortness of air.  She stabilized and was discharged 12/9/2022.  She then had worsening shortness of air again and was taken to [facility] 12/19/2022.  She required intubation and was transferred downtown.  She had WBC 27,000, elevated troponin, elevated lactate.  She was diagnosed with hypoxia, acute exacerbation COPD, severe sepsis, NSTEMI type II, MALATHI, lactic acidosis, hypertension, history of follow-up failure with preserved EF.  Ventilator was weaned and she was discharged from the hospital.  Patient was seen by cardiology in follow-up 12/28/2022 for tachycardia, hypertension, dizziness, chronic CHF, hyperlipidemia, COPD, and hospitalization.  Patient had episodes of tachycardia with oxygen saturation above 93% and heart rate quickly returned to normal with rest.  She is on carvedilol 12.5 mg twice daily-no dosage change recommended.  She was also to continue losartan 100 mg daily.  They advised follow-up with Dr. Nolan and to obtain nebulizer and nebulizer treatments.  She will follow-up with cardiology in about 6 weeks.  She was then seen by pulmonology-Dr. Nolan 1/4/2023 in follow-up of hospitalization, COPD with chronic bronchitis and emphysema, chronic hypoxemic respiratory failure.  She felt her shortness of air and wheezing was worse than her baseline and thought she needed oxygen again.  Saturation at rest was 90% in the office in 6-minute walk oximetry did not qualify for supplemental oxygen- saturation remained greater than 90% throughout ambulation.  She was upset and stated she needed oxygen, however, unfortunately she did not meet the  requirement per insurance.  They advised she have overnight oximetry for further evaluation of potential oxygen needs in the home.  She was to continue Breztri.    Since that time, she has worsened clinically in the last few days to a week.  She reports wheezing, congestion, coughing all day, exhaustion, anxiety, oxygen saturation down to 88 with ambulation, struggling significantly, feeling weak and shaky.  She also notes feeling clammy, headache, chest pain that she reports is cough related.  I discussed with patient in daughter my concerns for her worsening symptoms.  She has failed medications from pulmonology and has high risk for respiratory failure.  I will directly admit to the hospital.  Daughter is comfortable taking the patient to the hospital.  I contacted [facility] for direct admission.  With limited beds, there was a delay but I confirmed bed availability and that they had contacted the patient at 6 PM.  She will need hospital follow-up with me once she is discharged      I spent 30 minutes caring for Dafne Mary on this date of service. This time includes time spent by me in the following activities as necessary: preparing for the visit, reviewing tests, specialists records and previous visits, obtaining and/or reviewing a separately obtained history, performing a medically appropriate exam and/or evaluation, counseling and educating the patient, family, caregiver, referring and/or communicating with other healthcare professionals, documenting information in the medical record, independently interpreting results and communicating that information with the patient, family, caregiver, and developing a medically appropriate treatment plan with consideration of other conditions, medications, and treatments.

## 2023-01-07 PROBLEM — J21.0 RSV BRONCHIOLITIS: Status: ACTIVE | Noted: 2023-01-07

## 2023-01-07 LAB
ALBUMIN SERPL-MCNC: 4.1 G/DL (ref 3.5–5.2)
ALBUMIN/GLOB SERPL: 1.6 G/DL
ALP SERPL-CCNC: 117 U/L (ref 39–117)
ALT SERPL W P-5'-P-CCNC: 23 U/L (ref 1–33)
ANION GAP SERPL CALCULATED.3IONS-SCNC: 10.7 MMOL/L (ref 5–15)
ARTERIAL PATENCY WRIST A: POSITIVE
AST SERPL-CCNC: 30 U/L (ref 1–32)
ATMOSPHERIC PRESS: 758.2 MMHG
B PARAPERT DNA SPEC QL NAA+PROBE: NOT DETECTED
B PERT DNA SPEC QL NAA+PROBE: NOT DETECTED
BASE EXCESS BLDA CALC-SCNC: 3.5 MMOL/L (ref 0–2)
BASOPHILS # BLD AUTO: 0.01 10*3/MM3 (ref 0–0.2)
BASOPHILS NFR BLD AUTO: 0.5 % (ref 0–1.5)
BDY SITE: ABNORMAL
BILIRUB SERPL-MCNC: 0.5 MG/DL (ref 0–1.2)
BUN SERPL-MCNC: 9 MG/DL (ref 8–23)
BUN/CREAT SERPL: 12.9 (ref 7–25)
C PNEUM DNA NPH QL NAA+NON-PROBE: NOT DETECTED
CALCIUM SPEC-SCNC: 9.1 MG/DL (ref 8.6–10.5)
CHLORIDE SERPL-SCNC: 102 MMOL/L (ref 98–107)
CO2 SERPL-SCNC: 25.3 MMOL/L (ref 22–29)
CREAT SERPL-MCNC: 0.7 MG/DL (ref 0.57–1)
DEPRECATED RDW RBC AUTO: 38.5 FL (ref 37–54)
DEPRECATED RDW RBC AUTO: 39.3 FL (ref 37–54)
EGFRCR SERPLBLD CKD-EPI 2021: 86.5 ML/MIN/1.73
EOSINOPHIL # BLD AUTO: 0 10*3/MM3 (ref 0–0.4)
EOSINOPHIL # BLD MANUAL: 0.35 10*3/MM3 (ref 0–0.4)
EOSINOPHIL NFR BLD AUTO: 0 % (ref 0.3–6.2)
EOSINOPHIL NFR BLD MANUAL: 8 % (ref 0.3–6.2)
ERYTHROCYTE [DISTWIDTH] IN BLOOD BY AUTOMATED COUNT: 12.3 % (ref 12.3–15.4)
ERYTHROCYTE [DISTWIDTH] IN BLOOD BY AUTOMATED COUNT: 12.3 % (ref 12.3–15.4)
FLUAV SUBTYP SPEC NAA+PROBE: NOT DETECTED
FLUBV RNA ISLT QL NAA+PROBE: NOT DETECTED
GAS FLOW AIRWAY: 2 LPM
GLOBULIN UR ELPH-MCNC: 2.6 GM/DL
GLUCOSE BLDC GLUCOMTR-MCNC: 138 MG/DL (ref 70–130)
GLUCOSE BLDC GLUCOMTR-MCNC: 159 MG/DL (ref 70–130)
GLUCOSE BLDC GLUCOMTR-MCNC: 162 MG/DL (ref 70–130)
GLUCOSE SERPL-MCNC: 151 MG/DL (ref 65–99)
HADV DNA SPEC NAA+PROBE: NOT DETECTED
HCO3 BLDA-SCNC: 28.8 MMOL/L (ref 22–28)
HCOV 229E RNA SPEC QL NAA+PROBE: NOT DETECTED
HCOV HKU1 RNA SPEC QL NAA+PROBE: NOT DETECTED
HCOV NL63 RNA SPEC QL NAA+PROBE: NOT DETECTED
HCOV OC43 RNA SPEC QL NAA+PROBE: NOT DETECTED
HCT VFR BLD AUTO: 34.2 % (ref 34–46.6)
HCT VFR BLD AUTO: 36.2 % (ref 34–46.6)
HGB BLD-MCNC: 11.6 G/DL (ref 12–15.9)
HGB BLD-MCNC: 12 G/DL (ref 12–15.9)
HMPV RNA NPH QL NAA+NON-PROBE: NOT DETECTED
HPIV1 RNA ISLT QL NAA+PROBE: NOT DETECTED
HPIV2 RNA SPEC QL NAA+PROBE: NOT DETECTED
HPIV3 RNA NPH QL NAA+PROBE: NOT DETECTED
HPIV4 P GENE NPH QL NAA+PROBE: NOT DETECTED
IMM GRANULOCYTES # BLD AUTO: 0.02 10*3/MM3 (ref 0–0.05)
IMM GRANULOCYTES NFR BLD AUTO: 1.1 % (ref 0–0.5)
LYMPHOCYTES # BLD AUTO: 0.32 10*3/MM3 (ref 0.7–3.1)
LYMPHOCYTES # BLD MANUAL: 0.66 10*3/MM3 (ref 0.7–3.1)
LYMPHOCYTES NFR BLD AUTO: 17 % (ref 19.6–45.3)
LYMPHOCYTES NFR BLD MANUAL: 9 % (ref 5–12)
M PNEUMO IGG SER IA-ACNC: NOT DETECTED
MCH RBC QN AUTO: 28.6 PG (ref 26.6–33)
MCH RBC QN AUTO: 29.2 PG (ref 26.6–33)
MCHC RBC AUTO-ENTMCNC: 33.1 G/DL (ref 31.5–35.7)
MCHC RBC AUTO-ENTMCNC: 33.9 G/DL (ref 31.5–35.7)
MCV RBC AUTO: 86.1 FL (ref 79–97)
MCV RBC AUTO: 86.2 FL (ref 79–97)
MODALITY: ABNORMAL
MONOCYTES # BLD AUTO: 0.05 10*3/MM3 (ref 0.1–0.9)
MONOCYTES # BLD: 0.4 10*3/MM3 (ref 0.1–0.9)
MONOCYTES NFR BLD AUTO: 2.7 % (ref 5–12)
NEUTROPHILS # BLD AUTO: 3 10*3/MM3 (ref 1.7–7)
NEUTROPHILS NFR BLD AUTO: 1.48 10*3/MM3 (ref 1.7–7)
NEUTROPHILS NFR BLD AUTO: 78.7 % (ref 42.7–76)
NEUTROPHILS NFR BLD MANUAL: 68 % (ref 42.7–76)
NRBC BLD AUTO-RTO: 0.5 /100 WBC (ref 0–0.2)
NT-PROBNP SERPL-MCNC: 1046 PG/ML (ref 0–1800)
PCO2 BLDA: 45.1 MM HG (ref 35–45)
PH BLDA: 7.41 PH UNITS (ref 7.35–7.45)
PLAT MORPH BLD: NORMAL
PLATELET # BLD AUTO: 319 10*3/MM3 (ref 140–450)
PLATELET # BLD AUTO: 324 10*3/MM3 (ref 140–450)
PMV BLD AUTO: 8.8 FL (ref 6–12)
PMV BLD AUTO: 8.9 FL (ref 6–12)
PO2 BLDA: 103.6 MM HG (ref 80–100)
POTASSIUM SERPL-SCNC: 3.5 MMOL/L (ref 3.5–5.2)
PROCALCITONIN SERPL-MCNC: 0.06 NG/ML (ref 0–0.25)
PROT SERPL-MCNC: 6.7 G/DL (ref 6–8.5)
RBC # BLD AUTO: 3.97 10*6/MM3 (ref 3.77–5.28)
RBC # BLD AUTO: 4.2 10*6/MM3 (ref 3.77–5.28)
RBC MORPH BLD: NORMAL
RHINOVIRUS RNA SPEC NAA+PROBE: NOT DETECTED
RSV RNA NPH QL NAA+NON-PROBE: DETECTED
SAO2 % BLDCOA: 98 % (ref 92–99)
SARS-COV-2 RNA NPH QL NAA+NON-PROBE: NOT DETECTED
SODIUM SERPL-SCNC: 138 MMOL/L (ref 136–145)
TOTAL RATE: 20 BREATHS/MINUTE
VARIANT LYMPHS NFR BLD MANUAL: 15 % (ref 19.6–45.3)
WBC MORPH BLD: NORMAL
WBC NRBC COR # BLD: 1.88 10*3/MM3 (ref 3.4–10.8)
WBC NRBC COR # BLD: 2.58 10*3/MM3 (ref 3.4–10.8)

## 2023-01-07 PROCEDURE — 82803 BLOOD GASES ANY COMBINATION: CPT

## 2023-01-07 PROCEDURE — 94664 DEMO&/EVAL PT USE INHALER: CPT

## 2023-01-07 PROCEDURE — 85025 COMPLETE CBC W/AUTO DIFF WBC: CPT | Performed by: NURSE PRACTITIONER

## 2023-01-07 PROCEDURE — 63710000001 INSULIN LISPRO (HUMAN) PER 5 UNITS: Performed by: NURSE PRACTITIONER

## 2023-01-07 PROCEDURE — 85027 COMPLETE CBC AUTOMATED: CPT | Performed by: INTERNAL MEDICINE

## 2023-01-07 PROCEDURE — 94799 UNLISTED PULMONARY SVC/PX: CPT

## 2023-01-07 PROCEDURE — 94760 N-INVAS EAR/PLS OXIMETRY 1: CPT

## 2023-01-07 PROCEDURE — 83880 ASSAY OF NATRIURETIC PEPTIDE: CPT | Performed by: INTERNAL MEDICINE

## 2023-01-07 PROCEDURE — 82962 GLUCOSE BLOOD TEST: CPT

## 2023-01-07 PROCEDURE — 36600 WITHDRAWAL OF ARTERIAL BLOOD: CPT

## 2023-01-07 PROCEDURE — 0202U NFCT DS 22 TRGT SARS-COV-2: CPT

## 2023-01-07 PROCEDURE — 84145 PROCALCITONIN (PCT): CPT | Performed by: INTERNAL MEDICINE

## 2023-01-07 PROCEDURE — 80053 COMPREHEN METABOLIC PANEL: CPT | Performed by: NURSE PRACTITIONER

## 2023-01-07 PROCEDURE — 25010000002 METHYLPREDNISOLONE PER 40 MG

## 2023-01-07 RX ORDER — PAROXETINE 10 MG/1
10 TABLET, FILM COATED ORAL DAILY
Status: DISCONTINUED | OUTPATIENT
Start: 2023-01-07 | End: 2023-01-13 | Stop reason: HOSPADM

## 2023-01-07 RX ORDER — ASPIRIN 81 MG/1
81 TABLET, CHEWABLE ORAL DAILY
Status: DISCONTINUED | OUTPATIENT
Start: 2023-01-07 | End: 2023-01-13 | Stop reason: HOSPADM

## 2023-01-07 RX ORDER — FLUTICASONE PROPIONATE 50 MCG
2 SPRAY, SUSPENSION (ML) NASAL DAILY
Status: DISCONTINUED | OUTPATIENT
Start: 2023-01-07 | End: 2023-01-13 | Stop reason: HOSPADM

## 2023-01-07 RX ORDER — ATENOLOL 50 MG/1
50 TABLET ORAL DAILY
Status: DISCONTINUED | OUTPATIENT
Start: 2023-01-07 | End: 2023-01-13 | Stop reason: HOSPADM

## 2023-01-07 RX ORDER — LOSARTAN POTASSIUM 100 MG/1
100 TABLET ORAL DAILY
Status: DISCONTINUED | OUTPATIENT
Start: 2023-01-07 | End: 2023-01-13 | Stop reason: HOSPADM

## 2023-01-07 RX ORDER — LEVOTHYROXINE AND LIOTHYRONINE 19; 4.5 UG/1; UG/1
15 TABLET ORAL DAILY
Status: DISCONTINUED | OUTPATIENT
Start: 2023-01-07 | End: 2023-01-13 | Stop reason: HOSPADM

## 2023-01-07 RX ORDER — SACCHAROMYCES BOULARDII 250 MG
250 CAPSULE ORAL 2 TIMES DAILY
Status: DISCONTINUED | OUTPATIENT
Start: 2023-01-07 | End: 2023-01-13 | Stop reason: HOSPADM

## 2023-01-07 RX ORDER — LEVOTHYROXINE AND LIOTHYRONINE 19; 4.5 UG/1; UG/1
30 TABLET ORAL DAILY
Status: DISCONTINUED | OUTPATIENT
Start: 2023-01-07 | End: 2023-01-13 | Stop reason: HOSPADM

## 2023-01-07 RX ORDER — ATORVASTATIN CALCIUM 20 MG/1
20 TABLET, FILM COATED ORAL NIGHTLY
Status: DISCONTINUED | OUTPATIENT
Start: 2023-01-07 | End: 2023-01-13 | Stop reason: HOSPADM

## 2023-01-07 RX ORDER — AMLODIPINE BESYLATE 2.5 MG/1
2.5 TABLET ORAL DAILY
Status: DISCONTINUED | OUTPATIENT
Start: 2023-01-07 | End: 2023-01-11

## 2023-01-07 RX ORDER — PANTOPRAZOLE SODIUM 40 MG/1
40 TABLET, DELAYED RELEASE ORAL EVERY MORNING
Status: DISCONTINUED | OUTPATIENT
Start: 2023-01-07 | End: 2023-01-13 | Stop reason: HOSPADM

## 2023-01-07 RX ORDER — METHYLPREDNISOLONE SODIUM SUCCINATE 40 MG/ML
40 INJECTION, POWDER, LYOPHILIZED, FOR SOLUTION INTRAMUSCULAR; INTRAVENOUS EVERY 8 HOURS
Status: DISCONTINUED | OUTPATIENT
Start: 2023-01-07 | End: 2023-01-08

## 2023-01-07 RX ADMIN — ATORVASTATIN CALCIUM 20 MG: 20 TABLET, FILM COATED ORAL at 20:54

## 2023-01-07 RX ADMIN — ATENOLOL 50 MG: 50 TABLET ORAL at 13:38

## 2023-01-07 RX ADMIN — LEVOTHYROXINE, LIOTHYRONINE 15 MG: 19; 4.5 TABLET ORAL at 13:39

## 2023-01-07 RX ADMIN — ASPIRIN 81 MG: 81 TABLET, CHEWABLE ORAL at 12:06

## 2023-01-07 RX ADMIN — BUDESONIDE AND FORMOTEROL FUMARATE DIHYDRATE 2 PUFF: 160; 4.5 AEROSOL RESPIRATORY (INHALATION) at 20:05

## 2023-01-07 RX ADMIN — GUAIFENESIN 1200 MG: 600 TABLET, EXTENDED RELEASE ORAL at 08:59

## 2023-01-07 RX ADMIN — INSULIN LISPRO 2 UNITS: 100 INJECTION, SOLUTION INTRAVENOUS; SUBCUTANEOUS at 17:41

## 2023-01-07 RX ADMIN — Medication 250 MG: at 20:54

## 2023-01-07 RX ADMIN — Medication 10 ML: at 08:59

## 2023-01-07 RX ADMIN — FLUTICASONE PROPIONATE 2 SPRAY: 50 SPRAY, METERED NASAL at 12:08

## 2023-01-07 RX ADMIN — METHYLPREDNISOLONE SODIUM SUCCINATE 40 MG: 40 INJECTION, POWDER, FOR SOLUTION INTRAMUSCULAR; INTRAVENOUS at 21:00

## 2023-01-07 RX ADMIN — INSULIN LISPRO 2 UNITS: 100 INJECTION, SOLUTION INTRAVENOUS; SUBCUTANEOUS at 12:06

## 2023-01-07 RX ADMIN — GUAIFENESIN 1200 MG: 600 TABLET, EXTENDED RELEASE ORAL at 20:54

## 2023-01-07 RX ADMIN — METHYLPREDNISOLONE SODIUM SUCCINATE 40 MG: 40 INJECTION, POWDER, FOR SOLUTION INTRAMUSCULAR; INTRAVENOUS at 13:44

## 2023-01-07 RX ADMIN — Medication 250 MG: at 13:38

## 2023-01-07 RX ADMIN — IPRATROPIUM BROMIDE AND ALBUTEROL SULFATE 3 ML: 2.5; .5 SOLUTION RESPIRATORY (INHALATION) at 12:42

## 2023-01-07 RX ADMIN — LEVOTHYROXINE, LIOTHYRONINE 30 MG: 19; 4.5 TABLET ORAL at 12:07

## 2023-01-07 RX ADMIN — LOSARTAN POTASSIUM 100 MG: 100 TABLET, FILM COATED ORAL at 13:38

## 2023-01-07 RX ADMIN — BUDESONIDE AND FORMOTEROL FUMARATE DIHYDRATE 2 PUFF: 160; 4.5 AEROSOL RESPIRATORY (INHALATION) at 08:06

## 2023-01-07 RX ADMIN — AMLODIPINE BESYLATE 2.5 MG: 2.5 TABLET ORAL at 13:38

## 2023-01-07 RX ADMIN — PAROXETINE HYDROCHLORIDE 10 MG: 10 TABLET, FILM COATED ORAL at 12:07

## 2023-01-07 RX ADMIN — PANTOPRAZOLE SODIUM 40 MG: 40 TABLET, DELAYED RELEASE ORAL at 12:07

## 2023-01-07 RX ADMIN — METHYLPREDNISOLONE SODIUM SUCCINATE 40 MG: 40 INJECTION, POWDER, FOR SOLUTION INTRAMUSCULAR; INTRAVENOUS at 06:11

## 2023-01-07 RX ADMIN — Medication 10 ML: at 20:54

## 2023-01-07 NOTE — H&P
Patient Name:  Dafne Mary  YOB: 1940  MRN:  [id number]  Admit Date:  1/6/2023  Patient Care Team:  Sintia Chatterjee PA as PCP - General (Family Medicine)  Javier Nolan MD as Consulting Physician (Pulmonary Disease)  Nae Norman MD as Consulting Physician (Cardiology)      Subjective   History Present Illness     Chief complaint: Shortness of Breath, and Congestion       History of Present Illness   Ms. Mary is a 82 y.o. with a history of COPD, not on home oxygen, CHF, HLD, HTN, GERD, and depression that presents to [facility] as a direct admission from her primary care provider's office today where she was being seen as follow-up for her recent hospitalizations for acute respiratory failure with hypoxia, acute exacerbation of COPD, severe sepsis, non-STEMI, MALATHI, lactic acidosis, HTN, and CHF with preserved ejection fraction at [facility] where she required intubation and mechanical ventilation in late December. Patient reports increasing shortness of breath over the last several weeks, with increasing congestion over the last several days.  She reports becoming very dyspneic just walking across her living room.  She states that her oxygen saturations have been dropping as low as 84%, and she has to use her rescue albuterol with any activity. She does verbalize that she feels like she needs oxygen at home, upon review of medical records, she did not qualify for home oxygen. Patient also reports increasing congestion and non productive cough over the last several days.  COVID-19, influenza A and influenza B negative at primary care today. She denies any acute distress chest pain, palpitations headache, lightheadedness, syncope, fever, chills, abdominal pain, nausea, vomiting, diarrhea, or  complaints.     Initial labs, pending,   Chest X ray positive for bibasilar atelectasis, and cardiomegaly no infiltrates noted.    Patient will be admitted to the  "hospitalist group for further evaluation and treatment of COPD exacerbation and other acute and or chronic conditions.       Review of Systems   Constitutional: Positive for activity change and fatigue.   HENT: Negative.    Eyes: Negative.    Respiratory: Positive for cough, shortness of breath and wheezing.    Cardiovascular: Negative.    Gastrointestinal: Negative.    Endocrine: Negative.    Genitourinary: Negative.    Musculoskeletal: Negative.    Skin: Negative.    Allergic/Immunologic: Negative.    Neurological: Negative.    Hematological: Negative.    Psychiatric/Behavioral: Negative.    All other systems reviewed and are negative.       Personal History     Past Medical History:   Diagnosis Date   • Chronic diastolic congestive heart failure (HCC) 11/17/2022   • Depression    • Emphysema lung (HCC)    • GERD (gastroesophageal reflux disease)    • Heart failure (HCC)    • Hyperlipidemia    • Hypertension    • Hypothyroidism      Past Surgical History:   Procedure Laterality Date   • EYE SURGERY Left    • PARATHYROIDECTOMY      Patient reports thyroidectomy partial not a para thyroidectomy.   • THYROIDECTOMY, PARTIAL      1984     Family History   Problem Relation Age of Onset   • Alzheimer's disease Mother    • Lung disease Father    • Alcohol abuse Father    • Heart disease Brother    • Hypertension Brother    • Lung disease Brother    • Alcohol abuse Brother    • Cancer Brother         LUNG  WAS A SMOKER   • Thyroid disease Maternal Grandmother      Social History     Tobacco Use   • Smoking status: Never   • Smokeless tobacco: Never   Vaping Use   • Vaping Use: Never used   Substance Use Topics   • Alcohol use: Yes     Comment: \"occ\"; caffeine use- tea   • Drug use: No     No current facility-administered medications on file prior to encounter.     Current Outpatient Medications on File Prior to Encounter   Medication Sig Dispense Refill   • amLODIPine (NORVASC) 2.5 MG tablet Take 2.5 mg by mouth Daily. Pt " wasn't sure the dosage     • [place] Thyroid 15 MG tablet Take 1 tablet by mouth once daily 90 tablet 0   • [place] Thyroid 30 MG tablet Take 1 tablet by mouth once daily 90 tablet 0   • aspirin 81 MG chewable tablet Chew 81 mg Daily.     • atenolol (TENORMIN) 50 MG tablet Take 50 mg by mouth Daily.     • atorvastatin (LIPITOR) 20 MG tablet Take 1 tablet by mouth Every Night. 90 tablet 0   • Breztri Aerosphere 160-9-4.8 MCG/ACT aerosol inhaler 2 puffs 2 (Two) Times a Day.     • fluticasone (FLONASE) 50 MCG/ACT nasal spray 2 sprays into the nostril(s) as directed by provider Daily.     • losartan (COZAAR) 100 MG tablet Take 1 tablet by mouth once daily 90 tablet 0   • omeprazole (priLOSEC) 20 MG capsule Take 20 mg by mouth Daily.     • PARoxetine (PAXIL) 10 MG tablet Take 1 tablet by mouth once daily 90 tablet 0   • saccharomyces boulardii (Florastor) 250 MG capsule Take 1 capsule by mouth 2 (Two) Times a Day. 60 capsule 0   • vitamin D3 125 MCG (5000 UT) capsule capsule Take 5,000 Units by mouth Daily.     • carvedilol (COREG) 12.5 MG tablet Take 1 tablet by mouth 2 (Two) Times a Day. 60 tablet 5   • [DISCONTINUED] nitrofurantoin, macrocrystal-monohydrate, (Macrobid) 100 MG capsule Take 1 capsule by mouth 2 (Two) Times a Day. (Patient taking differently: Take 100 mg by mouth 2 (Two) Times a Day. 12/5 x 7 days) 14 capsule 0   • [DISCONTINUED] Triamcinolone Acetonide (NASACORT AQ) 55 MCG/ACT nasal inhaler 2 SPRAYS IN EACH NOSTRIL ONCE DAILY (Patient taking differently: As Needed. 2 SPRAYS IN EACH NOSTRIL ONCE DAILY) 1 bottle 11     Allergies   Allergen Reactions   • Lansoprazole Nausea Only     NAUSEA  NAUSEA  NAUSEA   • Diphenhydramine Hcl (Sleep) Irritability   • Lisinopril Cough       Objective    Objective     Vital Signs  Temp:  [97.4 °F (36.3 °C)-98.4 °F (36.9 °C)] 97.4 °F (36.3 °C)  Heart Rate:  [] 89  Resp:  [18-24] 18  BP: (162-193)/() 164/100  SpO2:  [91 %-96 %] 95 %  on  Flow (L/min):  [2] 2;    Device (Oxygen Therapy): nasal cannula  Body mass index is 168.34 kg/m².    Physical Exam  Vitals and nursing note reviewed.   Constitutional:       General: She is awake.      Appearance: She is obese.      Interventions: Nasal cannula in place.   HENT:      Head: Atraumatic.      Nose: Nose normal.      Mouth/Throat:      Mouth: Mucous membranes are dry.   Eyes:      Conjunctiva/sclera: Conjunctivae normal.   Cardiovascular:      Rate and Rhythm: Normal rate and regular rhythm.      Pulses: Normal pulses.      Heart sounds: Normal heart sounds.   Pulmonary:      Breath sounds: Examination of the right-upper field reveals wheezing. Examination of the left-upper field reveals wheezing. Wheezing present.   Abdominal:      General: Bowel sounds are normal.      Palpations: Abdomen is soft.   Musculoskeletal:         General: Normal range of motion.      Cervical back: Normal range of motion and neck supple.   Skin:     General: Skin is warm and dry.      Capillary Refill: Capillary refill takes less than 2 seconds.   Neurological:      General: No focal deficit present.      Mental Status: She is alert and oriented to person, place, and time.   Psychiatric:         Mood and Affect: Mood normal.         Behavior: Behavior is cooperative.         Results Review:  I reviewed the patient's new clinical results.  I reviewed the patient's new imaging results and agree with the interpretation.  I reviewed the patient's other test results and agree with the interpretation  I personally viewed and interpreted the patient's EKG/Telemetry data  Discussed with ED provider.    Lab Results (last 24 hours)     Procedure Component Value Units Date/Time    POCT SARS-CoV-2 Antigen SATISH + Flu [[id number]]  (Normal) Collected: 01/06/23 1158    Specimen: Swab Updated: 01/06/23 1159     SARS Antigen Not Detected     Influenza A Antigen SATISH Not Detected     Influenza B Antigen SATISH Not Detected     Internal Control Passed     Lot Number  [id number]     Expiration Date 02/01/24    CBC & Differential [[id number]] Collected: 01/06/23 2047    Specimen: Blood Updated: 01/06/23 2328    Narrative:      The following orders were created for panel order CBC & Differential.  Procedure                               Abnormality         Status                     ---------                               -----------         ------                     Manual Differential[[id number]]                                                         CBC Auto Differential[[id number]]        Normal              Final result                 Please view results for these tests on the individual orders.    CBC Auto Differential [[id number]]  (Normal) Collected: 01/06/23 2047    Specimen: Blood Updated: 01/06/23 2145     WBC 4.41 10*3/mm3      RBC 4.32 10*6/mm3      Hemoglobin 12.5 g/dL      Hematocrit 36.9 %      MCV 85.4 fL      MCH 28.9 pg      MCHC 33.9 g/dL      RDW 12.4 %      RDW-SD 38.1 fl      MPV 8.6 fL      Platelets 322 10*3/mm3     Manual Differential [[id number]] Collected: 01/06/23 2047    Specimen: Blood Updated: 01/06/23 2328          Imaging Results (Last 24 Hours)     Procedure Component Value Units Date/Time    XR Chest 1 View [[id number]] Resulted: 01/06/23 2252     Updated: 01/06/23 2252          Results for orders placed during the hospital encounter of 07/25/22    Adult Transthoracic Echo Complete w/ Color, Spectral and Contrast if Necessary Per Protocol    Interpretation Summary  · Calculated left ventricular EF = 52.6% Estimated left ventricular EF = 53% Estimated left ventricular EF was in agreement with the calculated left ventricular EF. Left ventricular systolic function is normal. Normal left ventricular cavity size and wall thickness noted. There are wall motion abnormalities as noted below Left ventricular diastolic function was indeterminate.  · The following segments are hypokinetic: basal inferoseptal and basal inferior. All other segments are normal.  ·  Left atrial volume is mildly increased.  · No aortic valve regurgitation or stenosis is present. The aortic valve is abnormal in structure. There is severe calcification of the aortic valve  · Severe mitral annular calcification is present. There is moderate, bileaflet mitral valve thickening present. Mild to moderate mitral valve regurgitation is present. No significant mitral valve stenosis is present.  · Tricuspid valve not well visualized. Trace tricuspid valve regurgitation is present. Estimated right ventricular systolic pressure from tricuspid regurgitation is moderately elevated (45-55 mmHg). Calculated right ventricular systolic pressure from tricuspid regurgitation is 45 mmHg.  · There is suggestion of atrial shunting by color-flow imaging subcostal views. Saline injection was not performed. We will contact the patient to return to address further.      No orders to display        Assessment/Plan     Active Hospital Problems    Diagnosis  POA   • **COPD with acute exacerbation (HCC) [J44.1]  Yes   • Hypothyroid [E03.9]  Yes   • COPD exacerbation (HCC) [J44.1]  Yes   • Morbid obesity with BMI of 70 and over, adult ([facility]) [E66.01, Z68.45]  Not Applicable   • Chronic diastolic congestive heart failure (HCC) [I50.32]  Yes   • Benign hypertension [I10]  Yes   • Anxiety [F41.9]  Yes   • Arteriosclerosis of both carotid arteries [I65.23]  Yes   • Fibromyalgia [M79.7]  Yes   • Gastroesophageal reflux disease [K21.9]  Yes      Resolved Hospital Problems   No resolved problems to display.      COPD with acute exacerbation   Acute on Chronic  Not on Chronic Oxygen  Base Line PFT's unknown    Chest X ray reviewed - unremarkable for pneumonia.  Never smoker - long history of second hand smoker exposure.   Check Respiratory PCR  Solu-Medrol ordered  Accu-Cheks and SSI ordered for steroid-induced hyperglycemia  DuoNebs ordered-scheduled and as needed for shortness of breath/wheezing  Continue Mucinex  Symbicort  ordered  Continuous pulse oximetry  Supplemental oxygen as needed for hypoxia/respiratory distress maintain oxygen saturation greater than 90%  Encourage pulmonary hygiene: TCDB/ and IS  As needed ABGs for respiratory distress-hypoxia  Consult Pulmonary -recent hospitalizations   Patient on Breztri at [facility]   Chronic diastolic congestive heart failure   Last Echo 12/20/2022- at [facility] un able to review  Echo 07/25/2022-LVEF 52.6%  Cardiomegaly noted on chest x-ray  Check BNP  Daily weights   Monitor I&O's   Patient may benefit from cardiology consult     Hypertension  Uncontrolled at time of admission patient reports she did not take her atenolol today  Labetalol every 6 ordered with parameters  Continue home regimen pending med rec verification    Arteriosclerosis of both carotid arteries  Chronic   Patient free of chest pain at time of admission  As needed nitro for chest pain per policy  Continuous cardiac monitoring pulse oximetry  Continue home amlodipine ASA, and statin pending home med rec verification    Gastroesophageal reflux disease  Chronic   Continue home PPI    Hypothyroid  Continue home [place] Thyroid  pending home med rec verification.    · I discussed the patient's findings and my recommendations with patient.    VTE Prophylaxis - SCDs.  Code Status - Full code.       ANTONELLA Masters  [facility]  01/06/23  23:39 EST

## 2023-01-07 NOTE — PLAN OF CARE
Goal Outcome Evaluation:  Patient BP medications continued today.  BP stable since starting back on home regimen.  No SOB, no c/o pain this shift.  Will continue to monitor.

## 2023-01-07 NOTE — CONSULTS
CONSULT NOTE    Patient Identification:  Dafne Mary  82 y.o.  female  1940  [id number]            Requesting physician: Dr. Marquez    Reason for Consultation:  COPD, multiple recent hospitalizations    CC: wheezing, coughing, hypoxia    History of Present Illness:  Dafne Mary is an 82 year old female with chronic diastolic congestive heart failure, depression, GERD, hyperlipidemia, hypertension, hypothyroidism.    She is followed by Dr. Nolan for severe COPD with chronic bronchitis and emphysema and chronic hypoxemic respiratory failure.  She was recently seen by Dr. Nolan in the office on 1/4/2023 for follow-up and for evaluation for home supplemental oxygen.  During this visit, an exercise oximetry was performed and patient did not qualify for supplemental oxygen.  Plans were made for an overnight oximetry test.  She is maintained on Breztri for her severe COPD. She is a never smoker, however has significant secondhand smoke exposure through her multiple brothers and her father.    She saw her primary care provider on 1/6/2022 as a follow-up for recent hospitalization.  During this visit, patient reported that she is clinically worsened in the past few weeks with increased wheezing, congestion, cough, and oxygenation saturation down to 88% with exertion.  She was direct admitted for COPD exacerbation.    Review of Systems:  Constitutional: Negative for unexpected weight change, appetite change or fever.  Positive for diaphoresis, fatigue and activity change.  HEENT: Positive for congestion, rhinorrhea, sinus pain and pressure.  Negative for eye discharge, itching or pain.  Respiratory: Positive for cough, shortness of breath and wheezing.  Cardiac: Negative for chest pain, leg swelling or palpitations.  GI: Negative for abdominal distention, pain, constipation, diarrhea, nausea or vomiting.  : Negative for frequency, dysuria, urgency.  Musculoskeletal: Negative for arthralgias or  myalgias.  Skin: Negative for color change, pallor, rash or wound.  Neurological: Negative for syncope, tremors, or headache.  Positive for weakness and intermittent dizziness.    Past Medical History:  Past Medical History:   Diagnosis Date   • Chronic diastolic congestive heart failure (HCC) 11/17/2022   • Depression    • Emphysema lung (HCC)    • GERD (gastroesophageal reflux disease)    • Heart failure (HCC)    • Hyperlipidemia    • Hypertension    • Hypothyroidism        Past Surgical History:  Past Surgical History:   Procedure Laterality Date   • EYE SURGERY Left    • PARATHYROIDECTOMY      Patient reports thyroidectomy partial not a para thyroidectomy.   • THYROIDECTOMY, PARTIAL      1984        Home Meds:  Medications Prior to Admission   Medication Sig Dispense Refill Last Dose   • amLODIPine (NORVASC) 2.5 MG tablet Take 2.5 mg by mouth Daily. Pt wasn't sure the dosage   1/6/2023   • [place] Thyroid 15 MG tablet Take 1 tablet by mouth once daily 90 tablet 0 1/6/2023   • [place] Thyroid 30 MG tablet Take 1 tablet by mouth once daily 90 tablet 0 1/6/2023   • aspirin 81 MG chewable tablet Chew 81 mg Daily.   1/6/2023   • atenolol (TENORMIN) 50 MG tablet Take 50 mg by mouth Daily.   1/5/2023   • atorvastatin (LIPITOR) 20 MG tablet Take 1 tablet by mouth Every Night. 90 tablet 0 1/5/2023   • Breztri Aerosphere 160-9-4.8 MCG/ACT aerosol inhaler 2 puffs 2 (Two) Times a Day.   1/6/2023   • fluticasone (FLONASE) 50 MCG/ACT nasal spray 2 sprays into the nostril(s) as directed by provider Daily.   1/6/2023   • losartan (COZAAR) 100 MG tablet Take 1 tablet by mouth once daily 90 tablet 0 1/6/2023   • omeprazole (priLOSEC) 20 MG capsule Take 20 mg by mouth Daily.   1/6/2023   • PARoxetine (PAXIL) 10 MG tablet Take 1 tablet by mouth once daily 90 tablet 0 1/6/2023   • saccharomyces boulardii (Florastor) 250 MG capsule Take 1 capsule by mouth 2 (Two) Times a Day. 60 capsule 0 Past Week   • vitamin D3 125 MCG (5000 UT)  "capsule capsule Take 5,000 Units by mouth Daily.   1/6/2023   • carvedilol (COREG) 12.5 MG tablet Take 1 tablet by mouth 2 (Two) Times a Day. 60 tablet 5        Allergies:  Allergies   Allergen Reactions   • Lansoprazole Nausea Only     NAUSEA  NAUSEA  NAUSEA   • Diphenhydramine Hcl (Sleep) Irritability   • Lisinopril Cough       Social History:   Social History     Socioeconomic History   • Marital status:    Tobacco Use   • Smoking status: Never   • Smokeless tobacco: Never   Vaping Use   • Vaping Use: Never used   Substance and Sexual Activity   • Alcohol use: Yes     Comment: \"occ\"; caffeine use- tea   • Drug use: No   • Sexual activity: Defer       Family History:  Family History   Problem Relation Age of Onset   • Alzheimer's disease Mother    • Lung disease Father    • Alcohol abuse Father    • Heart disease Brother    • Hypertension Brother    • Lung disease Brother    • Alcohol abuse Brother    • Cancer Brother         LUNG  WAS A SMOKER   • Thyroid disease Maternal Grandmother        Physical Exam:  /100 (BP Location: Right arm, Patient Position: Sitting)   Pulse 89   Temp 97.4 °F (36.3 °C) (Oral)   Resp 18   Ht 60 cm (23.62\")   Wt 60.6 kg (133 lb 9.6 oz)   SpO2 95%   .34 kg/m²  Body mass index is 168.34 kg/m². 95% 60.6 kg (133 lb 9.6 oz)      Physical Exam:  Constitutional: Patient is an alert, normal weight female.  She is sitting up in bed, does not appear to be in acute distress.  Head: Normocephalic and atraumatic.  Mouth/throat: Moist, clear of exudate or erythema.    Eyes: Pupils are equal round and reactive to light, conjunctiva normal and nonicteric.  Neck: Supple, nontender, trachea midline, no JVD.  Cardiovascular: Borderline tachycardic rate (100s), regular rhythm.  Normal S1-S2.  Pulmonary: Normal respiratory effort, expiratory wheezes in upper lobes, diminished in bases.  Abdominal: Flat, soft, nontender, normal bowel sounds.  : Not examined.  Musculoskeletal: No " swelling, tenderness, or bilateral lower extremity edema.  Skin: Warm, dry, no ecchymosis or erythema visible.  Capillary refill less than 3 seconds.  Neurological: Alert, oriented x3.  No sensory or motor deficits appreciated.    LABS:  SARS-CoV-2, MATT   Date Value Ref Range Status   12/19/2022 NEGATIVE Negative Final       Lab Results   Component Value Date    CALCIUM 9.8 12/09/2022    PHOS 3.8 05/05/2022     Results from last 7 days   Lab Units 01/06/23  2047   WBC 10*3/mm3 4.41   HEMOGLOBIN g/dL 12.5   PLATELETS 10*3/mm3 322     Lab Results   Component Value Date    CKTOTAL 67 10/04/2022    TROPONINT <0.010 12/08/2022                                 Lab Results   Component Value Date    TSH 1.740 11/01/2022     Estimated Creatinine Clearance: 19.2 mL/min (A) (by C-G formula based on SCr of 1.03 mg/dL (H)).         Imaging: I personally visualized the images of scans/x-rays performed within last 3 days.      Assessment:  1. COPD with acute exacerbation  2. Acute on chronic hypoxemic respiratory failure  3. Granulomatous lung disease  4. Chronic diastolic congestive heart failure  5. Hypothyroidism  6. Hypertension  7. GERD    Recommendations:  · Patient is complaining of symptoms consistent with a viral upper respiratory infection, complete respiratory viral panel ordered.  · Wheezing is still auscultated on exam, continue Solu-Medrol with plans to transition to oral prednisone when able.  · Continue supplemental oxygen, goal SPO2 greater than 90%.  Walking oximetry ordered for the morning.  · ABG ordered for the morning.  · Patient has had a recent echocardiogram at [facility] on 12/20/2022-request records.  · Chest x-ray reviewed, shows chronic stable changes of her granulomatous lung disease, no consolidation or infiltrate, vasculature unremarkable. Patient is afebrile with a normal white count, no indication for antibiotics at this time.    Patient was placed in face mask upon entering room and kept mask on  throughout our encounter. I wore full protective equipment throughout this patient encounter including a face mask, gown and gloves. Hand hygiene was performed before donning protective equipment and after removal when leaving the room.    Sarah Delarosa, APRN  1/6/2023  23:36 EST      Much of this encounter note is an electronic transcription/translation of spoken language to printed text using Dragon Software.

## 2023-01-07 NOTE — PROGRESS NOTES
[facility] INPATIENT PROGRESS NOTE         [address]    2023      PATIENT IDENTIFICATION:  Name: Dafne Mary ADMIT: 2023   : 1940  PCP: Sintia Chatterjee PA    MRN: [id number] LOS: 0 days   AGE/SEX: 82 y.o. female  ROOM: [facility]                     LOS 0    Reason for visit: COPD exacerbation      SUBJECTIVE:      Resting comfortably.  Says that she feels that her breathing is improving already.  Moderate wheezing on auscultation.  On 2 L supplemental oxygen.  Discussed with respiratory therapy at bedside. I am seeing the patient for the first time today.  All patient problems are new to me.      Objective   OBJECTIVE:    Vital Sign Min/Max for last 24 hours  Temp  Min: 97.4 °F (36.3 °C)  Max: 98.4 °F (36.9 °C)   BP  Min: 162/76  Max: 193/95   Pulse  Min: 89  Max: 114   Resp  Min: 18  Max: 24   SpO2  Min: 91 %  Max: 98 %   No data recorded   Weight  Min: 60.6 kg (133 lb 9.6 oz)  Max: 60.6 kg (133 lb 9.6 oz)                         Body mass index is 26.09 kg/m².  No intake or output data in the 24 hours ending 23 1034      Exam:  GEN:  No distress, appears stated age  EYES:   PERRL, anicteric sclerae  ENT:    External ears/nose normal, OP clear  NECK:  No adenopathy, midline trachea  LUNGS: Normal chest on inspection, palpation and moderate wheezing bilaterally on auscultation  CV:  Normal S1S2, without murmur  ABD:  Nontender, nondistended, no hepatosplenomegaly, +BS  EXT:  No edema.  No cyanosis or clubbing.  No mottling and normal cap refill.    Assessment     Scheduled meds:  budesonide-formoterol, 2 puff, Inhalation, BID - RT  guaiFENesin, 1,200 mg, Oral, Q12H  insulin lispro, 0-7 Units, Subcutaneous, TID With Meals  ipratropium-albuterol, 3 mL, Nebulization, Q6H While Awake - RT  methylPREDNISolone sodium succinate, 40 mg, Intravenous, Q8H  sodium chloride, 10 mL, Intravenous, Q12H      IV meds:                         Data Review:  Results from last 7 days   Lab  Units 01/07/23  0754   SODIUM mmol/L 138   POTASSIUM mmol/L 3.5   CHLORIDE mmol/L 102   CO2 mmol/L 25.3   BUN mg/dL 9   CREATININE mg/dL 0.70   GLUCOSE mg/dL 151*   CALCIUM mg/dL 9.1         Estimated Creatinine Clearance: 50.4 mL/min (by C-G formula based on SCr of 0.7 mg/dL).  Results from last 7 days   Lab Units 01/07/23  0754 01/06/23  2047   WBC 10*3/mm3 1.88* 4.41   HEMOGLOBIN g/dL 12.0 12.5   PLATELETS 10*3/mm3 319 322         Results from last 7 days   Lab Units 01/07/23  0754   ALT (SGPT) U/L 23   AST (SGOT) U/L 30     Results from last 7 days   Lab Units 01/07/23  0346   PH, ARTERIAL pH units 7.413   PO2 ART mm Hg 103.6*   PCO2, ARTERIAL mm Hg 45.1*   HCO3 ART mmol/L 28.8*     Results from last 7 days   Lab Units 01/07/23  0754   PROCALCITONIN ng/mL 0.06         No results found for: HGBA1C, POCGLU    Chest x-ray 1/6 reviewed      Microbiology reviewed          Active Hospital Problems    Diagnosis  POA   • **COPD with acute exacerbation (HCC) [J44.1]  Yes   • Hypothyroid [E03.9]  Yes   • COPD exacerbation (HCC) [J44.1]  Yes   • Morbid obesity with BMI of 70 and over, adult (HCC) [E66.01, Z68.45]  Not Applicable   • Chronic diastolic congestive heart failure (HCC) [I50.32]  Yes   • Benign hypertension [I10]  Yes   • Anxiety [F41.9]  Yes   • Arteriosclerosis of both carotid arteries [I65.23]  Yes   • Fibromyalgia [M79.7]  Yes   • Gastroesophageal reflux disease [K21.9]  Yes      Resolved Hospital Problems   No resolved problems to display.         ASSESSMENT:    1. COPD with acute exacerbation  2. Acute on chronic hypoxemic respiratory failure  3. Acute RSV  4. Granulomatous lung disease  5. Chronic diastolic congestive heart failure  6. Hypothyroidism  7. Hypertension  8. GERD          PLAN:    Encourage pulmonary toilet.  Continue bronchodilators and steroid with taper.  Wean oxygen as able.        Javier Nolan MD  Pulmonary and Critical Care Medicine  [facility], [facility]  1/7/2023    10:34  EST

## 2023-01-07 NOTE — PROGRESS NOTES
" LOS: 0 days     Name: Dafne Mary  Age: 82 y.o.  Sex: female  :  1940  MRN: [id number]         Primary Care Physician: Sintia Chatterjee PA    Subjective   Subjective  Feeling a little bit better today.  Still short of breath with cough.  Oxygen saturation dipped to 84% on room air this morning.    Objective   Vital Signs  Temp:  [97.4 °F (36.3 °C)-98.4 °F (36.9 °C)] 98.4 °F (36.9 °C)  Heart Rate:  [] 110  Resp:  [18-24] 18  BP: (162-193)/() 182/115  Body mass index is 26.09 kg/m².    Objective:  General Appearance:  Comfortable and in no acute distress.    Vital signs: (most recent): Blood pressure (!) 182/115, pulse 110, temperature 98.4 °F (36.9 °C), temperature source Oral, resp. rate 18, height 152.4 cm (60\"), weight 60.6 kg (133 lb 9.6 oz), SpO2 98 %, not currently breastfeeding.    Lungs:  Normal effort and normal respiratory rate.  There are decreased breath sounds and wheezes.    Heart: Normal rate.  Regular rhythm.    Abdomen: Abdomen is soft.  Bowel sounds are normal.   There is no abdominal tenderness.     Extremities: There is no dependent edema or local swelling.    Neurological: Patient is alert and oriented to person, place and time.    Skin:  Warm and dry.              Results Review:       I reviewed the patient's new clinical results.    Results from last 7 days   Lab Units 23  0754 23  2047   WBC 10*3/mm3 1.88* 4.41   HEMOGLOBIN g/dL 12.0 12.5   PLATELETS 10*3/mm3 319 322     Results from last 7 days   Lab Units 23  0754   SODIUM mmol/L 138   POTASSIUM mmol/L 3.5   CHLORIDE mmol/L 102   CO2 mmol/L 25.3   BUN mg/dL 9   CREATININE mg/dL 0.70   CALCIUM mg/dL 9.1   GLUCOSE mg/dL 151*                 Scheduled Meds:   budesonide-formoterol, 2 puff, Inhalation, BID - RT  guaiFENesin, 1,200 mg, Oral, Q12H  insulin lispro, 0-7 Units, Subcutaneous, TID With Meals  ipratropium-albuterol, 3 mL, Nebulization, Q6H While Awake - RT  methylPREDNISolone sodium succinate, " 40 mg, Intravenous, Q8H  sodium chloride, 10 mL, Intravenous, Q12H      PRN Meds:   •  albuterol  •  benzonatate  •  dextrose  •  dextrose  •  glucagon (human recombinant)  •  labetalol  •  nitroglycerin  •  sodium chloride  •  sodium chloride  Continuous Infusions:       Assessment & Plan   Active Hospital Problems    Diagnosis  POA   • **COPD with acute exacerbation (HCC) [J44.1]  Yes   • RSV bronchiolitis [J21.0]  Unknown   • Hypothyroid [E03.9]  Yes   • COPD exacerbation (HCC) [J44.1]  Yes   • Morbid obesity with BMI of 70 and over, adult ([facility]) [E66.01, Z68.45]  Not Applicable   • Chronic diastolic congestive heart failure (HCC) [I50.32]  Yes   • Benign hypertension [I10]  Yes   • Anxiety [F41.9]  Yes   • Arteriosclerosis of both carotid arteries [I65.23]  Yes   • Fibromyalgia [M79.7]  Yes   • Gastroesophageal reflux disease [K21.9]  Yes      Resolved Hospital Problems   No resolved problems to display.       Assessment & Plan    -Continue steroids and bronchodilators for COPD exacerbation.  -Symptomatic care for the RSV infection  -Pulmonology following.  -No evidence of volume overload  -Blood pressure uncontrolled and will intensify antihypertensives  -Blood sugars okay so far on the steroids.  On sliding scale.        Bennett Ellington MD  [facility]  01/07/23  10:54 EST

## 2023-01-07 NOTE — CONSULTS
"Nutrition Services    Patient Name:  Dafne Mary  YOB: 1940  MRN: [id number]  Admit Date:  1/6/2023    Assessment Date:  01/07/23    NUTRITION SCREENING      Reason for Encounter Diet education consult per APRN; COPD and BMI of 70 or more  -- Pt's current BMI is 26.09   Diagnosis/Problem COPD with acute exacerbation       PO Diet Diet: Cardiac Diets; Healthy Heart (2-3 Na+); Texture: Regular Texture (IDDSI 7); Fluid Consistency: Thin (IDDSI 0)   PO Supplements/Snacks N/a    PO Intake % No intake documented at this time       Labs  Listed below, reviewed   Physical Findings Alert, oriented, on oxygen therapy   GI Function WNL   Skin Status Pale skin        Height:  Weight:  BMI:    Weight Trend Height: 152.4 cm (60\")  Weight: 60.6 kg (133 lb 9.6 oz) (01/07/23 0156)  Body mass index is 26.09 kg/m².    Stable       Intervention/Plan 1. Monitor intake and tolerance. Encourage adequate intake PRN.     Diet education not appropriate at this time. Will follow course and provide diet education as indicated/appropriate.          Results from last 7 days   Lab Units 01/07/23  0754   SODIUM mmol/L 138   POTASSIUM mmol/L 3.5   CHLORIDE mmol/L 102   CO2 mmol/L 25.3   BUN mg/dL 9   CREATININE mg/dL 0.70   CALCIUM mg/dL 9.1   BILIRUBIN mg/dL 0.5   ALK PHOS U/L 117   ALT (SGPT) U/L 23   AST (SGOT) U/L 30   GLUCOSE mg/dL 151*     Results from last 7 days   Lab Units 01/07/23  1144   HEMOGLOBIN g/dL 11.6*   HEMATOCRIT % 34.2     COVID19   Date Value Ref Range Status   01/07/2023 Not Detected Not Detected - Ref. Range Final     Lab Results   Component Value Date    HGBA1C 5.4 05/24/2022       RD to follow up per protocol.    Electronically signed by:  Ermelinda Isaac RD  01/07/23 12:51 EST  "

## 2023-01-07 NOTE — PLAN OF CARE
Goal Outcome Evaluation:   Pt resting in bed, Alert and oriented. Able to make needs known. Blood pressure elevated and treat. Pt oriented to room, call light and bed controls. Will continue to assess.

## 2023-01-08 LAB
ANION GAP SERPL CALCULATED.3IONS-SCNC: 12.3 MMOL/L (ref 5–15)
BUN SERPL-MCNC: 18 MG/DL (ref 8–23)
BUN/CREAT SERPL: 23.7 (ref 7–25)
CALCIUM SPEC-SCNC: 9.1 MG/DL (ref 8.6–10.5)
CHLORIDE SERPL-SCNC: 101 MMOL/L (ref 98–107)
CO2 SERPL-SCNC: 24.7 MMOL/L (ref 22–29)
CREAT SERPL-MCNC: 0.76 MG/DL (ref 0.57–1)
EGFRCR SERPLBLD CKD-EPI 2021: 78.3 ML/MIN/1.73
GLUCOSE BLDC GLUCOMTR-MCNC: 139 MG/DL (ref 70–130)
GLUCOSE BLDC GLUCOMTR-MCNC: 143 MG/DL (ref 70–130)
GLUCOSE BLDC GLUCOMTR-MCNC: 149 MG/DL (ref 70–130)
GLUCOSE BLDC GLUCOMTR-MCNC: 168 MG/DL (ref 70–130)
GLUCOSE SERPL-MCNC: 144 MG/DL (ref 65–99)
POTASSIUM SERPL-SCNC: 3.6 MMOL/L (ref 3.5–5.2)
SODIUM SERPL-SCNC: 138 MMOL/L (ref 136–145)

## 2023-01-08 PROCEDURE — 94799 UNLISTED PULMONARY SVC/PX: CPT

## 2023-01-08 PROCEDURE — 63710000001 PREDNISONE PER 1 MG: Performed by: INTERNAL MEDICINE

## 2023-01-08 PROCEDURE — 25010000002 METHYLPREDNISOLONE PER 40 MG

## 2023-01-08 PROCEDURE — 80048 BASIC METABOLIC PNL TOTAL CA: CPT | Performed by: INTERNAL MEDICINE

## 2023-01-08 PROCEDURE — 94664 DEMO&/EVAL PT USE INHALER: CPT

## 2023-01-08 PROCEDURE — 63710000001 INSULIN LISPRO (HUMAN) PER 5 UNITS: Performed by: NURSE PRACTITIONER

## 2023-01-08 PROCEDURE — 82962 GLUCOSE BLOOD TEST: CPT

## 2023-01-08 RX ORDER — PREDNISONE 20 MG/1
40 TABLET ORAL
Status: DISCONTINUED | OUTPATIENT
Start: 2023-01-08 | End: 2023-01-12

## 2023-01-08 RX ORDER — BENZONATATE 100 MG/1
200 CAPSULE ORAL 3 TIMES DAILY PRN
Status: DISCONTINUED | OUTPATIENT
Start: 2023-01-08 | End: 2023-01-08 | Stop reason: SDUPTHER

## 2023-01-08 RX ADMIN — ATENOLOL 50 MG: 50 TABLET ORAL at 08:40

## 2023-01-08 RX ADMIN — PREDNISONE 40 MG: 20 TABLET ORAL at 15:35

## 2023-01-08 RX ADMIN — Medication 10 ML: at 08:40

## 2023-01-08 RX ADMIN — ASPIRIN 81 MG: 81 TABLET, CHEWABLE ORAL at 08:40

## 2023-01-08 RX ADMIN — PAROXETINE HYDROCHLORIDE 10 MG: 10 TABLET, FILM COATED ORAL at 08:37

## 2023-01-08 RX ADMIN — PANTOPRAZOLE SODIUM 40 MG: 40 TABLET, DELAYED RELEASE ORAL at 06:18

## 2023-01-08 RX ADMIN — BUDESONIDE AND FORMOTEROL FUMARATE DIHYDRATE 2 PUFF: 160; 4.5 AEROSOL RESPIRATORY (INHALATION) at 20:52

## 2023-01-08 RX ADMIN — GUAIFENESIN 1200 MG: 600 TABLET, EXTENDED RELEASE ORAL at 20:29

## 2023-01-08 RX ADMIN — INSULIN LISPRO 2 UNITS: 100 INJECTION, SOLUTION INTRAVENOUS; SUBCUTANEOUS at 12:11

## 2023-01-08 RX ADMIN — IPRATROPIUM BROMIDE AND ALBUTEROL SULFATE 3 ML: 2.5; .5 SOLUTION RESPIRATORY (INHALATION) at 12:17

## 2023-01-08 RX ADMIN — ATORVASTATIN CALCIUM 20 MG: 20 TABLET, FILM COATED ORAL at 20:29

## 2023-01-08 RX ADMIN — AMLODIPINE BESYLATE 2.5 MG: 2.5 TABLET ORAL at 08:40

## 2023-01-08 RX ADMIN — LOSARTAN POTASSIUM 100 MG: 100 TABLET, FILM COATED ORAL at 08:40

## 2023-01-08 RX ADMIN — Medication 250 MG: at 20:30

## 2023-01-08 RX ADMIN — Medication 250 MG: at 08:40

## 2023-01-08 RX ADMIN — Medication 10 ML: at 20:30

## 2023-01-08 RX ADMIN — IPRATROPIUM BROMIDE AND ALBUTEROL SULFATE 3 ML: 2.5; .5 SOLUTION RESPIRATORY (INHALATION) at 08:42

## 2023-01-08 RX ADMIN — LEVOTHYROXINE, LIOTHYRONINE 30 MG: 19; 4.5 TABLET ORAL at 08:37

## 2023-01-08 RX ADMIN — GUAIFENESIN 1200 MG: 600 TABLET, EXTENDED RELEASE ORAL at 08:40

## 2023-01-08 RX ADMIN — METHYLPREDNISOLONE SODIUM SUCCINATE 40 MG: 40 INJECTION, POWDER, FOR SOLUTION INTRAMUSCULAR; INTRAVENOUS at 06:18

## 2023-01-08 NOTE — PLAN OF CARE
Goal Outcome Evaluation:  Patient's lungs w/expiratory wheezes throughout this morning.  Requested breathing treatment from RT who was on her way in for scheduled breathing treatment at that time.  Patient states she does not feel like she's even close to being ready for discharge at this point and time.

## 2023-01-08 NOTE — PROGRESS NOTES
[facility] INPATIENT PROGRESS NOTE         [address]    2023      PATIENT IDENTIFICATION:  Name: Dafne Mary ADMIT: 2023   : 1940  PCP: Sintia Chatterjee PA    MRN: [id number] LOS: 1 days   AGE/SEX: 82 y.o. female  ROOM: [facility]                     LOS 1    Reason for visit: COPD exacerbation      SUBJECTIVE:      Says that she is breathing a bit better but still has moderate cough and it is causing some rib discomfort.  Adding cough suppressant.  Mild wheezing on auscultation.  Improved air movement compared to yesterday.      Objective   OBJECTIVE:    Vital Sign Min/Max for last 24 hours  Temp  Min: 97.5 °F (36.4 °C)  Max: 98.1 °F (36.7 °C)   BP  Min: 148/79  Max: 164/93   Pulse  Min: 87  Max: 101   Resp  Min: 18  Max: 20   SpO2  Min: 93 %  Max: 99 %   No data recorded   Weight  Min: 60.3 kg (133 lb)  Max: 60.3 kg (133 lb)                         Body mass index is 25.97 kg/m².  No intake or output data in the 24 hours ending 23 1057      Exam:  GEN:  No distress, appears stated age  EYES:   PERRL, anicteric sclerae  ENT:    External ears/nose normal, OP clear  NECK:  No adenopathy, midline trachea  LUNGS: Normal chest on inspection, palpation and moderate wheezing bilaterally on auscultation  CV:  Normal S1S2, without murmur  ABD:  Nontender, nondistended, no hepatosplenomegaly, +BS  EXT:  No edema.  No cyanosis or clubbing.  No mottling and normal cap refill.    Assessment     Scheduled meds:  amLODIPine, 2.5 mg, Oral, Daily  aspirin, 81 mg, Oral, Daily  atenolol, 50 mg, Oral, Daily  atorvastatin, 20 mg, Oral, Nightly  budesonide-formoterol, 2 puff, Inhalation, BID - RT  fluticasone, 2 spray, Nasal, Daily  guaiFENesin, 1,200 mg, Oral, Q12H  insulin lispro, 0-7 Units, Subcutaneous, TID With Meals  ipratropium-albuterol, 3 mL, Nebulization, Q6H While Awake - RT  losartan, 100 mg, Oral, Daily  methylPREDNISolone sodium succinate, 40 mg, Intravenous, Q8H  pantoprazole, 40  mg, Oral, QAM  PARoxetine, 10 mg, Oral, Daily  saccharomyces boulardii, 250 mg, Oral, BID  sodium chloride, 10 mL, Intravenous, Q12H  thyroid, 15 mg, Oral, Daily  Thyroid, 30 mg, Oral, Daily      IV meds:                         Data Review:  Results from last 7 days   Lab Units 01/08/23  0700 01/07/23  0754   SODIUM mmol/L 138 138   POTASSIUM mmol/L 3.6 3.5   CHLORIDE mmol/L 101 102   CO2 mmol/L 24.7 25.3   BUN mg/dL 18 9   CREATININE mg/dL 0.76 0.70   GLUCOSE mg/dL 144* 151*   CALCIUM mg/dL 9.1 9.1         Estimated Creatinine Clearance: 46.3 mL/min (by C-G formula based on SCr of 0.76 mg/dL).  Results from last 7 days   Lab Units 01/07/23  1144 01/07/23  0754 01/06/23  2047   WBC 10*3/mm3 2.58* 1.88* 4.41   HEMOGLOBIN g/dL 11.6* 12.0 12.5   PLATELETS 10*3/mm3 324 319 322         Results from last 7 days   Lab Units 01/07/23  0754   ALT (SGPT) U/L 23   AST (SGOT) U/L 30     Results from last 7 days   Lab Units 01/07/23  0346   PH, ARTERIAL pH units 7.413   PO2 ART mm Hg 103.6*   PCO2, ARTERIAL mm Hg 45.1*   HCO3 ART mmol/L 28.8*     Results from last 7 days   Lab Units 01/07/23  0754   PROCALCITONIN ng/mL 0.06         Glucose   Date/Time Value Ref Range Status   01/08/2023 0613 139 (H) 70 - 130 mg/dL Final     Comment:     Meter: [id number] : [id number] Barb Tosha NA   01/07/2023 2105 138 (H) 70 - 130 mg/dL Final     Comment:     Meter: [id number] : [id number] Barb Tosha NA   01/07/2023 1726 162 (H) 70 - 130 mg/dL Final     Comment:     Meter: [id number] : [id number] Nitish Oneal NA   01/07/2023 1127 159 (H) 70 - 130 mg/dL Final     Comment:     Meter: [id number] : luis Hinojosa RN       Chest x-ray 1/6 reviewed      Microbiology reviewed          Active Hospital Problems    Diagnosis  POA   • **COPD with acute exacerbation (HCC) [J44.1]  Yes   • RSV bronchiolitis [J21.0]  Unknown   • Hypothyroid [E03.9]  Yes   • COPD exacerbation (HCC) [J44.1]  Yes   • Chronic diastolic  congestive heart failure (HCC) [I50.32]  Yes   • Benign hypertension [I10]  Yes   • Anxiety [F41.9]  Yes   • Arteriosclerosis of both carotid arteries [I65.23]  Yes   • Fibromyalgia [M79.7]  Yes   • Gastroesophageal reflux disease [K21.9]  Yes      Resolved Hospital Problems   No resolved problems to display.         ASSESSMENT:    1. COPD with acute exacerbation  2. Acute on chronic hypoxemic respiratory failure  3. Acute RSV  4. Granulomatous lung disease  5. Chronic diastolic congestive heart failure  6. Hypothyroidism  7. Hypertension  8. GERD          PLAN:    Encourage pulmonary toilet.  Continue bronchodilators and steroid with taper.  Changing steroid to p.o.  Wean oxygen as able.        Javier Nolan MD  Pulmonary and Critical Care Medicine  [facility], [facility]  1/8/2023    10:57 EST

## 2023-01-08 NOTE — PLAN OF CARE
Problem: Adult Inpatient Plan of Care  Goal: Plan of Care Review  Outcome: Ongoing, Progressing  Flowsheets (Taken 1/8/2023 0350)  Progress: improving  Plan of Care Reviewed With: patient  Outcome Evaluation: VSS, 1L O2. A&Ox4. No c/o pain  Goal: Patient-Specific Goal (Individualized)  Outcome: Ongoing, Progressing  Goal: Absence of Hospital-Acquired Illness or Injury  Outcome: Ongoing, Progressing  Intervention: Identify and Manage Fall Risk  Recent Flowsheet Documentation  Taken 1/8/2023 0200 by Dominique Eddy RN  Safety Promotion/Fall Prevention: safety round/check completed  Taken 1/8/2023 0000 by Dominique Eddy RN  Safety Promotion/Fall Prevention: safety round/check completed  Taken 1/7/2023 2200 by Dominique Eddy RN  Safety Promotion/Fall Prevention: safety round/check completed  Taken 1/7/2023 2050 by Dominique Eddy RN  Safety Promotion/Fall Prevention: safety round/check completed  Intervention: Prevent Skin Injury  Recent Flowsheet Documentation  Taken 1/8/2023 0200 by Dominique Eddy RN  Body Position: position changed independently  Taken 1/8/2023 0000 by Dominique Eddy RN  Body Position: position changed independently  Taken 1/7/2023 2200 by Dominique Eddy RN  Body Position: position changed independently  Taken 1/7/2023 2050 by Dominique Eddy RN  Body Position: position changed independently  Intervention: Prevent and Manage VTE (Venous Thromboembolism) Risk  Recent Flowsheet Documentation  Taken 1/8/2023 0200 by Dominique Eddy RN  Activity Management: activity adjusted per tolerance  Taken 1/8/2023 0000 by Dominique Eddy RN  Activity Management: activity adjusted per tolerance  VTE Prevention/Management:   bilateral   sequential compression devices off  Taken 1/7/2023 2200 by Dominique Eddy RN  Activity Management: activity adjusted per tolerance  Taken 1/7/2023 2050 by Dominique Eddy RN  Activity Management: activity adjusted per tolerance  VTE  Prevention/Management:   bilateral   sequential compression devices off   patient refused intervention  Intervention: Prevent Infection  Recent Flowsheet Documentation  Taken 1/7/2023 2050 by Dominique Eddy, RN  Infection Prevention: rest/sleep promoted  Goal: Optimal Comfort and Wellbeing  Outcome: Ongoing, Progressing  Intervention: Provide Person-Centered Care  Recent Flowsheet Documentation  Taken 1/7/2023 2050 by oDminique Eddy, RN  Trust Relationship/Rapport: care explained  Goal: Readiness for Transition of Care  Outcome: Ongoing, Progressing   Goal Outcome Evaluation:  Plan of Care Reviewed With: patient        Progress: improving  Outcome Evaluation: VSS, 1L O2. A&Ox4. No c/o pain

## 2023-01-08 NOTE — PROGRESS NOTES
" LOS: 1 day     Name: Dafne Mary  Age: 82 y.o.  Sex: female  :  1940  MRN: [id number]         Primary Care Physician: Sintia Chatterjee PA    Subjective   Subjective  States that she still does not feel very well.  Has cough and congestion in her chest.  Still with some shortness of breath.  Currently on 1 L.  States she is afraid to use the flutter valve.    Objective   Vital Signs  Temp:  [97.5 °F (36.4 °C)-98.1 °F (36.7 °C)] 98.1 °F (36.7 °C)  Heart Rate:  [] 92  Resp:  [18-20] 20  BP: (148-164)/(79-93) 164/93  Body mass index is 25.97 kg/m².    Objective:  General Appearance:  Comfortable, in no acute distress and ill-appearing (She looks chronically ill).    Vital signs: (most recent): Blood pressure 164/93, pulse 92, temperature 98.1 °F (36.7 °C), temperature source Oral, resp. rate 20, height 152.4 cm (60\"), weight 60.3 kg (133 lb), SpO2 96 %, not currently breastfeeding.    Lungs:  Normal effort and normal respiratory rate.  There are decreased breath sounds, wheezes and rhonchi.  (Minimal wheezing today.  She does have coarse breath sounds bilaterally and congestion)  Heart: Normal rate.  Regular rhythm.    Abdomen: Abdomen is soft.  Bowel sounds are normal.   There is no abdominal tenderness.     Extremities: There is no dependent edema or local swelling.    Neurological: Patient is alert and oriented to person, place and time.    Skin:  Warm and dry.              Results Review:       I reviewed the patient's new clinical results.    Results from last 7 days   Lab Units 23  1144 23  0754 23  2047   WBC 10*3/mm3 2.58* 1.88* 4.41   HEMOGLOBIN g/dL 11.6* 12.0 12.5   PLATELETS 10*3/mm3 324 319 322     Results from last 7 days   Lab Units 23  0700 23  0754   SODIUM mmol/L 138 138   POTASSIUM mmol/L 3.6 3.5   CHLORIDE mmol/L 101 102   CO2 mmol/L 24.7 25.3   BUN mg/dL 18 9   CREATININE mg/dL 0.76 0.70   CALCIUM mg/dL 9.1 9.1   GLUCOSE mg/dL 144* 151*       "     Scheduled Meds:   amLODIPine, 2.5 mg, Oral, Daily  aspirin, 81 mg, Oral, Daily  atenolol, 50 mg, Oral, Daily  atorvastatin, 20 mg, Oral, Nightly  budesonide-formoterol, 2 puff, Inhalation, BID - RT  fluticasone, 2 spray, Nasal, Daily  guaiFENesin, 1,200 mg, Oral, Q12H  insulin lispro, 0-7 Units, Subcutaneous, TID With Meals  ipratropium-albuterol, 3 mL, Nebulization, Q6H While Awake - RT  losartan, 100 mg, Oral, Daily  pantoprazole, 40 mg, Oral, QAM  PARoxetine, 10 mg, Oral, Daily  predniSONE, 40 mg, Oral, Daily With Breakfast  saccharomyces boulardii, 250 mg, Oral, BID  sodium chloride, 10 mL, Intravenous, Q12H  thyroid, 15 mg, Oral, Daily  Thyroid, 30 mg, Oral, Daily      PRN Meds:   •  albuterol  •  benzonatate  •  dextrose  •  dextrose  •  glucagon (human recombinant)  •  labetalol  •  nitroglycerin  •  sodium chloride  •  sodium chloride  Continuous Infusions:       Assessment & Plan   Active Hospital Problems    Diagnosis  POA   • **COPD with acute exacerbation (HCC) [J44.1]  Yes   • RSV bronchiolitis [J21.0]  Unknown   • Hypothyroid [E03.9]  Yes   • COPD exacerbation (HCC) [J44.1]  Yes   • Chronic diastolic congestive heart failure (HCC) [I50.32]  Yes   • Benign hypertension [I10]  Yes   • Anxiety [F41.9]  Yes   • Arteriosclerosis of both carotid arteries [I65.23]  Yes   • Fibromyalgia [M79.7]  Yes   • Gastroesophageal reflux disease [K21.9]  Yes      Resolved Hospital Problems   No resolved problems to display.       Assessment & Plan    -Transitioned to prednisone today.  Continue bronchodilators for COPD exacerbation.  -Encouraged more aggressive pulmonary hygiene.  She states she will be more diligent in using the flutter valve.  Continue Mucinex.  -Symptomatic care for the RSV infection  -Pulmonology following.  -No evidence of volume overload  -Blood pressure better after getting her back on home medications.  -Leukopenia secondary to acute viral illness.  This is improving.  -Blood sugars okay so  far on the steroids.  On sliding scale.  -Increase ambulation      Bennett Ellington MD  [facility]  01/08/23  11:13 EST

## 2023-01-09 LAB
DEPRECATED RDW RBC AUTO: 41.3 FL (ref 37–54)
ERYTHROCYTE [DISTWIDTH] IN BLOOD BY AUTOMATED COUNT: 12.8 % (ref 12.3–15.4)
GLUCOSE BLDC GLUCOMTR-MCNC: 109 MG/DL (ref 70–130)
GLUCOSE BLDC GLUCOMTR-MCNC: 118 MG/DL (ref 70–130)
GLUCOSE BLDC GLUCOMTR-MCNC: 134 MG/DL (ref 70–130)
GLUCOSE BLDC GLUCOMTR-MCNC: 155 MG/DL (ref 70–130)
HCT VFR BLD AUTO: 33.2 % (ref 34–46.6)
HGB BLD-MCNC: 10.9 G/DL (ref 12–15.9)
HOLD SPECIMEN: NORMAL
MCH RBC QN AUTO: 29.1 PG (ref 26.6–33)
MCHC RBC AUTO-ENTMCNC: 32.8 G/DL (ref 31.5–35.7)
MCV RBC AUTO: 88.5 FL (ref 79–97)
PLATELET # BLD AUTO: 332 10*3/MM3 (ref 140–450)
PMV BLD AUTO: 9.1 FL (ref 6–12)
RBC # BLD AUTO: 3.75 10*6/MM3 (ref 3.77–5.28)
WBC NRBC COR # BLD: 8.94 10*3/MM3 (ref 3.4–10.8)

## 2023-01-09 PROCEDURE — 94761 N-INVAS EAR/PLS OXIMETRY MLT: CPT

## 2023-01-09 PROCEDURE — 63710000001 INSULIN LISPRO (HUMAN) PER 5 UNITS: Performed by: NURSE PRACTITIONER

## 2023-01-09 PROCEDURE — 94799 UNLISTED PULMONARY SVC/PX: CPT

## 2023-01-09 PROCEDURE — 85027 COMPLETE CBC AUTOMATED: CPT | Performed by: INTERNAL MEDICINE

## 2023-01-09 PROCEDURE — 82962 GLUCOSE BLOOD TEST: CPT

## 2023-01-09 PROCEDURE — 94664 DEMO&/EVAL PT USE INHALER: CPT

## 2023-01-09 PROCEDURE — 63710000001 PREDNISONE PER 1 MG: Performed by: INTERNAL MEDICINE

## 2023-01-09 RX ORDER — ACETAMINOPHEN 325 MG/1
650 TABLET ORAL EVERY 6 HOURS PRN
Status: DISCONTINUED | OUTPATIENT
Start: 2023-01-09 | End: 2023-01-13 | Stop reason: HOSPADM

## 2023-01-09 RX ADMIN — PREDNISONE 40 MG: 20 TABLET ORAL at 09:11

## 2023-01-09 RX ADMIN — LEVOTHYROXINE, LIOTHYRONINE 30 MG: 19; 4.5 TABLET ORAL at 09:10

## 2023-01-09 RX ADMIN — PAROXETINE HYDROCHLORIDE 10 MG: 10 TABLET, FILM COATED ORAL at 09:09

## 2023-01-09 RX ADMIN — BUDESONIDE AND FORMOTEROL FUMARATE DIHYDRATE 2 PUFF: 160; 4.5 AEROSOL RESPIRATORY (INHALATION) at 20:09

## 2023-01-09 RX ADMIN — LOSARTAN POTASSIUM 100 MG: 100 TABLET, FILM COATED ORAL at 09:11

## 2023-01-09 RX ADMIN — Medication 250 MG: at 09:11

## 2023-01-09 RX ADMIN — INSULIN LISPRO 2 UNITS: 100 INJECTION, SOLUTION INTRAVENOUS; SUBCUTANEOUS at 17:28

## 2023-01-09 RX ADMIN — Medication 10 ML: at 20:37

## 2023-01-09 RX ADMIN — BENZONATATE 200 MG: 100 CAPSULE ORAL at 10:09

## 2023-01-09 RX ADMIN — ATORVASTATIN CALCIUM 20 MG: 20 TABLET, FILM COATED ORAL at 20:34

## 2023-01-09 RX ADMIN — PANTOPRAZOLE SODIUM 40 MG: 40 TABLET, DELAYED RELEASE ORAL at 06:12

## 2023-01-09 RX ADMIN — ACETAMINOPHEN 650 MG: 325 TABLET, FILM COATED ORAL at 11:42

## 2023-01-09 RX ADMIN — BENZONATATE 200 MG: 100 CAPSULE ORAL at 20:37

## 2023-01-09 RX ADMIN — ASPIRIN 81 MG: 81 TABLET, CHEWABLE ORAL at 09:11

## 2023-01-09 RX ADMIN — GUAIFENESIN 1200 MG: 600 TABLET, EXTENDED RELEASE ORAL at 20:34

## 2023-01-09 RX ADMIN — GUAIFENESIN 1200 MG: 600 TABLET, EXTENDED RELEASE ORAL at 09:11

## 2023-01-09 RX ADMIN — FLUTICASONE PROPIONATE 2 SPRAY: 50 SPRAY, METERED NASAL at 09:11

## 2023-01-09 RX ADMIN — AMLODIPINE BESYLATE 2.5 MG: 2.5 TABLET ORAL at 09:11

## 2023-01-09 RX ADMIN — Medication 250 MG: at 20:37

## 2023-01-09 RX ADMIN — Medication 10 ML: at 09:12

## 2023-01-09 RX ADMIN — BUDESONIDE AND FORMOTEROL FUMARATE DIHYDRATE 2 PUFF: 160; 4.5 AEROSOL RESPIRATORY (INHALATION) at 08:44

## 2023-01-09 RX ADMIN — ATENOLOL 50 MG: 50 TABLET ORAL at 09:11

## 2023-01-09 RX ADMIN — LEVOTHYROXINE, LIOTHYRONINE 15 MG: 19; 4.5 TABLET ORAL at 09:10

## 2023-01-09 RX ADMIN — IPRATROPIUM BROMIDE AND ALBUTEROL SULFATE 3 ML: 2.5; .5 SOLUTION RESPIRATORY (INHALATION) at 15:57

## 2023-01-09 NOTE — PROGRESS NOTES
[facility] INPATIENT PROGRESS NOTE         [address]    2023      PATIENT IDENTIFICATION:  Name: Dafne Mary ADMIT: 2023   : 1940  PCP: Sintia Chatterjee PA    MRN: [id number] LOS: 2 days   AGE/SEX: 82 y.o. female  ROOM: [facility]                     LOS 2    Reason for visit: COPD exacerbation      SUBJECTIVE:      Resting comfortably.  Complains of nonproductive cough and sore throat.  Saturations 90% at time of visit.  Heart rate mildly tachycardic in the low 100s.  Mild wheezing but improved air movement compared to yesterday.      Objective   OBJECTIVE:    Vital Sign Min/Max for last 24 hours  Temp  Min: 97.9 °F (36.6 °C)  Max: 98.6 °F (37 °C)   BP  Min: 138/71  Max: 159/97   Pulse  Min: 83  Max: 91   Resp  Min: 18  Max: 18   SpO2  Min: 91 %  Max: 95 %   No data recorded   Weight  Min: 60.3 kg (132 lb 15 oz)  Max: 60.3 kg (132 lb 15 oz)                         Body mass index is 25.96 kg/m².  No intake or output data in the 24 hours ending 23 1007      Exam:  GEN:  No distress, appears stated age  EYES:   PERRL, anicteric sclerae  ENT:    External ears/nose normal, OP clear  NECK:  No adenopathy, midline trachea  LUNGS: Normal chest on inspection, palpation and  wheezing bilaterally on auscultation  CV:  Normal S1S2, without murmur  ABD:  Nontender, nondistended, no hepatosplenomegaly, +BS  EXT:  No edema.  No cyanosis or clubbing.  No mottling and normal cap refill.    Assessment     Scheduled meds:  amLODIPine, 2.5 mg, Oral, Daily  aspirin, 81 mg, Oral, Daily  atenolol, 50 mg, Oral, Daily  atorvastatin, 20 mg, Oral, Nightly  budesonide-formoterol, 2 puff, Inhalation, BID - RT  fluticasone, 2 spray, Nasal, Daily  guaiFENesin, 1,200 mg, Oral, Q12H  insulin lispro, 0-7 Units, Subcutaneous, TID With Meals  ipratropium-albuterol, 3 mL, Nebulization, Q6H While Awake - RT  losartan, 100 mg, Oral, Daily  pantoprazole, 40 mg, Oral, QAM  PARoxetine, 10 mg, Oral,  Daily  predniSONE, 40 mg, Oral, Daily With Breakfast  saccharomyces boulardii, 250 mg, Oral, BID  sodium chloride, 10 mL, Intravenous, Q12H  thyroid, 15 mg, Oral, Daily  Thyroid, 30 mg, Oral, Daily      IV meds:                         Data Review:  Results from last 7 days   Lab Units 01/08/23  0700 01/07/23  0754   SODIUM mmol/L 138 138   POTASSIUM mmol/L 3.6 3.5   CHLORIDE mmol/L 101 102   CO2 mmol/L 24.7 25.3   BUN mg/dL 18 9   CREATININE mg/dL 0.76 0.70   GLUCOSE mg/dL 144* 151*   CALCIUM mg/dL 9.1 9.1         Estimated Creatinine Clearance: 46.3 mL/min (by C-G formula based on SCr of 0.76 mg/dL).  Results from last 7 days   Lab Units 01/09/23  0612 01/07/23  1144 01/07/23  0754 01/06/23  2047   WBC 10*3/mm3 8.94 2.58* 1.88* 4.41   HEMOGLOBIN g/dL 10.9* 11.6* 12.0 12.5   PLATELETS 10*3/mm3 332 324 319 322         Results from last 7 days   Lab Units 01/07/23  0754   ALT (SGPT) U/L 23   AST (SGOT) U/L 30     Results from last 7 days   Lab Units 01/07/23  0346   PH, ARTERIAL pH units 7.413   PO2 ART mm Hg 103.6*   PCO2, ARTERIAL mm Hg 45.1*   HCO3 ART mmol/L 28.8*     Results from last 7 days   Lab Units 01/07/23  0754   PROCALCITONIN ng/mL 0.06         Glucose   Date/Time Value Ref Range Status   01/09/2023 0620 118 70 - 130 mg/dL Final     Comment:     Meter: [id number] : [id number] Ángel Addison NA   01/08/2023 2124 149 (H) 70 - 130 mg/dL Final     Comment:     Meter: [id number] : [id number] Ángel Addison NA   01/08/2023 1620 143 (H) 70 - 130 mg/dL Final     Comment:     Meter: [id number] : [id number] Nitish Oneal NA   01/08/2023 1130 168 (H) 70 - 130 mg/dL Final     Comment:     Meter: [id number] : [id number] Nitish Oneal    01/08/2023 0613 139 (H) 70 - 130 mg/dL Final     Comment:     M[facility]: [id number] : [id number] [facility]   01/07/2023 2105 138 (H) 70 - 130 mg/dL Final     Comment:     Meter: [id number] : [id number] [facility]   01/07/2023 1726 162 (H) 70 - 130  mg/dL Final     Comment:     Meter: [id number] : [id number] Nitish LIND       Chest x-ray 1/6 reviewed      Microbiology reviewed          Active Hospital Problems    Diagnosis  POA   • **COPD with acute exacerbation (HCC) [J44.1]  Yes   • RSV bronchiolitis [J21.0]  Unknown   • Hypothyroid [E03.9]  Yes   • COPD exacerbation (HCC) [J44.1]  Yes   • Chronic diastolic congestive heart failure (HCC) [I50.32]  Yes   • Benign hypertension [I10]  Yes   • Anxiety [F41.9]  Yes   • Arteriosclerosis of both carotid arteries [I65.23]  Yes   • Fibromyalgia [M79.7]  Yes   • Gastroesophageal reflux disease [K21.9]  Yes      Resolved Hospital Problems   No resolved problems to display.         ASSESSMENT:    1. COPD with acute exacerbation  2. Acute on chronic hypoxemic respiratory failure  3. Acute RSV  4. Granulomatous lung disease  5. Chronic diastolic congestive heart failure  6. Hypothyroidism  7. Hypertension  8. GERD          PLAN:    Encourage pulmonary toilet.  Continue bronchodilators and steroid with taper.  Changed steroid to p.o.  Wean oxygen as able.        Javier Nolan MD  Pulmonary and Critical Care Medicine  [facility]  1/9/2023    10:07 EST

## 2023-01-09 NOTE — PROGRESS NOTES
" LOS: 2 days     Name: Dafne Mary  Age: 82 y.o.  Sex: female  :  1940  MRN: [id number]         Primary Care Physician: Sintia Chatterjee PA    Subjective   Subjective  Feels about the same as yesterday.  Denies fevers or chills.  Short of breath with exertion.  Currently requiring 2 L.    Objective   Vital Signs  Temp:  [97.9 °F (36.6 °C)-98.6 °F (37 °C)] 98.6 °F (37 °C)  Heart Rate:  [83-91] 88  Resp:  [18] 18  BP: (138-159)/(71-97) 146/87  Body mass index is 25.96 kg/m².    Objective:  General Appearance:  Comfortable, in no acute distress and ill-appearing.    Vital signs: (most recent): Blood pressure 146/87, pulse 88, temperature 98.6 °F (37 °C), temperature source Oral, resp. rate 18, height 152.4 cm (60\"), weight 60.3 kg (132 lb 15 oz), SpO2 95 %, not currently breastfeeding.    Lungs:  Normal effort and normal respiratory rate.  There are decreased breath sounds, wheezes and rhonchi.    Heart: Normal rate.  Regular rhythm.    Abdomen: Abdomen is soft.  Bowel sounds are normal.   There is no abdominal tenderness.     Extremities: There is no dependent edema or local swelling.    Neurological: Patient is alert and oriented to person, place and time.    Skin:  Warm and dry.              Results Review:       I reviewed the patient's new clinical results.    Results from last 7 days   Lab Units 23  0612 23  1144 23  0754 23  2047   WBC 10*3/mm3 8.94 2.58* 1.88* 4.41   HEMOGLOBIN g/dL 10.9* 11.6* 12.0 12.5   PLATELETS 10*3/mm3 332 324 319 322     Results from last 7 days   Lab Units 23  0700 23  0754   SODIUM mmol/L 138 138   POTASSIUM mmol/L 3.6 3.5   CHLORIDE mmol/L 101 102   CO2 mmol/L 24.7 25.3   BUN mg/dL 18 9   CREATININE mg/dL 0.76 0.70   CALCIUM mg/dL 9.1 9.1   GLUCOSE mg/dL 144* 151*                 Scheduled Meds:   amLODIPine, 2.5 mg, Oral, Daily  aspirin, 81 mg, Oral, Daily  atenolol, 50 mg, Oral, Daily  atorvastatin, 20 mg, Oral, " Nightly  budesonide-formoterol, 2 puff, Inhalation, BID - RT  fluticasone, 2 spray, Nasal, Daily  guaiFENesin, 1,200 mg, Oral, Q12H  insulin lispro, 0-7 Units, Subcutaneous, TID With Meals  ipratropium-albuterol, 3 mL, Nebulization, Q6H While Awake - RT  losartan, 100 mg, Oral, Daily  pantoprazole, 40 mg, Oral, QAM  PARoxetine, 10 mg, Oral, Daily  predniSONE, 40 mg, Oral, Daily With Breakfast  saccharomyces boulardii, 250 mg, Oral, BID  sodium chloride, 10 mL, Intravenous, Q12H  thyroid, 15 mg, Oral, Daily  Thyroid, 30 mg, Oral, Daily      PRN Meds:   •  acetaminophen  •  albuterol  •  benzonatate  •  dextrose  •  dextrose  •  glucagon (human recombinant)  •  labetalol  •  nitroglycerin  •  sodium chloride  •  sodium chloride  Continuous Infusions:       Assessment & Plan   Active Hospital Problems    Diagnosis  POA   • **COPD with acute exacerbation (HCC) [J44.1]  Yes   • RSV bronchiolitis [J21.0]  Unknown   • Hypothyroid [E03.9]  Yes   • COPD exacerbation (HCC) [J44.1]  Yes   • Chronic diastolic congestive heart failure (HCC) [I50.32]  Yes   • Benign hypertension [I10]  Yes   • Anxiety [F41.9]  Yes   • Arteriosclerosis of both carotid arteries [I65.23]  Yes   • Fibromyalgia [M79.7]  Yes   • Gastroesophageal reflux disease [K21.9]  Yes      Resolved Hospital Problems   No resolved problems to display.       Assessment & Plan    -Continue prednisone and bronchodilators for acute exacerbation of COPD.  Will need additional days in the hospital.  -Encouraged more aggressive pulmonary hygiene.  She states she will be more diligent in using the flutter valve.  Continue Mucinex.  -Symptomatic care for the RSV infection  -Pulmonology following.  Appreciate assistance.  -No evidence of volume overload  -Blood pressure better after getting her back on home medications.  -Leukopenia secondary to acute viral illness.  This is resolved  -Blood sugars okay so far on the steroids.  On sliding scale.  -Increase  ambulation      Bennett Ellington MD  [facility]  01/09/23  11:30 EST

## 2023-01-09 NOTE — PLAN OF CARE
Goal Outcome Evaluation:  Plan of Care Reviewed With: patient        Progress: improving   VSS on 1L NC. Aox4. C/o cough, improved with PRN Tessalon. C/o headache and rib pain, improved with PRN Tylenol. Encouraged flutter valve and IS use. Up ad jessie to bathroom.

## 2023-01-09 NOTE — DISCHARGE PLACEMENT REQUEST
"Dafne Delarosa (82 y.o. Female)     Date of Birth   1940    Social Security Number       Address   [address] [place] [address]    Home Phone   [phone]    MRN   [id number]       Orthodox   Shinto    Marital Status                               Admission Date   1/6/23    Admission Type   Urgent    Admitting Provider   Beto Marquez MD    Attending Provider   Bennett Ellington MD    Department, Room/Bed   [address], S520/1       Discharge Date       Discharge Disposition       Discharge Destination                               Attending Provider: Bennett Ellington MD    Allergies: Lansoprazole, Diphenhydramine Hcl (Sleep), Lisinopril    Isolation: Contact   Infection: RSV (01/07/23)   Code Status: CPR    Ht: 152.4 cm (60\")   Wt: 60.3 kg (132 lb 15 oz)    Admission Cmt: None   Principal Problem: COPD with acute exacerbation (HCC) [J44.1]                 Active Insurance as of 1/6/2023     Primary Coverage     Payor Plan Insurance Group Employer/Plan Group    MEDICARE MEDICARE A & B      Payor Plan Address Payor Plan Phone Number Payor Plan Fax Number Effective Dates    [address] [phone]  12/1/2005 - None Entered    [facility] [id number]       Subscriber Name Subscriber Birth Date Member ID       DAFNE DELAROSA 1940 [id number]           Secondary Coverage     Payor Plan Insurance Group Employer/Plan Group    [employer]      Payor Plan Address Payor Plan Phone Number Payor Plan Fax Number Effective Dates    [address] [phone]  1/17/2006 - None Entered    [facility] [address]       Subscriber Name Subscriber Birth Date Member ID       DAFNE DELAROSA 1940 [id number]                 Emergency Contacts      (Rel.) Home Phone Work Phone Mobile Phone    Felicia Delarosa (Daughter) [phone] -- [phone]    Balaji Delarosa (Son) [phone] -- " [phone]

## 2023-01-09 NOTE — CASE MANAGEMENT/SOCIAL WORK
Discharge Planning Assessment  [facility]     Patient Name: Dafne Mary  MRN: [id number]  Today's Date: 1/9/2023    Admit Date: 1/6/2023    Plan: Home with HH   Discharge Needs Assessment     Row Name 01/09/23 1701       Living Environment    People in Home alone    Current Living Arrangements home    Primary Care Provided by self    Provides Primary Care For no one, unable/limited ability to care for self    Family Caregiver if Needed child(tiffanie), adult    Quality of Family Relationships helpful;involved;supportive    Able to Return to Prior Arrangements yes       Resource/Environmental Concerns    Resource/Environmental Concerns none       Transition Planning    Patient/Family Anticipates Transition to home with help/services    Patient/Family Anticipated Services at Transition home health care    Transportation Anticipated family or friend will provide       Discharge Needs Assessment    Equipment Currently Used at Home walker, rolling;nebulizer    Concerns to be Addressed discharge planning    Equipment Needed After Discharge oxygen    Outpatient/Agency/Support Group Needs homecare agency    Discharge Facility/Level of Care Needs home with home health    Provided Post Acute Provider List? Yes    Post Acute Provider List Home Health               Discharge Plan     Row Name 01/09/23 7007       Plan    Plan Home with HH    Plan Comments S/W pt and her dtr Felicia at bedside.  Facesheet info confirmed.   Pt lives alone in a single story house, is IADLs and can drive.  Home DME includes a walker and nebulizer.  Felicia lives in [place], but has been assisting pt at home lately since she has been sick.  No hx of HH or SNF.  Pt is interested in HH and has no preference of HH agency.  Referral made to [facility].  If home O2 needed, pt requests [facility].  Referral made to [facility]. .............Lorrie VENTURA/ RANDI              Continued Care and Services - Admitted Since 1/6/2023     Durable Medical  Equipment     Service Provider Request Status Selected Services Address Phone Fax Patient Preferred    [facility] Pending - Request Sent N/A [address], [facility] [address] [phone] --          Home Medical Care     Service Provider Request Status Selected Services Address Phone Fax Patient Preferred    [facility] Pending - Request Sent N/A [address] [facility], [address], [facility] [address] [phone] --                 Demographic Summary     Row Name 01/09/23 1702       General Information    Admission Type inpatient    Arrived From home    Referral Source admission list    Reason for Consult discharge planning    Preferred Language English               Functional Status     Row Name 01/09/23 1702       Functional Status    Usual Activity Tolerance good    Current Activity Tolerance moderate       Functional Status, IADL    Medications independent    Meal Preparation independent    Housekeeping independent    Laundry independent    Shopping assistive person       Mental Status    General Appearance WDL WDL       Mental Status Summary    Recent Changes in Mental Status/Cognitive Functioning no changes       Employment/    Employment Status retired                         Lorrie Verma, TERRANCE

## 2023-01-09 NOTE — PLAN OF CARE
Goal Outcome Evaluation:      Pt resting with eyes closed. Able to make needs known. No c/o pain or discomfort. VSS Breathing easy and unlabored. Will continue to assess.

## 2023-01-10 LAB
GLUCOSE BLDC GLUCOMTR-MCNC: 113 MG/DL (ref 70–130)
GLUCOSE BLDC GLUCOMTR-MCNC: 134 MG/DL (ref 70–130)
GLUCOSE BLDC GLUCOMTR-MCNC: 169 MG/DL (ref 70–130)

## 2023-01-10 PROCEDURE — 94664 DEMO&/EVAL PT USE INHALER: CPT

## 2023-01-10 PROCEDURE — 63710000001 INSULIN LISPRO (HUMAN) PER 5 UNITS: Performed by: NURSE PRACTITIONER

## 2023-01-10 PROCEDURE — 94799 UNLISTED PULMONARY SVC/PX: CPT

## 2023-01-10 PROCEDURE — 94760 N-INVAS EAR/PLS OXIMETRY 1: CPT

## 2023-01-10 PROCEDURE — 94761 N-INVAS EAR/PLS OXIMETRY MLT: CPT

## 2023-01-10 PROCEDURE — 63710000001 PREDNISONE PER 1 MG: Performed by: INTERNAL MEDICINE

## 2023-01-10 PROCEDURE — 82962 GLUCOSE BLOOD TEST: CPT

## 2023-01-10 RX ORDER — LIDOCAINE 50 MG/G
1 PATCH TOPICAL
Status: DISCONTINUED | OUTPATIENT
Start: 2023-01-10 | End: 2023-01-13 | Stop reason: HOSPADM

## 2023-01-10 RX ADMIN — LEVOTHYROXINE, LIOTHYRONINE 30 MG: 19; 4.5 TABLET ORAL at 09:28

## 2023-01-10 RX ADMIN — PREDNISONE 40 MG: 20 TABLET ORAL at 09:30

## 2023-01-10 RX ADMIN — ATENOLOL 50 MG: 50 TABLET ORAL at 09:30

## 2023-01-10 RX ADMIN — ACETAMINOPHEN 650 MG: 325 TABLET, FILM COATED ORAL at 19:58

## 2023-01-10 RX ADMIN — LOSARTAN POTASSIUM 100 MG: 100 TABLET, FILM COATED ORAL at 09:30

## 2023-01-10 RX ADMIN — FLUTICASONE PROPIONATE 2 SPRAY: 50 SPRAY, METERED NASAL at 09:34

## 2023-01-10 RX ADMIN — PANTOPRAZOLE SODIUM 40 MG: 40 TABLET, DELAYED RELEASE ORAL at 09:29

## 2023-01-10 RX ADMIN — LIDOCAINE 1 PATCH: 700 PATCH TOPICAL at 13:31

## 2023-01-10 RX ADMIN — BUDESONIDE AND FORMOTEROL FUMARATE DIHYDRATE 2 PUFF: 160; 4.5 AEROSOL RESPIRATORY (INHALATION) at 19:28

## 2023-01-10 RX ADMIN — GUAIFENESIN 1200 MG: 600 TABLET, EXTENDED RELEASE ORAL at 20:01

## 2023-01-10 RX ADMIN — IPRATROPIUM BROMIDE AND ALBUTEROL SULFATE 3 ML: 2.5; .5 SOLUTION RESPIRATORY (INHALATION) at 13:23

## 2023-01-10 RX ADMIN — ASPIRIN 81 MG: 81 TABLET, CHEWABLE ORAL at 09:30

## 2023-01-10 RX ADMIN — ATORVASTATIN CALCIUM 20 MG: 20 TABLET, FILM COATED ORAL at 20:01

## 2023-01-10 RX ADMIN — Medication 250 MG: at 20:01

## 2023-01-10 RX ADMIN — Medication 10 ML: at 09:33

## 2023-01-10 RX ADMIN — BUDESONIDE AND FORMOTEROL FUMARATE DIHYDRATE 2 PUFF: 160; 4.5 AEROSOL RESPIRATORY (INHALATION) at 08:24

## 2023-01-10 RX ADMIN — Medication 10 ML: at 20:04

## 2023-01-10 RX ADMIN — LEVOTHYROXINE, LIOTHYRONINE 15 MG: 19; 4.5 TABLET ORAL at 09:29

## 2023-01-10 RX ADMIN — AMLODIPINE BESYLATE 2.5 MG: 2.5 TABLET ORAL at 09:30

## 2023-01-10 RX ADMIN — PAROXETINE HYDROCHLORIDE 10 MG: 10 TABLET, FILM COATED ORAL at 09:29

## 2023-01-10 RX ADMIN — Medication 250 MG: at 09:30

## 2023-01-10 RX ADMIN — INSULIN LISPRO 2 UNITS: 100 INJECTION, SOLUTION INTRAVENOUS; SUBCUTANEOUS at 17:13

## 2023-01-10 RX ADMIN — GUAIFENESIN 1200 MG: 600 TABLET, EXTENDED RELEASE ORAL at 09:30

## 2023-01-10 NOTE — PLAN OF CARE
Problem: Adult Inpatient Plan of Care  Goal: Plan of Care Review  Outcome: Ongoing, Progressing  Flowsheets (Taken 1/10/2023 0759)  Progress: improving  Plan of Care Reviewed With: patient  Goal: Patient-Specific Goal (Individualized)  Outcome: Ongoing, Progressing  Goal: Absence of Hospital-Acquired Illness or Injury  Outcome: Ongoing, Progressing  Goal: Optimal Comfort and Wellbeing  Outcome: Ongoing, Progressing  Goal: Readiness for Transition of Care  Outcome: Ongoing, Progressing   Goal Outcome Evaluation:  Plan of Care Reviewed With: patient        Progress: improving

## 2023-01-10 NOTE — PLAN OF CARE
Problem: Adult Inpatient Plan of Care  Goal: Plan of Care Review  1/10/2023 0755 by Candy Juarez, RN  Outcome: Ongoing, Progressing  Flowsheets (Taken 1/10/2023 0755)  Progress: improving  Plan of Care Reviewed With: patient  1/10/2023 0754 by Candy Juarez, RN  Outcome: Ongoing, Progressing  Flowsheets (Taken 1/10/2023 0754)  Progress: improving  Plan of Care Reviewed With: patient  Goal: Patient-Specific Goal (Individualized)  Outcome: Ongoing, Progressing  Goal: Absence of Hospital-Acquired Illness or Injury  Outcome: Ongoing, Progressing  Goal: Optimal Comfort and Wellbeing  Outcome: Ongoing, Progressing  Goal: Readiness for Transition of Care  Outcome: Ongoing, Progressing   Goal Outcome Evaluation:  Plan of Care Reviewed With: patient        Progress: improving

## 2023-01-10 NOTE — PLAN OF CARE
Goal Outcome Evaluation:  Plan of Care Reviewed With: patient           Outcome Evaluation: A&O x4, VSS, 1 LNC, no complaints of pain. Patient resting comfortably, no questions or concerns at this time. Will continue to monitor.

## 2023-01-10 NOTE — PROGRESS NOTES
" LOS: 3 days     Name: Dafne Mary  Age: 82 y.o.  Sex: female  :  1940  MRN: [id number]         Primary Care Physician: Sintia Chatterjee PA    Subjective   Subjective  Patient states that she feels a little bit better today.  Improved breathing.  Denies fever.  She has a nonproductive cough.  States she is afraid to use the flutter valve citing concerns that sputum will start to come up and then gets stuck in her windpipe.  She has chest wall soreness due to all the coughing.    Objective   Vital Signs  Temp:  [97.5 °F (36.4 °C)-98 °F (36.7 °C)] 97.6 °F (36.4 °C)  Heart Rate:  [76-82] 76  Resp:  [16-18] 18  BP: (154-169)/(79-88) 169/88  Body mass index is 25.96 kg/m².    Objective:  General Appearance:  Comfortable and in no acute distress (Chronically ill-appearing).    Vital signs: (most recent): Blood pressure 169/88, pulse 76, temperature 97.6 °F (36.4 °C), temperature source Oral, resp. rate 18, height 152.4 cm (60\"), weight 60.3 kg (132 lb 15 oz), SpO2 97 %, not currently breastfeeding.    Lungs:  Normal effort and normal respiratory rate.  She is not in respiratory distress.  There are decreased breath sounds, wheezes and rhonchi.  (Improved rhonchi and wheezing today)  Heart: Normal rate.  Regular rhythm.    Abdomen: Abdomen is soft.  Bowel sounds are normal.   There is no abdominal tenderness.     Extremities: There is no dependent edema or local swelling.    Neurological: Patient is alert and oriented to person, place and time.    Skin:  Warm and dry.              Results Review:       I reviewed the patient's new clinical results.    Results from last 7 days   Lab Units 23  0612 23  1144 23  0754 23  2047   WBC 10*3/mm3 8.94 2.58* 1.88* 4.41   HEMOGLOBIN g/dL 10.9* 11.6* 12.0 12.5   PLATELETS 10*3/mm3 332 324 319 322     Results from last 7 days   Lab Units 23  0700 23  0754   SODIUM mmol/L 138 138   POTASSIUM mmol/L 3.6 3.5   CHLORIDE mmol/L 101 102   CO2 " mmol/L 24.7 25.3   BUN mg/dL 18 9   CREATININE mg/dL 0.76 0.70   CALCIUM mg/dL 9.1 9.1   GLUCOSE mg/dL 144* 151*                 Scheduled Meds:   amLODIPine, 2.5 mg, Oral, Daily  aspirin, 81 mg, Oral, Daily  atenolol, 50 mg, Oral, Daily  atorvastatin, 20 mg, Oral, Nightly  budesonide-formoterol, 2 puff, Inhalation, BID - RT  fluticasone, 2 spray, Nasal, Daily  guaiFENesin, 1,200 mg, Oral, Q12H  insulin lispro, 0-7 Units, Subcutaneous, TID With Meals  ipratropium-albuterol, 3 mL, Nebulization, Q6H While Awake - RT  lidocaine, 1 patch, Transdermal, Q24H  losartan, 100 mg, Oral, Daily  pantoprazole, 40 mg, Oral, QAM  PARoxetine, 10 mg, Oral, Daily  predniSONE, 40 mg, Oral, Daily With Breakfast  saccharomyces boulardii, 250 mg, Oral, BID  sodium chloride, 10 mL, Intravenous, Q12H  thyroid, 15 mg, Oral, Daily  Thyroid, 30 mg, Oral, Daily      PRN Meds:   •  acetaminophen  •  albuterol  •  benzonatate  •  dextrose  •  dextrose  •  glucagon (human recombinant)  •  labetalol  •  nitroglycerin  •  sodium chloride  •  sodium chloride  Continuous Infusions:       Assessment & Plan   Active Hospital Problems    Diagnosis  POA   • **COPD with acute exacerbation (HCC) [J44.1]  Yes   • RSV bronchiolitis [J21.0]  Unknown   • Hypothyroid [E03.9]  Yes   • COPD exacerbation (HCC) [J44.1]  Yes   • Chronic diastolic congestive heart failure (HCC) [I50.32]  Yes   • Benign hypertension [I10]  Yes   • Anxiety [F41.9]  Yes   • Arteriosclerosis of both carotid arteries [I65.23]  Yes   • Fibromyalgia [M79.7]  Yes   • Gastroesophageal reflux disease [K21.9]  Yes      Resolved Hospital Problems   No resolved problems to display.       Assessment & Plan    -Continue prednisone and bronchodilators for acute exacerbation of COPD.   -Encouraged more aggressive pulmonary hygiene.  We will ask RT to come back and assist with use of flutter valve.  Continue Mucinex.  -Symptomatic care for the RSV infection  -Pulmonology following.  Appreciate  assistance.  -No evidence of volume overload  -Blood pressure better after getting her back on home medications.  -Leukopenia secondary to acute viral illness.  This is resolved  -Blood sugars okay so far on the steroids.  On sliding scale.  -Add Lidoderm patch for localized chest wall soreness over the right breast.  -Increase ambulation  -Possible discharge in 1 to 2 days depending on progress.      Bennett Ellington MD  [facility]  01/10/23  12:17 EST

## 2023-01-11 LAB
GLUCOSE BLDC GLUCOMTR-MCNC: 102 MG/DL (ref 70–130)
GLUCOSE BLDC GLUCOMTR-MCNC: 106 MG/DL (ref 70–130)
GLUCOSE BLDC GLUCOMTR-MCNC: 136 MG/DL (ref 70–130)
GLUCOSE BLDC GLUCOMTR-MCNC: 171 MG/DL (ref 70–130)

## 2023-01-11 PROCEDURE — 63710000001 INSULIN LISPRO (HUMAN) PER 5 UNITS: Performed by: NURSE PRACTITIONER

## 2023-01-11 PROCEDURE — 94761 N-INVAS EAR/PLS OXIMETRY MLT: CPT

## 2023-01-11 PROCEDURE — 94762 N-INVAS EAR/PLS OXIMTRY CONT: CPT

## 2023-01-11 PROCEDURE — 63710000001 PREDNISONE PER 1 MG: Performed by: INTERNAL MEDICINE

## 2023-01-11 PROCEDURE — 94799 UNLISTED PULMONARY SVC/PX: CPT

## 2023-01-11 PROCEDURE — 94760 N-INVAS EAR/PLS OXIMETRY 1: CPT

## 2023-01-11 PROCEDURE — 94664 DEMO&/EVAL PT USE INHALER: CPT

## 2023-01-11 PROCEDURE — 82962 GLUCOSE BLOOD TEST: CPT

## 2023-01-11 RX ORDER — AMLODIPINE BESYLATE 5 MG/1
5 TABLET ORAL DAILY
Status: DISCONTINUED | OUTPATIENT
Start: 2023-01-12 | End: 2023-01-12

## 2023-01-11 RX ORDER — HYDRALAZINE HYDROCHLORIDE 20 MG/ML
10 INJECTION INTRAMUSCULAR; INTRAVENOUS EVERY 6 HOURS PRN
Status: DISCONTINUED | OUTPATIENT
Start: 2023-01-11 | End: 2023-01-13 | Stop reason: HOSPADM

## 2023-01-11 RX ORDER — AMLODIPINE BESYLATE 2.5 MG/1
2.5 TABLET ORAL ONCE
Status: COMPLETED | OUTPATIENT
Start: 2023-01-11 | End: 2023-01-11

## 2023-01-11 RX ADMIN — FLUTICASONE PROPIONATE 2 SPRAY: 50 SPRAY, METERED NASAL at 08:05

## 2023-01-11 RX ADMIN — PAROXETINE HYDROCHLORIDE 10 MG: 10 TABLET, FILM COATED ORAL at 08:05

## 2023-01-11 RX ADMIN — LIDOCAINE 1 PATCH: 700 PATCH TOPICAL at 08:03

## 2023-01-11 RX ADMIN — AMLODIPINE BESYLATE 2.5 MG: 2.5 TABLET ORAL at 08:04

## 2023-01-11 RX ADMIN — Medication 10 ML: at 08:05

## 2023-01-11 RX ADMIN — INSULIN LISPRO 2 UNITS: 100 INJECTION, SOLUTION INTRAVENOUS; SUBCUTANEOUS at 17:25

## 2023-01-11 RX ADMIN — LOSARTAN POTASSIUM 100 MG: 100 TABLET, FILM COATED ORAL at 08:04

## 2023-01-11 RX ADMIN — LEVOTHYROXINE, LIOTHYRONINE 30 MG: 19; 4.5 TABLET ORAL at 08:04

## 2023-01-11 RX ADMIN — ATORVASTATIN CALCIUM 20 MG: 20 TABLET, FILM COATED ORAL at 20:21

## 2023-01-11 RX ADMIN — BUDESONIDE AND FORMOTEROL FUMARATE DIHYDRATE 2 PUFF: 160; 4.5 AEROSOL RESPIRATORY (INHALATION) at 19:08

## 2023-01-11 RX ADMIN — LEVOTHYROXINE, LIOTHYRONINE 15 MG: 19; 4.5 TABLET ORAL at 08:04

## 2023-01-11 RX ADMIN — GUAIFENESIN 1200 MG: 600 TABLET, EXTENDED RELEASE ORAL at 08:05

## 2023-01-11 RX ADMIN — GUAIFENESIN 1200 MG: 600 TABLET, EXTENDED RELEASE ORAL at 20:21

## 2023-01-11 RX ADMIN — Medication 250 MG: at 20:21

## 2023-01-11 RX ADMIN — Medication 250 MG: at 08:05

## 2023-01-11 RX ADMIN — Medication 10 ML: at 20:21

## 2023-01-11 RX ADMIN — PANTOPRAZOLE SODIUM 40 MG: 40 TABLET, DELAYED RELEASE ORAL at 08:04

## 2023-01-11 RX ADMIN — PREDNISONE 40 MG: 20 TABLET ORAL at 08:04

## 2023-01-11 RX ADMIN — IPRATROPIUM BROMIDE AND ALBUTEROL SULFATE 3 ML: 2.5; .5 SOLUTION RESPIRATORY (INHALATION) at 14:01

## 2023-01-11 RX ADMIN — ATENOLOL 50 MG: 50 TABLET ORAL at 08:04

## 2023-01-11 RX ADMIN — ASPIRIN 81 MG: 81 TABLET, CHEWABLE ORAL at 08:04

## 2023-01-11 RX ADMIN — BUDESONIDE AND FORMOTEROL FUMARATE DIHYDRATE 2 PUFF: 160; 4.5 AEROSOL RESPIRATORY (INHALATION) at 07:14

## 2023-01-11 RX ADMIN — ACETAMINOPHEN 650 MG: 325 TABLET, FILM COATED ORAL at 08:04

## 2023-01-11 RX ADMIN — AMLODIPINE BESYLATE 2.5 MG: 2.5 TABLET ORAL at 12:12

## 2023-01-11 NOTE — PROGRESS NOTES
" LOS: 4 days     Name: Dafne Mary  Age: 82 y.o.  Sex: female  :  1940  MRN: [id number]         Primary Care Physician: Sintia Chatterjee PA    Subjective   Subjective  She reports improvement of her shortness of breath.  Presently on room air.  Still feels herself wheezing some.  Does not feel quite ready for discharge.  Denies cough or fever.    Objective   Vital Signs  Temp:  [97.5 °F (36.4 °C)-98.4 °F (36.9 °C)] 97.5 °F (36.4 °C)  Heart Rate:  [76-86] 81  Resp:  [18-22] 18  BP: (136-178)/(73-96) 178/81  Body mass index is 27.3 kg/m².    Objective:  General Appearance:  Comfortable and in no acute distress (She looks chronically ill).    Vital signs: (most recent): Blood pressure 178/81, pulse 81, temperature 97.5 °F (36.4 °C), temperature source Oral, resp. rate 18, height 152.4 cm (60\"), weight 63.4 kg (139 lb 12.4 oz), SpO2 90 %, not currently breastfeeding.    Lungs:  Normal effort and normal respiratory rate.  There are decreased breath sounds and wheezes.  (Only faint wheezing today)  Heart: Normal rate.  Regular rhythm.    Abdomen: Abdomen is soft.  Bowel sounds are normal.   There is no abdominal tenderness.     Extremities: There is no dependent edema or local swelling.    Neurological: Patient is alert and oriented to person, place and time.    Skin:  Warm and dry.              Results Review:       I reviewed the patient's new clinical results.    Results from last 7 days   Lab Units 23  0612 23  1144 23  0754 23  2047   WBC 10*3/mm3 8.94 2.58* 1.88* 4.41   HEMOGLOBIN g/dL 10.9* 11.6* 12.0 12.5   PLATELETS 10*3/mm3 332 324 319 322     Results from last 7 days   Lab Units 23  0700 23  0754   SODIUM mmol/L 138 138   POTASSIUM mmol/L 3.6 3.5   CHLORIDE mmol/L 101 102   CO2 mmol/L 24.7 25.3   BUN mg/dL 18 9   CREATININE mg/dL 0.76 0.70   CALCIUM mg/dL 9.1 9.1   GLUCOSE mg/dL 144* 151*                 Scheduled Meds:   amLODIPine, 2.5 mg, Oral, Once  [START ON " 1/12/2023] amLODIPine, 5 mg, Oral, Daily  aspirin, 81 mg, Oral, Daily  atenolol, 50 mg, Oral, Daily  atorvastatin, 20 mg, Oral, Nightly  budesonide-formoterol, 2 puff, Inhalation, BID - RT  fluticasone, 2 spray, Nasal, Daily  guaiFENesin, 1,200 mg, Oral, Q12H  insulin lispro, 0-7 Units, Subcutaneous, TID With Meals  ipratropium-albuterol, 3 mL, Nebulization, Q6H While Awake - RT  lidocaine, 1 patch, Transdermal, Q24H  losartan, 100 mg, Oral, Daily  pantoprazole, 40 mg, Oral, QAM  PARoxetine, 10 mg, Oral, Daily  predniSONE, 40 mg, Oral, Daily With Breakfast  saccharomyces boulardii, 250 mg, Oral, BID  sodium chloride, 10 mL, Intravenous, Q12H  thyroid, 15 mg, Oral, Daily  Thyroid, 30 mg, Oral, Daily      PRN Meds:   •  acetaminophen  •  albuterol  •  benzonatate  •  dextrose  •  dextrose  •  glucagon (human recombinant)  •  labetalol  •  nitroglycerin  •  sodium chloride  •  sodium chloride  Continuous Infusions:       Assessment & Plan   Active Hospital Problems    Diagnosis  POA   • **COPD with acute exacerbation (HCC) [J44.1]  Yes   • RSV bronchiolitis [J21.0]  Unknown   • Hypothyroid [E03.9]  Yes   • COPD exacerbation (HCC) [J44.1]  Yes   • Chronic diastolic congestive heart failure (HCC) [I50.32]  Yes   • Benign hypertension [I10]  Yes   • Anxiety [F41.9]  Yes   • Arteriosclerosis of both carotid arteries [I65.23]  Yes   • Fibromyalgia [M79.7]  Yes   • Gastroesophageal reflux disease [K21.9]  Yes      Resolved Hospital Problems   No resolved problems to display.       Assessment & Plan    -Continue prednisone and bronchodilators for acute exacerbation of COPD.   -Encouraged more aggressive pulmonary hygiene.  -Symptomatic care for the RSV infection  -Pulmonology following.  Appreciate assistance.  -No evidence of volume overload  -Blood pressure uncontrolled.  Increase Norvasc dose today.  Likely being influenced by the steroids.  -Leukopenia secondary to acute viral illness.  This is resolved  -Blood sugars  okay so far on the steroids.  On sliding scale.  -Increase ambulation  -Possible discharge in 1 to 2 days depending on progress.      Bennett Ellington MD  [facility]  01/11/23  12:04 EST

## 2023-01-11 NOTE — PLAN OF CARE
Goal Outcome Evaluation:  Plan of Care Reviewed With: patient           Outcome Evaluation: A&O x4, VSS RA, no complaints of pain at this time. Patient resting comfortably, no questions or concerns at this time. Will continue to monitor.

## 2023-01-11 NOTE — CASE MANAGEMENT/SOCIAL WORK
Continued Stay Note  [facility]     Patient Name: Dafne Mary  MRN: [id number]  Today's Date: 1/11/2023    Admit Date: 1/6/2023    Plan: Home with John .   Discharge Plan     Row Name 01/11/23 1411       Plan    Plan Home with John .    Plan Comments S/W pt at bedside who confirms plan to return home upon DC.  She does want to have John  for PT and nursing.  Zoya/ John is following.  Yohana following in case home O2 is needed ...........Lorrie VENTURA/ CCP               Discharge Codes    No documentation.               Expected Discharge Date and Time     Expected Discharge Date Expected Discharge Time    Jan 11, 2023             Lorrie Verma RN

## 2023-01-11 NOTE — PROGRESS NOTES
[facility] INPATIENT PROGRESS NOTE         [address]    2023      PATIENT IDENTIFICATION:  Name: Dafne Mary ADMIT: 2023   : 1940  PCP: Sintia Chatterjee PA    MRN: [id number] LOS: 4 days   AGE/SEX: 82 y.o. female  ROOM: [facility]                     LOS 4    Reason for visit: COPD exacerbation      SUBJECTIVE:      Resting comfortably.  Denies worsening cough, nausea or chest pain.  On room air saturations 92% at time of visit.  Still with mild wheezing on auscultation.      Objective   OBJECTIVE:    Vital Sign Min/Max for last 24 hours  Temp  Min: 97.5 °F (36.4 °C)  Max: 98.4 °F (36.9 °C)   BP  Min: 136/73  Max: 178/81   Pulse  Min: 76  Max: 86   Resp  Min: 18  Max: 22   SpO2  Min: 90 %  Max: 97 %   No data recorded   Weight  Min: 63.4 kg (139 lb 12.4 oz)  Max: 63.4 kg (139 lb 12.4 oz)                         Body mass index is 27.3 kg/m².  No intake or output data in the 24 hours ending 23 09      Exam:  GEN:  No distress, appears stated age  EYES:   PERRL, anicteric sclerae  ENT:    External ears/nose normal, OP clear  NECK:  No adenopathy, midline trachea  LUNGS: Normal chest on inspection, palpation and  wheezing bilaterally on auscultation  CV:  Normal S1S2, without murmur  ABD:  Nontender, nondistended, no hepatosplenomegaly, +BS  EXT:  No edema.  No cyanosis or clubbing.  No mottling and normal cap refill.    Assessment     Scheduled meds:  amLODIPine, 2.5 mg, Oral, Daily  aspirin, 81 mg, Oral, Daily  atenolol, 50 mg, Oral, Daily  atorvastatin, 20 mg, Oral, Nightly  budesonide-formoterol, 2 puff, Inhalation, BID - RT  fluticasone, 2 spray, Nasal, Daily  guaiFENesin, 1,200 mg, Oral, Q12H  insulin lispro, 0-7 Units, Subcutaneous, TID With Meals  ipratropium-albuterol, 3 mL, Nebulization, Q6H While Awake - RT  lidocaine, 1 patch, Transdermal, Q24H  losartan, 100 mg, Oral, Daily  pantoprazole, 40 mg, Oral, QAM  PARoxetine, 10 mg, Oral, Daily  predniSONE, 40 mg, Oral,  Daily With Breakfast  saccharomyces boulardii, 250 mg, Oral, BID  sodium chloride, 10 mL, Intravenous, Q12H  thyroid, 15 mg, Oral, Daily  Thyroid, 30 mg, Oral, Daily      IV meds:                         Data Review:  Results from last 7 days   Lab Units 01/08/23  0700 01/07/23  0754   SODIUM mmol/L 138 138   POTASSIUM mmol/L 3.6 3.5   CHLORIDE mmol/L 101 102   CO2 mmol/L 24.7 25.3   BUN mg/dL 18 9   CREATININE mg/dL 0.76 0.70   GLUCOSE mg/dL 144* 151*   CALCIUM mg/dL 9.1 9.1         Estimated Creatinine Clearance: 47.5 mL/min (by C-G formula based on SCr of 0.76 mg/dL).  Results from last 7 days   Lab Units 01/09/23  0612 01/07/23  1144 01/07/23  0754 01/06/23  2047   WBC 10*3/mm3 8.94 2.58* 1.88* 4.41   HEMOGLOBIN g/dL 10.9* 11.6* 12.0 12.5   PLATELETS 10*3/mm3 332 324 319 322         Results from last 7 days   Lab Units 01/07/23  0754   ALT (SGPT) U/L 23   AST (SGOT) U/L 30     Results from last 7 days   Lab Units 01/07/23  0346   PH, ARTERIAL pH units 7.413   PO2 ART mm Hg 103.6*   PCO2, ARTERIAL mm Hg 45.1*   HCO3 ART mmol/L 28.8*     Results from last 7 days   Lab Units 01/07/23  0754   PROCALCITONIN ng/mL 0.06         Glucose   Date/Time Value Ref Range Status   01/11/2023 0616 102 70 - 130 mg/dL Final     Comment:     Meter: [id number] : [id number] Navin LIND   01/10/2023 2022 134 (H) 70 - 130 mg/dL Final     Comment:     Meter: [id number] : [id number] Navin LIND   01/10/2023 1620 169 (H) 70 - 130 mg/dL Final     Comment:     Meter: [id number] : [id number] Jas LIND   01/10/2023 1127 113 70 - 130 mg/dL Final     Comment:     Meter: [id number] : [id number] Jas LIND   01/09/2023 2206 134 (H) 70 - 130 mg/dL Final     Comment:     Meter: [id number] : [id number] Qianbhaktishellie Terry NA   01/09/2023 1618 155 (H) 70 - 130 mg/dL Final     Comment:     Meter: [id number] : [id number] Jaison Do NA   01/09/2023 1059 109 70 - 130 mg/dL Final     Comment:      Meter: [id number] : [id number] Jaison LIND       Chest x-ray 1/6 reviewed      Microbiology reviewed          Active Hospital Problems    Diagnosis  POA   • **COPD with acute exacerbation (HCC) [J44.1]  Yes   • RSV bronchiolitis [J21.0]  Unknown   • Hypothyroid [E03.9]  Yes   • COPD exacerbation (HCC) [J44.1]  Yes   • Chronic diastolic congestive heart failure (HCC) [I50.32]  Yes   • Benign hypertension [I10]  Yes   • Anxiety [F41.9]  Yes   • Arteriosclerosis of both carotid arteries [I65.23]  Yes   • Fibromyalgia [M79.7]  Yes   • Gastroesophageal reflux disease [K21.9]  Yes      Resolved Hospital Problems   No resolved problems to display.         ASSESSMENT:    1. COPD with acute exacerbation  2. Acute on chronic hypoxemic respiratory failure  3. Acute RSV  4. Granulomatous lung disease  5. Chronic diastolic congestive heart failure  6. Hypothyroidism  7. Hypertension  8. GERD          PLAN:    Encourage pulmonary toilet.  Continue bronchodilators and p.o. steroid with taper.    Wean oxygen as able.        Javier Nolan MD  Pulmonary and Critical Care Medicine  [facility], [facility]  1/11/2023    09:21 EST

## 2023-01-12 LAB
GLUCOSE BLDC GLUCOMTR-MCNC: 127 MG/DL (ref 70–130)
GLUCOSE BLDC GLUCOMTR-MCNC: 139 MG/DL (ref 70–130)
GLUCOSE BLDC GLUCOMTR-MCNC: 164 MG/DL (ref 70–130)
GLUCOSE BLDC GLUCOMTR-MCNC: 99 MG/DL (ref 70–130)

## 2023-01-12 PROCEDURE — 63710000001 INSULIN LISPRO (HUMAN) PER 5 UNITS: Performed by: NURSE PRACTITIONER

## 2023-01-12 PROCEDURE — 94761 N-INVAS EAR/PLS OXIMETRY MLT: CPT

## 2023-01-12 PROCEDURE — 94799 UNLISTED PULMONARY SVC/PX: CPT

## 2023-01-12 PROCEDURE — 63710000001 PREDNISONE PER 1 MG: Performed by: INTERNAL MEDICINE

## 2023-01-12 PROCEDURE — 82962 GLUCOSE BLOOD TEST: CPT

## 2023-01-12 PROCEDURE — 94640 AIRWAY INHALATION TREATMENT: CPT

## 2023-01-12 PROCEDURE — 94760 N-INVAS EAR/PLS OXIMETRY 1: CPT

## 2023-01-12 PROCEDURE — 94664 DEMO&/EVAL PT USE INHALER: CPT

## 2023-01-12 RX ORDER — AMLODIPINE BESYLATE 10 MG/1
10 TABLET ORAL DAILY
Status: DISCONTINUED | OUTPATIENT
Start: 2023-01-13 | End: 2023-01-13 | Stop reason: HOSPADM

## 2023-01-12 RX ORDER — PREDNISONE 20 MG/1
20 TABLET ORAL
Status: DISCONTINUED | OUTPATIENT
Start: 2023-01-13 | End: 2023-01-13 | Stop reason: HOSPADM

## 2023-01-12 RX ORDER — AMLODIPINE BESYLATE 5 MG/1
5 TABLET ORAL ONCE
Status: COMPLETED | OUTPATIENT
Start: 2023-01-12 | End: 2023-01-12

## 2023-01-12 RX ADMIN — Medication 10 ML: at 08:15

## 2023-01-12 RX ADMIN — PREDNISONE 40 MG: 20 TABLET ORAL at 08:15

## 2023-01-12 RX ADMIN — GUAIFENESIN 1200 MG: 600 TABLET, EXTENDED RELEASE ORAL at 08:15

## 2023-01-12 RX ADMIN — IPRATROPIUM BROMIDE AND ALBUTEROL SULFATE 3 ML: 2.5; .5 SOLUTION RESPIRATORY (INHALATION) at 15:48

## 2023-01-12 RX ADMIN — INSULIN LISPRO 2 UNITS: 100 INJECTION, SOLUTION INTRAVENOUS; SUBCUTANEOUS at 18:07

## 2023-01-12 RX ADMIN — LEVOTHYROXINE, LIOTHYRONINE 15 MG: 19; 4.5 TABLET ORAL at 08:15

## 2023-01-12 RX ADMIN — ATORVASTATIN CALCIUM 20 MG: 20 TABLET, FILM COATED ORAL at 21:00

## 2023-01-12 RX ADMIN — FLUTICASONE PROPIONATE 2 SPRAY: 50 SPRAY, METERED NASAL at 08:16

## 2023-01-12 RX ADMIN — PAROXETINE HYDROCHLORIDE 10 MG: 10 TABLET, FILM COATED ORAL at 08:15

## 2023-01-12 RX ADMIN — Medication 10 ML: at 21:01

## 2023-01-12 RX ADMIN — AMLODIPINE BESYLATE 5 MG: 5 TABLET ORAL at 08:12

## 2023-01-12 RX ADMIN — LIDOCAINE 1 PATCH: 700 PATCH TOPICAL at 08:15

## 2023-01-12 RX ADMIN — IPRATROPIUM BROMIDE AND ALBUTEROL SULFATE 3 ML: 2.5; .5 SOLUTION RESPIRATORY (INHALATION) at 12:38

## 2023-01-12 RX ADMIN — ATENOLOL 50 MG: 50 TABLET ORAL at 08:11

## 2023-01-12 RX ADMIN — AMLODIPINE BESYLATE 5 MG: 5 TABLET ORAL at 12:06

## 2023-01-12 RX ADMIN — BUDESONIDE AND FORMOTEROL FUMARATE DIHYDRATE 2 PUFF: 160; 4.5 AEROSOL RESPIRATORY (INHALATION) at 19:07

## 2023-01-12 RX ADMIN — PANTOPRAZOLE SODIUM 40 MG: 40 TABLET, DELAYED RELEASE ORAL at 06:00

## 2023-01-12 RX ADMIN — Medication 250 MG: at 08:15

## 2023-01-12 RX ADMIN — LOSARTAN POTASSIUM 100 MG: 100 TABLET, FILM COATED ORAL at 08:12

## 2023-01-12 RX ADMIN — LEVOTHYROXINE, LIOTHYRONINE 30 MG: 19; 4.5 TABLET ORAL at 08:15

## 2023-01-12 RX ADMIN — ASPIRIN 81 MG: 81 TABLET, CHEWABLE ORAL at 08:15

## 2023-01-12 RX ADMIN — GUAIFENESIN 1200 MG: 600 TABLET, EXTENDED RELEASE ORAL at 21:01

## 2023-01-12 RX ADMIN — BUDESONIDE AND FORMOTEROL FUMARATE DIHYDRATE 2 PUFF: 160; 4.5 AEROSOL RESPIRATORY (INHALATION) at 09:19

## 2023-01-12 RX ADMIN — Medication 250 MG: at 21:00

## 2023-01-12 NOTE — PROGRESS NOTES
Name: Dafne Mary ADMIT: 2023   : 1940  PCP: Sintia Chatterjee PA    MRN: [id number] LOS: 5 days   AGE/SEX: 82 y.o. female  ROOM: [facility]     Subjective   Subjective   Patient seen and examined this morning. Hospital day 6. At time of my examination, patient is awake, alert and oriented x3, resting comfortably in bed, on room air with 02 sat >90%. Discussed with nursing, BP remains more elevated despite increased dosing of Amlodipine previously. Patient continues to endorse dyspnea, cough, however, stable from prior. Denies chest pain, palpitations, vision changes, dizziness, lightheadedness, syncope.    Review of Systems   Constitutional: Negative for chills and fever.   HENT: Positive for congestion.    Eyes: Negative for visual disturbance.   Respiratory: Positive for cough (improving), shortness of breath (improving) and wheezing (improving).    Cardiovascular: Negative for chest pain and palpitations.   Gastrointestinal: Negative for abdominal distention and abdominal pain.   Genitourinary: Negative for difficulty urinating and dysuria.   Neurological: Negative for dizziness, syncope and light-headedness.        Objective   Objective   Vital Signs  Temp:  [98.3 °F (36.8 °C)-98.7 °F (37.1 °C)] 98.7 °F (37.1 °C)  Heart Rate:  [77-93] 86  Resp:  [18-20] 18  BP: (141-192)/(75-98) 144/77  SpO2:  [93 %-96 %] 94 %  on   ;   Device (Oxygen Therapy): room air  Body mass index is 26.21 kg/m².  Physical Exam  Vitals and nursing note reviewed.   Constitutional:       General: She is not in acute distress.     Appearance: She is ill-appearing.   Eyes:      General: No scleral icterus.     Conjunctiva/sclera: Conjunctivae normal.   Cardiovascular:      Rate and Rhythm: Normal rate and regular rhythm.      Pulses: Normal pulses.      Heart sounds: Normal heart sounds.   Pulmonary:      Effort: Pulmonary effort is normal. No respiratory distress.      Breath sounds: Decreased breath sounds present. No wheezing  (faint).      Comments: On room air  Abdominal:      General: Bowel sounds are normal.      Palpations: Abdomen is soft.      Tenderness: There is no abdominal tenderness.   Musculoskeletal:      Cervical back: No tenderness.      Right lower leg: No edema.      Left lower leg: No edema.   Skin:     General: Skin is warm and dry.   Neurological:      General: No focal deficit present.      Mental Status: She is alert and oriented to person, place, and time.       Results Review:  I reviewed the patient's new clinical results.  I reviewed the patient's imaging results and agree with the interpretation.  I reviewed the patient's other test results and agree with the interpretation  I personally viewed and interpreted the patient's EKG/Telemetry data    Results from last 7 days   Lab Units 01/09/23  0612 01/07/23  1144 01/07/23  0754 01/06/23  2047   WBC 10*3/mm3 8.94 2.58* 1.88* 4.41   HEMOGLOBIN g/dL 10.9* 11.6* 12.0 12.5   PLATELETS 10*3/mm3 332 324 319 322     Results from last 7 days   Lab Units 01/08/23  0700 01/07/23  0754   SODIUM mmol/L 138 138   POTASSIUM mmol/L 3.6 3.5   CHLORIDE mmol/L 101 102   CO2 mmol/L 24.7 25.3   BUN mg/dL 18 9   CREATININE mg/dL 0.76 0.70   GLUCOSE mg/dL 144* 151*   EGFR mL/min/1.73 78.3 86.5     Results from last 7 days   Lab Units 01/07/23  0754   ALBUMIN g/dL 4.1   BILIRUBIN mg/dL 0.5   ALK PHOS U/L 117   AST (SGOT) U/L 30   ALT (SGPT) U/L 23     Results from last 7 days   Lab Units 01/08/23  0700 01/07/23  0754   CALCIUM mg/dL 9.1 9.1   ALBUMIN g/dL  --  4.1     Results from last 7 days   Lab Units 01/07/23  0754   PROCALCITONIN ng/mL 0.06     Glucose   Date/Time Value Ref Range Status   01/12/2023 1114 127 70 - 130 mg/dL Final     Comment:     Meter: [id number] : [id number] Jas LIND   01/12/2023 0616 99 70 - 130 mg/dL Final     Comment:     Meter: [id number] : [id number] James LIND   01/11/2023 2106 136 (H) 70 - 130 mg/dL Final     Comment:     Meter:  [id number] : [id number] James Pa NA   01/11/2023 1601 171 (H) 70 - 130 mg/dL Final     Comment:     Meter: [id number] : [id number] Handy Koch NA   01/11/2023 1223 106 70 - 130 mg/dL Final     Comment:     Meter: [id number] : [id number] Handy Koch NA   01/11/2023 0616 102 70 - 130 mg/dL Final     Comment:     Meter: [id number] : [id number] Navin Escobar NA   01/10/2023 2022 134 (H) 70 - 130 mg/dL Final     Comment:     Meter: [id number] : [id number] Navin Escobar NA       No radiology results for the last day  Scheduled Medications  [START ON 1/13/2023] amLODIPine, 10 mg, Oral, Daily  aspirin, 81 mg, Oral, Daily  atenolol, 50 mg, Oral, Daily  atorvastatin, 20 mg, Oral, Nightly  budesonide-formoterol, 2 puff, Inhalation, BID - RT  fluticasone, 2 spray, Nasal, Daily  guaiFENesin, 1,200 mg, Oral, Q12H  insulin lispro, 0-7 Units, Subcutaneous, TID With Meals  ipratropium-albuterol, 3 mL, Nebulization, Q6H While Awake - RT  lidocaine, 1 patch, Transdermal, Q24H  losartan, 100 mg, Oral, Daily  pantoprazole, 40 mg, Oral, QAM  PARoxetine, 10 mg, Oral, Daily  [START ON 1/13/2023] predniSONE, 20 mg, Oral, Daily With Breakfast  saccharomyces boulardii, 250 mg, Oral, BID  sodium chloride, 10 mL, Intravenous, Q12H  thyroid, 15 mg, Oral, Daily  Thyroid, 30 mg, Oral, Daily    Infusions   Diet  Diet: Cardiac Diets; Healthy Heart (2-3 Na+); Texture: Regular Texture (IDDSI 7); Fluid Consistency: Thin (IDDSI 0)       Assessment/Plan     Active Hospital Problems    Diagnosis  POA   • **COPD with acute exacerbation (HCC) [J44.1]  Yes   • RSV bronchiolitis [J21.0]  Unknown   • Hypothyroid [E03.9]  Yes   • COPD exacerbation (HCC) [J44.1]  Yes   • Chronic diastolic congestive heart failure (HCC) [I50.32]  Yes   • Benign hypertension [I10]  Yes   • Anxiety [F41.9]  Yes   • Arteriosclerosis of both carotid arteries [I65.23]  Yes   • Fibromyalgia [M79.7]  Yes   • Gastroesophageal reflux disease [K21.9]  Yes       Resolved Hospital Problems   No resolved problems to display.       82 y.o. female admitted with COPD with acute exacerbation (HCC).    COPD with acute exacerbation secondary to RSV bronchiolitis  - Patient appears to be improving from this perspective.  Although she does continue to endorse dyspnea, cough, wheezing, all of these are improved from prior.  Patient currently on room air with O2 sats greater than 90%, she is afebrile, WBC within normal limits.  No evidence to suggest acute worsening and/or development of superimposed bacterial pneumonia at this time.  -Pulmonology consulted and following.  We will continue with Symbicort, scheduled and as needed bronchodilators, prednisone taper and supportive medications for congestion as currently prescribed.  - Continue to follow pulmonology plans and recommendations, greatly appreciate their help.  - Continue with telemetry and pulse oximetry monitoring, supplemental O2 as needed to maintain O2 sats 88 to 92%.    Hypertension, uncontrolled  - BP remains elevated and outside of acceptable range, was >180 SBP this morning, however, no evidence of acute hypertensive crisis at this time, patient without complaints, likely representing uncontrolled hypertension. Likely a result of corticosteroids, discomfort/dyspnea, driving up BP.  - Increase Amlodipine to 10 mg daily. Continue Atenolol 50 mg daily and Losartan 100 mg daily as prescribed.  - Will closely monitor BP and titrate medications as needed to achieve better control.  - Order repeat BMP in AM for reassessment of renal function and electrolytes.    Chronic HFpEF  - Most recent 2D Echo (07/25/22): EF 53%, indeterminate LV diastolic function, normal systolic function. The following segments are hypokinetic: basal inferoseptal and basal inferior. All other segments are normal. LAE, severe aortic valve calcification, mild to moderate mitral valve regurgitation, moderately elevated RVSP.  - Appears stable at  this time from volume perspective, no evidence of acute exacerbation, appears to be controlled on current regimen.  - Continue to monitor, strict I/O, daily weights, telemetry, pulse oximetry, low sodium diet.    Hypothyroidism  - Stable. Continue Levothyroxine as prescribed.    Hyperlipidemia  - Stable. Continue Statin as prescribed.    GERD  - Stable. Continue PPI as prescribed.       · All workup, problems and plans new to me today. First day taking care of patient.  · SCDs for DVT prophylaxis.  · Full code.  · Discussed with patient, nursing staff, CCP and care team on multidisciplinary rounds.  · Anticipate discharge home in 1-2 days.      Deric Dean DO  [facility]  01/12/23

## 2023-01-12 NOTE — PROGRESS NOTES
[facility] INPATIENT PROGRESS NOTE         [address]    2023      PATIENT IDENTIFICATION:  Name: Dafne Mary ADMIT: 2023   : 1940  PCP: Sintia Chatterjee PA    MRN: [id number] LOS: 5 days   AGE/SEX: 82 y.o. female  ROOM: [facility]                     LOS 5    Reason for visit: COPD exacerbation      SUBJECTIVE:      Resting comfortably.  Says that she feels that she is breathing much better but her blood pressure still significantly elevated.      Objective   OBJECTIVE:    Vital Sign Min/Max for last 24 hours  Temp  Min: 97.9 °F (36.6 °C)  Max: 98.7 °F (37.1 °C)   BP  Min: 141/75  Max: 192/98   Pulse  Min: 77  Max: 93   Resp  Min: 18  Max: 20   SpO2  Min: 94 %  Max: 96 %   No data recorded   Weight  Min: 60.9 kg (134 lb 3.2 oz)  Max: 60.9 kg (134 lb 3.2 oz)                         Body mass index is 26.21 kg/m².  No intake or output data in the 24 hours ending 23 1052      Exam:  GEN:  No distress, appears stated age  EYES:   PERRL, anicteric sclerae  ENT:    External ears/nose normal, OP clear  NECK:  No adenopathy, midline trachea  LUNGS: Normal chest on inspection, palpation and  wheezing bilaterally on auscultation  CV:  Normal S1S2, without murmur  ABD:  Nontender, nondistended, no hepatosplenomegaly, +BS  EXT:  No edema.  No cyanosis or clubbing.  No mottling and normal cap refill.    Assessment     Scheduled meds:  amLODIPine, 5 mg, Oral, Daily  aspirin, 81 mg, Oral, Daily  atenolol, 50 mg, Oral, Daily  atorvastatin, 20 mg, Oral, Nightly  budesonide-formoterol, 2 puff, Inhalation, BID - RT  fluticasone, 2 spray, Nasal, Daily  guaiFENesin, 1,200 mg, Oral, Q12H  insulin lispro, 0-7 Units, Subcutaneous, TID With Meals  ipratropium-albuterol, 3 mL, Nebulization, Q6H While Awake - RT  lidocaine, 1 patch, Transdermal, Q24H  losartan, 100 mg, Oral, Daily  pantoprazole, 40 mg, Oral, QAM  PARoxetine, 10 mg, Oral, Daily  predniSONE, 40 mg, Oral, Daily With  Breakfast  saccharomyces boulardii, 250 mg, Oral, BID  sodium chloride, 10 mL, Intravenous, Q12H  thyroid, 15 mg, Oral, Daily  Thyroid, 30 mg, Oral, Daily      IV meds:                         Data Review:  Results from last 7 days   Lab Units 01/08/23  0700 01/07/23  0754   SODIUM mmol/L 138 138   POTASSIUM mmol/L 3.6 3.5   CHLORIDE mmol/L 101 102   CO2 mmol/L 24.7 25.3   BUN mg/dL 18 9   CREATININE mg/dL 0.76 0.70   GLUCOSE mg/dL 144* 151*   CALCIUM mg/dL 9.1 9.1         Estimated Creatinine Clearance: 46.6 mL/min (by C-G formula based on SCr of 0.76 mg/dL).  Results from last 7 days   Lab Units 01/09/23  0612 01/07/23  1144 01/07/23  0754 01/06/23  2047   WBC 10*3/mm3 8.94 2.58* 1.88* 4.41   HEMOGLOBIN g/dL 10.9* 11.6* 12.0 12.5   PLATELETS 10*3/mm3 332 324 319 322         Results from last 7 days   Lab Units 01/07/23  0754   ALT (SGPT) U/L 23   AST (SGOT) U/L 30     Results from last 7 days   Lab Units 01/07/23  0346   PH, ARTERIAL pH units 7.413   PO2 ART mm Hg 103.6*   PCO2, ARTERIAL mm Hg 45.1*   HCO3 ART mmol/L 28.8*     Results from last 7 days   Lab Units 01/07/23  0754   PROCALCITONIN ng/mL 0.06         Glucose   Date/Time Value Ref Range Status   01/12/2023 0616 99 70 - 130 mg/dL Final     Comment:     Meter: [id number] : [id number] Ramirez Ember NA   01/11/2023 2106 136 (H) 70 - 130 mg/dL Final     Comment:     Meter: [id number] : [id number] Ramirez Ember NA   01/11/2023 1601 171 (H) 70 - 130 mg/dL Final     Comment:     Meter: [id number] : [id number] Donnyetimega Zee NA   01/11/2023 1223 106 70 - 130 mg/dL Final     Comment:     Meter: [id number] : [id number] Chretien Zee NA   01/11/2023 0616 102 70 - 130 mg/dL Final     Comment:     Meter: [id number] : [id number] Navin LIND   01/10/2023 2022 134 (H) 70 - 130 mg/dL Final     Comment:     Meter: [id number] : [id number] Navin LIND   01/10/2023 1620 169 (H) 70 - 130 mg/dL Final     Comment:     Meter: [id number]  : [id number] Jas LIND       Chest x-ray 1/6 reviewed      Microbiology reviewed          Active Hospital Problems    Diagnosis  POA   • **COPD with acute exacerbation (HCC) [J44.1]  Yes   • RSV bronchiolitis [J21.0]  Unknown   • Hypothyroid [E03.9]  Yes   • COPD exacerbation (HCC) [J44.1]  Yes   • Chronic diastolic congestive heart failure (HCC) [I50.32]  Yes   • Benign hypertension [I10]  Yes   • Anxiety [F41.9]  Yes   • Arteriosclerosis of both carotid arteries [I65.23]  Yes   • Fibromyalgia [M79.7]  Yes   • Gastroesophageal reflux disease [K21.9]  Yes      Resolved Hospital Problems   No resolved problems to display.         ASSESSMENT:    1. COPD with acute exacerbation  2. Acute on chronic hypoxemic respiratory failure  3. Acute RSV  4. Granulomatous lung disease  5. Chronic diastolic congestive heart failure  6. Hypothyroidism  7. Hypertension  8. GERD          PLAN:    Encourage pulmonary toilet.  Continue bronchodilators and p.o. steroid with taper.    Wean oxygen as able.  Still with significant hypertension.  Defer to hospitalist service.      Javier Nolan MD  Pulmonary and Critical Care Medicine  [facility], [facility]  1/12/2023    10:52 EST

## 2023-01-12 NOTE — PLAN OF CARE
Goal Outcome Evaluation:  Plan of Care Reviewed With: patient        Progress: improving  Outcome Evaluation: VSS. Patient remained on room air overnight. Overnight sleep oximetry study completed. Plan is for discharge home today.      Problem: COPD (Chronic Obstructive Pulmonary Disease) Comorbidity  Goal: Maintenance of COPD Symptom Control  Outcome: Ongoing, Progressing  Intervention: Maintain COPD-Symptom Control  Recent Flowsheet Documentation  Taken 1/11/2023 2024 by Gabby Koo RN  Medication Review/Management: medications reviewed

## 2023-01-13 ENCOUNTER — READMISSION MANAGEMENT (OUTPATIENT)
Dept: CALL CENTER | Facility: HOSPITAL | Age: 83
End: 2023-01-13
Payer: MEDICARE

## 2023-01-13 VITALS
BODY MASS INDEX: 27.4 KG/M2 | WEIGHT: 139.55 LBS | HEIGHT: 60 IN | RESPIRATION RATE: 16 BRPM | SYSTOLIC BLOOD PRESSURE: 139 MMHG | TEMPERATURE: 97.7 F | OXYGEN SATURATION: 95 % | HEART RATE: 101 BPM | DIASTOLIC BLOOD PRESSURE: 65 MMHG

## 2023-01-13 LAB
ANION GAP SERPL CALCULATED.3IONS-SCNC: 8 MMOL/L (ref 5–15)
BUN SERPL-MCNC: 23 MG/DL (ref 8–23)
BUN/CREAT SERPL: 28 (ref 7–25)
CALCIUM SPEC-SCNC: 8.4 MG/DL (ref 8.6–10.5)
CHLORIDE SERPL-SCNC: 102 MMOL/L (ref 98–107)
CO2 SERPL-SCNC: 27 MMOL/L (ref 22–29)
CREAT SERPL-MCNC: 0.82 MG/DL (ref 0.57–1)
EGFRCR SERPLBLD CKD-EPI 2021: 71.5 ML/MIN/1.73
GLUCOSE BLDC GLUCOMTR-MCNC: 103 MG/DL (ref 70–130)
GLUCOSE BLDC GLUCOMTR-MCNC: 106 MG/DL (ref 70–130)
GLUCOSE BLDC GLUCOMTR-MCNC: 130 MG/DL (ref 70–130)
GLUCOSE SERPL-MCNC: 100 MG/DL (ref 65–99)
POTASSIUM SERPL-SCNC: 3.7 MMOL/L (ref 3.5–5.2)
SODIUM SERPL-SCNC: 137 MMOL/L (ref 136–145)

## 2023-01-13 PROCEDURE — 80048 BASIC METABOLIC PNL TOTAL CA: CPT | Performed by: STUDENT IN AN ORGANIZED HEALTH CARE EDUCATION/TRAINING PROGRAM

## 2023-01-13 PROCEDURE — 94664 DEMO&/EVAL PT USE INHALER: CPT

## 2023-01-13 PROCEDURE — 63710000001 PREDNISONE PER 1 MG: Performed by: INTERNAL MEDICINE

## 2023-01-13 PROCEDURE — 25010000002 HYDRALAZINE PER 20 MG: Performed by: INTERNAL MEDICINE

## 2023-01-13 PROCEDURE — 82962 GLUCOSE BLOOD TEST: CPT

## 2023-01-13 PROCEDURE — 94799 UNLISTED PULMONARY SVC/PX: CPT

## 2023-01-13 PROCEDURE — 94618 PULMONARY STRESS TESTING: CPT

## 2023-01-13 PROCEDURE — 94760 N-INVAS EAR/PLS OXIMETRY 1: CPT

## 2023-01-13 PROCEDURE — 94761 N-INVAS EAR/PLS OXIMETRY MLT: CPT

## 2023-01-13 RX ORDER — AMLODIPINE BESYLATE 10 MG/1
10 TABLET ORAL DAILY
Qty: 30 TABLET | Refills: 2 | Status: SHIPPED | OUTPATIENT
Start: 2023-01-13

## 2023-01-13 RX ORDER — GUAIFENESIN 600 MG/1
1200 TABLET, EXTENDED RELEASE ORAL EVERY 12 HOURS SCHEDULED
Qty: 60 TABLET | Refills: 0 | Status: SHIPPED | OUTPATIENT
Start: 2023-01-13 | End: 2023-03-28

## 2023-01-13 RX ORDER — PREDNISONE 10 MG/1
TABLET ORAL
Qty: 9 TABLET | Refills: 0 | Status: SHIPPED | OUTPATIENT
Start: 2023-01-13 | End: 2023-01-20

## 2023-01-13 RX ORDER — BENZONATATE 200 MG/1
200 CAPSULE ORAL 3 TIMES DAILY PRN
Qty: 30 CAPSULE | Refills: 1 | Status: SHIPPED | OUTPATIENT
Start: 2023-01-13 | End: 2023-03-28

## 2023-01-13 RX ORDER — LIDOCAINE 50 MG/G
1 PATCH TOPICAL
Qty: 15 EACH | Refills: 0 | Status: SHIPPED | OUTPATIENT
Start: 2023-01-14

## 2023-01-13 RX ADMIN — PANTOPRAZOLE SODIUM 40 MG: 40 TABLET, DELAYED RELEASE ORAL at 07:10

## 2023-01-13 RX ADMIN — LEVOTHYROXINE, LIOTHYRONINE 30 MG: 19; 4.5 TABLET ORAL at 08:46

## 2023-01-13 RX ADMIN — PAROXETINE HYDROCHLORIDE 10 MG: 10 TABLET, FILM COATED ORAL at 08:46

## 2023-01-13 RX ADMIN — AMLODIPINE BESYLATE 10 MG: 10 TABLET ORAL at 12:31

## 2023-01-13 RX ADMIN — PREDNISONE 20 MG: 20 TABLET ORAL at 08:46

## 2023-01-13 RX ADMIN — IPRATROPIUM BROMIDE AND ALBUTEROL SULFATE 3 ML: 2.5; .5 SOLUTION RESPIRATORY (INHALATION) at 07:37

## 2023-01-13 RX ADMIN — Medication 250 MG: at 08:46

## 2023-01-13 RX ADMIN — LOSARTAN POTASSIUM 100 MG: 100 TABLET, FILM COATED ORAL at 08:46

## 2023-01-13 RX ADMIN — FLUTICASONE PROPIONATE 2 SPRAY: 50 SPRAY, METERED NASAL at 08:48

## 2023-01-13 RX ADMIN — ASPIRIN 81 MG: 81 TABLET, CHEWABLE ORAL at 08:47

## 2023-01-13 RX ADMIN — GUAIFENESIN 1200 MG: 600 TABLET, EXTENDED RELEASE ORAL at 08:46

## 2023-01-13 RX ADMIN — HYDRALAZINE HYDROCHLORIDE 10 MG: 20 INJECTION INTRAMUSCULAR; INTRAVENOUS at 09:00

## 2023-01-13 RX ADMIN — IPRATROPIUM BROMIDE AND ALBUTEROL SULFATE 3 ML: 2.5; .5 SOLUTION RESPIRATORY (INHALATION) at 14:30

## 2023-01-13 RX ADMIN — ACETAMINOPHEN 650 MG: 325 TABLET, FILM COATED ORAL at 15:41

## 2023-01-13 RX ADMIN — ATENOLOL 50 MG: 50 TABLET ORAL at 08:46

## 2023-01-13 RX ADMIN — Medication 10 ML: at 08:49

## 2023-01-13 NOTE — CASE MANAGEMENT/SOCIAL WORK
Continued Stay Note  [facility]     Patient Name: Dafne Mary  MRN: [id number]  Today's Date: 1/13/2023    Admit Date: 1/6/2023    Plan: Home wiht John GUTIERREZ.   Discharge Plan     Row Name 01/13/23 1620       Plan    Plan Home with John GUTIERREZ.    Plan Comments S/W pt at bedside who confirms plan to return home upon DC.  She requests John GUTIERREZ.  [facility]/ [facility] GUT[facility] is following - they can accept pt for PT and skilled nursing. ............Lorrie VENTURA/ RANDI               Discharge Codes    No documentation.               Expected Discharge Date and Time     Expected Discharge Date Expected Discharge Time    Jan 12, 2023             Lorrie Verma RN

## 2023-01-13 NOTE — PLAN OF CARE
Problem: Adult Inpatient Plan of Care  Goal: Plan of Care Review  1/13/2023 1729 by Jose Reyna RN  Outcome: Adequate for Care Transition  1/13/2023 1439 by Jose Reyna RN  Outcome: Ongoing, Progressing  Goal: Patient-Specific Goal (Individualized)  1/13/2023 1729 by Jose Reyna RN  Outcome: Adequate for Care Transition  1/13/2023 1439 by Jose Reyna RN  Outcome: Ongoing, Progressing  Goal: Absence of Hospital-Acquired Illness or Injury  1/13/2023 1729 by Jose Reyna RN  Outcome: Adequate for Care Transition  1/13/2023 1439 by Jose Reyna RN  Outcome: Ongoing, Progressing  Intervention: Identify and Manage Fall Risk  Recent Flowsheet Documentation  Taken 1/13/2023 1600 by Jose Reyna RN  Safety Promotion/Fall Prevention: safety round/check completed  Taken 1/13/2023 1400 by Jose Reyna RN  Safety Promotion/Fall Prevention: safety round/check completed  Taken 1/13/2023 1200 by Jose Reyna RN  Safety Promotion/Fall Prevention: safety round/check completed  Taken 1/13/2023 1000 by Jose Reyna RN  Safety Promotion/Fall Prevention: safety round/check completed  Taken 1/13/2023 0800 by Jose Reyna RN  Safety Promotion/Fall Prevention: safety round/check completed  Intervention: Prevent Skin Injury  Recent Flowsheet Documentation  Taken 1/13/2023 1600 by Jose Reyna RN  Body Position:   position changed independently   neutral body alignment   neutral head position   weight shifting  Taken 1/13/2023 1400 by Jose Reyna RN  Body Position:   position changed independently   neutral body alignment   neutral head position   weight shifting  Taken 1/13/2023 1200 by Jose Reyna RN  Body Position:   position changed independently   neutral head position   neutral body alignment   weight shifting  Taken 1/13/2023 1000 by Jose Reyna RN  Body Position:   position changed independently   weight shifting  Taken 1/13/2023 0800 by Jose Reyna RN  Body Position:    position changed independently   weight shifting  Skin Protection:   adhesive use limited   transparent dressing maintained  Intervention: Prevent and Manage VTE (Venous Thromboembolism) Risk  Recent Flowsheet Documentation  Taken 1/13/2023 1400 by Jose Reyna RN  Range of Motion: active ROM (range of motion) encouraged  Taken 1/13/2023 1116 by Jose Reyna RN  Activity Management:   ambulated in room   up ad jessie  Taken 1/13/2023 0800 by Jose Reyna RN  VTE Prevention/Management:   bilateral   sequential compression devices off   patient refused intervention  Range of Motion: active ROM (range of motion) encouraged  Intervention: Prevent Infection  Recent Flowsheet Documentation  Taken 1/13/2023 1600 by Jose Reyna RN  Infection Prevention: single patient room provided  Taken 1/13/2023 1400 by Jose Reyna RN  Infection Prevention: single patient room provided  Taken 1/13/2023 1200 by Jose Reyna RN  Infection Prevention: single patient room provided  Taken 1/13/2023 1000 by Jose Reyna RN  Infection Prevention: single patient room provided  Taken 1/13/2023 0800 by Jose Reyna RN  Infection Prevention: single patient room provided  Goal: Optimal Comfort and Wellbeing  1/13/2023 1729 by Jose Reyna RN  Outcome: Adequate for Care Transition  1/13/2023 1439 by Jose Reyna RN  Outcome: Ongoing, Progressing  Intervention: Monitor Pain and Promote Comfort  Recent Flowsheet Documentation  Taken 1/13/2023 1400 by Jose Reyna RN  Pain Management Interventions:   see MAR   quiet environment facilitated   position adjusted   pillow support provided   care clustered  Taken 1/13/2023 0800 by Jose Reyna RN  Pain Management Interventions:   see MAR   quiet environment facilitated   position adjusted   pillow support provided   care clustered  Intervention: Provide Person-Centered Care  Recent Flowsheet Documentation  Taken 1/13/2023 1400 by Jose Reyna RN  Trust  Relationship/Rapport: reassurance provided  Taken 1/13/2023 0800 by Jose Reyna RN  Trust Relationship/Rapport: thoughts/feelings acknowledged  Goal: Readiness for Transition of Care  1/13/2023 1729 by Jose Reyna RN  Outcome: Adequate for Care Transition  1/13/2023 1439 by Jose Reyna RN  Outcome: Ongoing, Progressing     Problem: Asthma Comorbidity  Goal: Maintenance of Asthma Control  1/13/2023 1729 by Jose Reyna RN  Outcome: Adequate for Care Transition  1/13/2023 1439 by Jose Reyna RN  Outcome: Ongoing, Progressing  Intervention: Maintain Asthma Symptom Control  Recent Flowsheet Documentation  Taken 1/13/2023 1600 by Jose Reyna RN  Medication Review/Management: medications reviewed  Taken 1/13/2023 1400 by oJse Reyna RN  Medication Review/Management: medications reviewed  Taken 1/13/2023 1200 by Jose Reyna RN  Medication Review/Management: medications reviewed  Taken 1/13/2023 1000 by Jose Reyna RN  Medication Review/Management: medications reviewed  Taken 1/13/2023 0800 by Jose Reyna RN  Medication Review/Management: medications reviewed     Problem: Behavioral Health Comorbidity  Goal: Maintenance of Behavioral Health Symptom Control  1/13/2023 1729 by Jose Reyna RN  Outcome: Adequate for Care Transition  1/13/2023 1439 by Jose Reyna RN  Outcome: Ongoing, Progressing  Intervention: Maintain Behavioral Health Symptom Control  Recent Flowsheet Documentation  Taken 1/13/2023 1600 by Jose Reyna RN  Medication Review/Management: medications reviewed  Taken 1/13/2023 1400 by Jose Reyna RN  Medication Review/Management: medications reviewed  Taken 1/13/2023 1200 by Jose Reyna RN  Medication Review/Management: medications reviewed  Taken 1/13/2023 1000 by Jose Reyna RN  Medication Review/Management: medications reviewed  Taken 1/13/2023 0800 by Jose Reyna RN  Medication Review/Management: medications reviewed     Problem: COPD (Chronic  Obstructive Pulmonary Disease) Comorbidity  Goal: Maintenance of COPD Symptom Control  1/13/2023 1729 by Jose Reyna RN  Outcome: Adequate for Care Transition  1/13/2023 1439 by Jose Reyna RN  Outcome: Ongoing, Progressing  Intervention: Maintain COPD-Symptom Control  Recent Flowsheet Documentation  Taken 1/13/2023 1600 by Jose Reyna RN  Medication Review/Management: medications reviewed  Taken 1/13/2023 1400 by Jose Reyna RN  Medication Review/Management: medications reviewed  Taken 1/13/2023 1200 by Jose Reyna RN  Medication Review/Management: medications reviewed  Taken 1/13/2023 1000 by Jose Reyna RN  Medication Review/Management: medications reviewed  Taken 1/13/2023 0800 by Jose Reyna RN  Medication Review/Management: medications reviewed     Problem: Diabetes Comorbidity  Goal: Blood Glucose Level Within Targeted Range  1/13/2023 1729 by Jose Reyna RN  Outcome: Adequate for Care Transition  1/13/2023 1439 by Jose Reyna RN  Outcome: Ongoing, Progressing  Intervention: Monitor and Manage Glycemia  Recent Flowsheet Documentation  Taken 1/13/2023 1400 by Jose Reyna RN  Glycemic Management: blood glucose monitored  Taken 1/13/2023 0800 by Jose Reyna RN  Glycemic Management: blood glucose monitored     Problem: Heart Failure Comorbidity  Goal: Maintenance of Heart Failure Symptom Control  1/13/2023 1729 by Jose Reyna RN  Outcome: Adequate for Care Transition  1/13/2023 1439 by Jose Reyna RN  Outcome: Ongoing, Progressing  Intervention: Maintain Heart Failure-Management  Recent Flowsheet Documentation  Taken 1/13/2023 1600 by Jose Reyna RN  Medication Review/Management: medications reviewed  Taken 1/13/2023 1400 by Jose Reyna RN  Medication Review/Management: medications reviewed  Taken 1/13/2023 1200 by Jose Reyna RN  Medication Review/Management: medications reviewed  Taken 1/13/2023 1000 by Jose Reyna RN  Medication  Review/Management: medications reviewed  Taken 1/13/2023 0800 by Jose Reyna RN  Medication Review/Management: medications reviewed     Problem: Hypertension Comorbidity  Goal: Blood Pressure in Desired Range  1/13/2023 1729 by Jose Reyna RN  Outcome: Adequate for Care Transition  1/13/2023 1439 by Jose Reyna RN  Outcome: Ongoing, Progressing  Intervention: Maintain Blood Pressure Management  Recent Flowsheet Documentation  Taken 1/13/2023 1600 by Jose Reyna RN  Medication Review/Management: medications reviewed  Taken 1/13/2023 1400 by Jose Reyna RN  Medication Review/Management: medications reviewed  Taken 1/13/2023 1200 by Jose Reyna RN  Medication Review/Management: medications reviewed  Taken 1/13/2023 1000 by Jose Reyna RN  Medication Review/Management: medications reviewed  Taken 1/13/2023 0800 by Jose Reyna RN  Medication Review/Management: medications reviewed     Problem: Obstructive Sleep Apnea Risk or Actual Comorbidity Management  Goal: Unobstructed Breathing During Sleep  1/13/2023 1729 by Jose Reyna RN  Outcome: Adequate for Care Transition  1/13/2023 1439 by Jose Reyna RN  Outcome: Ongoing, Progressing     Problem: Osteoarthritis Comorbidity  Goal: Maintenance of Osteoarthritis Symptom Control  1/13/2023 1729 by Jose Reyna RN  Outcome: Adequate for Care Transition  1/13/2023 1439 by Jose Reyna RN  Outcome: Ongoing, Progressing  Intervention: Maintain Osteoarthritis Symptom Control  Recent Flowsheet Documentation  Taken 1/13/2023 1600 by Jose Reyna RN  Medication Review/Management: medications reviewed  Taken 1/13/2023 1400 by Jose Reyna RN  Medication Review/Management: medications reviewed  Taken 1/13/2023 1200 by Jose Reyna RN  Medication Review/Management: medications reviewed  Taken 1/13/2023 1116 by Jose Reyna RN  Activity Management:   ambulated in room   up ad jessie  Taken 1/13/2023 1000 by Jose Reyna  RN  Medication Review/Management: medications reviewed  Taken 1/13/2023 0800 by Jose Reyna RN  Medication Review/Management: medications reviewed     Problem: Pain Chronic (Persistent) (Comorbidity Management)  Goal: Acceptable Pain Control and Functional Ability  1/13/2023 1729 by Jose Reyna RN  Outcome: Adequate for Care Transition  1/13/2023 1439 by Jose Reyna RN  Outcome: Ongoing, Progressing  Intervention: Manage Persistent Pain  Recent Flowsheet Documentation  Taken 1/13/2023 1600 by Jose Reyna RN  Medication Review/Management: medications reviewed  Taken 1/13/2023 1400 by Jose Reyna RN  Medication Review/Management: medications reviewed  Taken 1/13/2023 1200 by Jose Reyna RN  Medication Review/Management: medications reviewed  Taken 1/13/2023 1000 by Jose Reyna RN  Medication Review/Management: medications reviewed  Taken 1/13/2023 0800 by Jose Reyna RN  Medication Review/Management: medications reviewed  Intervention: Develop Pain Management Plan  Recent Flowsheet Documentation  Taken 1/13/2023 1400 by Jose Reyna RN  Pain Management Interventions:   see MAR   quiet environment facilitated   position adjusted   pillow support provided   care clustered  Taken 1/13/2023 0800 by Jose Reyan RN  Pain Management Interventions:   see MAR   quiet environment facilitated   position adjusted   pillow support provided   care clustered  Intervention: Optimize Psychosocial Wellbeing  Recent Flowsheet Documentation  Taken 1/13/2023 1400 by Jose Reyna RN  Diversional Activities: television  Family/Support System Care: self-care encouraged  Taken 1/13/2023 0800 by Jose Reyna RN  Diversional Activities: television  Family/Support System Care: self-care encouraged     Problem: Seizure Disorder Comorbidity  Goal: Maintenance of Seizure Control  1/13/2023 1729 by Jose Reyna RN  Outcome: Adequate for Care Transition  1/13/2023 1439 by Jose Reyna RN  Outcome:  Ongoing, Progressing   Goal Outcome Evaluation:         Pt stable for d/c.. working on it now

## 2023-01-13 NOTE — DISCHARGE SUMMARY
Patient Name: Dafne Mary  : 1940  MRN: [id number]    Date of Admission: 2023  Date of Discharge:  2023  Primary Care Physician: Sintia Chatterjee PA      Chief Complaint:   No chief complaint on file.      Discharge Diagnoses     Active Hospital Problems    Diagnosis  POA   • **COPD with acute exacerbation (HCC) [J44.1]  Yes   • RSV bronchiolitis [J21.0]  Yes   • Hypothyroid [E03.9]  Yes   • COPD exacerbation (HCC) [J44.1]  Yes   • Chronic diastolic congestive heart failure (HCC) [I50.32]  Yes   • Benign hypertension [I10]  Yes   • Anxiety [F41.9]  Yes   • Arteriosclerosis of both carotid arteries [I65.23]  Yes   • Fibromyalgia [M79.7]  Yes   • Gastroesophageal reflux disease [K21.9]  Yes   • Hyperlipidemia [E78.5]  Yes      Resolved Hospital Problems   No resolved problems to display.        Hospital Course     Ms. Mary is a 82 y.o. female with a history of COPD, HFpEF, hypertension, hyperlipidemia, GERD who presented to [facility] initially complaining of worsening shortness of breath and increased congestion.  Please see the admitting history and physical for further details.  She was admitted to the hospital for further evaluation and treatment.    COPD with acute exacerbation secondary to RSV bronchiolitis  - Patient found to be positive for RSV on respiratory viral panel obtained .  Coupled with patient's endorsement of increased dyspnea, cough, wheezing, consistent with acute exacerbation of COPD.  - Procalcitonin 0.06, AB.413/45.1/103.6, FiO2 28 (); Initial imaging showing cardiomegaly but no infiltrate or effusion.  Did show bibasilar atelectasis.  -Pulmonology consulted and followed patient for the duration of her stay.  She was treated with bronchodilators and corticosteroids, which were tapered over the course of her stay, in addition to supportive medications with guaifenesin and Tessalon Pearls.  All of these interventions led to significant  improvement in patient's symptoms, on day of discharge her symptoms of dyspnea, cough and wheezing all were significantly improved from prior. Patient was on room air with O2 sats greater than 90%, she was afebrile, WBC within normal limits.  No evidence to suggest acute worsening and/or development of superimposed bacterial pneumonia at this time.  - Evaluated by pulmonology on day of discharge, recommended to continue to encourage pulmonary toilet, resume previous home inhaler regimen at discharge and continue with oral prednisone taper, otherwise, cleared for discharge.  - COPD order set selected and reviewed.  Patient underwent home walking oximetry prior to discharge.  - At time of discharge, resume home inhalers as recommended per pulmonology.  Continue with prednisone on taper as follows: Prednisone 20 mg daily x3 days followed by prednisone 10 mg daily x2 days and finally prednisone 5 mg daily x2 days.  - Recommend close follow-up with pulmonology within 1 to 2 weeks after discharge or as scheduled for reassessment to guide ongoing management decisions.  - Order HHPT and skilled nursing at discharge in setting of COPD, impaired functional status 2/2 breathing difficulties and need for cardiopulmonary assessments.     Hypertension  - BP  was intermittently uncontrolled and required adjustments to her medication regimen during her inpatient stay.  Although elevated, there was no evidence of acute hypertensive crisis. Worsening hypertension likely a result of treatment with corticosteroids in setting of acute COPD exacerbation. She was continued on home Atenolol 50 mg daily and Losartan 100 mg daily, however, changes were made to Amlodipine. She was on 2.5 mg daily as outpatient, which was titrated up during her stay, most recent changes occurring on day prior to discharge, at which time dose was increased to 10 mg daily. Following this change, patient had significant improvement in blood pressure. I had nurse  recheck BP prior to discharge, at which time it was stable at 139/65.  - At time of discharge, will continue increased dose of Amlodipine at 10 mg daily in addition to continuation of other home medications as listed above.  - Patient provided with discharge instructions regarding home BP monitoring as follows: After discharge, patient encouraged to check blood pressure 2-3 times daily and record those values in log book and take with her to next PCP visit to allow for greater insight into treatment efficacy. It is very possible that once steroids are completed, her BP medication requirements will decrease, so recommend close monitoring and titration of medication as needed.  - Recommend close follow up with PCP within 1 week for reassessment to guide ongoing management decisions. Also, at that time, recommend repeat BMP for assessment of renal function and electrolytes.     Chronic HFpEF  - Most recent 2D Echo (07/25/22): EF 53%, indeterminate LV diastolic function, normal systolic function. The following segments are hypokinetic: basal inferoseptal and basal inferior. All other segments are normal. LAE, severe aortic valve calcification, mild to moderate mitral valve regurgitation, moderately elevated RVSP. Appears stable at this time from volume perspective, no evidence of acute exacerbation, appears to be controlled on current regimen.  - Recommend close follow up with PCP to guide ongoing management decisions.  - Recommend cardiac diet at discharge.    Hyperlipidemia  - Stable. Continue Statin as prescribed.    GERD  - Stable. Continue PPI as prescribed.     Day of Discharge     Subjective:  Patient seen and examined this morning. Hospital day 7. At time of my examination, patient is awake, alert and oriented x3, resting comfortably in bed, on room air with 02 sat >90%. Discussed with nursing, BP much better controlled following adjustment to medication regimen. She has been cleared for discharge by  pulmonology.    Physical Exam:  Temp:  [97.7 °F (36.5 °C)-98.1 °F (36.7 °C)] 97.7 °F (36.5 °C)  Heart Rate:  [] 81  Resp:  [16-20] 16  BP: (134-176)/(59-94) 139/65  Body mass index is 27.25 kg/m².  Physical Exam  Vitals and nursing note reviewed.   Constitutional:       General: She is not in acute distress.  Eyes:      General: No scleral icterus.     Conjunctiva/sclera: Conjunctivae normal.   Cardiovascular:      Rate and Rhythm: Normal rate and regular rhythm.      Pulses: Normal pulses.      Heart sounds: Normal heart sounds.   Pulmonary:      Effort: Pulmonary effort is normal. No respiratory distress.      Breath sounds: Decreased breath sounds present. Faint wheezes.     Comments: On room air  Abdominal:      Palpations: Abdomen is soft.      Tenderness: There is no abdominal tenderness.   Musculoskeletal:       Right lower leg: No edema.      Left lower leg: No edema.   Skin:     General: Skin is warm and dry.   Neurological:      General: No focal deficit present.      Mental Status: She is alert and oriented to person, place, and time.      Consultants     Consult Orders (all) (From admission, onward)     Start     Ordered    01/07/23 0702  Inpatient Pulmonology Consult  IN AM        Comments: Does not need to be seen tonight,   Specialty:  Pulmonary Disease  Provider:  Javier Nolan MD    01/06/23 2237 01/06/23 2023  Inpatient Nutrition Consult - BMI 70 or Greater  Once        Provider:  (Not yet assigned)    01/06/23 2027              Procedures     * Surgery not found *      Imaging Results (All)     Procedure Component Value Units Date/Time    XR Chest 1 View [[id number]] Collected: 01/07/23 0118     Updated: 01/07/23 0123    Narrative:      SINGLE VIEW OF THE CHEST     HISTORY: Shortness of breath     COMPARISON: 12/07/2022     FINDINGS:  There is cardiomegaly. No pneumothorax is seen. No definite acute  infiltrates are seen. There is no effusion. There is bibasilar  atelectasis. There  are advanced degenerative changes of the right  shoulder and right AC joint.       Impression:      Mild bibasilar atelectasis.     This report was finalized on 1/7/2023 1:20 AM by Dr. Kelly Chirinos M.D.           Results for orders placed during the hospital encounter of 11/22/22    Duplex Carotid Ultrasound CAR    Interpretation Summary  •  Proximal right internal carotid artery plaque without significant stenosis.  •  Proximal left internal carotid artery plaque without significant stenosis.    Results for orders placed during the hospital encounter of 07/25/22    Adult Transthoracic Echo Complete w/ Color, Spectral and Contrast if Necessary Per Protocol    Interpretation Summary  · Calculated left ventricular EF = 52.6% Estimated left ventricular EF = 53% Estimated left ventricular EF was in agreement with the calculated left ventricular EF. Left ventricular systolic function is normal. Normal left ventricular cavity size and wall thickness noted. There are wall motion abnormalities as noted below Left ventricular diastolic function was indeterminate.  · The following segments are hypokinetic: basal inferoseptal and basal inferior. All other segments are normal.  · Left atrial volume is mildly increased.  · No aortic valve regurgitation or stenosis is present. The aortic valve is abnormal in structure. There is severe calcification of the aortic valve  · Severe mitral annular calcification is present. There is moderate, bileaflet mitral valve thickening present. Mild to moderate mitral valve regurgitation is present. No significant mitral valve stenosis is present.  · Tricuspid valve not well visualized. Trace tricuspid valve regurgitation is present. Estimated right ventricular systolic pressure from tricuspid regurgitation is moderately elevated (45-55 mmHg). Calculated right ventricular systolic pressure from tricuspid regurgitation is 45 mmHg.  · There is suggestion of atrial shunting by color-flow  imaging subcostal views. Saline injection was not performed. We will contact the patient to return to address further.    Pertinent Labs     Results from last 7 days   Lab Units 01/09/23  0612 01/07/23  1144 01/07/23  0754 01/06/23  2047   WBC 10*3/mm3 8.94 2.58* 1.88* 4.41   HEMOGLOBIN g/dL 10.9* 11.6* 12.0 12.5   PLATELETS 10*3/mm3 332 324 319 322     Results from last 7 days   Lab Units 01/13/23  0619 01/08/23  0700 01/07/23  0754   SODIUM mmol/L 137 138 138   POTASSIUM mmol/L 3.7 3.6 3.5   CHLORIDE mmol/L 102 101 102   CO2 mmol/L 27.0 24.7 25.3   BUN mg/dL 23 18 9   CREATININE mg/dL 0.82 0.76 0.70   GLUCOSE mg/dL 100* 144* 151*   EGFR mL/min/1.73 71.5 78.3 86.5     Results from last 7 days   Lab Units 01/07/23  0754   ALBUMIN g/dL 4.1   BILIRUBIN mg/dL 0.5   ALK PHOS U/L 117   AST (SGOT) U/L 30   ALT (SGPT) U/L 23     Results from last 7 days   Lab Units 01/13/23  0619 01/08/23  0700 01/07/23  0754   CALCIUM mg/dL 8.4* 9.1 9.1   ALBUMIN g/dL  --   --  4.1       Results from last 7 days   Lab Units 01/07/23  0754   PROBNP pg/mL 1,046.0           Invalid input(s): LDLCALC      Results from last 7 days   Lab Units 01/07/23  0026   COVID19  Not Detected       Test Results Pending at Discharge       Discharge Details        Discharge Medications      New Medications      Instructions Start Date   benzonatate 200 MG capsule  Commonly known as: TESSALON   200 mg, Oral, 3 Times Daily PRN      guaiFENesin 600 MG 12 hr tablet  Commonly known as: MUCINEX   1,200 mg, Oral, Every 12 Hours Scheduled      lidocaine 5 %  Commonly known as: LIDODERM   1 patch, Transdermal, Every 24 Hours Scheduled, Remove & Discard patch within 12 hours or as directed by MD   Start Date: January 14, 2023     predniSONE 10 MG tablet  Commonly known as: DELTASONE   Take 2 tablets by mouth Daily for 3 days, THEN 1 tablet Daily for 2 days, THEN 0.5 tablets Daily for 2 days.   Start Date: January 13, 2023        Changes to Medications       Instructions Start Date   amLODIPine 10 MG tablet  Commonly known as: NORVASC  What changed:   · medication strength  · how much to take   10 mg, Oral, Daily, Pt wasn't sure the dosage         Continue These Medications      Instructions Start Date   Deane Thyroid 15 MG tablet  Generic drug: thyroid   Take 1 tablet by mouth once daily      Deane Thyroid 30 MG tablet  Generic drug: Thyroid   Take 1 tablet by mouth once daily      aspirin 81 MG chewable tablet   81 mg, Oral, Daily      atenolol 50 MG tablet  Commonly known as: TENORMIN   50 mg, Oral, Daily      atorvastatin 20 MG tablet  Commonly known as: LIPITOR   20 mg, Oral, Nightly      Breztri Aerosphere 160-9-4.8 MCG/ACT aerosol inhaler  Generic drug: Budeson-Glycopyrrol-Formoterol   2 puffs, 2 Times Daily      fluticasone 50 MCG/ACT nasal spray  Commonly known as: FLONASE   2 sprays, Nasal, Daily      losartan 100 MG tablet  Commonly known as: COZAAR   Take 1 tablet by mouth once daily      omeprazole 20 MG capsule  Commonly known as: priLOSEC   20 mg, Oral, Daily      PARoxetine 10 MG tablet  Commonly known as: PAXIL   Take 1 tablet by mouth once daily      saccharomyces boulardii 250 MG capsule  Commonly known as: Florastor   250 mg, Oral, 2 Times Daily      vitamin D3 125 MCG (5000 UT) capsule capsule   5,000 Units, Oral, Daily             Allergies   Allergen Reactions   • Lansoprazole Nausea Only     NAUSEA  NAUSEA  NAUSEA   • Diphenhydramine Hcl (Sleep) Irritability   • Lisinopril Cough       Discharge Disposition:  Home-Health Care Svc      Discharge Diet:  Diet Order   Procedures   • Diet: Cardiac Diets; Healthy Heart (2-3 Na+); Texture: Regular Texture (IDDSI 7); Fluid Consistency: Thin (IDDSI 0)       Discharge Activity:   Activity Instructions     Activity as Tolerated      Other Activity Instructions      Activity Instructions: Per PT          CODE STATUS:    Code Status and Medical Interventions:   Ordered at: 01/07/23 1057     Code Status  (Patient has no pulse and is not breathing):    CPR (Attempt to Resuscitate)     Medical Interventions (Patient has pulse or is breathing):    Full Support     Release to patient:    Routine Release       Future Appointments   Date Time Provider Department Center   2/9/2023  1:30 PM Nae Norman MD [facility]   2/20/2023  3:00 PM Shelbie Good APRN [facility]     Additional Instructions for the Follow-ups that You Need to Schedule     Ambulatory Referral to Home Health   As directed      Face to Face Visit Date: 1/13/2023    Follow-up provider for Plan of Care?: I treated the patient in an acute care facility and will not continue treatment after discharge.    Follow-up provider: SINTIA CHATTERJEE [[id number]]    Reason/Clinical Findings: COPD, impaired mobility    Describe mobility limitations that make leaving home difficult: impaired functional status, dyspnea    Nursing/Therapeutic Services Requested: Skilled Nursing Physical Therapy    Skilled nursing orders: Medication education (Blood pressure monitoring) COPD management Cardiopulmonary assessments Other    PT orders: Total joint pathway Therapeutic exercise Gait Training Transfer training Home safety assessment Strengthening    Weight Bearing Status: As Tolerated    Frequency: 1 Week 1         Discharge Follow-up with PCP   As directed       Currently Documented PCP:    Sintia Chatterjee PA    PCP Phone Number:    [phone]     Follow Up Details: within 1 week after discharge for reassessment, medication review, BP check, repeat BMP, CBC         Discharge Follow-up with Specialty: Pulmonology-Dr. Nolan   As directed      Specialty: Pulmonology-Dr. Nolan    Follow Up Details: Please follow up with Pulmonology within 1-2 weeks after discharge. If you do not have an appointment, please call their office to schedule.            Contact information for follow-up providers     Sintia Chatterjee PA .    Specialty: Family Medicine  Why: within 1 week  after discharge for reassessment, medication review, BP check, repeat BMP, CBC  Contact information:  [address]  [facility] [address]  [phone]                   Contact information for after-discharge care     Home [facility] Care     [facility] .    Service: Home Health Services  Contact information:  [address]  [facility] [address] [phone]                             Additional Instructions for the Follow-ups that You Need to Schedule     Ambulatory Referral to Home Health   As directed      Face to Face Visit Date: 1/13/2023    Follow-up provider for Plan of Care?: I treated the patient in an acute care facility and will not continue treatment after discharge.    Follow-up provider: SINTIA CHATTERJEE [[id number]]    Reason/Clinical Findings: COPD, impaired mobility    Describe mobility limitations that make leaving home difficult: impaired functional status, dyspnea    Nursing/Therapeutic Services Requested: Skilled Nursing Physical Therapy    Skilled nursing orders: Medication education (Blood pressure monitoring) COPD management Cardiopulmonary assessments Other    PT orders: Total joint pathway Therapeutic exercise Gait Training Transfer training Home safety assessment Strengthening    Weight Bearing Status: As Tolerated    Frequency: 1 Week 1         Discharge Follow-up with PCP   As directed       Currently Documented PCP:    Sintia Chatterjee PA    PCP Phone Number:    [phone]     Follow Up Details: within 1 week after discharge for reassessment, medication review, BP check, repeat BMP, CBC         Discharge Follow-up with Specialty: Pulmonology-Dr. Nolan   As directed      Specialty: Pulmonology-Dr. Nolan    Follow Up Details: Please follow up with Pulmonology within 1-2 weeks after discharge. If you do not have an appointment, please call their office to schedule.           Time Spent on Discharge:  Greater than 30 minutes      Deric Dean    [facility]  01/13/23  17:27 EST

## 2023-01-13 NOTE — PLAN OF CARE
Problem: Adult Inpatient Plan of Care  Goal: Plan of Care Review  Outcome: Ongoing, Progressing  Goal: Patient-Specific Goal (Individualized)  Outcome: Ongoing, Progressing  Goal: Absence of Hospital-Acquired Illness or Injury  Outcome: Ongoing, Progressing  Intervention: Identify and Manage Fall Risk  Recent Flowsheet Documentation  Taken 1/13/2023 1400 by Jose Reyna RN  Safety Promotion/Fall Prevention: safety round/check completed  Taken 1/13/2023 1200 by Jose Reyna RN  Safety Promotion/Fall Prevention: safety round/check completed  Taken 1/13/2023 1000 by Jose Reyna RN  Safety Promotion/Fall Prevention: safety round/check completed  Taken 1/13/2023 0800 by Jose Reyna RN  Safety Promotion/Fall Prevention: safety round/check completed  Intervention: Prevent Skin Injury  Recent Flowsheet Documentation  Taken 1/13/2023 1400 by Jose Reyna RN  Body Position:   position changed independently   neutral body alignment   neutral head position   weight shifting  Taken 1/13/2023 1200 by Jose Reyna RN  Body Position:   position changed independently   neutral head position   neutral body alignment   weight shifting  Taken 1/13/2023 1000 by Jose Reyna RN  Body Position:   position changed independently   weight shifting  Taken 1/13/2023 0800 by Jose Reyna RN  Body Position:   position changed independently   weight shifting  Skin Protection:   adhesive use limited   transparent dressing maintained  Intervention: Prevent and Manage VTE (Venous Thromboembolism) Risk  Recent Flowsheet Documentation  Taken 1/13/2023 1400 by Jose Reyna RN  Range of Motion: active ROM (range of motion) encouraged  Taken 1/13/2023 1116 by Jose Reyna RN  Activity Management:   ambulated in room   up ad jessie  Taken 1/13/2023 0800 by Jose Reyna RN  VTE Prevention/Management:   bilateral   sequential compression devices off   patient refused intervention  Range of Motion: active ROM (range of  motion) encouraged  Intervention: Prevent Infection  Recent Flowsheet Documentation  Taken 1/13/2023 1400 by Jose Reyna RN  Infection Prevention: single patient room provided  Taken 1/13/2023 1200 by Jose Reyna RN  Infection Prevention: single patient room provided  Taken 1/13/2023 1000 by Jose Reyna RN  Infection Prevention: single patient room provided  Taken 1/13/2023 0800 by Jose Reyna RN  Infection Prevention: single patient room provided  Goal: Optimal Comfort and Wellbeing  Outcome: Ongoing, Progressing  Intervention: Monitor Pain and Promote Comfort  Recent Flowsheet Documentation  Taken 1/13/2023 1400 by Jose Reyna RN  Pain Management Interventions:   see MAR   quiet environment facilitated   position adjusted   pillow support provided   care clustered  Taken 1/13/2023 0800 by Jose Reyna RN  Pain Management Interventions:   see MAR   quiet environment facilitated   position adjusted   pillow support provided   care clustered  Intervention: Provide Person-Centered Care  Recent Flowsheet Documentation  Taken 1/13/2023 1400 by Jose Reyna RN  Trust Relationship/Rapport: reassurance provided  Taken 1/13/2023 0800 by Jose Reyna RN  Trust Relationship/Rapport: thoughts/feelings acknowledged  Goal: Readiness for Transition of Care  Outcome: Ongoing, Progressing     Problem: Asthma Comorbidity  Goal: Maintenance of Asthma Control  Outcome: Ongoing, Progressing  Intervention: Maintain Asthma Symptom Control  Recent Flowsheet Documentation  Taken 1/13/2023 1400 by Jose Reyna RN  Medication Review/Management: medications reviewed  Taken 1/13/2023 1200 by Jose Reyna RN  Medication Review/Management: medications reviewed  Taken 1/13/2023 1000 by Jose Reyna RN  Medication Review/Management: medications reviewed  Taken 1/13/2023 0800 by oJse Reyna RN  Medication Review/Management: medications reviewed     Problem: Behavioral Health Comorbidity  Goal: Maintenance  of Behavioral Health Symptom Control  Outcome: Ongoing, Progressing  Intervention: Maintain Behavioral Health Symptom Control  Recent Flowsheet Documentation  Taken 1/13/2023 1400 by Jose Reyna RN  Medication Review/Management: medications reviewed  Taken 1/13/2023 1200 by Jose Reyna RN  Medication Review/Management: medications reviewed  Taken 1/13/2023 1000 by Jose Reyna RN  Medication Review/Management: medications reviewed  Taken 1/13/2023 0800 by Jose Reyna RN  Medication Review/Management: medications reviewed     Problem: COPD (Chronic Obstructive Pulmonary Disease) Comorbidity  Goal: Maintenance of COPD Symptom Control  Outcome: Ongoing, Progressing  Intervention: Maintain COPD-Symptom Control  Recent Flowsheet Documentation  Taken 1/13/2023 1400 by Jose Reyna RN  Medication Review/Management: medications reviewed  Taken 1/13/2023 1200 by Jose Reyna RN  Medication Review/Management: medications reviewed  Taken 1/13/2023 1000 by Jose Reyna RN  Medication Review/Management: medications reviewed  Taken 1/13/2023 0800 by Jose Reyna RN  Medication Review/Management: medications reviewed     Problem: Diabetes Comorbidity  Goal: Blood Glucose Level Within Targeted Range  Outcome: Ongoing, Progressing  Intervention: Monitor and Manage Glycemia  Recent Flowsheet Documentation  Taken 1/13/2023 1400 by Jose Reyna RN  Glycemic Management: blood glucose monitored  Taken 1/13/2023 0800 by Jose Reyna RN  Glycemic Management: blood glucose monitored     Problem: Heart Failure Comorbidity  Goal: Maintenance of Heart Failure Symptom Control  Outcome: Ongoing, Progressing  Intervention: Maintain Heart Failure-Management  Recent Flowsheet Documentation  Taken 1/13/2023 1400 by Jose Reyna RN  Medication Review/Management: medications reviewed  Taken 1/13/2023 1200 by Jose Reyna RN  Medication Review/Management: medications reviewed  Taken 1/13/2023 1000 by Axel  Garcia, RN  Medication Review/Management: medications reviewed  Taken 1/13/2023 0800 by Jose Reyna RN  Medication Review/Management: medications reviewed     Problem: Hypertension Comorbidity  Goal: Blood Pressure in Desired Range  Outcome: Ongoing, Progressing  Intervention: Maintain Blood Pressure Management  Recent Flowsheet Documentation  Taken 1/13/2023 1400 by Jose Reyna RN  Medication Review/Management: medications reviewed  Taken 1/13/2023 1200 by Jose Reyna RN  Medication Review/Management: medications reviewed  Taken 1/13/2023 1000 by Jose Reyna RN  Medication Review/Management: medications reviewed  Taken 1/13/2023 0800 by Jose Reyna RN  Medication Review/Management: medications reviewed     Problem: Obstructive Sleep Apnea Risk or Actual Comorbidity Management  Goal: Unobstructed Breathing During Sleep  Outcome: Ongoing, Progressing     Problem: Osteoarthritis Comorbidity  Goal: Maintenance of Osteoarthritis Symptom Control  Outcome: Ongoing, Progressing  Intervention: Maintain Osteoarthritis Symptom Control  Recent Flowsheet Documentation  Taken 1/13/2023 1400 by Jose Reyna RN  Medication Review/Management: medications reviewed  Taken 1/13/2023 1200 by Jose Reyna RN  Medication Review/Management: medications reviewed  Taken 1/13/2023 1116 by Jose Reyna RN  Activity Management:   ambulated in room   up ad jessie  Taken 1/13/2023 1000 by Jose Reyna RN  Medication Review/Management: medications reviewed  Taken 1/13/2023 0800 by Jose Reyna RN  Medication Review/Management: medications reviewed     Problem: Pain Chronic (Persistent) (Comorbidity Management)  Goal: Acceptable Pain Control and Functional Ability  Outcome: Ongoing, Progressing  Intervention: Manage Persistent Pain  Recent Flowsheet Documentation  Taken 1/13/2023 1400 by Jose Reyna RN  Medication Review/Management: medications reviewed  Taken 1/13/2023 1200 by Jose Reyna RN  Medication  Review/Management: medications reviewed  Taken 1/13/2023 1000 by Jose Reyna RN  Medication Review/Management: medications reviewed  Taken 1/13/2023 0800 by Jose Reyna RN  Medication Review/Management: medications reviewed  Intervention: Develop Pain Management Plan  Recent Flowsheet Documentation  Taken 1/13/2023 1400 by Jose Reyna RN  Pain Management Interventions:   see MAR   quiet environment facilitated   position adjusted   pillow support provided   care clustered  Taken 1/13/2023 0800 by Jose Reyna RN  Pain Management Interventions:   see MAR   quiet environment facilitated   position adjusted   pillow support provided   care clustered  Intervention: Optimize Psychosocial Wellbeing  Recent Flowsheet Documentation  Taken 1/13/2023 1400 by Jose Reyna RN  Diversional Activities: television  Family/Support System Care: self-care encouraged  Taken 1/13/2023 0800 by Jose Reyna RN  Diversional Activities: television  Family/Support System Care: self-care encouraged     Problem: Seizure Disorder Comorbidity  Goal: Maintenance of Seizure Control  Outcome: Ongoing, Progressing   Goal Outcome Evaluation:      Pt vss x bp elevated requiring prn hydralazine this am, a&ox4, cooperative w/ care, anxious to leave.  SBA, RA, NSR, uses bathroom.  Possible d/c tomorrow pending overnight bp.  No distress/pain noted at this time. Dtr has been at bedside today.

## 2023-01-13 NOTE — PROGRESS NOTES
[facility] INPATIENT PROGRESS NOTE         [address]    2023      PATIENT IDENTIFICATION:  Name: Dafne Mary ADMIT: 2023   : 1940  PCP: Sintia Chatterjee PA    MRN: [id number] LOS: 6 days   AGE/SEX: 82 y.o. female  ROOM: [facility]                     LOS 6    Reason for visit: COPD exacerbation      SUBJECTIVE:      Resting comfortably.  No new complaints.  Hoping to go home today.  Resume previous home inhaled therapy at discharge.      Objective   OBJECTIVE:    Vital Sign Min/Max for last 24 hours  Temp  Min: 97.7 °F (36.5 °C)  Max: 98.7 °F (37.1 °C)   BP  Min: 136/72  Max: 176/94   Pulse  Min: 78  Max: 100   Resp  Min: 18  Max: 18   SpO2  Min: 90 %  Max: 97 %   No data recorded   Weight  Min: 63.3 kg (139 lb 8.8 oz)  Max: 63.3 kg (139 lb 8.8 oz)                         Body mass index is 27.25 kg/m².  No intake or output data in the 24 hours ending 23 09      Exam:  GEN:  No distress, appears stated age  EYES:   PERRL, anicteric sclerae  ENT:    External ears/nose normal, OP clear  NECK:  No adenopathy, midline trachea  LUNGS: Normal chest on inspection, palpation and  wheezing bilaterally on auscultation  CV:  Normal S1S2, without murmur  ABD:  Nontender, nondistended, no hepatosplenomegaly, +BS  EXT:  No edema.  No cyanosis or clubbing.  No mottling and normal cap refill.    Assessment     Scheduled meds:  amLODIPine, 10 mg, Oral, Daily  aspirin, 81 mg, Oral, Daily  atenolol, 50 mg, Oral, Daily  atorvastatin, 20 mg, Oral, Nightly  budesonide-formoterol, 2 puff, Inhalation, BID - RT  fluticasone, 2 spray, Nasal, Daily  guaiFENesin, 1,200 mg, Oral, Q12H  insulin lispro, 0-7 Units, Subcutaneous, TID With Meals  ipratropium-albuterol, 3 mL, Nebulization, Q6H While Awake - RT  lidocaine, 1 patch, Transdermal, Q24H  losartan, 100 mg, Oral, Daily  pantoprazole, 40 mg, Oral, QAM  PARoxetine, 10 mg, Oral, Daily  predniSONE, 20 mg, Oral, Daily With Breakfast  saccharomyces  boulardii, 250 mg, Oral, BID  sodium chloride, 10 mL, Intravenous, Q12H  thyroid, 15 mg, Oral, Daily  Thyroid, 30 mg, Oral, Daily      IV meds:                         Data Review:  Results from last 7 days   Lab Units 01/13/23  0619 01/08/23  0700 01/07/23  0754   SODIUM mmol/L 137 138 138   POTASSIUM mmol/L 3.7 3.6 3.5   CHLORIDE mmol/L 102 101 102   CO2 mmol/L 27.0 24.7 25.3   BUN mg/dL 23 18 9   CREATININE mg/dL 0.82 0.76 0.70   GLUCOSE mg/dL 100* 144* 151*   CALCIUM mg/dL 8.4* 9.1 9.1         Estimated Creatinine Clearance: 43.9 mL/min (by C-G formula based on SCr of 0.82 mg/dL).  Results from last 7 days   Lab Units 01/09/23  0612 01/07/23  1144 01/07/23  0754 01/06/23  2047   WBC 10*3/mm3 8.94 2.58* 1.88* 4.41   HEMOGLOBIN g/dL 10.9* 11.6* 12.0 12.5   PLATELETS 10*3/mm3 332 324 319 322         Results from last 7 days   Lab Units 01/07/23  0754   ALT (SGPT) U/L 23   AST (SGOT) U/L 30     Results from last 7 days   Lab Units 01/07/23  0346   PH, ARTERIAL pH units 7.413   PO2 ART mm Hg 103.6*   PCO2, ARTERIAL mm Hg 45.1*   HCO3 ART mmol/L 28.8*     Results from last 7 days   Lab Units 01/07/23  0754   PROCALCITONIN ng/mL 0.06         Glucose   Date/Time Value Ref Range Status   01/13/2023 0613 106 70 - 130 mg/dL Final     Comment:     Meter: [id number] : [id number] Cece Pandey NA   01/12/2023 2052 139 (H) 70 - 130 mg/dL Final     Comment:     Meter: [id number] : [id number] Cece Cowanite NA   01/12/2023 1603 164 (H) 70 - 130 mg/dL Final     Comment:     Meter: [id number] : [id number] Jas Renae NA   01/12/2023 1114 127 70 - 130 mg/dL Final     Comment:     Meter: [id number] : [id number] Jas Ramireztany    01/12/2023 0616 99 70 - 130 mg/dL Final     Comment:     Meter: [id number] : [id number] James LIND   01/11/2023 2106 136 (H) 70 - 130 mg/dL Final     Comment:     Meter: [id number] : [id number] James LIND   01/11/2023 1601 171 (H) 70 - 130 mg/dL Final      Comment:     Meter: [id number] : [id number] Handy LIND       Chest x-ray 1/6 reviewed      Microbiology reviewed          Active Hospital Problems    Diagnosis  POA   • **COPD with acute exacerbation (HCC) [J44.1]  Yes   • RSV bronchiolitis [J21.0]  Unknown   • Hypothyroid [E03.9]  Yes   • COPD exacerbation (HCC) [J44.1]  Yes   • Chronic diastolic congestive heart failure (HCC) [I50.32]  Yes   • Benign hypertension [I10]  Yes   • Anxiety [F41.9]  Yes   • Arteriosclerosis of both carotid arteries [I65.23]  Yes   • Fibromyalgia [M79.7]  Yes   • Gastroesophageal reflux disease [K21.9]  Yes      Resolved Hospital Problems   No resolved problems to display.         ASSESSMENT:    1. COPD with acute exacerbation  2. Acute on chronic hypoxemic respiratory failure  3. Acute RSV  4. Granulomatous lung disease  5. Chronic diastolic congestive heart failure  6. Hypothyroidism  7. Hypertension  8. GERD          PLAN:    Encourage pulmonary toilet.  Continue bronchodilators and p.o. steroid with taper.  Resume previous home inhaled therapy at discharge.  On room air.  No objection to discharge from my standpoint.        Javier Nolan MD  Pulmonary and Critical Care Medicine  [facility], [facility]  1/13/2023    09:25 EST

## 2023-01-14 NOTE — PROGRESS NOTES
Exercise Oximetry    Patient Name:Dafne Mary   MRN: [id number]   Date: 01/13/23             ROOM AIR BASELINE   SpO2% 100   Heart Rate 85   Blood Pressure      EXERCISE ON ROOM AIR SpO2% EXERCISE ON O2 @  LPM SpO2%   1 MINUTE 99 1 MINUTE    2 MINUTES 98 2 MINUTES    3 MINUTES 95 3 MINUTES    4 MINUTES 95 4 MINUTES    5 MINUTES 94 5 MINUTES    6 MINUTES 95 6 MINUTES               Distance Walked   Distance Walked   Dyspnea (Alla Scale)   Dyspnea (Alla Scale)   Fatigue (Alla Scale)   Fatigue (Alla Scale)   SpO2% Post Exercise 94 SpO2% Post Exercise   HR Post Exercise  102 HR Post Exercise   Time to Recovery   Time to Recovery     Comments: Walking completed as ordered. Forehead probe used with good wave form during walk. PT was able to hold conversation. PT did state she felt SOA. However, PT sats did not decrease as documented above. PT declined breathing treatment and inhaler upon reurn to room. RN aware. PT did not qualify for or need home O2

## 2023-01-14 NOTE — OUTREACH NOTE
Prep Survey    Flowsheet Row Responses   Religious facility patient discharged from? [place]   Is LACE score < 7 ? No   Eligibility [facility]   Date of Admission 01/06/23   Date of Discharge 01/13/23   Discharge Disposition Home-Health Care Sv   Discharge diagnosis COPD with acute exacerbation, RSV bronchiolitis, chronic CHF, Fibromyalgia   Does the patient have one of the following disease processes/diagnoses(primary or secondary)? COPD   Does the patient have Home health ordered? Yes   What is the Home health agency?  [facility]   Is there a DME ordered? No   Prep survey completed? Yes          CONSUELO GARCIA - Registered Nurse

## 2023-01-14 NOTE — CASE MANAGEMENT/SOCIAL WORK
Case Management Discharge Note      Final Note: home with Anandatenanupam  on 1/13/23    Provided Post Acute Provider List?: Yes  Post Acute Provider List: Home Health    Selected Continued Care - Discharged on 1/13/2023 Admission date: 1/6/2023 - Discharge disposition: Home-Health Care Svc    Destination    No services have been selected for the patient.              Durable Medical Equipment    No services have been selected for the patient.              Dialysis/Infusion    No services have been selected for the patient.              Home Medical Care     Service Provider Selected Services Address Phone Fax Patient Preferred    [facility] [address], [address], [place] [address] [phone] --          Therapy    No services have been selected for the patient.              Community Resources    No services have been selected for the patient.              Community & DME    No services have been selected for the patient.                       Final Discharge Disposition Code: 06 - home with home health care

## 2023-01-16 ENCOUNTER — TELEPHONE (OUTPATIENT)
Dept: FAMILY MEDICINE CLINIC | Facility: CLINIC | Age: 83
End: 2023-01-16

## 2023-01-16 ENCOUNTER — TRANSITIONAL CARE MANAGEMENT TELEPHONE ENCOUNTER (OUTPATIENT)
Dept: CALL CENTER | Facility: HOSPITAL | Age: 83
End: 2023-01-16
Payer: MEDICARE

## 2023-01-16 RX ORDER — ATENOLOL 50 MG/1
TABLET ORAL
Qty: 90 TABLET | Refills: 0 | OUTPATIENT
Start: 2023-01-16

## 2023-01-16 NOTE — TELEPHONE ENCOUNTER
I had listed this was a historical medication prescribed by her Cardiologist.    Is this correct?    Thanks  Beckie

## 2023-01-16 NOTE — OUTREACH NOTE
Call Center TCM Note    Flowsheet Row Responses   [facility] patient discharged from? [place]   Does the patient have one of the following disease processes/diagnoses(primary or secondary)? COPD   TCM attempt successful? Yes   Call start time 0817   Call end time 0819   Discharge diagnosis COPD with acute exacerbation, RSV bronchiolitis, chronic CHF, Fibromyalgia   Meds reviewed with patient/caregiver? Yes   Is the patient having any side effects they believe may be caused by any medication additions or changes? No   Does the patient have all medications ordered at discharge? Yes   Is the patient taking all medications as directed (includes completed medication regime)? Yes   Comments PATIENT STATES SHE HAS TO CALL HER PCP OFFICE ANYWAY, AND PREFERS TO SCHEDULE HER OWN APPOINTMENT   Does the patient have an appointment with their PCP within 7 days of discharge? No   Nursing Interventions Patient declined scheduling/rescheduling appointment at this time   What is the Home health agency?  [facility]    Has home health visited the patient within 72 hours of discharge? Call prior to 72 hours   Pulse Ox monitoring Intermittent   Psychosocial issues? No   Did the patient receive a copy of their discharge instructions? Yes   Nursing interventions Reviewed instructions with patient   What is the patient's perception of their health status since discharge? Improving   Nursing Interventions Nurse provided patient education   If the patient is a current smoker, are they able to teach back resources for cessation? Not a smoker   Is the patient/caregiver able to teach back the hierarchy of who to call/visit for symptoms/problems? PCP, Specialist, Home health nurse, Urgent Care, ED, 911 Yes   Is the patient able to teach back COPD zones? Yes   Nursing interventions Education provided on various zones   Patient reports what zone on this call? Green Zone   Green Zone Reports doing well, Breathing without shortness of  breath, Usual activity and exercise level, Usual amounts of cough and phlegm/mucous, Slept well last night, Appetite is good   Green Zone interventions: Take daily medications   TCM call completed? Yes   Call end time 0819   Would this patient benefit from a Referral to [facility] Social Work? No   Is the patient interested in additional calls from an ambulatory ?  NOTE:  applies to high risk patients requiring additional follow-up. Marlen White LPN    1/16/2023, 08:22 EST

## 2023-01-16 NOTE — TELEPHONE ENCOUNTER
Albuterol and recommendations should be determined by pulmonologist. Atenolol and blood pressure regimen should be determined by cardiology. I advised she needed to get medications for blood pressure from cardiology. They were working on changing and making adjustments to blood pressure medication.

## 2023-01-16 NOTE — TELEPHONE ENCOUNTER
Caller: COTY- TAMMY    Relationship: Home Health    Best call back number: [phone]    Who are you requesting to speak with (clinical staff, provider,  specific staff member): MACO MENDES    What was the call regarding: COTY, [facility] THE [facility] WANTED TO START HOME HEALTH TODAY INSTEAD OF OVER THE WEEKEND.    COTY STATES SHE WANTED TO LET MACO MENDES KNOW.

## 2023-01-16 NOTE — TELEPHONE ENCOUNTER
"HOME HEALTH JUST CALLED:    They want to see if the patient can get some Albuterol for her Nebulizer sent in.  She listened to her lungs and has \"some crackling\"    Beckie   "

## 2023-01-16 NOTE — TELEPHONE ENCOUNTER
Caller: Dafne Mary    Relationship: Self    Best call back number: [phone]    What was the call regarding: PATIENT WOULD LIKE TO KNOW WHY HER atenolol (TENORMIN) 50 MG tablet HAS BEEN REFUSED.    SHE STATED SHE NEEDS THIS FILLED BY TOMORROW.    PLEASE CALL TO ADVISE.    Do you require a callback: YES

## 2023-01-17 ENCOUNTER — OFFICE VISIT (OUTPATIENT)
Dept: FAMILY MEDICINE CLINIC | Facility: CLINIC | Age: 83
End: 2023-01-17
Payer: MEDICARE

## 2023-01-17 ENCOUNTER — TELEPHONE (OUTPATIENT)
Dept: CARDIOLOGY | Facility: CLINIC | Age: 83
End: 2023-01-17

## 2023-01-17 DIAGNOSIS — J43.9 PULMONARY EMPHYSEMA, UNSPECIFIED EMPHYSEMA TYPE: ICD-10-CM

## 2023-01-17 DIAGNOSIS — J96.01 ACUTE RESPIRATORY FAILURE WITH HYPOXIA: Primary | ICD-10-CM

## 2023-01-17 DIAGNOSIS — R06.02 SHORTNESS OF BREATH: ICD-10-CM

## 2023-01-17 DIAGNOSIS — J21.0 RSV BRONCHIOLITIS: ICD-10-CM

## 2023-01-17 DIAGNOSIS — E83.51 HYPOCALCEMIA: ICD-10-CM

## 2023-01-17 DIAGNOSIS — J44.1 COPD WITH ACUTE EXACERBATION: ICD-10-CM

## 2023-01-17 DIAGNOSIS — R71.0 DECREASED HEMOGLOBIN: ICD-10-CM

## 2023-01-17 DIAGNOSIS — H65.01 RIGHT ACUTE SEROUS OTITIS MEDIA, RECURRENCE NOT SPECIFIED: ICD-10-CM

## 2023-01-17 PROCEDURE — 99214 OFFICE O/P EST MOD 30 MIN: CPT | Performed by: PHYSICIAN ASSISTANT

## 2023-01-17 RX ORDER — CEFUROXIME AXETIL 500 MG/1
500 TABLET ORAL 2 TIMES DAILY
COMMUNITY
Start: 2022-12-27 | End: 2023-03-28

## 2023-01-17 RX ORDER — ATENOLOL 50 MG/1
50 TABLET ORAL DAILY
Qty: 90 TABLET | Refills: 3 | Status: SHIPPED | OUTPATIENT
Start: 2023-01-17

## 2023-01-17 RX ORDER — DOXYCYCLINE HYCLATE 100 MG/1
100 CAPSULE ORAL 2 TIMES DAILY
Qty: 20 CAPSULE | Refills: 0 | Status: SHIPPED | OUTPATIENT
Start: 2023-01-17 | End: 2023-03-28

## 2023-01-17 RX ORDER — BUDESONIDE AND FORMOTEROL FUMARATE DIHYDRATE 160; 4.5 UG/1; UG/1
AEROSOL RESPIRATORY (INHALATION)
COMMUNITY
Start: 2022-12-22 | End: 2023-02-20

## 2023-01-17 RX ORDER — ALBUTEROL SULFATE 90 UG/1
2 AEROSOL, METERED RESPIRATORY (INHALATION) EVERY 4 HOURS PRN
COMMUNITY
Start: 2022-12-20

## 2023-01-17 NOTE — PROGRESS NOTES
Subjective   Dafne Mary is a 82 y.o. female who presents today in follow up of hospitalization.     History of Present Illness     Patient was hospitalized 1/6/2023 through 1/13/2023 for COPD with acute exacerbation, RSV bronchiolitis, chronic CHF, hypothyroidism, anxiety, hypertension, arteriosclerosis bilateral carotid arteries, fibromyalgia, GERD, hyperlipidemia    I direct admit patient from the office 1/6/2023 and have reviewed and discussed with patient hospital notes, including H&P, labs, imaging, consult notes, and discharge summary. Per discharge summary, she was found to be RSV + 1/7/2023, initial imaging with cardiomegaly but no infiltrate or effusion but did have bibasilar atelectasis.  Pulmonology consulted and following the patient through her stay-treated with bronchodilators and corticosteroids which were tapered over the course of her stay in addition to guaifenesin and Tessalon Perles.  They reported she had improved dyspnea, coughing, and wheezing at discharge.  She was on room air with oxygen saturation greater than 90%, afebrile, WBC within normal limits and no evidence of superimposed bacterial pneumonia.  Walking oximetry performed prior to discharge and advised to continue prednisone taper 60 mg for 3 days followed by 20 mg for 2 days then 10 mg for 2 days.  Close follow-up with pulmonology within 1 to 2 weeks of discharge.  Ordered  PT and skilled nursing at discharge.  Blood pressure was intermittently uncontrolled with medication adjustment.  She was told to continue atenolol 50 mg losartan 100 mg and changed to atenolol from 2.5 mg to 10 mg.  · Chest x-ray with cardiomegaly, bibasilar atelectasis, advanced degenerative changes right shoulder and right AC.  · Labs-respiratory panel positive for RSV, blood gas with PCO2 45, PO2 103, HCO3 28.8, base excess 3.5, negative procalcitonin, negative proBNP, hemoglobin decreased through hospitalization with hemoglobin 1/9/2022 10.9    Within  48 business hours after discharge, Dafne ANGI Mary was contacted via telephone to coordinate care and needs.   Current outpatient and discharge medications have been reconciled for the patient.    Risk for Readmission (LACE) No data recorded    Pulmonology- does not have appt yet but will make one. Oxygen 95% at home at rest. With movement 88-89% at home. Still did not qualify for oxygen. Dr Nolan saw in the hospital and reports they did not need oxygen at night. Slept with o    Using nasal spray every day. Oximeter in hospital and did not have oxygen on at that time.    Breztri- will need to be approved.      The following portions of the patient's history were reviewed and updated as appropriate: allergies, current medications, past family history, past medical history, past social history, past surgical history and problem list.    Review of Systems    Objective   There were no vitals filed for this visit.  There is no height or weight on file to calculate BMI.    Physical Exam  Vitals and nursing note reviewed.   Constitutional:       General: She is not in acute distress.     Appearance: Normal appearance. She is well-developed.   HENT:      Head: Normocephalic and atraumatic.      Right Ear: Ear canal and external ear normal. A middle ear effusion is present. No hemotympanum. Tympanic membrane is injected. Tympanic membrane is not perforated, erythematous, retracted or bulging.      Left Ear: Tympanic membrane, ear canal and external ear normal.      Nose: Nose normal.      Mouth/Throat:      Pharynx: Uvula midline.   Eyes:      General: Lids are normal.      Conjunctiva/sclera: Conjunctivae normal.   Neck:      Vascular: No carotid bruit.   Cardiovascular:      Rate and Rhythm: Normal rate and regular rhythm.      Heart sounds: Normal heart sounds. No murmur heard.    No friction rub. No gallop.   Pulmonary:      Effort: Pulmonary effort is normal. No respiratory distress.      Breath sounds: Normal breath  sounds. No wheezing, rhonchi or rales.   Musculoskeletal:         General: No deformity.      Cervical back: Neck supple.   Skin:     General: Skin is warm and dry.   Neurological:      Mental Status: She is alert and oriented to person, place, and time.      Gait: Gait normal.   Psychiatric:         Speech: Speech normal.         Behavior: Behavior normal.         Thought Content: Thought content normal.         Judgment: Judgment normal.         Assessment & Plan   Diagnoses and all orders for this visit:    1. Acute respiratory failure with hypoxia (HCC) (Primary)  -     CBC & Differential    2. COPD with acute exacerbation (HCC)  -     CBC & Differential    3. Pulmonary emphysema, unspecified emphysema type (HCC)  -     CBC & Differential    4. RSV bronchiolitis  -     CBC & Differential    5. Shortness of breath  -     CBC & Differential    6. Decreased hemoglobin  -     CBC & Differential  -     Iron and TIBC  -     Ferritin  -     Vitamin B12 & Folate    7. Hypocalcemia  -     Comprehensive Metabolic Panel    8. Right acute serous otitis media, recurrence not specified  -     doxycycline (VIBRAMYCIN) 100 MG capsule; Take 1 capsule by mouth 2 (Two) Times a Day.  Dispense: 20 capsule; Refill: 0        Assessment and Plan  Patient was seen here with worsening respiratory symptoms 1/6/2023 and was direct admit to the hospital where she remained until 1/13/2023 for COPD exacerbation with RSV bronchiolitis.  She was treated symptomatically with prednisone and Tessalon as well as bronchodilators.  She did have some decrease in hemoglobin and hypocalcemia while in the hospital.  I will recheck these today.  Patient continues with symptoms and reports most bothersome is ear congestion.  She has some fluid in effusion on the right.  I discussed that this is likely viral otitis and should resolve spontaneously.  She should use Flonase or Nasacort 2 sprays each nostril once daily, Mucinex DM twice daily, and I will cover  chest and ears with doxycycline 100 mg twice daily for 10 days.  Patient does need to follow with pulmonology, she was to be seen in 1 to 2 weeks there.  I did discuss with them that she does need DuoNeb or any respiratory medications prescribed by her pulmonologist, especially with frequent exacerbation requiring 2 intubations within the last year.  Also, they are inquiring about her blood pressure medication-atenolol.  Despite note in the hospital for atenolol, losartan, and amlodipine, she was to be taking carvedilol twice daily and losartan per cardiology note 12/28/2022.  I discussed with him that since she has had symptoms with blood pressure medications and cardiology has seen her and is adjusting medications, prescriptions and medication regimen should be maintained by cardiology.  When I discussed the medications that she should be taking per cardiology note, she stated her blood pressure remained elevated and she change the blood pressure medication and that it was better.  She did endorse dizziness with amlodipine multiple times in the past and had dizziness with restarting it previously.  Also, she has frequent COPD exacerbations and shortness of air and is on beta-blocker.  Medication regimen and prescriptions should be maintained through cardiology.  I advised that if she is having elevated readings, she needs to discuss with cardiology prior to adjusting her medications at home without direction.  Patient and daughter understand to contact cardiology regarding blood pressures and her medications.  To be seen ASAP here, ER if worsening, new or changing symptoms, especially respiratory symptoms    I spent 35 minutes caring for Dafne Mary on this date of service. This time includes time spent by me in the following activities as necessary: preparing for the visit, reviewing tests, specialists records and previous visits, obtaining and/or reviewing a separately obtained history, performing a medically  appropriate exam and/or evaluation, counseling and educating the patient, family, caregiver, referring and/or communicating with other healthcare professionals, documenting information in the medical record, independently interpreting results and communicating that information with the patient, family, caregiver, and developing a medically appropriate treatment plan with consideration of other conditions, medications, and treatments.

## 2023-01-18 ENCOUNTER — TELEPHONE (OUTPATIENT)
Dept: FAMILY MEDICINE CLINIC | Facility: CLINIC | Age: 83
End: 2023-01-18
Payer: MEDICARE

## 2023-01-18 LAB
ALBUMIN SERPL-MCNC: 4.2 G/DL (ref 3.6–4.6)
ALBUMIN/GLOB SERPL: 2 {RATIO} (ref 1.2–2.2)
ALP SERPL-CCNC: 111 IU/L (ref 44–121)
ALT SERPL-CCNC: 26 IU/L (ref 0–32)
AST SERPL-CCNC: 26 IU/L (ref 0–40)
BASOPHILS # BLD AUTO: 0.1 X10E3/UL (ref 0–0.2)
BASOPHILS NFR BLD AUTO: 0 %
BILIRUB SERPL-MCNC: 0.8 MG/DL (ref 0–1.2)
BUN SERPL-MCNC: 18 MG/DL (ref 8–27)
BUN/CREAT SERPL: 20 (ref 12–28)
CALCIUM SERPL-MCNC: 9 MG/DL (ref 8.7–10.3)
CHLORIDE SERPL-SCNC: 98 MMOL/L (ref 96–106)
CO2 SERPL-SCNC: 20 MMOL/L (ref 20–29)
CREAT SERPL-MCNC: 0.9 MG/DL (ref 0.57–1)
EGFRCR SERPLBLD CKD-EPI 2021: 64 ML/MIN/1.73
EOSINOPHIL # BLD AUTO: 0 X10E3/UL (ref 0–0.4)
EOSINOPHIL NFR BLD AUTO: 0 %
ERYTHROCYTE [DISTWIDTH] IN BLOOD BY AUTOMATED COUNT: 13.2 % (ref 11.7–15.4)
FERRITIN SERPL-MCNC: 202 NG/ML (ref 15–150)
FOLATE SERPL-MCNC: 9.2 NG/ML
GLOBULIN SER CALC-MCNC: 2.1 G/DL (ref 1.5–4.5)
GLUCOSE SERPL-MCNC: 103 MG/DL (ref 70–99)
HCT VFR BLD AUTO: 38.4 % (ref 34–46.6)
HGB BLD-MCNC: 13 G/DL (ref 11.1–15.9)
IMM GRANULOCYTES # BLD AUTO: 0.5 X10E3/UL (ref 0–0.1)
IMM GRANULOCYTES NFR BLD AUTO: 2 %
IRON SATN MFR SERPL: 8 % (ref 15–55)
IRON SERPL-MCNC: 24 UG/DL (ref 27–139)
LYMPHOCYTES # BLD AUTO: 0.9 X10E3/UL (ref 0.7–3.1)
LYMPHOCYTES NFR BLD AUTO: 3 %
MCH RBC QN AUTO: 29.7 PG (ref 26.6–33)
MCHC RBC AUTO-ENTMCNC: 33.9 G/DL (ref 31.5–35.7)
MCV RBC AUTO: 88 FL (ref 79–97)
MONOCYTES # BLD AUTO: 1.4 X10E3/UL (ref 0.1–0.9)
MONOCYTES NFR BLD AUTO: 5 %
NEUTROPHILS # BLD AUTO: 26.7 X10E3/UL (ref 1.4–7)
NEUTROPHILS NFR BLD AUTO: 90 %
PLATELET # BLD AUTO: 358 X10E3/UL (ref 150–450)
POTASSIUM SERPL-SCNC: 4.3 MMOL/L (ref 3.5–5.2)
PROT SERPL-MCNC: 6.3 G/DL (ref 6–8.5)
RBC # BLD AUTO: 4.37 X10E6/UL (ref 3.77–5.28)
SODIUM SERPL-SCNC: 139 MMOL/L (ref 134–144)
TIBC SERPL-MCNC: 310 UG/DL (ref 250–450)
UIBC SERPL-MCNC: 286 UG/DL (ref 118–369)
VIT B12 SERPL-MCNC: 1074 PG/ML (ref 232–1245)
WBC # BLD AUTO: 29.5 X10E3/UL (ref 3.4–10.8)

## 2023-01-18 NOTE — TELEPHONE ENCOUNTER
Caller: SALVADOR    Relationship: Home Health    Best call back number: [phone]    What orders are you requesting (i.e. lab or imaging): VERBAL ORDERS    In what timeframe would the patient need to come in: ASAP    Where will you receive your lab/imaging services: IN HOME     Additional notes: SALVADOR FROM [facility] IS NEEDING VERBAL ORDERS FOR PHYSICAL THERAPY FOR PATIENT 1 TO 2 TIMES A WEEK FOR 4 WEEKS.     PLEASE CALL TO ADVISE.

## 2023-01-19 DIAGNOSIS — E61.1 IRON DEFICIENCY: Primary | ICD-10-CM

## 2023-01-19 DIAGNOSIS — D72.829 LEUKOCYTOSIS, UNSPECIFIED TYPE: Primary | ICD-10-CM

## 2023-01-20 ENCOUNTER — TELEPHONE (OUTPATIENT)
Dept: FAMILY MEDICINE CLINIC | Facility: CLINIC | Age: 83
End: 2023-01-20
Payer: MEDICARE

## 2023-01-20 LAB
BASOPHILS # BLD AUTO: 0 X10E3/UL (ref 0–0.2)
BASOPHILS NFR BLD AUTO: 0 %
EOSINOPHIL # BLD AUTO: 0.1 X10E3/UL (ref 0–0.4)
EOSINOPHIL NFR BLD AUTO: 1 %
ERYTHROCYTE [DISTWIDTH] IN BLOOD BY AUTOMATED COUNT: 13.5 % (ref 11.7–15.4)
HCT VFR BLD AUTO: 41.8 % (ref 34–46.6)
HGB BLD-MCNC: 13.4 G/DL (ref 11.1–15.9)
IMM GRANULOCYTES NFR BLD AUTO: 2 %
LYMPHOCYTES # BLD AUTO: 2.1 X10E3/UL (ref 0.7–3.1)
LYMPHOCYTES NFR BLD AUTO: 18 %
MCH RBC QN AUTO: 29.2 PG (ref 26.6–33)
MCHC RBC AUTO-ENTMCNC: 32.1 G/DL (ref 31.5–35.7)
MCV RBC AUTO: 91 FL (ref 79–97)
MONOCYTES # BLD AUTO: 1 X10E3/UL (ref 0.1–0.9)
MONOCYTES NFR BLD AUTO: 8 %
NEUTROPHILS # BLD AUTO: 8.4 X10E3/UL (ref 1.4–7)
NEUTROPHILS NFR BLD AUTO: 71 %
PLATELET # BLD AUTO: 308 X10E3/UL (ref 150–450)
RBC # BLD AUTO: 4.59 X10E6/UL (ref 3.77–5.28)
WBC # BLD AUTO: 11.9 X10E3/UL (ref 3.4–10.8)

## 2023-01-20 NOTE — TELEPHONE ENCOUNTER
CALLED PT TO INFORM HER HER STAT LABS FROM TODAY HAVE IMPROVED.    SHE VERBALIZED HER UNDERSTANDING

## 2023-01-20 NOTE — TELEPHONE ENCOUNTER
PT CALLED TO CANCEL HER STAT LAB APPT.   I DID ADVISE PT IT IS VERY IMPORTANT PER PROVIDER THAT SHE COMES IN THIS MORNING TO GET HER LABS DONE.    SHE STATED THAT SHE WILL TRY TO GET IN HERE THIS AM

## 2023-01-20 NOTE — TELEPHONE ENCOUNTER
DEWEY CALLED WITH PT ON THE PHONE AND WHEN SHE SWITCHED PT OVER TO US PT HAD HUNG UP THE PHONE.     PT WAS CALLING TO RESCHEDULE HER LABS THAT [facility] HAS TOLD HER THAT IS A LIFE THREATING  ISSUE AND SHE NEEDS TO GO TO THE ER IF SHE DOES NOT COME IN THIS AM FOR THE LABS

## 2023-01-21 NOTE — PROGRESS NOTES
Enter Query Response Below      Query Response:       unable to clinically determine        If applicable, please update the problem list.        Patient: Dafne Mary        : 1940  Account: [id number]           Admit Date: 2023        How to Respond to this query:       a. Click New Note     b. Answer query within the yellow box.                c. Update the Problem List, if applicable.      If you have any questions about this query contact me at:  [email or web address]    Dr. Nolan,     Patient admitted with COPD exacerbation, RSV infection, acute on chronic shortness of breath.  Oxygen saturations during admission on 1-2L 91-99%, on room air 90-96%.  Documentation of shortness of breath but no distress.  Was able to discharge home without oxygen.     Acute on chronic hypoxemic respiratory failure documented in pulmonary consult notes.      After study, is acute on chronic hypoxemic respiratory failure clinically supported this admission:  -yes, please provide further clinical support and treatment___________________  -no  -other_____________  -unable to clinically determine     By submitting this query, we are merely seeking further clarification of documentation to accurately reflect all conditions that you are monitoring, evaluating, treating or that extend the hospitalization or utilize additional resources of care. Please utilize your independent clinical judgment when addressing the question(s) above.     This query and your response, once completed, will be entered into the legal medical record.    Sincerely,  Hillary Coelho RN BSN  Clinical Documentation Integrity Program

## 2023-01-23 DIAGNOSIS — F41.1 GENERALIZED ANXIETY DISORDER: ICD-10-CM

## 2023-01-24 ENCOUNTER — READMISSION MANAGEMENT (OUTPATIENT)
Dept: CALL CENTER | Facility: HOSPITAL | Age: 83
End: 2023-01-24
Payer: MEDICARE

## 2023-01-24 RX ORDER — PAROXETINE 10 MG/1
TABLET, FILM COATED ORAL
Qty: 90 TABLET | Refills: 0 | Status: SHIPPED | OUTPATIENT
Start: 2023-01-24 | End: 2023-01-30 | Stop reason: SDUPTHER

## 2023-01-24 NOTE — OUTREACH NOTE
COPD/PN Week 2 Survey    Flowsheet Row Responses   [facility] patient discharged from? [place]   Does the patient have one of the following disease processes/diagnoses(primary or secondary)? COPD   Week 2 attempt successful? Yes   Call start time 1446   Call end time 1448   Discharge diagnosis COPD with acute exacerbation, RSV bronchiolitis, chronic CHF, Fibromyalgia   Person spoke with today (if not patient) and relationship patient   Meds reviewed with patient/caregiver? Yes   Is the patient having any side effects they believe may be caused by any medication additions or changes? No   Does the patient have all medications ordered at discharge? Yes   Is the patient taking all medications as directed (includes completed medication regime)? Yes   Does the patient have a primary care provider?  Yes   Does the patient have an appointment with their PCP or specialist within 7 days of discharge? Yes   Has the patient kept scheduled appointments due by today? Yes   What is the Home health agency?  [facility]    Has home health visited the patient within 72 hours of discharge? Call prior to 72 hours   Pulse Ox monitoring Intermittent   Pulse Ox device source Patient   O2 Sat: education provided Sat levels, Monitoring frequency, When to seek care   O2 Sat education comments If 90% or lower and stays there, call 911.   Psychosocial issues? No   Did the patient receive a copy of their discharge instructions? Yes   Nursing interventions Reviewed instructions with patient   What is the patient's perception of their health status since discharge? Improving   Nursing Interventions Nurse provided patient education   Are the patient's immunizations up to date?  Yes   Nursing interventions Educated on importance of maintaining up to date immunizations as advised by provider   If the patient is a current smoker, are they able to teach back resources for cessation? Not a smoker   Is the patient/caregiver able to teach  back the hierarchy of who to call/visit for symptoms/problems? PCP, Specialist, Home health nurse, Urgent Care, ED, 911 Yes   Is the patient able to teach back COPD zones? Yes   Nursing interventions Education provided on various zones   Patient reports what zone on this call? Green Zone   Green Zone Reports doing well, Breathing without shortness of breath, Usual activity and exercise level, Usual amounts of cough and phlegm/mucous, Slept well last night, Appetite is good   Green Zone interventions: Take daily medications   Week 2 call completed? Yes   Is the patient interested in additional calls from an ambulatory ?  NOTE:  applies to high risk patients requiring additional follow-up. No   Wrap up additional comments Doing very well.           CHRIS SORIA - Registered Nurse

## 2023-01-26 ENCOUNTER — TELEPHONE (OUTPATIENT)
Dept: FAMILY MEDICINE CLINIC | Facility: CLINIC | Age: 83
End: 2023-01-26
Payer: MEDICARE

## 2023-01-30 ENCOUNTER — OFFICE VISIT (OUTPATIENT)
Dept: FAMILY MEDICINE CLINIC | Facility: CLINIC | Age: 83
End: 2023-01-30
Payer: MEDICARE

## 2023-01-30 VITALS
HEART RATE: 84 BPM | OXYGEN SATURATION: 95 % | DIASTOLIC BLOOD PRESSURE: 60 MMHG | TEMPERATURE: 97.3 F | SYSTOLIC BLOOD PRESSURE: 108 MMHG | RESPIRATION RATE: 16 BRPM

## 2023-01-30 DIAGNOSIS — H91.91 DECREASED HEARING OF RIGHT EAR: ICD-10-CM

## 2023-01-30 DIAGNOSIS — F41.1 GENERALIZED ANXIETY DISORDER: ICD-10-CM

## 2023-01-30 DIAGNOSIS — H69.81 DYSFUNCTION OF RIGHT EUSTACHIAN TUBE: Primary | ICD-10-CM

## 2023-01-30 PROCEDURE — 99213 OFFICE O/P EST LOW 20 MIN: CPT | Performed by: PHYSICIAN ASSISTANT

## 2023-01-30 RX ORDER — IPRATROPIUM BROMIDE AND ALBUTEROL SULFATE 2.5; .5 MG/3ML; MG/3ML
SOLUTION RESPIRATORY (INHALATION)
COMMUNITY
Start: 2023-01-18

## 2023-01-30 RX ORDER — PAROXETINE 10 MG/1
20 TABLET, FILM COATED ORAL DAILY
Qty: 180 TABLET | Refills: 0 | Status: SHIPPED | OUTPATIENT
Start: 2023-01-30

## 2023-02-03 ENCOUNTER — TELEPHONE (OUTPATIENT)
Dept: FAMILY MEDICINE CLINIC | Facility: CLINIC | Age: 83
End: 2023-02-03

## 2023-02-03 NOTE — TELEPHONE ENCOUNTER
Caller: OTHER    Relationship: Other    Best call back number: [phone]  What is the best time to reach you:ANYTIME 830 AM   PM     Who are you requesting to speak with (clinical staff, provider,  specific staff member):CLINICAL STAFF  Do you know the name of the person who called: JUAN FRANCISCO   What was the call regarding:[facility] CALLED AND STATED PATIENT WILL BE DISCHARGED ON 2/7/23 DUE TO PATIENT IS DOING WELL.   Do you require a callback: NO

## 2023-02-09 ENCOUNTER — OFFICE VISIT (OUTPATIENT)
Dept: CARDIOLOGY | Facility: CLINIC | Age: 83
End: 2023-02-09
Payer: MEDICARE

## 2023-02-09 VITALS
HEART RATE: 79 BPM | WEIGHT: 135.2 LBS | DIASTOLIC BLOOD PRESSURE: 80 MMHG | OXYGEN SATURATION: 97 % | BODY MASS INDEX: 26.55 KG/M2 | HEIGHT: 60 IN | SYSTOLIC BLOOD PRESSURE: 110 MMHG

## 2023-02-09 DIAGNOSIS — I10 BENIGN HYPERTENSION: ICD-10-CM

## 2023-02-09 DIAGNOSIS — E78.2 MIXED HYPERLIPIDEMIA: Primary | ICD-10-CM

## 2023-02-09 DIAGNOSIS — J44.9 CHRONIC OBSTRUCTIVE PULMONARY DISEASE, UNSPECIFIED COPD TYPE: ICD-10-CM

## 2023-02-09 PROBLEM — I50.32 CHRONIC DIASTOLIC CONGESTIVE HEART FAILURE (HCC): Status: RESOLVED | Noted: 2022-11-17 | Resolved: 2023-02-09

## 2023-02-09 PROCEDURE — 93000 ELECTROCARDIOGRAM COMPLETE: CPT | Performed by: INTERNAL MEDICINE

## 2023-02-09 PROCEDURE — 99214 OFFICE O/P EST MOD 30 MIN: CPT | Performed by: INTERNAL MEDICINE

## 2023-02-09 RX ORDER — ATORVASTATIN CALCIUM 20 MG/1
20 TABLET, FILM COATED ORAL NIGHTLY
Qty: 90 TABLET | Refills: 1 | Status: SHIPPED | OUTPATIENT
Start: 2023-02-09

## 2023-02-09 NOTE — PROGRESS NOTES
Subjective:     Encounter Date:02/09/2023      Patient ID: Dafne Mary is a 82 y.o. female.    Chief Complaint:  History of Present Illness    This is a 82-year-old with moderate to severe COPD, emphysema, granulomatous lung disease, depression/anxiety, hypertension, hypothyroidism, chronic diastolic congestive heart failure, who presents for follow up.       Patient presents today for routine follow-up.  In 11/2022 she complained of positional lightheadedness.  She was noted to have orthostatic hypotension when this was checked in [facility]'s office.  Recommended that she decreasing her amlodipine dosage.  Furosemide was also discontinued.  She continued to have issues with lightheadedness so I had her discontinue the amlodipine completely.  She then developed issues with elevated blood pressure so I switch atenolol to carvedilol.  When she saw ANTONELLA Novak in follow-up in 12/2022 blood pressures appear to be doing well and her dizziness had improved.  She still had some occasional low blood pressure readings at that time.    The patient was recently admitted in 1/2022 with COPD exacerbation and RSV infection.  Prior to that admission the patient reports that she was having issues with elevated blood pressure so she decided to stop the carvedilol on her own and go back to taking both the amlodipine and atenolol.  She is continue this regimen following her discharge from the hospital.  Fortunately she has not had any further issues with lightheadedness.  She feels like the furosemide was responsible for this or possibly some inner ear issues.    Since her hospitalization she feels like her breathing is back to its baseline.  She denies any chest pain, palpitations, orthopnea, near-syncope or syncope, or lower extremity edema.     Prior History:  The patient was previously followed by Dr. Hanson.  She initially saw him in 5/2021 with complaints of palpitations and an abnormal EKG.  She  reported stable dyspnea on exertion at the time.  She reportedly had a stress test in 2015 that was normal.  He recommended proceeding with an echocardiogram and a Holter monitor at that time.  These were performed in 6/2021.  Her echocardiogram showed normal left ventricular systolic function wall motion with an EF of 53%, grade 2 diastolic dysfunction, hypokinesis of the mid inferior, basal inferoseptal, mid inferoseptal, basal inferior and basal inferoseptal walls, and no significant valvular disease within normal right ventricular systolic pressure.  Holter monitor was benign.  It was felt that the septal wall motion abnormalities were due to interventricular conduction delay.     She was not seen back in the office until 10/2021 when she was seen by Dr. Hanson in the cardiac evaluation center.  She reported an increase in dyspnea on exertion and onset of chest pressure.  A D-dimer was performed that was elevated so a CT angiogram of the chest was performed that was negative for pulmonary embolism.  She returned for a perfusion stress test on 11/5/2021 which showed no evidence of ischemia.     She was seen last by Dr. Hanson in 11/2021 for routine follow-up.  No changes were made to her management and was felt that she could be seen as needed.       She was hospitalized in 5/2021 with acute hypercapnic and hypoxic respiratory failure.  She ended up requiring intubation on admission.  Her work-up was significant for a mildly elevated BNP.  Chest imaging showed evidence of pleural effusions and evidence of pulmonary edema.  In addition she was felt to have an excuse exacerbation of her COPD along with acute exacerbation of chronic bronchitis.  She was also positive for COVID-19 and rhinovirus.  She was diuresed and along with supportive treatment of her infections.   During that admission she underwent a repeat echocardiogram on 5/4/2022 which showed normal left ventricular systolic function with an EF of 52%,  normal diastolic function, aortic valve calcification but no evidence of significant stenosis, mild mitral and tricuspid regurgitation, normal right ventricular systolic pressure, and again evidence of mid inferior, basal inferoseptal, mid inferoseptal, basal inferior, and basal inferoseptal hypokinesis.  Of note her heart rates were elevated around 130 bpm during that echocardiogram.     Following her discharge she continued to have issues with dyspnea on exertion which was progressing.  There was concerned that her CHF has not been adequately addressed so she was referred here for further evaluation by KAJ[facility] Wood.  I recommended proceeding with a repeat echocardiogram which was performed on 7/25/2022 and showed normal left ventricular systolic function wall motion with an EF of 53%, basal inferoseptal and inferior wall hypokinesis, mildly dilated left atrium, mild to moderate mitral valve regurgitation, moderately elevated right ventricular systolic pressure of 45 mmHg.  Reviewed the images with Dr. Hanson and we felt that the patient actually had at least grade 2 diastolic dysfunction.  Based on that finding along with evidence of moderate pulmonary hypertension I recommended starting furosemide 20 mg daily.    Review of Systems   Constitutional: Negative for malaise/fatigue.   HENT: Negative for hearing loss, hoarse voice, nosebleeds and sore throat.    Eyes: Negative for pain.   Cardiovascular: Positive for dyspnea on exertion. Negative for chest pain, claudication, cyanosis, irregular heartbeat, leg swelling, near-syncope, orthopnea, palpitations, paroxysmal nocturnal dyspnea and syncope.   Respiratory: Negative for shortness of breath and snoring.    Endocrine: Negative for cold intolerance, heat intolerance, polydipsia, polyphagia and polyuria.   Skin: Negative for itching and rash.   Musculoskeletal: Negative for arthritis, falls, joint pain, joint swelling, muscle cramps, muscle weakness and  myalgias.   Gastrointestinal: Negative for constipation, diarrhea, dysphagia, heartburn, hematemesis, hematochezia, melena, nausea and vomiting.   Genitourinary: Negative for frequency, hematuria and hesitancy.   Neurological: Negative for excessive daytime sleepiness, dizziness, headaches, light-headedness, numbness and weakness.   Psychiatric/Behavioral: Negative for depression. The patient is not nervous/anxious.          Current Outpatient Medications:   •  albuterol (PROVENTIL) (5 MG/ML) 0.5% nebulizer solution, 2.5 mg., Disp: , Rfl:   •  amLODIPine (NORVASC) 10 MG tablet, Take 1 tablet by mouth Daily., Disp: 30 tablet, Rfl: 2  •  Makanda Thyroid 15 MG tablet, Take 1 tablet by mouth once daily, Disp: 90 tablet, Rfl: 0  •  Makanda Thyroid 30 MG tablet, Take 1 tablet by mouth once daily, Disp: 90 tablet, Rfl: 0  •  aspirin 81 MG chewable tablet, Chew 81 mg Daily., Disp: , Rfl:   •  atenolol (TENORMIN) 50 MG tablet, Take 1 tablet by mouth Daily., Disp: 90 tablet, Rfl: 3  •  atorvastatin (LIPITOR) 20 MG tablet, Take 1 tablet by mouth Every Night., Disp: 90 tablet, Rfl: 1  •  benzonatate (TESSALON) 200 MG capsule, Take 1 capsule by mouth 3 (Three) Times a Day As Needed for Cough., Disp: 30 capsule, Rfl: 1  •  Breztri Aerosphere 160-9-4.8 MCG/ACT aerosol inhaler, 2 puffs 2 (Two) Times a Day., Disp: , Rfl:   •  budesonide-formoterol (SYMBICORT) 160-4.5 MCG/ACT inhaler,  2 Puff, Inhalation, Inh, BID, # 6 Gram, 1 Refill(s), Disp: , Rfl:   •  cefuroxime (CEFTIN) 500 MG tablet, Take 500 mg by mouth 2 (Two) Times a Day. for 7 days, Disp: , Rfl:   •  doxycycline (VIBRAMYCIN) 100 MG capsule, Take 1 capsule by mouth 2 (Two) Times a Day., Disp: 20 capsule, Rfl: 0  •  fluticasone (FLONASE) 50 MCG/ACT nasal spray, 2 sprays into the nostril(s) as directed by provider Daily., Disp: , Rfl:   •  guaiFENesin (MUCINEX) 600 MG 12 hr tablet, Take 2 tablets by mouth Every 12 (Twelve) Hours., Disp: 60 tablet, Rfl: 0  •   ipratropium-albuterol (DUO-NEB) 0.5-2.5 mg/3 ml nebulizer, USE 1 AMPULE IN NEBULIZER EVERY 4 HOURS AS NEEDED, Disp: , Rfl:   •  lidocaine (LIDODERM) 5 %, Place 1 patch on the skin as directed by provider Daily. Remove & Discard patch within 12 hours or as directed by MD, Disp: 15 each, Rfl: 0  •  losartan (COZAAR) 100 MG tablet, Take 1 tablet by mouth once daily, Disp: 90 tablet, Rfl: 0  •  omeprazole (priLOSEC) 20 MG capsule, Take 20 mg by mouth Daily., Disp: , Rfl:   •  PARoxetine (PAXIL) 10 MG tablet, Take 2 tablets by mouth Daily., Disp: 180 tablet, Rfl: 0  •  saccharomyces boulardii (Florastor) 250 MG capsule, Take 1 capsule by mouth 2 (Two) Times a Day., Disp: 60 capsule, Rfl: 0  •  tiotropium (SPIRIVA) 18 MCG per inhalation capsule, 18 mcg., Disp: , Rfl:   •  Ventolin  (90 Base) MCG/ACT inhaler, Inhale 2 puffs Every 4 (Four) Hours As Needed., Disp: , Rfl:   •  vitamin D3 125 MCG (5000 UT) capsule capsule, Take 5,000 Units by mouth Daily., Disp: , Rfl:     Past Medical History:   Diagnosis Date   • Chronic diastolic congestive heart failure (HCC) 11/17/2022   • Depression    • Emphysema lung (HCC)    • GERD (gastroesophageal reflux disease)    • Heart failure (HCC)    • Hyperlipidemia    • Hypertension    • Hypothyroidism        Past Surgical History:   Procedure Laterality Date   • EYE SURGERY Left    • PARATHYROIDECTOMY      Patient reports thyroidectomy partial not a para thyroidectomy.   • THYROIDECTOMY, PARTIAL      1984       Family History   Problem Relation Age of Onset   • Alzheimer's disease Mother    • Lung disease Father    • Alcohol abuse Father    • Heart disease Brother    • Hypertension Brother    • Lung disease Brother    • Alcohol abuse Brother    • Cancer Brother         LUNG  WAS A SMOKER   • Thyroid disease Maternal Grandmother        Social History     Tobacco Use   • Smoking status: Never     Passive exposure: Never   • Smokeless tobacco: Never   Vaping Use   • Vaping Use: Never  "used   Substance Use Topics   • Alcohol use: Yes     Comment: \"occ\"; caffeine use- tea   • Drug use: No         ECG 12 Lead    Date/Time: 2/9/2023 4:28 PM  Performed by: Nae Norman MD  Authorized by: Nae Norman MD   Comparison: compared with previous ECG   Comparison to previous ECG: LBBB has resolved  Rhythm: sinus rhythm  Comments: Non-specific ST-T changes               Objective:     Visit Vitals  /80 (BP Location: Right arm, Patient Position: Sitting, Cuff Size: Adult)   Pulse 79   Ht 152.4 cm (60\")   Wt 61.3 kg (135 lb 3.2 oz)   SpO2 97%   BMI 26.40 kg/m²         Constitutional:       Appearance: Normal appearance. Well-developed.   Eyes:      General: Lids are normal.      Conjunctiva/sclera: Conjunctivae normal.      Pupils: Pupils are equal, round, and reactive to light.   HENT:      Head: Normocephalic and atraumatic.   Neck:      Vascular: No carotid bruit or JVD.      Lymphadenopathy: No cervical adenopathy.   Pulmonary:      Effort: Pulmonary effort is normal.      Breath sounds: Normal breath sounds.   Cardiovascular:      Normal rate. Regular rhythm.      No gallop.   Pulses:     Radial: 2+ bilaterally.  Edema:     Peripheral edema absent.   Abdominal:      Palpations: Abdomen is soft.   Musculoskeletal:      Cervical back: Full passive range of motion without pain, normal range of motion and neck supple. Skin:     General: Skin is warm and dry.   Neurological:      Mental Status: Alert and oriented to person, place, and time.             Assessment:          Diagnosis Plan   1. Mixed hyperlipidemia  atorvastatin (LIPITOR) 20 MG tablet      2. Benign hypertension        3. Chronic obstructive pulmonary disease, unspecified COPD type (HCC)               Plan:       1.  History of diastolic congestive heart failure.  No evidence of volume overload at this time despite being off of furosemide.  2.  Hypertension.  Well-controlled on her current regimen medications.  Continue the " same.  3.  Hyperlipidemia.  On atorvastatin for goal LDL of 70 or below but due to known carotid artery plaque.  We will go ahead and refill her atorvastatin.  4.  COPD.  5.  Hypothyroidism.    We will plan on seeing the patient back again in 6 months.

## 2023-03-07 DIAGNOSIS — I65.23 ARTERIOSCLEROSIS OF BOTH CAROTID ARTERIES: ICD-10-CM

## 2023-03-07 DIAGNOSIS — D72.829 LEUKOCYTOSIS, UNSPECIFIED TYPE: ICD-10-CM

## 2023-03-07 DIAGNOSIS — E55.9 VITAMIN D DEFICIENCY: ICD-10-CM

## 2023-03-07 DIAGNOSIS — N28.9 ABNORMAL RENAL FUNCTION: ICD-10-CM

## 2023-03-07 DIAGNOSIS — E53.8 B12 DEFICIENCY: ICD-10-CM

## 2023-03-07 DIAGNOSIS — E03.9 ACQUIRED HYPOTHYROIDISM: ICD-10-CM

## 2023-03-07 DIAGNOSIS — E61.1 IRON DEFICIENCY: ICD-10-CM

## 2023-03-07 DIAGNOSIS — E83.51 HYPOCALCEMIA: ICD-10-CM

## 2023-03-07 DIAGNOSIS — R74.8 ELEVATED ALKALINE PHOSPHATASE LEVEL: ICD-10-CM

## 2023-03-07 DIAGNOSIS — I10 ESSENTIAL HYPERTENSION: Primary | ICD-10-CM

## 2023-03-07 DIAGNOSIS — I50.32 CHRONIC DIASTOLIC CONGESTIVE HEART FAILURE: ICD-10-CM

## 2023-03-07 DIAGNOSIS — R71.0 DECREASED HEMOGLOBIN: ICD-10-CM

## 2023-03-07 DIAGNOSIS — E78.2 MIXED HYPERLIPIDEMIA: ICD-10-CM

## 2023-03-10 LAB
25(OH)D3+25(OH)D2 SERPL-MCNC: 37.3 NG/ML (ref 30–100)
ALBUMIN SERPL-MCNC: 4.4 G/DL (ref 3.5–5.2)
ALBUMIN/GLOB SERPL: 2 G/DL
ALP SERPL-CCNC: 119 U/L (ref 39–117)
ALT SERPL-CCNC: 12 U/L (ref 1–33)
APPEARANCE UR: CLEAR
AST SERPL-CCNC: 18 U/L (ref 1–32)
BACTERIA #/AREA URNS HPF: ABNORMAL /HPF
BACTERIA UR CULT: ABNORMAL
BACTERIA UR CULT: ABNORMAL
BASOPHILS # BLD AUTO: 0.11 10*3/MM3 (ref 0–0.2)
BASOPHILS NFR BLD AUTO: 1.9 % (ref 0–1.5)
BILIRUB SERPL-MCNC: 0.5 MG/DL (ref 0–1.2)
BILIRUB UR QL STRIP: NEGATIVE
BUN SERPL-MCNC: 16 MG/DL (ref 8–23)
BUN/CREAT SERPL: 16.7 (ref 7–25)
CALCIUM SERPL-MCNC: 9.6 MG/DL (ref 8.6–10.5)
CASTS URNS QL MICRO: ABNORMAL /LPF
CHLORIDE SERPL-SCNC: 105 MMOL/L (ref 98–107)
CHOLEST SERPL-MCNC: 166 MG/DL (ref 0–200)
CK SERPL-CCNC: 52 U/L (ref 20–180)
CO2 SERPL-SCNC: 28.4 MMOL/L (ref 22–29)
COLOR UR: YELLOW
CREAT SERPL-MCNC: 0.96 MG/DL (ref 0.57–1)
EGFRCR SERPLBLD CKD-EPI 2021: 59.2 ML/MIN/1.73
EOSINOPHIL # BLD AUTO: 0.35 10*3/MM3 (ref 0–0.4)
EOSINOPHIL NFR BLD AUTO: 6 % (ref 0.3–6.2)
EPI CELLS #/AREA URNS HPF: ABNORMAL /HPF (ref 0–10)
ERYTHROCYTE [DISTWIDTH] IN BLOOD BY AUTOMATED COUNT: 12.4 % (ref 12.3–15.4)
FERRITIN SERPL-MCNC: 54.3 NG/ML (ref 13–150)
FOLATE SERPL-MCNC: 7.75 NG/ML (ref 4.78–24.2)
GLOBULIN SER CALC-MCNC: 2.2 GM/DL
GLUCOSE SERPL-MCNC: 88 MG/DL (ref 65–99)
GLUCOSE UR QL STRIP: NEGATIVE
HCT VFR BLD AUTO: 38.8 % (ref 34–46.6)
HDLC SERPL-MCNC: 69 MG/DL (ref 40–60)
HGB BLD-MCNC: 12.9 G/DL (ref 12–15.9)
HGB UR QL STRIP: NEGATIVE
IMM GRANULOCYTES # BLD AUTO: 0.04 10*3/MM3 (ref 0–0.05)
IMM GRANULOCYTES NFR BLD AUTO: 0.7 % (ref 0–0.5)
IRON SATN MFR SERPL: 18 % (ref 20–50)
IRON SERPL-MCNC: 77 MCG/DL (ref 37–145)
KETONES UR QL STRIP: NEGATIVE
LDLC SERPL CALC-MCNC: 79 MG/DL (ref 0–100)
LDLC/HDLC SERPL: 1.12 {RATIO}
LEUKOCYTE ESTERASE UR QL STRIP: ABNORMAL
LYMPHOCYTES # BLD AUTO: 1.04 10*3/MM3 (ref 0.7–3.1)
LYMPHOCYTES NFR BLD AUTO: 17.8 % (ref 19.6–45.3)
MCH RBC QN AUTO: 29.5 PG (ref 26.6–33)
MCHC RBC AUTO-ENTMCNC: 33.2 G/DL (ref 31.5–35.7)
MCV RBC AUTO: 88.6 FL (ref 79–97)
MICRO URNS: ABNORMAL
MONOCYTES # BLD AUTO: 0.51 10*3/MM3 (ref 0.1–0.9)
MONOCYTES NFR BLD AUTO: 8.7 % (ref 5–12)
NEUTROPHILS # BLD AUTO: 3.78 10*3/MM3 (ref 1.7–7)
NEUTROPHILS NFR BLD AUTO: 64.9 % (ref 42.7–76)
NITRITE UR QL STRIP: POSITIVE
NRBC BLD AUTO-RTO: 0 /100 WBC (ref 0–0.2)
OTHER ANTIBIOTIC SUSC ISLT: ABNORMAL
PH UR STRIP: 6 [PH] (ref 5–7.5)
PLATELET # BLD AUTO: 330 10*3/MM3 (ref 140–450)
POTASSIUM SERPL-SCNC: 4.5 MMOL/L (ref 3.5–5.2)
PROT SERPL-MCNC: 6.6 G/DL (ref 6–8.5)
PROT UR QL STRIP: NEGATIVE
RBC # BLD AUTO: 4.38 10*6/MM3 (ref 3.77–5.28)
RBC #/AREA URNS HPF: ABNORMAL /HPF (ref 0–2)
SODIUM SERPL-SCNC: 143 MMOL/L (ref 136–145)
SP GR UR STRIP: 1.01 (ref 1–1.03)
T3FREE SERPL-MCNC: 3.6 PG/ML (ref 2–4.4)
T4 FREE SERPL-MCNC: 0.75 NG/DL (ref 0.93–1.7)
TIBC SERPL-MCNC: 431 MCG/DL
TRIGL SERPL-MCNC: 99 MG/DL (ref 0–150)
TSH SERPL DL<=0.005 MIU/L-ACNC: 2.43 UIU/ML (ref 0.27–4.2)
UIBC SERPL-MCNC: 354 MCG/DL (ref 112–346)
URINALYSIS REFLEX: ABNORMAL
UROBILINOGEN UR STRIP-MCNC: 0.2 MG/DL (ref 0.2–1)
VIT B12 SERPL-MCNC: 934 PG/ML (ref 211–946)
VLDLC SERPL CALC-MCNC: 18 MG/DL (ref 5–40)
WBC # BLD AUTO: 5.83 10*3/MM3 (ref 3.4–10.8)
WBC #/AREA URNS HPF: >30 /HPF (ref 0–5)

## 2023-03-20 DIAGNOSIS — N39.0 URINARY TRACT INFECTION WITHOUT HEMATURIA, SITE UNSPECIFIED: Primary | ICD-10-CM

## 2023-03-20 RX ORDER — NITROFURANTOIN 25; 75 MG/1; MG/1
100 CAPSULE ORAL 2 TIMES DAILY
Qty: 14 CAPSULE | Refills: 0 | Status: SHIPPED | OUTPATIENT
Start: 2023-03-20 | End: 2023-03-28

## 2023-03-28 ENCOUNTER — OFFICE VISIT (OUTPATIENT)
Dept: FAMILY MEDICINE CLINIC | Facility: CLINIC | Age: 83
End: 2023-03-28
Payer: MEDICARE

## 2023-03-28 VITALS
HEART RATE: 86 BPM | OXYGEN SATURATION: 99 % | SYSTOLIC BLOOD PRESSURE: 102 MMHG | DIASTOLIC BLOOD PRESSURE: 60 MMHG | RESPIRATION RATE: 14 BRPM | BODY MASS INDEX: 26.86 KG/M2 | WEIGHT: 136.8 LBS | TEMPERATURE: 97.2 F | HEIGHT: 60 IN

## 2023-03-28 DIAGNOSIS — G89.29 CHRONIC BILATERAL LOW BACK PAIN WITH LEFT-SIDED SCIATICA: ICD-10-CM

## 2023-03-28 DIAGNOSIS — M54.42 CHRONIC BILATERAL LOW BACK PAIN WITH LEFT-SIDED SCIATICA: ICD-10-CM

## 2023-03-28 DIAGNOSIS — R71.0 DECREASED HEMOGLOBIN: ICD-10-CM

## 2023-03-28 DIAGNOSIS — I25.2 HISTORY OF NON-ST ELEVATION MYOCARDIAL INFARCTION (NSTEMI): ICD-10-CM

## 2023-03-28 DIAGNOSIS — E03.9 ACQUIRED HYPOTHYROIDISM: ICD-10-CM

## 2023-03-28 DIAGNOSIS — M47.816 FACET ARTHROPATHY, LUMBAR: ICD-10-CM

## 2023-03-28 DIAGNOSIS — E83.51 HYPOCALCEMIA: ICD-10-CM

## 2023-03-28 DIAGNOSIS — I50.9 CHRONIC CONGESTIVE HEART FAILURE, UNSPECIFIED HEART FAILURE TYPE: ICD-10-CM

## 2023-03-28 DIAGNOSIS — Z78.0 POSTMENOPAUSAL: ICD-10-CM

## 2023-03-28 DIAGNOSIS — R74.8 ELEVATED ALKALINE PHOSPHATASE LEVEL: ICD-10-CM

## 2023-03-28 DIAGNOSIS — E55.9 VITAMIN D DEFICIENCY: ICD-10-CM

## 2023-03-28 DIAGNOSIS — J43.9 PULMONARY EMPHYSEMA, UNSPECIFIED EMPHYSEMA TYPE: ICD-10-CM

## 2023-03-28 DIAGNOSIS — R00.2 PALPITATIONS: ICD-10-CM

## 2023-03-28 DIAGNOSIS — R06.09 DYSPNEA ON EXERTION: ICD-10-CM

## 2023-03-28 DIAGNOSIS — J96.10 CHRONIC RESPIRATORY FAILURE, UNSPECIFIED WHETHER WITH HYPOXIA OR HYPERCAPNIA: ICD-10-CM

## 2023-03-28 DIAGNOSIS — M51.36 DEGENERATIVE DISC DISEASE, LUMBAR: ICD-10-CM

## 2023-03-28 DIAGNOSIS — M48.061 SPINAL STENOSIS OF LUMBAR REGION, UNSPECIFIED WHETHER NEUROGENIC CLAUDICATION PRESENT: ICD-10-CM

## 2023-03-28 DIAGNOSIS — M43.16 SPONDYLOLISTHESIS OF LUMBAR REGION: ICD-10-CM

## 2023-03-28 DIAGNOSIS — I10 ESSENTIAL HYPERTENSION: ICD-10-CM

## 2023-03-28 DIAGNOSIS — E78.2 MIXED HYPERLIPIDEMIA: ICD-10-CM

## 2023-03-28 DIAGNOSIS — Z00.00 MEDICARE ANNUAL WELLNESS VISIT, SUBSEQUENT: Primary | ICD-10-CM

## 2023-03-28 DIAGNOSIS — I65.23 ARTERIOSCLEROSIS OF BOTH CAROTID ARTERIES: ICD-10-CM

## 2023-03-28 DIAGNOSIS — E53.8 B12 DEFICIENCY: ICD-10-CM

## 2023-03-28 DIAGNOSIS — N39.0 URINARY TRACT INFECTION WITHOUT HEMATURIA, SITE UNSPECIFIED: ICD-10-CM

## 2023-03-28 DIAGNOSIS — F41.1 GENERALIZED ANXIETY DISORDER: ICD-10-CM

## 2023-03-28 DIAGNOSIS — Z12.31 ENCOUNTER FOR SCREENING MAMMOGRAM FOR MALIGNANT NEOPLASM OF BREAST: ICD-10-CM

## 2023-03-28 DIAGNOSIS — N28.9 ABNORMAL RENAL FUNCTION: ICD-10-CM

## 2023-03-28 DIAGNOSIS — M41.9 SCOLIOSIS OF LUMBAR SPINE, UNSPECIFIED SCOLIOSIS TYPE: ICD-10-CM

## 2023-03-28 DIAGNOSIS — E61.1 IRON DEFICIENCY: ICD-10-CM

## 2023-03-28 DIAGNOSIS — M79.7 FIBROMYALGIA: ICD-10-CM

## 2023-03-28 DIAGNOSIS — R42 DIZZINESS: ICD-10-CM

## 2023-03-28 NOTE — PROGRESS NOTES
Subjective   Dafne Mary is a 82 y.o. female who presents today in follow up of hypertension, hyperlipidemia, hypothyroidism, vitamin D deficiency, B12 deficiency, abnormal renal function, elevated alk phos, moods, fibromyalgia, low back pain, leg pain, COPD, chronic respiratory failure, allergic rhinitis, GERD, dizziness, carotid stenosis, palpitations, OSORIO, thoracic back pain, CHF, NSTEMI, and specialists.     Hypertension  Associated symptoms include chest pain, headaches, palpitations and shortness of breath (chronic - increased OSORIO).   Dizziness  Associated symptoms include chest pain, coughing (chronic), fatigue and headaches. Pertinent negatives include no fever.   Hypothyroidism  Associated symptoms include chest pain, coughing (chronic), fatigue and headaches. Pertinent negatives include no fever.     She is not feeling like she has any urinary symptoms and did not start antibiotics. Then started and still on antibiotics- macrobid.     Hypertension- blood pressure not getting low and only occasionally 150. Otherwise, has been better.   she reports her BP is still up and down. She initially started   She was on Atenolol and Losartan. No BP cuff that is accurate. Feeling ok. Tired but does not think from BP. She was told to not take her Losartan prior to eyelid surgery. They were worried her BP would be too low. Her BP was 200s/ 100s. They had to give medication to get it down. Has been high but has not been able to take at home.   Hyperlipidemia- she reports feeling bad when she doubled medication with uncontrolled lipids.   · Restarted Lipitor 12/2020 and did well- tolerated without AE.   Hypothyroidism- [place] thyroid 45 mg- she is on [place] thyroid a 30 mg and 15 mg once daily.   Vitamin D deficiency- taking calcium, vit D3 5000 IU 4-5 x weekly   B12 deficiency- taking B12 1000 mcg 4-5 x weekly  Iron- taking iron 65 mg (325 ferrous sulfate) once daily.   Abnormal renal function- NSAIDS- None- stopped  Aleve. Tylenol only- not very often. Every once in a while- less than once weekly alcohol use.   Elevated alkaline phosphatase- no symptoms- GI or bone.       Moods- Paxil- reports sometimes she increases to 2 tablets to help with increased stress and uncontrolled moods. This works well for her.   Fibromyalgia- has been stable. Using Tylenol only as needed. Leg pain and some fatigue but no other worsening pain/ symptoms.   Low back pain, DDD, spinal stenosis, OA, spondylolisthesis, scoliosis- still has all the time. Has to sit and rest between vacuuming. PT helps and dry needling helps.   Patient describes pain across the middle of her back- where waist is with pain down into the left hip. Aching, throbbing, stabbing at times but most of the time, spasm pain in center of back. 3-10/10. She can get to the point of being uncomfortable in bed. Her back is bothering worse than breathing when she is outside doing things. She has had improvement with PT and dry needling in the past. She has not had the pain down the leg again but does not want it to progress. Patient usually has worsening at the end of summer after working in the yard all summer. Not having numbness but has had tingling in LE> a few times has had leg weakness but not recently. Hard to walk up steps because of pain and weakness- cannot put all her weight on 1 foot. She has to walk up both feet on a step. No saddle anesethia or new or changing GI/ symptoms.   · With Dr Shultz- she had MRI and had pain down the leg to her toes and had to have epidurals. They worked for a long time. Then stopped going there and came here.   · MRI lumbar spine 2017- mild dextroconvex curvature- centered at L4.  · T12-L1- minimal disc bulging and facet arthropathy.   · L1-2 prominent degenerative disc changes, endplate marrow changes, degenerative retrolisthesis L1 on L2 by 6 mm, mild to moderate bilateral foraminal narrowing from disc osteophyte complex, mild central  canal stenosis.   · L2-3 moderate central canal stenosis with disc bulge, ligamentum flavum thickening, facet arthropathy, degenerative retrolisthesis L2 on L3- 2-3 mm, mild to moderate bilateral foraminal narrowing from bulging disc, degenerative endplate changes.   · L3-4- mild to moderate central canal stenosis from disc bulge, ligamentum flavum thickening, and facet arthropathy, bilateral foraminal narrowing.   · L4-5- moderate to severe central canal stenosis from disc bulging, ligamentum flavum thickening, facet arthropathy, degenerative anterolisthesis L4 on L5 by 2 mm.   · L5-S1- moderate to severe facet arthropathy  · Referred to neurosurgery with 3 appts 1/2018- she cancelled 2 appts and no show the final appt.   · Patient was seen by Dr. Dc 4/2019 for back pain going down her left side.  She was given prednisone for 5 days and Flexeril.  · Patient was seen by ANTONELLA Jiménez 8/9/2021 for right low back pain and right hip pain going down the right leg.  She was working in her yard, going up and down a ladder and doing household chores with increased pain.  She had stabbing pain going down her right leg but denied numbness or tingling.  She wanted to consider physical therapy and epidural ejections.  Back still hurting and has to sit down when doing certain things but overall improved some.   Leg pain- no complaints today.   · 4/2021- she woke up with pain down her leg that did not feel like her normal sciatica and in the other leg. Mid- leg around the knee felt like the whole leg- she could not tell if anterior, posterior, medial, or lateral. No injury or activity change. Varicose veins in that leg. No edema, erythema, or heat.     Emphysema- Feeling like is back to as good as it will get now. Using nebulizer once daily. Trelegy helps- has been on for 2 years and albuterol as needed.  Never a smoker - father and brothers.    Dr Nolan- continues to follow.   · Patient was hospitalized  "5/3/2022-5/7/2022 for acute hypercapnic and hypoxemic respiratory failure, acute on chronic CHF, COPD with exacerbation by viral infection- rhinovirus and Covid 19 infection, bronchitis- acute on chronic, elevated LFT, hyperglycemia, hypothyroidism, htn, hyperlipidemia. Per discharge summary, she was having gradually increasing SOA on exertion for a few days. It was mild but her son reported she had some increased \"hacking as well\". She then had sudden onset SOA, called 9-1-1 and was brought to ER intubated. She improved quickly. Increased coughing with Trelegy- advised she try Breztri. Follow up Dr Nolan 4 weeks.    · Labs- 5/20-blood gas- pCO2 61.5, pH7.243, WBC 20.25, elevated glucose, ALT, AST, BNP 2972, TSH 7.5, Free T4 low at 0.73, respiratory panel positive for rhinovirus and Covid 19. 5/4- WBC 12.91, improving LFT- elevated AST, blood gas- pH7.55, pCO2 improved to 27, 5/5- WBC 19, creatinine 1.26. 5/7- 1.3  · CXR- 5/3- enlarged heart, aorta tortuous, vascular congestion, hazy opacity right mid to lower lung- edema or pneumonia, possible right pleural effusion. CXR post intubation- heart enlarged, aorta tortuous, pulmonary vascular congestion, patchy pulmonary opacity- right mid to upper lung and both lower lobes possible infiltrate, ET tube in place.   · CTA chest 5/3/2022- cardiac enlargement, coronary artery calcifications and atherosclerosis of aorta, bilateral small pleural effusions, vague groundglass opacity- pulmonary edema, mild bronchial wall thickening with mucus plugging right- bronchitis. ET tube in place.   · CT head 5/3/2022- mucosal thickening left maxillary sinus and ethmoid air cells, martial opacification mastoid air cell bilaterally.   · KUB 5/3- following NG tube placement.   · Patient was hospitalized 12/8/2022-12/9/2022 for COPD with acute exacerbation, emphysema, anxiety, fibromyalgia, GERD, htn, hypothyroidism. Per discharge summary, she presented with SOA and dry cough. Oxygen " saturation as low as 84%, CXR and CBC negative for underlying bacteria pneumonia. Aggressive bronchodilators and IV steroid. Improved overnight but continued with headache. Potassium was low and was given supplement in hospital. Advised oral steroid. Walking oxygen saturation good range.   · Patient was hospitalized 12/19/2022-12/22/2022 requiring intubation for acute hypoxic respiratory failure, COPD with acute exacerbation, emphysema, severe sepsis, NSTEMI, acute kidney injury, lactic acidosis. Per discharge summary, she presented with SOA and was given 2 neulizer treatments without improvement. She was in severe distress, unable to verbalize, and was intubated. WBC 27, lactic acid 3, and HS Troponin 209. CXR unremarkable and given 1 L crystalloid and initiated heparin. Admit to MICU. Completed 3 days of Ceftriaxone, transferred out of ICU. Advised Symbicort and Spiriva, was to have nebulizer machine, and advised follow up with cardiology for stress test and pulmonology with PFT.   · When she was seen in follow-up of ER 1/2023, I gave doxycycline.  · Patient was hospitalized 1/6/2023 through 1/13/2023 for COPD with acute exacerbation, RSV bronchiolitis, chronic CHF, hypothyroidism, anxiety, hypertension, arteriosclerosis bilateral carotid arteries, fibromyalgia, GERD, hyperlipidemia. Per discharge summary, she was found to be RSV + 1/7/2023, initial imaging with cardiomegaly but no infiltrate or effusion but did have bibasilar atelectasis.  Pulmonology consulted and following the patient through her stay-treated with bronchodilators and corticosteroids which were tapered over the course of her stay in addition to guaifenesin and Tessalon Perles.  They reported she had improved dyspnea, coughing, and wheezing at discharge.  She was on room air with oxygen saturation greater than 90%, afebrile, WBC within normal limits and no evidence of superimposed bacterial pneumonia.  Walking oximetry performed prior to  discharge and advised to continue prednisone taper 60 mg for 3 days followed by 20 mg for 2 days then 10 mg for 2 days.  Close follow-up with pulmonology within 1 to 2 weeks of discharge.  Ordered  PT and skilled nursing at discharge.  Blood pressure was intermittently uncontrolled with medication adjustment.  She was told to continue atenolol 50 mg losartan 100 mg and changed to atenolol from 2.5 mg to 10 mg.  · Chest x-ray with cardiomegaly, bibasilar atelectasis, advanced degenerative changes right shoulder and right AC.  · Labs-respiratory panel positive for RSV, blood gas with PCO2 45, PO2 103, HCO3 28.8, base excess 3.5, negative procalcitonin, negative proBNP, hemoglobin decreased through hospitalization with hemoglobin 1/9/2022 10.9  · Oximeter in hospital and did not have oxygen on at that time.  · Breztri- will need to be approved.    · 1/17/2023-she was on Mucinex DM twice daily, Flonase, and using Breztri, nebulizer. She had not been seen by pulmonology- appt 2/13/2023. She had improvement in PND and left ear symptoms but right ear still clogged with decreased hearing. Using nasal spray every day.     Allergies- Nasacort daily.   GERD- controlled on Prilosec without breakthrough symptoms. Not bothering at all.     Dizziness- No recurrence of dizziness.   · She had some dizziness- intermittent- sometimes when 1st standing up.   · She had 2 episodes that she felt like she was going to faint, weakness, and legs were shaking. Both times, improved with sitting down. She had no associated CP, SOA, palp, numbness, tingling, speech issues, confusion, speech symptoms. Her legs were shaking uncontrollably but stopped with sitting. She had water with improvement. No spinning dizziness.   Carotid stenosis- last carotid duplex 11/2022- plaque without stenosis.   · Carotid duplex 2016 was ok but prior had stenosis noted on duplex.   · Referred 12/2020 and 5/2021.    Palpitations- She has had flutter a couple times  daily for years.   · She was seen  for palpitations with echo and holter without the need for treatment.   · She has also noticed increased SOA spring 2021 after getting to work outside. She has chronic SOA with COPD, however, she had some increase from baseline. No CP, sweating, nausea with symptoms. She had palpitations here right before she had EKG but none while having tracing.     OSORIO, CP, thoracic back pain, intermittent palpitations, tachycardia, NSTEMI 2022 hospitalization-   · Started with decreased exercise tolerance. She took albuterol and increased her pulse up to 114 and took a while to go back down. Felt worse after the albuterol.   · She was folding clothes and was winded despite standing still and folding clothes. 15 steps to the bathroom and by the time she got back to the bed, she was panting and having a hard time breathing. It took a while but resolved. Sitting and resting, she was ok.   · She was having pain in her chest and across. Pain in midback/upper back  as well. No SOA laying down. She was sent to [facility].   · Brother with MI at 64 or 65.       Mother lived until age 87 and  of dementia. Active      Patient's Specialists:  Cardiology- Dr Norman- last appt 2023 for hypertension, CHF, hyperlipidemia, COPD, hypothyroidism. No evidence of volume overload. Continue BP regimen. LDL goal < 70. Follow up 6 months.   Endocrinology- Dr Sari Jose- last appt 2021 for postoperative hypothyroidism, abnormal thyroid function tests. 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 Discussed with patient that thyroid tests looked typical of someone on [place] Thyroid and did not feel well on levothyroxine. Recommended leave on [place] thyroid and just check TSH- keeping TSH > 2. Follow up PRN.   GI- Dr Rick Herrera- last appt 10/2007 for colonoscopy.   Neurosurgery- Dr Agarwal- scheduled 2018- patient cancelled 2 appts and No Show final appt.   PM&R- Dr Eduardo Boland- last appt 2014 for back pain.  Given Vimovo. Advised warm water therapy.   Pulmonology- Dr Nolan- last appt 2/2023 for severe COPD, hypoxemic respiratory failure, and granulomatous disease. Continue triple therapy, oxygen as needed, and annual CXR.   · 1/2023 for COPD, chronic respiratory failure, pulmonary fibrosis, non-rheumatic aortic valve stenosis.   Ophthalmology- Dr Hunt- last appt 3/2022 for other abnormality of cornea, mechanical ptosis, blepharoconjunctivitis.   ENT- Dr Woody De La Fuente- last appt 2/2023 for hearing loss, tinnitus, eustachian tube issues, TMJ, tension headache. Advised hearing aids to help with hearing, tinnitus, and help prevent dementia. Advised she get treatment for TMJ. Follow up PRN.     The following portions of the patient's history were reviewed and updated as appropriate: allergies, current medications, past family history, past medical history, past social history, past surgical history and problem list.    Review of Systems   Constitutional: Positive for fatigue. Negative for fever.   HENT: Positive for hearing loss and tinnitus. Ear pain: ears popping.    Respiratory: Positive for cough (chronic) and shortness of breath (chronic - increased OSORIO).    Cardiovascular: Positive for chest pain and palpitations.   Gastrointestinal: Negative.    Genitourinary: Negative.    Musculoskeletal: Positive for back pain.        Leg pain   Neurological: Positive for dizziness and headaches.   Psychiatric/Behavioral: Positive for dysphoric mood. The patient is nervous/anxious.        Objective   Vitals:    03/28/23 1309   BP: 102/60   Pulse: 86   Resp: 14   Temp: 97.2 °F (36.2 °C)   SpO2: 99%     Body mass index is 26.72 kg/m².    Physical Exam  Vitals and nursing note reviewed.   Constitutional:       General: She is not in acute distress.     Appearance: Normal appearance. She is well-developed.   HENT:      Head: Normocephalic and atraumatic.      Right Ear: External ear normal.      Left Ear: External ear normal.       Nose: Nose normal.   Eyes:      General: Lids are normal.      Conjunctiva/sclera: Conjunctivae normal.   Neck:      Vascular: No carotid bruit.   Cardiovascular:      Rate and Rhythm: Normal rate and regular rhythm.      Pulses: Normal pulses.      Heart sounds: Normal heart sounds. No murmur heard.    No friction rub. No gallop.   Pulmonary:      Effort: Pulmonary effort is normal. No respiratory distress.      Breath sounds: Normal breath sounds. No wheezing, rhonchi or rales.   Musculoskeletal:         General: No deformity.      Cervical back: Neck supple.   Skin:     General: Skin is warm and dry.   Neurological:      Mental Status: She is alert and oriented to person, place, and time.      Gait: Gait normal.   Psychiatric:         Speech: Speech normal.         Behavior: Behavior normal.         Thought Content: Thought content normal.         Judgment: Judgment normal.       Assessment & Plan   Diagnoses and all orders for this visit:    1. Medicare annual wellness visit, subsequent (Primary)    2. Encounter for screening mammogram for malignant neoplasm of breast  -     Mammo screening digital tomosynthesis bilateral w CAD; Future    3. Postmenopausal  -     DEXA Bone Density Axial; Future    4. Essential hypertension  -     Comprehensive Metabolic Panel; Future  -     Urinalysis With Culture If Indicated -; Future    5. Mixed hyperlipidemia  -     Comprehensive Metabolic Panel; Future  -     CK; Future  -     Lipid Panel With LDL / HDL Ratio; Future    6. Acquired hypothyroidism  -     TSH; Future    7. Hypocalcemia  -     Comprehensive Metabolic Panel; Future  -     Vitamin D,25-Hydroxy; Future    8. Vitamin D deficiency  -     Comprehensive Metabolic Panel; Future  -     Vitamin D,25-Hydroxy; Future    9. B12 deficiency  -     CBC & Differential; Future  -     Vitamin B12 & Folate; Future    10. Iron deficiency  -     CBC & Differential; Future  -     Iron and TIBC; Future  -     Ferritin; Future    11.  Decreased hemoglobin  -     Iron and TIBC; Future  -     Ferritin; Future    12. Abnormal renal function  -     Comprehensive Metabolic Panel; Future    13. Elevated alkaline phosphatase level  -     Comprehensive Metabolic Panel; Future    14. Urinary tract infection without hematuria, site unspecified    15. Generalized anxiety disorder    16. Fibromyalgia    17. Chronic bilateral low back pain with left-sided sciatica    18. Degenerative disc disease, lumbar    19. Facet arthropathy, lumbar    20. Spinal stenosis of lumbar region, unspecified whether neurogenic claudication present    21. Spondylolisthesis of lumbar region    22. Scoliosis of lumbar spine, unspecified scoliosis type    23. Pulmonary emphysema, unspecified emphysema type    24. Chronic respiratory failure, unspecified whether with hypoxia or hypercapnia    25. Arteriosclerosis of both carotid arteries    26. Palpitations    27. Dizziness    28. Dyspnea on exertion    29. Chronic congestive heart failure, unspecified heart failure type    30. History of non-ST elevation myocardial infarction (NSTEMI)        Assessment and Plan  AWV completed today. She is not up to date on mammogram, DEXA, colonoscopy. I will refer for mammogram and DEXA. She needs to consider colonoscopy but should have cardiology and pulmonology clearance for sedation. Patient to contact her insurance to determine coverage and location of coverage for Prevnar 20 and Shingrix. She needs to ensure annual flu shot and Covid 19 booster if further available in the future.     I reviewed labs from 3/7/2023. Fasting labs in 3 months and follow up with me 3-5 days after labs. To be seen sooner if any concerns.   · Hypertension- Blood pressure is slightly low. She has had elevation and low readings. I will have cardiology determine BP regimen and continue to prescribe medication to avoid medication confusion and accidental changes.  She is currently taking Amlodipine, Losartan, and  reports Atenolol.   · Hyperlipidemia- Cholesterol is borderline. HDL remains good but LDL > 70. Continue Lipitor 20 mg at bedtime- consider increasing to 40 mg at follow up if persistently > 70.    · Hypothyroidism- Thyroid was abnormal in the hospital but she was sick and required brief intubation. TSH remains in recommended range per endocrinology- advised TSH >2 and not checking other thyroid labs- reports thyroid labs consistent with someone taking [place] thyroid. Advised follow up here for thyroid. Continue [place] thyroid 45 mg.    · Vitamin D deficiency- Continue calcium, vit D3 5000 IU 4-5 x weekly.  · B12 deficiency-Continue B12 1000 mcg 4-5 x weekly.    · Iron deficiency- Continue iron daily. I will recheck at follow up.   · Abnormal renal function- Avoid NSAIDS and ensure proper hydration. I will continue to monitor and make recommendations. Consideration of nephrology if needed.  · Elevated alkaline phosphatase- We will consider further workup if persistent elevation- normalized and only a couple points elevated now.   · UTI- Finish Macrobid. To be seen if urinary symptoms. Otherwise, I will recheck at follow up.   · Major depressive disorder with anxiety-Moods are more stable. Continue Paxil to 10 mg twice daily or 20 mg once daily. She can continue with the 2nd dose only as needed if this is helping but if she is having to take the second dose often, she should start taking 20 mg every day.    · Fibromyalgia- She has been stable. Follow up if uncontrolled symptoms.   · Low back pain, DDD, spinal stenosis, OA, spondylolisthesis, scoliosis- She was referred back to physical therapy and given Baclofen as needed. She should consider repeat MRI and to see specialist if worsening, new, or changing symptoms or persistent pain.   · Emphysema- Continue treatment and follow up with Dr Nolan as directed- they should fill all respiratory medications.  · Allergic rhinitis-  Continue Nasacort as needed.    · GERD-  Symptoms are controlled. Continue Prilosec 20 mg once daily. To see GI if significant breakthrough symptoms  · Carotid stenosis- Bilateral plaque without stenosis 11/2022. Recheck in 1 year.  · OSORIO, CP, intermittent palpitations, CHF, NSTEMI-  Patient to continue follow up with cardiology and treatment as directed. I will ensure they have reviewed hospitalization from [facility]  with NSTEMI and recommendations for outpatient stress test with cardiology.    · COPD with multiple exacerbations in the last year, 2 exacerbations requiring intubation, chronic respiratory failure, granulomatous disease- Continue follow up with pulmonology- Dr Nolan. There was recent note with diagnosis pulmonary fibrosis but no diagnosis on follow up 2/2023. I will continue to follow.      I spent 45 minutes caring for Dafne Mary on this date of service, including 15 minutes for AWV and 30 minutes for follow up and problem-focussed visit. This time includes time spent by me in the following activities as necessary: preparing for the visit, reviewing tests, specialists records and previous visits, obtaining and/or reviewing a separately obtained history, performing a medically appropriate exam and/or evaluation, counseling and educating the patient, family, caregiver, referring and/or communicating with other healthcare professionals, documenting information in the medical record, independently interpreting results and communicating that information with the patient, family, caregiver, and developing a medically appropriate treatment plan with consideration of other conditions, medications, and treatments.

## 2023-03-28 NOTE — PROGRESS NOTES
The ABCs of the Annual Wellness Visit  Subsequent Medicare Wellness Visit    Subjective      Dafne Mary is a 82 y.o. female who presents for a Subsequent Medicare Wellness Visit.    The following portions of the patient's history were reviewed and   updated as appropriate: allergies, current medications, past family history, past medical history, past social history, past surgical history and problem list.    Last Pap-- last GYN exam [facility] Herndon-urogynecology.  No history of abnormal Pap.  Sexually active 1 partner for couple years but not active at this time.  Last mammogram- 8/2016  Last DEXA- 8/2016  Last colonoscopy- 2007- Dr. Rick Herrera.  Referred to [facility] and had paperwork.  Last Tdap- 7/2017  Prevnar- unspecified-11/30/2016  Pneumovax- unspecified- 11/30/2016  Hepatitis A- 5/30/2018  Hepatitis B-5/30/2018  Last flu shot- 12/2022  Shingrix- had varicella as a child. Had Zostavax years ago but not Shingrix  Covid 19-Moderna- 2/2021, 3/2021, 11/2022    Compared to one year ago, the patient feels her physical   health is the same.    Compared to one year ago, the patient feels her mental   health is the same.    Recent Hospitalizations:  This patient has had a [facility] admission record on file within the last 365 days.    Current Medical Providers:  Patient Care Team:  Sintia Chatterjee PA as PCP - General (Family Medicine)  Javier Nolan MD as Consulting Physician (Pulmonary Disease)  Nae Norman MD as Consulting Physician (Cardiology)    Outpatient Medications Prior to Visit   Medication Sig Dispense Refill   • amLODIPine (NORVASC) 10 MG tablet Take 1 tablet by mouth Daily. 30 tablet 2   • [place] Thyroid 15 MG tablet Take 1 tablet by mouth once daily 90 tablet 0   • [place] Thyroid 30 MG tablet Take 1 tablet by mouth once daily 90 tablet 0   • aspirin 81 MG chewable tablet Chew 1 tablet Daily.     • atenolol (TENORMIN) 50 MG tablet Take 1 tablet by mouth Daily. 90 tablet 3   •  atorvastatin (LIPITOR) 20 MG tablet Take 1 tablet by mouth Every Night. 90 tablet 1   • Breztri Aerosphere 160-9-4.8 MCG/ACT aerosol inhaler 2 puffs 2 (Two) Times a Day.     • fluticasone (FLONASE) 50 MCG/ACT nasal spray 2 sprays into the nostril(s) as directed by provider Daily.     • ipratropium-albuterol (DUO-NEB) 0.5-2.5 mg/3 ml nebulizer USE 1 AMPULE IN NEBULIZER EVERY 4 HOURS AS NEEDED     • lidocaine (LIDODERM) 5 % Place 1 patch on the skin as directed by provider Daily. Remove & Discard patch within 12 hours or as directed by MD 15 each 0   • omeprazole (priLOSEC) 20 MG capsule Take 1 capsule by mouth Daily.     • PARoxetine (PAXIL) 10 MG tablet Take 2 tablets by mouth Daily. 180 tablet 0   • saccharomyces boulardii (Florastor) 250 MG capsule Take 1 capsule by mouth 2 (Two) Times a Day. 60 capsule 0   • Ventolin  (90 Base) MCG/ACT inhaler Inhale 2 puffs Every 4 (Four) Hours As Needed.     • vitamin D3 125 MCG (5000 UT) capsule capsule Take 1 capsule by mouth Daily.     • cefuroxime (CEFTIN) 500 MG tablet Take 1 tablet by mouth 2 (Two) Times a Day. for 7 days     • losartan (COZAAR) 100 MG tablet Take 1 tablet by mouth once daily 90 tablet 0   • nitrofurantoin, macrocrystal-monohydrate, (Macrobid) 100 MG capsule Take 1 capsule by mouth 2 (Two) Times a Day. 14 capsule 0   • benzonatate (TESSALON) 200 MG capsule Take 1 capsule by mouth 3 (Three) Times a Day As Needed for Cough. 30 capsule 1   • doxycycline (VIBRAMYCIN) 100 MG capsule Take 1 capsule by mouth 2 (Two) Times a Day. 20 capsule 0   • guaiFENesin (MUCINEX) 600 MG 12 hr tablet Take 2 tablets by mouth Every 12 (Twelve) Hours. 60 tablet 0     No facility-administered medications prior to visit.       No opioid medication identified on active medication list. I have reviewed chart for other potential  high risk medication/s and harmful drug interactions in the elderly.          Aspirin is on active medication list. Aspirin use is indicated based on  "review of current medical condition/s. Pros and cons of this therapy have been discussed today. Benefits of this medication outweigh potential harm.  Patient has been encouraged to continue taking this medication.  .      Patient Active Problem List   Diagnosis   • Anxiety   • Arteriosclerosis of both carotid arteries   • Chronic interstitial cystitis   • Cough   • Pulmonary emphysema   • Gastroesophageal reflux disease   • Fibromyalgia   • Headache   • Hematuria   • Hoarseness   • Hyperlipidemia   • Benign hypertension   • Postoperative hypothyroidism   • Muscle spasms of both lower extremities   • Palpitations   • Shortness of breath   • Postmenopausal osteoporosis   • Chronic fatigue   • Hair loss disorder   • Dysuria   • Dry cough   • Spondylolysis, lumbar region   • Vocal cord paralysis   • Abnormal finding on thyroid function test   • Positional lightheadedness   • COPD with acute exacerbation   • Hypothyroid   • COPD (chronic obstructive pulmonary disease)   • COPD exacerbation   • Morbid obesity with BMI of 70 and over, adult   • RSV bronchiolitis   • Vitamin D deficiency   • B12 deficiency   • Iron deficiency   • Decreased hemoglobin   • Generalized anxiety disorder   • Chronic bilateral low back pain with left-sided sciatica   • Facet arthropathy, lumbar   • Spinal stenosis of lumbar region   • Spondylolisthesis of lumbar region   • Scoliosis of lumbar spine   • Chronic congestive heart failure   • History of non-ST elevation myocardial infarction (NSTEMI)     Advance Care Planning  Advance Directive is not on file.  ACP discussion was held with the patient during this visit. Patient does not have an advance directive, information provided.     Objective    Vitals:    03/28/23 1309   BP: 102/60   BP Location: Left arm   Patient Position: Sitting   Pulse: 86   Resp: 14   Temp: 97.2 °F (36.2 °C)   TempSrc: Temporal   SpO2: 99%   Weight: 62.1 kg (136 lb 12.8 oz)   Height: 152.4 cm (60\")     Estimated body " "mass index is 26.72 kg/m² as calculated from the following:    Height as of this encounter: 152.4 cm (60\").    Weight as of this encounter: 62.1 kg (136 lb 12.8 oz).    BMI is >= 25 and <30. (Overweight) The following options were offered after discussion;: exercise counseling/recommendations and nutrition counseling/recommendations      Does the patient have evidence of cognitive impairment?   No    Lab Results   Component Value Date    CHLPL 166 03/07/2023    TRIG 99 03/07/2023    HDL 69 (H) 03/07/2023    LDL 79 03/07/2023    VLDL 18 03/07/2023          HEALTH RISK ASSESSMENT    Smoking Status:  Social History     Tobacco Use   Smoking Status Never   • Passive exposure: Never   Smokeless Tobacco Never     Alcohol Consumption:  Social History     Substance and Sexual Activity   Alcohol Use Yes    Comment: \"occ\"; caffeine use- tea     Fall Risk Screen:    RINA Fall Risk Assessment was completed, and patient is at LOW risk for falls.Assessment completed on:1/17/2023    Depression Screening:  PHQ-2/PHQ-9 Depression Screening 3/28/2023   Little Interest or Pleasure in Doing Things 0-->not at all   Feeling Down, Depressed or Hopeless 0-->not at all   PHQ-9: Brief Depression Severity Measure Score 0       Health Habits and Functional and Cognitive Screening:  Functional & Cognitive Status 3/28/2023   Do you have difficulty preparing food and eating? No   Do you have difficulty bathing yourself, getting dressed or grooming yourself? No   Do you have difficulty using the toilet? No   Do you have difficulty moving around from place to place? No   Do you have trouble with steps or getting out of a bed or a chair? No   Current Diet Well Balanced Diet   Dental Exam Up to date   Eye Exam Up to date   Exercise (times per week) 0 times per week   Current Exercises Include No Regular Exercise   Current Exercise Activities Include -   Do you need help using the phone?  No   Are you deaf or do you have serious difficulty hearing?  " Yes   Do you need help with transportation? No   Do you need help shopping? No   Do you need help preparing meals?  No   Do you need help with housework?  No   Do you need help with laundry? No   Do you need help taking your medications? No   Do you need help managing money? No   Have you felt unusual stress, anger or loneliness in the last month? No   Who do you live with? Alone   If you need help, do you have trouble finding someone available to you? No   Have you been bothered in the last four weeks by sexual problems? No   Do you have difficulty concentrating, remembering or making decisions? Yes       Age-appropriate Screening Schedule:  Refer to the list below for future screening recommendations based on patient's age, sex and/or medical conditions. Orders for these recommended tests are listed in the plan section. The patient has been provided with a written plan.    Health Maintenance   Topic Date Due   • COLORECTAL CANCER SCREENING  Never done   • Pneumococcal Vaccine 65+ (1 - PCV) 12/08/1946   • ZOSTER VACCINE (1 of 2) Never done   • MAMMOGRAM  08/01/2018   • DXA SCAN  08/01/2018   • COVID-19 Vaccine (4 - Booster for Moderna series) 01/08/2023   • INFLUENZA VACCINE  08/01/2023   • LIPID PANEL  03/07/2024   • ANNUAL WELLNESS VISIT  03/28/2024   • TDAP/TD VACCINES (2 - Td or Tdap) 07/10/2027                CMS Preventative Services Quick Reference  Risk Factors Identified During Encounter:    Immunizations Discussed/Encouraged: Hepatitis A Vaccine/Series, Prevnar 20 (Pneumococcal 20-valent conjugate) and Shingrix    The above risks/problems have been discussed with the patient.  Pertinent information has been shared with the patient in the After Visit Summary.    Diagnoses and all orders for this visit:    1. Medicare annual wellness visit, subsequent (Primary)    2. Encounter for screening mammogram for malignant neoplasm of breast  -     Mammo screening digital tomosynthesis bilateral w CAD; Future    3.  Postmenopausal  -     DEXA Bone Density Axial; Future    4. Essential hypertension  -     Comprehensive Metabolic Panel; Future  -     Urinalysis With Culture If Indicated -; Future    5. Mixed hyperlipidemia  -     Comprehensive Metabolic Panel; Future  -     CK; Future  -     Lipid Panel With LDL / HDL Ratio; Future    6. Acquired hypothyroidism  -     TSH; Future    7. Hypocalcemia  -     Comprehensive Metabolic Panel; Future  -     Vitamin D,25-Hydroxy; Future    8. Vitamin D deficiency  -     Comprehensive Metabolic Panel; Future  -     Vitamin D,25-Hydroxy; Future    9. B12 deficiency  -     CBC & Differential; Future  -     Vitamin B12 & Folate; Future    10. Iron deficiency  -     CBC & Differential; Future  -     Iron and TIBC; Future  -     Ferritin; Future    11. Decreased hemoglobin  -     Iron and TIBC; Future  -     Ferritin; Future    12. Abnormal renal function  -     Comprehensive Metabolic Panel; Future    13. Elevated alkaline phosphatase level  -     Comprehensive Metabolic Panel; Future    14. Urinary tract infection without hematuria, site unspecified    15. Generalized anxiety disorder    16. Fibromyalgia    17. Chronic bilateral low back pain with left-sided sciatica    18. Degenerative disc disease, lumbar    19. Facet arthropathy, lumbar    20. Spinal stenosis of lumbar region, unspecified whether neurogenic claudication present    21. Spondylolisthesis of lumbar region    22. Scoliosis of lumbar spine, unspecified scoliosis type    23. Pulmonary emphysema, unspecified emphysema type    24. Chronic respiratory failure, unspecified whether with hypoxia or hypercapnia    25. Arteriosclerosis of both carotid arteries    26. Palpitations    27. Dizziness    28. Dyspnea on exertion    29. Chronic congestive heart failure, unspecified heart failure type    30. History of non-ST elevation myocardial infarction (NSTEMI)        Follow Up:   Next Medicare Wellness visit to be scheduled in 1 year.       An After Visit Summary and PPPS were made available to the patient.

## 2023-03-30 DIAGNOSIS — I10 ESSENTIAL HYPERTENSION: ICD-10-CM

## 2023-03-30 RX ORDER — LOSARTAN POTASSIUM 100 MG/1
TABLET ORAL
Qty: 90 TABLET | Refills: 1 | Status: SHIPPED | OUTPATIENT
Start: 2023-03-30

## 2023-03-30 RX ORDER — AMLODIPINE BESYLATE 2.5 MG/1
TABLET ORAL
Qty: 90 TABLET | Refills: 0 | Status: SHIPPED | OUTPATIENT
Start: 2023-03-30

## 2023-04-09 PROBLEM — M47.816 FACET ARTHROPATHY, LUMBAR: Status: ACTIVE | Noted: 2023-04-09

## 2023-04-09 PROBLEM — G89.29 CHRONIC BILATERAL LOW BACK PAIN WITH LEFT-SIDED SCIATICA: Status: ACTIVE | Noted: 2023-04-09

## 2023-04-09 PROBLEM — E61.1 IRON DEFICIENCY: Status: ACTIVE | Noted: 2023-04-09

## 2023-04-09 PROBLEM — M41.9 SCOLIOSIS OF LUMBAR SPINE: Status: ACTIVE | Noted: 2023-04-09

## 2023-04-09 PROBLEM — M48.061 SPINAL STENOSIS OF LUMBAR REGION: Status: ACTIVE | Noted: 2023-04-09

## 2023-04-09 PROBLEM — M54.42 CHRONIC BILATERAL LOW BACK PAIN WITH LEFT-SIDED SCIATICA: Status: ACTIVE | Noted: 2023-04-09

## 2023-04-09 PROBLEM — R71.0 DECREASED HEMOGLOBIN: Status: ACTIVE | Noted: 2023-04-09

## 2023-04-09 PROBLEM — F41.1 GENERALIZED ANXIETY DISORDER: Status: ACTIVE | Noted: 2023-04-09

## 2023-04-09 PROBLEM — E53.8 B12 DEFICIENCY: Status: ACTIVE | Noted: 2023-04-09

## 2023-04-09 PROBLEM — I25.2 HISTORY OF NON-ST ELEVATION MYOCARDIAL INFARCTION (NSTEMI): Status: ACTIVE | Noted: 2023-04-09

## 2023-04-09 PROBLEM — E55.9 VITAMIN D DEFICIENCY: Status: ACTIVE | Noted: 2023-04-09

## 2023-04-09 PROBLEM — I50.9 CHRONIC CONGESTIVE HEART FAILURE: Status: ACTIVE | Noted: 2023-04-09

## 2023-04-09 PROBLEM — M43.16 SPONDYLOLISTHESIS OF LUMBAR REGION: Status: ACTIVE | Noted: 2023-04-09

## 2023-04-09 NOTE — PATIENT INSTRUCTIONS
Medicare Wellness  Personal Prevention Plan of Service     Date of Office Visit:    Encounter Provider:  KAJAL Wood  Place of Service:  [facility]  Patient Name: Dafne Mary  :  1940    As part of the Medicare Wellness portion of your visit today, we are providing you with this personalized preventive plan of services (PPPS). This plan is based upon recommendations of the [place] Preventive Services Task Force (USPSTF) and the Advisory Committee on Immunization Practices (ACIP).    This lists the preventive care services that should be considered, and provides dates of when you are due. Items listed as completed are up-to-date and do not require any further intervention.    Health Maintenance   Topic Date Due    COLORECTAL CANCER SCREENING  Never done    Pneumococcal Vaccine 65+ (1 - PCV) 1946    ZOSTER VACCINE (1 of 2) Never done    MAMMOGRAM  2018    DXA SCAN  2018    COVID-19 Vaccine (4 - Booster for Moderna series) 2023    INFLUENZA VACCINE  2023    LIPID PANEL  2024    ANNUAL WELLNESS VISIT  2024    TDAP/TD VACCINES (2 - Td or Tdap) 07/10/2027       Orders Placed This Encounter   Procedures    Mammo screening digital tomosynthesis bilateral w CAD     Standing Status:   Future     Standing Expiration Date:   3/28/2024     Order Specific Question:   Reason for Exam:     Answer:   breast cancer screening     Order Specific Question:   Release to patient     Answer:   Routine Release    DEXA Bone Density Axial     Standing Status:   Future     Standing Expiration Date:   3/28/2024     Order Specific Question:   Is patient taking or have taken long term Glucocorticoid (steroids)?     Answer:   No     Order Specific Question:   Does the patient have rheumatoid arthritis?     Answer:   No     Order Specific Question:   Does the patient have secondary osteoporosis?     Answer:   No     Order Specific Question:   Reason  for Exam:     Answer:   postmenopausal     Order Specific Question:   Release to patient     Answer:   Routine Release    Comprehensive Metabolic Panel     Standing Status:   Future     Standing Expiration Date:   4/9/2024     Order Specific Question:   Release to patient     Answer:   Routine Release    CK     Standing Status:   Future     Standing Expiration Date:   4/9/2024     Order Specific Question:   Release to patient     Answer:   Routine Release    Lipid Panel With LDL / HDL Ratio     Standing Status:   Future     Standing Expiration Date:   4/9/2024     Order Specific Question:   Release to patient     Answer:   Routine Release    Iron and TIBC     Standing Status:   Future     Standing Expiration Date:   4/9/2024    Ferritin     Standing Status:   Future     Standing Expiration Date:   4/9/2024    Vitamin B12 & Folate     Standing Status:   Future     Standing Expiration Date:   4/9/2024     Order Specific Question:   Release to patient     Answer:   Routine Release    Vitamin D,25-Hydroxy     Standing Status:   Future     Standing Expiration Date:   4/9/2024     Order Specific Question:   Release to patient     Answer:   Routine Release    TSH     Standing Status:   Future     Standing Expiration Date:   4/9/2024     Order Specific Question:   Release to patient     Answer:   Routine Release    Urinalysis With Culture If Indicated -     Standing Status:   Future     Standing Expiration Date:   4/9/2024     Order Specific Question:   Release to patient     Answer:   Routine Release    CBC & Differential     Standing Status:   Future     Standing Expiration Date:   4/9/2024     Order Specific Question:   Manual Differential     Answer:   No       Return in about 3 months (around 6/28/2023).

## 2023-04-11 RX ORDER — THYROID,PORK 15 MG
TABLET ORAL
Qty: 90 TABLET | Refills: 0 | Status: SHIPPED | OUTPATIENT
Start: 2023-04-11

## 2023-04-11 RX ORDER — THYROID 30 MG/1
TABLET ORAL
Qty: 90 TABLET | Refills: 0 | Status: SHIPPED | OUTPATIENT
Start: 2023-04-11

## 2023-04-17 ENCOUNTER — TELEPHONE (OUTPATIENT)
Dept: FAMILY MEDICINE CLINIC | Facility: CLINIC | Age: 83
End: 2023-04-17
Payer: MEDICARE

## 2023-04-17 NOTE — TELEPHONE ENCOUNTER
----- Message from Nae Norman MD sent at 4/17/2023 10:56 AM EDT -----  Regarding: RE: [place] patient  Thanks for making me aware.  It appears that they were concerned about her ongoing dyspnea at the time and recommended further cardiac and pulmonary testing to work this up further.  When I saw her in the office recently it sounds like her breathing was back to her baseline.  As long as this remains the case I would just recommend monitoring for any change in her symptoms.  If she does have a change in her symptoms would certainly consider further cardiac testing including ischemic testing at that point.        ----- Message -----  From: Sintia Chatterjee PA  Sent: 4/9/2023   3:10 AM EDT  To: Nae Norman MD  Subject: [place] patient                                   I was reviewing your notes on this patient. I wanted to ensure you were aware of her hospitalization 12/2022 at [facility]- she had hypoxia and required intubation from COPD exacerbation- diagnosed with severe sepsis, lactic acidosis and NSTEMI. Per discharge, they recommended follow up with cardiology for stress test. Since this was not at [facility], I know the records are not as easy to access. It is an encounter listed under 12/19/2022.     Thank you so much for your care of this patient.     Sintia Chatterjee PA-C

## 2023-05-02 ENCOUNTER — TELEPHONE (OUTPATIENT)
Dept: FAMILY MEDICINE CLINIC | Facility: CLINIC | Age: 83
End: 2023-05-02
Payer: MEDICARE

## 2023-05-17 ENCOUNTER — TELEPHONE (OUTPATIENT)
Dept: FAMILY MEDICINE CLINIC | Facility: CLINIC | Age: 83
End: 2023-05-17

## 2023-05-17 NOTE — TELEPHONE ENCOUNTER
Caller: Dafne Mary    Relationship: Self    Best call back number: [phone]    What is the best time to reach you: ANYTIME    Who are you requesting to speak with (clinical staff, provider,  specific staff member): MACO MENDES     What was the call regarding: PATIENT STATES SHE IS UNABLE TO GET HER ipratropium-albuterol (DUO-NEB) 0.5-2.5 mg/3 ml nebulizer PRESCRIPTION FILLED DUE TO THERE BEING A SHORTAGE ON THE IPRATROPIUM AT EVERY PHARMACY.     PATIENT STATES SHE IS WANTING TO KNOW IF THERE ARE ANY SAMPLES SHE CAN GET FROM THE OFFICE OR IF THERE IS AN INHALER THAT SHE CAN BE PRESCRIBED.     PATIENT IS REQUESTING A CALL BACK.

## 2023-05-18 NOTE — TELEPHONE ENCOUNTER
Tried to call patient but she did not answer and it stated that the call could not be completed - KL

## 2023-05-18 NOTE — TELEPHONE ENCOUNTER
We do not have samples.  She can check with her pulmonologist who prescribes her medications and see if they have any samples, recommendations, or want to change the medication.

## 2023-05-19 ENCOUNTER — OFFICE VISIT (OUTPATIENT)
Dept: FAMILY MEDICINE CLINIC | Facility: CLINIC | Age: 83
End: 2023-05-19
Payer: MEDICARE

## 2023-05-19 VITALS
RESPIRATION RATE: 22 BRPM | TEMPERATURE: 98.7 F | WEIGHT: 134.2 LBS | DIASTOLIC BLOOD PRESSURE: 42 MMHG | HEIGHT: 60 IN | BODY MASS INDEX: 26.35 KG/M2 | SYSTOLIC BLOOD PRESSURE: 98 MMHG | OXYGEN SATURATION: 94 % | HEART RATE: 74 BPM

## 2023-05-19 DIAGNOSIS — R05.1 ACUTE COUGH: Primary | ICD-10-CM

## 2023-05-19 DIAGNOSIS — J44.1 COPD WITH EXACERBATION: ICD-10-CM

## 2023-05-19 LAB
EXPIRATION DATE: NORMAL
FLUAV AG UPPER RESP QL IA.RAPID: NOT DETECTED
FLUBV AG UPPER RESP QL IA.RAPID: NOT DETECTED
INTERNAL CONTROL: NORMAL
Lab: NORMAL
SARS-COV-2 AG UPPER RESP QL IA.RAPID: NOT DETECTED

## 2023-05-19 PROCEDURE — 87428 SARSCOV & INF VIR A&B AG IA: CPT | Performed by: PHYSICIAN ASSISTANT

## 2023-05-19 PROCEDURE — 3078F DIAST BP <80 MM HG: CPT | Performed by: PHYSICIAN ASSISTANT

## 2023-05-19 PROCEDURE — 1160F RVW MEDS BY RX/DR IN RCRD: CPT | Performed by: PHYSICIAN ASSISTANT

## 2023-05-19 PROCEDURE — 1159F MED LIST DOCD IN RCRD: CPT | Performed by: PHYSICIAN ASSISTANT

## 2023-05-19 PROCEDURE — 3074F SYST BP LT 130 MM HG: CPT | Performed by: PHYSICIAN ASSISTANT

## 2023-05-19 PROCEDURE — 99213 OFFICE O/P EST LOW 20 MIN: CPT | Performed by: PHYSICIAN ASSISTANT

## 2023-05-19 RX ORDER — PREDNISONE 20 MG/1
TABLET ORAL
Qty: 18 TABLET | Refills: 0 | Status: ON HOLD | OUTPATIENT
Start: 2023-05-19 | End: 2023-05-22

## 2023-05-19 RX ORDER — DOXYCYCLINE HYCLATE 100 MG/1
100 CAPSULE ORAL 2 TIMES DAILY
Qty: 20 CAPSULE | Refills: 0 | Status: ON HOLD | OUTPATIENT
Start: 2023-05-19 | End: 2023-05-22

## 2023-05-19 NOTE — PROGRESS NOTES
Subjective   Dafne Mary is a 82 y.o. female who presents today for evaluation of cough, congestion, wheezing, and headache.     History of Present Illness     Started a few days ago with deep cough, wheezing, SOA with getting up to do anything. Headache since yesterday and has had sore throat with coughing.   No fever, V, D, ear pain.   Oxygen 88% if walks across the room then up to 93-95%.  No one with similar symptoms. Went to [facility] 5/13/2023.     Patient called 5/17/2023 reporting a shortage of DuoNeb and wanting to know if we had samples.  She was advised to contact her pulmonologist-we do not have samples of that medication and medication is filled and managed through pulmonology. Reports she called pulmonology- they wanted to know why she could not get it and have not made recommendations. Has been out a week. Usually used once daily to twice daily.     The following portions of the patient's history were reviewed and updated as appropriate: allergies, current medications, past family history, past medical history, past social history, past surgical history and problem list.    Review of Systems    Objective   Vitals:    05/19/23 1308   BP: 98/42   Pulse:    Resp:    Temp:    SpO2:      Body mass index is 26.21 kg/m².    Physical Exam    Assessment & Plan   Diagnoses and all orders for this visit:    1. Acute cough (Primary)  -     POCT SARS-CoV-2 Antigen SATISH + Flu    2. COPD with exacerbation  -     predniSONE (DELTASONE) 20 MG tablet; Take 3 tabs today then 2 tabs po qd x 5 days then 1 tab po qd x 5 days  Dispense: 18 tablet; Refill: 0  -     doxycycline (VIBRAMYCIN) 100 MG capsule; Take 1 capsule by mouth 2 (Two) Times a Day.  Dispense: 20 capsule; Refill: 0        Assessment and Plan  I discussed with patient my concern for her having acute URI. She has complicated lung disease with hospitalization with prior viral URI and 2 intubation in the last year.     Patient to obtain pulse oximeter and  monitor oxygen and pulse closely at rest, with ambulation, and if possible, have someone monitor occasionally while sleeping. To ER ASAP if any oxygen saturation less than 90%, persistent tachycardia, or if worsening respiratory symptoms or systemic symptoms- uncontrolled fever, weakness, dizziness, confusion or mental status changes. Treat symptoms- to use Mucinex DM twice daily, Nasacort or Flonase 2 sprays in each nostril once daily and Claritin or Zyrtec 10 mg once daily if nasal congestion. Avoid decongestants to avoid worsening tachycardia. Tylenol as needed for fever and ensure proper hydration. I will cover with Doxycycline 100 mg twice daily for 10 days and prednisone taper. She should ensure she is using inhalers, nebulizers, and using albuterol. She has run out of DuoNeb, being told they had a shortage. I asked that she contact her pulmonologist again for recommendations about Duoneb.     To be seen if no improvement, worsening, new, or changing symptoms or if no resolution of symptoms. Patient has been counseled about timing of Covid 19 testing for accuracy. If testing prior to 5 days of symptoms, continued quarantine and retesting at 5-7 days of symptoms for more accurate testing. Quarantine as directed today.       I spent 20 minutes caring for Dafne Mary on this date of service. This time includes time spent by me in the following activities as necessary: preparing for the visit, reviewing tests, specialists records and previous visits, obtaining and/or reviewing a separately obtained history, performing a medically appropriate exam and/or evaluation, counseling and educating the patient, family, caregiver, referring and/or communicating with other healthcare professionals, documenting information in the medical record, independently interpreting results and communicating that information with the patient, family, caregiver, and developing a medically appropriate treatment plan with consideration  of other conditions, medications, and treatments.

## 2023-05-20 ENCOUNTER — APPOINTMENT (OUTPATIENT)
Dept: GENERAL RADIOLOGY | Facility: HOSPITAL | Age: 83
DRG: 208 | End: 2023-05-20
Payer: MEDICARE

## 2023-05-20 ENCOUNTER — HOSPITAL ENCOUNTER (INPATIENT)
Facility: HOSPITAL | Age: 83
LOS: 6 days | Discharge: HOME | DRG: 208 | End: 2023-05-27
Attending: EMERGENCY MEDICINE | Admitting: INTERNAL MEDICINE
Payer: MEDICARE

## 2023-05-20 DIAGNOSIS — J44.1 COPD WITH ACUTE EXACERBATION: Primary | ICD-10-CM

## 2023-05-20 DIAGNOSIS — E87.6 HYPOKALEMIA: ICD-10-CM

## 2023-05-20 DIAGNOSIS — R94.31 ABNORMAL ECG: ICD-10-CM

## 2023-05-20 LAB
ALBUMIN SERPL-MCNC: 4.3 G/DL (ref 3.5–5.2)
ALBUMIN/GLOB SERPL: 1.6 G/DL
ALP SERPL-CCNC: 134 U/L (ref 39–117)
ALT SERPL W P-5'-P-CCNC: 25 U/L (ref 1–33)
ANION GAP SERPL CALCULATED.3IONS-SCNC: 14 MMOL/L (ref 5–15)
AST SERPL-CCNC: 30 U/L (ref 1–32)
BASOPHILS # BLD AUTO: 0.01 10*3/MM3 (ref 0–0.2)
BASOPHILS NFR BLD AUTO: 0.1 % (ref 0–1.5)
BILIRUB SERPL-MCNC: 0.4 MG/DL (ref 0–1.2)
BUN SERPL-MCNC: 15 MG/DL (ref 8–23)
BUN/CREAT SERPL: 20 (ref 7–25)
CALCIUM SPEC-SCNC: 9 MG/DL (ref 8.6–10.5)
CHLORIDE SERPL-SCNC: 95 MMOL/L (ref 98–107)
CO2 SERPL-SCNC: 21 MMOL/L (ref 22–29)
CREAT SERPL-MCNC: 0.75 MG/DL (ref 0.57–1)
DEPRECATED RDW RBC AUTO: 39.8 FL (ref 37–54)
EGFRCR SERPLBLD CKD-EPI 2021: 79.6 ML/MIN/1.73
EOSINOPHIL # BLD AUTO: 0 10*3/MM3 (ref 0–0.4)
EOSINOPHIL NFR BLD AUTO: 0 % (ref 0.3–6.2)
ERYTHROCYTE [DISTWIDTH] IN BLOOD BY AUTOMATED COUNT: 12.7 % (ref 12.3–15.4)
GLOBULIN UR ELPH-MCNC: 2.7 GM/DL
GLUCOSE SERPL-MCNC: 163 MG/DL (ref 65–99)
HCT VFR BLD AUTO: 41.3 % (ref 34–46.6)
HGB BLD-MCNC: 13.7 G/DL (ref 12–15.9)
HOLD SPECIMEN: NORMAL
HOLD SPECIMEN: NORMAL
IMM GRANULOCYTES # BLD AUTO: 0.03 10*3/MM3 (ref 0–0.05)
IMM GRANULOCYTES NFR BLD AUTO: 0.4 % (ref 0–0.5)
LYMPHOCYTES # BLD AUTO: 0.51 10*3/MM3 (ref 0.7–3.1)
LYMPHOCYTES NFR BLD AUTO: 7.3 % (ref 19.6–45.3)
MCH RBC QN AUTO: 28.7 PG (ref 26.6–33)
MCHC RBC AUTO-ENTMCNC: 33.2 G/DL (ref 31.5–35.7)
MCV RBC AUTO: 86.4 FL (ref 79–97)
MONOCYTES # BLD AUTO: 0.34 10*3/MM3 (ref 0.1–0.9)
MONOCYTES NFR BLD AUTO: 4.9 % (ref 5–12)
NEUTROPHILS NFR BLD AUTO: 6.11 10*3/MM3 (ref 1.7–7)
NEUTROPHILS NFR BLD AUTO: 87.3 % (ref 42.7–76)
NRBC BLD AUTO-RTO: 0 /100 WBC (ref 0–0.2)
NT-PROBNP SERPL-MCNC: 1714 PG/ML (ref 0–1800)
PLATELET # BLD AUTO: 242 10*3/MM3 (ref 140–450)
PMV BLD AUTO: 8.8 FL (ref 6–12)
POTASSIUM SERPL-SCNC: 3.1 MMOL/L (ref 3.5–5.2)
PROT SERPL-MCNC: 7 G/DL (ref 6–8.5)
RBC # BLD AUTO: 4.78 10*6/MM3 (ref 3.77–5.28)
SODIUM SERPL-SCNC: 130 MMOL/L (ref 136–145)
TROPONIN T SERPL HS-MCNC: 9 NG/L
WBC NRBC COR # BLD: 7 10*3/MM3 (ref 3.4–10.8)
WHOLE BLOOD HOLD COAG: NORMAL
WHOLE BLOOD HOLD SPECIMEN: NORMAL

## 2023-05-20 PROCEDURE — 36415 COLL VENOUS BLD VENIPUNCTURE: CPT

## 2023-05-20 PROCEDURE — 85025 COMPLETE CBC W/AUTO DIFF WBC: CPT

## 2023-05-20 PROCEDURE — 93005 ELECTROCARDIOGRAM TRACING: CPT | Performed by: EMERGENCY MEDICINE

## 2023-05-20 PROCEDURE — 93010 ELECTROCARDIOGRAM REPORT: CPT | Performed by: INTERNAL MEDICINE

## 2023-05-20 PROCEDURE — 93005 ELECTROCARDIOGRAM TRACING: CPT

## 2023-05-20 PROCEDURE — 71046 X-RAY EXAM CHEST 2 VIEWS: CPT

## 2023-05-20 PROCEDURE — 83880 ASSAY OF NATRIURETIC PEPTIDE: CPT

## 2023-05-20 PROCEDURE — 84484 ASSAY OF TROPONIN QUANT: CPT

## 2023-05-20 PROCEDURE — 83735 ASSAY OF MAGNESIUM: CPT | Performed by: EMERGENCY MEDICINE

## 2023-05-20 PROCEDURE — 99285 EMERGENCY DEPT VISIT HI MDM: CPT

## 2023-05-20 PROCEDURE — 80053 COMPREHEN METABOLIC PANEL: CPT

## 2023-05-20 RX ORDER — SODIUM CHLORIDE 0.9 % (FLUSH) 0.9 %
10 SYRINGE (ML) INJECTION AS NEEDED
Status: DISCONTINUED | OUTPATIENT
Start: 2023-05-20 | End: 2023-05-27 | Stop reason: HOSPADM

## 2023-05-21 ENCOUNTER — APPOINTMENT (OUTPATIENT)
Dept: GENERAL RADIOLOGY | Facility: HOSPITAL | Age: 83
DRG: 208 | End: 2023-05-21
Payer: MEDICARE

## 2023-05-21 PROBLEM — J96.10 CHRONIC RESPIRATORY FAILURE: Status: ACTIVE | Noted: 2023-05-21

## 2023-05-21 PROBLEM — J96.00 ACUTE RESPIRATORY FAILURE: Status: ACTIVE | Noted: 2023-05-21

## 2023-05-21 LAB
ANION GAP SERPL CALCULATED.3IONS-SCNC: 13 MMOL/L (ref 5–15)
ARTERIAL PATENCY WRIST A: ABNORMAL
ARTERIAL PATENCY WRIST A: ABNORMAL
ATMOSPHERIC PRESS: 755 MMHG
ATMOSPHERIC PRESS: 756.1 MMHG
B PARAPERT DNA SPEC QL NAA+PROBE: NOT DETECTED
B PERT DNA SPEC QL NAA+PROBE: NOT DETECTED
BASE EXCESS BLDA CALC-SCNC: -0.5 MMOL/L (ref 0–2)
BASE EXCESS BLDA CALC-SCNC: -2.1 MMOL/L (ref 0–2)
BDY SITE: ABNORMAL
BDY SITE: ABNORMAL
BUN SERPL-MCNC: 14 MG/DL (ref 8–23)
BUN/CREAT SERPL: 18.4 (ref 7–25)
C PNEUM DNA NPH QL NAA+NON-PROBE: NOT DETECTED
CALCIUM SPEC-SCNC: 9.2 MG/DL (ref 8.6–10.5)
CHLORIDE SERPL-SCNC: 101 MMOL/L (ref 98–107)
CO2 SERPL-SCNC: 23 MMOL/L (ref 22–29)
CREAT SERPL-MCNC: 0.76 MG/DL (ref 0.57–1)
EGFRCR SERPLBLD CKD-EPI 2021: 78.3 ML/MIN/1.73
FLUAV SUBTYP SPEC NAA+PROBE: NOT DETECTED
FLUBV RNA ISLT QL NAA+PROBE: NOT DETECTED
GLUCOSE BLDC GLUCOMTR-MCNC: 177 MG/DL (ref 70–130)
GLUCOSE BLDC GLUCOMTR-MCNC: 197 MG/DL (ref 70–130)
GLUCOSE SERPL-MCNC: 154 MG/DL (ref 65–99)
HADV DNA SPEC NAA+PROBE: NOT DETECTED
HCO3 BLDA-SCNC: 25.4 MMOL/L (ref 22–28)
HCO3 BLDA-SCNC: 25.5 MMOL/L (ref 22–28)
HCOV 229E RNA SPEC QL NAA+PROBE: NOT DETECTED
HCOV HKU1 RNA SPEC QL NAA+PROBE: NOT DETECTED
HCOV NL63 RNA SPEC QL NAA+PROBE: NOT DETECTED
HCOV OC43 RNA SPEC QL NAA+PROBE: NOT DETECTED
HMPV RNA NPH QL NAA+NON-PROBE: NOT DETECTED
HPIV1 RNA ISLT QL NAA+PROBE: NOT DETECTED
HPIV2 RNA SPEC QL NAA+PROBE: NOT DETECTED
HPIV3 RNA NPH QL NAA+PROBE: DETECTED
HPIV4 P GENE NPH QL NAA+PROBE: NOT DETECTED
INHALED O2 CONCENTRATION: 40 %
INHALED O2 CONCENTRATION: 70 %
M PNEUMO IGG SER IA-ACNC: NOT DETECTED
MAGNESIUM SERPL-MCNC: 1.9 MG/DL (ref 1.6–2.4)
MODALITY: ABNORMAL
MODALITY: ABNORMAL
O2 A-A PPRESDIFF RESPIRATORY: 0.3 MMHG
O2 A-A PPRESDIFF RESPIRATORY: 0.4 MMHG
PCO2 BLDA: 45.4 MM HG (ref 35–45)
PCO2 BLDA: 53.2 MM HG (ref 35–45)
PEEP RESPIRATORY: 5 CM[H2O]
PEEP RESPIRATORY: 5 CM[H2O]
PH BLDA: 7.29 PH UNITS (ref 7.35–7.45)
PH BLDA: 7.36 PH UNITS (ref 7.35–7.45)
PO2 BLDA: 175.3 MM HG (ref 80–100)
PO2 BLDA: 84.5 MM HG (ref 80–100)
POTASSIUM SERPL-SCNC: 3.6 MMOL/L (ref 3.5–5.2)
QT INTERVAL: 411 MS
RHINOVIRUS RNA SPEC NAA+PROBE: NOT DETECTED
RSV RNA NPH QL NAA+NON-PROBE: NOT DETECTED
SAO2 % BLDCOA: 95.8 % (ref 92–99)
SAO2 % BLDCOA: 99.4 % (ref 92–99)
SARS-COV-2 RNA NPH QL NAA+NON-PROBE: NOT DETECTED
SET MECH RESP RATE: 18
SET MECH RESP RATE: 22
SODIUM SERPL-SCNC: 137 MMOL/L (ref 136–145)
TOTAL RATE: 23 BREATHS/MINUTE
TOTAL RATE: 24 BREATHS/MINUTE
VENTILATOR MODE: ABNORMAL
VENTILATOR MODE: ABNORMAL
VT ON VENT VENT: 541 ML

## 2023-05-21 PROCEDURE — 25010000002 METHYLPREDNISOLONE PER 125 MG: Performed by: INTERNAL MEDICINE

## 2023-05-21 PROCEDURE — 25010000002 AZITHROMYCIN PER 500 MG: Performed by: INTERNAL MEDICINE

## 2023-05-21 PROCEDURE — 0202U NFCT DS 22 TRGT SARS-COV-2: CPT | Performed by: EMERGENCY MEDICINE

## 2023-05-21 PROCEDURE — 94799 UNLISTED PULMONARY SVC/PX: CPT

## 2023-05-21 PROCEDURE — 25010000002 PROPOFOL 10 MG/ML EMULSION: Performed by: INTERNAL MEDICINE

## 2023-05-21 PROCEDURE — 82803 BLOOD GASES ANY COMBINATION: CPT

## 2023-05-21 PROCEDURE — 63710000001 PREDNISONE PER 1 MG: Performed by: INTERNAL MEDICINE

## 2023-05-21 PROCEDURE — 94664 DEMO&/EVAL PT USE INHALER: CPT

## 2023-05-21 PROCEDURE — 94761 N-INVAS EAR/PLS OXIMETRY MLT: CPT

## 2023-05-21 PROCEDURE — 0BH17EZ INSERTION OF ENDOTRACHEAL AIRWAY INTO TRACHEA, VIA NATURAL OR ARTIFICIAL OPENING: ICD-10-PCS | Performed by: INTERNAL MEDICINE

## 2023-05-21 PROCEDURE — 71045 X-RAY EXAM CHEST 1 VIEW: CPT

## 2023-05-21 PROCEDURE — 94002 VENT MGMT INPAT INIT DAY: CPT

## 2023-05-21 PROCEDURE — 36600 WITHDRAWAL OF ARTERIAL BLOOD: CPT

## 2023-05-21 PROCEDURE — 25010000002 LORAZEPAM PER 2 MG: Performed by: EMERGENCY MEDICINE

## 2023-05-21 PROCEDURE — 94760 N-INVAS EAR/PLS OXIMETRY 1: CPT

## 2023-05-21 PROCEDURE — 82948 REAGENT STRIP/BLOOD GLUCOSE: CPT

## 2023-05-21 PROCEDURE — 25010000002 FENTANYL CITRATE (PF) 50 MCG/ML SOLUTION

## 2023-05-21 PROCEDURE — 80048 BASIC METABOLIC PNL TOTAL CA: CPT | Performed by: NURSE PRACTITIONER

## 2023-05-21 PROCEDURE — 94640 AIRWAY INHALATION TREATMENT: CPT

## 2023-05-21 PROCEDURE — 25010000002 MAGNESIUM SULFATE 2 GM/50ML SOLUTION: Performed by: EMERGENCY MEDICINE

## 2023-05-21 PROCEDURE — 25010000002 METHYLPREDNISOLONE PER 125 MG: Performed by: EMERGENCY MEDICINE

## 2023-05-21 PROCEDURE — 5A1935Z RESPIRATORY VENTILATION, LESS THAN 24 CONSECUTIVE HOURS: ICD-10-PCS | Performed by: INTERNAL MEDICINE

## 2023-05-21 RX ORDER — PANTOPRAZOLE SODIUM 40 MG/1
40 TABLET, DELAYED RELEASE ORAL
Status: DISCONTINUED | OUTPATIENT
Start: 2023-05-21 | End: 2023-05-27 | Stop reason: HOSPADM

## 2023-05-21 RX ORDER — POTASSIUM CHLORIDE 7.45 MG/ML
10 INJECTION INTRAVENOUS ONCE
Status: DISCONTINUED | OUTPATIENT
Start: 2023-05-21 | End: 2023-05-21

## 2023-05-21 RX ORDER — FENTANYL CITRATE 50 UG/ML
INJECTION, SOLUTION INTRAMUSCULAR; INTRAVENOUS
Status: COMPLETED
Start: 2023-05-21 | End: 2023-05-21

## 2023-05-21 RX ORDER — ALBUTEROL SULFATE 2.5 MG/3ML
2.5 SOLUTION RESPIRATORY (INHALATION) EVERY 6 HOURS PRN
Status: DISCONTINUED | OUTPATIENT
Start: 2023-05-21 | End: 2023-05-27 | Stop reason: HOSPADM

## 2023-05-21 RX ORDER — ATENOLOL 50 MG/1
50 TABLET ORAL DAILY
Status: DISCONTINUED | OUTPATIENT
Start: 2023-05-21 | End: 2023-05-24

## 2023-05-21 RX ORDER — MAGNESIUM SULFATE HEPTAHYDRATE 40 MG/ML
2 INJECTION, SOLUTION INTRAVENOUS ONCE
Status: COMPLETED | OUTPATIENT
Start: 2023-05-21 | End: 2023-05-21

## 2023-05-21 RX ORDER — METHYLPREDNISOLONE SODIUM SUCCINATE 125 MG/2ML
125 INJECTION, POWDER, LYOPHILIZED, FOR SOLUTION INTRAMUSCULAR; INTRAVENOUS ONCE
Status: COMPLETED | OUTPATIENT
Start: 2023-05-21 | End: 2023-05-21

## 2023-05-21 RX ORDER — SODIUM CHLORIDE 0.9 % (FLUSH) 0.9 %
10 SYRINGE (ML) INJECTION EVERY 12 HOURS SCHEDULED
Status: DISCONTINUED | OUTPATIENT
Start: 2023-05-21 | End: 2023-05-27 | Stop reason: HOSPADM

## 2023-05-21 RX ORDER — ASPIRIN 81 MG/1
81 TABLET, CHEWABLE ORAL DAILY
Status: DISCONTINUED | OUTPATIENT
Start: 2023-05-21 | End: 2023-05-27 | Stop reason: HOSPADM

## 2023-05-21 RX ORDER — LOSARTAN POTASSIUM 100 MG/1
100 TABLET ORAL DAILY
Status: DISCONTINUED | OUTPATIENT
Start: 2023-05-21 | End: 2023-05-24

## 2023-05-21 RX ORDER — ATORVASTATIN CALCIUM 20 MG/1
20 TABLET, FILM COATED ORAL NIGHTLY
Status: DISCONTINUED | OUTPATIENT
Start: 2023-05-21 | End: 2023-05-27 | Stop reason: HOSPADM

## 2023-05-21 RX ORDER — BISACODYL 5 MG/1
5 TABLET, DELAYED RELEASE ORAL DAILY PRN
Status: DISCONTINUED | OUTPATIENT
Start: 2023-05-21 | End: 2023-05-27 | Stop reason: HOSPADM

## 2023-05-21 RX ORDER — IPRATROPIUM BROMIDE AND ALBUTEROL SULFATE 2.5; .5 MG/3ML; MG/3ML
3 SOLUTION RESPIRATORY (INHALATION)
Status: DISCONTINUED | OUTPATIENT
Start: 2023-05-21 | End: 2023-05-27 | Stop reason: HOSPADM

## 2023-05-21 RX ORDER — PREDNISONE 20 MG/1
40 TABLET ORAL
Status: DISCONTINUED | OUTPATIENT
Start: 2023-05-21 | End: 2023-05-21

## 2023-05-21 RX ORDER — SODIUM CHLORIDE 0.9 % (FLUSH) 0.9 %
10 SYRINGE (ML) INJECTION AS NEEDED
Status: DISCONTINUED | OUTPATIENT
Start: 2023-05-21 | End: 2023-05-27 | Stop reason: HOSPADM

## 2023-05-21 RX ORDER — LORAZEPAM 2 MG/ML
0.5 INJECTION INTRAMUSCULAR ONCE
Status: COMPLETED | OUTPATIENT
Start: 2023-05-21 | End: 2023-05-21

## 2023-05-21 RX ORDER — METHYLPREDNISOLONE SODIUM SUCCINATE 40 MG/ML
40 INJECTION, POWDER, LYOPHILIZED, FOR SOLUTION INTRAMUSCULAR; INTRAVENOUS EVERY 6 HOURS
Status: DISCONTINUED | OUTPATIENT
Start: 2023-05-21 | End: 2023-05-21

## 2023-05-21 RX ORDER — AMOXICILLIN 250 MG
2 CAPSULE ORAL 2 TIMES DAILY
Status: DISCONTINUED | OUTPATIENT
Start: 2023-05-21 | End: 2023-05-27 | Stop reason: HOSPADM

## 2023-05-21 RX ORDER — METHYLPREDNISOLONE SODIUM SUCCINATE 125 MG/2ML
60 INJECTION, POWDER, LYOPHILIZED, FOR SOLUTION INTRAMUSCULAR; INTRAVENOUS EVERY 12 HOURS
Status: DISCONTINUED | OUTPATIENT
Start: 2023-05-22 | End: 2023-05-23

## 2023-05-21 RX ORDER — IPRATROPIUM BROMIDE AND ALBUTEROL SULFATE 2.5; .5 MG/3ML; MG/3ML
3 SOLUTION RESPIRATORY (INHALATION)
Status: DISCONTINUED | OUTPATIENT
Start: 2023-05-21 | End: 2023-05-21

## 2023-05-21 RX ORDER — POLYETHYLENE GLYCOL 3350 17 G/17G
17 POWDER, FOR SOLUTION ORAL DAILY PRN
Status: DISCONTINUED | OUTPATIENT
Start: 2023-05-21 | End: 2023-05-27 | Stop reason: HOSPADM

## 2023-05-21 RX ORDER — FENTANYL CITRATE 50 UG/ML
100 INJECTION, SOLUTION INTRAMUSCULAR; INTRAVENOUS ONCE
Status: COMPLETED | OUTPATIENT
Start: 2023-05-21 | End: 2023-05-21

## 2023-05-21 RX ORDER — LEVOTHYROXINE AND LIOTHYRONINE 19; 4.5 UG/1; UG/1
15 TABLET ORAL DAILY
Status: DISCONTINUED | OUTPATIENT
Start: 2023-05-21 | End: 2023-05-27 | Stop reason: HOSPADM

## 2023-05-21 RX ORDER — PAROXETINE HYDROCHLORIDE 20 MG/1
20 TABLET, FILM COATED ORAL DAILY
Status: DISCONTINUED | OUTPATIENT
Start: 2023-05-21 | End: 2023-05-27 | Stop reason: HOSPADM

## 2023-05-21 RX ORDER — BISACODYL 10 MG
10 SUPPOSITORY, RECTAL RECTAL DAILY PRN
Status: DISCONTINUED | OUTPATIENT
Start: 2023-05-21 | End: 2023-05-27 | Stop reason: HOSPADM

## 2023-05-21 RX ORDER — IPRATROPIUM BROMIDE AND ALBUTEROL SULFATE 2.5; .5 MG/3ML; MG/3ML
3 SOLUTION RESPIRATORY (INHALATION) EVERY 4 HOURS PRN
Status: DISCONTINUED | OUTPATIENT
Start: 2023-05-21 | End: 2023-05-21

## 2023-05-21 RX ORDER — SODIUM CHLORIDE 9 MG/ML
40 INJECTION, SOLUTION INTRAVENOUS AS NEEDED
Status: DISCONTINUED | OUTPATIENT
Start: 2023-05-21 | End: 2023-05-27 | Stop reason: HOSPADM

## 2023-05-21 RX ORDER — POTASSIUM CHLORIDE 750 MG/1
40 TABLET, FILM COATED, EXTENDED RELEASE ORAL EVERY 4 HOURS
Status: COMPLETED | OUTPATIENT
Start: 2023-05-21 | End: 2023-05-21

## 2023-05-21 RX ORDER — LEVOTHYROXINE AND LIOTHYRONINE 19; 4.5 UG/1; UG/1
30 TABLET ORAL DAILY
Status: DISCONTINUED | OUTPATIENT
Start: 2023-05-21 | End: 2023-05-27 | Stop reason: HOSPADM

## 2023-05-21 RX ORDER — AMLODIPINE BESYLATE 5 MG/1
2.5 TABLET ORAL DAILY
Status: DISCONTINUED | OUTPATIENT
Start: 2023-05-21 | End: 2023-05-24

## 2023-05-21 RX ADMIN — POTASSIUM CHLORIDE 40 MEQ: 750 TABLET, EXTENDED RELEASE ORAL at 04:02

## 2023-05-21 RX ADMIN — PROPOFOL 10 MCG/KG/MIN: 10 INJECTION, EMULSION INTRAVENOUS at 14:18

## 2023-05-21 RX ADMIN — FENTANYL CITRATE 100 MCG: 50 INJECTION, SOLUTION INTRAMUSCULAR; INTRAVENOUS at 14:05

## 2023-05-21 RX ADMIN — IPRATROPIUM BROMIDE AND ALBUTEROL SULFATE 3 ML: 2.5; .5 SOLUTION RESPIRATORY (INHALATION) at 19:43

## 2023-05-21 RX ADMIN — SODIUM CHLORIDE, POTASSIUM CHLORIDE, SODIUM LACTATE AND CALCIUM CHLORIDE 1000 ML: 600; 310; 30; 20 INJECTION, SOLUTION INTRAVENOUS at 01:52

## 2023-05-21 RX ADMIN — PAROXETINE HYDROCHLORIDE HEMIHYDRATE 20 MG: 20 TABLET, FILM COATED ORAL at 10:06

## 2023-05-21 RX ADMIN — PROPOFOL 35 MCG/KG/MIN: 10 INJECTION, EMULSION INTRAVENOUS at 18:49

## 2023-05-21 RX ADMIN — IPRATROPIUM BROMIDE AND ALBUTEROL SULFATE 3 ML: 2.5; .5 SOLUTION RESPIRATORY (INHALATION) at 14:55

## 2023-05-21 RX ADMIN — PANTOPRAZOLE SODIUM 40 MG: 40 TABLET, DELAYED RELEASE ORAL at 06:29

## 2023-05-21 RX ADMIN — IPRATROPIUM BROMIDE AND ALBUTEROL SULFATE 3 ML: 2.5; .5 SOLUTION RESPIRATORY (INHALATION) at 07:36

## 2023-05-21 RX ADMIN — METHYLPREDNISOLONE SODIUM SUCCINATE 125 MG: 125 INJECTION, POWDER, FOR SOLUTION INTRAMUSCULAR; INTRAVENOUS at 16:27

## 2023-05-21 RX ADMIN — Medication 10 ML: at 04:03

## 2023-05-21 RX ADMIN — ATENOLOL 50 MG: 50 TABLET ORAL at 08:20

## 2023-05-21 RX ADMIN — IPRATROPIUM BROMIDE AND ALBUTEROL SULFATE 3 ML: 2.5; .5 SOLUTION RESPIRATORY (INHALATION) at 01:19

## 2023-05-21 RX ADMIN — PROPOFOL 40 MCG/KG/MIN: 10 INJECTION, EMULSION INTRAVENOUS at 23:53

## 2023-05-21 RX ADMIN — POTASSIUM CHLORIDE 40 MEQ: 750 TABLET, EXTENDED RELEASE ORAL at 08:21

## 2023-05-21 RX ADMIN — AMLODIPINE BESYLATE 2.5 MG: 2.5 TABLET ORAL at 08:19

## 2023-05-21 RX ADMIN — LOSARTAN POTASSIUM 100 MG: 100 TABLET, FILM COATED ORAL at 08:20

## 2023-05-21 RX ADMIN — ALBUTEROL SULFATE 2.5 MG: 2.5 SOLUTION RESPIRATORY (INHALATION) at 14:15

## 2023-05-21 RX ADMIN — PREDNISONE 40 MG: 20 TABLET ORAL at 10:06

## 2023-05-21 RX ADMIN — IPRATROPIUM BROMIDE AND ALBUTEROL SULFATE 3 ML: 2.5; .5 SOLUTION RESPIRATORY (INHALATION) at 11:59

## 2023-05-21 RX ADMIN — Medication 10 ML: at 20:04

## 2023-05-21 RX ADMIN — FENTANYL CITRATE 100 MCG: 50 INJECTION INTRAMUSCULAR; INTRAVENOUS at 14:05

## 2023-05-21 RX ADMIN — ALBUTEROL SULFATE 2.5 MG: 2.5 SOLUTION RESPIRATORY (INHALATION) at 14:35

## 2023-05-21 RX ADMIN — METHYLPREDNISOLONE SODIUM SUCCINATE 125 MG: 125 INJECTION, POWDER, FOR SOLUTION INTRAMUSCULAR; INTRAVENOUS at 01:51

## 2023-05-21 RX ADMIN — ASPIRIN 81 MG: 81 TABLET, CHEWABLE ORAL at 08:19

## 2023-05-21 RX ADMIN — MAGNESIUM SULFATE HEPTAHYDRATE 2 G: 40 INJECTION, SOLUTION INTRAVENOUS at 01:51

## 2023-05-21 RX ADMIN — AZITHROMYCIN MONOHYDRATE 500 MG: 500 INJECTION, POWDER, LYOPHILIZED, FOR SOLUTION INTRAVENOUS at 06:29

## 2023-05-21 RX ADMIN — LORAZEPAM 0.5 MG: 2 INJECTION INTRAMUSCULAR; INTRAVENOUS at 12:54

## 2023-05-21 NOTE — PROCEDURES
Endotracheal Intubation Procedure Note    Indication for endotracheal intubation: respiratory failure.  Sedation: fentanyl.  And propofol  Equipment: A video GlideScope was used and Génesis 3 laryngoscope blade.  Number of attempts: 1.  ETT location confirmed by by auscultation, by CXR and ETCO2 monitor.    Lisandra Villarreal MD  5/21/2023

## 2023-05-21 NOTE — PLAN OF CARE
Problem: Adult Inpatient Plan of Care  Goal: Plan of Care Review  Outcome: Ongoing, Progressing  Flowsheets (Taken 5/21/2023 6311)  Progress: declining  Plan of Care Reviewed With: patient  Outcome Evaluation: Transfer to CICU. Intubated. Propofol drip infusing. IV solu-medrol given. Restraints in place. SCDs on. Daughter updated at bedside.

## 2023-05-21 NOTE — ED PROVIDER NOTES
EMERGENCY DEPARTMENT ENCOUNTER    Room Number:  18/18  Date seen:  5/21/2023  PCP: Sintia Chatterjee PA      HPI:  Chief Complaint: Shortness of breath  A complete HPI/ROS/PMH/PSH/SH/FH are unobtainable due to: None  Context: Dafne Mary is a 82 y.o. female who presents to the ED c/o shortness of breath.  Onset Thursday.  She has had associated congestion and wheezing.  She was recently treated with prednisone and doxycycline by her PCP but has been progressively worsening.  Her pharmacy is out of nebulizer solution and therefore she has not been able to use this.  She has had not had any chest pain.  She is feeling better after receiving nebulizer therapy from EMS.          PAST MEDICAL HISTORY  Active Ambulatory Problems     Diagnosis Date Noted    Anxiety 07/26/2016    Arteriosclerosis of both carotid arteries 07/26/2016    Chronic interstitial cystitis 07/26/2016    Cough 07/26/2016    Pulmonary emphysema 07/26/2016    Gastroesophageal reflux disease 07/26/2016    Fibromyalgia 07/26/2016    Headache 07/26/2016    Hematuria 07/26/2016    Hoarseness 07/26/2016    Hyperlipidemia 06/08/2012    Benign hypertension 07/26/2016    Postoperative hypothyroidism 06/08/2012    Muscle spasms of both lower extremities 07/26/2016    Palpitations 07/26/2016    Shortness of breath 07/26/2016    Postmenopausal osteoporosis 10/17/2016    Chronic fatigue 10/17/2016    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/17/2016    Dysuria 12/15/2017    Dry cough 12/15/2017    Spondylolysis, lumbar region 06/08/2012    Vocal cord paralysis 08/24/2021    Abnormal finding on thyroid function test 08/24/2021    Positional lightheadedness 11/17/2022    COPD with acute exacerbation 12/08/2022    Hypothyroid 01/06/2023    COPD (chronic obstructive pulmonary disease) 01/06/2023    COPD exacerbation 01/06/2023    Morbid obesity with BMI of 70 and over, adult 01/06/2023    RSV bronchiolitis 01/07/2023    Vitamin D deficiency 04/09/2023    B12 deficiency 04/09/2023     "Iron deficiency 04/09/2023    Decreased hemoglobin 04/09/2023    Generalized anxiety disorder 04/09/2023    Chronic bilateral low back pain with left-sided sciatica 04/09/2023    Facet arthropathy, lumbar 04/09/2023    Spinal stenosis of lumbar region 04/09/2023    Spondylolisthesis of lumbar region 04/09/2023    Scoliosis of lumbar spine 04/09/2023    Chronic congestive heart failure 04/09/2023    History of non-ST elevation myocardial infarction (NSTEMI) 04/09/2023     Resolved Ambulatory Problems     Diagnosis Date Noted    Leukocytes in urine 12/15/2017    Cystitis with hematuria 12/15/2017    Acute cystitis without hematuria 12/15/2017    Acute respiratory failure with hypoxia and hypercapnia 05/03/2022    Chronic diastolic congestive heart failure 11/17/2022     Past Medical History:   Diagnosis Date    Depression     Emphysema lung     GERD (gastroesophageal reflux disease)     Heart failure     Hypertension     Hypothyroidism          PAST SURGICAL HISTORY  Past Surgical History:   Procedure Laterality Date    EYE SURGERY Left     PARATHYROIDECTOMY      Patient reports thyroidectomy partial not a para thyroidectomy.    THYROIDECTOMY, PARTIAL      1984         FAMILY HISTORY  Family History   Problem Relation Age of Onset    Alzheimer's disease Mother     Lung disease Father     Alcohol abuse Father     Heart disease Brother     Hypertension Brother     Lung disease Brother     Alcohol abuse Brother     Cancer Brother         LUNG  WAS A SMOKER    Thyroid disease Maternal Grandmother          SOCIAL HISTORY  Social History     Socioeconomic History    Marital status:    Tobacco Use    Smoking status: Never     Passive exposure: Never    Smokeless tobacco: Never   Vaping Use    Vaping Use: Never used   Substance and Sexual Activity    Alcohol use: Yes     Comment: \"occ\"; caffeine use- tea    Drug use: No    Sexual activity: Defer         ALLERGIES  Lansoprazole, Diphenhydramine hcl (sleep), and " Lisinopril        REVIEW OF SYSTEMS  Review of Systems     All systems reviewed and negative except for those discussed in HPI.       PHYSICAL EXAM  ED Triage Vitals [05/20/23 2016]   Temp Heart Rate Resp BP SpO2   98 °F (36.7 °C) 98 18 142/84 99 %      Temp src Heart Rate Source Patient Position BP Location FiO2 (%)   Tympanic Monitor Sitting Right arm --       Physical Exam      GENERAL: no acute distress  HENT: nares patent  EYES: no scleral icterus  CV: regular rhythm, normal rate  RESPIRATORY: Supraclavicular retractions, expiratory wheezing bilaterally  ABDOMEN: soft, nontender  MUSCULOSKELETAL: no deformity, no lower extremity edema or tenderness  NEURO: alert, moves all extremities, follows commands  PSYCH:  calm, cooperative  SKIN: warm, dry    Vital signs and nursing notes reviewed.          LAB RESULTS  Recent Results (from the past 24 hour(s))   ECG 12 Lead Dyspnea    Collection Time: 05/20/23  8:26 PM   Result Value Ref Range    QT Interval 411 ms   Comprehensive Metabolic Panel    Collection Time: 05/20/23  9:25 PM    Specimen: Arm, Left; Blood   Result Value Ref Range    Glucose 163 (H) 65 - 99 mg/dL    BUN 15 8 - 23 mg/dL    Creatinine 0.75 0.57 - 1.00 mg/dL    Sodium 130 (L) 136 - 145 mmol/L    Potassium 3.1 (L) 3.5 - 5.2 mmol/L    Chloride 95 (L) 98 - 107 mmol/L    CO2 21.0 (L) 22.0 - 29.0 mmol/L    Calcium 9.0 8.6 - 10.5 mg/dL    Total Protein 7.0 6.0 - 8.5 g/dL    Albumin 4.3 3.5 - 5.2 g/dL    ALT (SGPT) 25 1 - 33 U/L    AST (SGOT) 30 1 - 32 U/L    Alkaline Phosphatase 134 (H) 39 - 117 U/L    Total Bilirubin 0.4 0.0 - 1.2 mg/dL    Globulin 2.7 gm/dL    A/G Ratio 1.6 g/dL    BUN/Creatinine Ratio 20.0 7.0 - 25.0    Anion Gap 14.0 5.0 - 15.0 mmol/L    eGFR 79.6 >60.0 mL/min/1.73   BNP    Collection Time: 05/20/23  9:25 PM    Specimen: Arm, Left; Blood   Result Value Ref Range    proBNP 1,714.0 0.0 - 1,800.0 pg/mL   Single High Sensitivity Troponin T    Collection Time: 05/20/23  9:25 PM    Specimen:  Arm, Left; Blood   Result Value Ref Range    HS Troponin T 9 <10 ng/L   Green Top (Gel)    Collection Time: 05/20/23  9:25 PM   Result Value Ref Range    Extra Tube Hold for add-ons.    Lavender Top    Collection Time: 05/20/23  9:25 PM   Result Value Ref Range    Extra Tube hold for add-on    Gold Top - SST    Collection Time: 05/20/23  9:25 PM   Result Value Ref Range    Extra Tube Hold for add-ons.    Light Blue Top    Collection Time: 05/20/23  9:25 PM   Result Value Ref Range    Extra Tube Hold for add-ons.    CBC Auto Differential    Collection Time: 05/20/23  9:25 PM    Specimen: Arm, Left; Blood   Result Value Ref Range    WBC 7.00 3.40 - 10.80 10*3/mm3    RBC 4.78 3.77 - 5.28 10*6/mm3    Hemoglobin 13.7 12.0 - 15.9 g/dL    Hematocrit 41.3 34.0 - 46.6 %    MCV 86.4 79.0 - 97.0 fL    MCH 28.7 26.6 - 33.0 pg    MCHC 33.2 31.5 - 35.7 g/dL    RDW 12.7 12.3 - 15.4 %    RDW-SD 39.8 37.0 - 54.0 fl    MPV 8.8 6.0 - 12.0 fL    Platelets 242 140 - 450 10*3/mm3    Neutrophil % 87.3 (H) 42.7 - 76.0 %    Lymphocyte % 7.3 (L) 19.6 - 45.3 %    Monocyte % 4.9 (L) 5.0 - 12.0 %    Eosinophil % 0.0 (L) 0.3 - 6.2 %    Basophil % 0.1 0.0 - 1.5 %    Immature Grans % 0.4 0.0 - 0.5 %    Neutrophils, Absolute 6.11 1.70 - 7.00 10*3/mm3    Lymphocytes, Absolute 0.51 (L) 0.70 - 3.10 10*3/mm3    Monocytes, Absolute 0.34 0.10 - 0.90 10*3/mm3    Eosinophils, Absolute 0.00 0.00 - 0.40 10*3/mm3    Basophils, Absolute 0.01 0.00 - 0.20 10*3/mm3    Immature Grans, Absolute 0.03 0.00 - 0.05 10*3/mm3    nRBC 0.0 0.0 - 0.2 /100 WBC   Magnesium    Collection Time: 05/20/23  9:25 PM    Specimen: Arm, Left; Blood   Result Value Ref Range    Magnesium 1.9 1.6 - 2.4 mg/dL       Ordered the above labs and reviewed the results.        RADIOLOGY  XR Chest 2 View    Result Date: 5/20/2023  CHEST: 2 VIEWS  HISTORY: Shortness of air.  COMPARISON: AP chest 01/06/2023, CT angiogram chest 12/08/2022.  FINDINGS: Heart and mediastinal structures appear within  normal limits. There is a small subsegmental thin linear lingular opacity most likely representing scarring or atelectasis. Lungs otherwise clear and there is no evidence for pulmonary or pleural effusion or infiltrate. There is a scoliotic curvature of the thoracolumbar spine.      Mild segmental lingular scar or atelectasis. Lungs are otherwise clear and there is no evidence for CHF.  This report was finalized on 5/20/2023 11:07 PM by Dr. Alcon Canales M.D.       Ordered the above noted radiological studies. Reviewed by me in PACS.          PROCEDURES  Procedures          MEDICATIONS GIVEN IN ER  Medications   sodium chloride 0.9 % flush 10 mL (has no administration in time range)   ipratropium-albuterol (DUO-NEB) nebulizer solution 3 mL (3 mL Nebulization Given 5/21/23 0119)   lactated ringers bolus 1,000 mL (1,000 mL Intravenous New Bag 5/21/23 0152)   potassium chloride 10 mEq in 100 mL IVPB (has no administration in time range)     And   potassium chloride 10 mEq in 100 mL IVPB (has no administration in time range)     And   potassium chloride 10 mEq in 100 mL IVPB (has no administration in time range)     And   potassium chloride 10 mEq in 100 mL IVPB (has no administration in time range)   sodium chloride 0.9 % flush 10 mL (has no administration in time range)   sodium chloride 0.9 % flush 10 mL (has no administration in time range)   sodium chloride 0.9 % infusion 40 mL (has no administration in time range)   sennosides-docusate (PERICOLACE) 8.6-50 MG per tablet 2 tablet (has no administration in time range)     And   polyethylene glycol (MIRALAX) packet 17 g (has no administration in time range)     And   bisacodyl (DULCOLAX) EC tablet 5 mg (has no administration in time range)     And   bisacodyl (DULCOLAX) suppository 10 mg (has no administration in time range)   methylPREDNISolone sodium succinate (SOLU-Medrol) injection 40 mg (has no administration in time range)   methylPREDNISolone sodium  succinate (SOLU-Medrol) injection 125 mg (125 mg Intravenous Given 5/21/23 0151)   magnesium sulfate 2g/50 mL (PREMIX) infusion (2 g Intravenous New Bag 5/21/23 0151)         MEDICAL DECISION MAKING, PROGRESS, and CONSULTS    Discussion below represents my analysis of pertinent findings related to patient's condition, differential diagnosis, treatment plan and final disposition.      Orders placed during this visit:  Orders Placed This Encounter   Procedures    Respiratory Panel PCR w/COVID-19(SARS-CoV-2) [facility]/GILMA/JORGE/PAD/COR/MAD/HAWA In-House, NP Swab in UTM/VTM, 3-4 HR TAT - Swab, Nasopharynx    XR Chest 2 View    [place] Draw    Comprehensive Metabolic Panel    BNP    Single High Sensitivity Troponin T    CBC Auto Differential    Potassium    Magnesium    CBC (No Diff)    Basic Metabolic Panel    NPO Diet NPO Type: Strict NPO    Undress & Gown    Continuous Pulse Oximetry    Vital Signs    Tobacco Cessation Education    Respiratory Treatment Education (MDI / Spacer / Nebulizer)    COPD Education    Cough / Deep Breathe    Vital Signs    Intake & Output    Weigh Patient    Oral Care    Saline Lock & Maintain IV Access    Reason for COPD Admission: Natural Disease Progression    Place Sequential Compression Device    Maintain Sequential Compression Device    Code Status and Medical Interventions:    Oxygen Therapy- Nasal Cannula; Titrate 1-6 LPM Per SpO2; 90 - 95%    Document Pulse Oximetry - On Room Air / Home O2 Level    ECG 12 Lead Dyspnea    ECG 12 Lead ED Triage Standing Order; SOA    Insert Peripheral IV    Insert Peripheral IV    Initiate ED Observation Status    CBC & Differential    Green Top (Gel)    Lavender Top    Gold Top - SST    Light Blue Top         Additional sources:  - Discussed/obtained information from independent historians: Daughter provides additional information, particular regarding difficulty finding nebulizer solution at surrounding pharmacies  Additional information was obtained to  confirm the patient's history.        Differential diagnosis:    Differential diagnosis includes but not limited to CHF, acute coronary syndrome, pulmonary embolism, pneumothorax, pneumonia, asthma/COPD, pulmonary hypertension, deconditioning, anemia, other hematologic etiologies such as CO poisoning, anxiety.             Independent interpretation of labs, radiology studies, and discussions with consultants:  ED Course as of 05/21/23 0203   Sun May 21, 2023   0016 Potassium(!): 3.1 [TD]   0016 Sodium(!): 130 [TD]   0016 HS Troponin T: 9 [TD]   0016 proBNP: 1,714.0 [TD]   0017 EKG independently interpreted by myself.  Time 8:26 PM.  Sinus rhythm.  Heart rate 100.  IVCD.  PVC. [TD]   0018 On medical chart review, patient had an old EKG performed 2/9/2023.  This showed narrow complex rhythm.  Nonspecific diffuse ST depression. [TD]      ED Course User Index  [TD] Sen Duffy II, MD         I discussed the case with ANTONELLA Garces.  We reviewed the patient's labs and history.  We will admit for COPD exacerbation.    Of note, patient does have a new EKG compared to her old one.  She would  benefit from cardiology evaluation.      DIAGNOSIS  Final diagnoses:   COPD with acute exacerbation   Hypokalemia   Abnormal ECG         DISPOSITION  Admit      Latest Documented Vital Signs:  As of 02:03 EDT  BP- 142/84 HR- 88 Temp- 98 °F (36.7 °C) (Tympanic) O2 sat- 100%      --    Please note that portions of this were completed with a voice recognition program.       Note Disclaimer: At [facility], we believe that sharing information builds trust and better relationships. You are receiving this note because you are receiving care at [facility] or recently visited. It is possible you will see health information before a provider has talked with you about it. This kind of information can be easy to misunderstand. To help you fully understand what it means for your health, we urge you to discuss this note with  your provider.         Sen Duffy II, MD  05/21/23 2660

## 2023-05-21 NOTE — NURSING NOTE
"Went into patient's room and she stated, \"I can not breath.\" Called for Erin Juárez PA-C to come into the room. Rapid response called.   "

## 2023-05-21 NOTE — H&P
.   [facility]   HISTORY AND PHYSICAL    Patient Name: Dafne Mary  : 1940  MRN: [id number]  Primary Care Physician:  Sintia Chatterjee PA  Date of admission: 2023    Subjective   Subjective     Chief Complaint: Shortness of breath    HPI:    Dafne Mary is a 82 y.o. female with past medical history including but not limited to COPD, hypertension, and hyperlipidemia presents to [facility] with shortness of breath for the past few days that has progressively worsened. Patient reports shortness of breath and dry nonproductive cough.  Reports using her inhaler several times with no relief. Reports that she was seen by her PCP yesterday and was started on steroid and antibiotic with no improvement.  Patient reports she has nebulizer machine but does not have the actual medicine for it.  Patient reports that she is established with Dr. Nolan with pulmonology patient denies associated chest pain or palpitation.  Denies abdominal pain, nausea, vomiting, or diarrhea.  Denies fever or chills.    Initial evaluation in the ED included hospitalist troponin 9, BNP 1714, potassium 3.1, alkaline phosphatase 134, WBC 7, hemoglobin 13.7, platelets 242.  Chest x-ray negative for acute infiltration or pleural effusion.  Respiratory panel pending    Review of Systems   All systems were reviewed and negative except for: The mentioned above in HPI    Personal History     Past Medical History:   Diagnosis Date   • Chronic diastolic congestive heart failure 2022   • Depression    • Emphysema lung    • GERD (gastroesophageal reflux disease)    • Heart failure    • Hyperlipidemia    • Hypertension    • Hypothyroidism        Past Surgical History:   Procedure Laterality Date   • EYE SURGERY Left    • PARATHYROIDECTOMY      Patient reports thyroidectomy partial not a para thyroidectomy.   • THYROIDECTOMY, PARTIAL             Family History: family history includes Alcohol abuse in her brother  and father; Alzheimer's disease in her mother; Cancer in her brother; Heart disease in her brother; Hypertension in her brother; Lung disease in her brother and father; Thyroid disease in her maternal grandmother. Otherwise pertinent FHx was reviewed and not pertinent to current issue.    Social History:  reports that she has never smoked. She has never been exposed to tobacco smoke. She has never used smokeless tobacco. She reports current alcohol use. She reports that she does not use drugs.    Home Medications:  Budeson-Glycopyrrol-Formoterol, PARoxetine, Thyroid, albuterol sulfate HFA, amLODIPine, aspirin, atenolol, atorvastatin, doxycycline, fluticasone, ipratropium-albuterol, lidocaine, losartan, omeprazole, predniSONE, saccharomyces boulardii, thyroid, and vitamin D3    Allergies:  Allergies   Allergen Reactions   • Lansoprazole Nausea Only     NAUSEA  NAUSEA  NAUSEA   • Diphenhydramine Hcl (Sleep) Irritability   • Lisinopril Cough       Objective   Objective     Vitals:   Temp:  [98 °F (36.7 °C)] 98 °F (36.7 °C)  Heart Rate:  [88-98] 88  Resp:  [18] 18  BP: (142)/(84) 142/84  Physical Exam    Constitutional: Awake, alert   Eyes: PERRLA, sclerae anicteric, no conjunctival injection   HENT: NCAT, mucous membranes moist   Neck: Supple, no thyromegaly, no lymphadenopathy, trachea midline   Respiratory: Clear to auscultation bilaterally, nonlabored respirations    Cardiovascular: RRR, no murmurs, rubs, or gallops, palpable pedal pulses bilaterally   Gastrointestinal: Positive bowel sounds, soft, nontender, nondistended   Musculoskeletal: No bilateral ankle edema, no clubbing or cyanosis to extremities   Psychiatric: Appropriate affect, cooperative   Neurologic: Oriented x 3, strength symmetric in all extremities, Cranial Nerves grossly intact to confrontation, speech clear   Skin: No rashes     Result Review    Result Review:  I have personally reviewed the results from the time of this admission to 5/21/2023  02:12 EDT and agree with these findings:  []  Laboratory list / accordion  []  Microbiology  []  Radiology  []  EKG/Telemetry   []  Cardiology/Vascular   []  Pathology  []  Old records  []  Other:    Assessment & Plan   Assessment / Plan     Brief Patient Summary:  Dafne Mary is a 82 y.o. female who was seen and evaluated the ED for COPD exacerbation.  Patient is being admitted to observation for further evaluation and pulmonology consult.    Active Hospital Problems:  Active Hospital Problems    Diagnosis    • **COPD exacerbation    • Chronic respiratory failure      Plan:     COPD  -Pulmonology count  -Respiratory panel pending  -Prednisone 40 mg daily  -Azithromycin 500 mg daily  -DuoNeb 4 times daily  -Chest x-ray negative for acute infiltrate or pleural effusion    Hypokalemia  -Potassium replaced  -Repeat BMP in a.m.    Hypertension  -Continue home regiment  -Vital signs per nursing    Hyperlipidemia  -Continue atorvastatin    DVT prophylaxis:  Mechanical DVT prophylaxis orders are present.    CODE STATUS:    Level Of Support Discussed With: Patient  Code Status (Patient has no pulse and is not breathing): CPR (Attempt to Resuscitate)  Medical Interventions (Patient has pulse or is breathing): Full Support    Admission Status:  I believe this patient meets observation status.    Total of 75 minutes have been spent on this H&P including face-to-face encounter and reviewing labs and imaging    Electronically signed by ANTONELLA Meng, 05/21/23, 2:12 AM EDT.

## 2023-05-21 NOTE — PLAN OF CARE
Goal Outcome Evaluation:  Plan of Care Reviewed With: patient        Progress: no change  Outcome Evaluation: Admitted from ER, alert, oriented, short of air with exertion, nonproductive, frequent, congested sounding cough. Sats in mid 90's on room air, history of COPD, magnesium replacement completed, potassium replacement with oral potassium. Pulm in to see pt.

## 2023-05-21 NOTE — PROGRESS NOTES
PROGRESS NOTE  Patient Name: Dafne Mary  Age/Sex: 82 y.o. female  : 1940  MRN: [id number]    Date of Admission: 2023  Date of Encounter Visit: 23   LOS: 0 days   Patient Care Team:  Sintia Chatterjee PA as PCP - General (Family Medicine)  Javier Nolan MD as Consulting Physician (Pulmonary Disease)  Nae Norman MD as Consulting Physician (Cardiology)    Chief Complaint: Pulmonary nodules, metastatic lesions, hypercalcemia    Hospital course: Patient came in because of generalized weakness and was found to have critical hypercalcemia with some hyponatremia, further work-up revealed innumerable lung nodules, several liver metastatic lesions, and the clinical presentation is strongly  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  The likely source might be her large pelvic mass.  The patient was having some problem with urine retention and was already evaluated by her gynecologist and she had abnormal pelvic exam and was supposed to have a urology procedure with cystoscopy but this was scheduled for sometime next week  She did report more than 15 pound weight loss over the last 2 weeks because of loss of appetite  No fever or chills  She reports that her Pap smears have been up-to-date last one was few months ago and it was normal  She denies any family history of pelvic malignancy or ovarian/uterine cancer  She does have history of smoking but only for short period of time with no cumulative exposure and that was several years ago which she quit  Patient denies any cough or wheezing or shortness of breath  No abnormal bleeding tendency  No unusual skin rash  No joint swelling or effusion    Interval History: ***    REVIEW OF SYSTEMS:   CONSTITUTIONAL: no fever or chills  CARDIOVASCULAR: No chest pain, chest pressure or chest discomfort. No palpitations or edema.   RESPIRATORY: No shortness of breath, cough or sputum.   GASTROINTESTINAL: No anorexia, nausea,  "vomiting or diarrhea. No abdominal pain or blood.   HEMATOLOGIC: No bleeding or bruising.     Ventilator/Non-Invasive Ventilation Settings (From admission, onward)    None            Vital Signs  Temp:  [97.8 °F (36.6 °C)-98.2 °F (36.8 °C)] 98.2 °F (36.8 °C)  Heart Rate:  [] 99  Resp:  [18-24] 22  BP: (142-185)/() 185/99  SpO2:  [91 %-100 %] 97 %  on  Flow (L/min):  [1] 1 Device (Oxygen Therapy): nasal cannula    Intake/Output Summary (Last 24 hours) at 5/21/2023 1414  Last data filed at 5/21/2023 0629  Gross per 24 hour   Intake 250 ml   Output --   Net 250 ml     Flowsheet Rows    Flowsheet Row First Filed Value   Admission Height 152.4 cm (60\") Documented at 05/20/2023 2016   Admission Weight 59.9 kg (132 lb) Documented at 05/20/2023 2016        Body mass index is 25.66 kg/m².      05/20/23 2016 05/21/23  0302   Weight: 59.9 kg (132 lb) 59.6 kg (131 lb 6.4 oz)       Physical Exam:  GEN: Very sickly looking lady, lethargic and almost obtunded, in respiratory distress  EYES:   Sclerae clear. No icterus. PERRL does not follow with her eyes  ENT:   External ears/nose normal, no oral lesions, no thrush, mucous membranes moist blue cyanotic lips  NECK:  Supple, midline trachea, no JVD  LUNGS: Normal chest on inspection, significantly diminished breath sounds with minimal air movement, very loud expiratory wheezes, no crackles d.   CV:  Regular rhythm and rate. Normal S1/S2. No murmurs, gallops, or rubs noted.  ABD:  Soft, nontender and nondistended. Normal bowel sounds. No guarding  EXT:  Moves all extremities well. No cyanosis. No redness. No edema.   Skin: Dry, intact, no bleeding    Results Review:        Results from last 7 days   Lab Units 05/21/23  0639 05/20/23  2125   SODIUM mmol/L 137 130*   POTASSIUM mmol/L 3.6 3.1*   CHLORIDE mmol/L 101 95*   CO2 mmol/L 23.0 21.0*   BUN mg/dL 14 15   CREATININE mg/dL 0.76 0.75   CALCIUM mg/dL 9.2 9.0   AST (SGOT) U/L  --  30   ALT (SGPT) U/L  --  25   ANION GAP " mmol/L 13.0 14.0   ALBUMIN g/dL  --  4.3     Results from last 7 days   Lab Units 05/20/23 2125   HSTROP T ng/L 9         Results from last 7 days   Lab Units 05/20/23 2125   PROBNP pg/mL 1,714.0     Results from last 7 days   Lab Units 05/20/23 2125   WBC 10*3/mm3 7.00   HEMOGLOBIN g/dL 13.7   HEMATOCRIT % 41.3   PLATELETS 10*3/mm3 242   MCV fL 86.4   NEUTROPHIL % % 87.3*   LYMPHOCYTE % % 7.3*   MONOCYTES % % 4.9*   EOSINOPHIL % % 0.0*   BASOPHIL % % 0.1   IMM GRAN % % 0.4         Results from last 7 days   Lab Units 05/20/23 2125   MAGNESIUM mg/dL 1.9           Invalid input(s): LDLCALC          Glucose   Date/Time Value Ref Range Status   05/21/2023 1347 177 (H) 70 - 130 mg/dL Final     Comment:     Meter: [id number] : elizabeth Osei RN                 Results from last 7 days   Lab Units 05/21/23  0054   COVID19  Not Detected   ADENOVIRUS DETECTION BY PCR  Not Detected   CORONAVIRUS 229E  Not Detected   CORONAVIRUS HKU1  Not Detected   CORONAVIRUS NL63  Not Detected   CORONAVIRUS OC43  Not Detected   HUMAN METAPNEUMOVIRUS  Not Detected   HUMAN RHINOVIRUS/ENTEROVIRUS  Not Detected   INFLUENZA B PCR  Not Detected   PARAINFLUENZA 1  Not Detected   PARAINFLUENZA VIRUS 2  Not Detected   PARAINFLUENZA VIRUS 3  Detected*   PARAINFLUENZA VIRUS 4  Not Detected   BORDETELLA PERTUSSIS PCR  Not Detected   BORDETELLA PARAPERTUSSIS PCR  Not Detected   CHLAMYDOPHILA PNEUMONIAE PCR  Not Detected   MYCOPLAMA PNEUMO PCR  Not Detected   RSV, PCR  Not Detected               Imaging:   Imaging Results (All)     Procedure Component Value Units Date/Time    XR Chest 2 View [[id number]] Collected: 05/20/23 2241     Updated: 05/20/23 2310    Narrative:      CHEST: 2 VIEWS     HISTORY: Shortness of air.     COMPARISON: AP chest 01/06/2023, CT angiogram chest 12/08/2022.     FINDINGS: Heart and mediastinal structures appear within normal limits.  There is a small subsegmental thin linear lingular opacity most  likely  representing scarring or atelectasis. Lungs otherwise clear and there is  no evidence for pulmonary or pleural effusion or infiltrate. There is a  scoliotic curvature of the thoracolumbar spine.       Impression:      Mild segmental lingular scar or atelectasis. Lungs are  otherwise clear and there is no evidence for CHF.     This report was finalized on 5/20/2023 11:07 PM by Dr. Alcon Canales M.D.             I reviewed the patient's new clinical results.  I personally viewed and interpreted the patient's imaging results:        Medication Review:   amLODIPine, 2.5 mg, Oral, Daily  aspirin, 81 mg, Oral, Daily  atenolol, 50 mg, Oral, Daily  atorvastatin, 20 mg, Oral, Nightly  azithromycin, 500 mg, Intravenous, Q24H  Budeson-Glycopyrrol-Formoterol, 2 puff, Inhalation, BID  ipratropium-albuterol, 3 mL, Nebulization, 4x Daily - RT  losartan, 100 mg, Oral, Daily  pantoprazole, 40 mg, Oral, Q AM  PARoxetine, 20 mg, Oral, Daily  predniSONE, 40 mg, Oral, Daily With Breakfast  senna-docusate sodium, 2 tablet, Oral, BID  sodium chloride, 10 mL, Intravenous, Q12H  thyroid, 15 mg, Oral, Daily  Thyroid, 30 mg, Oral, Daily        propofol, 5-50 mcg/kg/min        ASSESSMENT:   Acute on chronic hypoxemic and hypercapnic respiratory failure requiring intubation and mechanical ventilation  COPD with acute exacerbation with parainfluenza virus  Acute sinusitis  Pulmonary hypertension  Chronic diastolic congestive heart failure with no sign of volume overload  Essential hypertension  Hyponatremia, improved  Hypokalemia, improved    PLAN:  Patient got worse despite initial supportive measures for her COPD with steroids and bronchodilators and antibiotics.  She was unable to ventilate properly and bedtime assessment was suggestive of severe bronchospasm with limited airflow and probably worsening hypercapnia  Patient was having secondary CO2 narcosis  Decided to proceed with intubation mechanical ventilation and patient will  be transferred to the intensive care unit for further care  We will continue with the steroid and will transition to systemic Solu-Medrol, patient is unlikely to be discharged home anytime in the next 24 hours and need to be changed to a full admit  We will send respiratory secretion for culture and decide if antibiotic need to be escalated beyond the Zithromax  Continue with the bronchodilators  Vent setting will be readjusted depending on repeat ABG but currently she has very high elevated dynamic resistance and we had to adjust the ventilator alarm at least temporarily holding that this will improve as she respond to the treatments  The plateau pressure was below 30 even though the peak average pressure was held in the 60s  Tidal volume was adjusted to 450 because of that  We will use propofol for sedation  Patient is already on stress ulcer prophylaxis, need to add DVT prophylaxis.  Discussed with nursing staff at the bedside    Disposition: Change to a full admit in transfer to the intensive care unit    Lisandra Villarreal MD  05/21/23  14:14 EDT      Time: Critical care 39 min excluding any separate billable procedure time      Dictated utilizing Dragon dictation

## 2023-05-21 NOTE — PROGRESS NOTES
ED OBSERVATION PROGRESS/DISCHARGE SUMMARY    Date of Admission: 5/20/2023   LOS: 0 days   PCP: Sintia Chatterjee PA    Subjective  Unobtainable due to patient sedation and intubation status    Hospital Outcome: 82-year-old female admitted to the observation unit for further evaluation of shortness of breath and cough.  Patient is respiratory viral panel positive for parainfluenza virus.  Initial chest x-ray shows mild segmental lingular scar or atelectasis, lungs otherwise clear and no evidence for CHF.  Initial labs show sodium 130, potassium 3.1, CO2 21, chloride 95, laboratory evaluation this morning showed improvement in all these values of sodium 137, potassium 3.6, CO2 23, chloride 101.      Patient was seen by pulmonology overnight, Dr. Grewal.  Patient reportedly has nebulizer at home but does not have solution for her nebulizer.  Overnight recommendations from pulmonology mention home with nebulizer solution, albuterol MDI, prednisone, and azithromycin.    Patient was doing well this morning but got progressively worse throughout the day.  Patient got up to use the restroom and became significantly short of breath.  Patient received DuoNeb breathing treatment for this at 11:59 PM.  Patient then received lunch and continued to feel short of breath and complained of her heart racing.  Heart racing felt to be due to DuoNeb breathing treatment.  Patient's oxygen saturation at this time was 98% on 1 L.  Patient stated she felt very anxious.  She wanted an additional dose of her Paxil.  Patient received 0.5 mg of IV Ativan at 12:54 PM for anxiety.  Around 1:40 PM, RN called me into patient's room because patient reported she could not breathe.  Patient was rolled over on her side in fetal position and respirations were labored and she sounded wet and congested.  At that time we paged respiratory and ordered stat ABG and chest x-ray.  Patient then started to turn gray and she became much less responsive so rapid  response was called.  Pulse oximeter had been pulled off patient's finger, it took several minutes to get pulse oximetry to read again and when we finally got reading her oxygen saturation was in the 30s.  ED physician Dr. Moore arrived to the room and started preparation for intubation.  At that time, Dr. Villarreal arrived to unit and intubated the patient.  I discussed events with patient's daughter.  Patient will be transferred to ICU.    ROS: Unobtainable due to patient sedation and intubation status     Objective   Physical Exam:  I have reviewed the vital signs.  Temp:  [97.8 °F (36.6 °C)-98 °F (36.7 °C)] 97.8 °F (36.6 °C)  Heart Rate:  [83-98] 83  Resp:  [18] 18  BP: (142-168)/() 162/69  General Appearance:    Alert, cooperative, no distress  Head:    Normocephalic, atraumatic  Eyes:    Sclerae anicteric  Neck:   Supple, no mass  Lungs: Clear to auscultation bilaterally, respirations unlabored  Heart: Regular rate and rhythm, S1 and S2 normal, no murmur, rub or gallop  Abdomen:  Soft, non-tender, bowel sounds active, nondistended  Extremities: No clubbing, cyanosis, or edema to lower extremities  Pulses:  2+ and symmetric in distal lower extremities  Skin: No rashes   Neurologic: Oriented x3, Normal strength to extremities    Results Review:    I have reviewed the labs, radiology results and diagnostic studies.    Results from last 7 days   Lab Units 05/20/23  2125   WBC 10*3/mm3 7.00   HEMOGLOBIN g/dL 13.7   HEMATOCRIT % 41.3   PLATELETS 10*3/mm3 242     Results from last 7 days   Lab Units 05/21/23  0639 05/20/23  2125   SODIUM mmol/L 137 130*   POTASSIUM mmol/L 3.6 3.1*   CHLORIDE mmol/L 101 95*   CO2 mmol/L 23.0 21.0*   BUN mg/dL 14 15   CREATININE mg/dL 0.76 0.75   CALCIUM mg/dL 9.2 9.0   BILIRUBIN mg/dL  --  0.4   ALK PHOS U/L  --  134*   ALT (SGPT) U/L  --  25   AST (SGOT) U/L  --  30   GLUCOSE mg/dL 154* 163*     Imaging Results (Last 24 Hours)     Procedure Component Value Units Date/Time    XR  Chest 2 View [[id number]] Collected: 05/20/23 2241     Updated: 05/20/23 2310    Narrative:      CHEST: 2 VIEWS     HISTORY: Shortness of air.     COMPARISON: AP chest 01/06/2023, CT angiogram chest 12/08/2022.     FINDINGS: Heart and mediastinal structures appear within normal limits.  There is a small subsegmental thin linear lingular opacity most likely  representing scarring or atelectasis. Lungs otherwise clear and there is  no evidence for pulmonary or pleural effusion or infiltrate. There is a  scoliotic curvature of the thoracolumbar spine.       Impression:      Mild segmental lingular scar or atelectasis. Lungs are  otherwise clear and there is no evidence for CHF.     This report was finalized on 5/20/2023 11:07 PM by Dr. Alcon Canales M.D.             I have reviewed the medications.  ---------------------------------------------------------------------------------------------  Assessment & Plan   Assessment/Problem List    COPD exacerbation    Chronic respiratory failure      Plan:    Acute respiratory failure  Parainfluenza virus  COPD exacerbation  -Patient received Solu-Medrol 125 mg IV, magnesium sulfate 2 g IV, and azithromycin 500 mg IV overnight  -Patient receiving DuoNeb breathing treatments throughout the day  -Patient decompensated around 1:45 PM, became acutely dyspneic with oxygen saturation in the 30s  -Patient currently intubated, care transferred to pulmonology     Hypokalemia  -Potassium replaced  -Repeat BMP in a.m.     Hypertension  -Continue home regiment  -Vital signs per nursing     Hyperlipidemia  -Continue atorvastatin    Disposition: Transfer to ICU, Dr. Villarreal    67 minutes has been spent by [facility] providers in the care of this patient while under observation status     This note will serve as transfer summary    KAJAL Loaiza 05/21/23 07:32 EDT    I have worn appropriate PPE during this patient encounter and sanitized my hands with  entering and exiting patient room.

## 2023-05-21 NOTE — CONSULTS
Patient Care Team:  Sintia Chatterjee PA as PCP - General (Family Medicine)  Javier Nolan MD as Consulting Physician (Pulmonary Disease)  Nae Norman MD as Consulting Physician (Cardiology)      Subjective     Patient is a 82 y.o. female.  Asked to see regarding COPD exacerbation she follows with Dr. Nolan in our office.  Patient been ill for 2 to 3 days with a lot of nasal congestion and sinus headache.  A lot of cough not getting much mucus up no hemoptysis.  No fevers no chills no sweats.  Mild sore throat she thinks related to coughing.  Runny nose.  No nausea no vomiting no diarrhea.  She does have some urinary frequency and urgency no dysuria or hematuria.  No melena hematochezia she has some pain across the lower chest bilaterally when she coughs.  She is a never smoker but carries a diagnosis of COPD never had asthma as a child that she is aware of she did grow up with father and 6 brothers who smoked and had a lot of other secondhand smoke exposure through the years.  I found some spirometry from 7/23/2021 in our office her FEV1 was 0.86 L 57% of predicted her FVC was 1.33 L 65% of predicted her FEV1 FVC ratio was 65 which was 87% of predicted and she had no significant bronchodilator responsiveness..  She uses Breztri 2 puffs twice daily at home she has a nebulizer but has not been able to get any albuterol for rescue use apparently [facility] pharmacy that she uses has been unable to order any.  She is feeling somewhat better now since presentation to the emergency department and some breathing treatments.      Review of Systems:  No acute visual changes no palpitations she does have high blood pressure she did not take her medicines yesterday because she was coming in here.  No melena hematochezia no easy bleeding or bruising no polyuria polydipsia heat or cold intolerances.      History  Past Medical History:   Diagnosis Date   • Chronic diastolic congestive heart failure  "11/17/2022   • Depression    • Emphysema lung    • GERD (gastroesophageal reflux disease)    • Heart failure    • Hyperlipidemia    • Hypertension    • Hypothyroidism      Past Surgical History:   Procedure Laterality Date   • EYE SURGERY Left    • PARATHYROIDECTOMY      Patient reports thyroidectomy partial not a para thyroidectomy.   • THYROIDECTOMY, PARTIAL      1984     Social History     Socioeconomic History   • Marital status:    Tobacco Use   • Smoking status: Never     Passive exposure: Never   • Smokeless tobacco: Never   Vaping Use   • Vaping Use: Never used   Substance and Sexual Activity   • Alcohol use: Yes     Comment: \"occ\"; caffeine use- tea   • Drug use: No   • Sexual activity: Defer     Family History   Problem Relation Age of Onset   • Alzheimer's disease Mother    • Lung disease Father    • Alcohol abuse Father    • Heart disease Brother    • Hypertension Brother    • Lung disease Brother    • Alcohol abuse Brother    • Cancer Brother         LUNG  WAS A SMOKER   • Thyroid disease Maternal Grandmother          Allergies:  Lansoprazole, Diphenhydramine hcl (sleep), and Lisinopril    Medications:  Prior to Admission medications    Medication Sig Start Date End Date Taking? Authorizing Provider   amLODIPine (NORVASC) 2.5 MG tablet Take 1 tablet by mouth once daily 3/30/23   Nae Norman MD [place] Thyroid 15 MG tablet Take 1 tablet by mouth once daily 4/11/23   Sintia Chatterjee PA   [place] Thyroid 30 MG tablet Take 1 tablet by mouth once daily 4/11/23   Sintia Chatterjee PA   aspirin 81 MG chewable tablet Chew 1 tablet Daily.    ProviderAnurag MD   atenolol (TENORMIN) 50 MG tablet Take 1 tablet by mouth Daily. 1/17/23   Shelbie Good APRN   atorvastatin (LIPITOR) 20 MG tablet Take 1 tablet by mouth Every Night. 2/9/23   Nae Norman MD   [place]na Aerosphere 160-9-4.8 MCG/ACT aerosol inhaler 2 puffs 2 (Two) Times a Day. 6/6/22   ProviderAnurag MD   doxycycline " (VIBRAMYCIN) 100 MG capsule Take 1 capsule by mouth 2 (Two) Times a Day. 5/19/23   Sintia Chatterjee PA   fluticasone (FLONASE) 50 MCG/ACT nasal spray 2 sprays into the nostril(s) as directed by provider Daily.    Anurag Palacios MD   ipratropium-albuterol (DUO-NEB) 0.5-2.5 mg/3 ml nebulizer USE 1 AMPULE IN NEBULIZER EVERY 4 HOURS AS NEEDED 1/18/23   Anurag Palacios MD   lidocaine (LIDODERM) 5 % Place 1 patch on the skin as directed by provider Daily. Remove & Discard patch within 12 hours or as directed by MD 1/14/23   Deric Dean DO   losartan (COZAAR) 100 MG tablet Take 1 tablet by mouth once daily 3/30/23   Nae Norman MD   omeprazole (priLOSEC) 20 MG capsule Take 1 capsule by mouth Daily.    Anurag Palacios MD   PARoxetine (PAXIL) 10 MG tablet Take 2 tablets by mouth Daily. 1/30/23   Sintia Chatterjee PA   predniSONE (DELTASONE) 20 MG tablet Take 3 tabs today then 2 tabs po qd x 5 days then 1 tab po qd x 5 days 5/19/23   Sintia Chatterjee PA   saccharomyces boulardii (Florastor) 250 MG capsule Take 1 capsule by mouth 2 (Two) Times a Day.  Patient not taking: Reported on 5/19/2023 5/24/22   Sintia Chatterjee PA   Ventolin  (90 Base) MCG/ACT inhaler Inhale 2 puffs Every 4 (Four) Hours As Needed. 12/20/22   Anurag Palacios MD   vitamin D3 125 MCG (5000 UT) capsule capsule Take 1 capsule by mouth Daily.    Anurag Palacios MD     ipratropium-albuterol, 3 mL, Nebulization, 4x Daily - RT  methylPREDNISolone sodium succinate, 40 mg, Intravenous, Q6H  potassium chloride, 40 mEq, Oral, Q4H  senna-docusate sodium, 2 tablet, Oral, BID  sodium chloride, 10 mL, Intravenous, Q12H           Objective     Vital Signs  Vital Sign Min/Max for last 24 hours  Temp  Min: 98 °F (36.7 °C)  Max: 98 °F (36.7 °C)   BP  Min: 142/84  Max: 142/84   Pulse  Min: 88  Max: 98   Resp  Min: 18  Max: 18   SpO2  Min: 95 %  Max: 100 %   No data recorded   Weight  Min: 59.9 kg (132 lb)  Max: 59.9 kg (132 lb)  "    No intake or output data in the 24 hours ending 05/21/23 0258  No intake/output data recorded.  Last Weight and Admission Weight        05/20/23 2016   Weight: 59.9 kg (132 lb)     Flowsheet Rows    Flowsheet Row First Filed Value   Admission Height 152.4 cm (60\") Documented at 05/20/2023 2016   Admission Weight 59.9 kg (132 lb) Documented at 05/20/2023 2016          Body mass index is 25.78 kg/m².           Physical Exam:  General Appearance: Well-developed white female she is resting in bed on room air oxygen saturation 98% and she does not appear in any acute distress  Eyes: Conjunctiva are clear and anicteric, pupils are equal  ENT: Mucous membranes are moist no erythema no exudates she has torus palatini, dentition looks normal, nasal septum midline  Neck: No adenopathy or thyromegaly no visible jugular venous tension and trachea midline  Lungs: Clear nonlabored symmetric expansion he did with deep breaths have a wheeze with sort of a little inspiratory and expiratory squeak and it was loudest over the laryngeal area.  Cardiac: Regular rate rhythm no murmur no gallop  Abdomen: Soft no palpable hepatosplenomegaly or masses  : Not examined  Musculoskeletal: Grossly normal  Skin: Warm and dry no jaundice no petechiae  Neuro: Alert and oriented cooperative following commands and grossly intact except she is hard of hearing.  Extremities/P Vascular: No clubbing no cyanosis no edema palpable radial and dorsalis pedis pulses  MSE: Pleasant and appropriate      Labs:  Results from last 7 days   Lab Units 05/20/23  2125   GLUCOSE mg/dL 163*   SODIUM mmol/L 130*   POTASSIUM mmol/L 3.1*   MAGNESIUM mg/dL 1.9   CO2 mmol/L 21.0*   CHLORIDE mmol/L 95*   ANION GAP mmol/L 14.0   CREATININE mg/dL 0.75   BUN mg/dL 15   BUN / CREAT RATIO  20.0   CALCIUM mg/dL 9.0   ALK PHOS U/L 134*   TOTAL PROTEIN g/dL 7.0   ALT (SGPT) U/L 25   AST (SGOT) U/L 30   BILIRUBIN mg/dL 0.4   ALBUMIN g/dL 4.3   GLOBULIN gm/dL 2.7     Estimated " Creatinine Clearance: 46.8 mL/min (by C-G formula based on SCr of 0.75 mg/dL).      Results from last 7 days   Lab Units 05/20/23 2125   WBC 10*3/mm3 7.00   RBC 10*6/mm3 4.78   HEMOGLOBIN g/dL 13.7   HEMATOCRIT % 41.3   MCV fL 86.4   MCH pg 28.7   MCHC g/dL 33.2   RDW % 12.7   RDW-SD fl 39.8   MPV fL 8.8   PLATELETS 10*3/mm3 242   NEUTROPHIL % % 87.3*   LYMPHOCYTE % % 7.3*   MONOCYTES % % 4.9*   EOSINOPHIL % % 0.0*   BASOPHIL % % 0.1   IMM GRAN % % 0.4   NEUTROS ABS 10*3/mm3 6.11   LYMPHS ABS 10*3/mm3 0.51*   MONOS ABS 10*3/mm3 0.34   EOS ABS 10*3/mm3 0.00   BASOS ABS 10*3/mm3 0.01   IMMATURE GRANS (ABS) 10*3/mm3 0.03   NRBC /100 WBC 0.0         Results from last 7 days   Lab Units 05/20/23 2125   HSTROP T ng/L 9     Results from last 7 days   Lab Units 05/20/23 2125   PROBNP pg/mL 1,714.0                 Microbiology Results (last 10 days)     ** No results found for the last 240 hours. **            Diagnostics:  XR Chest 2 View    Result Date: 5/20/2023  CHEST: 2 VIEWS  HISTORY: Shortness of air.  COMPARISON: AP chest 01/06/2023, CT angiogram chest 12/08/2022.  FINDINGS: Heart and mediastinal structures appear within normal limits. There is a small subsegmental thin linear lingular opacity most likely representing scarring or atelectasis. Lungs otherwise clear and there is no evidence for pulmonary or pleural effusion or infiltrate. There is a scoliotic curvature of the thoracolumbar spine.      Mild segmental lingular scar or atelectasis. Lungs are otherwise clear and there is no evidence for CHF.  This report was finalized on 5/20/2023 11:07 PM by Dr. Alcon Canales M.D.      Results for orders placed during the hospital encounter of 07/25/22    Adult Transthoracic Echo Complete w/ Color, Spectral and Contrast if Necessary Per Protocol    Interpretation Summary  · Calculated left ventricular EF = 52.6% Estimated left ventricular EF = 53% Estimated left ventricular EF was in agreement with the calculated  left ventricular EF. Left ventricular systolic function is normal. Normal left ventricular cavity size and wall thickness noted. There are wall motion abnormalities as noted below Left ventricular diastolic function was indeterminate.  · The following segments are hypokinetic: basal inferoseptal and basal inferior. All other segments are normal.  · Left atrial volume is mildly increased.  · No aortic valve regurgitation or stenosis is present. The aortic valve is abnormal in structure. There is severe calcification of the aortic valve  · Severe mitral annular calcification is present. There is moderate, bileaflet mitral valve thickening present. Mild to moderate mitral valve regurgitation is present. No significant mitral valve stenosis is present.  · Tricuspid valve not well visualized. Trace tricuspid valve regurgitation is present. Estimated right ventricular systolic pressure from tricuspid regurgitation is moderately elevated (45-55 mmHg). Calculated right ventricular systolic pressure from tricuspid regurgitation is 45 mmHg.  · There is suggestion of atrial shunting by color-flow imaging subcostal views. Saline injection was not performed. We will contact the patient to return to address further.      Chest x-ray reviewed there is a little linear scar in the left midlung probably lingular area that is present on prior studies.    Assessment & Plan     1. Shortness of breath she does not appear to be hypoxic she has a history of COPD and on spirometry her FEV1 to FVC ratio was below 70% with a moderately reduced FEV1, although for her age her FEV1/ FVC ratio was within normal limits.  This certainly does not suggest severe COPD.  I do not hear much wheezing at all the only wheezing I hear is a little bit of squeaking up in her laryngeal area.  I have reviewed several CT scans and I see no evidence of emphysema on those scans.  She is much improved already.  She may just need meds to beds to get her some  albuterol for her nebulizer so that she has rescue therapy at home I would also give her a rescue albuterol MDI in case she has further problems getting nebulizer medications or is away from her nebulizer.  I am not certain she even needs systemic steroids but I probably would drop her to a more standard exacerbation dose prednisone 40 mg daily for just 5 days and question is do we give her an antibiotic guidelines suggest better improvement with no have clear viral cause if we find that then we could probably stop the antibiotics.  2. Acute sinusitis this may be viral respiratory panel is pending and may be even allergy related  3. Pulmonary hypertension by 7/22 echocardiogram moderately elevated pressures she did have some mild to moderate mitral regurgitation and grade 2 diastolic dysfunction on prior echocardiograms that might be the cause the left atrium was mildly increased.  I wonder how much of her dyspnea is related to this  4. Chronic diastolic CHF BNP was normal today.  5. Hyperlipidemia  6. Hypertension blood pressure is up she did not take her medicines yesterday  7. Hyponatremia this may be related to lung disease this will need to be followed  8. Hypokalemia correct        From a pulmonary standpoint if she continues to do well she probably could go home from a pulmonary standpoint later today as long as she can get some albuterol for her nebulizer and probably an albuterol MDI she should continue her Breztri inhaler and go ahead and finish the prednisone and azithromycin course for 5 days    Justin Grewal Jr, MD  05/21/23  02:58 EDT    Time:

## 2023-05-21 NOTE — ED NOTES
"Pt arrives from home via [facility]     EMS states \"Pt complains of SOA since Wednesday and seen PCP yesterday. Pt had auditory wheezing on scene. Pt has Hx of COPD no O2 at this time. Pt recived albuteerol in route. Pt has reduced wheezing with breathing Tx.\"    Pt with no other complaints at this time.     "

## 2023-05-21 NOTE — NURSING NOTE
Rapid response called for sudden decline in resp status. Pt sats dropped down into the 30's prior to RRT arrival. Dr Moore at bedside. Dr Villarreal arrived and patient was intubated. 7.5 ETT tube was placed. Pt tolerated well. Pt is being transferred to CICU for critical care management.

## 2023-05-21 NOTE — ED NOTES
"..Nursing report ED to floor  Dafne Mary  82 y.o.  female    HPI :   Chief Complaint   Patient presents with    Shortness of Breath    Pain With Breathing       Admitting doctor:   Aram Escalante MD    Admitting diagnosis:   The primary encounter diagnosis was COPD with acute exacerbation. Diagnoses of Hypokalemia and Abnormal ECG were also pertinent to this visit.    Code status:   Current Code Status       Date Active Code Status Order ID Comments User Context       5/21/2023 0152 CPR (Attempt to Resuscitate) [id number]  Hetal Santacruz APRN ED        Question Answer    Code Status (Patient has no pulse and is not breathing) CPR (Attempt to Resuscitate)    Medical Interventions (Patient has pulse or is breathing) Full Support    Level Of Support Discussed With Patient                    Allergies:   Lansoprazole, Diphenhydramine hcl (sleep), and Lisinopril    Isolation:   No active isolations    Intake and Output  No intake or output data in the 24 hours ending 05/21/23 0237    Weight:       05/20/23 2016   Weight: 59.9 kg (132 lb)       Most recent vitals:   Vitals:    05/20/23 2016 05/21/23 0119 05/21/23 0122   BP: 142/84     BP Location: Right arm     Patient Position: Sitting     Pulse: 98 88 88   Resp: 18 18 18   Temp: 98 °F (36.7 °C)     TempSrc: Tympanic     SpO2: 99% 95% 100%   Weight: 59.9 kg (132 lb)     Height: 152.4 cm (60\")         Active LDAs/IV Access:   Lines, Drains & Airways       Active LDAs       Name Placement date Placement time Site Days    Peripheral IV 05/21/23 0118 Right Antecubital 05/21/23 0118  Antecubital  less than 1                    Labs (abnormal labs have a star):   Labs Reviewed   COMPREHENSIVE METABOLIC PANEL - Abnormal; Notable for the following components:       Result Value    Glucose 163 (*)     Sodium 130 (*)     Potassium 3.1 (*)     Chloride 95 (*)     CO2 21.0 (*)     Alkaline Phosphatase 134 (*)     All other components within normal limits    Narrative:     " GFR Normal >60  Chronic Kidney Disease <60  Kidney Failure <15    The GFR formula is only valid for adults with stable renal function between ages 18 and 70.   CBC WITH AUTO DIFFERENTIAL - Abnormal; Notable for the following components:    Neutrophil % 87.3 (*)     Lymphocyte % 7.3 (*)     Monocyte % 4.9 (*)     Eosinophil % 0.0 (*)     Lymphocytes, Absolute 0.51 (*)     All other components within normal limits   BNP (IN-HOUSE) - Normal    Narrative:     Among patients with dyspnea, NT-proBNP is highly sensitive for the detection of acute congestive heart failure. In addition NT-proBNP of <300 pg/ml effectively rules out acute congestive heart failure with 99% negative predictive value.    Results may be falsely decreased if patient taking Biotin.     SINGLE HSTROPONIN T - Normal    Narrative:     High Sensitive Troponin T Reference Range:  <10.0 ng/L- Negative Female for AMI  <15.0 ng/L- Negative Male for AMI  >=10 - Abnormal Female indicating possible myocardial injury.  >=15 - Abnormal Male indicating possible myocardial injury.   Clinicians would have to utilize clinical acumen, EKG, Troponin, and serial changes to determine if it is an Acute Myocardial Infarction or myocardial injury due to an underlying chronic condition.        MAGNESIUM - Normal   RESPIRATORY PANEL PCR W/ COVID-19 (SARS-COV-2) [facility]/GILMA/JORGE/PAD/COR/MAD/[facility]WA IN-HOUSE, NP SWAB IN [facility], 3-4 HR TAT   RAINBOW DRAW    Narrative:     The following orders were created for panel order [facility] Draw.  Procedure                               Abnormality         Status                     ---------                               -----------         ------                     Green Top (Gel)[[id number]]                                  Final result               Lavender Top[[id number]]                                     Final result               Gold Top - SST[[id number]]                                   Final result               Light Blue  Top[[id number]]                                   Final result                 Please view results for these tests on the individual orders.   CBC (NO DIFF)   BASIC METABOLIC PANEL   CBC AND DIFFERENTIAL    Narrative:     The following orders were created for panel order CBC & Differential.  Procedure                               Abnormality         Status                     ---------                               -----------         ------                     CBC Auto Differential[[id number]]        Abnormal            Final result                 Please view results for these tests on the individual orders.   GREEN TOP   LAVENDER TOP   GOLD TOP - SST   LIGHT BLUE TOP       EKG:   ECG 12 Lead Dyspnea   Preliminary Result   HEART RATE= 100  bpm   RR Interval= 600  ms   NV Interval= 146  ms   P Horizontal Axis= 11  deg   P Front Axis= 71  deg   QRSD Interval= 148  ms   QT Interval= 411  ms   QRS Axis= -90  deg   T Wave Axis= 73  deg   - ABNORMAL ECG -   Sinus tachycardia   Multiform ventricular premature complexes   Left bundle branch block   Electronically Signed By:    Date and Time of Study: 2023-05-20 20:26:24      ECG 12 Lead ED Triage Standing Order; SOA    (Results Pending)       Meds given in ED:   Medications   sodium chloride 0.9 % flush 10 mL (has no administration in time range)   ipratropium-albuterol (DUO-NEB) nebulizer solution 3 mL (3 mL Nebulization Given 5/21/23 0119)   potassium chloride 10 mEq in 100 mL IVPB (has no administration in time range)     And   potassium chloride 10 mEq in 100 mL IVPB (has no administration in time range)     And   potassium chloride 10 mEq in 100 mL IVPB (has no administration in time range)     And   potassium chloride 10 mEq in 100 mL IVPB (has no administration in time range)   sodium chloride 0.9 % flush 10 mL (has no administration in time range)   sodium chloride 0.9 % flush 10 mL (has no administration in time range)   sodium chloride 0.9 % infusion 40 mL (has  "no administration in time range)   sennosides-docusate (PERICOLACE) 8.6-50 MG per tablet 2 tablet (has no administration in time range)     And   polyethylene glycol (MIRALAX) packet 17 g (has no administration in time range)     And   bisacodyl (DULCOLAX) EC tablet 5 mg (has no administration in time range)     And   bisacodyl (DULCOLAX) suppository 10 mg (has no administration in time range)   methylPREDNISolone sodium succinate (SOLU-Medrol) injection 40 mg (has no administration in time range)   methylPREDNISolone sodium succinate (SOLU-Medrol) injection 125 mg (125 mg Intravenous Given 5/21/23 0151)   lactated ringers bolus 1,000 mL (1,000 mL Intravenous New Bag 5/21/23 0152)   magnesium sulfate 2g/50 mL (PREMIX) infusion (2 g Intravenous New Bag 5/21/23 0151)       Imaging results:  XR Chest 2 View    Result Date: 5/20/2023  Mild segmental lingular scar or atelectasis. Lungs are otherwise clear and there is no evidence for CHF.  This report was finalized on 5/20/2023 11:07 PM by Dr. Alcon Canales M.D.       Ambulatory status:   - up ad jessie    Social issues:   Social History     Socioeconomic History    Marital status:    Tobacco Use    Smoking status: Never     Passive exposure: Never    Smokeless tobacco: Never   Vaping Use    Vaping Use: Never used   Substance and Sexual Activity    Alcohol use: Yes     Comment: \"occ\"; caffeine use- tea    Drug use: No    Sexual activity: Defer       NIH Stroke Scale:         Ayleen Hennessy RN  05/21/23 02:37 EDT        "

## 2023-05-22 LAB
ANION GAP SERPL CALCULATED.3IONS-SCNC: 14.5 MMOL/L (ref 5–15)
BASOPHILS # BLD AUTO: 0 10*3/MM3 (ref 0–0.2)
BASOPHILS NFR BLD AUTO: 0 % (ref 0–1.5)
BUN SERPL-MCNC: 30 MG/DL (ref 8–23)
BUN/CREAT SERPL: 27 (ref 7–25)
CALCIUM SPEC-SCNC: 9.2 MG/DL (ref 8.6–10.5)
CHLORIDE SERPL-SCNC: 102 MMOL/L (ref 98–107)
CO2 SERPL-SCNC: 19.5 MMOL/L (ref 22–29)
CREAT SERPL-MCNC: 1.11 MG/DL (ref 0.57–1)
DEPRECATED RDW RBC AUTO: 41 FL (ref 37–54)
EGFRCR SERPLBLD CKD-EPI 2021: 49.7 ML/MIN/1.73
EOSINOPHIL # BLD AUTO: 0 10*3/MM3 (ref 0–0.4)
EOSINOPHIL NFR BLD AUTO: 0 % (ref 0.3–6.2)
ERYTHROCYTE [DISTWIDTH] IN BLOOD BY AUTOMATED COUNT: 12.8 % (ref 12.3–15.4)
GLUCOSE SERPL-MCNC: 124 MG/DL (ref 65–99)
HCT VFR BLD AUTO: 41.4 % (ref 34–46.6)
HGB BLD-MCNC: 13.3 G/DL (ref 12–15.9)
IMM GRANULOCYTES # BLD AUTO: 0.07 10*3/MM3 (ref 0–0.05)
IMM GRANULOCYTES NFR BLD AUTO: 0.6 % (ref 0–0.5)
LYMPHOCYTES # BLD AUTO: 0.55 10*3/MM3 (ref 0.7–3.1)
LYMPHOCYTES NFR BLD AUTO: 4.7 % (ref 19.6–45.3)
MCH RBC QN AUTO: 28.6 PG (ref 26.6–33)
MCHC RBC AUTO-ENTMCNC: 32.1 G/DL (ref 31.5–35.7)
MCV RBC AUTO: 89 FL (ref 79–97)
MONOCYTES # BLD AUTO: 0.46 10*3/MM3 (ref 0.1–0.9)
MONOCYTES NFR BLD AUTO: 4 % (ref 5–12)
NEUTROPHILS NFR BLD AUTO: 10.52 10*3/MM3 (ref 1.7–7)
NEUTROPHILS NFR BLD AUTO: 90.7 % (ref 42.7–76)
NRBC BLD AUTO-RTO: 0 /100 WBC (ref 0–0.2)
PLATELET # BLD AUTO: 244 10*3/MM3 (ref 140–450)
PMV BLD AUTO: 9 FL (ref 6–12)
POTASSIUM SERPL-SCNC: 5 MMOL/L (ref 3.5–5.2)
RBC # BLD AUTO: 4.65 10*6/MM3 (ref 3.77–5.28)
SODIUM SERPL-SCNC: 136 MMOL/L (ref 136–145)
WBC NRBC COR # BLD: 11.6 10*3/MM3 (ref 3.4–10.8)

## 2023-05-22 PROCEDURE — 94799 UNLISTED PULMONARY SVC/PX: CPT

## 2023-05-22 PROCEDURE — 25010000002 AZITHROMYCIN PER 500 MG: Performed by: INTERNAL MEDICINE

## 2023-05-22 PROCEDURE — 94003 VENT MGMT INPAT SUBQ DAY: CPT

## 2023-05-22 PROCEDURE — 85025 COMPLETE CBC W/AUTO DIFF WBC: CPT | Performed by: INTERNAL MEDICINE

## 2023-05-22 PROCEDURE — 25010000002 PROPOFOL 10 MG/ML EMULSION: Performed by: INTERNAL MEDICINE

## 2023-05-22 PROCEDURE — 25010000002 ENOXAPARIN PER 10 MG: Performed by: INTERNAL MEDICINE

## 2023-05-22 PROCEDURE — 80048 BASIC METABOLIC PNL TOTAL CA: CPT | Performed by: INTERNAL MEDICINE

## 2023-05-22 PROCEDURE — 94761 N-INVAS EAR/PLS OXIMETRY MLT: CPT

## 2023-05-22 PROCEDURE — 25010000002 METHYLPREDNISOLONE PER 125 MG: Performed by: INTERNAL MEDICINE

## 2023-05-22 PROCEDURE — 94760 N-INVAS EAR/PLS OXIMETRY 1: CPT

## 2023-05-22 PROCEDURE — 94664 DEMO&/EVAL PT USE INHALER: CPT

## 2023-05-22 RX ORDER — AMLODIPINE BESYLATE 2.5 MG/1
2.5 TABLET ORAL DAILY
COMMUNITY
End: 2023-06-12 | Stop reason: HOSPADM

## 2023-05-22 RX ORDER — IPRATROPIUM BROMIDE AND ALBUTEROL SULFATE 2.5; .5 MG/3ML; MG/3ML
3 SOLUTION RESPIRATORY (INHALATION) EVERY 4 HOURS PRN
COMMUNITY

## 2023-05-22 RX ORDER — CHOLECALCIFEROL (VITAMIN D3) 125 MCG
500 CAPSULE ORAL DAILY
COMMUNITY

## 2023-05-22 RX ORDER — PANTOPRAZOLE SODIUM 40 MG/10ML
40 INJECTION, POWDER, LYOPHILIZED, FOR SOLUTION INTRAVENOUS ONCE
Status: COMPLETED | OUTPATIENT
Start: 2023-05-23 | End: 2023-05-22

## 2023-05-22 RX ORDER — LOSARTAN POTASSIUM 100 MG/1
100 TABLET ORAL DAILY
Status: ON HOLD | COMMUNITY
End: 2023-06-12 | Stop reason: SDUPTHER

## 2023-05-22 RX ORDER — ENOXAPARIN SODIUM 100 MG/ML
40 INJECTION SUBCUTANEOUS EVERY 24 HOURS
Status: DISCONTINUED | OUTPATIENT
Start: 2023-05-22 | End: 2023-05-27 | Stop reason: HOSPADM

## 2023-05-22 RX ORDER — LEVOTHYROXINE AND LIOTHYRONINE 9.5; 2.25 UG/1; UG/1
15 TABLET ORAL DAILY
COMMUNITY

## 2023-05-22 RX ORDER — AZITHROMYCIN 250 MG/1
250 TABLET, FILM COATED ORAL
Status: COMPLETED | OUTPATIENT
Start: 2023-05-23 | End: 2023-05-25

## 2023-05-22 RX ORDER — LEVOTHYROXINE AND LIOTHYRONINE 19; 4.5 UG/1; UG/1
30 TABLET ORAL DAILY
COMMUNITY

## 2023-05-22 RX ORDER — MULTIVIT WITH MINERALS/LUTEIN
500 TABLET ORAL DAILY
COMMUNITY

## 2023-05-22 RX ADMIN — IPRATROPIUM BROMIDE AND ALBUTEROL SULFATE 3 ML: 2.5; .5 SOLUTION RESPIRATORY (INHALATION) at 07:26

## 2023-05-22 RX ADMIN — ATENOLOL 50 MG: 50 TABLET ORAL at 08:00

## 2023-05-22 RX ADMIN — ENOXAPARIN SODIUM 40 MG: 100 INJECTION SUBCUTANEOUS at 20:42

## 2023-05-22 RX ADMIN — IPRATROPIUM BROMIDE AND ALBUTEROL SULFATE 3 ML: 2.5; .5 SOLUTION RESPIRATORY (INHALATION) at 19:13

## 2023-05-22 RX ADMIN — AZITHROMYCIN MONOHYDRATE 500 MG: 500 INJECTION, POWDER, LYOPHILIZED, FOR SOLUTION INTRAVENOUS at 04:29

## 2023-05-22 RX ADMIN — PROPOFOL 30 MCG/KG/MIN: 10 INJECTION, EMULSION INTRAVENOUS at 05:54

## 2023-05-22 RX ADMIN — METHYLPREDNISOLONE SODIUM SUCCINATE 60 MG: 125 INJECTION, POWDER, FOR SOLUTION INTRAMUSCULAR; INTRAVENOUS at 16:56

## 2023-05-22 RX ADMIN — Medication 10 ML: at 20:42

## 2023-05-22 RX ADMIN — IPRATROPIUM BROMIDE AND ALBUTEROL SULFATE 3 ML: 2.5; .5 SOLUTION RESPIRATORY (INHALATION) at 15:52

## 2023-05-22 RX ADMIN — ATORVASTATIN CALCIUM 20 MG: 20 TABLET, FILM COATED ORAL at 20:42

## 2023-05-22 RX ADMIN — BUDESONIDE, GLYCOPYRROLATE, AND FORMOTEROL FUMARATE 2 PUFF: 160; 9; 4.8 AEROSOL, METERED RESPIRATORY (INHALATION) at 19:17

## 2023-05-22 RX ADMIN — DOCUSATE SODIUM 50MG AND SENNOSIDES 8.6MG 2 TABLET: 8.6; 5 TABLET, FILM COATED ORAL at 20:42

## 2023-05-22 RX ADMIN — AMLODIPINE BESYLATE 2.5 MG: 2.5 TABLET ORAL at 08:00

## 2023-05-22 RX ADMIN — IPRATROPIUM BROMIDE AND ALBUTEROL SULFATE 3 ML: 2.5; .5 SOLUTION RESPIRATORY (INHALATION) at 11:26

## 2023-05-22 RX ADMIN — METHYLPREDNISOLONE SODIUM SUCCINATE 60 MG: 125 INJECTION, POWDER, FOR SOLUTION INTRAMUSCULAR; INTRAVENOUS at 04:29

## 2023-05-22 RX ADMIN — LOSARTAN POTASSIUM 100 MG: 100 TABLET, FILM COATED ORAL at 08:00

## 2023-05-22 RX ADMIN — Medication 10 ML: at 08:16

## 2023-05-22 RX ADMIN — PANTOPRAZOLE SODIUM 40 MG: 40 INJECTION, POWDER, FOR SOLUTION INTRAVENOUS at 23:33

## 2023-05-22 NOTE — PROGRESS NOTES
[facility]: Medication Reconciliation     Dafne Mary is a 82 y.o. female presenting to [facility] for Hypokalemia [E87.6]  Abnormal ECG [R94.31]  COPD exacerbation [J44.1]  COPD with acute exacerbation [J44.1]  Acute respiratory failure [J96.00]       She  has a past medical history of Chronic diastolic congestive heart failure (11/17/2022), Depression, Emphysema lung, GERD (gastroesophageal reflux disease), Heart failure, Hyperlipidemia, Hypertension, and Hypothyroidism.       Allergies as of 05/20/2023 - Reviewed 05/20/2023   Allergen Reaction Noted    Lansoprazole Nausea Only 12/19/2012    Diphenhydramine hcl (sleep) Irritability 07/27/2016    Lisinopril Cough 05/01/2017          Medication information was obtained from: external fill history and interview with patient and patient's daughter     Prior to Admission Medications       Prescriptions Last Dose Informant Patient Reported? Taking?    amLODIPine (NORVASC) 2.5 MG tablet  Self, Pharmacy Yes Yes    Take 1 tablet by mouth Daily.    aspirin 81 MG chewable tablet  Self Yes Yes    Chew 1 tablet Daily.    atenolol (TENORMIN) 50 MG tablet  Self, Pharmacy Yes Yes    Take 1 tablet by mouth Daily.    atorvastatin (LIPITOR) 20 MG tablet  Self, Pharmacy Yes Yes    Take 1 tablet by mouth Every Night.    Breztri Aerosphere 160-9-4.8 MCG/ACT aerosol inhaler  Self, Pharmacy Yes Yes    2 puffs 2 (Two) Times a Day.    Homeopathic Products (ZICAM ALLERGY RELIEF NA)  Self Yes Yes    1 spray into the nostril(s) as directed by provider Daily.    losartan (COZAAR) 100 MG tablet  Pharmacy, Self Yes Yes    Take 1 tablet by mouth Daily.    omeprazole (priLOSEC) 20 MG capsule  Self Yes Yes    Take 1 capsule by mouth Daily.    PARoxetine (PAXIL) 10 MG tablet  Self, Pharmacy Yes Yes    Take 2 tablets by mouth Daily.    thyroid (ARMOUR) 15 MG tablet  Pharmacy, Self Yes Yes    Take 1 tablet by mouth Daily. Take with 30mg for total daily  "dose of 45mg.    Thyroid 30 MG PO tablet  Self, Pharmacy Yes Yes    Take 1 tablet by mouth Daily. Take with 15mg for total daily dose of 45mg.    Ventolin  (90 Base) MCG/ACT inhaler  Self,  Pharmacy Yes Yes    Inhale 2 puffs Every 4 (Four) Hours As Needed.    vitamin B-12 (CYANOCOBALAMIN) 500 MCG tablet  Self Yes Yes    Take 1 tablet by mouth Daily.    vitamin C (ASCORBIC ACID) 250 MG tablet  Self Yes Yes    Take 2 tablets by mouth Daily.    vitamin D3 125 MCG (5000 UT) capsule capsule  Self Yes Yes    Take 1 capsule by mouth Daily.    ipratropium-albuterol (DUO-NEB) 0.5-2.5 mg/3 ml nebulizer  Self, Pharmacy Yes No    Take 3 mL by nebulization Every 4 (Four) Hours As Needed for Wheezing.           Medication notes:   Patient has had a difficult time refilling ipratropium-albuterol due to prescription troubles   Patient states albuterol is infrequent without exacerbations, but requires \"several times a day\" when exacerbations occur  Patient was recently prescribed a 5 day course of doxycycline and prednisone on 5/19/23 - she had taken 1.5 days worth of the medications prior to admission       This medication list is complete to the best of my knowledge as of 5/22/2023       Please call if questions.     Jacklyn Duffy, PharmD  Clinical Pharmacist    "

## 2023-05-22 NOTE — NURSING NOTE
Pt remains in the CICU. Intubated. Propofol drip infusing. IV solu-medrol given. Restraints in place. SCDs on. AM labs sent. Will continue to monitor.

## 2023-05-22 NOTE — PROGRESS NOTES
Discharge Planning Assessment  [facility]     Patient Name: Dafne Mary  MRN: [id number]  Today's Date: 5/22/2023    Admit Date: 5/20/2023    Plan: Plan is home  F/U for  possible HH   Discharge Needs Assessment     Row Name 05/22/23 1512       Living Environment    People in Home alone    Current Living Arrangements home    Potentially Unsafe Housing Conditions none    Primary Care Provided by self    Provides Primary Care For no one    Family Caregiver if Needed child(tiffanie), adult    Quality of Family Relationships supportive    Able to Return to Prior Arrangements yes       Resource/Environmental Concerns    Resource/Environmental Concerns none    Transportation Concerns none       Food Insecurity    Within the past 12 months, you worried that your food would run out before you got the money to buy more. Never true    Within the past 12 months, the food you bought just didn't last and you didn't have money to get more. Never true       Transition Planning    Patient/Family Anticipates Transition to home    Patient/Family Anticipated Services at Transition none    Transportation Anticipated family or friend will provide       Discharge Needs Assessment    Equipment Currently Used at Home none    Anticipated Changes Related to Illness none    Equipment Needed After Discharge none               Discharge Plan     Row Name 05/22/23 6557       Plan    Plan Plan is home  F/U for  possible HH    Plan Comments IMM noted  CCP spoke to patient at bedside  CCP role explained.  DC planning discussed.  Face sheet verified.  Pt daughter Felicia, [phone].  Pt lives in a one story house alone.  She uses no DME to ambulate  She is independent with ADL's.  She has no history of rehab.  She has had HH in the past.  CCP to follow up for possible HH at [place]  Plan is home with no needs              Continued Care and Services - Admitted Since 5/20/2023    Coordination has not been started for this encounter.          Demographic  Summary    No documentation.                Functional Status    No documentation.                Psychosocial    No documentation.                Abuse/Neglect    No documentation.                Legal    No documentation.                Substance Abuse    No documentation.                Patient Forms    No documentation.                   Yadi Wiggins RN

## 2023-05-22 NOTE — SIGNIFICANT NOTE
05/22/23 0830   Vent Information   $ Vent Patient Assesment yes   Vent Serial Number [id number]   Ventilator On Yes   Settings   FiO2 (%) 30 %   PEEP/CPAP (cm H2O) 5 cm H20   Insp Rise Time (%) 70 %   Pressure Support (cm H2O) 8 cm H20   Trigger Sensitivity Flow (L/min) 3 L/min   Disconnect Sensitivity (%) 75 %   Readings   ETCO2 (mmHg) 29 mmHg   Resp Rate (Observed) Vent 21   I:E Ratio (Obs) 1:2.1   Vt Mandatory Ins (observed, mL) 0.365 mL   Vt Spontaneous (mL) (Obs) 0.424 mL   Vt, Exp (observed, mL) 0.424 mL   Minute Ventilation (L/min) (Obs) 9.75 L/min   PIP Observed (cm H2O) 14 cm H2O   MAP (cm H2O) 7.7   PEEP Observed cmH2O 4.6 cmH2O   Plateau Pressure (cm H2O) 36 cm H2O   Driving Pressure (cm H2O) 31.4 cm H2O   Static Compliance (L/cm H2O) 0   Dynamic Compliance (L/cm H2O) 41 L/cm H2O   Alarms   Insp Pressure High (cm H2O) 30 cm H2O   MV High (L/min) 12 L/min   MV Low (L/min) 4 L/min   Vt High (ML) 0.9 ML   Vt Low (ML) 0.25 ML   Resp Rate High (bpm) 30 bpm   Apnea Interval (sec) 15 seconds   Apnea I-Time 0.82 seconds   Apnea Resp Rate (bpm) 22 bpm   Apnea Inspiratory Pressure (cmH20) 18 cmH2O   Apnea FIO2% 100 %   Spontaneous Breathing Trial   Vt Spontaneous (mL) 606 mL   Vital Capacity (mL) 909   Effort (VC) good   RSBI 29   Negative Inspiratory Force (cm H2O) -35   Effort (NIF) good   Resp Rate (Obs) 16       Pt gave great effort. Alert and follows commands.

## 2023-05-22 NOTE — PLAN OF CARE
Pt remains in CICU. Extubated this morning. Currently on room air. Low UOP. Pt refusing bladder scan. Education provided. Pt up to chair with SBA.

## 2023-05-22 NOTE — PROGRESS NOTES
[facility]     Mar/chart reviewed  Follow up copd with acute exacerbation  Patient sedated on vent, unable to provide subjective    Vital Sign Min/Max for last 24 hours  Temp  Min: 97.9 °F (36.6 °C)  Max: 98.4 °F (36.9 °C)   BP  Min: 82/56  Max: 185/99   Pulse  Min: 77  Max: 118   Resp  Min: 22  Max: 35   SpO2  Min: 90 %  Max: 100 %   Flow (L/min)  Min: 1  Max: 1   Weight  Min: 61.8 kg (136 lb 3.9 oz)  Max: 61.8 kg (136 lb 3.9 oz)   493/0?  Appears ill, sedated on vent, she does arouse some  perrl, normal sclera  mmm, no jvd, trachea midline, neck supple,  chest cta bilaterally, no crackles, no wheezes,   rrr,   soft, nt, nd +bs,  no c/c/ e  Skin warm, dry no rashes    Labs: 5/22: reviewed:  Nothing new    A/P:  1. Asthma/copd overlap syndrome with acute exacerbation -- continue iv steroids for now, continue scheduled bronchodilators.  2. Acute hypoxemic and hypercapnic respiratory failure -- will attempt PS trial  3. Acute parainfluenza bronchitis/infectin -- supportive care  4. HTN  5. HLD  6.  Chronic diastolic chf  7. Pulmonary hypertension -- who group II    Apnea still on PS, propofol paused not long ago, trial PS when more awake.  Discussed with RN  If not extubated today, need to start TF    CC 35 mins

## 2023-05-23 LAB
ANION GAP SERPL CALCULATED.3IONS-SCNC: 10 MMOL/L (ref 5–15)
BASOPHILS # BLD AUTO: 0.01 10*3/MM3 (ref 0–0.2)
BASOPHILS NFR BLD AUTO: 0.1 % (ref 0–1.5)
BUN SERPL-MCNC: 31 MG/DL (ref 8–23)
BUN/CREAT SERPL: 29 (ref 7–25)
CALCIUM SPEC-SCNC: 8.7 MG/DL (ref 8.6–10.5)
CHLORIDE SERPL-SCNC: 100 MMOL/L (ref 98–107)
CO2 SERPL-SCNC: 23 MMOL/L (ref 22–29)
CREAT SERPL-MCNC: 1.07 MG/DL (ref 0.57–1)
DEPRECATED RDW RBC AUTO: 40.7 FL (ref 37–54)
EGFRCR SERPLBLD CKD-EPI 2021: 52 ML/MIN/1.73
EOSINOPHIL # BLD AUTO: 0 10*3/MM3 (ref 0–0.4)
EOSINOPHIL NFR BLD AUTO: 0 % (ref 0.3–6.2)
ERYTHROCYTE [DISTWIDTH] IN BLOOD BY AUTOMATED COUNT: 12.8 % (ref 12.3–15.4)
GLUCOSE SERPL-MCNC: 120 MG/DL (ref 65–99)
HCT VFR BLD AUTO: 39.1 % (ref 34–46.6)
HGB BLD-MCNC: 12.8 G/DL (ref 12–15.9)
IMM GRANULOCYTES # BLD AUTO: 0.16 10*3/MM3 (ref 0–0.05)
IMM GRANULOCYTES NFR BLD AUTO: 1.4 % (ref 0–0.5)
LYMPHOCYTES # BLD AUTO: 0.59 10*3/MM3 (ref 0.7–3.1)
LYMPHOCYTES NFR BLD AUTO: 5.3 % (ref 19.6–45.3)
MCH RBC QN AUTO: 28.6 PG (ref 26.6–33)
MCHC RBC AUTO-ENTMCNC: 32.7 G/DL (ref 31.5–35.7)
MCV RBC AUTO: 87.3 FL (ref 79–97)
MONOCYTES # BLD AUTO: 0.57 10*3/MM3 (ref 0.1–0.9)
MONOCYTES NFR BLD AUTO: 5.1 % (ref 5–12)
NEUTROPHILS NFR BLD AUTO: 88.1 % (ref 42.7–76)
NEUTROPHILS NFR BLD AUTO: 9.82 10*3/MM3 (ref 1.7–7)
NRBC BLD AUTO-RTO: 0 /100 WBC (ref 0–0.2)
PLATELET # BLD AUTO: 243 10*3/MM3 (ref 140–450)
PMV BLD AUTO: 9.2 FL (ref 6–12)
POTASSIUM SERPL-SCNC: 4.2 MMOL/L (ref 3.5–5.2)
QT INTERVAL: 322 MS
RBC # BLD AUTO: 4.48 10*6/MM3 (ref 3.77–5.28)
SODIUM SERPL-SCNC: 133 MMOL/L (ref 136–145)
WBC NRBC COR # BLD: 11.15 10*3/MM3 (ref 3.4–10.8)

## 2023-05-23 PROCEDURE — 85025 COMPLETE CBC W/AUTO DIFF WBC: CPT | Performed by: INTERNAL MEDICINE

## 2023-05-23 PROCEDURE — 80048 BASIC METABOLIC PNL TOTAL CA: CPT | Performed by: INTERNAL MEDICINE

## 2023-05-23 PROCEDURE — 25010000002 METHYLPREDNISOLONE PER 40 MG: Performed by: INTERNAL MEDICINE

## 2023-05-23 PROCEDURE — 94761 N-INVAS EAR/PLS OXIMETRY MLT: CPT

## 2023-05-23 PROCEDURE — 25010000002 ENOXAPARIN PER 10 MG: Performed by: INTERNAL MEDICINE

## 2023-05-23 PROCEDURE — 94799 UNLISTED PULMONARY SVC/PX: CPT

## 2023-05-23 PROCEDURE — 94760 N-INVAS EAR/PLS OXIMETRY 1: CPT

## 2023-05-23 PROCEDURE — 94664 DEMO&/EVAL PT USE INHALER: CPT

## 2023-05-23 PROCEDURE — 97162 PT EVAL MOD COMPLEX 30 MIN: CPT

## 2023-05-23 PROCEDURE — 97110 THERAPEUTIC EXERCISES: CPT

## 2023-05-23 PROCEDURE — 93005 ELECTROCARDIOGRAM TRACING: CPT | Performed by: INTERNAL MEDICINE

## 2023-05-23 PROCEDURE — 25010000002 METHYLPREDNISOLONE PER 125 MG: Performed by: INTERNAL MEDICINE

## 2023-05-23 PROCEDURE — 93010 ELECTROCARDIOGRAM REPORT: CPT | Performed by: INTERNAL MEDICINE

## 2023-05-23 RX ORDER — ACETAMINOPHEN 325 MG/1
650 TABLET ORAL EVERY 6 HOURS PRN
Status: DISCONTINUED | OUTPATIENT
Start: 2023-05-23 | End: 2023-05-27 | Stop reason: HOSPADM

## 2023-05-23 RX ORDER — GUAIFENESIN 600 MG/1
600 TABLET, EXTENDED RELEASE ORAL EVERY 12 HOURS SCHEDULED
Status: DISCONTINUED | OUTPATIENT
Start: 2023-05-23 | End: 2023-05-27 | Stop reason: HOSPADM

## 2023-05-23 RX ORDER — LABETALOL HYDROCHLORIDE 5 MG/ML
20 INJECTION, SOLUTION INTRAVENOUS
Status: DISPENSED | OUTPATIENT
Start: 2023-05-23 | End: 2023-05-26

## 2023-05-23 RX ORDER — ECHINACEA PURPUREA EXTRACT 125 MG
2 TABLET ORAL 3 TIMES DAILY
Status: DISCONTINUED | OUTPATIENT
Start: 2023-05-23 | End: 2023-05-27 | Stop reason: HOSPADM

## 2023-05-23 RX ORDER — METHYLPREDNISOLONE SODIUM SUCCINATE 40 MG/ML
40 INJECTION, POWDER, LYOPHILIZED, FOR SOLUTION INTRAMUSCULAR; INTRAVENOUS EVERY 12 HOURS
Status: DISCONTINUED | OUTPATIENT
Start: 2023-05-23 | End: 2023-05-24

## 2023-05-23 RX ORDER — LORAZEPAM 0.5 MG/1
0.5 TABLET ORAL EVERY 6 HOURS PRN
Status: DISCONTINUED | OUTPATIENT
Start: 2023-05-23 | End: 2023-05-27 | Stop reason: HOSPADM

## 2023-05-23 RX ADMIN — SALINE NASAL SPRAY 2 SPRAY: 1.5 SOLUTION NASAL at 08:47

## 2023-05-23 RX ADMIN — SALINE NASAL SPRAY 2 SPRAY: 1.5 SOLUTION NASAL at 15:22

## 2023-05-23 RX ADMIN — DOCUSATE SODIUM 50MG AND SENNOSIDES 8.6MG 2 TABLET: 8.6; 5 TABLET, FILM COATED ORAL at 08:32

## 2023-05-23 RX ADMIN — AZITHROMYCIN 250 MG: 250 TABLET, FILM COATED ORAL at 08:32

## 2023-05-23 RX ADMIN — LABETALOL HYDROCHLORIDE 20 MG: 5 INJECTION, SOLUTION INTRAVENOUS at 03:42

## 2023-05-23 RX ADMIN — IPRATROPIUM BROMIDE AND ALBUTEROL SULFATE 3 ML: 2.5; .5 SOLUTION RESPIRATORY (INHALATION) at 08:16

## 2023-05-23 RX ADMIN — IPRATROPIUM BROMIDE AND ALBUTEROL SULFATE 3 ML: 2.5; .5 SOLUTION RESPIRATORY (INHALATION) at 15:26

## 2023-05-23 RX ADMIN — BUDESONIDE, GLYCOPYRROLATE, AND FORMOTEROL FUMARATE 2 PUFF: 160; 9; 4.8 AEROSOL, METERED RESPIRATORY (INHALATION) at 08:16

## 2023-05-23 RX ADMIN — ENOXAPARIN SODIUM 40 MG: 100 INJECTION SUBCUTANEOUS at 20:09

## 2023-05-23 RX ADMIN — IPRATROPIUM BROMIDE AND ALBUTEROL SULFATE 3 ML: 2.5; .5 SOLUTION RESPIRATORY (INHALATION) at 11:34

## 2023-05-23 RX ADMIN — Medication 10 ML: at 08:32

## 2023-05-23 RX ADMIN — ATORVASTATIN CALCIUM 20 MG: 20 TABLET, FILM COATED ORAL at 20:09

## 2023-05-23 RX ADMIN — SALINE NASAL SPRAY 2 SPRAY: 1.5 SOLUTION NASAL at 20:09

## 2023-05-23 RX ADMIN — PAROXETINE HYDROCHLORIDE HEMIHYDRATE 20 MG: 20 TABLET, FILM COATED ORAL at 08:32

## 2023-05-23 RX ADMIN — ALBUTEROL SULFATE 2.5 MG: 2.5 SOLUTION RESPIRATORY (INHALATION) at 04:47

## 2023-05-23 RX ADMIN — ACETAMINOPHEN 650 MG: 325 TABLET, FILM COATED ORAL at 18:58

## 2023-05-23 RX ADMIN — ACETAMINOPHEN 650 MG: 325 TABLET, FILM COATED ORAL at 08:46

## 2023-05-23 RX ADMIN — PANTOPRAZOLE SODIUM 40 MG: 40 TABLET, DELAYED RELEASE ORAL at 05:13

## 2023-05-23 RX ADMIN — LEVOTHYROXINE, LIOTHYRONINE 15 MG: 19; 4.5 TABLET ORAL at 05:13

## 2023-05-23 RX ADMIN — LABETALOL HYDROCHLORIDE 20 MG: 5 INJECTION, SOLUTION INTRAVENOUS at 20:09

## 2023-05-23 RX ADMIN — LEVOTHYROXINE, LIOTHYRONINE 30 MG: 19; 4.5 TABLET ORAL at 08:34

## 2023-05-23 RX ADMIN — LOSARTAN POTASSIUM 100 MG: 100 TABLET, FILM COATED ORAL at 08:32

## 2023-05-23 RX ADMIN — NYSTATIN 500000 UNITS: 100000 SUSPENSION ORAL at 13:44

## 2023-05-23 RX ADMIN — IPRATROPIUM BROMIDE AND ALBUTEROL SULFATE 3 ML: 2.5; .5 SOLUTION RESPIRATORY (INHALATION) at 20:57

## 2023-05-23 RX ADMIN — GUAIFENESIN 600 MG: 600 TABLET, EXTENDED RELEASE ORAL at 20:09

## 2023-05-23 RX ADMIN — ASPIRIN 81 MG: 81 TABLET, CHEWABLE ORAL at 08:32

## 2023-05-23 RX ADMIN — METHYLPREDNISOLONE SODIUM SUCCINATE 60 MG: 125 INJECTION, POWDER, FOR SOLUTION INTRAMUSCULAR; INTRAVENOUS at 03:22

## 2023-05-23 RX ADMIN — LABETALOL HYDROCHLORIDE 20 MG: 5 INJECTION, SOLUTION INTRAVENOUS at 08:31

## 2023-05-23 RX ADMIN — GUAIFENESIN 600 MG: 600 TABLET, EXTENDED RELEASE ORAL at 08:32

## 2023-05-23 RX ADMIN — LABETALOL HYDROCHLORIDE 20 MG: 5 INJECTION, SOLUTION INTRAVENOUS at 04:47

## 2023-05-23 RX ADMIN — METHYLPREDNISOLONE SODIUM SUCCINATE 40 MG: 40 INJECTION, POWDER, FOR SOLUTION INTRAMUSCULAR; INTRAVENOUS at 15:21

## 2023-05-23 RX ADMIN — AMLODIPINE BESYLATE 2.5 MG: 2.5 TABLET ORAL at 08:32

## 2023-05-23 RX ADMIN — LABETALOL HYDROCHLORIDE 20 MG: 5 INJECTION, SOLUTION INTRAVENOUS at 06:39

## 2023-05-23 RX ADMIN — ATENOLOL 50 MG: 50 TABLET ORAL at 08:32

## 2023-05-23 RX ADMIN — Medication 10 ML: at 20:11

## 2023-05-23 NOTE — PLAN OF CARE
Goal Outcome Evaluation:  Plan of Care Reviewed With: patient        Progress: improving  Outcome Evaluation: Pt seen for PT eval this am. She was admitted to [facility] on 5/20 w COPD exacerbation requiring intubation. Pt was extubated yesterday and doing well today. She is A&O x 4. At baseline, pt lives alone and is independent w all mobility and ADLs. She does not use AD for mobility. Today, pt denies pain, but states she is just generallly not feeling well. Pt moving well. She was able to sit EOB independently. she stood w supervision and was able to ambulate approx 180 ft w SBA. No unsteadiness or LOB noted. Pt plans home at [place] and reports her daughter will be able to assist is she has any needs. Pt currently w no further acute PT needs at this time. She may continue to ambulate w nsg. Discussed w RN. PT will sign off.

## 2023-05-23 NOTE — PROGRESS NOTES
"Nutrition Services    Patient Name:  Dafne Mary  YOB: 1940  MRN: [id number]  Admit Date:  5/20/2023  Assessment Date:  05/23/23    Comment: Nutrition Screen  Dx: Asthma/COPD, respiratory failure extubated, CHF, HTN, pulmonary hypertension  Pt extubated yesterday.   Diet advanced per speech eval   Visited pt, during breakfast meal, who states po intake is good  Labs Na 133, glu 120, BUN 31    Will continue to follow clinical course and monitor nutritional needs.       CLINICAL NUTRITION ASSESSMENT      Reason for Assessment Other: critical care screen     Diagnosis/Problem    Asthma/COPD, respiratory failure extubated, CHF, HTN, pulmonary hypertension   Medical/Surgical History Past Medical History:   Diagnosis Date   • Chronic diastolic congestive heart failure 11/17/2022   • Depression    • Emphysema lung    • GERD (gastroesophageal reflux disease)    • Heart failure    • Hyperlipidemia    • Hypertension    • Hypothyroidism        Past Surgical History:   Procedure Laterality Date   • EYE SURGERY Left    • INTUBATION  5/21/2023        • PARATHYROIDECTOMY      Patient reports thyroidectomy partial not a para thyroidectomy.   • THYROIDECTOMY, PARTIAL      1984        Encounter Information        Nutrition History:  Appetite usually good   Food Preferences:    Supplements:    Factors Affecting Intake: No factors at this time     Anthropometrics        Current Height  Current Weight  BMI kg/m2 Height: 152.4 cm (60\")  Weight: 63.7 kg (140 lb 6.9 oz) (05/23/23 0322)  Body mass index is 27.43 kg/m².   Adjusted BMI (if applicable)    BMI Category Overweight (25 - 29.9)       Admission Weight        Ideal Body Weight (IBW) 45.5 kg   Adjusted IBW (if applicable)        Usual Body Weight (UBW) 135-140's   Weight Change/Trend Stable       Weight History Wt Readings from Last 30 Encounters:   05/23/23 0322 63.7 kg (140 lb 6.9 oz)   05/21/23 1503 61.8 kg (136 lb 3.9 oz)   05/21/23 0302 59.6 kg (131 lb 6.4 oz) "   05/20/23 2016 59.9 kg (132 lb)   05/19/23 1306 60.9 kg (134 lb 3.2 oz)   03/28/23 1309 62.1 kg (136 lb 12.8 oz)   02/09/23 1346 61.3 kg (135 lb 3.2 oz)   01/13/23 0500 63.3 kg (139 lb 8.8 oz)   01/12/23 0500 60.9 kg (134 lb 3.2 oz)   01/11/23 0625 63.4 kg (139 lb 12.4 oz)   01/09/23 0652 60.3 kg (132 lb 15 oz)   01/08/23 0523 60.3 kg (133 lb)   01/07/23 0156 60.6 kg (133 lb 9.6 oz)   01/06/23 2242 60.6 kg (133 lb 9.6 oz)   01/06/23 1122 60.6 kg (133 lb 9.6 oz)   12/28/22 1018 61.7 kg (136 lb)   12/08/22 1200 65.1 kg (143 lb 8.3 oz)   12/08/22 0801 65.1 kg (143 lb 8.3 oz)   12/08/22 0608 65.1 kg (143 lb 8.3 oz)   11/17/22 1336 62.6 kg (138 lb)   10/06/22 1028 62.1 kg (137 lb)   08/23/22 1359 60.8 kg (134 lb)   08/05/22 0950 61.8 kg (136 lb 3.2 oz)   07/25/22 1526 61.2 kg (135 lb)   06/24/22 1315 61.2 kg (135 lb)   05/24/22 1017 61.7 kg (136 lb)   05/11/22 1259 61.2 kg (135 lb)   05/07/22 0500 59.9 kg (132 lb 1.6 oz)   05/06/22 0643 59.6 kg (131 lb 8 oz)   05/04/22 1318 59.9 kg (132 lb)   05/04/22 0600 60.1 kg (132 lb 7.9 oz)   05/03/22 1244 60.1 kg (132 lb 7.9 oz)   11/30/21 1456 60.1 kg (132 lb 9.6 oz)   11/05/21 1254 60 kg (132 lb 4.4 oz)   10/29/21 1444 59 kg (130 lb)   10/29/21 1131 59.9 kg (132 lb)   08/09/21 0939 59.9 kg (132 lb)   06/14/21 1353 60.8 kg (134 lb)   05/20/21 1202 61.1 kg (134 lb 9.6 oz)   05/17/21 1357 61.2 kg (135 lb)   12/16/20 1348 59.9 kg (132 lb)   11/05/19 0958 60.6 kg (133 lb 9.6 oz)   07/15/19 1525 60.9 kg (134 lb 3.2 oz)   04/09/19 1104 59.6 kg (131 lb 6.4 oz)   03/13/19 1315 60.1 kg (132 lb 6.4 oz)           --  Tests/Procedures        Tests/Procedures No new tests/procedures     Labs       Pertinent Labs    Results from last 7 days   Lab Units 05/23/23  0345 05/22/23  0809 05/21/23  0639 05/20/23  2125   SODIUM mmol/L 133* 136 137 130*   POTASSIUM mmol/L 4.2 5.0 3.6 3.1*   CHLORIDE mmol/L 100 102 101 95*   CO2 mmol/L 23.0 19.5* 23.0 21.0*   BUN mg/dL 31* 30* 14 15   CREATININE mg/dL  1.07* 1.11* 0.76 0.75   CALCIUM mg/dL 8.7 9.2 9.2 9.0   BILIRUBIN mg/dL  --   --   --  0.4   ALK PHOS U/L  --   --   --  134*   ALT (SGPT) U/L  --   --   --  25   AST (SGOT) U/L  --   --   --  30   GLUCOSE mg/dL 120* 124* 154* 163*     Results from last 7 days   Lab Units 05/23/23  0345 05/22/23  0809 05/20/23  2125   MAGNESIUM mg/dL  --   --  1.9   HEMOGLOBIN g/dL 12.8   < > 13.7   HEMATOCRIT % 39.1   < > 41.3   WBC 10*3/mm3 11.15*   < > 7.00   ALBUMIN g/dL  --   --  4.3    < > = values in this interval not displayed.     Results from last 7 days   Lab Units 05/23/23  0345 05/22/23  0809 05/20/23 2125   PLATELETS 10*3/mm3 243 244 242     COVID19   Date Value Ref Range Status   05/21/2023 Not Detected Not Detected - Ref. Range Final     Lab Results   Component Value Date    HGBA1C 5.4 05/24/2022          Medications           Scheduled Medications amLODIPine, 2.5 mg, Oral, Daily  aspirin, 81 mg, Oral, Daily  atenolol, 50 mg, Oral, Daily  atorvastatin, 20 mg, Oral, Nightly  azithromycin, 250 mg, Oral, Q24H  Budeson-Glycopyrrol-Formoterol, 2 puff, Inhalation, BID  enoxaparin, 40 mg, Subcutaneous, Q24H  guaiFENesin, 600 mg, Oral, Q12H  ipratropium-albuterol, 3 mL, Nebulization, 4x Daily - RT  losartan, 100 mg, Oral, Daily  methylPREDNISolone sodium succinate, 40 mg, Intravenous, Q12H  nystatin, 5 mL, Swish & Spit, 4x Daily  pantoprazole, 40 mg, Oral, Q AM  PARoxetine, 20 mg, Oral, Daily  senna-docusate sodium, 2 tablet, Oral, BID  sodium chloride, 10 mL, Intravenous, Q12H  sodium chloride, 2 spray, Each Nare, TID  thyroid, 15 mg, Oral, Daily  Thyroid, 30 mg, Oral, Daily       Infusions     PRN Medications •  acetaminophen  •  albuterol  •  senna-docusate sodium **AND** polyethylene glycol **AND** bisacodyl **AND** bisacodyl  •  labetalol  •  LORazepam  •  sodium chloride  •  sodium chloride  •  sodium chloride     Physical Findings          Physical Appearance alert   Oral/Mouth Cavity WNL   Edema  1+ (trace)    Gastrointestinal hypoactive bowel sounds   Skin  bruising   Tubes/Drains/Lines none   NFPE Not applicable at this time   --  Current Nutrition Orders & Evaluation of Intake       Oral Nutrition     Food Allergies NKFA   Current PO Diet Diet: Regular/House Diet, Cardiac Diets; Healthy Heart (2-3 Na+); Texture: Regular Texture (IDDSI 7); Fluid Consistency: Thin (IDDSI 0)   Supplement n/a   PO Evaluation     % PO Intake %    # of Days Evaluated    --  PES STATEMENT / NUTRITION DIAGNOSIS      Nutrition Dx Problem  Problem: Nutrition Appropriate for Condition at this Time  Etiology:   Signs/Symptoms:     Comment:    --  NUTRITION INTERVENTION / PLAN OF CARE      Intervention Goal(s) Maintain nutrition status         RD Intervention/Action Interview for preferences and Follow Tx Progress     --      Monitor/Evaluation Per protocol   Discharge Plan/Needs Pending clinical course   Education Will instruct as appropriate   --    RD to follow per protocol.      Electronically signed by:  Leisa Hansen RD  05/23/23 09:29 EDT

## 2023-05-23 NOTE — PROGRESS NOTES
[facility]      Mar/chart reviewed  Follow up copd with acute exacerbation  No chest pain, some cough    Vital Sign Min/Max for last 24 hours  Temp  Min: 98.1 °F (36.7 °C)  Max: 98.2 °F (36.8 °C)   BP  Min: 105/94  Max: 185/92   Pulse  Min: 78  Max: 113   Resp  Min: 16  Max: 22   SpO2  Min: 67 %  Max: 100 %   Flow (L/min)  Min: 2  Max: 4   Weight  Min: 63.7 kg (140 lb 6.9 oz)  Max: 63.7 kg (140 lb 6.9 oz)   593/900    Appears ill, axox3  perrl, normal sclera  Mm dry, +thrush, no jvd, trachea midline, neck supple,  chest fair ae bilaterally, no crackles, + wheezes,   rrr,   soft, nt, nd +bs,  no c/c/ trace edema  Skin warm, dry no rashes    Labs: 5/23; reviewed:  Glucose 120  Bun 31  Cr 1.07  Na 133  Bicarb 23  Wbc 11  hgb 12.8  plts 243    A/P:  1. Asthma/copd overlap syndrome with acute exacerbation -- continue iv steroids for now, continue scheduled bronchodilators.  2. Acute hypoxemic and hypercapnic respiratory failure --improved  3. Acute parainfluenza bronchitis/infectin -- supportive care  4. HTN  5. HLD  6.  Chronic diastolic chf  7. Pulmonary hypertension -- who group II    Doing well, post extubation, continue bronchodilators, decrease iv steroids, pt/ot/speech, transfer out of unit.

## 2023-05-23 NOTE — PLAN OF CARE
Goal Outcome Evaluation:  Plan of Care Reviewed With: patient           Outcome Evaluation: Pt to transfer to [address] [place] [address]. Up with PT today. SBA.

## 2023-05-23 NOTE — THERAPY EVALUATION
Patient Name: Dafne Mary  : 1940    MRN: [id number]                              Today's Date: 2023       Admit Date: 2023    Visit Dx:     ICD-10-CM ICD-9-CM   1. COPD with acute exacerbation  J44.1 491.21   2. Hypokalemia  E87.6 276.8   3. Abnormal ECG  R94.31 794.31     Patient Active Problem List   Diagnosis   • Anxiety   • Arteriosclerosis of both carotid arteries   • Chronic interstitial cystitis   • Cough   • Pulmonary emphysema   • Gastroesophageal reflux disease   • Fibromyalgia   • Headache   • Hematuria   • Hoarseness   • Hyperlipidemia   • Benign hypertension   • Postoperative hypothyroidism   • Muscle spasms of both lower extremities   • Palpitations   • Shortness of breath   • Postmenopausal osteoporosis   • Chronic fatigue   • Hair loss disorder   • Dysuria   • Dry cough   • Spondylolysis, lumbar region   • Vocal cord paralysis   • Abnormal finding on thyroid function test   • Positional lightheadedness   • COPD with acute exacerbation   • Hypothyroid   • COPD (chronic obstructive pulmonary disease)   • COPD exacerbation   • Morbid obesity with BMI of 70 and over, adult   • RSV bronchiolitis   • Vitamin D deficiency   • B12 deficiency   • Iron deficiency   • Decreased hemoglobin   • Generalized anxiety disorder   • Chronic bilateral low back pain with left-sided sciatica   • Facet arthropathy, lumbar   • Spinal stenosis of lumbar region   • Spondylolisthesis of lumbar region   • Scoliosis of lumbar spine   • Chronic congestive heart failure   • History of non-ST elevation myocardial infarction (NSTEMI)   • Chronic respiratory failure   • Acute respiratory failure     Past Medical History:   Diagnosis Date   • Chronic diastolic congestive heart failure 2022   • Depression    • Emphysema lung    • GERD (gastroesophageal reflux disease)    • Heart failure    • Hyperlipidemia    • Hypertension    • Hypothyroidism      Past Surgical History:   Procedure Laterality Date   • EYE  SURGERY Left    • INTUBATION  5/21/2023        • PARATHYROIDECTOMY      Patient reports thyroidectomy partial not a para thyroidectomy.   • THYROIDECTOMY, PARTIAL      1984      General Information     Row Name 05/23/23 1103          Physical Therapy Time and Intention    Document Type evaluation;discharge evaluation/summary  -     Mode of Treatment physical therapy  -EJ     Row Name 05/23/23 1103          General Information    Patient Profile Reviewed yes  -EJ     Prior Level of Function independent:;ADL's;all household mobility;community mobility  -EJ     Existing Precautions/Restrictions no known precautions/restrictions  -EJ     Barriers to Rehab none identified  -EJ     Row Name 05/23/23 1103          Living Environment    People in Home alone  -EJ     Row Name 05/23/23 1103          Home Main Entrance    Number of Stairs, Main Entrance three  -EJ     Row Name 05/23/23 1103          Stairs Within Home, Primary    Number of Stairs, Within Home, Primary none  -EJ     Row Name 05/23/23 1103          Cognition    Orientation Status (Cognition) oriented x 4  -EJ     Row Name 05/23/23 1103          Safety Issues, Functional Mobility    Impairments Affecting Function (Mobility) endurance/activity tolerance  -EJ           User Key  (r) = Recorded By, (t) = Taken By, (c) = Cosigned By    Initials Name Provider Type    EJ Araceli De La Cruz, PT Physical Therapist               Mobility     Row Name 05/23/23 1103          Bed Mobility    Bed Mobility supine-sit  -EJ     Supine-Sit Wells (Bed Mobility) independent  -EJ     Row Name 05/23/23 1103          Sit-Stand Transfer    Sit-Stand Wells (Transfers) independent  -EJ     Row Name 05/23/23 1103          Gait/Stairs (Locomotion)    Wells Level (Gait) standby assist;supervision  -EJ     Distance in Feet (Gait) 180  -EJ     Deviations/Abnormal Patterns (Gait) foreign decreased;stride length decreased  -EJ     Comment, (Gait/Stairs) no unsteadines or  LOB Noted  -EJ           User Key  (r) = Recorded By, (t) = Taken By, (c) = Cosigned By    Initials Name Provider Type    Araceli Madison, PT Physical Therapist               Obj/Interventions     Row Name 05/23/23 1106          Range of Motion Comprehensive    General Range of Motion no range of motion deficits identified  -     Row Name 05/23/23 1106          Strength Comprehensive (MMT)    General Manual Muscle Testing (MMT) Assessment no strength deficits identified  -     Row Name 05/23/23 1106          Balance    Balance Assessment sitting static balance;standing static balance;standing dynamic balance  -EJ     Static Sitting Balance independent  -EJ     Static Standing Balance supervision  -EJ     Dynamic Standing Balance standby assist;supervision  -EJ           User Key  (r) = Recorded By, (t) = Taken By, (c) = Cosigned By    Initials Name Provider Type    Araceli Madison, PT Physical Therapist               Goals/Plan    No documentation.                Clinical Impression     Row Name 05/23/23 1106          Pain    Pretreatment Pain Rating 0/10 - no pain  -EJ     Pre/Posttreatment Pain Comment pt reports generally not feeling well.  -     Row Name 05/23/23 1106          Plan of Care Review    Plan of Care Reviewed With patient  -EJ     Progress improving  -     Outcome Evaluation Pt seen for PT eval this am. She was admitted to [facility] on 5/20 w COPD exacerbation requiring intubation. Pt was extubated yesterday and doing well today. She is A&O x 4. At baseline, pt lives alone and is independent w all mobility and ADLs. She does not use AD for mobility. Today, pt denies pain, but states she is just generallly not feeling well. Pt moving well. She was able to sit EOB independently. she stood w supervision and was able to ambulate approx 180 ft w SBA. No unsteadiness or LOB noted. [place] home at [place] and reports her daughter will be able to assist is she has any needs. Pt currently w no  further acute PT needs at this time. She may continue to ambulate w nsg. Discussed w RN. PT will sign off.  -     Row Name 05/23/23 1106          Therapy Assessment/Plan (PT)    Criteria for Skilled Interventions Met (PT) no problems identified which require skilled intervention  -EJ     Therapy Frequency (PT) evaluation only  -EJ     Row Name 05/23/23 1106          Positioning and Restraints    Pre-Treatment Position in bed  -EJ     Post Treatment Position chair  -EJ     In Chair notified nsg;reclined;call light within reach;encouraged to call for assist  -EJ           User Key  (r) = Recorded By, (t) = Taken By, (c) = Cosigned By    Initials Name Provider Type    Araceli Madison, PT Physical Therapist               Outcome Measures     Row Name 05/23/23 1109 05/23/23 0800       How much help from another person do you currently need...    Turning from your back to your side while in flat bed without using bedrails? 4  -EJ 4  -JF    Moving from lying on back to sitting on the side of a flat bed without bedrails? 4  -EJ 4  -JF    Moving to and from a bed to a chair (including a wheelchair)? 4  -EJ 4  -JF    Standing up from a chair using your arms (e.g., wheelchair, bedside chair)? 4  -EJ 4  -JF    Climbing 3-5 steps with a railing? 3  -EJ 4  -JF    To walk in hospital room? 4  -EJ 4  -JF    AM-PAC 6 Clicks Score (PT) 23  -EJ 24  -JF    Highest level of mobility 7 --> Walked 25 feet or more  -EJ 8 --> Walked 250 feet or more  -JF          User Key  (r) = Recorded By, (t) = Taken By, (c) = Cosigned By    Initials Name Provider Type    Araceli Madison, PT Physical Therapist    Jacklyn Belle RN Registered Nurse                               PT Recommendation and Plan     Plan of Care Reviewed With: patient  Progress: improving  Outcome Evaluation: Pt seen for PT eval this am. She was admitted to [facility] on 5/20 w COPD exacerbation requiring intubation. Pt was extubated yesterday and doing well  today. She is A&O x 4. At baseline, pt lives alone and is independent w all mobility and ADLs. She does not use AD for mobility. Today, pt denies pain, but states she is just generallly not feeling well. Pt moving well. She was able to sit EOB independently. she stood w supervision and was able to ambulate approx 180 ft w SBA. No unsteadiness or LOB noted. Pt plans home at [place] and reports her daughter will be able to assist is she has any needs. Pt currently w no further acute PT needs at this time. She may continue to ambulate w nsg. Discussed w RN. PT will sign off.     Time Calculation:    PT Charges     Row Name 05/23/23 1109             Time Calculation    Start Time 1043  -EJ      Stop Time 1054  -EJ      Time Calculation (min) 11 min  -EJ      PT Received On 05/23/23  -EJ         Time Calculation- PT    Total Timed Code Minutes- PT 8 minute(s)  -EJ            User Key  (r) = Recorded By, (t) = Taken By, (c) = Cosigned By    Initials Name Provider Type    Araceli Madison, PT Physical Therapist              Therapy Charges for Today     Code Description Service Date Service Provider Modifiers Qty    45283894936 HC PT EVAL MOD COMPLEXITY 3 5/23/2023 Araceli De La Cruz, PT GP 1    54230623498 HC PT THER PROC EA 15 MIN 5/23/2023 Araceli De La Cruz, PT GP 1          PT G-Codes  AM-PAC 6 Clicks Score (PT): 23  PT Discharge Summary  Anticipated Discharge Disposition (PT): home, home with assist    Araceli De La Cruz PT  5/23/2023

## 2023-05-24 PROCEDURE — 94761 N-INVAS EAR/PLS OXIMETRY MLT: CPT

## 2023-05-24 PROCEDURE — 94664 DEMO&/EVAL PT USE INHALER: CPT

## 2023-05-24 PROCEDURE — 94799 UNLISTED PULMONARY SVC/PX: CPT

## 2023-05-24 PROCEDURE — 25010000002 METHYLPREDNISOLONE PER 40 MG: Performed by: INTERNAL MEDICINE

## 2023-05-24 PROCEDURE — 25010000002 ENOXAPARIN PER 10 MG: Performed by: INTERNAL MEDICINE

## 2023-05-24 PROCEDURE — 94760 N-INVAS EAR/PLS OXIMETRY 1: CPT

## 2023-05-24 RX ORDER — ATENOLOL 50 MG/1
50 TABLET ORAL NIGHTLY
Status: DISCONTINUED | OUTPATIENT
Start: 2023-05-24 | End: 2023-05-26

## 2023-05-24 RX ORDER — LOSARTAN POTASSIUM 100 MG/1
100 TABLET ORAL NIGHTLY
Status: DISCONTINUED | OUTPATIENT
Start: 2023-05-24 | End: 2023-05-27 | Stop reason: HOSPADM

## 2023-05-24 RX ORDER — PREDNISONE 20 MG/1
40 TABLET ORAL
Status: DISCONTINUED | OUTPATIENT
Start: 2023-05-25 | End: 2023-05-27 | Stop reason: HOSPADM

## 2023-05-24 RX ORDER — AMLODIPINE BESYLATE 5 MG/1
2.5 TABLET ORAL NIGHTLY
Status: DISCONTINUED | OUTPATIENT
Start: 2023-05-24 | End: 2023-05-27 | Stop reason: HOSPADM

## 2023-05-24 RX ORDER — LOPERAMIDE HYDROCHLORIDE 2 MG/1
2 CAPSULE ORAL 4 TIMES DAILY PRN
Status: DISCONTINUED | OUTPATIENT
Start: 2023-05-24 | End: 2023-05-27 | Stop reason: HOSPADM

## 2023-05-24 RX ADMIN — IPRATROPIUM BROMIDE AND ALBUTEROL SULFATE 3 ML: 2.5; .5 SOLUTION RESPIRATORY (INHALATION) at 07:13

## 2023-05-24 RX ADMIN — METHYLPREDNISOLONE SODIUM SUCCINATE 40 MG: 40 INJECTION, POWDER, FOR SOLUTION INTRAMUSCULAR; INTRAVENOUS at 04:42

## 2023-05-24 RX ADMIN — NYSTATIN 500000 UNITS: 100000 SUSPENSION ORAL at 09:44

## 2023-05-24 RX ADMIN — LEVOTHYROXINE, LIOTHYRONINE 15 MG: 19; 4.5 TABLET ORAL at 06:06

## 2023-05-24 RX ADMIN — NYSTATIN 500000 UNITS: 100000 SUSPENSION ORAL at 12:41

## 2023-05-24 RX ADMIN — NYSTATIN 500000 UNITS: 100000 SUSPENSION ORAL at 20:16

## 2023-05-24 RX ADMIN — AMLODIPINE BESYLATE 2.5 MG: 5 TABLET ORAL at 20:17

## 2023-05-24 RX ADMIN — NYSTATIN 500000 UNITS: 100000 SUSPENSION ORAL at 17:38

## 2023-05-24 RX ADMIN — ACETAMINOPHEN 650 MG: 325 TABLET, FILM COATED ORAL at 16:57

## 2023-05-24 RX ADMIN — PANTOPRAZOLE SODIUM 40 MG: 40 TABLET, DELAYED RELEASE ORAL at 06:06

## 2023-05-24 RX ADMIN — ASPIRIN 81 MG: 81 TABLET, CHEWABLE ORAL at 09:43

## 2023-05-24 RX ADMIN — IPRATROPIUM BROMIDE AND ALBUTEROL SULFATE 3 ML: 2.5; .5 SOLUTION RESPIRATORY (INHALATION) at 14:37

## 2023-05-24 RX ADMIN — ATORVASTATIN CALCIUM 20 MG: 20 TABLET, FILM COATED ORAL at 20:15

## 2023-05-24 RX ADMIN — SALINE NASAL SPRAY 2 SPRAY: 1.5 SOLUTION NASAL at 16:57

## 2023-05-24 RX ADMIN — GUAIFENESIN 600 MG: 600 TABLET, EXTENDED RELEASE ORAL at 09:42

## 2023-05-24 RX ADMIN — AZITHROMYCIN 250 MG: 250 TABLET, FILM COATED ORAL at 09:42

## 2023-05-24 RX ADMIN — LEVOTHYROXINE, LIOTHYRONINE 30 MG: 19; 4.5 TABLET ORAL at 09:44

## 2023-05-24 RX ADMIN — IPRATROPIUM BROMIDE AND ALBUTEROL SULFATE 3 ML: 2.5; .5 SOLUTION RESPIRATORY (INHALATION) at 10:19

## 2023-05-24 RX ADMIN — SALINE NASAL SPRAY 2 SPRAY: 1.5 SOLUTION NASAL at 09:45

## 2023-05-24 RX ADMIN — ATENOLOL 50 MG: 50 TABLET ORAL at 20:15

## 2023-05-24 RX ADMIN — LOPERAMIDE HYDROCHLORIDE 2 MG: 2 CAPSULE ORAL at 04:42

## 2023-05-24 RX ADMIN — LOSARTAN POTASSIUM 100 MG: 100 TABLET, FILM COATED ORAL at 20:14

## 2023-05-24 RX ADMIN — SALINE NASAL SPRAY 2 SPRAY: 1.5 SOLUTION NASAL at 20:15

## 2023-05-24 RX ADMIN — IPRATROPIUM BROMIDE AND ALBUTEROL SULFATE 3 ML: 2.5; .5 SOLUTION RESPIRATORY (INHALATION) at 19:12

## 2023-05-24 RX ADMIN — Medication 10 ML: at 20:15

## 2023-05-24 RX ADMIN — GUAIFENESIN 600 MG: 600 TABLET, EXTENDED RELEASE ORAL at 20:15

## 2023-05-24 RX ADMIN — ENOXAPARIN SODIUM 40 MG: 100 INJECTION SUBCUTANEOUS at 20:15

## 2023-05-24 RX ADMIN — PAROXETINE HYDROCHLORIDE HEMIHYDRATE 20 MG: 20 TABLET, FILM COATED ORAL at 09:43

## 2023-05-24 RX ADMIN — Medication 10 ML: at 09:44

## 2023-05-24 NOTE — PROGRESS NOTES
[facility]      Mar/chart reviewed  Follow up copd with acute exacerbation  No chest pain, some cough    Vital Sign Min/Max for last 24 hours  Temp  Min: 97.2 °F (36.2 °C)  Max: 97.8 °F (36.6 °C)   BP  Min: 132/74  Max: 182/91   Pulse  Min: 72  Max: 92   Resp  Min: 16  Max: 20   SpO2  Min: 92 %  Max: 100 %   No data recorded   No data recorded     Appears ill, axox3  perrl, normal sclera  Mm dry, +thrush, no jvd, trachea midline, neck supple,  chest fair ae bilaterally, no crackles, + wheezes,   rrr,   soft, nt, nd +bs,  no c/c/ trace edema  Skin warm, dry no rashes    Labs: 5/24: reviewed: nothing new  5/23:   Bun 31  Cr 1.07  Na 133  Bicarb 23  Wbc 11  hgb 12.8  plts 243    A/P:  1. Asthma/copd overlap syndrome with acute exacerbation -- continue  steroids for now, continue scheduled bronchodilators.  2. Acute hypoxemic and hypercapnic respiratory failure --improved  3. Acute parainfluenza bronchitis/infectin -- supportive care  4. HTN  5. HLD  6.  Chronic diastolic chf  7. Pulmonary hypertension -- who group II    Transition to po steroids, possibly d/c tomorrow or friday

## 2023-05-24 NOTE — PLAN OF CARE
Goal Outcome Evaluation:  Plan of Care Reviewed With: patient        Progress: improving       VSS, slight headache today. Exp wheezing still present but improving per patient. BP meds changed to HS per pt request. Ambulated and up in chair for dinner

## 2023-05-25 PROCEDURE — 94799 UNLISTED PULMONARY SVC/PX: CPT

## 2023-05-25 PROCEDURE — 94761 N-INVAS EAR/PLS OXIMETRY MLT: CPT

## 2023-05-25 PROCEDURE — 25010000002 ENOXAPARIN PER 10 MG: Performed by: INTERNAL MEDICINE

## 2023-05-25 PROCEDURE — 94664 DEMO&/EVAL PT USE INHALER: CPT

## 2023-05-25 PROCEDURE — 63710000001 PREDNISONE PER 1 MG: Performed by: INTERNAL MEDICINE

## 2023-05-25 RX ADMIN — PANTOPRAZOLE SODIUM 40 MG: 40 TABLET, DELAYED RELEASE ORAL at 06:26

## 2023-05-25 RX ADMIN — ASPIRIN 81 MG: 81 TABLET, CHEWABLE ORAL at 08:45

## 2023-05-25 RX ADMIN — LEVOTHYROXINE, LIOTHYRONINE 30 MG: 19; 4.5 TABLET ORAL at 08:45

## 2023-05-25 RX ADMIN — IPRATROPIUM BROMIDE AND ALBUTEROL SULFATE 3 ML: 2.5; .5 SOLUTION RESPIRATORY (INHALATION) at 15:06

## 2023-05-25 RX ADMIN — Medication 10 ML: at 20:03

## 2023-05-25 RX ADMIN — BUDESONIDE, GLYCOPYRROLATE, AND FORMOTEROL FUMARATE 2 PUFF: 160; 9; 4.8 AEROSOL, METERED RESPIRATORY (INHALATION) at 07:10

## 2023-05-25 RX ADMIN — PAROXETINE HYDROCHLORIDE HEMIHYDRATE 20 MG: 20 TABLET, FILM COATED ORAL at 08:45

## 2023-05-25 RX ADMIN — ATENOLOL 50 MG: 50 TABLET ORAL at 20:02

## 2023-05-25 RX ADMIN — Medication 10 ML: at 08:46

## 2023-05-25 RX ADMIN — NYSTATIN 500000 UNITS: 100000 SUSPENSION ORAL at 08:45

## 2023-05-25 RX ADMIN — GUAIFENESIN 600 MG: 600 TABLET, EXTENDED RELEASE ORAL at 20:02

## 2023-05-25 RX ADMIN — IPRATROPIUM BROMIDE AND ALBUTEROL SULFATE 3 ML: 2.5; .5 SOLUTION RESPIRATORY (INHALATION) at 07:09

## 2023-05-25 RX ADMIN — SALINE NASAL SPRAY 2 SPRAY: 1.5 SOLUTION NASAL at 08:44

## 2023-05-25 RX ADMIN — ALBUTEROL SULFATE 2.5 MG: 2.5 SOLUTION RESPIRATORY (INHALATION) at 00:42

## 2023-05-25 RX ADMIN — IPRATROPIUM BROMIDE AND ALBUTEROL SULFATE 3 ML: 2.5; .5 SOLUTION RESPIRATORY (INHALATION) at 19:28

## 2023-05-25 RX ADMIN — NYSTATIN 500000 UNITS: 100000 SUSPENSION ORAL at 18:39

## 2023-05-25 RX ADMIN — ATORVASTATIN CALCIUM 20 MG: 20 TABLET, FILM COATED ORAL at 20:02

## 2023-05-25 RX ADMIN — LEVOTHYROXINE, LIOTHYRONINE 15 MG: 19; 4.5 TABLET ORAL at 06:26

## 2023-05-25 RX ADMIN — SALINE NASAL SPRAY 2 SPRAY: 1.5 SOLUTION NASAL at 18:39

## 2023-05-25 RX ADMIN — IPRATROPIUM BROMIDE AND ALBUTEROL SULFATE 3 ML: 2.5; .5 SOLUTION RESPIRATORY (INHALATION) at 10:55

## 2023-05-25 RX ADMIN — GUAIFENESIN 600 MG: 600 TABLET, EXTENDED RELEASE ORAL at 08:45

## 2023-05-25 RX ADMIN — LOSARTAN POTASSIUM 100 MG: 100 TABLET, FILM COATED ORAL at 21:48

## 2023-05-25 RX ADMIN — AMLODIPINE BESYLATE 2.5 MG: 5 TABLET ORAL at 20:02

## 2023-05-25 RX ADMIN — SALINE NASAL SPRAY 2 SPRAY: 1.5 SOLUTION NASAL at 20:05

## 2023-05-25 RX ADMIN — PREDNISONE 40 MG: 20 TABLET ORAL at 08:45

## 2023-05-25 RX ADMIN — ACETAMINOPHEN 650 MG: 325 TABLET, FILM COATED ORAL at 23:20

## 2023-05-25 RX ADMIN — NYSTATIN 500000 UNITS: 100000 SUSPENSION ORAL at 13:01

## 2023-05-25 RX ADMIN — BUDESONIDE, GLYCOPYRROLATE, AND FORMOTEROL FUMARATE 2 PUFF: 160; 9; 4.8 AEROSOL, METERED RESPIRATORY (INHALATION) at 19:28

## 2023-05-25 RX ADMIN — ENOXAPARIN SODIUM 40 MG: 100 INJECTION SUBCUTANEOUS at 20:03

## 2023-05-25 RX ADMIN — AZITHROMYCIN 250 MG: 250 TABLET, FILM COATED ORAL at 08:45

## 2023-05-25 NOTE — PROGRESS NOTES
[facility]      Mar/chart reviewed  Follow up copd with acute exacerbation  No chest pain, some cough    Vital Sign Min/Max for last 24 hours  Temp  Min: 97.3 °F (36.3 °C)  Max: 98.6 °F (37 °C)   BP  Min: 146/79  Max: 176/92   Pulse  Min: 84  Max: 96   Resp  Min: 16  Max: 20   SpO2  Min: 91 %  Max: 100 %   No data recorded   No data recorded     Appears ill, axox3  perrl, normal sclera  Mm dry, +thrush, no jvd, trachea midline, neck supple,  chest fair ae bilaterally, no crackles, + wheezes,   rrr,   soft, nt, nd +bs,  no c/c/ trace edema  Skin warm, dry no rashes     Labs: 5/25: reviewed:  Bun 31  Cr 1.07  Na 133  Bicarb 23  Wbc 11  hgb 12.8  plts 243    A/P:  1. Asthma/copd overlap syndrome with acute exacerbation -- continue  steroids for now, continue scheduled bronchodilators.  2. Acute hypoxemic and hypercapnic respiratory failure --improved  3. Acute parainfluenza bronchitis/infectin -- supportive care  4. HTN  5. HLD  6.  Chronic diastolic chf  7. Pulmonary hypertension -- who group II    Likely  Home tomorrow.

## 2023-05-25 NOTE — PLAN OF CARE
Goal Outcome Evaluation:  Plan of Care Reviewed With: patient        Progress: no change  Outcome Evaluation: Pt on RA, purewick in place. Rested well this shift. IV steroid switch to PO today. Possibility discharge. Will continue to monitor.

## 2023-05-25 NOTE — PLAN OF CARE
Goal Outcome Evaluation:              Outcome Evaluation: VSS. No new complaints this shift. Currently on RA. Plan for D/C tomorrow.

## 2023-05-26 LAB
ALBUMIN SERPL-MCNC: 3.7 G/DL (ref 3.5–5.2)
ALBUMIN/GLOB SERPL: 1.8 G/DL
ALP SERPL-CCNC: 103 U/L (ref 39–117)
ALT SERPL W P-5'-P-CCNC: 31 U/L (ref 1–33)
ANION GAP SERPL CALCULATED.3IONS-SCNC: 10.8 MMOL/L (ref 5–15)
AST SERPL-CCNC: 31 U/L (ref 1–32)
BILIRUB SERPL-MCNC: 0.3 MG/DL (ref 0–1.2)
BUN SERPL-MCNC: 21 MG/DL (ref 8–23)
BUN/CREAT SERPL: 23.6 (ref 7–25)
CALCIUM SPEC-SCNC: 9 MG/DL (ref 8.6–10.5)
CHLORIDE SERPL-SCNC: 104 MMOL/L (ref 98–107)
CO2 SERPL-SCNC: 24.2 MMOL/L (ref 22–29)
CREAT SERPL-MCNC: 0.89 MG/DL (ref 0.57–1)
EGFRCR SERPLBLD CKD-EPI 2021: 64.8 ML/MIN/1.73
GLOBULIN UR ELPH-MCNC: 2.1 GM/DL
GLUCOSE SERPL-MCNC: 108 MG/DL (ref 65–99)
MAGNESIUM SERPL-MCNC: 2.2 MG/DL (ref 1.6–2.4)
POTASSIUM SERPL-SCNC: 4.2 MMOL/L (ref 3.5–5.2)
PROT SERPL-MCNC: 5.8 G/DL (ref 6–8.5)
QT INTERVAL: 339 MS
SODIUM SERPL-SCNC: 139 MMOL/L (ref 136–145)

## 2023-05-26 PROCEDURE — 80053 COMPREHEN METABOLIC PANEL: CPT | Performed by: INTERNAL MEDICINE

## 2023-05-26 PROCEDURE — 94799 UNLISTED PULMONARY SVC/PX: CPT

## 2023-05-26 PROCEDURE — 93010 ELECTROCARDIOGRAM REPORT: CPT | Performed by: INTERNAL MEDICINE

## 2023-05-26 PROCEDURE — 63710000001 PREDNISONE PER 1 MG: Performed by: INTERNAL MEDICINE

## 2023-05-26 PROCEDURE — 25010000002 ENOXAPARIN PER 10 MG: Performed by: INTERNAL MEDICINE

## 2023-05-26 PROCEDURE — 94664 DEMO&/EVAL PT USE INHALER: CPT

## 2023-05-26 PROCEDURE — 25010000002 DIGOXIN PER 500 MCG: Performed by: INTERNAL MEDICINE

## 2023-05-26 PROCEDURE — 83735 ASSAY OF MAGNESIUM: CPT | Performed by: INTERNAL MEDICINE

## 2023-05-26 PROCEDURE — 93005 ELECTROCARDIOGRAM TRACING: CPT | Performed by: INTERNAL MEDICINE

## 2023-05-26 PROCEDURE — 99222 1ST HOSP IP/OBS MODERATE 55: CPT | Performed by: INTERNAL MEDICINE

## 2023-05-26 RX ORDER — ATENOLOL 50 MG/1
50 TABLET ORAL EVERY 12 HOURS SCHEDULED
Status: DISCONTINUED | OUTPATIENT
Start: 2023-05-26 | End: 2023-05-27 | Stop reason: HOSPADM

## 2023-05-26 RX ORDER — ATENOLOL 25 MG/1
25 TABLET ORAL ONCE
Status: COMPLETED | OUTPATIENT
Start: 2023-05-26 | End: 2023-05-26

## 2023-05-26 RX ORDER — DIGOXIN 0.25 MG/ML
500 INJECTION INTRAMUSCULAR; INTRAVENOUS ONCE
Status: COMPLETED | OUTPATIENT
Start: 2023-05-26 | End: 2023-05-26

## 2023-05-26 RX ORDER — NITROGLYCERIN 0.4 MG/1
0.4 TABLET SUBLINGUAL
Status: DISCONTINUED | OUTPATIENT
Start: 2023-05-26 | End: 2023-05-27 | Stop reason: HOSPADM

## 2023-05-26 RX ADMIN — METOPROLOL TARTRATE 2.5 MG: 1 INJECTION, SOLUTION INTRAVENOUS at 02:01

## 2023-05-26 RX ADMIN — SALINE NASAL SPRAY 2 SPRAY: 1.5 SOLUTION NASAL at 17:44

## 2023-05-26 RX ADMIN — SALINE NASAL SPRAY 2 SPRAY: 1.5 SOLUTION NASAL at 20:43

## 2023-05-26 RX ADMIN — PAROXETINE HYDROCHLORIDE HEMIHYDRATE 20 MG: 20 TABLET, FILM COATED ORAL at 08:32

## 2023-05-26 RX ADMIN — PANTOPRAZOLE SODIUM 40 MG: 40 TABLET, DELAYED RELEASE ORAL at 06:12

## 2023-05-26 RX ADMIN — Medication 10 ML: at 08:34

## 2023-05-26 RX ADMIN — LEVOTHYROXINE, LIOTHYRONINE 30 MG: 19; 4.5 TABLET ORAL at 08:32

## 2023-05-26 RX ADMIN — LEVOTHYROXINE, LIOTHYRONINE 15 MG: 19; 4.5 TABLET ORAL at 08:34

## 2023-05-26 RX ADMIN — METOPROLOL TARTRATE 5 MG: 5 INJECTION INTRAVENOUS at 02:32

## 2023-05-26 RX ADMIN — LORAZEPAM 0.5 MG: 0.5 TABLET ORAL at 08:44

## 2023-05-26 RX ADMIN — ATORVASTATIN CALCIUM 20 MG: 20 TABLET, FILM COATED ORAL at 20:39

## 2023-05-26 RX ADMIN — LABETALOL HYDROCHLORIDE 20 MG: 5 INJECTION, SOLUTION INTRAVENOUS at 00:05

## 2023-05-26 RX ADMIN — ATENOLOL 25 MG: 50 TABLET ORAL at 17:43

## 2023-05-26 RX ADMIN — ATENOLOL 50 MG: 50 TABLET ORAL at 21:41

## 2023-05-26 RX ADMIN — Medication 10 ML: at 20:40

## 2023-05-26 RX ADMIN — METOPROLOL TARTRATE 2.5 MG: 1 INJECTION, SOLUTION INTRAVENOUS at 01:41

## 2023-05-26 RX ADMIN — GUAIFENESIN 600 MG: 600 TABLET, EXTENDED RELEASE ORAL at 08:32

## 2023-05-26 RX ADMIN — BUDESONIDE, GLYCOPYRROLATE, AND FORMOTEROL FUMARATE 2 PUFF: 160; 9; 4.8 AEROSOL, METERED RESPIRATORY (INHALATION) at 11:03

## 2023-05-26 RX ADMIN — IPRATROPIUM BROMIDE AND ALBUTEROL SULFATE 3 ML: 2.5; .5 SOLUTION RESPIRATORY (INHALATION) at 15:33

## 2023-05-26 RX ADMIN — AMLODIPINE BESYLATE 2.5 MG: 5 TABLET ORAL at 20:40

## 2023-05-26 RX ADMIN — DOCUSATE SODIUM 50MG AND SENNOSIDES 8.6MG 2 TABLET: 8.6; 5 TABLET, FILM COATED ORAL at 08:32

## 2023-05-26 RX ADMIN — LOSARTAN POTASSIUM 100 MG: 100 TABLET, FILM COATED ORAL at 20:39

## 2023-05-26 RX ADMIN — ASPIRIN 81 MG: 81 TABLET, CHEWABLE ORAL at 08:32

## 2023-05-26 RX ADMIN — NITROGLYCERIN 0.4 MG: 0.4 TABLET SUBLINGUAL at 00:54

## 2023-05-26 RX ADMIN — ATENOLOL 50 MG: 50 TABLET ORAL at 09:44

## 2023-05-26 RX ADMIN — PREDNISONE 40 MG: 20 TABLET ORAL at 08:32

## 2023-05-26 RX ADMIN — DIGOXIN 500 MCG: 0.25 INJECTION INTRAMUSCULAR; INTRAVENOUS at 09:45

## 2023-05-26 RX ADMIN — ENOXAPARIN SODIUM 40 MG: 100 INJECTION SUBCUTANEOUS at 20:40

## 2023-05-26 RX ADMIN — GUAIFENESIN 600 MG: 600 TABLET, EXTENDED RELEASE ORAL at 20:40

## 2023-05-26 NOTE — CONSULTS
[facility] Consult    Patient Name: Dafne Mary  Age/Sex: 82 y.o. female  : 1940  MRN: [id number]    Date of Admission: 2023  Date of Encounter Visit: 23  Encounter Provider: Nae Norman MD  Referring Provider: Sen Duffy II,*  Place of Service: [facility] CARDIOLOGY  Patient Care Team:  Sintia Chatterjee PA as PCP - General (Family Medicine)  Javier Nolan MD as Consulting Physician (Pulmonary Disease)  Nae Norman MD as Consulting Physician (Cardiology)    Subjective:     Consulted for: Tachycardia    Chief Complaint: Shortness of breath    History of Present Illness:  Dafne Mary is a 82 y.o. female with COPD and emphysema, granulomatous lung disease, hypertension, chronic diastolic congestive heart failure, hypothyroidism, depression and anxiety, who was admitted to the hospital on 2023 with a COPD exacerbation due to parainfluenza virus.      The patient reports that she was improving and was hoping to be discharged home today.  However last night she ended up having a coughing fit and around that time her heart rate started to elevate.  Heart rates have been persistently elevated since then in the 130s to 140s.  She has been treated with IV metoprolol without much improvement.  She does feel her heart racing.  She denies any worsening shortness of breath at rest.  She does have some chest tightness but reports this has been presents since the start of her admission and cannot tell if this is because of all the coughing she has been doing.  It has not really worsened with the onset of the atrial fibrillation.    The patient has been followed in our office for history of chronic diastolic congestive heart failure.  She has no known history of atrial fibrillation or prior arrhythmias.  Her last echocardiogram was performed at [facility] in 2022 which reportedly showed normal left  ventricular systolic function and an EF of 55% and finding of pulmonary hypertension although the report is not available for my review.  Her last echocardiogram in our facility was in 7/2022 and showed normal left ventricular systolic function with an EF of 53%, hypokinesis of her basal inferoseptal and basal inferior walls which is a chronic finding, and otherwise no significant valvular disease.  She did have evidence of moderate pulmonary hypertension with an RVSP of 45 mmHg.  I saw her last in the office in 2/2023 at which time from a cardiac standpoint she appeared to be doing well and off of furosemide.      Past Medical History:  Past Medical History:   Diagnosis Date   • Chronic diastolic congestive heart failure 11/17/2022   • Depression    • Emphysema lung    • GERD (gastroesophageal reflux disease)    • Heart failure    • Hyperlipidemia    • Hypertension    • Hypothyroidism        Past Surgical History:   Procedure Laterality Date   • EYE SURGERY Left    • INTUBATION  5/21/2023        • PARATHYROIDECTOMY      Patient reports thyroidectomy partial not a para thyroidectomy.   • THYROIDECTOMY, PARTIAL      1984       Home Medications:   Medications Prior to Admission   Medication Sig Dispense Refill Last Dose   • amLODIPine (NORVASC) 2.5 MG tablet Take 1 tablet by mouth Daily.      • aspirin 81 MG chewable tablet Chew 1 tablet Daily.   5/19/2023 at 2100   • atenolol (TENORMIN) 50 MG tablet Take 1 tablet by mouth Daily. 90 tablet 3 Past Week at 2100   • atorvastatin (LIPITOR) 20 MG tablet Take 1 tablet by mouth Every Night. 90 tablet 1 5/19/2023 at 2100   • Breztri Aerosphere 160-9-4.8 MCG/ACT aerosol inhaler 2 puffs 2 (Two) Times a Day.   5/20/2023 at 1800   • Glycerin-Polysorbate 80 (REFRESH DRY EYE THERAPY OP) Apply 1 drop to eye(s) as directed by provider Daily. For dry eyes      • Homeopathic Products (ZICAM ALLERGY RELIEF NA) 1 spray into the nostril(s) as directed by provider Daily.      • losartan  "(COZAAR) 100 MG tablet Take 1 tablet by mouth Daily.      • omeprazole (priLOSEC) 20 MG capsule Take 1 capsule by mouth Daily.   5/19/2023 at 2100   • PARoxetine (PAXIL) 10 MG tablet Take 2 tablets by mouth Daily. 180 tablet 0 5/20/2023 at 0900   • thyroid (ARMOUR) 15 MG tablet Take 1 tablet by mouth Daily. Take with 30mg for total daily dose of 45mg.      • Thyroid 30 MG PO tablet Take 1 tablet by mouth Daily. Take with 15mg for total daily dose of 45mg.      • Ventolin  (90 Base) MCG/ACT inhaler Inhale 2 puffs Every 4 (Four) Hours As Needed.   5/20/2023 at 0900   • vitamin B-12 (CYANOCOBALAMIN) 500 MCG tablet Take 1 tablet by mouth Daily.      • vitamin C (ASCORBIC ACID) 250 MG tablet Take 2 tablets by mouth Daily.      • vitamin D3 125 MCG (5000 UT) capsule capsule Take 1 capsule by mouth Daily.   5/20/2023 at 0900   • ipratropium-albuterol (DUO-NEB) 0.5-2.5 mg/3 ml nebulizer Take 3 mL by nebulization Every 4 (Four) Hours As Needed for Wheezing.          Allergies:  Allergies   Allergen Reactions   • Lansoprazole Nausea Only     NAUSEA  NAUSEA  NAUSEA   • Diphenhydramine Hcl (Sleep) Irritability   • Lisinopril Cough       Past Social History:  Social History     Socioeconomic History   • Marital status:    Tobacco Use   • Smoking status: Never     Passive exposure: Never   • Smokeless tobacco: Never   Vaping Use   • Vaping Use: Never used   Substance and Sexual Activity   • Alcohol use: Yes     Comment: \"occ\"; caffeine use- tea   • Drug use: No   • Sexual activity: Defer       Past Family History:  Family History   Problem Relation Age of Onset   • Alzheimer's disease Mother    • Lung disease Father    • Alcohol abuse Father    • Heart disease Brother    • Hypertension Brother    • Lung disease Brother    • Alcohol abuse Brother    • Cancer Brother         LUNG  WAS A SMOKER   • Thyroid disease Maternal Grandmother        Review of Systems:   All systems reviewed. Pertinent positives identified in " HPI. All other systems are negative.    Objective:   Temp:  [97.4 °F (36.3 °C)-98.3 °F (36.8 °C)] 97.8 °F (36.6 °C)  Heart Rate:  [] 142  Resp:  [16-20] 20  BP: (120-190)/() 133/116     Intake/Output Summary (Last 24 hours) at 5/26/2023 0943  Last data filed at 5/26/2023 0845  Gross per 24 hour   Intake 960 ml   Output --   Net 960 ml     Body mass index is 27.43 kg/m².      05/21/23  0302 05/21/23  1503 05/23/23  0322   Weight: 59.6 kg (131 lb 6.4 oz) 61.8 kg (136 lb 3.9 oz) 63.7 kg (140 lb 6.9 oz)     Weight change:     Physical Exam:   General Appearance:    Alert, cooperative, in no acute distress   Head:    Normocephalic, without obvious abnormality, atraumatic   Eyes:            Lids and lashes normal, conjunctivae and sclerae normal, no   icterus, no pallor, corneas clear, PERRLA   Ears:    Ears appear intact with no abnormalities noted   Neck:   No adenopathy, supple, trachea midline, no thyromegaly, no   carotid bruit, no JVD   Lungs:    Faint right basilar wheeze    Heart:   Irregularly, irregular, tachycardic, normal S1 and S2, no murmur, no gallop, no rub, no click   Chest Wall:    No abnormalities observed   Abdomen:     Normal bowel sounds, no masses, no organomegaly, soft        non-tender, non-distended, no guarding, no rebound  tenderness   Extremities:   Moves all extremities well, no edema, no cyanosis, no redness   Pulses:   Pulses palpable and equal bilaterally. Normal radial, carotid, femoral, dorsalis pedis and posterior tibial pulses bilaterally. Normal abdominal aorta   Skin:  Psychiatric:   No bleeding, bruising or rash    Alert and oriented x 3, normal mood and affect       Lab Review:   Results from last 7 days   Lab Units 05/26/23  0638 05/23/23  0345 05/22/23  0809 05/21/23  0639 05/20/23  2125   SODIUM mmol/L 139 133* 136 137 130*   POTASSIUM mmol/L 4.2 4.2 5.0 3.6 3.1*   CHLORIDE mmol/L 104 100 102 101 95*   CO2 mmol/L 24.2 23.0 19.5* 23.0 21.0*   BUN mg/dL 21 31* 30* 14  15   CREATININE mg/dL 0.89 1.07* 1.11* 0.76 0.75   GLUCOSE mg/dL 108* 120* 124* 154* 163*   CALCIUM mg/dL 9.0 8.7 9.2 9.2 9.0   AST (SGOT) U/L 31  --   --   --  30   ALT (SGPT) U/L 31  --   --   --  25     Results from last 7 days   Lab Units 05/20/23  2125   HSTROP T ng/L 9     Results from last 7 days   Lab Units 05/23/23  0345 05/22/23  0809 05/20/23 2125   WBC 10*3/mm3 11.15* 11.60* 7.00   HEMOGLOBIN g/dL 12.8 13.3 13.7   HEMATOCRIT % 39.1 41.4 41.3   PLATELETS 10*3/mm3 243 244 242         Results from last 7 days   Lab Units 05/26/23  0638 05/20/23 2125   MAGNESIUM mg/dL 2.2 1.9           Invalid input(s): LDLCALC  Results from last 7 days   Lab Units 05/20/23 2125   PROBNP pg/mL 1,714.0           Echo EF Estimated  Lab Results   Component Value Date    ECHOEFEST 53 07/25/2022       EKG:     Imaging:  Imaging Results (Most Recent)     Procedure Component Value Units Date/Time    XR Chest 1 View [[id number]] Collected: 05/21/23 1457     Updated: 05/21/23 1502    Narrative:      XR CHEST 1 VW-     HISTORY: Female who is 82 years-old, short of breath     TECHNIQUE: Supine view of the chest     COMPARISON: 5/20/2023     FINDINGS: Endotracheal tube tip is 1.9 cm above the bubba. The heart  size is normal. Aorta is calcified. Pulmonary vasculature is  unremarkable. Asymmetric hazy opacification of the right hemithorax may  be combination of atelectasis/infiltrate and/or layering pleural  effusion. No pneumothorax is seen, limited by supine positioning. No  acute osseous process.       Impression:      Endotracheal intubation. Hazy asymmetric opacification of  the right hemithorax.     This report was finalized on 5/21/2023 2:59 PM by Dr. Ned Guzman M.D.       XR Chest 2 View [[id number]] Collected: 05/20/23 2241     Updated: 05/20/23 2310    Narrative:      CHEST: 2 VIEWS     HISTORY: Shortness of air.     COMPARISON: AP chest 01/06/2023, CT angiogram chest 12/08/2022.     FINDINGS: Heart and mediastinal  structures appear within normal limits.  There is a small subsegmental thin linear lingular opacity most likely  representing scarring or atelectasis. Lungs otherwise clear and there is  no evidence for pulmonary or pleural effusion or infiltrate. There is a  scoliotic curvature of the thoracolumbar spine.       Impression:      Mild segmental lingular scar or atelectasis. Lungs are  otherwise clear and there is no evidence for CHF.     This report was finalized on 5/20/2023 11:07 PM by Dr. Alcon Canales M.D.             I personally viewed and interpreted the patient's EKG    Assessment/Plan:     1.  Tachycardia.  She appears to be in atrial fibrillation.  This is associated with a new bundle.  She does have symptoms of palpitations associated with this.  Likely triggered by viral respiratory infection and COPD exacerbation.  2.  Asthma and COPD with exacerbation  3.  Parainfluenza virus  4.  Acute hypoxic and hypercapnic respiratory failure.  Improving.  5.  Hypertension.  Intermittently elevated.  6.  Hyperlipidemia  7.  Chronic diastolic congestive heart failure.  No evidence of volume overload at this time.  8.  Pulmonary hypertension.    - Increase atenolol to 50 mg twice a day.  - Give a dose of IV digoxin 500 mcg  - If atrial fibrillation persists may need to consider starting anticoagulation.    Thank you for allowing me to participate in the care of Dafne Mary. Feel free to contact me directly with any further questions or concerns.    Nae Norman MD  [facility]  05/26/23  09:43 EDT

## 2023-05-26 NOTE — PLAN OF CARE
Goal Outcome Evaluation:  Plan of Care Reviewed With: patient        Progress: no change  Outcome Evaluation: Patient c/o headache, treated with Tylenol. This evening went into SVT with HR up in 140's, multiple doses of IV metoprolol given per MD orders. HR has trended down some but still mostly sustaining in 130's. 1 L O2 placed for comfort, SAT's have been good. Manual BP's obtained for better accuracy and BP has maintained well. Up with stand-by assist to bathroom. Nitro given once for c/o chest pressure/tightness. Lobetolol given once for elevated BP per order parameters. Tight nonproductive cough continues. Unclear if patient will be discharged today now. New IV placed in RAC. WCTM closely.

## 2023-05-26 NOTE — PROGRESS NOTES
[facility]      Mar/chart reviewed  Follow up copd with acute exacerbation  No chest pain, some cough  Elevated HR overnight    Vital Sign Min/Max for last 24 hours  Temp  Min: 97.4 °F (36.3 °C)  Max: 98.3 °F (36.8 °C)   BP  Min: 120/80  Max: 190/125   Pulse  Min: 85  Max: 149   Resp  Min: 16  Max: 20   SpO2  Min: 94 %  Max: 97 %   Flow (L/min)  Min: 1.5  Max: 1.5   No data recorded     Appears ill, axox3  perrl, normal sclera  Mm dry, +thrush, no jvd, trachea midline, neck supple,  chest fair ae bilaterally, no crackles, + wheezes,   Tachy, irrg  soft, nt, nd +bs,  no c/c/ trace edema  Skin warm, dry no rashes    Labs: 5/26: reviewed:  Bun 21  Cr 0.89  Bicarb 24  Glucose 120  Bun 31  Cr 1.07  Na 133  Bicarb 23    A/P:  1. Asthma/copd overlap syndrome with acute exacerbation -- continue  steroids for now, continue scheduled bronchodilators.  2. Acute hypoxemic and hypercapnic respiratory failure --improved  3. Acute parainfluenza bronchitis/infectin -- supportive care  4. HTN  5. HLD  6.  Chronic diastolic chf  7. Pulmonary hypertension -- who group II   8. SVT - -suspect this is afib -- will have cards to see    Continue inpatient status given elevated HR

## 2023-05-26 NOTE — PLAN OF CARE
Goal Outcome Evaluation: patient alert and oriented x4.   CARDIOLOGY CONSULTED REGARDING SVT. ORDERS GIVEN. SEE MAR. PATIENT HR IMPROVED THIS MORNING AND PATIENT REPORTED FEELING BETTER. THIS EVENING HR BECAME ELEVATED AGAIN '-140'S BUT NOT SUSTAINING, THIS RN CALLED CARDIOLOGY AND ONE TIME ORDER FOR ATENOLOL 25MG ORDERED AND ADMINISTERED, PATIENT RESTING WELL AT THIS TIME AND DENIES CHEST PAIN/DISCOMFORT. CALL LIGHT IN REACH.

## 2023-05-26 NOTE — PROGRESS NOTES
Received a call that patients pulse is 100s but jumps to 140 but goes right down. Her SBP is 110-120s. I will give a one time extra dose of atenolol 25 now and she is to still get this evening's dose of atenolol providing  or greater.

## 2023-05-27 ENCOUNTER — READMISSION MANAGEMENT (OUTPATIENT)
Dept: CALL CENTER | Facility: HOSPITAL | Age: 83
End: 2023-05-27
Payer: MEDICARE

## 2023-05-27 VITALS
SYSTOLIC BLOOD PRESSURE: 118 MMHG | OXYGEN SATURATION: 93 % | BODY MASS INDEX: 27.57 KG/M2 | WEIGHT: 140.43 LBS | HEIGHT: 60 IN | TEMPERATURE: 97.5 F | HEART RATE: 108 BPM | RESPIRATION RATE: 20 BRPM | DIASTOLIC BLOOD PRESSURE: 83 MMHG

## 2023-05-27 PROCEDURE — 94799 UNLISTED PULMONARY SVC/PX: CPT

## 2023-05-27 PROCEDURE — 94760 N-INVAS EAR/PLS OXIMETRY 1: CPT

## 2023-05-27 PROCEDURE — 99232 SBSQ HOSP IP/OBS MODERATE 35: CPT | Performed by: INTERNAL MEDICINE

## 2023-05-27 PROCEDURE — 94761 N-INVAS EAR/PLS OXIMETRY MLT: CPT

## 2023-05-27 PROCEDURE — 94664 DEMO&/EVAL PT USE INHALER: CPT

## 2023-05-27 PROCEDURE — 63710000001 PREDNISONE PER 1 MG: Performed by: INTERNAL MEDICINE

## 2023-05-27 RX ORDER — PREDNISONE 10 MG/1
TABLET ORAL
Qty: 31 TABLET | Refills: 0 | Status: SHIPPED | OUTPATIENT
Start: 2023-05-27 | End: 2023-06-12 | Stop reason: HOSPADM

## 2023-05-27 RX ORDER — IPRATROPIUM BROMIDE AND ALBUTEROL SULFATE 2.5; .5 MG/3ML; MG/3ML
3 SOLUTION RESPIRATORY (INHALATION) 4 TIMES DAILY PRN
Qty: 120 ML | Refills: 5 | Status: SHIPPED | OUTPATIENT
Start: 2023-05-27

## 2023-05-27 RX ADMIN — ATENOLOL 50 MG: 50 TABLET ORAL at 09:35

## 2023-05-27 RX ADMIN — LEVOTHYROXINE, LIOTHYRONINE 30 MG: 19; 4.5 TABLET ORAL at 09:36

## 2023-05-27 RX ADMIN — ASPIRIN 81 MG: 81 TABLET, CHEWABLE ORAL at 09:35

## 2023-05-27 RX ADMIN — LEVOTHYROXINE, LIOTHYRONINE 15 MG: 19; 4.5 TABLET ORAL at 09:35

## 2023-05-27 RX ADMIN — BUDESONIDE, GLYCOPYRROLATE, AND FORMOTEROL FUMARATE 2 PUFF: 160; 9; 4.8 AEROSOL, METERED RESPIRATORY (INHALATION) at 08:28

## 2023-05-27 RX ADMIN — IPRATROPIUM BROMIDE AND ALBUTEROL SULFATE 3 ML: 2.5; .5 SOLUTION RESPIRATORY (INHALATION) at 08:29

## 2023-05-27 RX ADMIN — GUAIFENESIN 600 MG: 600 TABLET, EXTENDED RELEASE ORAL at 09:35

## 2023-05-27 RX ADMIN — PANTOPRAZOLE SODIUM 40 MG: 40 TABLET, DELAYED RELEASE ORAL at 06:09

## 2023-05-27 RX ADMIN — DOCUSATE SODIUM 50MG AND SENNOSIDES 8.6MG 2 TABLET: 8.6; 5 TABLET, FILM COATED ORAL at 09:35

## 2023-05-27 RX ADMIN — PAROXETINE HYDROCHLORIDE HEMIHYDRATE 20 MG: 20 TABLET, FILM COATED ORAL at 09:35

## 2023-05-27 RX ADMIN — IPRATROPIUM BROMIDE AND ALBUTEROL SULFATE 3 ML: 2.5; .5 SOLUTION RESPIRATORY (INHALATION) at 11:27

## 2023-05-27 RX ADMIN — SALINE NASAL SPRAY 2 SPRAY: 1.5 SOLUTION NASAL at 09:37

## 2023-05-27 RX ADMIN — PREDNISONE 40 MG: 20 TABLET ORAL at 09:35

## 2023-05-27 RX ADMIN — Medication 10 ML: at 09:37

## 2023-05-27 NOTE — DISCHARGE SUMMARY
PHYSICIAN DISCHARGE SUMMARY                                                                        [facility]    Date of admit: 5/20/2023  Date of Discharge:  5/27/2023    PCP: Sintia Chatterjee PA    Discharge Diagnosis:     COPD exacerbation    Chronic respiratory failure    Acute respiratory failure  Asthma/COPD overlap syndrome with acute exacerbation  Acute hypoxemic and hypercapnic respiratory failure: Improved  Acute parainfluenza infection  Chronic diastolic CHF  WHO group 2 pulm hypertension  SVT  Hypertension/hyperlipidemia    Secondary Diagnoses:  Past Medical History:   Diagnosis Date   • Chronic diastolic congestive heart failure 11/17/2022   • Depression    • Emphysema lung    • GERD (gastroesophageal reflux disease)    • Heart failure    • Hyperlipidemia    • Hypertension    • Hypothyroidism        Procedures Performed           Consults:       Hospital Course  Patient is a 82 y.o. female presented with   Patient is a 82 y.o. female.  Asked to see regarding COPD exacerbation she follows with Dr. Nolan in our office.  Patient been ill for 2 to 3 days with a lot of nasal congestion and sinus headache.  A lot of cough not getting much mucus up no hemoptysis.  No fevers no chills no sweats.  Mild sore throat she thinks related to coughing.  Runny nose.  No nausea no vomiting no diarrhea.  She does have some urinary frequency and urgency no dysuria or hematuria.  No melena hematochezia she has some pain across the lower chest bilaterally when she coughs.  She is a never smoker but carries a diagnosis of COPD never had asthma as a child that she is aware of she did grow up with father and 6 brothers who smoked and had a lot of other secondhand smoke exposure through the years.  I found some spirometry from 7/23/2021 in our office her FEV1 was 0.86 L 57% of predicted her FVC was 1.33 L 65% of predicted her FEV1 FVC ratio was 65  which was 87% of predicted and she had no significant bronchodilator responsiveness..  She uses Breztri 2 puffs twice daily at home she has a nebulizer but has not been able to get any albuterol for rescue use apparently [facility] pharmacy that she uses has been unable to order any.  She is feeling somewhat better now since presentation to the emergency department and some breathing treatments.    Patient admitted with acute obstructive lung disease exacerbation respiratory failure related to acute viral infection.  Has significantly improved.  Was seen by cardiology while here for her chronic heart disease.  They would like her to follow-up in 2 weeks.  Medications adjusted and she will follow-up with us as well in 4 weeks in the office.    Condition on Discharge:  Stable.      Vital Signs  Temp:  [97.5 °F (36.4 °C)-98.4 °F (36.9 °C)] 97.5 °F (36.4 °C)  Heart Rate:  [] 97  Resp:  [18-20] 18  BP: (118-129)/(66-91) 118/83    Physical Exam:  General Appearance: no acute distress, appears comfortable  Heart: regular rate and rhythm  Lungs: clear to auscultation bilaterally, respirations unlabored  Abdomen: soft, nontender, nondistended, no guarding/rebound, nl BS  Extremeties: no edema, no cyanosis  Neurology: speech normal, mental status normal, moving all four extremities  Mental Status: alert, oriented        --  Consults:   IP CONSULT TO PULMONOLOGY  IP CONSULT TO CARDIOLOGY    Significant Discharge Diagnostics   Procedures Performed:         Pertinent Lab Results:  Results from last 7 days   Lab Units 05/26/23  0638 05/23/23  0345 05/22/23  0809 05/21/23  0639 05/20/23  2125   SODIUM mmol/L 139 133* 136 137 130*   POTASSIUM mmol/L 4.2 4.2 5.0 3.6 3.1*   CHLORIDE mmol/L 104 100 102 101 95*   CO2 mmol/L 24.2 23.0 19.5* 23.0 21.0*   BUN mg/dL 21 31* 30* 14 15   CREATININE mg/dL 0.89 1.07* 1.11* 0.76 0.75   GLUCOSE mg/dL 108* 120* 124* 154* 163*   CALCIUM mg/dL 9.0 8.7 9.2 9.2 9.0   AST (SGOT) U/L 31  --   --   --   30   ALT (SGPT) U/L 31  --   --   --  25     Results from last 7 days   Lab Units 05/20/23 2125   HSTROP T ng/L 9     Results from last 7 days   Lab Units 05/23/23  0345 05/22/23  0809 05/20/23 2125   WBC 10*3/mm3 11.15* 11.60* 7.00   HEMOGLOBIN g/dL 12.8 13.3 13.7   HEMATOCRIT % 39.1 41.4 41.3   PLATELETS 10*3/mm3 243 244 242   MCV fL 87.3 89.0 86.4   MCH pg 28.6 28.6 28.7   MCHC g/dL 32.7 32.1 33.2   RDW % 12.8 12.8 12.7   RDW-SD fl 40.7 41.0 39.8   MPV fL 9.2 9.0 8.8   NEUTROPHIL % % 88.1* 90.7* 87.3*   LYMPHOCYTE % % 5.3* 4.7* 7.3*   MONOCYTES % % 5.1 4.0* 4.9*   EOSINOPHIL % % 0.0* 0.0* 0.0*   BASOPHIL % % 0.1 0.0 0.1   IMM GRAN % % 1.4* 0.6* 0.4   NEUTROS ABS 10*3/mm3 9.82* 10.52* 6.11   LYMPHS ABS 10*3/mm3 0.59* 0.55* 0.51*   MONOS ABS 10*3/mm3 0.57 0.46 0.34   EOS ABS 10*3/mm3 0.00 0.00 0.00   BASOS ABS 10*3/mm3 0.01 0.00 0.01   IMMATURE GRANS (ABS) 10*3/mm3 0.16* 0.07* 0.03   NRBC /100 WBC 0.0 0.0 0.0         Results from last 7 days   Lab Units 05/26/23  0638 05/20/23 2125   MAGNESIUM mg/dL 2.2 1.9           Invalid input(s): LDLCALC  Results from last 7 days   Lab Units 05/20/23 2125   PROBNP pg/mL 1,714.0         Results from last 7 days   Lab Units 05/21/23  1953   PH, ARTERIAL pH units 7.357   PO2 ART mm Hg 84.5   PCO2, ARTERIAL mm Hg 45.4*   HCO3 ART mmol/L 25.5                         Results from last 7 days   Lab Units 05/21/23  0054   ADENOVIRUS DETECTION BY PCR  Not Detected   CORONAVIRUS 229E  Not Detected   CORONAVIRUS HKU1  Not Detected   CORONAVIRUS NL63  Not Detected   CORONAVIRUS OC43  Not Detected   HUMAN METAPNEUMOVIRUS  Not Detected   HUMAN RHINOVIRUS/ENTEROVIRUS  Not Detected   INFLUENZA B PCR  Not Detected   PARAINFLUENZA 1  Not Detected   PARAINFLUENZA VIRUS 2  Not Detected   PARAINFLUENZA VIRUS 3  Detected*   PARAINFLUENZA VIRUS 4  Not Detected   BORDETELLA PERTUSSIS PCR  Not Detected   CHLAMYDOPHILA PNEUMONIAE PCR  Not Detected   MYCOPLAMA PNEUMO PCR  Not Detected   INFLUENZA A  PCR  Not Detected   RSV, PCR  Not Detected           Imaging Results:  Imaging Results (All)     Procedure Component Value Units Date/Time    XR Chest 1 View [[id number]] Collected: 05/21/23 1457     Updated: 05/21/23 1502    Narrative:      XR CHEST 1 VW-     HISTORY: Female who is 82 years-old, short of breath     TECHNIQUE: Supine view of the chest     COMPARISON: 5/20/2023     FINDINGS: Endotracheal tube tip is 1.9 cm above the bubba. The heart  size is normal. Aorta is calcified. Pulmonary vasculature is  unremarkable. Asymmetric hazy opacification of the right hemithorax may  be combination of atelectasis/infiltrate and/or layering pleural  effusion. No pneumothorax is seen, limited by supine positioning. No  acute osseous process.       Impression:      Endotracheal intubation. Hazy asymmetric opacification of  the right hemithorax.     This report was finalized on 5/21/2023 2:59 PM by Dr. Ned Guzman M.D.       XR Chest 2 View [[id number]] Collected: 05/20/23 2241     Updated: 05/20/23 2310    Narrative:      CHEST: 2 VIEWS     HISTORY: Shortness of air.     COMPARISON: AP chest 01/06/2023, CT angiogram chest 12/08/2022.     FINDINGS: Heart and mediastinal structures appear within normal limits.  There is a small subsegmental thin linear lingular opacity most likely  representing scarring or atelectasis. Lungs otherwise clear and there is  no evidence for pulmonary or pleural effusion or infiltrate. There is a  scoliotic curvature of the thoracolumbar spine.       Impression:      Mild segmental lingular scar or atelectasis. Lungs are  otherwise clear and there is no evidence for CHF.     This report was finalized on 5/20/2023 11:07 PM by Dr. Alcon Canales M.D.           --    Discharge Disposition  Home or Self Care    Discharge Medications     Discharge Medications      New Medications      Instructions Start Date   predniSONE 10 MG tablet  Commonly known as: DELTASONE   Take 4 tabs daily x 3  days, then take 3 tabs daily x 3 days, then take 2 tabs daily x 3 days, then take 1 tab daily x 3 days         Continue These Medications      Instructions Start Date   amLODIPine 2.5 MG tablet  Commonly known as: NORVASC   2.5 mg, Oral, Daily      aspirin 81 MG chewable tablet   81 mg, Oral, Daily      atenolol 50 MG tablet  Commonly known as: TENORMIN   50 mg, Oral, Daily      atorvastatin 20 MG tablet  Commonly known as: LIPITOR   20 mg, Oral, Nightly      Breztri Aerosphere 160-9-4.8 MCG/ACT aerosol inhaler  Generic drug: Budeson-Glycopyrrol-Formoterol   2 puffs, 2 Times Daily      ipratropium-albuterol 0.5-2.5 mg/3 ml nebulizer  Commonly known as: DUO-NEB   3 mL, Nebulization, Every 4 Hours PRN      losartan 100 MG tablet  Commonly known as: COZAAR   100 mg, Oral, Daily      omeprazole 20 MG capsule  Commonly known as: priLOSEC   20 mg, Oral, Daily      PARoxetine 10 MG tablet  Commonly known as: PAXIL   20 mg, Oral, Daily      REFRESH DRY EYE THERAPY OP   1 drop, Ophthalmic, Daily, For dry eyes       thyroid 15 MG tablet  Commonly known as: ARMOUR   15 mg, Oral, Daily, Take with 30mg for total daily dose of 45mg.       Thyroid 30 MG tablet  Commonly known as: ARMOUR   30 mg, Oral, Daily, Take with 15mg for total daily dose of 45mg.       Ventolin  (90 Base) MCG/ACT inhaler  Generic drug: albuterol sulfate HFA   2 puffs, Inhalation, Every 4 Hours PRN      vitamin B-12 500 MCG tablet  Commonly known as: CYANOCOBALAMIN   500 mcg, Oral, Daily      vitamin C 250 MG tablet  Commonly known as: ASCORBIC ACID   500 mg, Oral, Daily      vitamin D3 125 MCG (5000 UT) capsule capsule   5,000 Units, Oral, Daily      ZICAM ALLERGY RELIEF NA   1 spray, Nasal, Daily             Discharge Diet: regular diet    Activity at Discharge:      Follow-up Information     Sintia Chatterjee PA .    Specialty: Family Medicine  Contact information:  [address] [place] [id number] [phone]             Javier Nolan  MD Stan Follow up in 4 week(s).    Specialty: Pulmonary Disease  Contact information:  [address] [facility] [address]  [place] [address]  [phone]             Nae Norman MD Follow up in 2 week(s).    Specialty: Cardiology  Contact information:  [address] [facility] [address]  [place] [address]  [phone]                       See primary care provider, Sintia Chatterjee PA, in 1-2 weeks        Test Results Pending at Discharge       Javier Nolan MD  [facility], [facility]  Pulmonary and Critical Care Medicine  05/27/23  10:41 EDT    Time: greater than 30 minutes.        Total time spent discharging patient including evaluation, post hospitalization follow up, medication and post hospitalization instructions and education total time exceeds 30 minutes.

## 2023-05-27 NOTE — OUTREACH NOTE
Prep Survey    Flowsheet Row Responses   [facility] patient discharged from? [facility]   Is LACE score < 7 ? No   Eligibility [facility]   Date of Admission 05/20/23   Date of Discharge 05/27/23   Discharge Disposition Home or Self Care   Discharge diagnosis COPD exacerbation   Does the patient have one of the following disease processes/diagnoses(primary or secondary)? COPD   Does the patient have Home health ordered? No   Is there a DME ordered? No   Prep survey completed? Yes          Ailyn JESSICA - Registered Nurse

## 2023-05-27 NOTE — PLAN OF CARE
Goal Outcome Evaluation:  Plan of Care Reviewed With: patient        Progress: improving  Outcome Evaluation: Patient had no c/o pain through the night. Ambulating ad jessie in room. HR has improved this evening, still irregular but rate is coming down and BP is remaining stable. Back on room air, mild SOA with activity. BM last night. Possible discharge today or tomorrow. VSS overall. WCTM.

## 2023-05-27 NOTE — PLAN OF CARE
Goal Outcome Evaluation: Patient discharging today. PIV removed and discharge instructions were reviewed with the patient.  VSS. Denies pain/distress at this time.  Understanding verbalized.

## 2023-05-27 NOTE — PROGRESS NOTES
"    Patient Name: Dafne Mary  :1940  82 y.o.      Patient Care Team:  Sintia Chatterjee PA as PCP - General (Family Medicine)  Javier Nolan MD as Consulting Physician (Pulmonary Disease)  Nae Norman MD as Consulting Physician (Cardiology)    Chief Complaint: parainfluenza virus    Interval History: Heart rate better controlled overnight but remains in A-fib.  Fast asleep on my arrival, awakens denies chest pain, goes back to sleep       Objective   Vital Signs  Temp:  [97.5 °F (36.4 °C)-98.4 °F (36.9 °C)] 97.5 °F (36.4 °C)  Heart Rate:  [] 97  Resp:  [18-20] 18  BP: (118-129)/(66-91) 118/83    Intake/Output Summary (Last 24 hours) at 2023 0957  Last data filed at 2023 0610  Gross per 24 hour   Intake 420 ml   Output --   Net 420 ml     Flowsheet Rows    Flowsheet Row First Filed Value   Admission Height 152.4 cm (60\") Documented at 2023   Admission Weight 59.9 kg (132 lb) Documented at 2023          Physical Exam:   General Appearance:   Comfortable   Lungs:     Clear to auscultation.  Normal respiratory effort and rate.      Heart:    irregular rhythm and normal rate, normal S1 and S2, no murmurs, gallops or rubs.     Chest Wall:    No abnormalities observed   Abdomen:     Soft, nontender, positive bowel sounds.     Extremities:   no cyanosis, clubbing or edema.  No marked joint deformities.         Results Review:    Results from last 7 days   Lab Units 23  0638   SODIUM mmol/L 139   POTASSIUM mmol/L 4.2   CHLORIDE mmol/L 104   CO2 mmol/L 24.2   BUN mg/dL 21   CREATININE mg/dL 0.89   GLUCOSE mg/dL 108*   CALCIUM mg/dL 9.0     Results from last 7 days   Lab Units 23  2125   HSTROP T ng/L 9     Results from last 7 days   Lab Units 23  0345   WBC 10*3/mm3 11.15*   HEMOGLOBIN g/dL 12.8   HEMATOCRIT % 39.1   PLATELETS 10*3/mm3 243         Results from last 7 days   Lab Units 23  0638   MAGNESIUM mg/dL 2.2                   Medication " Review:   amLODIPine, 2.5 mg, Oral, Nightly  aspirin, 81 mg, Oral, Daily  atenolol, 50 mg, Oral, Q12H  atorvastatin, 20 mg, Oral, Nightly  Budeson-Glycopyrrol-Formoterol, 2 puff, Inhalation, BID  enoxaparin, 40 mg, Subcutaneous, Q24H  guaiFENesin, 600 mg, Oral, Q12H  ipratropium-albuterol, 3 mL, Nebulization, 4x Daily - RT  losartan, 100 mg, Oral, Nightly  pantoprazole, 40 mg, Oral, Q AM  PARoxetine, 20 mg, Oral, Daily  predniSONE, 40 mg, Oral, Daily With Breakfast  senna-docusate sodium, 2 tablet, Oral, BID  sodium chloride, 10 mL, Intravenous, Q12H  sodium chloride, 2 spray, Each Nare, TID  thyroid, 15 mg, Oral, Daily  Thyroid, 30 mg, Oral, Daily             CHADS-VASc Risk Assessment            5 Total Score    1 CHF    1 Hypertension    2 Age >/= 75    1 Sex: Female        Criteria that do not apply:    DM    PRIOR STROKE/TIA/THROMBO    Vascular Disease    Age 65-74            Assessment & Plan     Active Hospital Problems    Diagnosis  POA   • **COPD exacerbation [J44.1]  Yes   • Chronic respiratory failure [J96.10]  Yes   • Acute respiratory failure [J96.00]  Yes      Resolved Hospital Problems   No resolved problems to display.     1.  Acute parainfluenza virus infection  2.  New onset atrial fibrillation in the setting of acute parainfluenza virus infection and COPD exacerbation, on atenolol, rates better controlled, continue atenolol 50 mg twice daily, rate control should improve as viral infection and COPD exacerbation resolves  3.  Asthma and COPD with acute exacerbation  4.  Acute hypoxic and hypercapnic respiratory failure improving  5.  Chronic diastolic congestive heart failure  6.  Chronic coagulation-patient is a FPS0QY1-TRQq score of 5, meets guideline recommendations for initiation of chronic anticoagulation, currently on DVT prophylaxis enoxaparin    Note plans for discharge today, okay from our standpoint, should follow-up in our office with Dr. Norman in 1-2 weeks.  We will sign off, please call  if we can help in any way.       Jerman Joe III, MD  [facility]  05/27/23  09:57 EDT

## 2023-05-27 NOTE — PROGRESS NOTES
[facility] INPATIENT PROGRESS NOTE         [address]    2023      PATIENT IDENTIFICATION:  Name: Dafne Mary ADMIT: 2023   : 1940  PCP: Sintia Chatterjee PA    MRN: [id number] LOS: 6 days   AGE/SEX: 82 y.o. female  ROOM: [facility]                     LOS 6    Reason for visit: Acute exacerbation COPD/asthma with respiratory failure and viral illness      SUBJECTIVE:      Resting comfortably.  Denies productive cough, chest pain or worsening wheezing.  On room air.  She is hoping to go home today.  I am seeing the patient for the first time today.  All patient problems are new to me.      Objective   OBJECTIVE:    Vital Sign Min/Max for last 24 hours  Temp  Min: 97.5 °F (36.4 °C)  Max: 98.4 °F (36.9 °C)   BP  Min: 118/83  Max: 129/66   Pulse  Min: 91  Max: 120   Resp  Min: 18  Max: 20   SpO2  Min: 93 %  Max: 97 %   No data recorded   No data recorded                   FiO2 (%): 30 %     Body mass index is 27.43 kg/m².    Intake/Output Summary (Last 24 hours) at 2023 1036  Last data filed at 2023 0610  Gross per 24 hour   Intake 420 ml   Output --   Net 420 ml         Exam:  GEN:  No distress, appears stated age  EYES:   PERRL, anicteric sclerae  ENT:    External ears/nose normal, OP clear  NECK:  No adenopathy, midline trachea  LUNGS: Normal chest on inspection, palpation and diminished breath sounds at bases on auscultation  CV:  Normal S1S2, without murmur  ABD:  Nontender, nondistended, no hepatosplenomegaly, +BS  EXT:  No edema.  No cyanosis or clubbing.  No mottling and normal cap refill.    Assessment     Scheduled meds:  amLODIPine, 2.5 mg, Oral, Nightly  aspirin, 81 mg, Oral, Daily  atenolol, 50 mg, Oral, Q12H  atorvastatin, 20 mg, Oral, Nightly  Budeson-Glycopyrrol-Formoterol, 2 puff, Inhalation, BID  enoxaparin, 40 mg, Subcutaneous, Q24H  guaiFENesin, 600 mg, Oral, Q12H  ipratropium-albuterol, 3 mL, Nebulization, 4x Daily - RT  losartan, 100 mg, Oral,  Nightly  pantoprazole, 40 mg, Oral, Q AM  PARoxetine, 20 mg, Oral, Daily  predniSONE, 40 mg, Oral, Daily With Breakfast  senna-docusate sodium, 2 tablet, Oral, BID  sodium chloride, 10 mL, Intravenous, Q12H  sodium chloride, 2 spray, Each Nare, TID  thyroid, 15 mg, Oral, Daily  Thyroid, 30 mg, Oral, Daily      IV meds:                         Data Review:  Results from last 7 days   Lab Units 05/26/23  0638 05/23/23  0345 05/22/23  0809 05/21/23  0639 05/20/23 2125   SODIUM mmol/L 139 133* 136 137 130*   POTASSIUM mmol/L 4.2 4.2 5.0 3.6 3.1*   CHLORIDE mmol/L 104 100 102 101 95*   CO2 mmol/L 24.2 23.0 19.5* 23.0 21.0*   BUN mg/dL 21 31* 30* 14 15   CREATININE mg/dL 0.89 1.07* 1.11* 0.76 0.75   GLUCOSE mg/dL 108* 120* 124* 154* 163*   CALCIUM mg/dL 9.0 8.7 9.2 9.2 9.0         Estimated Creatinine Clearance: 40.6 mL/min (by C-G formula based on SCr of 0.89 mg/dL).  Results from last 7 days   Lab Units 05/23/23  0345 05/22/23  0809 05/20/23 2125   WBC 10*3/mm3 11.15* 11.60* 7.00   HEMOGLOBIN g/dL 12.8 13.3 13.7   PLATELETS 10*3/mm3 243 244 242         Results from last 7 days   Lab Units 05/26/23  0638 05/20/23  2125   ALT (SGPT) U/L 31 25   AST (SGOT) U/L 31 30     Results from last 7 days   Lab Units 05/21/23  1953 05/21/23  1549   PH, ARTERIAL pH units 7.357 7.286*   PO2 ART mm Hg 84.5 175.3*   PCO2, ARTERIAL mm Hg 45.4* 53.2*   HCO3 ART mmol/L 25.5 25.4             No results found for: HGBA1C, POCGLU            Active Hospital Problems    Diagnosis  POA   • **COPD exacerbation [J44.1]  Yes   • Chronic respiratory failure [J96.10]  Yes   • Acute respiratory failure [J96.00]  Yes      Resolved Hospital Problems   No resolved problems to display.         ASSESSMENT:  Asthma/COPD overlap syndrome with acute exacerbation  Acute hypoxemic and hypercapnic respiratory failure: Improved  Acute parainfluenza infection  Chronic diastolic CHF  WHO group 2 pulm  hypertension  SVT  Hypertension/hyperlipidemia        PLAN:    Encourage pulmonary toilet.  Continue same inhaled therapy with maintenance inhaled triple therapy, scheduled bronchodilators nebulized and steroid with taper.  Patient is hoping to go home.  Discharge if okay with cardiology.        Javier Nolan MD  Pulmonary and Critical Care Medicine  [facility], [facility]  5/27/2023    10:36 EDT

## 2023-05-30 ENCOUNTER — TRANSITIONAL CARE MANAGEMENT TELEPHONE ENCOUNTER (OUTPATIENT)
Dept: CALL CENTER | Facility: HOSPITAL | Age: 83
End: 2023-05-30

## 2023-05-30 NOTE — OUTREACH NOTE
Call Center TCM Note    Flowsheet Row Responses   [facility] patient discharged from? [place]   Does the patient have one of the following disease processes/diagnoses(primary or secondary)? COPD   TCM attempt successful? Yes   Call start time 1003   Call end time 1005   Discharge diagnosis COPD exacerbation   Meds reviewed with patient/caregiver? Yes   Is the patient having any side effects they believe may be caused by any medication additions or changes? No   Does the patient have all medications ordered at discharge? Yes   Is the patient taking all medications as directed (includes completed medication regime)? Yes   Comments Patient states she will call for her own appointment.   Does the patient have an appointment with their PCP within 7 days of discharge? No   Nursing Interventions Patient declined scheduling/rescheduling appointment at this time   Has home health visited the patient within 72 hours of discharge? N/A   Pulse Ox monitoring None   Psychosocial issues? No   Did the patient receive a copy of their discharge instructions? Yes   Nursing interventions Reviewed instructions with patient   What is the patient's perception of their health status since discharge? Improving   Nursing Interventions Nurse provided patient education   If the patient is a current smoker, are they able to teach back resources for cessation? Not a smoker   Is the patient able to teach back COPD zones? Yes   Nursing interventions Education provided on various zones   Patient reports what zone on this call? Green Zone   Green Zone Reports doing well, Breathing without shortness of breath, Usual activity and exercise level, Usual amounts of cough and phlegm/mucous, Appetite is good, Slept well last night   Green Zone interventions: Take daily medications, Continue regular exercise/diet plan   TCM call completed? Yes   Call end time 1005          Andie White LPN    5/30/2023, 10:14 EDT

## 2023-06-02 ENCOUNTER — NURSE TRIAGE (OUTPATIENT)
Dept: CALL CENTER | Facility: HOSPITAL | Age: 83
End: 2023-06-02

## 2023-06-02 NOTE — TELEPHONE ENCOUNTER
Advised caller she has DX SVT on her discharge papers. Advised she needs to follow up with her providers and get referral to cardiology if she does not have one.  If HR >140 and has chest pain, shortness of air of <50 with symptoms then go to ER.   Explained to caller how closely having lung and heart issues interweave with one another and if one is flaring, it can cause issues with the other.  She states this rapid and then slow heart rate is new in the past two weeks. She is afraid to use her nebulizer because it makes the palpitations worse.  She denies any edema.  States she is not bouncing back from this illness like she has in the past and can tell she is worried.

## 2023-06-02 NOTE — TELEPHONE ENCOUNTER
Reason for Disposition   History of hyperthyroidism or taking thyroid medication    Additional Information   Negative: Passed out (i.e., lost consciousness, collapsed and was not responding)   Negative: Shock suspected (e.g., cold/pale/clammy skin, too weak to stand, low BP, rapid pulse)   Negative: Difficult to awaken or acting confused (e.g., disoriented, slurred speech)   Negative: Visible sweat on face or sweat dripping down face   Negative: Unable to walk, or can only walk with assistance (e.g., requires support)   Negative: [1] Received SHOCK from implantable cardiac defibrillator AND [2] persisting symptoms (i.e., palpitations, lightheadedness)   Negative: [1] Dizziness, lightheadedness, or weakness AND [2] heart beating very rapidly (e.g., > 140 / minute)   Negative: [1] Dizziness, lightheadedness, or weakness AND [2] heart beating very slowly (e.g., < 50 / minute)   Negative: Sounds like a life-threatening emergency to the triager   Negative: Chest pain   Negative: Implantable Cardiac Defibrillator (ICD) or a pacemaker symptoms or questions   Negative: Difficulty breathing   Negative: Dizziness, lightheadedness, or weakness   Negative: [1] Heart beating very rapidly (e.g., > 140 / minute) AND [2] present now  (Exception: During exercise.)   Negative: Heart beating very slowly (e.g., < 50 / minute)  (Exception: Athlete and heart rate normal for caller.)   Negative: New or worsened shortness of breath with activity (dyspnea on exertion)   Negative: Patient sounds very sick or weak to the triager   Negative: [1] Heart beating very rapidly (e.g., > 140 / minute) AND [2] not present now  (Exception: During exercise.)   Negative: [1] Skipped or extra beat(s) AND [2] increases with exercise or exertion   Negative: [1] Skipped or extra beat(s) AND [2] occurs 4 or more times per minute   Negative: New or worsened ankle swelling   Negative: History of heart disease (i.e., heart attack, bypass surgery, angina,  "angioplasty, CHF)  (Exception: Brief heartbeat symptoms that went away and now feels well.)   Negative: Age > 60 years  (Exception: Brief heartbeat symptoms that went away and now feels well.)   Negative: Taking water pill (i.e., diuretic) or heart medication (e.g., digoxin)   Negative: Wearing a \"Holter monitor\" or \"cardiac event monitor\"   Negative: [1] Received SHOCK from implantable cardiac defibrillator AND [2] now feels well   Negative: Heart rhythm alert (e.g., \"you have irregular heartbeat\") from personal wearable device (e.g., Apple Watch)    Answer Assessment - Initial Assessment Questions  1. DESCRIPTION: \"Please describe your heart rate or heartbeat that you are having\" (e.g., fast/slow, regular/irregular, skipped or extra beats, \"palpitations\")      Rate goes from 60 to 140 and swings back and forth  2. ONSET: \"When did it start?\" (Minutes, hours or days)      Two weeks ago when she got sick  3. DURATION: \"How long does it last\" (e.g., seconds, minutes, hours)      It lasts several minutes but recurs  4. PATTERN \"Does it come and go, or has it been constant since it started?\"  \"Does it get worse with exertion?\"   \"Are you feeling it now?\"      intermittent  5. TAP: \"Using your hand, can you tap out what you are feeling on a chair or table in front of you, so that I can hear?\" (Note: not all patients can do this)        Regular right now  6. HEART RATE: \"Can you tell me your heart rate?\" \"How many beats in 15 seconds?\"  (Note: not all patients can do this)        59 currently  7. RECURRENT SYMPTOM: \"Have you ever had this before?\" If Yes, ask: \"When was the last time?\" and \"What happened that time?\"       This is new in the past two weeks  8. CAUSE: \"What do you think is causing the palpitations?\"      She states caused as complication of the URI she had  9. CARDIAC HISTORY: \"Do you have any history of heart disease?\" (e.g., heart attack, angina, bypass surgery, angioplasty, arrhythmia)       CHF  10. " "OTHER SYMPTOMS: \"Do you have any other symptoms?\" (e.g., dizziness, chest pain, sweating, difficulty breathing)        COPD  11. PREGNANCY: \"Is there any chance you are pregnant?\" \"When was your last menstrual period?\"        na    Protocols used: Heart Rate and Heartbeat Questions-ADULT-AH    "

## 2023-06-03 ENCOUNTER — HOSPITAL ENCOUNTER (INPATIENT)
Facility: HOSPITAL | Age: 83
LOS: 8 days | Discharge: NURSING, REHAB OR LONG-TERM CARE | End: 2023-06-12
Attending: EMERGENCY MEDICINE | Admitting: HOSPITALIST
Payer: MEDICARE

## 2023-06-03 DIAGNOSIS — I48.91 ATRIAL FIBRILLATION WITH RVR: Primary | ICD-10-CM

## 2023-06-03 DIAGNOSIS — F41.9 ANXIETY: ICD-10-CM

## 2023-06-03 DIAGNOSIS — I50.9 CONGESTIVE HEART FAILURE, UNSPECIFIED HF CHRONICITY, UNSPECIFIED HEART FAILURE TYPE: ICD-10-CM

## 2023-06-03 DIAGNOSIS — J44.1 COPD EXACERBATION: ICD-10-CM

## 2023-06-03 PROCEDURE — 99285 EMERGENCY DEPT VISIT HI MDM: CPT

## 2023-06-03 PROCEDURE — 93005 ELECTROCARDIOGRAM TRACING: CPT | Performed by: EMERGENCY MEDICINE

## 2023-06-03 PROCEDURE — 93005 ELECTROCARDIOGRAM TRACING: CPT

## 2023-06-03 RX ORDER — SODIUM CHLORIDE 0.9 % (FLUSH) 0.9 %
10 SYRINGE (ML) INJECTION AS NEEDED
Status: DISCONTINUED | OUTPATIENT
Start: 2023-06-03 | End: 2023-06-12 | Stop reason: HOSPADM

## 2023-06-04 ENCOUNTER — APPOINTMENT (OUTPATIENT)
Dept: GENERAL RADIOLOGY | Facility: HOSPITAL | Age: 83
End: 2023-06-04
Payer: MEDICARE

## 2023-06-04 ENCOUNTER — READMISSION MANAGEMENT (OUTPATIENT)
Dept: CALL CENTER | Facility: HOSPITAL | Age: 83
End: 2023-06-04

## 2023-06-04 PROBLEM — I48.91 ATRIAL FIBRILLATION WITH RVR: Status: ACTIVE | Noted: 2023-06-04

## 2023-06-04 LAB
ALBUMIN SERPL-MCNC: 3.7 G/DL (ref 3.5–5.2)
ALBUMIN/GLOB SERPL: 1.7 G/DL
ALP SERPL-CCNC: 131 U/L (ref 39–117)
ALT SERPL W P-5'-P-CCNC: 78 U/L (ref 1–33)
ANION GAP SERPL CALCULATED.3IONS-SCNC: 11.9 MMOL/L (ref 5–15)
ARTERIAL PATENCY WRIST A: ABNORMAL
AST SERPL-CCNC: 27 U/L (ref 1–32)
ATMOSPHERIC PRESS: 745.8 MMHG
BASE EXCESS BLDA CALC-SCNC: -0.9 MMOL/L (ref 0–2)
BASOPHILS # BLD AUTO: 0.02 10*3/MM3 (ref 0–0.2)
BASOPHILS NFR BLD AUTO: 0.1 % (ref 0–1.5)
BDY SITE: ABNORMAL
BILIRUB SERPL-MCNC: 0.5 MG/DL (ref 0–1.2)
BUN SERPL-MCNC: 32 MG/DL (ref 8–23)
BUN/CREAT SERPL: 33 (ref 7–25)
CALCIUM SPEC-SCNC: 8.8 MG/DL (ref 8.6–10.5)
CHLORIDE SERPL-SCNC: 99 MMOL/L (ref 98–107)
CO2 SERPL-SCNC: 22.1 MMOL/L (ref 22–29)
CREAT SERPL-MCNC: 0.97 MG/DL (ref 0.57–1)
D-LACTATE SERPL-SCNC: 4.2 MMOL/L (ref 0.5–2)
DEPRECATED RDW RBC AUTO: 41.9 FL (ref 37–54)
EGFRCR SERPLBLD CKD-EPI 2021: 58.5 ML/MIN/1.73
EOSINOPHIL # BLD AUTO: 0 10*3/MM3 (ref 0–0.4)
EOSINOPHIL NFR BLD AUTO: 0 % (ref 0.3–6.2)
ERYTHROCYTE [DISTWIDTH] IN BLOOD BY AUTOMATED COUNT: 13.2 % (ref 12.3–15.4)
GAS FLOW AIRWAY: 3 LPM
GEN 5 2HR TROPONIN T REFLEX: 31 NG/L
GLOBULIN UR ELPH-MCNC: 2.2 GM/DL
GLUCOSE SERPL-MCNC: 161 MG/DL (ref 65–99)
HCO3 BLDA-SCNC: 25.7 MMOL/L (ref 22–28)
HCT VFR BLD AUTO: 36.4 % (ref 34–46.6)
HGB BLD-MCNC: 11.9 G/DL (ref 12–15.9)
IMM GRANULOCYTES # BLD AUTO: 0.49 10*3/MM3 (ref 0–0.05)
IMM GRANULOCYTES NFR BLD AUTO: 3.1 % (ref 0–0.5)
LYMPHOCYTES # BLD AUTO: 0.57 10*3/MM3 (ref 0.7–3.1)
LYMPHOCYTES NFR BLD AUTO: 3.6 % (ref 19.6–45.3)
MAGNESIUM SERPL-MCNC: 2.2 MG/DL (ref 1.6–2.4)
MCH RBC QN AUTO: 28.8 PG (ref 26.6–33)
MCHC RBC AUTO-ENTMCNC: 32.7 G/DL (ref 31.5–35.7)
MCV RBC AUTO: 88.1 FL (ref 79–97)
MODALITY: ABNORMAL
MONOCYTES # BLD AUTO: 0.9 10*3/MM3 (ref 0.1–0.9)
MONOCYTES NFR BLD AUTO: 5.7 % (ref 5–12)
NEUTROPHILS NFR BLD AUTO: 13.92 10*3/MM3 (ref 1.7–7)
NEUTROPHILS NFR BLD AUTO: 87.5 % (ref 42.7–76)
NRBC BLD AUTO-RTO: 0.1 /100 WBC (ref 0–0.2)
NT-PROBNP SERPL-MCNC: 7773 PG/ML (ref 0–1800)
PCO2 BLDA: 49.1 MM HG (ref 35–45)
PH BLDA: 7.33 PH UNITS (ref 7.35–7.45)
PLATELET # BLD AUTO: 260 10*3/MM3 (ref 140–450)
PMV BLD AUTO: 9.9 FL (ref 6–12)
PO2 BLDA: 74 MM HG (ref 80–100)
POTASSIUM SERPL-SCNC: 4.5 MMOL/L (ref 3.5–5.2)
PROCALCITONIN SERPL-MCNC: 0.08 NG/ML (ref 0–0.25)
PROT SERPL-MCNC: 5.9 G/DL (ref 6–8.5)
RBC # BLD AUTO: 4.13 10*6/MM3 (ref 3.77–5.28)
SAO2 % BLDCOA: 93.3 % (ref 92–99)
SODIUM SERPL-SCNC: 133 MMOL/L (ref 136–145)
TOTAL RATE: 30 BREATHS/MINUTE
TROPONIN T DELTA: 4 NG/L
TROPONIN T SERPL HS-MCNC: 27 NG/L
TSH SERPL DL<=0.05 MIU/L-ACNC: 0.71 UIU/ML (ref 0.27–4.2)
WBC NRBC COR # BLD: 15.9 10*3/MM3 (ref 3.4–10.8)

## 2023-06-04 PROCEDURE — 71045 X-RAY EXAM CHEST 1 VIEW: CPT

## 2023-06-04 PROCEDURE — 83880 ASSAY OF NATRIURETIC PEPTIDE: CPT | Performed by: PHYSICIAN ASSISTANT

## 2023-06-04 PROCEDURE — 94660 CPAP INITIATION&MGMT: CPT

## 2023-06-04 PROCEDURE — 94640 AIRWAY INHALATION TREATMENT: CPT

## 2023-06-04 PROCEDURE — 36600 WITHDRAWAL OF ARTERIAL BLOOD: CPT

## 2023-06-04 PROCEDURE — 94760 N-INVAS EAR/PLS OXIMETRY 1: CPT

## 2023-06-04 PROCEDURE — 94761 N-INVAS EAR/PLS OXIMETRY MLT: CPT

## 2023-06-04 PROCEDURE — 94799 UNLISTED PULMONARY SVC/PX: CPT

## 2023-06-04 PROCEDURE — 25010000002 FUROSEMIDE PER 20 MG: Performed by: INTERNAL MEDICINE

## 2023-06-04 PROCEDURE — 25010000002 METHYLPREDNISOLONE PER 125 MG: Performed by: PHYSICIAN ASSISTANT

## 2023-06-04 PROCEDURE — 25010000002 FUROSEMIDE PER 20 MG: Performed by: HOSPITALIST

## 2023-06-04 PROCEDURE — 85025 COMPLETE CBC W/AUTO DIFF WBC: CPT | Performed by: PHYSICIAN ASSISTANT

## 2023-06-04 PROCEDURE — 84484 ASSAY OF TROPONIN QUANT: CPT | Performed by: PHYSICIAN ASSISTANT

## 2023-06-04 PROCEDURE — 83605 ASSAY OF LACTIC ACID: CPT | Performed by: PHYSICIAN ASSISTANT

## 2023-06-04 PROCEDURE — 80053 COMPREHEN METABOLIC PANEL: CPT | Performed by: PHYSICIAN ASSISTANT

## 2023-06-04 PROCEDURE — 82803 BLOOD GASES ANY COMBINATION: CPT

## 2023-06-04 PROCEDURE — 84443 ASSAY THYROID STIM HORMONE: CPT | Performed by: PHYSICIAN ASSISTANT

## 2023-06-04 PROCEDURE — 83735 ASSAY OF MAGNESIUM: CPT | Performed by: PHYSICIAN ASSISTANT

## 2023-06-04 PROCEDURE — 25010000002 LORAZEPAM PER 2 MG: Performed by: EMERGENCY MEDICINE

## 2023-06-04 PROCEDURE — 25010000002 DIGOXIN PER 500 MCG: Performed by: INTERNAL MEDICINE

## 2023-06-04 PROCEDURE — 84145 PROCALCITONIN (PCT): CPT | Performed by: INTERNAL MEDICINE

## 2023-06-04 PROCEDURE — 36415 COLL VENOUS BLD VENIPUNCTURE: CPT

## 2023-06-04 PROCEDURE — 25010000002 ENOXAPARIN PER 10 MG: Performed by: PHYSICIAN ASSISTANT

## 2023-06-04 RX ORDER — SODIUM CHLORIDE 0.9 % (FLUSH) 0.9 %
10 SYRINGE (ML) INJECTION AS NEEDED
Status: DISCONTINUED | OUTPATIENT
Start: 2023-06-04 | End: 2023-06-12 | Stop reason: HOSPADM

## 2023-06-04 RX ORDER — LOSARTAN POTASSIUM 100 MG/1
100 TABLET ORAL DAILY
Status: DISCONTINUED | OUTPATIENT
Start: 2023-06-04 | End: 2023-06-04

## 2023-06-04 RX ORDER — FUROSEMIDE 10 MG/ML
40 INJECTION INTRAMUSCULAR; INTRAVENOUS EVERY 12 HOURS
Status: DISCONTINUED | OUTPATIENT
Start: 2023-06-04 | End: 2023-06-04

## 2023-06-04 RX ORDER — ENOXAPARIN SODIUM 100 MG/ML
1 INJECTION SUBCUTANEOUS ONCE
Status: COMPLETED | OUTPATIENT
Start: 2023-06-04 | End: 2023-06-04

## 2023-06-04 RX ORDER — LEVOTHYROXINE AND LIOTHYRONINE 19; 4.5 UG/1; UG/1
15 TABLET ORAL DAILY
Status: DISCONTINUED | OUTPATIENT
Start: 2023-06-04 | End: 2023-06-12 | Stop reason: HOSPADM

## 2023-06-04 RX ORDER — AMOXICILLIN 250 MG
2 CAPSULE ORAL 2 TIMES DAILY
Status: DISCONTINUED | OUTPATIENT
Start: 2023-06-04 | End: 2023-06-06

## 2023-06-04 RX ORDER — BISACODYL 10 MG
10 SUPPOSITORY, RECTAL RECTAL DAILY PRN
Status: DISCONTINUED | OUTPATIENT
Start: 2023-06-04 | End: 2023-06-06

## 2023-06-04 RX ORDER — POLYETHYLENE GLYCOL 3350 17 G/17G
17 POWDER, FOR SOLUTION ORAL DAILY PRN
Status: DISCONTINUED | OUTPATIENT
Start: 2023-06-04 | End: 2023-06-06

## 2023-06-04 RX ORDER — ASPIRIN 81 MG/1
81 TABLET, CHEWABLE ORAL DAILY
Status: DISCONTINUED | OUTPATIENT
Start: 2023-06-04 | End: 2023-06-04

## 2023-06-04 RX ORDER — SODIUM CHLORIDE 0.9 % (FLUSH) 0.9 %
10 SYRINGE (ML) INJECTION EVERY 12 HOURS SCHEDULED
Status: DISCONTINUED | OUTPATIENT
Start: 2023-06-04 | End: 2023-06-12 | Stop reason: HOSPADM

## 2023-06-04 RX ORDER — LORAZEPAM 0.5 MG/1
0.5 TABLET ORAL EVERY 6 HOURS PRN
Status: DISCONTINUED | OUTPATIENT
Start: 2023-06-04 | End: 2023-06-12 | Stop reason: HOSPADM

## 2023-06-04 RX ORDER — FUROSEMIDE 10 MG/ML
40 INJECTION INTRAMUSCULAR; INTRAVENOUS 2 TIMES DAILY
Status: DISCONTINUED | OUTPATIENT
Start: 2023-06-04 | End: 2023-06-05

## 2023-06-04 RX ORDER — LEVOTHYROXINE AND LIOTHYRONINE 19; 4.5 UG/1; UG/1
30 TABLET ORAL DAILY
Status: DISCONTINUED | OUTPATIENT
Start: 2023-06-04 | End: 2023-06-12 | Stop reason: HOSPADM

## 2023-06-04 RX ORDER — PANTOPRAZOLE SODIUM 40 MG/1
40 TABLET, DELAYED RELEASE ORAL
Status: DISCONTINUED | OUTPATIENT
Start: 2023-06-04 | End: 2023-06-12 | Stop reason: HOSPADM

## 2023-06-04 RX ORDER — ATORVASTATIN CALCIUM 20 MG/1
20 TABLET, FILM COATED ORAL NIGHTLY
Status: DISCONTINUED | OUTPATIENT
Start: 2023-06-04 | End: 2023-06-12 | Stop reason: HOSPADM

## 2023-06-04 RX ORDER — LOSARTAN POTASSIUM 50 MG/1
50 TABLET ORAL DAILY
Status: DISCONTINUED | OUTPATIENT
Start: 2023-06-05 | End: 2023-06-06

## 2023-06-04 RX ORDER — METHYLPREDNISOLONE SODIUM SUCCINATE 125 MG/2ML
125 INJECTION, POWDER, LYOPHILIZED, FOR SOLUTION INTRAMUSCULAR; INTRAVENOUS ONCE
Status: COMPLETED | OUTPATIENT
Start: 2023-06-04 | End: 2023-06-04

## 2023-06-04 RX ORDER — BISACODYL 5 MG/1
5 TABLET, DELAYED RELEASE ORAL DAILY PRN
Status: DISCONTINUED | OUTPATIENT
Start: 2023-06-04 | End: 2023-06-06

## 2023-06-04 RX ORDER — ATENOLOL 50 MG/1
50 TABLET ORAL DAILY
Status: DISCONTINUED | OUTPATIENT
Start: 2023-06-04 | End: 2023-06-04

## 2023-06-04 RX ORDER — DIGOXIN 0.25 MG/ML
250 INJECTION INTRAMUSCULAR; INTRAVENOUS ONCE
Status: COMPLETED | OUTPATIENT
Start: 2023-06-04 | End: 2023-06-04

## 2023-06-04 RX ORDER — IPRATROPIUM BROMIDE AND ALBUTEROL SULFATE 2.5; .5 MG/3ML; MG/3ML
3 SOLUTION RESPIRATORY (INHALATION) ONCE
Status: COMPLETED | OUTPATIENT
Start: 2023-06-04 | End: 2023-06-04

## 2023-06-04 RX ORDER — METOPROLOL TARTRATE 50 MG/1
50 TABLET, FILM COATED ORAL EVERY 6 HOURS
Status: DISCONTINUED | OUTPATIENT
Start: 2023-06-04 | End: 2023-06-05

## 2023-06-04 RX ORDER — AMLODIPINE BESYLATE 2.5 MG/1
2.5 TABLET ORAL DAILY
Status: DISCONTINUED | OUTPATIENT
Start: 2023-06-04 | End: 2023-06-04

## 2023-06-04 RX ORDER — PAROXETINE HYDROCHLORIDE 20 MG/1
20 TABLET, FILM COATED ORAL DAILY
Status: DISCONTINUED | OUTPATIENT
Start: 2023-06-04 | End: 2023-06-12 | Stop reason: HOSPADM

## 2023-06-04 RX ORDER — ONDANSETRON 2 MG/ML
4 INJECTION INTRAMUSCULAR; INTRAVENOUS EVERY 6 HOURS PRN
Status: DISCONTINUED | OUTPATIENT
Start: 2023-06-04 | End: 2023-06-12 | Stop reason: HOSPADM

## 2023-06-04 RX ORDER — NITROGLYCERIN 0.4 MG/1
0.4 TABLET SUBLINGUAL
Status: DISCONTINUED | OUTPATIENT
Start: 2023-06-04 | End: 2023-06-12 | Stop reason: HOSPADM

## 2023-06-04 RX ORDER — ACETAMINOPHEN 650 MG/1
650 SUPPOSITORY RECTAL EVERY 4 HOURS PRN
Status: DISCONTINUED | OUTPATIENT
Start: 2023-06-04 | End: 2023-06-12 | Stop reason: HOSPADM

## 2023-06-04 RX ORDER — SODIUM CHLORIDE 9 MG/ML
40 INJECTION, SOLUTION INTRAVENOUS AS NEEDED
Status: DISCONTINUED | OUTPATIENT
Start: 2023-06-04 | End: 2023-06-12 | Stop reason: HOSPADM

## 2023-06-04 RX ORDER — GUAIFENESIN 600 MG/1
600 TABLET, EXTENDED RELEASE ORAL EVERY 12 HOURS SCHEDULED
Status: DISCONTINUED | OUTPATIENT
Start: 2023-06-04 | End: 2023-06-12 | Stop reason: HOSPADM

## 2023-06-04 RX ORDER — ACETAMINOPHEN 325 MG/1
650 TABLET ORAL EVERY 4 HOURS PRN
Status: DISCONTINUED | OUTPATIENT
Start: 2023-06-04 | End: 2023-06-12 | Stop reason: HOSPADM

## 2023-06-04 RX ORDER — FUROSEMIDE 10 MG/ML
40 INJECTION INTRAMUSCULAR; INTRAVENOUS ONCE
Status: COMPLETED | OUTPATIENT
Start: 2023-06-04 | End: 2023-06-04

## 2023-06-04 RX ORDER — LORAZEPAM 2 MG/ML
0.5 INJECTION INTRAMUSCULAR ONCE
Status: COMPLETED | OUTPATIENT
Start: 2023-06-04 | End: 2023-06-04

## 2023-06-04 RX ORDER — IPRATROPIUM BROMIDE AND ALBUTEROL SULFATE 2.5; .5 MG/3ML; MG/3ML
3 SOLUTION RESPIRATORY (INHALATION) EVERY 4 HOURS PRN
Status: DISCONTINUED | OUTPATIENT
Start: 2023-06-04 | End: 2023-06-12 | Stop reason: HOSPADM

## 2023-06-04 RX ORDER — ACETAMINOPHEN 160 MG/5ML
650 SOLUTION ORAL EVERY 4 HOURS PRN
Status: DISCONTINUED | OUTPATIENT
Start: 2023-06-04 | End: 2023-06-12 | Stop reason: HOSPADM

## 2023-06-04 RX ADMIN — AMLODIPINE BESYLATE 2.5 MG: 2.5 TABLET ORAL at 15:49

## 2023-06-04 RX ADMIN — LORAZEPAM 0.5 MG: 0.5 TABLET ORAL at 16:08

## 2023-06-04 RX ADMIN — LOSARTAN POTASSIUM 100 MG: 100 TABLET, FILM COATED ORAL at 15:49

## 2023-06-04 RX ADMIN — METHYLPREDNISOLONE SODIUM SUCCINATE 125 MG: 125 INJECTION, POWDER, FOR SOLUTION INTRAMUSCULAR; INTRAVENOUS at 02:57

## 2023-06-04 RX ADMIN — ASPIRIN 81 MG: 81 TABLET, CHEWABLE ORAL at 15:49

## 2023-06-04 RX ADMIN — LORAZEPAM 0.5 MG: 2 INJECTION INTRAMUSCULAR; INTRAVENOUS at 03:03

## 2023-06-04 RX ADMIN — LEVOTHYROXINE, LIOTHYRONINE 30 MG: 19; 4.5 TABLET ORAL at 15:49

## 2023-06-04 RX ADMIN — LEVOTHYROXINE, LIOTHYRONINE 15 MG: 19; 4.5 TABLET ORAL at 15:51

## 2023-06-04 RX ADMIN — ATORVASTATIN CALCIUM 20 MG: 20 TABLET, FILM COATED ORAL at 20:39

## 2023-06-04 RX ADMIN — IPRATROPIUM BROMIDE AND ALBUTEROL SULFATE 3 ML: .5; 3 SOLUTION RESPIRATORY (INHALATION) at 09:46

## 2023-06-04 RX ADMIN — METOPROLOL TARTRATE 5 MG: 1 INJECTION, SOLUTION INTRAVENOUS at 02:07

## 2023-06-04 RX ADMIN — METOPROLOL TARTRATE 5 MG: 1 INJECTION, SOLUTION INTRAVENOUS at 00:42

## 2023-06-04 RX ADMIN — FUROSEMIDE 40 MG: 40 INJECTION, SOLUTION INTRAMUSCULAR; INTRAVENOUS at 18:14

## 2023-06-04 RX ADMIN — SODIUM CHLORIDE 10 MG/HR: 900 INJECTION, SOLUTION INTRAVENOUS at 12:27

## 2023-06-04 RX ADMIN — ENOXAPARIN SODIUM 60 MG: 100 INJECTION SUBCUTANEOUS at 03:21

## 2023-06-04 RX ADMIN — PANTOPRAZOLE SODIUM 40 MG: 40 TABLET, DELAYED RELEASE ORAL at 15:49

## 2023-06-04 RX ADMIN — FUROSEMIDE 40 MG: 40 INJECTION, SOLUTION INTRAMUSCULAR; INTRAVENOUS at 10:13

## 2023-06-04 RX ADMIN — IPRATROPIUM BROMIDE AND ALBUTEROL SULFATE 3 ML: .5; 3 SOLUTION RESPIRATORY (INHALATION) at 03:09

## 2023-06-04 RX ADMIN — METOPROLOL TARTRATE 5 MG: 1 INJECTION, SOLUTION INTRAVENOUS at 01:14

## 2023-06-04 RX ADMIN — GUAIFENESIN 600 MG: 600 TABLET, EXTENDED RELEASE ORAL at 20:39

## 2023-06-04 RX ADMIN — METOPROLOL TARTRATE 25 MG: 25 TABLET, FILM COATED ORAL at 00:40

## 2023-06-04 RX ADMIN — Medication 10 ML: at 15:52

## 2023-06-04 RX ADMIN — SODIUM CHLORIDE 500 ML: 9 INJECTION, SOLUTION INTRAVENOUS at 00:41

## 2023-06-04 RX ADMIN — DIGOXIN 250 MCG: 0.25 INJECTION INTRAMUSCULAR; INTRAVENOUS at 17:51

## 2023-06-04 RX ADMIN — EMPAGLIFLOZIN 10 MG: 10 TABLET, FILM COATED ORAL at 18:14

## 2023-06-04 RX ADMIN — APIXABAN 5 MG: 5 TABLET, FILM COATED ORAL at 20:39

## 2023-06-04 RX ADMIN — Medication 10 ML: at 20:40

## 2023-06-04 RX ADMIN — LORAZEPAM 0.5 MG: 0.5 TABLET ORAL at 09:48

## 2023-06-04 RX ADMIN — PAROXETINE HYDROCHLORIDE HEMIHYDRATE 20 MG: 20 TABLET, FILM COATED ORAL at 15:48

## 2023-06-04 RX ADMIN — SODIUM CHLORIDE 5 MG/HR: 900 INJECTION, SOLUTION INTRAVENOUS at 03:07

## 2023-06-04 RX ADMIN — ATENOLOL 50 MG: 50 TABLET ORAL at 15:49

## 2023-06-04 RX ADMIN — METOPROLOL TARTRATE 50 MG: 50 TABLET, FILM COATED ORAL at 20:39

## 2023-06-04 NOTE — ED NOTES
Nursing report ED to floor  Dafne Mary  82 y.o.  female    HPI :   Chief Complaint   Patient presents with    Rapid Heart Rate       Admitting doctor:   Ramos Griffin MD    Admitting diagnosis:   The primary encounter diagnosis was Atrial fibrillation with RVR. Diagnoses of COPD exacerbation and Congestive heart failure, unspecified HF chronicity, unspecified heart failure type were also pertinent to this visit.    Code status:   Current Code Status       Date Active Code Status Order ID Comments User Context       6/4/2023 0415 CPR (Attempt to Resuscitate) [id number]  Claudia Alejo, APRN ED        Question Answer    Code Status (Patient has no pulse and is not breathing) CPR (Attempt to Resuscitate)    Medical Interventions (Patient has pulse or is breathing) Full Support                    Allergies:   Lansoprazole, Diphenhydramine hcl (sleep), and Lisinopril    Isolation:   No active isolations    Intake and Output    Intake/Output Summary (Last 24 hours) at 6/4/2023 0437  Last data filed at 6/4/2023 0311  Gross per 24 hour   Intake 500 ml   Output --   Net 500 ml       Weight:       06/03/23  2310   Weight: 60.3 kg (133 lb)       Most recent vitals:   Vitals:    06/04/23 0309 06/04/23 0317 06/04/23 0326 06/04/23 0339   BP:    101/52   Pulse: (!) 163 (!) 122 107 108   Resp: 28 28     Temp:       TempSrc:       SpO2: 93%  93% 92%   Weight:       Height:           Active LDAs/IV Access:   Lines, Drains & Airways       Active LDAs       Name Placement date Placement time Site Days    Peripheral IV 06/04/23 0310 Distal;Right Forearm 06/04/23 0310  Forearm  less than 1                    Labs (abnormal labs have a star):   Labs Reviewed   COMPREHENSIVE METABOLIC PANEL - Abnormal; Notable for the following components:       Result Value    Glucose 161 (*)     BUN 32 (*)     Sodium 133 (*)     Total Protein 5.9 (*)     ALT (SGPT) 78 (*)     Alkaline Phosphatase 131 (*)     BUN/Creatinine Ratio 33.0 (*)      eGFR 58.5 (*)     All other components within normal limits    Narrative:     GFR Normal >60  Chronic Kidney Disease <60  Kidney Failure <15    The GFR formula is only valid for adults with stable renal function between ages 18 and 70.   BNP (IN-HOUSE) - Abnormal; Notable for the following components:    proBNP 7,773.0 (*)     All other components within normal limits    Narrative:     Among patients with dyspnea, NT-proBNP is highly sensitive for the detection of acute congestive heart failure. In addition NT-proBNP of <300 pg/ml effectively rules out acute congestive heart failure with 99% negative predictive value.    Results may be falsely decreased if patient taking Biotin.     TROPONIN - Abnormal; Notable for the following components:    HS Troponin T 27 (*)     All other components within normal limits    Narrative:     High Sensitive Troponin T Reference Range:  <10.0 ng/L- Negative Female for AMI  <15.0 ng/L- Negative Male for AMI  >=10 - Abnormal Female indicating possible myocardial injury.  >=15 - Abnormal Male indicating possible myocardial injury.   Clinicians would have to utilize clinical acumen, EKG, Troponin, and serial changes to determine if it is an Acute Myocardial Infarction or myocardial injury due to an underlying chronic condition.        CBC WITH AUTO DIFFERENTIAL - Abnormal; Notable for the following components:    WBC 15.90 (*)     Hemoglobin 11.9 (*)     Neutrophil % 87.5 (*)     Lymphocyte % 3.6 (*)     Eosinophil % 0.0 (*)     Immature Grans % 3.1 (*)     Neutrophils, Absolute 13.92 (*)     Lymphocytes, Absolute 0.57 (*)     Immature Grans, Absolute 0.49 (*)     All other components within normal limits   HIGH SENSITIVITIY TROPONIN T 2HR - Abnormal; Notable for the following components:    HS Troponin T 31 (*)     Troponin T Delta 4 (*)     All other components within normal limits    Narrative:     High Sensitive Troponin T Reference Range:  <10.0 ng/L- Negative Female for AMI  <15.0  ng/L- Negative Male for AMI  >=10 - Abnormal Female indicating possible myocardial injury.  >=15 - Abnormal Male indicating possible myocardial injury.   Clinicians would have to utilize clinical acumen, EKG, Troponin, and serial changes to determine if it is an Acute Myocardial Infarction or myocardial injury due to an underlying chronic condition.        LACTIC ACID, PLASMA - Abnormal; Notable for the following components:    Lactate 4.2 (*)     All other components within normal limits   TSH - Normal   MAGNESIUM - Normal   LACTIC ACID, REFLEX   BASIC METABOLIC PANEL   CBC WITH AUTO DIFFERENTIAL   PROTIME-INR   TROPONIN   CBC AND DIFFERENTIAL    Narrative:     The following orders were created for panel order CBC & Differential.  Procedure                               Abnormality         Status                     ---------                               -----------         ------                     CBC Auto Differential[[id number]]        Abnormal            Final result                 Please view results for these tests on the individual orders.       EKG:   ECG 12 Lead Rhythm Change   Preliminary Result   HEART RATE= 152  bpm   RR Interval= 395  ms   DC Interval= 308  ms   P Horizontal Axis= 201  deg   P Front Axis= 248  deg   QRSD Interval= 134  ms   QT Interval= 312  ms   QRS Axis= 262  deg   T Wave Axis= 79  deg   - ABNORMAL ECG -   Wide-QRS tachycardia   Nonspecific IVCD with LAD   Electronically Signed By:    Date and Time of Study: 2023-06-03 23:24:19          Meds given in ED:   Medications   sodium chloride 0.9 % flush 10 mL (has no administration in time range)   dilTIAZem (CARDIZEM) 100 mg in 100 mL NS infusion (ADV) (5 mg/hr Intravenous New Bag 6/4/23 0307)   sodium chloride 0.9 % bolus 500 mL (has no administration in time range)   sodium chloride 0.9 % flush 10 mL (has no administration in time range)   sodium chloride 0.9 % flush 10 mL (has no administration in time range)   sodium chloride 0.9 %  infusion 40 mL (has no administration in time range)   sennosides-docusate (PERICOLACE) 8.6-50 MG per tablet 2 tablet (has no administration in time range)     And   polyethylene glycol (MIRALAX) packet 17 g (has no administration in time range)     And   bisacodyl (DULCOLAX) EC tablet 5 mg (has no administration in time range)     And   bisacodyl (DULCOLAX) suppository 10 mg (has no administration in time range)   nitroglycerin (NITROSTAT) SL tablet 0.4 mg (has no administration in time range)   acetaminophen (TYLENOL) tablet 650 mg (has no administration in time range)     Or   acetaminophen (TYLENOL) 160 MG/5ML solution 650 mg (has no administration in time range)     Or   acetaminophen (TYLENOL) suppository 650 mg (has no administration in time range)   ondansetron (ZOFRAN) injection 4 mg (has no administration in time range)   sodium chloride 0.9 % bolus 500 mL (0 mL Intravenous Stopped 6/4/23 0311)   metoprolol tartrate (LOPRESSOR) injection 5 mg (5 mg Intravenous Given 6/4/23 0042)   metoprolol tartrate (LOPRESSOR) tablet 25 mg (25 mg Oral Given 6/4/23 0040)   metoprolol tartrate (LOPRESSOR) injection 5 mg (5 mg Intravenous Given 6/4/23 0114)   metoprolol tartrate (LOPRESSOR) injection 5 mg (5 mg Intravenous Given 6/4/23 0207)   ipratropium-albuterol (DUO-NEB) nebulizer solution 3 mL (3 mL Nebulization Given 6/4/23 0309)   methylPREDNISolone sodium succinate (SOLU-Medrol) injection 125 mg (125 mg Intravenous Given 6/4/23 0257)   Enoxaparin Sodium (LOVENOX) syringe 60 mg (60 mg Subcutaneous Given 6/4/23 0321)   dilTIAZem (CARDIZEM) bolus from bag 1 mg/mL 10 mg (10 mg Intravenous Bolus from Bag 6/4/23 0309)   LORazepam (ATIVAN) injection 0.5 mg (0.5 mg Intravenous Given 6/4/23 0303)       Imaging results:  XR Chest 1 View    Result Date: 6/4/2023  Electronically signed by Jose Cabrera MD on 06-04-23 at 0311     Ambulatory status:   - bedrest    Social issues:   Social History     Socioeconomic History     "Marital status:    Tobacco Use    Smoking status: Never     Passive exposure: Never    Smokeless tobacco: Never   Vaping Use    Vaping Use: Never used   Substance and Sexual Activity    Alcohol use: Yes     Comment: \"occ\"; caffeine use- tea    Drug use: No    Sexual activity: Defer       NIH Stroke Scale:         Sandie Bass RN  06/04/23 04:37 EDT       "

## 2023-06-04 NOTE — H&P
HISTORY AND PHYSICAL   [facility]        Patient Identification:  Name: Dafne Mary  Age: 82 y.o.  Sex: female  :  1940  MRN: [id number]                     Primary Care Physician: Sintia Chatterjee PA    Chief Complaint:  palpitations and short of air    History of Present Illness:      The patient is a 82 y.o. female with history of A-fib, chronic diastolic CHF, depression, COPD, GERD, hypertension, hyperlipidemia and hypothyroidism who presents to the hospital complaining of approximate 1 week history of palpitations.  Patient was recently in the hospital for a COPD exacerbation.  During that admission she did have an episode of atrial fibrillation.  This was thought to be triggered by her viral URI.  She had her metoprolol increased.  They held off on anticoagulation at that time.  She states since being discharged she has had an episode of palpitations every day.  She denies any overt chest pain.  She does have chronic shortness of breath which she states is mildly worse.  The patient was evaluated in the ER and was started on Cardizem drip for A-fib.  The patient was admitted for further evaluation treatment.    Past Medical History:  Past Medical History:   Diagnosis Date    Atrial fibrillation with RVR 2023    Chronic diastolic congestive heart failure 2022    Depression     Emphysema lung     GERD (gastroesophageal reflux disease)     Heart failure     Hyperlipidemia     Hypertension     Hypothyroidism      Past Surgical History:  Past Surgical History:   Procedure Laterality Date    EYE SURGERY Left     INTUBATION  2023         PARATHYROIDECTOMY      Patient reports thyroidectomy partial not a para thyroidectomy.    THYROIDECTOMY, PARTIAL            Home Meds:  Medications Prior to Admission   Medication Sig Dispense Refill Last Dose    amLODIPine (NORVASC) 2.5 MG tablet Take 1 tablet by mouth Daily.       aspirin 81 MG chewable tablet Chew 1 tablet Daily.        atenolol (TENORMIN) 50 MG tablet Take 1 tablet by mouth Daily. 90 tablet 3     atorvastatin (LIPITOR) 20 MG tablet Take 1 tablet by mouth Every Night. 90 tablet 1     Breztri Aerosphere 160-9-4.8 MCG/ACT aerosol inhaler 2 puffs 2 (Two) Times a Day.       ipratropium-albuterol (DUO-NEB) 0.5-2.5 mg/3 ml nebulizer Take 3 mL by nebulization Every 4 (Four) Hours As Needed for Wheezing.       losartan (COZAAR) 100 MG tablet Take 1 tablet by mouth Daily.       omeprazole (priLOSEC) 20 MG capsule Take 1 capsule by mouth Daily.       PARoxetine (PAXIL) 10 MG tablet Take 2 tablets by mouth Daily. 180 tablet 0     predniSONE (DELTASONE) 10 MG tablet Take 4 tabs daily x 3 days, then take 3 tabs daily x 3 days, then take 2 tabs daily x 3 days, then take 1 tab daily x 3 days 31 tablet 0     thyroid (ARMOUR) 15 MG tablet Take 1 tablet by mouth Daily. Take with 30mg for total daily dose of 45mg.       Thyroid 30 MG PO tablet Take 1 tablet by mouth Daily. Take with 15mg for total daily dose of 45mg.       vitamin B-12 (CYANOCOBALAMIN) 500 MCG tablet Take 1 tablet by mouth Daily.       vitamin C (ASCORBIC ACID) 250 MG tablet Take 2 tablets by mouth Daily.       vitamin D3 125 MCG (5000 UT) capsule capsule Take 1 capsule by mouth Daily.       Glycerin-Polysorbate 80 (REFRESH DRY EYE THERAPY OP) Apply 1 drop to eye(s) as directed by provider Daily. For dry eyes       Homeopathic Products (ZICAM ALLERGY RELIEF NA) 1 spray into the nostril(s) as directed by provider Daily.       ipratropium-albuterol (DUO-NEB) 0.5-2.5 mg/3 ml nebulizer Take 3 mL by nebulization 4 (Four) Times a Day As Needed for Wheezing. 120 mL 5     Ventolin  (90 Base) MCG/ACT inhaler Inhale 2 puffs Every 4 (Four) Hours As Needed.        Current meds    Current Facility-Administered Medications:     acetaminophen (TYLENOL) tablet 650 mg, 650 mg, Oral, Q4H PRN **OR** acetaminophen (TYLENOL) 160 MG/5ML solution 650 mg, 650 mg, Oral, Q4H PRN **OR** acetaminophen  (TYLENOL) suppository 650 mg, 650 mg, Rectal, Q4H PRN, Claudia Alejo APRN    amLODIPine (NORVASC) tablet 2.5 mg, 2.5 mg, Oral, Daily, Amor Shane MD    aspirin chewable tablet 81 mg, 81 mg, Oral, Daily, Amor Shane MD    atenolol (TENORMIN) tablet 50 mg, 50 mg, Oral, Daily, Amor Shane MD    atorvastatin (LIPITOR) tablet 20 mg, 20 mg, Oral, Nightly, Amor Shane MD    sennosides-docusate (PERICOLACE) 8.6-50 MG per tablet 2 tablet, 2 tablet, Oral, BID **AND** polyethylene glycol (MIRALAX) packet 17 g, 17 g, Oral, Daily PRN **AND** bisacodyl (DULCOLAX) EC tablet 5 mg, 5 mg, Oral, Daily PRN **AND** bisacodyl (DULCOLAX) suppository 10 mg, 10 mg, Rectal, Daily PRN, Claudia Alejo APRN    dilTIAZem (CARDIZEM) 100 mg in 100 mL NS infusion (ADV), 5-15 mg/hr, Intravenous, Continuous, Sachin Arredondo MD, Last Rate: 15 mL/hr at 06/04/23 1018, 15 mg/hr at 06/04/23 1018    furosemide (LASIX) injection 40 mg, 40 mg, Intravenous, Q12H, Ammy Narvaez MD    ipratropium-albuterol (DUO-NEB) nebulizer solution 3 mL, 3 mL, Nebulization, Q4H PRN, Amor Shane MD, 3 mL at 06/04/23 0946    LORazepam (ATIVAN) tablet 0.5 mg, 0.5 mg, Oral, Q6H PRN, Amor Shane MD, 0.5 mg at 06/04/23 0948    losartan (COZAAR) tablet 100 mg, 100 mg, Oral, Daily, Amor Shane MD    nitroglycerin (NITROSTAT) SL tablet 0.4 mg, 0.4 mg, Sublingual, Q5 Min PRN, Claudia Alejo APRN    ondansetron (ZOFRAN) injection 4 mg, 4 mg, Intravenous, Q6H PRN, Claudia Alejo APRN    pantoprazole (PROTONIX) EC tablet 40 mg, 40 mg, Oral, Q AM, Amor Shane MD    PARoxetine (PAXIL) tablet 20 mg, 20 mg, Oral, Daily, Amor Shane MD    sodium chloride 0.9 % bolus 500 mL, 500 mL, Intravenous, Once, Jonas Castro PA    [COMPLETED] Insert Peripheral IV, , , Once **AND** sodium chloride 0.9 % flush 10 mL, 10 mL, Intravenous, PRN, Jonas Castro PA    sodium chloride 0.9 % flush 10 mL, 10 mL, Intravenous, Q12H, Claudia Alejo,  "APRN    sodium chloride 0.9 % flush 10 mL, 10 mL, Intravenous, PRN, Claudia Alejo APRN    sodium chloride 0.9 % infusion 40 mL, 40 mL, Intravenous, PRN, Claudia Alejo APRN    Thyroid (ARMOUR) tablet 15 mg, 15 mg, Oral, Daily, Amor Shane MD    Thyroid (ARMOUR) tablet 30 mg, 30 mg, Oral, Daily, Amor Shane MD  Allergies:  Allergies   Allergen Reactions    Lansoprazole Nausea Only     NAUSEA  NAUSEA  NAUSEA    Diphenhydramine Hcl (Sleep) Irritability    Lisinopril Cough     Immunizations:  Immunization History   Administered Date(s) Administered    31-influenza Vac Quardvalent Preservativ 11/14/2017    COVID-19 (MODERNA) 1st,2nd,3rd Dose Monovalent 02/23/2021, 03/23/2021, 11/13/2022    COVID-19 (MODERNA) BIVALENT 12+YRS 11/13/2022    COVID-19 (MODERNA) Monovalent Original Booster 10/28/2021    FLUAD TRI 65YR+ 12/01/2022    Fluad Quad 65+ 11/05/2019    Fluzone High Dose =>65 Years ([employer] ONLY) 11/30/2016, 11/13/2018, 11/13/2022    Hepatitis A 05/30/2018    Hepatitis B 05/30/2018    Pneumococcal, Unspecified 11/30/2016    Tdap 07/10/2017     Social History:   Social History     Social History Narrative    Not on file     Social History     Socioeconomic History    Marital status:    Tobacco Use    Smoking status: Never     Passive exposure: Never    Smokeless tobacco: Never   Vaping Use    Vaping Use: Never used   Substance and Sexual Activity    Alcohol use: Yes     Comment: \"occ\"; caffeine use- tea    Drug use: No    Sexual activity: Defer       Family History:  Family History   Problem Relation Age of Onset    Alzheimer's disease Mother     Lung disease Father     Alcohol abuse Father     Heart disease Brother     Hypertension Brother     Lung disease Brother     Alcohol abuse Brother     Cancer Brother         LUNG  WAS A SMOKER    Thyroid disease Maternal Grandmother         Review of Systems  See history of present illness and past medical history.  Patient denies headache, dizziness, " "syncope, falls, trauma, change in vision, change in hearing, change in taste, changes in weight, changes in appetite, focal weakness, numbness, or paresthesia.  Patient denies chest pain, palpitations, dyspnea, orthopnea, PND, cough, sinus pressure, rhinorrhea, epistaxis, hemoptysis, nausea, vomiting, hematemesis, diarrhea, constipation or hematochezia. Denies cold or heat intolerance, polydipsia, polyuria, polyphagia. Denies hematuria, pyuria, dysuria, hesitancy, frequency or urgency. Denies consumption of raw and under cooked meats foods or change in water source.  Denies fever, chills, sweats, night sweats.  Denies missing any routine medications. Remainder of ROS is negative.    Objective:  tMax 24 hrs: Temp (24hrs), Av.5 °F (36.9 °C), Min:98.2 °F (36.8 °C), Max:99 °F (37.2 °C)    Vitals Ranges:   Temp:  [98.2 °F (36.8 °C)-99 °F (37.2 °C)] 98.4 °F (36.9 °C)  Heart Rate:  [] 108  Resp:  [18-29] 24  BP: ()/() 115/88      Exam:  /88   Pulse 108   Temp 98.4 °F (36.9 °C) (Oral)   Resp 24   Ht 152.4 cm (60\")   Wt 68 kg (149 lb 14.6 oz)   SpO2 92%   BMI 29.28 kg/m²     General Appearance:    Alert, cooperative, no distress, appears stated age   Head:    Normocephalic, without obvious abnormality, atraumatic   Eyes:    PERRL, conjunctivae/corneas clear, EOM's intact, both eyes   Ears:    Normal external ear canals, both ears   Nose:   Nares normal, septum midline, mucosa normal, no drainage    or sinus tenderness   Throat:   Lips, mucosa, and tongue normal   Neck:   Supple, symmetrical, trachea midline, no adenopathy;     thyroid:  no enlargement/tenderness/nodules; no carotid    bruit or JVD   Back:     Symmetric, no curvature, ROM normal, no CVA tenderness   Lungs:   Wheezes and some crackles bilaterally, respirations unlabored   Chest Wall:    No tenderness or deformity    Heart:    Regular rate and rhythm, S1 and S2 normal, no murmur, rub   or gallop   Abdomen:     Soft, nontender, " bowel sounds active all four quadrants,     no masses, no hepatomegaly, no splenomegaly   Extremities:   Extremities normal, atraumatic, no cyanosis or edema   Pulses:   2+ and symmetric all extremities   Skin:   Skin color, texture, turgor normal, no rashes or lesions   Lymph nodes:   Cervical, supraclavicular, and axillary nodes normal   Neurologic:   CNII-XII intact, normal strength, sensation intact throughout      .    Data Review:  Lab Results (last 72 hours)       Procedure Component Value Units Date/Time    Procalcitonin [[id number]] Collected: 06/04/23 1058    Specimen: Blood Updated: 06/04/23 1110    Blood Gas, Arterial - [[id number]]  (Abnormal) Collected: 06/04/23 1022    Specimen: Arterial Blood Updated: 06/04/23 1025     Site Arterial: left brachial     Ortiz's Test N/A     pH, Arterial 7.327 pH units      pCO2, Arterial 49.1 mm Hg      pO2, Arterial 74.0 mm Hg      HCO3, Arterial 25.7 mmol/L      Base Excess, Arterial -0.9 mmol/L      O2 Saturation Calculated 93.3 %      Comment: SAT 92% Meter: [id number] : [id number] [facility]        Barometric Pressure for Blood Gas 745.8 mmHg      Modality Cannula     Flow Rate 3 lpm      Rate 30 Breaths/minute     Lactic Acid, Plasma [[id number]]  (Abnormal) Collected: 06/04/23 0310    Specimen: Blood Updated: 06/04/23 0346     Lactate 4.2 mmol/L     High Sensitivity Troponin T 2Hr [[id number]]  (Abnormal) Collected: 06/04/23 0310    Specimen: Blood Updated: 06/04/23 0345     HS Troponin T 31 ng/L      Troponin T Delta 4 ng/L     Narrative:      High Sensitive Troponin T Reference Range:  <10.0 ng/L- Negative Female for AMI  <15.0 ng/L- Negative Male for AMI  >=10 - Abnormal Female indicating possible myocardial injury.  >=15 - Abnormal Male indicating possible myocardial injury.   Clinicians would have to utilize clinical acumen, EKG, Troponin, and serial changes to determine if it is an Acute Myocardial Infarction or myocardial injury due to an  underlying chronic condition.         TSH [[id number]]  (Normal) Collected: 06/04/23 0026    Specimen: Blood Updated: 06/04/23 0104     TSH 0.710 uIU/mL     BNP [[id number]]  (Abnormal) Collected: 06/04/23 0026    Specimen: Blood Updated: 06/04/23 0104     proBNP 7,773.0 pg/mL     Narrative:      Among patients with dyspnea, NT-proBNP is highly sensitive for the detection of acute congestive heart failure. In addition NT-proBNP of <300 pg/ml effectively rules out acute congestive heart failure with 99% negative predictive value.    Results may be falsely decreased if patient taking Biotin.      High Sensitivity Troponin T [[id number]]  (Abnormal) Collected: 06/04/23 0026    Specimen: Blood Updated: 06/04/23 0104     HS Troponin T 27 ng/L     Narrative:      High Sensitive Troponin T Reference Range:  <10.0 ng/L- Negative Female for AMI  <15.0 ng/L- Negative Male for AMI  >=10 - Abnormal Female indicating possible myocardial injury.  >=15 - Abnormal Male indicating possible myocardial injury.   Clinicians would have to utilize clinical acumen, EKG, Troponin, and serial changes to determine if it is an Acute Myocardial Infarction or myocardial injury due to an underlying chronic condition.         Magnesium [[id number]]  (Normal) Collected: 06/04/23 0026    Specimen: Blood Updated: 06/04/23 0058     Magnesium 2.2 mg/dL     Comprehensive Metabolic Panel [[id number]]  (Abnormal) Collected: 06/04/23 0026    Specimen: Blood Updated: 06/04/23 0058     Glucose 161 mg/dL      BUN 32 mg/dL      Creatinine 0.97 mg/dL      Sodium 133 mmol/L      Potassium 4.5 mmol/L      Chloride 99 mmol/L      CO2 22.1 mmol/L      Calcium 8.8 mg/dL      Total Protein 5.9 g/dL      Albumin 3.7 g/dL      ALT (SGPT) 78 U/L      AST (SGOT) 27 U/L      Alkaline Phosphatase 131 U/L      Total Bilirubin 0.5 mg/dL      Globulin 2.2 gm/dL      A/G Ratio 1.7 g/dL      BUN/Creatinine Ratio 33.0     Anion Gap 11.9 mmol/L      eGFR 58.5 mL/min/1.73      Narrative:      GFR Normal >60  Chronic Kidney Disease <60  Kidney Failure <15    The GFR formula is only valid for adults with stable renal function between ages 18 and 70.    CBC & Differential [[id number]]  (Abnormal) Collected: 06/04/23 0026    Specimen: Blood Updated: 06/04/23 0038    Narrative:      The following orders were created for panel order CBC & Differential.  Procedure                               Abnormality         Status                     ---------                               -----------         ------                     CBC Auto Differential[[id number]]        Abnormal            Final result                 Please view results for these tests on the individual orders.    CBC Auto Differential [[id number]]  (Abnormal) Collected: 06/04/23 0026    Specimen: Blood Updated: 06/04/23 0038     WBC 15.90 10*3/mm3      RBC 4.13 10*6/mm3      Hemoglobin 11.9 g/dL      Hematocrit 36.4 %      MCV 88.1 fL      MCH 28.8 pg      MCHC 32.7 g/dL      RDW 13.2 %      RDW-SD 41.9 fl      MPV 9.9 fL      Platelets 260 10*3/mm3      Neutrophil % 87.5 %      Lymphocyte % 3.6 %      Monocyte % 5.7 %      Eosinophil % 0.0 %      Basophil % 0.1 %      Immature Grans % 3.1 %      Neutrophils, Absolute 13.92 10*3/mm3      Lymphocytes, Absolute 0.57 10*3/mm3      Monocytes, Absolute 0.90 10*3/mm3      Eosinophils, Absolute 0.00 10*3/mm3      Basophils, Absolute 0.02 10*3/mm3      Immature Grans, Absolute 0.49 10*3/mm3      nRBC 0.1 /100 WBC             Results from last 7 days   Lab Units 06/04/23  0026   TSH uIU/mL 0.710          Imaging Results (All)       Procedure Component Value Units Date/Time    XR Chest 1 View [[id number]] Collected: 06/04/23 1008     Updated: 06/04/23 1013    Narrative:      XR CHEST 1 VW-     HISTORY: Female who is 82 years-old,  short of breath     TECHNIQUE: Frontal view of the chest     COMPARISON: 06/04/2023     FINDINGS: The heart is enlarged. Slight prominence of vascular  and  interstitial markings. Mild bibasilar atelectasis/infiltrate, minimal  right pleural effusion. No pneumothorax. Otherwise stable.       Impression:      Mild bibasilar atelectasis/infiltrate, minimal right pleural  effusion. Cardiomegaly with slight prominence of vascular and  interstitial markings.     This report was finalized on 6/4/2023 10:10 AM by Dr. Ned Guzman M.D.       XR Chest 1 View [[id number]] Collected: 06/04/23 0311     Updated: 06/04/23 0311    Narrative:        Patient: YANG DELAROSA  Time Out: 03:11  Exam(s): XR CXR 1 VIEW     EXAM:    XR Chest, 1 View    CLINICAL HISTORY:     Reason for exam: leukocytosis.    TECHNIQUE:    Frontal view of the chest.    COMPARISON:    CT from May 3, 2022.    FINDINGS:    Lungs:  Prominent vascular and interstitial markings in both lung bases,   exaggerated by technique and large body habitus.  Possible mild CHF.    Pleural space:  Slight blunting the right gastritic angle suggest small   right pleural effusion.  No pneumothorax.    Heart:  The cardiac silhouette is moderately enlarged.    Mediastinum:  Unremarkable.    Bones joints:  Unremarkable.    Vasculature:  The aortic arch is calcified but nondilated.    Upper abdomen:  There is no pneumoperitoneum under the diaphragm.    IMPRESSION:         Prominent vascular and interstitial markings in both lung bases,   exaggerated by technique and large body habitus.  Possible mild CHF.      Impression:          Electronically signed by Jose Cabrera MD on 06-04-23 at 0311          Past Medical History:   Diagnosis Date    Atrial fibrillation with RVR 6/4/2023    Chronic diastolic congestive heart failure 11/17/2022    Depression     Emphysema lung     GERD (gastroesophageal reflux disease)     Heart failure     Hyperlipidemia     Hypertension     Hypothyroidism        Assessment:  Active Hospital Problems    Diagnosis  POA    **Atrial fibrillation with RVR [I48.91]  Yes    Chronic congestive heart failure  [I50.9]  Yes    Anxiety [F41.9]  Yes    Chronic interstitial cystitis [N30.10]  Yes    Pulmonary emphysema [J43.9]  Yes    Gastroesophageal reflux disease [K21.9]  Yes    Fibromyalgia [M79.7]  Yes    Benign hypertension [I10]  Yes    Hyperlipidemia [E78.5]  Yes      Resolved Hospital Problems   No resolved problems to display.       Plan:  The patient admitted to hospital we will consult cardiology and pulmonary medicine.  Continue medication for rate control per cardiology and also continue with nebulizer treatments and oxygen.  We will get follow-up lab studies.  Patient also appeared to be somewhat fluid overloaded we will give IV Lasix.    Amor Shane MD  6/4/2023  11:23 EDT

## 2023-06-04 NOTE — CODE DOCUMENTATION
Patient Name:  Dafne Mary  YOB: 1940  MRN:  [id number]  Admit Date:  6/3/2023    Visit Diagnoses:     ICD-10-CM ICD-9-CM   1. Atrial fibrillation with RVR  I48.91 427.31   2. COPD exacerbation  J44.1 491.21   3. Congestive heart failure, unspecified HF chronicity, unspecified heart failure type  I50.9 428.0       Reason For Rapid:   Increased work of breathing,     RN Communicated With:  Dr Shane and Dr Narvaez    Rapid Outcome:  Start Bipap.  Remain on unit    Communication From Rapid Team:   Please recall rapid for any new or worsening resp status.     Most Recent Vital Signs  Temp:  [98.2 °F (36.8 °C)-99 °F (37.2 °C)] 98.4 °F (36.9 °C)  Heart Rate:  [] 108  Resp:  [18-29] 24  BP: ()/() 115/88  SpO2:  [90 %-97 %] 92 %  on  Flow (L/min):  [2-3] 3;   Device (Oxygen Therapy): NPPV/NIV    Labs:      No results found for: POCGLU  Site   Date Value Ref Range Status   06/04/2023 Arterial: left brachial  Final     Ortiz's Test   Date Value Ref Range Status   06/04/2023 N/A  Final     pH, Arterial   Date Value Ref Range Status   06/04/2023 7.327 (L) 7.350 - 7.450 pH units Final     pCO2, Arterial   Date Value Ref Range Status   06/04/2023 49.1 (H) 35.0 - 45.0 mm Hg Final     pO2, Arterial   Date Value Ref Range Status   06/04/2023 74.0 (L) 80.0 - 100.0 mm Hg Final     HCO3, Arterial   Date Value Ref Range Status   06/04/2023 25.7 22.0 - 28.0 mmol/L Final     Base Excess, Arterial   Date Value Ref Range Status   06/04/2023 -0.9 (L) 0.0 - 2.0 mmol/L Final     Barometric Pressure for Blood Gas   Date Value Ref Range Status   06/04/2023 745.8 mmHg Final     Modality   Date Value Ref Range Status   06/04/2023 Cannula  Final     Results from last 7 days   Lab Units 06/04/23  0026   WBC 10*3/mm3 15.90*   HEMOGLOBIN g/dL 11.9*   PLATELETS 10*3/mm3 260     Results from last 7 days   Lab Units 06/04/23  0026   SODIUM mmol/L 133*   POTASSIUM mmol/L 4.5   CHLORIDE mmol/L 99   CO2 mmol/L 22.1   BUN  Start tessalon prn cough as directed and use Flonase prn pnd and cough as the cough may be due to PND  Refilled glucose test strips and lancets and also stay well hydrated and call if worse  Does get BS checked every 6 months  She will monitor it while sick  Her previous elevated Bp is now stable  Monitor BP as directed  mg/dL 32*   CREATININE mg/dL 0.97   GLUCOSE mg/dL 161*   ALBUMIN g/dL 3.7   BILIRUBIN mg/dL 0.5   ALK PHOS U/L 131*   AST (SGOT) U/L 27   ALT (SGPT) U/L 78*   Estimated Creatinine Clearance: 38.5 mL/min (by C-G formula based on SCr of 0.97 mg/dL).  Results from last 7 days   Lab Units 06/04/23  0310 06/04/23  0026   HSTROP T ng/L 31* 27*   PROBNP pg/mL  --  7,773.0*     Results from last 7 days   Lab Units 06/04/23  1058 06/04/23  0310   PROCALCITONIN ng/mL 0.08  --    LACTATE mmol/L  --  4.2*     Results from last 7 days   Lab Units 06/04/23  1022   PH, ARTERIAL pH units 7.327*   PO2 ART mm Hg 74.0*   PCO2, ARTERIAL mm Hg 49.1*   HCO3 ART mmol/L 25.7   MODALITY  Cannula   No results found for: STREPPNEUAG, LEGANTIGENUR               Please refer to full rapid documentation on summary page under Index / Code Timeline

## 2023-06-04 NOTE — ED PROVIDER NOTES
MD ATTESTATION NOTE    The CEM and I have discussed this patient's history, physical exam, and treatment plan.  I have reviewed the documentation and personally had a face to face interaction with the patient. I affirm the documentation and agree with the treatment and plan.  The attached note describes my personal findings.      I provided a substantive portion of the care of the patient.  I personally performed the physical exam in its entirety, and below are my findings.  For this patient encounter, the patient wore surgical mask, I wore full protective PPE including N95 and eye protection.      Brief HPI: This patient is an 82-year-old female with a history of COPD as well as 1 previous episode of atrial fibrillation presenting to the ED today with increased heart rate/palpitations.  She currently denies any chest pain, fever/chills, back pain, or nausea and vomiting.      PHYSICAL EXAM  ED Triage Vitals   Temp Heart Rate Resp BP SpO2   06/03/23 2311 06/03/23 2311 06/03/23 2311 06/03/23 2325 06/03/23 2311   98.2 °F (36.8 °C) 79 18 155/95 90 %      Temp src Heart Rate Source Patient Position BP Location FiO2 (%)   06/03/23 2311 06/03/23 2311 06/03/23 2325 06/03/23 2325 --   Tympanic Monitor Lying Left arm          GENERAL: Resting comfortably and in no acute distress, nontoxic in appearance  HENT: nares patent  EYES: no scleral icterus  CV: Irregularly irregular tachycardia, no M/R/G  RESPIRATORY: normal effort, lungs clear bilaterally  ABDOMEN: soft, nontender, no rebound or guarding  MUSCULOSKELETAL: no deformity, no edema  NEURO: alert, moves all extremities, follows commands  PSYCH:  calm, cooperative  SKIN: warm, dry    Vital signs and nursing notes reviewed.        Plan: We will obtain an EKG to confirm that the patient is in atrial fibrillation with RVR.  We will give the patient IV doses of beta-blockers to hopefully obtain rate control as we obtain labs in the ED today.  We will monitor and reassess  following.       Sachin Arredondo MD  06/04/23 0237

## 2023-06-04 NOTE — CONSULTS
CONSULT NOTE    Patient Identification:  Dafne Mary  82 y.o.  female  1940  [id number]            Requesting physician: Amor Shane    Reason for Consultation: Acute respiratory distress    CC:     History of Present Illness:  82-year-old female well-known to our service follows my partner Dr. Stacy for COPD/asthma overlap syndrome.  Patient presented to the emergency room 1 week history of palpitations.  She was last discharge about a week ago with COPD exacerbation due to viral infection.  Patient was noted to be in A-fib RVR currently on Cardizem drip.  This morning she had acute respiratory distress and currently has received 1 dose of Lasix.  Due to increased work of breathing patient currently on noninvasive ventilation AVAPS ordered by me and she is tolerating well.  She denies any active chest pain.  Denies any cough or purulent sputum.  No fever chills or aspiration was reported.      Review of Systems  As above rest is negative   Past Medical History:  Past Medical History:   Diagnosis Date    Atrial fibrillation with RVR 6/4/2023    Chronic diastolic congestive heart failure 11/17/2022    Depression     Emphysema lung     GERD (gastroesophageal reflux disease)     Heart failure     Hyperlipidemia     Hypertension     Hypothyroidism        Past Surgical History:  Past Surgical History:   Procedure Laterality Date    EYE SURGERY Left     INTUBATION  5/21/2023         PARATHYROIDECTOMY      Patient reports thyroidectomy partial not a para thyroidectomy.    THYROIDECTOMY, PARTIAL      1984        Home Meds:  Medications Prior to Admission   Medication Sig Dispense Refill Last Dose    amLODIPine (NORVASC) 2.5 MG tablet Take 1 tablet by mouth Daily.       aspirin 81 MG chewable tablet Chew 1 tablet Daily.       atenolol (TENORMIN) 50 MG tablet Take 1 tablet by mouth Daily. 90 tablet 3     atorvastatin (LIPITOR) 20 MG tablet Take 1 tablet by mouth Every Night. 90 tablet 1     Breztri Aerosphere  160-9-4.8 MCG/ACT aerosol inhaler 2 puffs 2 (Two) Times a Day.       ipratropium-albuterol (DUO-NEB) 0.5-2.5 mg/3 ml nebulizer Take 3 mL by nebulization Every 4 (Four) Hours As Needed for Wheezing.       losartan (COZAAR) 100 MG tablet Take 1 tablet by mouth Daily.       omeprazole (priLOSEC) 20 MG capsule Take 1 capsule by mouth Daily.       PARoxetine (PAXIL) 10 MG tablet Take 2 tablets by mouth Daily. 180 tablet 0     predniSONE (DELTASONE) 10 MG tablet Take 4 tabs daily x 3 days, then take 3 tabs daily x 3 days, then take 2 tabs daily x 3 days, then take 1 tab daily x 3 days 31 tablet 0     thyroid (ARMOUR) 15 MG tablet Take 1 tablet by mouth Daily. Take with 30mg for total daily dose of 45mg.       Thyroid 30 MG PO tablet Take 1 tablet by mouth Daily. Take with 15mg for total daily dose of 45mg.       vitamin B-12 (CYANOCOBALAMIN) 500 MCG tablet Take 1 tablet by mouth Daily.       vitamin C (ASCORBIC ACID) 250 MG tablet Take 2 tablets by mouth Daily.       vitamin D3 125 MCG (5000 UT) capsule capsule Take 1 capsule by mouth Daily.       Glycerin-Polysorbate 80 (REFRESH DRY EYE THERAPY OP) Apply 1 drop to eye(s) as directed by provider Daily. For dry eyes       Homeopathic Products (ZICAM ALLERGY RELIEF NA) 1 spray into the nostril(s) as directed by provider Daily.       ipratropium-albuterol (DUO-NEB) 0.5-2.5 mg/3 ml nebulizer Take 3 mL by nebulization 4 (Four) Times a Day As Needed for Wheezing. 120 mL 5     Ventolin  (90 Base) MCG/ACT inhaler Inhale 2 puffs Every 4 (Four) Hours As Needed.          Allergies:  Allergies   Allergen Reactions    Lansoprazole Nausea Only     NAUSEA  NAUSEA  NAUSEA    Diphenhydramine Hcl (Sleep) Irritability    Lisinopril Cough       Social History:   Social History     Socioeconomic History    Marital status:    Tobacco Use    Smoking status: Never     Passive exposure: Never    Smokeless tobacco: Never   Vaping Use    Vaping Use: Never used   Substance and Sexual  "Activity    Alcohol use: Yes     Comment: \"occ\"; caffeine use- tea    Drug use: No    Sexual activity: Defer       Family History:  Family History   Problem Relation Age of Onset    Alzheimer's disease Mother     Lung disease Father     Alcohol abuse Father     Heart disease Brother     Hypertension Brother     Lung disease Brother     Alcohol abuse Brother     Cancer Brother         LUNG  WAS A SMOKER    Thyroid disease Maternal Grandmother        Physical Exam:  /88   Pulse 111   Temp 98.4 °F (36.9 °C) (Oral)   Resp 21   Ht 152.4 cm (60\")   Wt 68 kg (149 lb 14.6 oz)   SpO2 91%   BMI 29.28 kg/m²  Body mass index is 29.28 kg/m². 91% 68 kg (149 lb 14.6 oz)  Physical Exam  Patient is examined using the personal protective equipment as per guidelines from infection control for this particular patient as enacted.  Hand hygiene was performed before and after patient interaction.  Well-developed normal body habitus tolerating noninvasive ventilation well   Eyes normal conjunctivae and pupils reactive to light  ENT unable to evaluate  Neck midline trachea no thyromegaly  Chest diminished breath sounds with crackles bilaterally  CVS regular rate and rhythm no lower extremity edema  Abdomen soft nontender no hepatosplenomegaly  CNS intact normal sensory exam  Skin no rashes no nodules  Psych oriented to time place and person normal memory  Musculoskeletal no cyanosis no clubbing normal range of motion        LABS:  Lab Results   Component Value Date    CALCIUM 8.8 06/04/2023    PHOS 3.8 05/05/2022     Results from last 7 days   Lab Units 06/04/23  0026   MAGNESIUM mg/dL 2.2   SODIUM mmol/L 133*   POTASSIUM mmol/L 4.5   CHLORIDE mmol/L 99   CO2 mmol/L 22.1   BUN mg/dL 32*   CREATININE mg/dL 0.97   GLUCOSE mg/dL 161*   CALCIUM mg/dL 8.8   WBC 10*3/mm3 15.90*   HEMOGLOBIN g/dL 11.9*   PLATELETS 10*3/mm3 260   ALT (SGPT) U/L 78*   AST (SGOT) U/L 27   PROBNP pg/mL 7,773.0*     Lab Results   Component Value Date    " CKTOTAL 52 03/07/2023    TROPONINT 31 (H) 06/04/2023     Results from last 7 days   Lab Units 06/04/23  0310 06/04/23  0026   HSTROP T ng/L 31* 27*         Results from last 7 days   Lab Units 06/04/23  0310   LACTATE mmol/L 4.2*     Results from last 7 days   Lab Units 06/04/23  1022   PH, ARTERIAL pH units 7.327*   PCO2, ARTERIAL mm Hg 49.1*   PO2 ART mm Hg 74.0*   FLOW RATE lpm 3   MODALITY  Cannula   O2 SATURATION CALC % 93.3                 Lab Results   Component Value Date    TSH 0.710 06/04/2023     Estimated Creatinine Clearance: 38.5 mL/min (by C-G formula based on SCr of 0.97 mg/dL).         Imaging: I personally visualized the images of scans/x-rays performed within last 3 days.      Assessment:  Atrial fibrillation with RVR  Acute hypoxemic hypercapnic respiratory failure  Acute respiratory distress  Suspect acute pulmonary edema due to diastolic heart failure  Asthma/COPD overlap syndrome  Group 2 pulmonary hypertension  Lactic acidosis        Recommendations:  At this time we have a elderly female known history of diastolic dysfunction admitted with A-fib RVR now in acute respiratory distress  Suspect likely all due to diastolic heart failure leading to acute pulmonary edema.  No clinical evidence to suggest pneumonia but patient does have lactic acidosis with leukocytosis.  I will check a stat procalcitonin.  Initiate aggressive diuresis Lasix 40 mg IV every 12  Already on Cardizem drip heart rate needs to be controlled better  Noninvasive ventilation for increased work of breathing and acute respiratory distress acidosis  Blood pressure management  Cardiology has been consulted  Trend lactate  Discussed with patient's daughter at bedside    Critical care time 35 minutes          Ammy Narvaez MD  6/4/2023  10:47 EDT      Much of this encounter note is an electronic transcription/translation of spoken language to printed text using Dragon Software.

## 2023-06-04 NOTE — ED PROVIDER NOTES
EMERGENCY DEPARTMENT ENCOUNTER    Room Number:  03/03  Date of encounter:  6/4/2023  PCP: Sintia Chatterjee PA  Historian: Patient, family  Chronic or social conditions impacting care (social determinants of health): Nothing      I used full protective equipment while examining this patient.  This includes face mask, gloves and protective eyewear.  I washed my hands before entering the room and immediately upon leaving the room      HPI:  Chief Complaint: Palpitations  A complete HPI/ROS/PMH/PSH/SH/FH are unobtainable due to: Nothing    Context: Dafne Mary is a 82 y.o. female who presents to the ED c/o approximate 1 week history of palpitations.  Patient was recently in the hospital for a COPD exacerbation.  During that admission she did have an episode of atrial fibrillation.  This was thought to be triggered by her viral URI.  She had her metoprolol increased.  They held off on anticoagulation at that time.  She states since being discharged she has had an episode of palpitations every day.  She denies any overt chest pain.  She does have chronic shortness of breath which she states is mildly worse.    Review of prior external notes (non-ED):   Reviewed admission from 5/20/2023 through 5/27/2023.  Patient admitted for COPD exacerbation, chronic respiratory failure, parainfluenza virus.  Patient was evaluated by Dr. Norman from cardiology during the admission.    Review of prior external test results outside of this encounter:  I reviewed BMP from 5/26/2023.  This showed a creatinine of 0.89.    PAST MEDICAL HISTORY  Active Ambulatory Problems     Diagnosis Date Noted    Anxiety 07/26/2016    Arteriosclerosis of both carotid arteries 07/26/2016    Chronic interstitial cystitis 07/26/2016    Cough 07/26/2016    Pulmonary emphysema 07/26/2016    Gastroesophageal reflux disease 07/26/2016    Fibromyalgia 07/26/2016    Headache 07/26/2016    Hematuria 07/26/2016    Hoarseness 07/26/2016    Hyperlipidemia 06/08/2012     Benign hypertension 07/26/2016    Postoperative hypothyroidism 06/08/2012    Muscle spasms of both lower extremities 07/26/2016    Palpitations 07/26/2016    Shortness of breath 07/26/2016    Postmenopausal osteoporosis 10/17/2016    Chronic fatigue 10/17/2016    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/17/2016    Dysuria 12/15/2017    Dry cough 12/15/2017    Spondylolysis, lumbar region 06/08/2012    Vocal cord paralysis 08/24/2021    Abnormal finding on thyroid function test 08/24/2021    Positional lightheadedness 11/17/2022    COPD with acute exacerbation 12/08/2022    Hypothyroid 01/06/2023    COPD (chronic obstructive pulmonary disease) 01/06/2023    COPD exacerbation 01/06/2023    Morbid obesity with BMI of 70 and over, adult 01/06/2023    RSV bronchiolitis 01/07/2023    Vitamin D deficiency 04/09/2023    B12 deficiency 04/09/2023    Iron deficiency 04/09/2023    Decreased hemoglobin 04/09/2023    Generalized anxiety disorder 04/09/2023    Chronic bilateral low back pain with left-sided sciatica 04/09/2023    Facet arthropathy, lumbar 04/09/2023    Spinal stenosis of lumbar region 04/09/2023    Spondylolisthesis of lumbar region 04/09/2023    Scoliosis of lumbar spine 04/09/2023    Chronic congestive heart failure 04/09/2023    History of non-ST elevation myocardial infarction (NSTEMI) 04/09/2023    Chronic respiratory failure 05/21/2023    Acute respiratory failure 05/21/2023     Resolved Ambulatory Problems     Diagnosis Date Noted    Leukocytes in urine 12/15/2017    Cystitis with hematuria 12/15/2017    Acute cystitis without hematuria 12/15/2017    Acute respiratory failure with hypoxia and hypercapnia 05/03/2022    Chronic diastolic congestive heart failure 11/17/2022     Past Medical History:   Diagnosis Date    Atrial fibrillation with RVR 6/4/2023    Depression     Emphysema lung     GERD (gastroesophageal reflux disease)     Heart failure     Hypertension     Hypothyroidism          PAST SURGICAL HISTORY  Past  "Surgical History:   Procedure Laterality Date    EYE SURGERY Left     INTUBATION  5/21/2023         PARATHYROIDECTOMY      Patient reports thyroidectomy partial not a para thyroidectomy.    THYROIDECTOMY, PARTIAL      1984         FAMILY HISTORY  Family History   Problem Relation Age of Onset    Alzheimer's disease Mother     Lung disease Father     Alcohol abuse Father     Heart disease Brother     Hypertension Brother     Lung disease Brother     Alcohol abuse Brother     Cancer Brother         LUNG  WAS A SMOKER    Thyroid disease Maternal Grandmother          SOCIAL HISTORY  Social History     Socioeconomic History    Marital status:    Tobacco Use    Smoking status: Never     Passive exposure: Never    Smokeless tobacco: Never   Vaping Use    Vaping Use: Never used   Substance and Sexual Activity    Alcohol use: Yes     Comment: \"occ\"; caffeine use- tea    Drug use: No    Sexual activity: Defer         ALLERGIES  Lansoprazole, Diphenhydramine hcl (sleep), and Lisinopril        REVIEW OF SYSTEMS  All systems reviewed and negative except for those discussed in HPI.       PHYSICAL EXAM    I have reviewed the triage vital signs and nursing notes.    ED Triage Vitals   Temp Heart Rate Resp BP SpO2   06/03/23 2311 06/03/23 2311 06/03/23 2311 06/03/23 2325 06/03/23 2311   98.2 °F (36.8 °C) 79 18 155/95 90 %      Temp src Heart Rate Source Patient Position BP Location FiO2 (%)   06/03/23 2311 06/03/23 2311 06/03/23 2325 06/03/23 2325 --   Tympanic Monitor Lying Left arm        Physical Exam  GENERAL: Alert, oriented, mild distress, chronically ill-appearing   HENT: head atraumatic, no nuchal rigidity  EYES: no scleral icterus, EOMI  CV: Irregular rhythm, tachycardic rate, no murmur  RESPIRATORY: Coarse breath sounds, mild wheezing  ABDOMEN: soft, nontender  MUSCULOSKELETAL: no deformity, FROM, no calf swelling or tenderness  NEURO: alert, moves all extremities, follows commands  SKIN: warm, dry        LAB " RESULTS  Recent Results (from the past 24 hour(s))   ECG 12 Lead Rhythm Change    Collection Time: 06/03/23 11:24 PM   Result Value Ref Range    QT Interval 312 ms   Comprehensive Metabolic Panel    Collection Time: 06/04/23 12:26 AM    Specimen: Blood   Result Value Ref Range    Glucose 161 (H) 65 - 99 mg/dL    BUN 32 (H) 8 - 23 mg/dL    Creatinine 0.97 0.57 - 1.00 mg/dL    Sodium 133 (L) 136 - 145 mmol/L    Potassium 4.5 3.5 - 5.2 mmol/L    Chloride 99 98 - 107 mmol/L    CO2 22.1 22.0 - 29.0 mmol/L    Calcium 8.8 8.6 - 10.5 mg/dL    Total Protein 5.9 (L) 6.0 - 8.5 g/dL    Albumin 3.7 3.5 - 5.2 g/dL    ALT (SGPT) 78 (H) 1 - 33 U/L    AST (SGOT) 27 1 - 32 U/L    Alkaline Phosphatase 131 (H) 39 - 117 U/L    Total Bilirubin 0.5 0.0 - 1.2 mg/dL    Globulin 2.2 gm/dL    A/G Ratio 1.7 g/dL    BUN/Creatinine Ratio 33.0 (H) 7.0 - 25.0    Anion Gap 11.9 5.0 - 15.0 mmol/L    eGFR 58.5 (L) >60.0 mL/min/1.73   TSH    Collection Time: 06/04/23 12:26 AM    Specimen: Blood   Result Value Ref Range    TSH 0.710 0.270 - 4.200 uIU/mL   Magnesium    Collection Time: 06/04/23 12:26 AM    Specimen: Blood   Result Value Ref Range    Magnesium 2.2 1.6 - 2.4 mg/dL   BNP    Collection Time: 06/04/23 12:26 AM    Specimen: Blood   Result Value Ref Range    proBNP 7,773.0 (H) 0.0 - 1,800.0 pg/mL   High Sensitivity Troponin T    Collection Time: 06/04/23 12:26 AM    Specimen: Blood   Result Value Ref Range    HS Troponin T 27 (H) <10 ng/L   CBC Auto Differential    Collection Time: 06/04/23 12:26 AM    Specimen: Blood   Result Value Ref Range    WBC 15.90 (H) 3.40 - 10.80 10*3/mm3    RBC 4.13 3.77 - 5.28 10*6/mm3    Hemoglobin 11.9 (L) 12.0 - 15.9 g/dL    Hematocrit 36.4 34.0 - 46.6 %    MCV 88.1 79.0 - 97.0 fL    MCH 28.8 26.6 - 33.0 pg    MCHC 32.7 31.5 - 35.7 g/dL    RDW 13.2 12.3 - 15.4 %    RDW-SD 41.9 37.0 - 54.0 fl    MPV 9.9 6.0 - 12.0 fL    Platelets 260 140 - 450 10*3/mm3    Neutrophil % 87.5 (H) 42.7 - 76.0 %    Lymphocyte % 3.6 (L)  19.6 - 45.3 %    Monocyte % 5.7 5.0 - 12.0 %    Eosinophil % 0.0 (L) 0.3 - 6.2 %    Basophil % 0.1 0.0 - 1.5 %    Immature Grans % 3.1 (H) 0.0 - 0.5 %    Neutrophils, Absolute 13.92 (H) 1.70 - 7.00 10*3/mm3    Lymphocytes, Absolute 0.57 (L) 0.70 - 3.10 10*3/mm3    Monocytes, Absolute 0.90 0.10 - 0.90 10*3/mm3    Eosinophils, Absolute 0.00 0.00 - 0.40 10*3/mm3    Basophils, Absolute 0.02 0.00 - 0.20 10*3/mm3    Immature Grans, Absolute 0.49 (H) 0.00 - 0.05 10*3/mm3    nRBC 0.1 0.0 - 0.2 /100 WBC   High Sensitivity Troponin T 2Hr    Collection Time: 06/04/23  3:10 AM    Specimen: Blood   Result Value Ref Range    HS Troponin T 31 (H) <10 ng/L    Troponin T Delta 4 (C) >=-4 - <+4 ng/L   Lactic Acid, Plasma    Collection Time: 06/04/23  3:10 AM    Specimen: Blood   Result Value Ref Range    Lactate 4.2 (C) 0.5 - 2.0 mmol/L       Ordered the above labs and independently reviewed the results.        RADIOLOGY  XR Chest 1 View    Result Date: 6/4/2023  Patient: YANG DELAROSA  Time Out: 03:11 Exam(s): XR CXR 1 VIEW EXAM:   XR Chest, 1 View CLINICAL HISTORY:    Reason for exam: leukocytosis. TECHNIQUE:   Frontal view of the chest. COMPARISON:   CT from May 3, 2022. FINDINGS:   Lungs:  Prominent vascular and interstitial markings in both lung bases,  exaggerated by technique and large body habitus.  Possible mild CHF.   Pleural space:  Slight blunting the right gastritic angle suggest small right pleural effusion.  No pneumothorax.   Heart:  The cardiac silhouette is moderately enlarged.   Mediastinum:  Unremarkable.   Bones joints:  Unremarkable.   Vasculature:  The aortic arch is calcified but nondilated.   Upper abdomen:  There is no pneumoperitoneum under the diaphragm. IMPRESSION:       Prominent vascular and interstitial markings in both lung bases, exaggerated by technique and large body habitus.  Possible mild CHF.     Electronically signed by Jose Cabrera MD on 06-04-23 at 0311     I ordered the above noted  radiological studies. Reviewed by me and discussed with radiologist.  See dictation for official radiology interpretation.      MEDICATIONS GIVEN IN ER    Medications   sodium chloride 0.9 % flush 10 mL (has no administration in time range)   dilTIAZem (CARDIZEM) 100 mg in 100 mL NS infusion (ADV) (5 mg/hr Intravenous Currently Infusing 6/4/23 0511)   sodium chloride 0.9 % bolus 500 mL (has no administration in time range)   sodium chloride 0.9 % flush 10 mL (has no administration in time range)   sodium chloride 0.9 % flush 10 mL (has no administration in time range)   sodium chloride 0.9 % infusion 40 mL (has no administration in time range)   sennosides-docusate (PERICOLACE) 8.6-50 MG per tablet 2 tablet (has no administration in time range)     And   polyethylene glycol (MIRALAX) packet 17 g (has no administration in time range)     And   bisacodyl (DULCOLAX) EC tablet 5 mg (has no administration in time range)     And   bisacodyl (DULCOLAX) suppository 10 mg (has no administration in time range)   nitroglycerin (NITROSTAT) SL tablet 0.4 mg (has no administration in time range)   acetaminophen (TYLENOL) tablet 650 mg (has no administration in time range)     Or   acetaminophen (TYLENOL) 160 MG/5ML solution 650 mg (has no administration in time range)     Or   acetaminophen (TYLENOL) suppository 650 mg (has no administration in time range)   ondansetron (ZOFRAN) injection 4 mg (has no administration in time range)   sodium chloride 0.9 % bolus 500 mL (0 mL Intravenous Stopped 6/4/23 0311)   metoprolol tartrate (LOPRESSOR) injection 5 mg (5 mg Intravenous Given 6/4/23 0042)   metoprolol tartrate (LOPRESSOR) tablet 25 mg (25 mg Oral Given 6/4/23 0040)   metoprolol tartrate (LOPRESSOR) injection 5 mg (5 mg Intravenous Given 6/4/23 0114)   metoprolol tartrate (LOPRESSOR) injection 5 mg (5 mg Intravenous Given 6/4/23 0207)   ipratropium-albuterol (DUO-NEB) nebulizer solution 3 mL (3 mL Nebulization Given 6/4/23 0309)    methylPREDNISolone sodium succinate (SOLU-Medrol) injection 125 mg (125 mg Intravenous Given 6/4/23 0257)   Enoxaparin Sodium (LOVENOX) syringe 60 mg (60 mg Subcutaneous Given 6/4/23 0321)   dilTIAZem (CARDIZEM) bolus from bag 1 mg/mL 10 mg (10 mg Intravenous Bolus from Bag 6/4/23 0309)   LORazepam (ATIVAN) injection 0.5 mg (0.5 mg Intravenous Given 6/4/23 0303)         ADDITIONAL ORDERS CONSIDERED BUT NOT ORDERED:  Nothing    Critical Care  Performed by: Jonas Castro PA  Authorized by: Sachin Arredondo MD     Critical care provider statement:     Critical care time (minutes):  40    Critical care time was exclusive of:  Separately billable procedures and treating other patients    Critical care was necessary to treat or prevent imminent or life-threatening deterioration of the following conditions:  Circulatory failure and cardiac failure    Critical care was time spent personally by me on the following activities:  Blood draw for specimens, development of treatment plan with patient or surrogate, evaluation of patient's response to treatment, examination of patient, interpretation of cardiac output measurements, obtaining history from patient or surrogate, ordering and performing treatments and interventions, ordering and review of laboratory studies, ordering and review of radiographic studies, pulse oximetry, re-evaluation of patient's condition and review of old charts      PROGRESS, DATA ANALYSIS, CONSULTS, AND MEDICAL DECISION MAKING    All labs have been independently interpreted by myself.  All radiology studies have been independently interpreted by myself and discussed with radiologist dictating the report.   EKG's independently interpreted by myself.  Discussion below represents my analysis of pertinent findings related to patient's condition, differential diagnosis, treatment plan and final disposition.    I have discussed case with Dr. Arredondo, emergency room physician.  He has performed his own  bedside examination and agrees with treatment plan.    ED Course as of 06/04/23 0519   Sat Jun 03, 2023   2354 Patient presents with intermittent palpitations x1 week.  Patient in A-fib with RVR at this time.  No chest pain.  Normotensive.  Plan for rate control and anticoagulation. [EE]   2358 EKG independently interpreted myself.  Time 2324.  Atrial fibrillation, rate 152.  QRS shows IVCD with left axis deviation.  No significant ST abnormalities.  Similar to prior EKG from 5/26/2023. [EE]   Sun Jun 04, 2023   0104 WBC(!): 15.90  Pt on steroids [EE]   0104 proBNP(!): 7,773.0 [EE]   0104 TSH Baseline: 0.710 [EE]   0104 Magnesium: 2.2 [EE]   0159 Heart rate still in the 140's, third dose of Lopressor ordered. [EE]   0245 Recheck of patient.  HR 140s, O2 sat 97% on 2 L.  I have added Cardizem and some Ativan. [EE]   0309 WBC(!): 15.90 [EE]   0309 proBNP(!): 7,773.0 [EE]   0309 Creatinine: 0.97 [EE]   0316 Recheck of patient.  She is breathing better now she appears more calm.  Blood pressure improved. [EE]   0341 Chest x-ray independently interpreted myself shows mild vascular congestion. [EE]   0346 Recheck of patient.  She is looking much better.  They understand plan for admission. [EE]   0352 Discussed case with Claudia Alejo, nurse practitioner with [facility].  She agrees to admit to Dr. Griffin. [EE]   0441 Lactate(!!): 4.2  I believe this is due to patient's atrial fibrillation.  No suspicion of underlying infection.  We will hold off on antibiotics. [EE]   0518 Recheck of patient.  She is resting quietly. [EE]      ED Course User Index  [EE] Jonas Castro PA       AS OF 05:18 EDT VITALS:    BP - 101/52  HR - 101  TEMP - 98.2 °F (36.8 °C) (Tympanic)  O2 SATS - 96%        DIAGNOSIS  Final diagnoses:   Atrial fibrillation with RVR   COPD exacerbation   Congestive heart failure, unspecified HF chronicity, unspecified heart failure type         DISPOSITION  Admitted      Dictated utilizing Dragon dictation     Matthew  KAJAL Carvalho  06/04/23 0519

## 2023-06-04 NOTE — CONSULTS
Date of Hospital Visit: 6/3/2023  Date of consult: 2023  Encounter Provider: Sorin Charles MD  Place of Service: [facility] CARDIOLOGY  Patient Name: Dafne Mary  :1940  Referral Provider: Ramos Griffin MD    Chief complaint: SOA , palpitations    Reason for consult: AF with RVR    History of Present Illness:   Ms. Dafne Mary is an 82 year old woman followed by Dr. Norman chronic diastolic heart failure and atrial fibrillation. She has COPD, emphysema, granulomatous lung disease, hypertension and hyperlipidemia.     She was admitted May 20 to May 27 for COPD exacerbation and parainfluenza. She had new onset atrial fibrillation. She was rate controlled with atenolol. She has a CHADs 2 Vasc score of 5. Anticoagulation was not initiated at that time with plans for close outpatient follow up.     Since discharge she has experienced daily palpitations. She came back to the emergency room with worsening shortness of breath. She remains in AF and HR was rapid. She received 10mg IV diltiazem bolus, 3 doses of IV lopressor as well as oral metoprolol tartrate. She was given one dose of therapeutic lovenox.     Lab work in the emergency room included elevated high sensitivity troponin 27 then 31. Elevated proBNP 7773. Lactate 4.2. WBC count 15.9. CXR Showed prominent vascular and interstitial markings.     She remains on a diltiazem drip at 5 mg /hr.     Her last echocardiogram was performed at  in 2022 which reportedly showed normal left ventricular systolic function and an EF of 55% and finding of pulmonary hypertension although the report is not available for review .         Echo 2022  Calculated left ventricular EF = 52.6% Estimated left ventricular EF = 53% Estimated left ventricular EF was in agreement with the calculated left ventricular EF. Left ventricular systolic function is normal. Normal left ventricular cavity size and  wall thickness noted. There are wall motion abnormalities as noted below Left ventricular diastolic function was indeterminate.  The following segments are hypokinetic: basal inferoseptal and basal inferior. All other segments are normal.  Left atrial volume is mildly increased.  No aortic valve regurgitation or stenosis is present. The aortic valve is abnormal in structure. There is severe calcification of the aortic valve  Severe mitral annular calcification is present. There is moderate, bileaflet mitral valve thickening present. Mild to moderate mitral valve regurgitation is present. No significant mitral valve stenosis is present.  Tricuspid valve not well visualized. Trace tricuspid valve regurgitation is present. Estimated right ventricular systolic pressure from tricuspid regurgitation is moderately elevated (45-55 mmHg). Calculated right ventricular systolic pressure from tricuspid regurgitation is 45 mmHg.  There is suggestion of atrial shunting by color-flow imaging subcostal views. Saline injection was not performed. We will contact the patient to return to address further.       Past Medical History:   Diagnosis Date    Atrial fibrillation with RVR 6/4/2023    Chronic diastolic congestive heart failure 11/17/2022    Depression     Emphysema lung     GERD (gastroesophageal reflux disease)     Heart failure     Hyperlipidemia     Hypertension     Hypothyroidism        Past Surgical History:   Procedure Laterality Date    EYE SURGERY Left     INTUBATION  5/21/2023         PARATHYROIDECTOMY      Patient reports thyroidectomy partial not a para thyroidectomy.    THYROIDECTOMY, PARTIAL      1984       Medications Prior to Admission   Medication Sig Dispense Refill Last Dose    amLODIPine (NORVASC) 2.5 MG tablet Take 1 tablet by mouth Daily.       aspirin 81 MG chewable tablet Chew 1 tablet Daily.       atenolol (TENORMIN) 50 MG tablet Take 1 tablet by mouth Daily. 90 tablet 3     atorvastatin (LIPITOR) 20 MG  tablet Take 1 tablet by mouth Every Night. 90 tablet 1     Breztri Aerosphere 160-9-4.8 MCG/ACT aerosol inhaler 2 puffs 2 (Two) Times a Day.       ipratropium-albuterol (DUO-NEB) 0.5-2.5 mg/3 ml nebulizer Take 3 mL by nebulization Every 4 (Four) Hours As Needed for Wheezing.       losartan (COZAAR) 100 MG tablet Take 1 tablet by mouth Daily.       omeprazole (priLOSEC) 20 MG capsule Take 1 capsule by mouth Daily.       PARoxetine (PAXIL) 10 MG tablet Take 2 tablets by mouth Daily. 180 tablet 0     predniSONE (DELTASONE) 10 MG tablet Take 4 tabs daily x 3 days, then take 3 tabs daily x 3 days, then take 2 tabs daily x 3 days, then take 1 tab daily x 3 days 31 tablet 0     thyroid (ARMOUR) 15 MG tablet Take 1 tablet by mouth Daily. Take with 30mg for total daily dose of 45mg.       Thyroid 30 MG PO tablet Take 1 tablet by mouth Daily. Take with 15mg for total daily dose of 45mg.       vitamin B-12 (CYANOCOBALAMIN) 500 MCG tablet Take 1 tablet by mouth Daily.       vitamin C (ASCORBIC ACID) 250 MG tablet Take 2 tablets by mouth Daily.       vitamin D3 125 MCG (5000 UT) capsule capsule Take 1 capsule by mouth Daily.       Glycerin-Polysorbate 80 (REFRESH DRY EYE THERAPY OP) Apply 1 drop to eye(s) as directed by provider Daily. For dry eyes       Homeopathic Products (ZICAM ALLERGY RELIEF NA) 1 spray into the nostril(s) as directed by provider Daily.       ipratropium-albuterol (DUO-NEB) 0.5-2.5 mg/3 ml nebulizer Take 3 mL by nebulization 4 (Four) Times a Day As Needed for Wheezing. 120 mL 5     Ventolin  (90 Base) MCG/ACT inhaler Inhale 2 puffs Every 4 (Four) Hours As Needed.          Current Meds  Scheduled Meds:amLODIPine, 2.5 mg, Oral, Daily  aspirin, 81 mg, Oral, Daily  atenolol, 50 mg, Oral, Daily  atorvastatin, 20 mg, Oral, Nightly  furosemide, 40 mg, Intravenous, Q12H  losartan, 100 mg, Oral, Daily  pantoprazole, 40 mg, Oral, Q AM  PARoxetine, 20 mg, Oral, Daily  senna-docusate sodium, 2 tablet, Oral,  "BID  sodium chloride, 500 mL, Intravenous, Once  sodium chloride, 10 mL, Intravenous, Q12H  thyroid, 15 mg, Oral, Daily  Thyroid, 30 mg, Oral, Daily      Continuous Infusions:dilTIAZem, 5-15 mg/hr, Last Rate: 10 mg/hr (06/04/23 1227)      PRN Meds:.  acetaminophen **OR** acetaminophen **OR** acetaminophen    senna-docusate sodium **AND** polyethylene glycol **AND** bisacodyl **AND** bisacodyl    ipratropium-albuterol    LORazepam    nitroglycerin    ondansetron    [COMPLETED] Insert Peripheral IV **AND** sodium chloride    sodium chloride    sodium chloride    Allergies as of 06/03/2023 - Reviewed 06/03/2023   Allergen Reaction Noted    Lansoprazole Nausea Only 12/19/2012    Diphenhydramine hcl (sleep) Irritability 07/27/2016    Lisinopril Cough 05/01/2017       Social History     Socioeconomic History    Marital status:    Tobacco Use    Smoking status: Never     Passive exposure: Never    Smokeless tobacco: Never   Vaping Use    Vaping Use: Never used   Substance and Sexual Activity    Alcohol use: Yes     Comment: \"occ\"; caffeine use- tea    Drug use: No    Sexual activity: Defer       Family History   Problem Relation Age of Onset    Alzheimer's disease Mother     Lung disease Father     Alcohol abuse Father     Heart disease Brother     Hypertension Brother     Lung disease Brother     Alcohol abuse Brother     Cancer Brother         LUNG  WAS A SMOKER    Thyroid disease Maternal Grandmother        REVIEW OF SYSTEMS:   All systems reviewed and pertinent positives include in HPI otherwise negative review of systems.       Objective:   Temp:  [98.2 °F (36.8 °C)-99 °F (37.2 °C)] 98.8 °F (37.1 °C)  Heart Rate:  [] 101  Resp:  [16-29] 16  BP: ()/() 153/88  Body mass index is 29.28 kg/m².  Flowsheet Rows      Flowsheet Row First Filed Value   Admission Height 152.4 cm (60\") Documented at 06/03/2023 2310   Admission Weight 60.3 kg (133 lb) Documented at 06/03/2023 2310          Vitals:    " 06/04/23 1512   BP: 153/88   Pulse: 101   Resp: 16   Temp: 98.8 °F (37.1 °C)   SpO2: 98%       General Appearance:    Alert, cooperative, in no acute distress   Head:    Normocephalic, without obvious abnormality, atraumatic   Eyes:            Lids and lashes normal, conjunctivae and sclerae normal, no   icterus, no pallor, corneas clear, PERRLA   Ears:    Ears appear intact with no abnormalities noted   Throat:   No oral lesions, no thrush, oral mucosa moist   Neck:   No adenopathy, supple, trachea midline, no thyromegaly, no   carotid bruit, no JVD   Back:     No kyphosis present, no scoliosis present, no skin lesions, erythema or scars, no tenderness to percussion or palpation, range of motion normal   Lungs:     Clear to auscultation,respirations regular, even and unlabored    Heart:  Irregularly irregular, normal S1 and S2, no murmur, no gallop, no rub, no click   Chest Wall:    No abnormalities observed   Abdomen:     Normal bowel sounds, no masses, no organomegaly, soft nontender, nondistended, no guarding, no rebound  tenderness   Extremities:   Moves all extremities well, no edema, no cyanosis, no redness   Pulses:   Pulses palpable and equal bilaterally. Normal radial, carotid, femoral, dorsalis pedis and posterior tibial pulses bilaterally. Normal abdominal aorta   Skin:  Neurology:   Psychiatric:   No bleeding, bruising or rash   Normal speech and cranial nerve exam, no focal deficit   Alert and oriented x 3, normal mood and affect             Review of Data:      Results from last 7 days   Lab Units 06/04/23  0026   SODIUM mmol/L 133*   POTASSIUM mmol/L 4.5   CHLORIDE mmol/L 99   CO2 mmol/L 22.1   BUN mg/dL 32*   CREATININE mg/dL 0.97   CALCIUM mg/dL 8.8   BILIRUBIN mg/dL 0.5   ALK PHOS U/L 131*   ALT (SGPT) U/L 78*   AST (SGOT) U/L 27   GLUCOSE mg/dL 161*     Results from last 7 days   Lab Units 06/04/23  0310 06/04/23  0026   HSTROP T ng/L 31* 27*     @LABRCNTbnp@  Results from last 7 days   Lab Units  06/04/23  0026   WBC 10*3/mm3 15.90*   HEMOGLOBIN g/dL 11.9*   HEMATOCRIT % 36.4   PLATELETS 10*3/mm3 260         Results from last 7 days   Lab Units 06/04/23  0026   MAGNESIUM mg/dL 2.2         I personally viewed and interpreted the patient's EKG/Telemetry data  )   Atrial fibrillation with RVR    Anxiety    Chronic interstitial cystitis    Pulmonary emphysema    Gastroesophageal reflux disease    Fibromyalgia    Hyperlipidemia    Benign hypertension    Chronic congestive heart failure        Assessment and Plan:    Mrs Mary is an 82 years old lady with past medical history of chronic diastolic CHF and A-fib, COPD (denies prior tobacco use), granulomatous lung disease, hypertension hyperlipidemia readmitted with worsening shortness of breath and palpitations.  Diuretic discontinued in the past because of dizziness.   she reports being compliant with medications otherwise.  Not on oxygen at home.  Ambulates using a walker.    Acute hypoxemic respiratory failure from acute on chronic congestive heart failure precipitated by A-fib with RVR.  Atrial fibrillation with rapid ventricular response.  Essential hypertension    She was given IV Lasix this morning with good response.  Heart rate slightly improved after she was given several IV metoprolol and current diltiazem drip.  I will give her a dose of IV digoxin and started on more frequent oral metoprolol.  Great to start anticoagulation.  Get limited transthoracic echo to evaluate left ventricular ejection fraction in a.m.  Continue IV diuresis.  Added Jardiance.  With poor rate control may consider LE guided cardioversion as most of her symptoms are coming from afib       Sorin Charles MD  06/04/23  16:24 EDT.      Time spent in reviewing chart, discussion and examination:

## 2023-06-04 NOTE — OUTREACH NOTE
COPD/PN Week 2 Survey      Flowsheet Row Responses   [facility] facility patient discharged from? [place]   Does the patient have one of the following disease processes/diagnoses(primary or secondary)? Pneumonia   Week 2 attempt successful? No   Unsuccessful attempts Attempt 1   Revoke Readmitted            RICHARD ASTUDILLO - Registered Nurse

## 2023-06-05 ENCOUNTER — APPOINTMENT (OUTPATIENT)
Dept: CARDIOLOGY | Facility: HOSPITAL | Age: 83
End: 2023-06-05
Payer: MEDICARE

## 2023-06-05 LAB
AORTIC DIMENSIONLESS INDEX: 0.5 (DI)
BH CV ECHO MEAS - AO MAX PG: 10.2 MMHG
BH CV ECHO MEAS - AO MEAN PG: 5.2 MMHG
BH CV ECHO MEAS - AO V2 MAX: 159.8 CM/SEC
BH CV ECHO MEAS - AO V2 VTI: 27.4 CM
BH CV ECHO MEAS - AVA(I,D): 1.35 CM2
BH CV ECHO MEAS - CONTRAST EF 4CH: 26 CM2
BH CV ECHO MEAS - EDV(CUBED): 84.2 ML
BH CV ECHO MEAS - EDV(MOD-SP2): 77 ML
BH CV ECHO MEAS - EDV(MOD-SP4): 59 ML
BH CV ECHO MEAS - EF(MOD-BP): 26.7 %
BH CV ECHO MEAS - EF(MOD-SP2): 35.1 %
BH CV ECHO MEAS - EF(MOD-SP4): 20.3 %
BH CV ECHO MEAS - ESV(CUBED): 48.6 ML
BH CV ECHO MEAS - ESV(MOD-SP2): 50 ML
BH CV ECHO MEAS - ESV(MOD-SP4): 47 ML
BH CV ECHO MEAS - FS: 16.8 %
BH CV ECHO MEAS - IVS/LVPW: 1 CM
BH CV ECHO MEAS - IVSD: 1.03 CM
BH CV ECHO MEAS - LV MASS(C)D: 154.3 GRAMS
BH CV ECHO MEAS - LV MAX PG: 1.95 MMHG
BH CV ECHO MEAS - LV MEAN PG: 1.03 MMHG
BH CV ECHO MEAS - LV V1 MAX: 69.8 CM/SEC
BH CV ECHO MEAS - LV V1 VTI: 12.7 CM
BH CV ECHO MEAS - LVIDD: 4.4 CM
BH CV ECHO MEAS - LVIDS: 3.6 CM
BH CV ECHO MEAS - LVOT AREA: 2.9 CM2
BH CV ECHO MEAS - LVOT DIAM: 1.92 CM
BH CV ECHO MEAS - LVPWD: 1.04 CM
BH CV ECHO MEAS - MV MAX PG: 6.7 MMHG
BH CV ECHO MEAS - MV MEAN PG: 2.7 MMHG
BH CV ECHO MEAS - MV V2 VTI: 20.9 CM
BH CV ECHO MEAS - MVA(VTI): 1.77 CM2
BH CV ECHO MEAS - PA ACC TIME: 0.06 SEC
BH CV ECHO MEAS - PA PR(ACCEL): 52.9 MMHG
BH CV ECHO MEAS - PA V2 MAX: 92.3 CM/SEC
BH CV ECHO MEAS - QP/QS: 0.83
BH CV ECHO MEAS - RAP SYSTOLE: 3 MMHG
BH CV ECHO MEAS - RV MAX PG: 2.07 MMHG
BH CV ECHO MEAS - RV V1 MAX: 72 CM/SEC
BH CV ECHO MEAS - RV V1 VTI: 11.7 CM
BH CV ECHO MEAS - RVOT DIAM: 1.82 CM
BH CV ECHO MEAS - RVSP: 38 MMHG
BH CV ECHO MEAS - SV(LVOT): 36.8 ML
BH CV ECHO MEAS - SV(MOD-SP2): 27 ML
BH CV ECHO MEAS - SV(MOD-SP4): 12 ML
BH CV ECHO MEAS - SV(RVOT): 30.6 ML
BH CV ECHO MEAS - TAPSE (>1.6): 1.9 CM
BH CV ECHO MEAS - TR MAX PG: 35 MMHG
BH CV ECHO MEAS - TR MAX VEL: 295.9 CM/SEC
BH CV VAS BP RIGHT ARM: NORMAL MMHG
BH CV XLRA - RV BASE: 2.42 CM
BH CV XLRA - RV LENGTH: 6.6 CM
BH CV XLRA - RV MID: 1.86 CM
LEFT ATRIUM VOLUME INDEX: 38 ML/M2
QT INTERVAL: 312 MS
SINUS: 3.3 CM
STJ: 3.1 CM

## 2023-06-05 PROCEDURE — 94799 UNLISTED PULMONARY SVC/PX: CPT

## 2023-06-05 PROCEDURE — 93306 TTE W/DOPPLER COMPLETE: CPT | Performed by: INTERNAL MEDICINE

## 2023-06-05 PROCEDURE — 25510000001 PERFLUTREN (DEFINITY) 8.476 MG IN SODIUM CHLORIDE (PF) 0.9 % 10 ML INJECTION: Performed by: INTERNAL MEDICINE

## 2023-06-05 PROCEDURE — 25010000002 FUROSEMIDE PER 20 MG: Performed by: INTERNAL MEDICINE

## 2023-06-05 PROCEDURE — 93306 TTE W/DOPPLER COMPLETE: CPT

## 2023-06-05 RX ORDER — ATENOLOL 50 MG/1
50 TABLET ORAL EVERY 12 HOURS SCHEDULED
Status: DISCONTINUED | OUTPATIENT
Start: 2023-06-05 | End: 2023-06-09

## 2023-06-05 RX ORDER — FUROSEMIDE 40 MG/1
40 TABLET ORAL
Status: DISCONTINUED | OUTPATIENT
Start: 2023-06-05 | End: 2023-06-06

## 2023-06-05 RX ADMIN — GUAIFENESIN 600 MG: 600 TABLET, EXTENDED RELEASE ORAL at 20:39

## 2023-06-05 RX ADMIN — EMPAGLIFLOZIN 10 MG: 10 TABLET, FILM COATED ORAL at 09:38

## 2023-06-05 RX ADMIN — LEVOTHYROXINE, LIOTHYRONINE 15 MG: 19; 4.5 TABLET ORAL at 09:39

## 2023-06-05 RX ADMIN — DOCUSATE SODIUM 50MG AND SENNOSIDES 8.6MG 2 TABLET: 8.6; 5 TABLET, FILM COATED ORAL at 20:39

## 2023-06-05 RX ADMIN — PANTOPRAZOLE SODIUM 40 MG: 40 TABLET, DELAYED RELEASE ORAL at 09:38

## 2023-06-05 RX ADMIN — FUROSEMIDE 40 MG: 40 INJECTION, SOLUTION INTRAMUSCULAR; INTRAVENOUS at 09:38

## 2023-06-05 RX ADMIN — ATENOLOL 50 MG: 50 TABLET ORAL at 09:38

## 2023-06-05 RX ADMIN — GUAIFENESIN 600 MG: 600 TABLET, EXTENDED RELEASE ORAL at 09:38

## 2023-06-05 RX ADMIN — LEVOTHYROXINE, LIOTHYRONINE 30 MG: 19; 4.5 TABLET ORAL at 09:38

## 2023-06-05 RX ADMIN — Medication 10 ML: at 20:40

## 2023-06-05 RX ADMIN — APIXABAN 5 MG: 5 TABLET, FILM COATED ORAL at 20:39

## 2023-06-05 RX ADMIN — WHITE PETROLATUM 57.7 %-MINERAL OIL 31.9 % EYE OINTMENT: at 20:40

## 2023-06-05 RX ADMIN — FUROSEMIDE 40 MG: 40 TABLET ORAL at 17:24

## 2023-06-05 RX ADMIN — ATENOLOL 50 MG: 50 TABLET ORAL at 20:39

## 2023-06-05 RX ADMIN — ACETAMINOPHEN 650 MG: 325 TABLET, FILM COATED ORAL at 23:42

## 2023-06-05 RX ADMIN — APIXABAN 5 MG: 5 TABLET, FILM COATED ORAL at 09:38

## 2023-06-05 RX ADMIN — PAROXETINE HYDROCHLORIDE HEMIHYDRATE 20 MG: 20 TABLET, FILM COATED ORAL at 11:44

## 2023-06-05 RX ADMIN — PERFLUTREN 1.5 ML: 6.52 INJECTION, SUSPENSION INTRAVENOUS at 15:21

## 2023-06-05 RX ADMIN — METOPROLOL TARTRATE 50 MG: 50 TABLET, FILM COATED ORAL at 02:18

## 2023-06-05 RX ADMIN — LOSARTAN POTASSIUM 50 MG: 50 TABLET, FILM COATED ORAL at 09:38

## 2023-06-05 RX ADMIN — ATORVASTATIN CALCIUM 20 MG: 20 TABLET, FILM COATED ORAL at 20:40

## 2023-06-05 NOTE — PROGRESS NOTES
LOS: 1 day   Patient Care Team:  Sintia Chatterjee PA as PCP - General (Family Medicine)  Javier Nolan MD as Consulting Physician (Pulmonary Disease)  Nae Norman MD as Consulting Physician (Cardiology)    Subjective     I am picking up pulmonary coverage from Dr. Narvaez we are following patient for acute respiratory distress she follows with Dr. Nolan in our office for COPD/asthma overlap syndrome presented with a week of palpitations being been discharged just the week prior with a COPD exacerbation due to viral infection and was found to be in A-fib with a rapid rate patient was felt to likely be in some CHF and was treated with noninvasive ventilation.  Patient is feeling better I saw her earlier in the admission and she looks a whole lot better now than she did then.  Review of Systems:          Objective     Vital Signs  Vital Sign Min/Max for last 24 hours  Temp  Min: 98.1 °F (36.7 °C)  Max: 98.8 °F (37.1 °C)   BP  Min: 97/67  Max: 153/88   Pulse  Min: 87  Max: 118   Resp  Min: 16  Max: 26   SpO2  Min: 95 %  Max: 99 %   Flow (L/min)  Min: 2  Max: 2   No data recorded        Ventilator/Non-Invasive Ventilation Settings (From admission, onward)       Start     Ordered    06/04/23 1028  NIPPV (CPAP or BIPAP)  Until Discontinued        Question Answer Comment   Indication Acute Respiratory Failure    Type AVAPS/PC/PS    Backup Rate 16    Target VT (mL) 500    EPAP/PEEP (cm H2O) 10    Min Pressure (cm H2O) 11    Max Pressure (cm H2O) 20    Titrate Oxygen for SpO2 90% or Greater        06/04/23 1028                                 Body mass index is 29.28 kg/m².  I/O last 3 completed shifts:  In: 620 [P.O.:120; IV Piggyback:500]  Out: 2500 [Urine:2500]  No intake/output data recorded.        Physical Exam:  General Appearance: Well-developed older white female resting in bed she does not look in acute distress on room air  Eyes: Conjunctiva are clear and anicteric, pupils are equal  ENT:  Mucous membranes are moist no erythema no exudates  Neck: No adenopathy or thyromegaly no visible jugular venous tension and trachea midline  Lungs: Clear nonlabored symmetric expansion no wheezes rales or rhonchi  Cardiac: Regular rate rhythm no murmur no gallop  Abdomen: Soft nontender no palpable hepatosplenomegaly or masses  : Not examined  Musculoskeletal: Grossly normal  Skin: Warm and dry no jaundice no petechiae  Neuro: Alert and oriented cooperative following commands grossly intact  Extremities/P Vascular: No clubbing cyanosis or edema, palpable radial and dorsalis pedis pulses bilaterally  MSE: Seems to be in good spirits       Labs:  Results from last 7 days   Lab Units 06/04/23  0026   GLUCOSE mg/dL 161*   SODIUM mmol/L 133*   POTASSIUM mmol/L 4.5   MAGNESIUM mg/dL 2.2   CO2 mmol/L 22.1   CHLORIDE mmol/L 99   ANION GAP mmol/L 11.9   CREATININE mg/dL 0.97   BUN mg/dL 32*   BUN / CREAT RATIO  33.0*   CALCIUM mg/dL 8.8   ALK PHOS U/L 131*   TOTAL PROTEIN g/dL 5.9*   ALT (SGPT) U/L 78*   AST (SGOT) U/L 27   BILIRUBIN mg/dL 0.5   ALBUMIN g/dL 3.7   GLOBULIN gm/dL 2.2     Estimated Creatinine Clearance: 38.5 mL/min (by C-G formula based on SCr of 0.97 mg/dL).      Results from last 7 days   Lab Units 06/04/23  0026   WBC 10*3/mm3 15.90*   RBC 10*6/mm3 4.13   HEMOGLOBIN g/dL 11.9*   HEMATOCRIT % 36.4   MCV fL 88.1   MCH pg 28.8   MCHC g/dL 32.7   RDW % 13.2   RDW-SD fl 41.9   MPV fL 9.9   PLATELETS 10*3/mm3 260   NEUTROPHIL % % 87.5*   LYMPHOCYTE % % 3.6*   MONOCYTES % % 5.7   EOSINOPHIL % % 0.0*   BASOPHIL % % 0.1   IMM GRAN % % 3.1*   NEUTROS ABS 10*3/mm3 13.92*   LYMPHS ABS 10*3/mm3 0.57*   MONOS ABS 10*3/mm3 0.90   EOS ABS 10*3/mm3 0.00   BASOS ABS 10*3/mm3 0.02   IMMATURE GRANS (ABS) 10*3/mm3 0.49*   NRBC /100 WBC 0.1     Results from last 7 days   Lab Units 06/04/23  1022   PH, ARTERIAL pH units 7.327*   PO2 ART mm Hg 74.0*   PCO2, ARTERIAL mm Hg 49.1*   HCO3 ART mmol/L 25.7     Results from last 7  days   Lab Units 06/04/23  0310 06/04/23  0026   HSTROP T ng/L 31* 27*     Results from last 7 days   Lab Units 06/04/23  0026   PROBNP pg/mL 7,773.0*     Results from last 7 days   Lab Units 06/04/23  0026   TSH uIU/mL 0.710     Results from last 7 days   Lab Units 06/04/23  1058 06/04/23  0310   LACTATE mmol/L  --  4.2*   PROCALCITONIN ng/mL 0.08  --          Microbiology Results (last 10 days)       ** No results found for the last 240 hours. **                apixaban, 5 mg, Oral, Q12H  atenolol, 50 mg, Oral, Q12H  atorvastatin, 20 mg, Oral, Nightly  empagliflozin, 10 mg, Oral, Daily  furosemide, 40 mg, Oral, BID  guaiFENesin, 600 mg, Oral, Q12H  losartan, 50 mg, Oral, Daily  pantoprazole, 40 mg, Oral, Q AM  PARoxetine, 20 mg, Oral, Daily  senna-docusate sodium, 2 tablet, Oral, BID  sodium chloride, 500 mL, Intravenous, Once  sodium chloride, 10 mL, Intravenous, Q12H  thyroid, 15 mg, Oral, Daily  Thyroid, 30 mg, Oral, Daily           Diagnostics:  XR Chest 2 View    Result Date: 5/20/2023  CHEST: 2 VIEWS  HISTORY: Shortness of air.  COMPARISON: AP chest 01/06/2023, CT angiogram chest 12/08/2022.  FINDINGS: Heart and mediastinal structures appear within normal limits. There is a small subsegmental thin linear lingular opacity most likely representing scarring or atelectasis. Lungs otherwise clear and there is no evidence for pulmonary or pleural effusion or infiltrate. There is a scoliotic curvature of the thoracolumbar spine.      Mild segmental lingular scar or atelectasis. Lungs are otherwise clear and there is no evidence for CHF.  This report was finalized on 5/20/2023 11:07 PM by Dr. Alcon Canales M.D.      XR Chest 1 View    Result Date: 6/4/2023  XR CHEST 1 VW-  HISTORY: Female who is 82 years-old,  short of breath  TECHNIQUE: Frontal view of the chest  COMPARISON: 06/04/2023  FINDINGS: The heart is enlarged. Slight prominence of vascular and interstitial markings. Mild bibasilar  atelectasis/infiltrate, minimal right pleural effusion. No pneumothorax. Otherwise stable.      Mild bibasilar atelectasis/infiltrate, minimal right pleural effusion. Cardiomegaly with slight prominence of vascular and interstitial markings.  This report was finalized on 6/4/2023 10:10 AM by Dr. Ned Guzman M.D.      XR Chest 1 View    Result Date: 6/4/2023  Patient: YANG DELAROSA  Time Out: 03:11 Exam(s): XR CXR 1 VIEW EXAM:   XR Chest, 1 View CLINICAL HISTORY:    Reason for exam: leukocytosis. TECHNIQUE:   Frontal view of the chest. COMPARISON:   CT from May 3, 2022. FINDINGS:   Lungs:  Prominent vascular and interstitial markings in both lung bases,  exaggerated by technique and large body habitus.  Possible mild CHF.   Pleural space:  Slight blunting the right gastritic angle suggest small right pleural effusion.  No pneumothorax.   Heart:  The cardiac silhouette is moderately enlarged.   Mediastinum:  Unremarkable.   Bones joints:  Unremarkable.   Vasculature:  The aortic arch is calcified but nondilated.   Upper abdomen:  There is no pneumoperitoneum under the diaphragm. IMPRESSION:       Prominent vascular and interstitial markings in both lung bases, exaggerated by technique and large body habitus.  Possible mild CHF.     Electronically signed by Jose Cabrera MD on 06-04-23 at 0311    XR Chest 1 View    Result Date: 5/21/2023  XR CHEST 1 VW-  HISTORY: Female who is 82 years-old, short of breath  TECHNIQUE: Supine view of the chest  COMPARISON: 5/20/2023  FINDINGS: Endotracheal tube tip is 1.9 cm above the bubba. The heart size is normal. Aorta is calcified. Pulmonary vasculature is unremarkable. Asymmetric hazy opacification of the right hemithorax may be combination of atelectasis/infiltrate and/or layering pleural effusion. No pneumothorax is seen, limited by supine positioning. No acute osseous process.      Endotracheal intubation. Hazy asymmetric opacification of the right hemithorax.  This  report was finalized on 5/21/2023 2:59 PM by Dr. Ned Guzman M.D.      Results for orders placed during the hospital encounter of 07/25/22    Adult Transthoracic Echo Complete w/ Color, Spectral and Contrast if Necessary Per Protocol    Interpretation Summary  · Calculated left ventricular EF = 52.6% Estimated left ventricular EF = 53% Estimated left ventricular EF was in agreement with the calculated left ventricular EF. Left ventricular systolic function is normal. Normal left ventricular cavity size and wall thickness noted. There are wall motion abnormalities as noted below Left ventricular diastolic function was indeterminate.  · The following segments are hypokinetic: basal inferoseptal and basal inferior. All other segments are normal.  · Left atrial volume is mildly increased.  · No aortic valve regurgitation or stenosis is present. The aortic valve is abnormal in structure. There is severe calcification of the aortic valve  · Severe mitral annular calcification is present. There is moderate, bileaflet mitral valve thickening present. Mild to moderate mitral valve regurgitation is present. No significant mitral valve stenosis is present.  · Tricuspid valve not well visualized. Trace tricuspid valve regurgitation is present. Estimated right ventricular systolic pressure from tricuspid regurgitation is moderately elevated (45-55 mmHg). Calculated right ventricular systolic pressure from tricuspid regurgitation is 45 mmHg.  · There is suggestion of atrial shunting by color-flow imaging subcostal views. Saline injection was not performed. We will contact the patient to return to address further.      Chest x-ray looks to me like there are bilateral pleural effusions the right bigger than the left there is a little bit of atelectasis at the bases I cannot say there is not a definite infiltrate but nothing jumps out as a definitive infiltrate either.    Active Hospital Problems    Diagnosis  POA    **Atrial  fibrillation with RVR [I48.91]  Yes    Chronic congestive heart failure [I50.9]  Yes    Anxiety [F41.9]  Yes    Chronic interstitial cystitis [N30.10]  Yes    Pulmonary emphysema [J43.9]  Yes    Gastroesophageal reflux disease [K21.9]  Yes    Fibromyalgia [M79.7]  Yes    Benign hypertension [I10]  Yes    Hyperlipidemia [E78.5]  Yes      Resolved Hospital Problems   No resolved problems to display.         Assessment & Plan     Acute hypoxic and hypercapnic respiratory failure likely due to some heart failure on her underlying chronic lung disease improved seems to be doing well this morning off of O2  A-fib with rapid ventricular rate her heart rate was in the low 100s while I was in the room was with her at rest.  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

## 2023-06-05 NOTE — PLAN OF CARE
Goal Outcome Evaluation:            Pt resting in bed quietly. Denies pain. Denies soa. Denies racing heart. Turns self.

## 2023-06-05 NOTE — PROGRESS NOTES
Discharge Planning Assessment  [facility]     Patient Name: Dafne Mary  MRN: [id number]  Today's Date: 6/5/2023    Admit Date: 6/3/2023    Plan: plans home- no needs   Discharge Needs Assessment       Row Name 06/05/23 1750       Living Environment    People in Home alone    Current Living Arrangements home    Potentially Unsafe Housing Conditions none    Primary Care Provided by self    Provides Primary Care For no one    Quality of Family Relationships helpful;involved;supportive       Resource/Environmental Concerns    Resource/Environmental Concerns none    Transportation Concerns none       Transition Planning    Patient/Family Anticipates Transition to home    Patient/Family Anticipated Services at Transition none    Transportation Anticipated family or friend will provide       Discharge Needs Assessment    Readmission Within the Last 30 Days no previous admission in last 30 days    Equipment Currently Used at Home none    Anticipated Changes Related to Illness none                   Discharge Plan       Row Name 06/05/23 5067       Plan    Plan plans home- no needs    Patient/Family in Agreement with Plan yes    Plan Comments Spoke with patient at bedside. Introduced self and explained role. Facesheet verified. Patient lives alone in a single story house. She is IADLS and drives. She does not use any DME.  She has used [facility] in the past, but is not current with any services. She does not have a SNF history.  At OR, she plans to return home and family will transport home. CCP will follow.                  Continued Care and Services - Admitted Since 6/3/2023    Coordination has not been started for this encounter.       Expected Discharge Date and Time       Expected Discharge Date Expected Discharge Time    Jun 8, 2023            Demographic Summary       Row Name 06/05/23 1751       General Information    Admission Type inpatient    Arrived From emergency department    Required Notices Provided  Important Message from Medicare    Referral Source admission list    Reason for Consult discharge planning                   Functional Status       Row Name 06/05/23 7591       Functional Status    Usual Activity Tolerance good    Current Activity Tolerance moderate       Functional Status, IADL    Medications independent    Meal Preparation independent    Housekeeping independent    Laundry independent    Shopping independent       Mental Status    General Appearance WDL WDL                   Psychosocial    No documentation.                  Abuse/Neglect    No documentation.                  Legal    No documentation.                  Substance Abuse    No documentation.                  Patient Forms    No documentation.                     Karin Good RN

## 2023-06-05 NOTE — PROGRESS NOTES
[facility] Follow Up    Chief Complaint: Follow-up atrial fibrillation    Interval History: She reports feeling better today.  Breathing better.  Remains in atrial fibrillation but rates fairly well controlled.    Objective:     Objective:  Temp:  [98.1 °F (36.7 °C)-98.8 °F (37.1 °C)] 98.3 °F (36.8 °C)  Heart Rate:  [] 102  Resp:  [16-29] 18  BP: ()/() 97/67     Intake/Output Summary (Last 24 hours) at 6/5/2023 0733  Last data filed at 6/5/2023 0624  Gross per 24 hour   Intake 120 ml   Output 2500 ml   Net -2380 ml     Body mass index is 29.28 kg/m².      06/03/23  2310 06/04/23  0536   Weight: 60.3 kg (133 lb) 68 kg (149 lb 14.6 oz)     Weight change:       Physical Exam:   General : Alert, cooperative, in no acute distress.  Neuro: Alert,cooperative and oriented.  Lungs: CTAB. Normal respiratory effort and rate.  CV: Irregularly, irregular, normal S1 and S2, no murmurs, gallops or rubs.  ABD: Soft, nontender, nondistended. Positive bowel sounds.  Extr: No edema or cyanosis, moves all extremities.    Lab Review:   Results from last 7 days   Lab Units 06/04/23  0026   SODIUM mmol/L 133*   POTASSIUM mmol/L 4.5   CHLORIDE mmol/L 99   CO2 mmol/L 22.1   BUN mg/dL 32*   CREATININE mg/dL 0.97   GLUCOSE mg/dL 161*   CALCIUM mg/dL 8.8   AST (SGOT) U/L 27   ALT (SGPT) U/L 78*     Results from last 7 days   Lab Units 06/04/23  0310 06/04/23  0026   HSTROP T ng/L 31* 27*     Results from last 7 days   Lab Units 06/04/23  0026   WBC 10*3/mm3 15.90*   HEMOGLOBIN g/dL 11.9*   HEMATOCRIT % 36.4   PLATELETS 10*3/mm3 260         Results from last 7 days   Lab Units 06/04/23  0026   MAGNESIUM mg/dL 2.2           Invalid input(s): LDLCALC  Results from last 7 days   Lab Units 06/04/23  0026   PROBNP pg/mL 7,773.0*     Results from last 7 days   Lab Units 06/04/23  0026   TSH uIU/mL 0.710     I reviewed the patient's new clinical results.  I personally viewed and interpreted the patient's  EKG  Current Medications:   Scheduled Meds:apixaban, 5 mg, Oral, Q12H  atorvastatin, 20 mg, Oral, Nightly  empagliflozin, 10 mg, Oral, Daily  furosemide, 40 mg, Intravenous, BID  guaiFENesin, 600 mg, Oral, Q12H  losartan, 50 mg, Oral, Daily  metoprolol tartrate, 50 mg, Oral, Q6H  pantoprazole, 40 mg, Oral, Q AM  PARoxetine, 20 mg, Oral, Daily  senna-docusate sodium, 2 tablet, Oral, BID  sodium chloride, 500 mL, Intravenous, Once  sodium chloride, 10 mL, Intravenous, Q12H  thyroid, 15 mg, Oral, Daily  Thyroid, 30 mg, Oral, Daily      Continuous Infusions:     Allergies:  Allergies   Allergen Reactions    Lansoprazole Nausea Only     NAUSEA  NAUSEA  NAUSEA    Diphenhydramine Hcl (Sleep) Irritability    Lisinopril Cough       Assessment/Plan:     1.  Acute on chronic diastolic congestive heart failure.  Improving with IV diuresis.  Likely triggered by atrial fibrillation with rapid ventricular rates.  He started on Jardiance this admission.  2.  Atrial fibrillation.  New diagnosis last admission.  Initially thought this may be related to respiratory issues at the time.  However this is now persistent and she is having issues with rate control.  Rate is little bit better today.  Started on apixaban this admission.  Diltiazem infusion discontinued.  3.  Acute hypoxic respiratory failure.  Remains on 2 L nasal cannula.  4.  Hypertension.  Blood pressures low normal this morning.  5.  Asthma and COPD  6.  Pulmonary hypertension    -Switch metoprolol back to atenolol 50 mg twice a day.  - Agree with the initiation of anticoagulation with apixaban.  - May need to decrease losartan dosage in our order to allow room for up titration of AV dulce blocking agents.  - Switch to oral furosemide later today.  -Follow-up on admitted echocardiogram.    Nae Norman MD  06/05/23  07:33 EDT

## 2023-06-05 NOTE — PROGRESS NOTES
"DAILY PROGRESS NOTE  [facility]    Patient Identification:  Name: Dafne Mary  Age: 82 y.o.  Sex: female  :  1940  MRN: [id number]         Primary Care Physician: Sintia Chatterjee PA    Subjective:  Interval History:She feels better On room air.    Objective:    Scheduled Meds:apixaban, 5 mg, Oral, Q12H  atenolol, 50 mg, Oral, Q12H  atorvastatin, 20 mg, Oral, Nightly  empagliflozin, 10 mg, Oral, Daily  furosemide, 40 mg, Oral, BID  guaiFENesin, 600 mg, Oral, Q12H  losartan, 50 mg, Oral, Daily  pantoprazole, 40 mg, Oral, Q AM  PARoxetine, 20 mg, Oral, Daily  senna-docusate sodium, 2 tablet, Oral, BID  sodium chloride, 500 mL, Intravenous, Once  sodium chloride, 10 mL, Intravenous, Q12H  thyroid, 15 mg, Oral, Daily  Thyroid, 30 mg, Oral, Daily      Continuous Infusions:     Vital signs in last 24 hours:  Temp:  [98.1 °F (36.7 °C)-98.8 °F (37.1 °C)] 98.2 °F (36.8 °C)  Heart Rate:  [] 118  Resp:  [16-26] 18  BP: ()/(63-88) 129/72    Intake/Output:    Intake/Output Summary (Last 24 hours) at 2023 1240  Last data filed at 2023 0624  Gross per 24 hour   Intake --   Output 2500 ml   Net -2500 ml       Exam:  /72 (BP Location: Right arm, Patient Position: Lying)   Pulse 118   Temp 98.2 °F (36.8 °C) (Oral)   Resp 18   Ht 152.4 cm (60\")   Wt 68 kg (149 lb 14.6 oz)   SpO2 95%   BMI 29.28 kg/m²     General Appearance:    Alert, cooperative, no distress   Head:    Normocephalic, without obvious abnormality, atraumatic   Eyes:       Throat:   Lips, tongue, gums normal   Neck:   Supple, symmetrical, trachea midline, no JVD   Lungs:     Clear to auscultation bilaterally, respirations unlabored   Chest Wall:    No tenderness or deformity    Heart:    Regular rate and rhythm, S1 and S2 normal, no murmur,no  Rub or gallop   Abdomen:     Soft, nontender, bowel sounds active, no masses, no organomegaly    Extremities:   Extremities normal, atraumatic, no cyanosis or edema " "  Pulses:      Skin:   Skin is warm and dry,  no rashes or palpable lesions   Neurologic:   no focal deficits noted      Lab Results (last 72 hours)       Procedure Component Value Units Date/Time    Procalcitonin [[id number]]  (Normal) Collected: 06/04/23 1058    Specimen: Blood Updated: 06/04/23 1143     Procalcitonin 0.08 ng/mL     Narrative:      As a Marker for Sepsis (Non-Neonates):    1. <0.5 ng/mL represents a low risk of severe sepsis and/or septic shock.  2. >2 ng/mL represents a high risk of severe sepsis and/or septic shock.    As a Marker for Lower Respiratory Tract Infections that require antibiotic therapy:    PCT on Admission    Antibiotic Therapy       6-12 Hrs later    >0.5                Strongly Recommended  >0.25 - <0.5        Recommended   0.1 - 0.25          Discouraged              Remeasure/reassess PCT  <0.1                Strongly Discouraged     Remeasure/reassess PCT    As 28 day mortality risk marker: \"Change in Procalcitonin Result\" (>80% or <=80%) if Day 0 (or Day 1) and Day 4 values are available. Refer to http://www.Quibly-pct-calculator.com    Change in PCT <=80%  A decrease of PCT levels below or equal to 80% defines a positive change in PCT test result representing a higher risk for 28-day all-cause mortality of patients diagnosed with severe sepsis for septic shock.    Change in PCT >80%  A decrease of PCT levels of more than 80% defines a negative change in PCT result representing a lower risk for 28-day all-cause mortality of patients diagnosed with severe sepsis or septic shock.       Blood Gas, Arterial - [[id number]]  (Abnormal) Collected: 06/04/23 1022    Specimen: Arterial Blood Updated: 06/04/23 1025     Site Arterial: left brachial     Ortiz's Test N/A     pH, Arterial 7.327 pH units      pCO2, Arterial 49.1 mm Hg      pO2, Arterial 74.0 mm Hg      HCO3, Arterial 25.7 mmol/L      Base Excess, Arterial -0.9 mmol/L      O2 Saturation Calculated 93.3 %      Comment: SAT 92% " Meter: [id number] : [id number] Cristi Bateman        Barometric Pressure for Blood Gas 745.8 mmHg      Modality Cannula     Flow Rate 3 lpm      Rate 30 Breaths/minute     Lactic Acid, Plasma [[id number]]  (Abnormal) Collected: 06/04/23 0310    Specimen: Blood Updated: 06/04/23 0346     Lactate 4.2 mmol/L     High Sensitivity Troponin T 2Hr [[id number]]  (Abnormal) Collected: 06/04/23 0310    Specimen: Blood Updated: 06/04/23 0345     HS Troponin T 31 ng/L      Troponin T Delta 4 ng/L     Narrative:      High Sensitive Troponin T Reference Range:  <10.0 ng/L- Negative Female for AMI  <15.0 ng/L- Negative Male for AMI  >=10 - Abnormal Female indicating possible myocardial injury.  >=15 - Abnormal Male indicating possible myocardial injury.   Clinicians would have to utilize clinical acumen, EKG, Troponin, and serial changes to determine if it is an Acute Myocardial Infarction or myocardial injury due to an underlying chronic condition.         TSH [[id number]]  (Normal) Collected: 06/04/23 0026    Specimen: Blood Updated: 06/04/23 0104     TSH 0.710 uIU/mL     BNP [[id number]]  (Abnormal) Collected: 06/04/23 0026    Specimen: Blood Updated: 06/04/23 0104     proBNP 7,773.0 pg/mL     Narrative:      Among patients with dyspnea, NT-proBNP is highly sensitive for the detection of acute congestive heart failure. In addition NT-proBNP of <300 pg/ml effectively rules out acute congestive heart failure with 99% negative predictive value.    Results may be falsely decreased if patient taking Biotin.      High Sensitivity Troponin T [[id number]]  (Abnormal) Collected: 06/04/23 0026    Specimen: Blood Updated: 06/04/23 0104     HS Troponin T 27 ng/L     Narrative:      High Sensitive Troponin T Reference Range:  <10.0 ng/L- Negative Female for AMI  <15.0 ng/L- Negative Male for AMI  >=10 - Abnormal Female indicating possible myocardial injury.  >=15 - Abnormal Male indicating possible myocardial injury.   Clinicians would  have to utilize clinical acumen, EKG, Troponin, and serial changes to determine if it is an Acute Myocardial Infarction or myocardial injury due to an underlying chronic condition.         Magnesium [[id number]]  (Normal) Collected: 06/04/23 0026    Specimen: Blood Updated: 06/04/23 0058     Magnesium 2.2 mg/dL     Comprehensive Metabolic Panel [[id number]]  (Abnormal) Collected: 06/04/23 0026    Specimen: Blood Updated: 06/04/23 0058     Glucose 161 mg/dL      BUN 32 mg/dL      Creatinine 0.97 mg/dL      Sodium 133 mmol/L      Potassium 4.5 mmol/L      Chloride 99 mmol/L      CO2 22.1 mmol/L      Calcium 8.8 mg/dL      Total Protein 5.9 g/dL      Albumin 3.7 g/dL      ALT (SGPT) 78 U/L      AST (SGOT) 27 U/L      Alkaline Phosphatase 131 U/L      Total Bilirubin 0.5 mg/dL      Globulin 2.2 gm/dL      A/G Ratio 1.7 g/dL      BUN/Creatinine Ratio 33.0     Anion Gap 11.9 mmol/L      eGFR 58.5 mL/min/1.73     Narrative:      GFR Normal >60  Chronic Kidney Disease <60  Kidney Failure <15    The GFR formula is only valid for adults with stable renal function between ages 18 and 70.    CBC & Differential [[id number]]  (Abnormal) Collected: 06/04/23 0026    Specimen: Blood Updated: 06/04/23 0038    Narrative:      The following orders were created for panel order CBC & Differential.  Procedure                               Abnormality         Status                     ---------                               -----------         ------                     CBC Auto Differential[[id number]]        Abnormal            Final result                 Please view results for these tests on the individual orders.    CBC Auto Differential [[id number]]  (Abnormal) Collected: 06/04/23 0026    Specimen: Blood Updated: 06/04/23 0038     WBC 15.90 10*3/mm3      RBC 4.13 10*6/mm3      Hemoglobin 11.9 g/dL      Hematocrit 36.4 %      MCV 88.1 fL      MCH 28.8 pg      MCHC 32.7 g/dL      RDW 13.2 %      RDW-SD 41.9 fl      MPV 9.9 fL       Platelets 260 10*3/mm3      Neutrophil % 87.5 %      Lymphocyte % 3.6 %      Monocyte % 5.7 %      Eosinophil % 0.0 %      Basophil % 0.1 %      Immature Grans % 3.1 %      Neutrophils, Absolute 13.92 10*3/mm3      Lymphocytes, Absolute 0.57 10*3/mm3      Monocytes, Absolute 0.90 10*3/mm3      Eosinophils, Absolute 0.00 10*3/mm3      Basophils, Absolute 0.02 10*3/mm3      Immature Grans, Absolute 0.49 10*3/mm3      nRBC 0.1 /100 WBC           Data Review:  Results from last 7 days   Lab Units 06/04/23  0026   SODIUM mmol/L 133*   POTASSIUM mmol/L 4.5   CHLORIDE mmol/L 99   CO2 mmol/L 22.1   BUN mg/dL 32*   CREATININE mg/dL 0.97   GLUCOSE mg/dL 161*   CALCIUM mg/dL 8.8     Results from last 7 days   Lab Units 06/04/23  0026   WBC 10*3/mm3 15.90*   HEMOGLOBIN g/dL 11.9*   HEMATOCRIT % 36.4   PLATELETS 10*3/mm3 260     Results from last 7 days   Lab Units 06/04/23  0026   TSH uIU/mL 0.710         Lab Results   Lab Value Date/Time    TROPONINT 31 (H) 06/04/2023 0310    TROPONINT 27 (H) 06/04/2023 0026    TROPONINT 9 05/20/2023 2125    TROPONINT <0.010 12/08/2022 0128    TROPONINT <0.010 05/04/2022 0335    TROPONINT <0.010 05/03/2022 2322    TROPONINT 0.013 05/03/2022 1804    TROPONINT <0.010 05/03/2022 1250    TROPONINT <0.010 10/29/2021 1444    TROPONINT <0.010 02/13/2018 0920         Results from last 7 days   Lab Units 06/04/23  0026   ALK PHOS U/L 131*   BILIRUBIN mg/dL 0.5   ALT (SGPT) U/L 78*   AST (SGOT) U/L 27     Results from last 7 days   Lab Units 06/04/23  0026   TSH uIU/mL 0.710         No results found for: POCGLU        Past Medical History:   Diagnosis Date    Atrial fibrillation with RVR 6/4/2023    Chronic diastolic congestive heart failure 11/17/2022    Depression     Emphysema lung     GERD (gastroesophageal reflux disease)     Heart failure     Hyperlipidemia     Hypertension     Hypothyroidism        Assessment:  Active Hospital Problems    Diagnosis  POA    **Atrial fibrillation with RVR [I48.91]   Yes    Chronic congestive heart failure [I50.9]  Yes    Anxiety [F41.9]  Yes    Chronic interstitial cystitis [N30.10]  Yes    Pulmonary emphysema [J43.9]  Yes    Gastroesophageal reflux disease [K21.9]  Yes    Fibromyalgia [M79.7]  Yes    Benign hypertension [I10]  Yes    Hyperlipidemia [E78.5]  Yes      Resolved Hospital Problems   No resolved problems to display.       Plan:  Continue with rate control. Follow lab. Diuresis with Lasix.  As per Cardiology and Pulmonary.    Amor Shane MD  6/5/2023  12:40 EDT

## 2023-06-06 LAB
ANION GAP SERPL CALCULATED.3IONS-SCNC: 10.3 MMOL/L (ref 5–15)
BASOPHILS # BLD AUTO: 0.01 10*3/MM3 (ref 0–0.2)
BASOPHILS NFR BLD AUTO: 0.1 % (ref 0–1.5)
BUN SERPL-MCNC: 37 MG/DL (ref 8–23)
BUN/CREAT SERPL: 30.6 (ref 7–25)
CALCIUM SPEC-SCNC: 8.5 MG/DL (ref 8.6–10.5)
CHLORIDE SERPL-SCNC: 93 MMOL/L (ref 98–107)
CO2 SERPL-SCNC: 31.7 MMOL/L (ref 22–29)
CREAT SERPL-MCNC: 1.21 MG/DL (ref 0.57–1)
DEPRECATED RDW RBC AUTO: 42.7 FL (ref 37–54)
EGFRCR SERPLBLD CKD-EPI 2021: 44.8 ML/MIN/1.73
EOSINOPHIL # BLD AUTO: 0.05 10*3/MM3 (ref 0–0.4)
EOSINOPHIL NFR BLD AUTO: 0.4 % (ref 0.3–6.2)
ERYTHROCYTE [DISTWIDTH] IN BLOOD BY AUTOMATED COUNT: 13.5 % (ref 12.3–15.4)
GLUCOSE SERPL-MCNC: 87 MG/DL (ref 65–99)
HCT VFR BLD AUTO: 37.4 % (ref 34–46.6)
HGB BLD-MCNC: 12.4 G/DL (ref 12–15.9)
IMM GRANULOCYTES # BLD AUTO: 0.19 10*3/MM3 (ref 0–0.05)
IMM GRANULOCYTES NFR BLD AUTO: 1.6 % (ref 0–0.5)
LYMPHOCYTES # BLD AUTO: 1.09 10*3/MM3 (ref 0.7–3.1)
LYMPHOCYTES NFR BLD AUTO: 9.1 % (ref 19.6–45.3)
MCH RBC QN AUTO: 29 PG (ref 26.6–33)
MCHC RBC AUTO-ENTMCNC: 33.2 G/DL (ref 31.5–35.7)
MCV RBC AUTO: 87.6 FL (ref 79–97)
MONOCYTES # BLD AUTO: 0.98 10*3/MM3 (ref 0.1–0.9)
MONOCYTES NFR BLD AUTO: 8.2 % (ref 5–12)
NEUTROPHILS NFR BLD AUTO: 80.6 % (ref 42.7–76)
NEUTROPHILS NFR BLD AUTO: 9.69 10*3/MM3 (ref 1.7–7)
NRBC BLD AUTO-RTO: 0 /100 WBC (ref 0–0.2)
PLATELET # BLD AUTO: 223 10*3/MM3 (ref 140–450)
PMV BLD AUTO: 10.2 FL (ref 6–12)
POTASSIUM SERPL-SCNC: 3.7 MMOL/L (ref 3.5–5.2)
QT INTERVAL: 359 MS
RBC # BLD AUTO: 4.27 10*6/MM3 (ref 3.77–5.28)
SODIUM SERPL-SCNC: 135 MMOL/L (ref 136–145)
WBC NRBC COR # BLD: 12.01 10*3/MM3 (ref 3.4–10.8)

## 2023-06-06 PROCEDURE — 93005 ELECTROCARDIOGRAM TRACING: CPT | Performed by: INTERNAL MEDICINE

## 2023-06-06 PROCEDURE — 85025 COMPLETE CBC W/AUTO DIFF WBC: CPT | Performed by: HOSPITALIST

## 2023-06-06 PROCEDURE — 80048 BASIC METABOLIC PNL TOTAL CA: CPT | Performed by: HOSPITALIST

## 2023-06-06 PROCEDURE — 25010000002 ONDANSETRON PER 1 MG: Performed by: NURSE PRACTITIONER

## 2023-06-06 RX ORDER — FUROSEMIDE 40 MG/1
40 TABLET ORAL DAILY
Status: DISCONTINUED | OUTPATIENT
Start: 2023-06-06 | End: 2023-06-07

## 2023-06-06 RX ORDER — LOSARTAN POTASSIUM 25 MG/1
25 TABLET ORAL DAILY
Status: DISCONTINUED | OUTPATIENT
Start: 2023-06-06 | End: 2023-06-07

## 2023-06-06 RX ADMIN — APIXABAN 5 MG: 5 TABLET, FILM COATED ORAL at 09:25

## 2023-06-06 RX ADMIN — ATORVASTATIN CALCIUM 20 MG: 20 TABLET, FILM COATED ORAL at 20:34

## 2023-06-06 RX ADMIN — APIXABAN 5 MG: 5 TABLET, FILM COATED ORAL at 20:34

## 2023-06-06 RX ADMIN — PAROXETINE HYDROCHLORIDE HEMIHYDRATE 20 MG: 20 TABLET, FILM COATED ORAL at 09:25

## 2023-06-06 RX ADMIN — LOSARTAN POTASSIUM 25 MG: 50 TABLET, FILM COATED ORAL at 09:24

## 2023-06-06 RX ADMIN — ATENOLOL 50 MG: 50 TABLET ORAL at 09:25

## 2023-06-06 RX ADMIN — EMPAGLIFLOZIN 10 MG: 10 TABLET, FILM COATED ORAL at 09:25

## 2023-06-06 RX ADMIN — LEVOTHYROXINE, LIOTHYRONINE 15 MG: 19; 4.5 TABLET ORAL at 09:24

## 2023-06-06 RX ADMIN — GUAIFENESIN 600 MG: 600 TABLET, EXTENDED RELEASE ORAL at 20:34

## 2023-06-06 RX ADMIN — PANTOPRAZOLE SODIUM 40 MG: 40 TABLET, DELAYED RELEASE ORAL at 06:10

## 2023-06-06 RX ADMIN — LEVOTHYROXINE, LIOTHYRONINE 30 MG: 19; 4.5 TABLET ORAL at 09:25

## 2023-06-06 RX ADMIN — GUAIFENESIN 600 MG: 600 TABLET, EXTENDED RELEASE ORAL at 09:25

## 2023-06-06 RX ADMIN — FUROSEMIDE 40 MG: 40 TABLET ORAL at 09:25

## 2023-06-06 RX ADMIN — ATENOLOL 50 MG: 50 TABLET ORAL at 20:34

## 2023-06-06 RX ADMIN — ONDANSETRON 4 MG: 2 INJECTION INTRAMUSCULAR; INTRAVENOUS at 14:04

## 2023-06-06 RX ADMIN — Medication 10 ML: at 20:37

## 2023-06-06 NOTE — PROGRESS NOTES
LOS: 2 days   Patient Care Team:  Sintia Chatterjee PA as PCP - General (Family Medicine)  Javier Nolan MD as Consulting Physician (Pulmonary Disease)  Nae Norman MD as Consulting Physician (Cardiology)    Subjective     I am picking up pulmonary coverage from Dr. Narvaez we are following patient for acute respiratory distress she follows with Dr. Nolan in our office for COPD/asthma overlap syndrome presented with a week of palpitations being been discharged just the week prior with a COPD exacerbation due to viral infection and was found to be in A-fib with a rapid rate patient was felt to likely be in some CHF and was treated with noninvasive ventilation.  Patient seems to be feeling pretty well today she has been on room air for over 24 hours and not short of breath no chest pain.  Review of Systems:          Objective     Vital Signs  Vital Sign Min/Max for last 24 hours  Temp  Min: 98.1 °F (36.7 °C)  Max: 98.2 °F (36.8 °C)   BP  Min: 93/76  Max: 129/72   Pulse  Min: 102  Max: 131   Resp  Min: 18  Max: 18   SpO2  Min: 93 %  Max: 97 %   No data recorded   Weight  Min: 67.6 kg (149 lb)  Max: 67.6 kg (149 lb)        Ventilator/Non-Invasive Ventilation Settings (From admission, onward)       Start     Ordered    06/04/23 1028  NIPPV (CPAP or BIPAP)  Until Discontinued        Question Answer Comment   Indication Acute Respiratory Failure    Type AVAPS/PC/PS    Backup Rate 16    Target VT (mL) 500    EPAP/PEEP (cm H2O) 10    Min Pressure (cm H2O) 11    Max Pressure (cm H2O) 20    Titrate Oxygen for SpO2 90% or Greater        06/04/23 1028                                 Body mass index is 29.1 kg/m².  I/O last 3 completed shifts:  In: -   Out: 4300 [Urine:4300]  No intake/output data recorded.        Physical Exam:  General Appearance: Well-developed older white female resting in bed she does not look in acute distress on room air  Eyes: Conjunctiva are clear and anicteric, pupils are  equal  ENT: Mucous membranes are moist no erythema no exudates  Neck: No adenopathy or thyromegaly no visible jugular venous tension and trachea midline  Lungs: Clear nonlabored symmetric expansion no wheezes rales or rhonchi  Cardiac: Regularly irregular heart rates in the 80s to 120s  Abdomen: Soft nontender no palpable hepatosplenomegaly or masses  : Not examined  Musculoskeletal: Grossly normal  Skin: Warm and dry no jaundice no petechiae  Neuro: Alert and oriented cooperative following commands grossly intact  Extremities/P Vascular: No clubbing cyanosis or edema, palpable radial and dorsalis pedis pulses bilaterally  MSE: Seems to be in good spirits       Labs:  Results from last 7 days   Lab Units 06/06/23  0504 06/04/23  0026   GLUCOSE mg/dL 87 161*   SODIUM mmol/L 135* 133*   POTASSIUM mmol/L 3.7 4.5   MAGNESIUM mg/dL  --  2.2   CO2 mmol/L 31.7* 22.1   CHLORIDE mmol/L 93* 99   ANION GAP mmol/L 10.3 11.9   CREATININE mg/dL 1.21* 0.97   BUN mg/dL 37* 32*   BUN / CREAT RATIO  30.6* 33.0*   CALCIUM mg/dL 8.5* 8.8   ALK PHOS U/L  --  131*   TOTAL PROTEIN g/dL  --  5.9*   ALT (SGPT) U/L  --  78*   AST (SGOT) U/L  --  27   BILIRUBIN mg/dL  --  0.5   ALBUMIN g/dL  --  3.7   GLOBULIN gm/dL  --  2.2     Estimated Creatinine Clearance: 30.7 mL/min (A) (by C-G formula based on SCr of 1.21 mg/dL (H)).      Results from last 7 days   Lab Units 06/06/23  0504 06/04/23  0026   WBC 10*3/mm3 12.01* 15.90*   RBC 10*6/mm3 4.27 4.13   HEMOGLOBIN g/dL 12.4 11.9*   HEMATOCRIT % 37.4 36.4   MCV fL 87.6 88.1   MCH pg 29.0 28.8   MCHC g/dL 33.2 32.7   RDW % 13.5 13.2   RDW-SD fl 42.7 41.9   MPV fL 10.2 9.9   PLATELETS 10*3/mm3 223 260   NEUTROPHIL % % 80.6* 87.5*   LYMPHOCYTE % % 9.1* 3.6*   MONOCYTES % % 8.2 5.7   EOSINOPHIL % % 0.4 0.0*   BASOPHIL % % 0.1 0.1   IMM GRAN % % 1.6* 3.1*   NEUTROS ABS 10*3/mm3 9.69* 13.92*   LYMPHS ABS 10*3/mm3 1.09 0.57*   MONOS ABS 10*3/mm3 0.98* 0.90   EOS ABS 10*3/mm3 0.05 0.00   BASOS ABS  10*3/mm3 0.01 0.02   IMMATURE GRANS (ABS) 10*3/mm3 0.19* 0.49*   NRBC /100 WBC 0.0 0.1     Results from last 7 days   Lab Units 06/04/23  1022   PH, ARTERIAL pH units 7.327*   PO2 ART mm Hg 74.0*   PCO2, ARTERIAL mm Hg 49.1*   HCO3 ART mmol/L 25.7     Results from last 7 days   Lab Units 06/04/23  0310 06/04/23  0026   HSTROP T ng/L 31* 27*     Results from last 7 days   Lab Units 06/04/23  0026   PROBNP pg/mL 7,773.0*     Results from last 7 days   Lab Units 06/04/23  0026   TSH uIU/mL 0.710     Results from last 7 days   Lab Units 06/04/23  1058 06/04/23  0310   LACTATE mmol/L  --  4.2*   PROCALCITONIN ng/mL 0.08  --          Microbiology Results (last 10 days)       ** No results found for the last 240 hours. **                apixaban, 5 mg, Oral, Q12H  atenolol, 50 mg, Oral, Q12H  atorvastatin, 20 mg, Oral, Nightly  empagliflozin, 10 mg, Oral, Daily  furosemide, 40 mg, Oral, Daily  guaiFENesin, 600 mg, Oral, Q12H  losartan, 25 mg, Oral, Daily  pantoprazole, 40 mg, Oral, Q AM  PARoxetine, 20 mg, Oral, Daily  senna-docusate sodium, 2 tablet, Oral, BID  sodium chloride, 500 mL, Intravenous, Once  sodium chloride, 10 mL, Intravenous, Q12H  thyroid, 15 mg, Oral, Daily  Thyroid, 30 mg, Oral, Daily           Diagnostics:  XR Chest 2 View    Result Date: 5/20/2023  CHEST: 2 VIEWS  HISTORY: Shortness of air.  COMPARISON: AP chest 01/06/2023, CT angiogram chest 12/08/2022.  FINDINGS: Heart and mediastinal structures appear within normal limits. There is a small subsegmental thin linear lingular opacity most likely representing scarring or atelectasis. Lungs otherwise clear and there is no evidence for pulmonary or pleural effusion or infiltrate. There is a scoliotic curvature of the thoracolumbar spine.      Mild segmental lingular scar or atelectasis. Lungs are otherwise clear and there is no evidence for CHF.  This report was finalized on 5/20/2023 11:07 PM by Dr. Alcon Canales M.D.      XR Chest 1 View    Result  Date: 6/4/2023  XR CHEST 1 VW-  HISTORY: Female who is 82 years-old,  short of breath  TECHNIQUE: Frontal view of the chest  COMPARISON: 06/04/2023  FINDINGS: The heart is enlarged. Slight prominence of vascular and interstitial markings. Mild bibasilar atelectasis/infiltrate, minimal right pleural effusion. No pneumothorax. Otherwise stable.      Mild bibasilar atelectasis/infiltrate, minimal right pleural effusion. Cardiomegaly with slight prominence of vascular and interstitial markings.  This report was finalized on 6/4/2023 10:10 AM by Dr. Ned Guzman M.D.      XR Chest 1 View    Result Date: 6/4/2023  Patient: YANG DELAROSA  Time Out: 03:11 Exam(s): XR CXR 1 VIEW EXAM:   XR Chest, 1 View CLINICAL HISTORY:    Reason for exam: leukocytosis. TECHNIQUE:   Frontal view of the chest. COMPARISON:   CT from May 3, 2022. FINDINGS:   Lungs:  Prominent vascular and interstitial markings in both lung bases,  exaggerated by technique and large body habitus.  Possible mild CHF.   Pleural space:  Slight blunting the right gastritic angle suggest small right pleural effusion.  No pneumothorax.   Heart:  The cardiac silhouette is moderately enlarged.   Mediastinum:  Unremarkable.   Bones joints:  Unremarkable.   Vasculature:  The aortic arch is calcified but nondilated.   Upper abdomen:  There is no pneumoperitoneum under the diaphragm. IMPRESSION:       Prominent vascular and interstitial markings in both lung bases, exaggerated by technique and large body habitus.  Possible mild CHF.     Electronically signed by Jose Cabrera MD on 06-04-23 at 0311    XR Chest 1 View    Result Date: 5/21/2023  XR CHEST 1 VW-  HISTORY: Female who is 82 years-old, short of breath  TECHNIQUE: Supine view of the chest  COMPARISON: 5/20/2023  FINDINGS: Endotracheal tube tip is 1.9 cm above the bubba. The heart size is normal. Aorta is calcified. Pulmonary vasculature is unremarkable. Asymmetric hazy opacification of the right hemithorax  may be combination of atelectasis/infiltrate and/or layering pleural effusion. No pneumothorax is seen, limited by supine positioning. No acute osseous process.      Endotracheal intubation. Hazy asymmetric opacification of the right hemithorax.  This report was finalized on 5/21/2023 2:59 PM by Dr. Ned Guzman M.D.      Results for orders placed during the hospital encounter of 06/03/23    Adult Transthoracic Echo Complete w/ Color, Spectral and Contrast if Necessary Per Protocol    Interpretation Summary    Left ventricular systolic function is moderately decreased. Left ventricular ejection fraction appears to be 36 - 40%.  Very difficult study regarding analysis of ejection fraction given the bundle branch block and arrhythmia as well as ectopy.  Severe hypokinesis of the inferior wall and septum.    Left ventricular diastolic function was indeterminate.    The left atrial cavity is mild to moderately dilated.    Mild aortic valve stenosis is present.    Moderate to severe tricuspid valve regurgitation is present.    Estimated right ventricular systolic pressure from tricuspid regurgitation is mildly elevated (35-45 mmHg).      Chest x-ray looks to me like there are bilateral pleural effusions the right bigger than the left there is a little bit of atelectasis at the bases I cannot say there is not a definite infiltrate but nothing jumps out as a definitive infiltrate either.    Active Hospital Problems    Diagnosis  POA    **Atrial fibrillation with RVR [I48.91]  Yes    Chronic congestive heart failure [I50.9]  Yes    Anxiety [F41.9]  Yes    Chronic interstitial cystitis [N30.10]  Yes    Pulmonary emphysema [J43.9]  Yes    Gastroesophageal reflux disease [K21.9]  Yes    Fibromyalgia [M79.7]  Yes    Benign hypertension [I10]  Yes    Hyperlipidemia [E78.5]  Yes      Resolved Hospital Problems   No resolved problems to display.         Assessment & Plan     Acute hypoxic and hypercapnic respiratory failure  likely due to some heart failure on her underlying chronic lung disease improved seems to be doing well off of O2 for about 24 hours now  A-fib with rapid ventricular rate her heart rate was in the low 100s while I was in the room was with her at rest.  Acute on chronic heart failure probably combined systolic and diastolic dysfunction  Cardiomyopathy cardiology hoping that LV function improves with rate control and time  Pleural effusions right greater than left probably secondary to CHF should improve with diuresis and rate control recommend a follow-up chest x-ray in a few weeks to ensure resolution  COPD/asthma overlap syndrome at discharge she can resume Breztri and as needed albuterol per routine follow-up in our office.  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

## 2023-06-06 NOTE — PLAN OF CARE
Goal Outcome Evaluation:  Plan of Care Reviewed With: patient        Progress: no change  Outcome Evaluation: VSS with exception of elevated HR.  Complained of mild headache overnight and was treated with PRN medication.  Will continue to monitor.

## 2023-06-06 NOTE — PROGRESS NOTES
"DAILY PROGRESS NOTE  [facility]    Patient Identification:  Name: Dafne Mary  Age: 82 y.o.  Sex: female  :  1940  MRN: [id number]         Primary Care Physician: Sintia Chatterjee PA    Subjective:  Interval History:She feels better On room air.    Objective:    Scheduled Meds:apixaban, 5 mg, Oral, Q12H  atenolol, 50 mg, Oral, Q12H  atorvastatin, 20 mg, Oral, Nightly  empagliflozin, 10 mg, Oral, Daily  furosemide, 40 mg, Oral, Daily  guaiFENesin, 600 mg, Oral, Q12H  losartan, 25 mg, Oral, Daily  pantoprazole, 40 mg, Oral, Q AM  PARoxetine, 20 mg, Oral, Daily  senna-docusate sodium, 2 tablet, Oral, BID  sodium chloride, 500 mL, Intravenous, Once  sodium chloride, 10 mL, Intravenous, Q12H  thyroid, 15 mg, Oral, Daily  Thyroid, 30 mg, Oral, Daily      Continuous Infusions:     Vital signs in last 24 hours:  Temp:  [97.6 °F (36.4 °C)-98.2 °F (36.8 °C)] 97.6 °F (36.4 °C)  Heart Rate:  [102-131] 108  Resp:  [18] 18  BP: ()/(66-85) 92/75    Intake/Output:    Intake/Output Summary (Last 24 hours) at 2023 1447  Last data filed at 2023 0550  Gross per 24 hour   Intake --   Output 2300 ml   Net -2300 ml         Exam:  BP 92/75 (BP Location: Right arm, Patient Position: Lying)   Pulse 108   Temp 97.6 °F (36.4 °C) (Oral)   Resp 18   Ht 152.4 cm (60\")   Wt 67.6 kg (149 lb)   SpO2 94%   BMI 29.10 kg/m²     General Appearance:    Alert, cooperative, no distress   Head:    Normocephalic, without obvious abnormality, atraumatic   Eyes:       Throat:   Lips, tongue, gums normal   Neck:   Supple, symmetrical, trachea midline, no JVD   Lungs:     Clear to auscultation bilaterally, respirations unlabored   Chest Wall:    No tenderness or deformity    Heart:    Regular rate and rhythm, S1 and S2 normal, no murmur,no  Rub or gallop   Abdomen:     Soft, nontender, bowel sounds active, no masses, no organomegaly    Extremities:   Extremities normal, atraumatic, no cyanosis or edema   Pulses:   " "   Skin:   Skin is warm and dry,  no rashes or palpable lesions   Neurologic:   no focal deficits noted      Lab Results (last 72 hours)       Procedure Component Value Units Date/Time    Procalcitonin [[id number]]  (Normal) Collected: 06/04/23 1058    Specimen: Blood Updated: 06/04/23 1143     Procalcitonin 0.08 ng/mL     Narrative:      As a Marker for Sepsis (Non-Neonates):    1. <0.5 ng/mL represents a low risk of severe sepsis and/or septic shock.  2. >2 ng/mL represents a high risk of severe sepsis and/or septic shock.    As a Marker for Lower Respiratory Tract Infections that require antibiotic therapy:    PCT on Admission    Antibiotic Therapy       6-12 Hrs later    >0.5                Strongly Recommended  >0.25 - <0.5        Recommended   0.1 - 0.25          Discouraged              Remeasure/reassess PCT  <0.1                Strongly Discouraged     Remeasure/reassess PCT    As 28 day mortality risk marker: \"Change in Procalcitonin Result\" (>80% or <=80%) if Day 0 (or Day 1) and Day 4 values are available. Refer to [email or web address]://[email or web address] [email or web address][facility][email or web address]    Change in PCT <=80%  A decrease of PCT levels below or equal to 80% defines a positive change in PCT test result representing a higher risk for 28-day all-cause mortality of patients diagnosed with severe sepsis for septic shock.    Change in PCT >80%  A decrease of PCT levels of more than 80% defines a negative change in PCT result representing a lower risk for 28-day all-cause mortality of patients diagnosed with severe sepsis or septic shock.       Blood Gas, Arterial - [[id number]]  (Abnormal) Collected: 06/04/23 1022    Specimen: Arterial Blood Updated: 06/04/23 1025     Site Arterial: left brachial     Ortiz's Test N/A     pH, Arterial 7.327 pH units      pCO2, Arterial 49.1 mm Hg      pO2, Arterial 74.0 mm Hg      HCO3, Arterial 25.7 mmol/L      Base Excess, Arterial -0.9 mmol/L      O2 Saturation Calculated 93.3 %      Comment: SAT 92% Meter: " [id number] : [id number] [facility]        Barometric Pressure for Blood Gas 745.8 mmHg      Modality Cannula     Flow Rate 3 lpm      Rate 30 Breaths/minute     Lactic Acid, Plasma [[id number]]  (Abnormal) Collected: 06/04/23 0310    Specimen: Blood Updated: 06/04/23 0346     Lactate 4.2 mmol/L     High Sensitivity Troponin T 2Hr [[id number]]  (Abnormal) Collected: 06/04/23 0310    Specimen: Blood Updated: 06/04/23 0345     HS Troponin T 31 ng/L      Troponin T Delta 4 ng/L     Narrative:      High Sensitive Troponin T Reference Range:  <10.0 ng/L- Negative Female for AMI  <15.0 ng/L- Negative Male for AMI  >=10 - Abnormal Female indicating possible myocardial injury.  >=15 - Abnormal Male indicating possible myocardial injury.   Clinicians would have to utilize clinical acumen, EKG, Troponin, and serial changes to determine if it is an Acute Myocardial Infarction or myocardial injury due to an underlying chronic condition.         TSH [[id number]]  (Normal) Collected: 06/04/23 0026    Specimen: Blood Updated: 06/04/23 0104     TSH 0.710 uIU/mL     BNP [[id number]]  (Abnormal) Collected: 06/04/23 0026    Specimen: Blood Updated: 06/04/23 0104     proBNP 7,773.0 pg/mL     Narrative:      Among patients with dyspnea, NT-proBNP is highly sensitive for the detection of acute congestive heart failure. In addition NT-proBNP of <300 pg/ml effectively rules out acute congestive heart failure with 99% negative predictive value.    Results may be falsely decreased if patient taking Biotin.      High Sensitivity Troponin T [[id number]]  (Abnormal) Collected: 06/04/23 0026    Specimen: Blood Updated: 06/04/23 0104     HS Troponin T 27 ng/L     Narrative:      High Sensitive Troponin T Reference Range:  <10.0 ng/L- Negative Female for AMI  <15.0 ng/L- Negative Male for AMI  >=10 - Abnormal Female indicating possible myocardial injury.  >=15 - Abnormal Male indicating possible myocardial injury.   Clinicians would have to  utilize clinical acumen, EKG, Troponin, and serial changes to determine if it is an Acute Myocardial Infarction or myocardial injury due to an underlying chronic condition.         Magnesium [[id number]]  (Normal) Collected: 06/04/23 0026    Specimen: Blood Updated: 06/04/23 0058     Magnesium 2.2 mg/dL     Comprehensive Metabolic Panel [[id number]]  (Abnormal) Collected: 06/04/23 0026    Specimen: Blood Updated: 06/04/23 0058     Glucose 161 mg/dL      BUN 32 mg/dL      Creatinine 0.97 mg/dL      Sodium 133 mmol/L      Potassium 4.5 mmol/L      Chloride 99 mmol/L      CO2 22.1 mmol/L      Calcium 8.8 mg/dL      Total Protein 5.9 g/dL      Albumin 3.7 g/dL      ALT (SGPT) 78 U/L      AST (SGOT) 27 U/L      Alkaline Phosphatase 131 U/L      Total Bilirubin 0.5 mg/dL      Globulin 2.2 gm/dL      A/G Ratio 1.7 g/dL      BUN/Creatinine Ratio 33.0     Anion Gap 11.9 mmol/L      eGFR 58.5 mL/min/1.73     Narrative:      GFR Normal >60  Chronic Kidney Disease <60  Kidney Failure <15    The GFR formula is only valid for adults with stable renal function between ages 18 and 70.    CBC & Differential [[id number]]  (Abnormal) Collected: 06/04/23 0026    Specimen: Blood Updated: 06/04/23 0038    Narrative:      The following orders were created for panel order CBC & Differential.  Procedure                               Abnormality         Status                     ---------                               -----------         ------                     CBC Auto Differential[[id number]]        Abnormal            Final result                 Please view results for these tests on the individual orders.    CBC Auto Differential [[id number]]  (Abnormal) Collected: 06/04/23 0026    Specimen: Blood Updated: 06/04/23 0038     WBC 15.90 10*3/mm3      RBC 4.13 10*6/mm3      Hemoglobin 11.9 g/dL      Hematocrit 36.4 %      MCV 88.1 fL      MCH 28.8 pg      MCHC 32.7 g/dL      RDW 13.2 %      RDW-SD 41.9 fl      MPV 9.9 fL      Platelets 260  10*3/mm3      Neutrophil % 87.5 %      Lymphocyte % 3.6 %      Monocyte % 5.7 %      Eosinophil % 0.0 %      Basophil % 0.1 %      Immature Grans % 3.1 %      Neutrophils, Absolute 13.92 10*3/mm3      Lymphocytes, Absolute 0.57 10*3/mm3      Monocytes, Absolute 0.90 10*3/mm3      Eosinophils, Absolute 0.00 10*3/mm3      Basophils, Absolute 0.02 10*3/mm3      Immature Grans, Absolute 0.49 10*3/mm3      nRBC 0.1 /100 WBC           Data Review:  Results from last 7 days   Lab Units 06/06/23  0504 06/04/23  0026   SODIUM mmol/L 135* 133*   POTASSIUM mmol/L 3.7 4.5   CHLORIDE mmol/L 93* 99   CO2 mmol/L 31.7* 22.1   BUN mg/dL 37* 32*   CREATININE mg/dL 1.21* 0.97   GLUCOSE mg/dL 87 161*   CALCIUM mg/dL 8.5* 8.8       Results from last 7 days   Lab Units 06/06/23  0504 06/04/23  0026   WBC 10*3/mm3 12.01* 15.90*   HEMOGLOBIN g/dL 12.4 11.9*   HEMATOCRIT % 37.4 36.4   PLATELETS 10*3/mm3 223 260       Results from last 7 days   Lab Units 06/04/23  0026   TSH uIU/mL 0.710           Lab Results   Lab Value Date/Time    TROPONINT 31 (H) 06/04/2023 0310    TROPONINT 27 (H) 06/04/2023 0026    TROPONINT 9 05/20/2023 2125    TROPONINT <0.010 12/08/2022 0128    TROPONINT <0.010 05/04/2022 0335    TROPONINT <0.010 05/03/2022 2322    TROPONINT 0.013 05/03/2022 1804    TROPONINT <0.010 05/03/2022 1250    TROPONINT <0.010 10/29/2021 1444    TROPONINT <0.010 02/13/2018 0920         Results from last 7 days   Lab Units 06/04/23  0026   ALK PHOS U/L 131*   BILIRUBIN mg/dL 0.5   ALT (SGPT) U/L 78*   AST (SGOT) U/L 27       Results from last 7 days   Lab Units 06/04/23  0026   TSH uIU/mL 0.710           No results found for: POCGLU        Past Medical History:   Diagnosis Date    Atrial fibrillation with RVR 6/4/2023    Chronic diastolic congestive heart failure 11/17/2022    Depression     Emphysema lung     GERD (gastroesophageal reflux disease)     Heart failure     Hyperlipidemia     Hypertension     Hypothyroidism         Assessment:  Active Hospital Problems    Diagnosis  POA    **Atrial fibrillation with RVR [I48.91]  Yes    Chronic congestive heart failure [I50.9]  Yes    Anxiety [F41.9]  Yes    Chronic interstitial cystitis [N30.10]  Yes    Pulmonary emphysema [J43.9]  Yes    Gastroesophageal reflux disease [K21.9]  Yes    Fibromyalgia [M79.7]  Yes    Benign hypertension [I10]  Yes    Hyperlipidemia [E78.5]  Yes      Resolved Hospital Problems   No resolved problems to display.       Plan:  Continue with rate control. Follow lab. Diuresis with Lasix.  As per Cardiology and Pulmonary.   PT eval DC planning Maybe hoe tomorrow    Amor Shane MD  6/6/2023  14:47 EDT

## 2023-06-06 NOTE — PLAN OF CARE
Problem: Adult Inpatient Plan of Care  Goal: Plan of Care Review  Outcome: Ongoing, Progressing  Flowsheets (Taken 6/6/2023 1716)  Progress: improving  Outcome Evaluation: Mildly hypotensive. Afib with moderately controlled rate. Denies CP or SOA. Loose stool noted -  laxatives discontinued.  Goal: Patient-Specific Goal (Individualized)  Outcome: Ongoing, Progressing  Goal: Absence of Hospital-Acquired Illness or Injury  Outcome: Ongoing, Progressing  Intervention: Identify and Manage Fall Risk  Recent Flowsheet Documentation  Taken 6/6/2023 1625 by Maricarmen Myers RN  Safety Promotion/Fall Prevention:   fall prevention program maintained   safety round/check completed  Taken 6/6/2023 1459 by Maricarmen Myers RN  Safety Promotion/Fall Prevention:   fall prevention program maintained   safety round/check completed  Taken 6/6/2023 1230 by Maricarmen Myers RN  Safety Promotion/Fall Prevention:   fall prevention program maintained   safety round/check completed  Taken 6/6/2023 1032 by Maricarmen Myers RN  Safety Promotion/Fall Prevention:   fall prevention program maintained   safety round/check completed  Taken 6/6/2023 0933 by Maricarmen Myers RN  Safety Promotion/Fall Prevention:   fall prevention program maintained   safety round/check completed  Taken 6/6/2023 0800 by Maricarmen Myers RN  Safety Promotion/Fall Prevention:   fall prevention program maintained   safety round/check completed  Goal: Optimal Comfort and Wellbeing  Outcome: Ongoing, Progressing  Goal: Readiness for Transition of Care  Outcome: Ongoing, Progressing     Problem: COPD (Chronic Obstructive Pulmonary Disease) Comorbidity  Goal: Maintenance of COPD Symptom Control  Outcome: Ongoing, Progressing     Problem: Hypertension Comorbidity  Goal: Blood Pressure in Desired Range  Outcome: Ongoing, Progressing     Problem: Adjustment to Illness (Sepsis/Septic Shock)  Goal: Optimal Coping  Outcome: Ongoing, Progressing     Problem:  Bleeding (Sepsis/Septic Shock)  Goal: Absence of Bleeding  Outcome: Ongoing, Progressing     Problem: Glycemic Control Impaired (Sepsis/Septic Shock)  Goal: Blood Glucose Level Within Desired Range  Outcome: Ongoing, Progressing     Problem: Infection Progression (Sepsis/Septic Shock)  Goal: Absence of Infection Signs and Symptoms  Outcome: Ongoing, Progressing     Problem: Nutrition Impaired (Sepsis/Septic Shock)  Goal: Optimal Nutrition Intake  Outcome: Ongoing, Progressing   Goal Outcome Evaluation:           Progress: improving  Outcome Evaluation: Mildly hypotensive. Afib with moderately controlled rate. Denies CP or SOA. Loose stool noted -  laxatives discontinued.

## 2023-06-06 NOTE — PROGRESS NOTES
[facility] Follow Up    Chief Complaint: Follow-up atrial fibrillation, CHF    Interval History: Breathing better.  Feels like her breathing is closer to her baseline.  She denies any chest discomfort.  She denies any palpitations.    Objective:     Objective:  Temp:  [98.1 °F (36.7 °C)-98.2 °F (36.8 °C)] 98.1 °F (36.7 °C)  Heart Rate:  [102-131] 102  Resp:  [18] 18  BP: ()/(72-88) 111/82     Intake/Output Summary (Last 24 hours) at 6/6/2023 0730  Last data filed at 6/6/2023 0550  Gross per 24 hour   Intake --   Output 2300 ml   Net -2300 ml     Body mass index is 29.1 kg/m².      06/03/23  2310 06/04/23  0536 06/05/23  1521   Weight: 60.3 kg (133 lb) 68 kg (149 lb 14.6 oz) 67.6 kg (149 lb)     Weight change:       Physical Exam:   General : Alert, cooperative, in no acute distress.  Neuro: Alert,cooperative and oriented.  Lungs: CTAB. Normal respiratory effort and rate.  CV: Irregularly, irregular, normal S1 and S2, no murmurs, gallops or rubs.  ABD: Soft, nontender, nondistended. Positive bowel sounds.  Extr: No edema or cyanosis, moves all extremities.    Lab Review:   Results from last 7 days   Lab Units 06/06/23  0504 06/04/23  0026   SODIUM mmol/L 135* 133*   POTASSIUM mmol/L 3.7 4.5   CHLORIDE mmol/L 93* 99   CO2 mmol/L 31.7* 22.1   BUN mg/dL 37* 32*   CREATININE mg/dL 1.21* 0.97   GLUCOSE mg/dL 87 161*   CALCIUM mg/dL 8.5* 8.8   AST (SGOT) U/L  --  27   ALT (SGPT) U/L  --  78*     Results from last 7 days   Lab Units 06/04/23  0310 06/04/23  0026   HSTROP T ng/L 31* 27*     Results from last 7 days   Lab Units 06/06/23  0504 06/04/23  0026   WBC 10*3/mm3 12.01* 15.90*   HEMOGLOBIN g/dL 12.4 11.9*   HEMATOCRIT % 37.4 36.4   PLATELETS 10*3/mm3 223 260         Results from last 7 days   Lab Units 06/04/23  0026   MAGNESIUM mg/dL 2.2           Invalid input(s): LDLCALC  Results from last 7 days   Lab Units 06/04/23  0026   PROBNP pg/mL 7,773.0*     Results from last 7 days   Lab Units  06/04/23  0026   TSH uIU/mL 0.710     I reviewed the patient's new clinical results.  I personally viewed and interpreted the patient's EKG  Current Medications:   Scheduled Meds:apixaban, 5 mg, Oral, Q12H  atenolol, 50 mg, Oral, Q12H  atorvastatin, 20 mg, Oral, Nightly  empagliflozin, 10 mg, Oral, Daily  furosemide, 40 mg, Oral, BID  guaiFENesin, 600 mg, Oral, Q12H  losartan, 50 mg, Oral, Daily  pantoprazole, 40 mg, Oral, Q AM  PARoxetine, 20 mg, Oral, Daily  senna-docusate sodium, 2 tablet, Oral, BID  sodium chloride, 500 mL, Intravenous, Once  sodium chloride, 10 mL, Intravenous, Q12H  thyroid, 15 mg, Oral, Daily  Thyroid, 30 mg, Oral, Daily      Continuous Infusions:     Allergies:  Allergies   Allergen Reactions    Lansoprazole Nausea Only     NAUSEA  NAUSEA  NAUSEA    Diphenhydramine Hcl (Sleep) Irritability    Lisinopril Cough       Assessment/Plan:     1.  Acute systolic congestive heart failure.  Proved with IV diuresis.  She is now on oral diuretics.  Likely triggered by atrial fibrillation with rapid ventricular rates.    2.  Cardiomyopathy.  Although there is some concern for wall motion abnormalities I suspect her cardiomyopathy is due to atrial fibrillation.  She is on atenolol.  Jardiance started this admission.  Remains on losartan.  On a lot of blood pressure room to push guideline directed management further.  2.  Atrial fibrillation.  New diagnosis last admission.  Initially thought this may be related to respiratory issues at the time.  However this is now persistent and she is having issues with rate control.   Started on apixaban this admission.  Diltiazem infusion discontinued.  Rates remain mildly elevated.  3.  Acute hypoxic respiratory failure.  Resolved.  Now on room air.  4.  Hypertension.  Blood pressures low normal this morning.  5.  Asthma and COPD  6.  Pulmonary hypertension  7.  Acute kidney injury.  Likely due to overdiuresis and possibly some low blood pressures.    -Back off on  furosemide to 40 mg once a day.  - Continue current dose of atenolol.  Unfortunately she does not have a lot of blood pressure room to titrate dosage further this time.  - Decrease losartan to 25 mg daily in order to allow for up titration of her beta-blockers and in light of her acute kidney injury.  -Continue apixaban at the current dose for now.  - Monitor renal function.  - Consider adding digoxin if renal function improves rate control.  -Repeat EKG to reevaluate bundle branch block.  -Continue medical management of cardiomyopathy.  Hopefully her LV function will improve with management of atrial fibrillation.  However may need to consider further ischemic work-up as an outpatient if her LV function does not improve.  - I would like her to stay an additional day so we can continue to adjust her medications.    Nae Norman MD  06/06/23  07:30 EDT

## 2023-06-07 LAB
ANION GAP SERPL CALCULATED.3IONS-SCNC: 12 MMOL/L (ref 5–15)
BASOPHILS # BLD AUTO: 0.02 10*3/MM3 (ref 0–0.2)
BASOPHILS NFR BLD AUTO: 0.2 % (ref 0–1.5)
BUN SERPL-MCNC: 36 MG/DL (ref 8–23)
BUN/CREAT SERPL: 28.8 (ref 7–25)
CALCIUM SPEC-SCNC: 8.4 MG/DL (ref 8.6–10.5)
CHLORIDE SERPL-SCNC: 96 MMOL/L (ref 98–107)
CO2 SERPL-SCNC: 30 MMOL/L (ref 22–29)
CREAT SERPL-MCNC: 1.25 MG/DL (ref 0.57–1)
DEPRECATED RDW RBC AUTO: 40.8 FL (ref 37–54)
EGFRCR SERPLBLD CKD-EPI 2021: 43.1 ML/MIN/1.73
EOSINOPHIL # BLD AUTO: 0.17 10*3/MM3 (ref 0–0.4)
EOSINOPHIL NFR BLD AUTO: 1.7 % (ref 0.3–6.2)
ERYTHROCYTE [DISTWIDTH] IN BLOOD BY AUTOMATED COUNT: 12.9 % (ref 12.3–15.4)
GLUCOSE SERPL-MCNC: 100 MG/DL (ref 65–99)
HCT VFR BLD AUTO: 41.9 % (ref 34–46.6)
HGB BLD-MCNC: 13.9 G/DL (ref 12–15.9)
IMM GRANULOCYTES # BLD AUTO: 0.2 10*3/MM3 (ref 0–0.05)
IMM GRANULOCYTES NFR BLD AUTO: 1.9 % (ref 0–0.5)
LYMPHOCYTES # BLD AUTO: 1.4 10*3/MM3 (ref 0.7–3.1)
LYMPHOCYTES NFR BLD AUTO: 13.6 % (ref 19.6–45.3)
MCH RBC QN AUTO: 29 PG (ref 26.6–33)
MCHC RBC AUTO-ENTMCNC: 33.2 G/DL (ref 31.5–35.7)
MCV RBC AUTO: 87.3 FL (ref 79–97)
MONOCYTES # BLD AUTO: 0.83 10*3/MM3 (ref 0.1–0.9)
MONOCYTES NFR BLD AUTO: 8.1 % (ref 5–12)
NEUTROPHILS NFR BLD AUTO: 7.64 10*3/MM3 (ref 1.7–7)
NEUTROPHILS NFR BLD AUTO: 74.5 % (ref 42.7–76)
NRBC BLD AUTO-RTO: 0 /100 WBC (ref 0–0.2)
PLATELET # BLD AUTO: 228 10*3/MM3 (ref 140–450)
PMV BLD AUTO: 10.1 FL (ref 6–12)
POTASSIUM SERPL-SCNC: 4.2 MMOL/L (ref 3.5–5.2)
RBC # BLD AUTO: 4.8 10*6/MM3 (ref 3.77–5.28)
SODIUM SERPL-SCNC: 138 MMOL/L (ref 136–145)
WBC NRBC COR # BLD: 10.26 10*3/MM3 (ref 3.4–10.8)

## 2023-06-07 PROCEDURE — 85025 COMPLETE CBC W/AUTO DIFF WBC: CPT | Performed by: HOSPITALIST

## 2023-06-07 PROCEDURE — 80048 BASIC METABOLIC PNL TOTAL CA: CPT | Performed by: HOSPITALIST

## 2023-06-07 PROCEDURE — 94799 UNLISTED PULMONARY SVC/PX: CPT

## 2023-06-07 PROCEDURE — 94660 CPAP INITIATION&MGMT: CPT

## 2023-06-07 RX ORDER — AMIODARONE HYDROCHLORIDE 200 MG/1
200 TABLET ORAL EVERY 12 HOURS SCHEDULED
Status: DISCONTINUED | OUTPATIENT
Start: 2023-06-07 | End: 2023-06-12 | Stop reason: HOSPADM

## 2023-06-07 RX ORDER — FUROSEMIDE 20 MG/1
20 TABLET ORAL DAILY
Status: DISCONTINUED | OUTPATIENT
Start: 2023-06-07 | End: 2023-06-08

## 2023-06-07 RX ORDER — LOSARTAN POTASSIUM 25 MG/1
12.5 TABLET ORAL DAILY
Status: DISCONTINUED | OUTPATIENT
Start: 2023-06-07 | End: 2023-06-08

## 2023-06-07 RX ADMIN — ACETAMINOPHEN 650 MG: 325 TABLET, FILM COATED ORAL at 21:53

## 2023-06-07 RX ADMIN — APIXABAN 5 MG: 5 TABLET, FILM COATED ORAL at 09:10

## 2023-06-07 RX ADMIN — Medication 10 ML: at 09:13

## 2023-06-07 RX ADMIN — LEVOTHYROXINE, LIOTHYRONINE 15 MG: 19; 4.5 TABLET ORAL at 09:10

## 2023-06-07 RX ADMIN — ATENOLOL 50 MG: 50 TABLET ORAL at 09:11

## 2023-06-07 RX ADMIN — GUAIFENESIN 600 MG: 600 TABLET, EXTENDED RELEASE ORAL at 09:11

## 2023-06-07 RX ADMIN — ATENOLOL 50 MG: 50 TABLET ORAL at 21:53

## 2023-06-07 RX ADMIN — LOSARTAN POTASSIUM 12.5 MG: 25 TABLET, FILM COATED ORAL at 09:13

## 2023-06-07 RX ADMIN — AMIODARONE HYDROCHLORIDE 200 MG: 200 TABLET ORAL at 21:53

## 2023-06-07 RX ADMIN — PAROXETINE HYDROCHLORIDE HEMIHYDRATE 20 MG: 20 TABLET, FILM COATED ORAL at 09:10

## 2023-06-07 RX ADMIN — PANTOPRAZOLE SODIUM 40 MG: 40 TABLET, DELAYED RELEASE ORAL at 05:38

## 2023-06-07 RX ADMIN — Medication 10 ML: at 21:54

## 2023-06-07 RX ADMIN — LEVOTHYROXINE, LIOTHYRONINE 30 MG: 19; 4.5 TABLET ORAL at 09:11

## 2023-06-07 RX ADMIN — ATORVASTATIN CALCIUM 20 MG: 20 TABLET, FILM COATED ORAL at 21:53

## 2023-06-07 RX ADMIN — APIXABAN 5 MG: 5 TABLET, FILM COATED ORAL at 21:53

## 2023-06-07 RX ADMIN — GUAIFENESIN 600 MG: 600 TABLET, EXTENDED RELEASE ORAL at 21:53

## 2023-06-07 RX ADMIN — AMIODARONE HYDROCHLORIDE 200 MG: 200 TABLET ORAL at 14:48

## 2023-06-07 RX ADMIN — FUROSEMIDE 20 MG: 20 TABLET ORAL at 09:11

## 2023-06-07 RX ADMIN — EMPAGLIFLOZIN 10 MG: 10 TABLET, FILM COATED ORAL at 09:10

## 2023-06-07 NOTE — PROGRESS NOTES
"DAILY PROGRESS NOTE  [facility]    Patient Identification:  Name: Dafne Mary  Age: 82 y.o.  Sex: female  :  1940  MRN: [id number]         Primary Care Physician: Sintia Chatterjee PA    Subjective:  Interval History:She feels better On room air.  Still with rapid heart rate    Objective:    Scheduled Meds:amiodarone, 200 mg, Oral, Q12H  apixaban, 5 mg, Oral, Q12H  atenolol, 50 mg, Oral, Q12H  atorvastatin, 20 mg, Oral, Nightly  empagliflozin, 10 mg, Oral, Daily  furosemide, 20 mg, Oral, Daily  guaiFENesin, 600 mg, Oral, Q12H  losartan, 12.5 mg, Oral, Daily  pantoprazole, 40 mg, Oral, Q AM  PARoxetine, 20 mg, Oral, Daily  sodium chloride, 500 mL, Intravenous, Once  sodium chloride, 10 mL, Intravenous, Q12H  thyroid, 15 mg, Oral, Daily  Thyroid, 30 mg, Oral, Daily      Continuous Infusions:     Vital signs in last 24 hours:  Temp:  [98 °F (36.7 °C)] 98 °F (36.7 °C)  Heart Rate:  [106-115] 113  Resp:  [18] 18  BP: ()/(66-89) 113/89    Intake/Output:    Intake/Output Summary (Last 24 hours) at 2023 1359  Last data filed at 2023 0352  Gross per 24 hour   Intake --   Output 800 ml   Net -800 ml         Exam:  /89 (BP Location: Right arm, Patient Position: Lying)   Pulse 113   Temp 98 °F (36.7 °C) (Oral)   Resp 18   Ht 152.4 cm (60\")   Wt 67.6 kg (149 lb)   SpO2 95%   BMI 29.10 kg/m²     General Appearance:    Alert, cooperative, no distress   Head:    Normocephalic, without obvious abnormality, atraumatic   Eyes:       Throat:   Lips, tongue, gums normal   Neck:   Supple, symmetrical, trachea midline, no JVD   Lungs:     Clear to auscultation bilaterally, respirations unlabored   Chest Wall:    No tenderness or deformity    Heart:    Regular rate and rhythm, S1 and S2 normal, no murmur,no  Rub or gallop   Abdomen:     Soft, nontender, bowel sounds active, no masses, no organomegaly    Extremities:   Extremities normal, atraumatic, no cyanosis or edema   Pulses:    " "  Skin:   Skin is warm and dry,  no rashes or palpable lesions   Neurologic:   no focal deficits noted      Lab Results (last 72 hours)       Procedure Component Value Units Date/Time    Procalcitonin [[id number]]  (Normal) Collected: 06/04/23 1058    Specimen: Blood Updated: 06/04/23 1143     Procalcitonin 0.08 ng/mL     Narrative:      As a Marker for Sepsis (Non-Neonates):    1. <0.5 ng/mL represents a low risk of severe sepsis and/or septic shock.  2. >2 ng/mL represents a high risk of severe sepsis and/or septic shock.    As a Marker for Lower Respiratory Tract Infections that require antibiotic therapy:    PCT on Admission    Antibiotic Therapy       6-12 Hrs later    >0.5                Strongly Recommended  >0.25 - <0.5        Recommended   0.1 - 0.25          Discouraged              Remeasure/reassess PCT  <0.1                Strongly Discouraged     Remeasure/reassess PCT    As 28 day mortality risk marker: \"Change in Procalcitonin Result\" (>80% or <=80%) if Day 0 (or Day 1) and Day 4 values are available. Refer to [email or web address]    Change in PCT <=80%  A decrease of PCT levels below or equal to 80% defines a positive change in PCT test result representing a higher risk for 28-day all-cause mortality of patients diagnosed with severe sepsis for septic shock.    Change in PCT >80%  A decrease of PCT levels of more than 80% defines a negative change in PCT result representing a lower risk for 28-day all-cause mortality of patients diagnosed with severe sepsis or septic shock.       Blood Gas, Arterial - [[id number]]  (Abnormal) Collected: 06/04/23 1022    Specimen: Arterial Blood Updated: 06/04/23 1025     Site Arterial: left brachial     Ortiz's Test N/A     pH, Arterial 7.327 pH units      pCO2, Arterial 49.1 mm Hg      pO2, Arterial 74.0 mm Hg      HCO3, Arterial 25.7 mmol/L      Base Excess, Arterial -0.9 mmol/L      O2 Saturation Calculated 93.3 %      Comment: SAT 92% Meter: " [id number] : [id number] [facility]        Barometric Pressure for Blood Gas 745.8 mmHg      Modality Cannula     Flow Rate 3 lpm      Rate 30 Breaths/minute     Lactic Acid, Plasma [[id number]]  (Abnormal) Collected: 06/04/23 0310    Specimen: Blood Updated: 06/04/23 0346     Lactate 4.2 mmol/L     High Sensitivity Troponin T 2Hr [[id number]]  (Abnormal) Collected: 06/04/23 0310    Specimen: Blood Updated: 06/04/23 0345     HS Troponin T 31 ng/L      Troponin T Delta 4 ng/L     Narrative:      High Sensitive Troponin T Reference Range:  <10.0 ng/L- Negative Female for AMI  <15.0 ng/L- Negative Male for AMI  >=10 - Abnormal Female indicating possible myocardial injury.  >=15 - Abnormal Male indicating possible myocardial injury.   Clinicians would have to utilize clinical acumen, EKG, Troponin, and serial changes to determine if it is an Acute Myocardial Infarction or myocardial injury due to an underlying chronic condition.         TSH [[id number]]  (Normal) Collected: 06/04/23 0026    Specimen: Blood Updated: 06/04/23 0104     TSH 0.710 uIU/mL     BNP [[id number]]  (Abnormal) Collected: 06/04/23 0026    Specimen: Blood Updated: 06/04/23 0104     proBNP 7,773.0 pg/mL     Narrative:      Among patients with dyspnea, NT-proBNP is highly sensitive for the detection of acute congestive heart failure. In addition NT-proBNP of <300 pg/ml effectively rules out acute congestive heart failure with 99% negative predictive value.    Results may be falsely decreased if patient taking Biotin.      High Sensitivity Troponin T [[id number]]  (Abnormal) Collected: 06/04/23 0026    Specimen: Blood Updated: 06/04/23 0104     HS Troponin T 27 ng/L     Narrative:      High Sensitive Troponin T Reference Range:  <10.0 ng/L- Negative Female for AMI  <15.0 ng/L- Negative Male for AMI  >=10 - Abnormal Female indicating possible myocardial injury.  >=15 - Abnormal Male indicating possible myocardial injury.   Clinicians would have to  utilize clinical acumen, EKG, Troponin, and serial changes to determine if it is an Acute Myocardial Infarction or myocardial injury due to an underlying chronic condition.         Magnesium [[id number]]  (Normal) Collected: 06/04/23 0026    Specimen: Blood Updated: 06/04/23 0058     Magnesium 2.2 mg/dL     Comprehensive Metabolic Panel [[id number]]  (Abnormal) Collected: 06/04/23 0026    Specimen: Blood Updated: 06/04/23 0058     Glucose 161 mg/dL      BUN 32 mg/dL      Creatinine 0.97 mg/dL      Sodium 133 mmol/L      Potassium 4.5 mmol/L      Chloride 99 mmol/L      CO2 22.1 mmol/L      Calcium 8.8 mg/dL      Total Protein 5.9 g/dL      Albumin 3.7 g/dL      ALT (SGPT) 78 U/L      AST (SGOT) 27 U/L      Alkaline Phosphatase 131 U/L      Total Bilirubin 0.5 mg/dL      Globulin 2.2 gm/dL      A/G Ratio 1.7 g/dL      BUN/Creatinine Ratio 33.0     Anion Gap 11.9 mmol/L      eGFR 58.5 mL/min/1.73     Narrative:      GFR Normal >60  Chronic Kidney Disease <60  Kidney Failure <15    The GFR formula is only valid for adults with stable renal function between ages 18 and 70.    CBC & Differential [[id number]]  (Abnormal) Collected: 06/04/23 0026    Specimen: Blood Updated: 06/04/23 0038    Narrative:      The following orders were created for panel order CBC & Differential.  Procedure                               Abnormality         Status                     ---------                               -----------         ------                     CBC Auto Differential[[id number]]        Abnormal            Final result                 Please view results for these tests on the individual orders.    CBC Auto Differential [[id number]]  (Abnormal) Collected: 06/04/23 0026    Specimen: Blood Updated: 06/04/23 0038     WBC 15.90 10*3/mm3      RBC 4.13 10*6/mm3      Hemoglobin 11.9 g/dL      Hematocrit 36.4 %      MCV 88.1 fL      MCH 28.8 pg      MCHC 32.7 g/dL      RDW 13.2 %      RDW-SD 41.9 fl      MPV 9.9 fL      Platelets 260  10*3/mm3      Neutrophil % 87.5 %      Lymphocyte % 3.6 %      Monocyte % 5.7 %      Eosinophil % 0.0 %      Basophil % 0.1 %      Immature Grans % 3.1 %      Neutrophils, Absolute 13.92 10*3/mm3      Lymphocytes, Absolute 0.57 10*3/mm3      Monocytes, Absolute 0.90 10*3/mm3      Eosinophils, Absolute 0.00 10*3/mm3      Basophils, Absolute 0.02 10*3/mm3      Immature Grans, Absolute 0.49 10*3/mm3      nRBC 0.1 /100 WBC           Data Review:  Results from last 7 days   Lab Units 06/07/23  0517 06/06/23  0504 06/04/23  0026   SODIUM mmol/L 138 135* 133*   POTASSIUM mmol/L 4.2 3.7 4.5   CHLORIDE mmol/L 96* 93* 99   CO2 mmol/L 30.0* 31.7* 22.1   BUN mg/dL 36* 37* 32*   CREATININE mg/dL 1.25* 1.21* 0.97   GLUCOSE mg/dL 100* 87 161*   CALCIUM mg/dL 8.4* 8.5* 8.8       Results from last 7 days   Lab Units 06/07/23  0517 06/06/23  0504 06/04/23  0026   WBC 10*3/mm3 10.26 12.01* 15.90*   HEMOGLOBIN g/dL 13.9 12.4 11.9*   HEMATOCRIT % 41.9 37.4 36.4   PLATELETS 10*3/mm3 228 223 260       Results from last 7 days   Lab Units 06/04/23  0026   TSH uIU/mL 0.710           Lab Results   Lab Value Date/Time    TROPONINT 31 (H) 06/04/2023 0310    TROPONINT 27 (H) 06/04/2023 0026    TROPONINT 9 05/20/2023 2125    TROPONINT <0.010 12/08/2022 0128    TROPONINT <0.010 05/04/2022 0335    TROPONINT <0.010 05/03/2022 2322    TROPONINT 0.013 05/03/2022 1804    TROPONINT <0.010 05/03/2022 1250    TROPONINT <0.010 10/29/2021 1444    TROPONINT <0.010 02/13/2018 0920         Results from last 7 days   Lab Units 06/04/23  0026   ALK PHOS U/L 131*   BILIRUBIN mg/dL 0.5   ALT (SGPT) U/L 78*   AST (SGOT) U/L 27       Results from last 7 days   Lab Units 06/04/23  0026   TSH uIU/mL 0.710           No results found for: POCGLU        Past Medical History:   Diagnosis Date    Atrial fibrillation with RVR 6/4/2023    Chronic diastolic congestive heart failure 11/17/2022    Depression     Emphysema lung     GERD (gastroesophageal reflux disease)      Heart failure     Hyperlipidemia     Hypertension     Hypothyroidism        Assessment:  Active Hospital Problems    Diagnosis  POA    **Atrial fibrillation with RVR [I48.91]  Yes    Chronic congestive heart failure [I50.9]  Yes    Anxiety [F41.9]  Yes    Chronic interstitial cystitis [N30.10]  Yes    Pulmonary emphysema [J43.9]  Yes    Gastroesophageal reflux disease [K21.9]  Yes    Fibromyalgia [M79.7]  Yes    Benign hypertension [I10]  Yes    Hyperlipidemia [E78.5]  Yes      Resolved Hospital Problems   No resolved problems to display.       Plan:  Continue with rate control. Follow lab. Diuresis with Lasix.  As per Cardiology and Pulmonary.   PT eval DC planning probably needs a couple more days.    Amor Shane MD  6/7/2023  13:59 EDT

## 2023-06-07 NOTE — SIGNIFICANT NOTE
06/07/23 1304   OTHER   Discipline physical therapist   Rehab Time/Intention   Session Not Performed other (see comments)  (Per RN, HR remains elevated at rest, PT will hold today and follow up tomorrow for evaluation)   Recommendation   PT - Next Appointment 06/08/23

## 2023-06-07 NOTE — PROGRESS NOTES
[facility] Follow Up    Chief Complaint: Follow up CHF, afib    Interval History: She reports that her breathing is okay.  She denies any significant palpitations at rest.  She denies any chest discomfort.  She remains in atrial fibrillation.  When she is completely relaxed and at rest her heart rates remain in the 100s to 120s.  However with any kind of anxiety or activity her heart rates rise to the 130s to 140s.    Objective:     Objective:  Temp:  [97.6 °F (36.4 °C)-98 °F (36.7 °C)] 98 °F (36.7 °C)  Heart Rate:  [102-108] 106  Resp:  [18] 18  BP: ()/(66-76) 100/66     Intake/Output Summary (Last 24 hours) at 6/7/2023 0743  Last data filed at 6/7/2023 0352  Gross per 24 hour   Intake --   Output 800 ml   Net -800 ml     Body mass index is 29.1 kg/m².      06/03/23  2310 06/04/23  0536 06/05/23  1521   Weight: 60.3 kg (133 lb) 68 kg (149 lb 14.6 oz) 67.6 kg (149 lb)     Weight change:       Physical Exam:   General : Alert, cooperative, in no acute distress.  Neuro: Alert,cooperative and oriented.  Lungs: CTAB. Normal respiratory effort and rate.  CV: Irregularly, irregular, normal S1 and S2, no murmurs, gallops or rubs.  ABD: Soft, nontender, nondistended. Positive bowel sounds.  Extr: No edema or cyanosis, moves all extremities.    Lab Review:   Results from last 7 days   Lab Units 06/07/23  0517 06/06/23  0504 06/04/23  0026   SODIUM mmol/L 138 135* 133*   POTASSIUM mmol/L 4.2 3.7 4.5   CHLORIDE mmol/L 96* 93* 99   CO2 mmol/L 30.0* 31.7* 22.1   BUN mg/dL 36* 37* 32*   CREATININE mg/dL 1.25* 1.21* 0.97   GLUCOSE mg/dL 100* 87 161*   CALCIUM mg/dL 8.4* 8.5* 8.8   AST (SGOT) U/L  --   --  27   ALT (SGPT) U/L  --   --  78*     Results from last 7 days   Lab Units 06/04/23  0310 06/04/23  0026   HSTROP T ng/L 31* 27*     Results from last 7 days   Lab Units 06/07/23  0517 06/06/23  0504   WBC 10*3/mm3 10.26 12.01*   HEMOGLOBIN g/dL 13.9 12.4   HEMATOCRIT % 41.9 37.4   PLATELETS 10*3/mm3  228 223         Results from last 7 days   Lab Units 06/04/23  0026   MAGNESIUM mg/dL 2.2           Invalid input(s): LDLCALC  Results from last 7 days   Lab Units 06/04/23  0026   PROBNP pg/mL 7,773.0*     Results from last 7 days   Lab Units 06/04/23  0026   TSH uIU/mL 0.710     I reviewed the patient's new clinical results.  I personally viewed and interpreted the patient's EKG  Current Medications:   Scheduled Meds:apixaban, 5 mg, Oral, Q12H  atenolol, 50 mg, Oral, Q12H  atorvastatin, 20 mg, Oral, Nightly  empagliflozin, 10 mg, Oral, Daily  furosemide, 40 mg, Oral, Daily  guaiFENesin, 600 mg, Oral, Q12H  losartan, 25 mg, Oral, Daily  pantoprazole, 40 mg, Oral, Q AM  PARoxetine, 20 mg, Oral, Daily  sodium chloride, 500 mL, Intravenous, Once  sodium chloride, 10 mL, Intravenous, Q12H  thyroid, 15 mg, Oral, Daily  Thyroid, 30 mg, Oral, Daily      Continuous Infusions:     Allergies:  Allergies   Allergen Reactions    Lansoprazole Nausea Only     NAUSEA  NAUSEA  NAUSEA    Diphenhydramine Hcl (Sleep) Irritability    Lisinopril Cough       Assessment/Plan:     1.  Acute systolic congestive heart failure.  Improved.  She is now on oral furosemide.      2.  Cardiomyopathy.  Although there is some concern for wall motion abnormalities I suspect her cardiomyopathy is tachycardia mediated due to atrial fibrillation with rapid rates.  She is on atenolol.  Jardiance started this admission.  Remains on losartan although had to decrease dosage.  Unfortunately not a lot of blood pressure room to push guideline directed management further.  2.  Atrial fibrillation.  New diagnosis last admission.  Initially thought this may be related to respiratory issues at the time.  However this is now persistent and she is having issues with rate control.   Started on apixaban this admission.  Diltiazem infusion discontinued.  Rates remain elevated.    3.  Acute hypoxic respiratory failure.  Resolved.  Now on room air.  4.  Hypertension.   Blood pressures remain low normal this morning despite decreasing furosemide and losartan dosage.  5.  Asthma and COPD  6.  Pulmonary hypertension  7.  Acute kidney injury.  No much improvement despite decreasing losartan and furosemide dosage.  Likely due to overdiuresis and possibly some low blood pressures.    -Decrease furosemide and losartan dosage further to 20 mg and 12.5 mg respectively.  May have to discontinue losartan altogether for now if creatinine remains abnormal.  -I am still not happy with her rate control.  Although her rates are ok at rest I worry that she will not tolerate elevated heart rates with activity.  Unfortunately options are limited.  Do not have any blood pressure room to titrate AV dulce blocking agents.  Due to her renal insufficiency I think digoxin would be tricky to use.  Only other option would be to consider amiodarone although would have to be careful and in light of her already known underlying lung and thyroid issues.  - I discussed the options for further management with the patient.  The patient and I ultimately decided to start her on amiodarone for short-term course.  I am hoping in the short-term this will help with rate control.  In the future if we decide on cardioversion after she has been on anticoagulation for 3 to 4 weeks we can use the amiodarone for short-term to help maintain sinus rhythm with ultimate plans to discontinue this in the future.  We will start amiodarone 200 mg twice a day.  -Unfortunately I do not think she is ready from a cardiac standpoint for discharge.  Hopefully she will be in the next 1 to 2 days.      Nae Norman MD  06/07/23  07:43 EDT

## 2023-06-07 NOTE — PLAN OF CARE
Goal Outcome Evaluation:  Plan of Care Reviewed With: patient         Pt is alert and oriented. Pt has made no complaints of chest pain or SOA. Pt remains in Afib on monitor. Pt is resting. Will continue to monitor.

## 2023-06-08 LAB
ANION GAP SERPL CALCULATED.3IONS-SCNC: 9 MMOL/L (ref 5–15)
BASOPHILS # BLD AUTO: 0.02 10*3/MM3 (ref 0–0.2)
BASOPHILS NFR BLD AUTO: 0.2 % (ref 0–1.5)
BUN SERPL-MCNC: 31 MG/DL (ref 8–23)
BUN/CREAT SERPL: 25.2 (ref 7–25)
CALCIUM SPEC-SCNC: 8.4 MG/DL (ref 8.6–10.5)
CHLORIDE SERPL-SCNC: 97 MMOL/L (ref 98–107)
CO2 SERPL-SCNC: 29 MMOL/L (ref 22–29)
CREAT SERPL-MCNC: 1.23 MG/DL (ref 0.57–1)
DEPRECATED RDW RBC AUTO: 42.8 FL (ref 37–54)
EGFRCR SERPLBLD CKD-EPI 2021: 44 ML/MIN/1.73
EOSINOPHIL # BLD AUTO: 0.24 10*3/MM3 (ref 0–0.4)
EOSINOPHIL NFR BLD AUTO: 2.4 % (ref 0.3–6.2)
ERYTHROCYTE [DISTWIDTH] IN BLOOD BY AUTOMATED COUNT: 13.2 % (ref 12.3–15.4)
GLUCOSE SERPL-MCNC: 94 MG/DL (ref 65–99)
HCT VFR BLD AUTO: 42.2 % (ref 34–46.6)
HGB BLD-MCNC: 14 G/DL (ref 12–15.9)
IMM GRANULOCYTES # BLD AUTO: 0.23 10*3/MM3 (ref 0–0.05)
IMM GRANULOCYTES NFR BLD AUTO: 2.3 % (ref 0–0.5)
LYMPHOCYTES # BLD AUTO: 1.74 10*3/MM3 (ref 0.7–3.1)
LYMPHOCYTES NFR BLD AUTO: 17.4 % (ref 19.6–45.3)
MCH RBC QN AUTO: 29.3 PG (ref 26.6–33)
MCHC RBC AUTO-ENTMCNC: 33.2 G/DL (ref 31.5–35.7)
MCV RBC AUTO: 88.3 FL (ref 79–97)
MONOCYTES # BLD AUTO: 0.87 10*3/MM3 (ref 0.1–0.9)
MONOCYTES NFR BLD AUTO: 8.7 % (ref 5–12)
NEUTROPHILS NFR BLD AUTO: 6.88 10*3/MM3 (ref 1.7–7)
NEUTROPHILS NFR BLD AUTO: 69 % (ref 42.7–76)
NRBC BLD AUTO-RTO: 0 /100 WBC (ref 0–0.2)
PLATELET # BLD AUTO: 200 10*3/MM3 (ref 140–450)
PMV BLD AUTO: 10 FL (ref 6–12)
POTASSIUM SERPL-SCNC: 3.8 MMOL/L (ref 3.5–5.2)
RBC # BLD AUTO: 4.78 10*6/MM3 (ref 3.77–5.28)
SODIUM SERPL-SCNC: 135 MMOL/L (ref 136–145)
WBC NRBC COR # BLD: 9.98 10*3/MM3 (ref 3.4–10.8)

## 2023-06-08 PROCEDURE — 25010000002 ONDANSETRON PER 1 MG: Performed by: NURSE PRACTITIONER

## 2023-06-08 PROCEDURE — 80048 BASIC METABOLIC PNL TOTAL CA: CPT | Performed by: HOSPITALIST

## 2023-06-08 PROCEDURE — 85025 COMPLETE CBC W/AUTO DIFF WBC: CPT | Performed by: HOSPITALIST

## 2023-06-08 PROCEDURE — 97530 THERAPEUTIC ACTIVITIES: CPT

## 2023-06-08 PROCEDURE — 97161 PT EVAL LOW COMPLEX 20 MIN: CPT

## 2023-06-08 RX ADMIN — PANTOPRAZOLE SODIUM 40 MG: 40 TABLET, DELAYED RELEASE ORAL at 05:53

## 2023-06-08 RX ADMIN — Medication 10 ML: at 20:30

## 2023-06-08 RX ADMIN — GUAIFENESIN 600 MG: 600 TABLET, EXTENDED RELEASE ORAL at 09:24

## 2023-06-08 RX ADMIN — ATENOLOL 50 MG: 50 TABLET ORAL at 09:26

## 2023-06-08 RX ADMIN — LEVOTHYROXINE, LIOTHYRONINE 15 MG: 19; 4.5 TABLET ORAL at 09:27

## 2023-06-08 RX ADMIN — AMIODARONE HYDROCHLORIDE 200 MG: 200 TABLET ORAL at 09:26

## 2023-06-08 RX ADMIN — APIXABAN 5 MG: 5 TABLET, FILM COATED ORAL at 09:26

## 2023-06-08 RX ADMIN — PAROXETINE HYDROCHLORIDE HEMIHYDRATE 20 MG: 20 TABLET, FILM COATED ORAL at 09:24

## 2023-06-08 RX ADMIN — AMIODARONE HYDROCHLORIDE 200 MG: 200 TABLET ORAL at 20:23

## 2023-06-08 RX ADMIN — LEVOTHYROXINE, LIOTHYRONINE 30 MG: 19; 4.5 TABLET ORAL at 09:24

## 2023-06-08 RX ADMIN — ONDANSETRON 4 MG: 2 INJECTION INTRAMUSCULAR; INTRAVENOUS at 14:37

## 2023-06-08 RX ADMIN — ATENOLOL 50 MG: 50 TABLET ORAL at 20:22

## 2023-06-08 RX ADMIN — Medication 10 ML: at 10:25

## 2023-06-08 RX ADMIN — GUAIFENESIN 600 MG: 600 TABLET, EXTENDED RELEASE ORAL at 20:23

## 2023-06-08 RX ADMIN — ATORVASTATIN CALCIUM 20 MG: 20 TABLET, FILM COATED ORAL at 20:23

## 2023-06-08 RX ADMIN — APIXABAN 5 MG: 5 TABLET, FILM COATED ORAL at 20:22

## 2023-06-08 RX ADMIN — EMPAGLIFLOZIN 10 MG: 10 TABLET, FILM COATED ORAL at 09:27

## 2023-06-08 NOTE — PLAN OF CARE
Goal Outcome Evaluation:  Plan of Care Reviewed With: patient            Pt is alert and oriented. Pt is resting comfortably. Pt made no complaints of chest pain, discomfort or shortness of air.  Pt remains afib on monitor. Will continue to monitor.

## 2023-06-08 NOTE — PLAN OF CARE
Goal Outcome Evaluation:  Plan of Care Reviewed With: patient      Pt A&Ox4. Afib on the monitor. HR goes up to 130's but does not sustain. No c/o pain. Pt up to chair for a couple of hours. She walked with physical therapy. NAD noted. Will continue to monitor.

## 2023-06-08 NOTE — THERAPY EVALUATION
Patient Name: Dafne Mary  : 1940    MRN: [id number]                              Today's Date: 2023       Admit Date: 6/3/2023    Visit Dx:     ICD-10-CM ICD-9-CM   1. Atrial fibrillation with RVR  I48.91 427.31   2. COPD exacerbation  J44.1 491.21   3. Congestive heart failure, unspecified HF chronicity, unspecified heart failure type  I50.9 428.0     Patient Active Problem List   Diagnosis    Anxiety    Arteriosclerosis of both carotid arteries    Chronic interstitial cystitis    Cough    Pulmonary emphysema    Gastroesophageal reflux disease    Fibromyalgia    Headache    Hematuria    Hoarseness    Hyperlipidemia    Benign hypertension    Postoperative hypothyroidism    Muscle spasms of both lower extremities    Palpitations    Shortness of breath    Postmenopausal osteoporosis    Chronic fatigue    Hair loss disorder    Dysuria    Dry cough    Spondylolysis, lumbar region    Vocal cord paralysis    Abnormal finding on thyroid function test    Positional lightheadedness    COPD with acute exacerbation    Hypothyroid    COPD (chronic obstructive pulmonary disease)    COPD exacerbation    Morbid obesity with BMI of 70 and over, adult    RSV bronchiolitis    Vitamin D deficiency    B12 deficiency    Iron deficiency    Decreased hemoglobin    Generalized anxiety disorder    Chronic bilateral low back pain with left-sided sciatica    Facet arthropathy, lumbar    Spinal stenosis of lumbar region    Spondylolisthesis of lumbar region    Scoliosis of lumbar spine    Chronic congestive heart failure    History of non-ST elevation myocardial infarction (NSTEMI)    Chronic respiratory failure    Acute respiratory failure    Atrial fibrillation with RVR     Past Medical History:   Diagnosis Date    Atrial fibrillation with RVR 2023    Chronic diastolic congestive heart failure 2022    Depression     Emphysema lung     GERD (gastroesophageal reflux disease)     Heart failure     Hyperlipidemia      Hypertension     Hypothyroidism      Past Surgical History:   Procedure Laterality Date    EYE SURGERY Left     INTUBATION  5/21/2023         PARATHYROIDECTOMY      Patient reports thyroidectomy partial not a para thyroidectomy.    THYROIDECTOMY, PARTIAL      1984      General Information       Row Name 06/08/23 [facility]9          Physical Therapy Time and Intention    Document Type evaluation  -CS     Mode of Treatment individual therapy;physical therapy  -CS       Row Name 06/08/23 [facility]9          General Information    Patient Profile Reviewed yes  -CS     Prior Level of Function independent:;all household mobility;gait;transfer;bed mobility  -CS     Existing Precautions/Restrictions fall  -CS     Barriers to Rehab none identified  -CS       Row Name 06/08/23 1359          Living Environment    People in Home alone  -CS       Row Name 06/08/23 1359          Home Main Entrance    Number of Stairs, Main Entrance three  -CS     Stair Railings, Main Entrance railings safe and in good condition  -CS       Row Name 06/08/23 1351          Stairs Within Home, Primary    Number of Stairs, Within Home, Primary none  -CS       Row Name 06/08/23 1352          Cognition    Orientation Status (Cognition) oriented x 3  -CS       Row Name 06/08/23 [facility]4          Safety Issues, Functional Mobility    Impairments Affecting Function (Mobility) endurance/activity tolerance  -CS               User Key  (r) = Recorded By, (t) = Taken By, (c) = Cosigned By      Initials Name Provider Type    CS Deepthi Turner, ABIEL Physical Therapist                   Mobility       Row Name 06/08/23 1359          Bed Mobility    Comment, (Bed Mobility) NT; UIC  -CS       Row Name 06/08/23 1352          Sit-Stand Transfer    Sit-Stand [place] (Transfers) standby assist  -CS     Assistive Device (Sit-Stand Transfers) other (see comments)  NO AD  -CS       Row Name 06/08/23 1353          Gait/Stairs (Locomotion)    [place] Level (Gait) standby  assist;contact guard  -CS     Assistive Device (Gait) other (see comments)  NO AD & HHA  -CS     Distance in Feet (Gait) 125'  -CS     Deviations/Abnormal Patterns (Gait) foreign decreased;stride length decreased  -CS     [place] Level (Stairs) not tested  -CS     Comment, (Gait/Stairs) slow pace but no LOB  -CS               User Key  (r) = Recorded By, (t) = Taken By, (c) = Cosigned By      Initials Name Provider Type    Deepthi Richards, PT Physical Therapist                   Obj/Interventions       Row Name 06/08/23 1400          Range of Motion Comprehensive    General Range of Motion bilateral lower extremity ROM WFL  -CS       Row Name 06/08/23 1400          Strength Comprehensive (MMT)    General Manual Muscle Testing (MMT) Assessment other (see comments)  -CS     Comment, General Manual Muscle Testing (MMT) Assessment B LE 4/5 except B hip flexor 3+/5  -CS       Row Name 06/08/23 1400          Balance    Balance Assessment sitting static balance;sitting dynamic balance;standing static balance;standing dynamic balance  -CS     Static Sitting Balance independent  -CS     Dynamic Sitting Balance independent  -CS     Position, Sitting Balance supported;sitting in chair  -CS     Static Standing Balance standby assist  -CS     Dynamic Standing Balance standby assist;contact guard  -CS     Position/Device Used, Standing Balance supported;unsupported  -CS               User Key  (r) = Recorded By, (t) = Taken By, (c) = Cosigned By      Initials Name Provider Type    Deepthi Richards, PT Physical Therapist                   Goals/Plan       Row Name 06/08/23 1404          Bed Mobility Goal 1 (PT)    Activity/Assistive Device (Bed Mobility Goal 1, PT) bed mobility activities, all  -CS     [place] Level/Cues Needed (Bed Mobility Goal 1, PT) modified independence  -CS     Time Frame (Bed Mobility Goal 1, PT) 1 week  -       Row Name 06/08/23 1404          Transfer Goal 1 (PT)    Activity/Assistive  Device (Transfer Goal 1, PT) sit-to-stand/stand-to-sit;bed-to-chair/chair-to-bed  -CS     [place] Level/Cues Needed (Transfer Goal 1, PT) modified independence  -CS     Time Frame (Transfer Goal 1, PT) 1 week  -CS       Row Name 06/08/23 1404          Gait Training Goal 1 (PT)    Activity/Assistive Device (Gait Training Goal 1, PT) gait (walking locomotion);assistive device use;increase endurance/gait distance  -CS     [place] Level (Gait Training Goal 1, PT) standby assist  -CS     Distance (Gait Training Goal 1, PT) 150'  -CS     Time Frame (Gait Training Goal 1, PT) 1 week  -CS               User Key  (r) = Recorded By, (t) = Taken By, (c) = Cosigned By      Initials Name Provider Type    CS Deepthi Turner, PT Physical Therapist                   Clinical Impression       Row Name 06/08/23 1400          Pain    Pretreatment Pain Rating 0/10 - no pain  -CS     Posttreatment Pain Rating 0/10 - no pain  -CS       Row Name 06/08/23 1400          Plan of Care Review    Plan of Care Reviewed With patient;daughter  -CS     Outcome Evaluation Pt is a 83 y/o F admitted to [facility] with work-up revealing Afib with RVR. Pt has a past med hx of chronic diastolic CHF, depression, COPD, GERD, hypertension, hyperlipidemia and hypothyroidism. Pt received UIC upon arrival and agreeable to PT eval. Pt reports she lives alone with 3 COLT and is (I) with mobility at . Pt presents to PT with decreased endurance. Pt stood and ambulated 125' c NO AD requiring SBA/CGA. Pt required HHA towards the end of ambulating with reports of B LE weakness (no buckling noted). Pt UIC at end of session and PT encoruaged pt to complete HEP and ambulate with familly or staff throughout the day. PT recommends home with assist and HHPT at D/C.  -CS       Row Name 06/08/23 1400          Therapy Assessment/Plan (PT)    Patient/Family Therapy Goals Statement (PT) to return home  -CS     Rehab Potential (PT) good, to achieve stated therapy goals   -CS     Criteria for Skilled Interventions Met (PT) yes;meets criteria  -CS     Therapy Frequency (PT) 3 times/wk  -CS       Row Name 06/08/23 1400          Positioning and Restraints    Pre-Treatment Position sitting in chair/recliner  -CS     Post Treatment Position chair  -CS     In Chair notified nsg;sitting;call light within reach;encouraged to call for assist;with family/caregiver  cleared to ambulate with family  -CS               User Key  (r) = Recorded By, (t) = Taken By, (c) = Cosigned By      Initials Name Provider Type    Deepthi Richards, PT Physical Therapist                   Outcome Measures       Row Name 06/08/23 1404          How much help from another person do you currently need...    Turning from your back to your side while in flat bed without using bedrails? 4  -CS     Moving from lying on back to sitting on the side of a flat bed without bedrails? 3  -CS     Moving to and from a bed to a chair (including a wheelchair)? 4  -CS     Standing up from a chair using your arms (e.g., wheelchair, bedside chair)? 4  -CS     Climbing 3-5 steps with a railing? 3  -CS     To walk in hospital room? 4  -CS     AM-PAC 6 Clicks Score (PT) 22  -CS     Highest level of mobility 7 --> Walked 25 feet or more  -CS       Row Name 06/08/23 1404          Functional Assessment    Outcome Measure Options AM-PAC 6 Clicks Basic Mobility (PT)  -CS               User Key  (r) = Recorded By, (t) = Taken By, (c) = Cosigned By      Initials Name Provider Type    Deepthi Richards, ABIEL Physical Therapist                                 Physical Therapy Education       Title: PT OT SLP Therapies (Done)       Topic: Physical Therapy (Done)       Point: Mobility training (Done)       Learning Progress Summary             Patient Acceptance, E,TB, VU,DU by CS at 6/8/2023 1404                         Point: Home exercise program (Done)       Learning Progress Summary             Patient Acceptance, E,TB, VU,DU by  at  6/8/2023 1404                         Point: Body mechanics (Done)       Learning Progress Summary             Patient Acceptance, E,TB, VU,DU by CS at 6/8/2023 1404                         Point: Precautions (Done)       Learning Progress Summary             Patient Acceptance, E,TB, VU,DU by  at 6/8/2023 1404                                         User Key       Initials Effective Dates Name Provider Type Discipline    CS 09/22/22 -  Deepthi Turner, PT Physical Therapist PT                  PT Recommendation and Plan     Plan of Care Reviewed With: patient, daughter  Outcome Evaluation: Pt is a 83 y/o F admitted to [facility] with work-up revealing Afib with RVR. Pt has a past med hx of chronic diastolic CHF, depression, COPD, GERD, hypertension, hyperlipidemia and hypothyroidism. Pt received UIC upon arrival and agreeable to PT eval. Pt reports she lives alone with 3 COLT and is (I) with mobility at BL. Pt presents to PT with decreased endurance. Pt stood and ambulated 125' c NO AD requiring SBA/CGA. Pt required HHA towards the end of ambulating with reports of B LE weakness (no buckling noted). Pt UIC at end of session and PT encoruaged pt to complete HEP and ambulate with familly or staff throughout the day. PT recommends home with assist and HHPT at D/C.     Time Calculation:    PT Charges       Row Name 06/08/23 1405             Time Calculation    Start Time 1331  -CS      Stop Time 1344  -CS      Time Calculation (min) 13 min  -CS      PT Received On 06/08/23  -CS      PT - Next Appointment 06/09/23  -      PT Goal Re-Cert Due Date 06/15/23  -         Time Calculation- PT    Total Timed Code Minutes- PT 12 minute(s)  -CS         Timed Charges    59007 - PT Therapeutic Activity Minutes 12  -CS         Total Minutes    Timed Charges Total Minutes 12  -CS       Total Minutes 12  -CS                User Key  (r) = Recorded By, (t) = Taken By, (c) = Cosigned By      Initials Name Provider Type    BROOKS Turner  Deepthi, PT Physical Therapist                  Therapy Charges for Today       Code Description Service Date Service Provider Modifiers Qty    54499835046 HC PT THERAPEUTIC ACT EA 15 MIN 6/8/2023 Deepthi Turner, PT GP 1    89132238377 HC PT EVAL LOW COMPLEXITY 3 6/8/2023 Deepthi Turner, PT GP 1            PT G-Codes  Outcome Measure Options: AM-PAC 6 Clicks Basic Mobility (PT)  AM-PAC 6 Clicks Score (PT): 22  PT Discharge Summary  Anticipated Discharge Disposition (PT): home with assist, home with home health    Deepthi Turner PT  6/8/2023

## 2023-06-08 NOTE — PROGRESS NOTES
"DAILY PROGRESS NOTE  [facility]    Patient Identification:  Name: Dafne Mary  Age: 82 y.o.  Sex: female  :  1940  MRN: [id number]         Primary Care Physician: Sintia Chatterjee PA    Subjective:  Interval History:She feels better On room air.  Still with rapid heart rate    Objective:    Scheduled Meds:amiodarone, 200 mg, Oral, Q12H  apixaban, 5 mg, Oral, Q12H  atenolol, 50 mg, Oral, Q12H  atorvastatin, 20 mg, Oral, Nightly  empagliflozin, 10 mg, Oral, Daily  guaiFENesin, 600 mg, Oral, Q12H  pantoprazole, 40 mg, Oral, Q AM  PARoxetine, 20 mg, Oral, Daily  sodium chloride, 500 mL, Intravenous, Once  sodium chloride, 10 mL, Intravenous, Q12H  thyroid, 15 mg, Oral, Daily  Thyroid, 30 mg, Oral, Daily      Continuous Infusions:     Vital signs in last 24 hours:  Temp:  [97.7 °F (36.5 °C)-98.1 °F (36.7 °C)] 98.1 °F (36.7 °C)  Heart Rate:  [] 103  Resp:  [16-20] 16  BP: ()/() 114/90    Intake/Output:    Intake/Output Summary (Last 24 hours) at 2023 1411  Last data filed at 2023 0344  Gross per 24 hour   Intake --   Output 1050 ml   Net -1050 ml         Exam:  /90   Pulse 103   Temp 98.1 °F (36.7 °C) (Oral)   Resp 16   Ht 152.4 cm (60\")   Wt 67.6 kg (149 lb)   SpO2 94%   BMI 29.10 kg/m²     General Appearance:    Alert, cooperative, no distress   Head:    Normocephalic, without obvious abnormality, atraumatic   Eyes:       Throat:   Lips, tongue, gums normal   Neck:   Supple, symmetrical, trachea midline, no JVD   Lungs:     Clear to auscultation bilaterally, respirations unlabored   Chest Wall:    No tenderness or deformity    Heart:    Regular rate and rhythm, S1 and S2 normal, no murmur,no  Rub or gallop   Abdomen:     Soft, nontender, bowel sounds active, no masses, no organomegaly    Extremities:   Extremities normal, atraumatic, no cyanosis or edema   Pulses:      Skin:   Skin is warm and dry,  no rashes or palpable lesions   Neurologic:   no focal " "deficits noted      Lab Results (last 72 hours)       Procedure Component Value Units Date/Time    Procalcitonin [[id number]]  (Normal) Collected: 06/04/23 1058    Specimen: Blood Updated: 06/04/23 1143     Procalcitonin 0.08 ng/mL     Narrative:      As a Marker for Sepsis (Non-Neonates):    1. <0.5 ng/mL represents a low risk of severe sepsis and/or septic shock.  2. >2 ng/mL represents a high risk of severe sepsis and/or septic shock.    As a Marker for Lower Respiratory Tract Infections that require antibiotic therapy:    PCT on Admission    Antibiotic Therapy       6-12 Hrs later    >0.5                Strongly Recommended  >0.25 - <0.5        Recommended   0.1 - 0.25          Discouraged              Remeasure/reassess PCT  <0.1                Strongly Discouraged     Remeasure/reassess PCT    As 28 day mortality risk marker: \"Change in Procalcitonin Result\" (>80% or <=80%) if Day 0 (or Day 1) and Day 4 values are available. Refer to http://www.[employer]-pct-calculator.com    Change in PCT <=80%  A decrease of PCT levels below or equal to 80% defines a positive change in PCT test result representing a higher risk for 28-day all-cause mortality of patients diagnosed with severe sepsis for septic shock.    Change in PCT >80%  A decrease of PCT levels of more than 80% defines a negative change in PCT result representing a lower risk for 28-day all-cause mortality of patients diagnosed with severe sepsis or septic shock.       Blood Gas, Arterial - [[id number]]  (Abnormal) Collected: 06/04/23 1022    Specimen: Arterial Blood Updated: 06/04/23 1025     Site Arterial: left brachial     Ortiz's Test N/A     pH, Arterial 7.327 pH units      pCO2, Arterial 49.1 mm Hg      pO2, Arterial 74.0 mm Hg      HCO3, Arterial 25.7 mmol/L      Base Excess, Arterial -0.9 mmol/L      O2 Saturation Calculated 93.3 %      Comment: SAT 92% Meter: [id number] : [id number] [facility]        Barometric Pressure for Blood Gas 745.8 " mmHg      Modality Cannula     Flow Rate 3 lpm      Rate 30 Breaths/minute     Lactic Acid, Plasma [[id number]]  (Abnormal) Collected: 06/04/23 0310    Specimen: Blood Updated: 06/04/23 0346     Lactate 4.2 mmol/L     High Sensitivity Troponin T 2Hr [[id number]]  (Abnormal) Collected: 06/04/23 0310    Specimen: Blood Updated: 06/04/23 0345     HS Troponin T 31 ng/L      Troponin T Delta 4 ng/L     Narrative:      High Sensitive Troponin T Reference Range:  <10.0 ng/L- Negative Female for AMI  <15.0 ng/L- Negative Male for AMI  >=10 - Abnormal Female indicating possible myocardial injury.  >=15 - Abnormal Male indicating possible myocardial injury.   Clinicians would have to utilize clinical acumen, EKG, Troponin, and serial changes to determine if it is an Acute Myocardial Infarction or myocardial injury due to an underlying chronic condition.         TSH [[id number]]  (Normal) Collected: 06/04/23 0026    Specimen: Blood Updated: 06/04/23 0104     TSH 0.710 uIU/mL     BNP [[id number]]  (Abnormal) Collected: 06/04/23 0026    Specimen: Blood Updated: 06/04/23 0104     proBNP 7,773.0 pg/mL     Narrative:      Among patients with dyspnea, NT-proBNP is highly sensitive for the detection of acute congestive heart failure. In addition NT-proBNP of <300 pg/ml effectively rules out acute congestive heart failure with 99% negative predictive value.    Results may be falsely decreased if patient taking Biotin.      High Sensitivity Troponin T [[id number]]  (Abnormal) Collected: 06/04/23 0026    Specimen: Blood Updated: 06/04/23 0104     HS Troponin T 27 ng/L     Narrative:      High Sensitive Troponin T Reference Range:  <10.0 ng/L- Negative Female for AMI  <15.0 ng/L- Negative Male for AMI  >=10 - Abnormal Female indicating possible myocardial injury.  >=15 - Abnormal Male indicating possible myocardial injury.   Clinicians would have to utilize clinical acumen, EKG, Troponin, and serial changes to determine if it is an Acute  Myocardial Infarction or myocardial injury due to an underlying chronic condition.         Magnesium [[id number]]  (Normal) Collected: 06/04/23 0026    Specimen: Blood Updated: 06/04/23 0058     Magnesium 2.2 mg/dL     Comprehensive Metabolic Panel [[id number]]  (Abnormal) Collected: 06/04/23 0026    Specimen: Blood Updated: 06/04/23 0058     Glucose 161 mg/dL      BUN 32 mg/dL      Creatinine 0.97 mg/dL      Sodium 133 mmol/L      Potassium 4.5 mmol/L      Chloride 99 mmol/L      CO2 22.1 mmol/L      Calcium 8.8 mg/dL      Total Protein 5.9 g/dL      Albumin 3.7 g/dL      ALT (SGPT) 78 U/L      AST (SGOT) 27 U/L      Alkaline Phosphatase 131 U/L      Total Bilirubin 0.5 mg/dL      Globulin 2.2 gm/dL      A/G Ratio 1.7 g/dL      BUN/Creatinine Ratio 33.0     Anion Gap 11.9 mmol/L      eGFR 58.5 mL/min/1.73     Narrative:      GFR Normal >60  Chronic Kidney Disease <60  Kidney Failure <15    The GFR formula is only valid for adults with stable renal function between ages 18 and 70.    CBC & Differential [[id number]]  (Abnormal) Collected: 06/04/23 0026    Specimen: Blood Updated: 06/04/23 0038    Narrative:      The following orders were created for panel order CBC & Differential.  Procedure                               Abnormality         Status                     ---------                               -----------         ------                     CBC Auto Differential[[id number]]        Abnormal            Final result                 Please view results for these tests on the individual orders.    CBC Auto Differential [[id number]]  (Abnormal) Collected: 06/04/23 0026    Specimen: Blood Updated: 06/04/23 0038     WBC 15.90 10*3/mm3      RBC 4.13 10*6/mm3      Hemoglobin 11.9 g/dL      Hematocrit 36.4 %      MCV 88.1 fL      MCH 28.8 pg      MCHC 32.7 g/dL      RDW 13.2 %      RDW-SD 41.9 fl      MPV 9.9 fL      Platelets 260 10*3/mm3      Neutrophil % 87.5 %      Lymphocyte % 3.6 %      Monocyte % 5.7 %       Eosinophil % 0.0 %      Basophil % 0.1 %      Immature Grans % 3.1 %      Neutrophils, Absolute 13.92 10*3/mm3      Lymphocytes, Absolute 0.57 10*3/mm3      Monocytes, Absolute 0.90 10*3/mm3      Eosinophils, Absolute 0.00 10*3/mm3      Basophils, Absolute 0.02 10*3/mm3      Immature Grans, Absolute 0.49 10*3/mm3      nRBC 0.1 /100 WBC           Data Review:  Results from last 7 days   Lab Units 06/08/23  0520 06/07/23  0517 06/06/23  0504   SODIUM mmol/L 135* 138 135*   POTASSIUM mmol/L 3.8 4.2 3.7   CHLORIDE mmol/L 97* 96* 93*   CO2 mmol/L 29.0 30.0* 31.7*   BUN mg/dL 31* 36* 37*   CREATININE mg/dL 1.23* 1.25* 1.21*   GLUCOSE mg/dL 94 100* 87   CALCIUM mg/dL 8.4* 8.4* 8.5*       Results from last 7 days   Lab Units 06/08/23  0520 06/07/23  0517 06/06/23  0504   WBC 10*3/mm3 9.98 10.26 12.01*   HEMOGLOBIN g/dL 14.0 13.9 12.4   HEMATOCRIT % 42.2 41.9 37.4   PLATELETS 10*3/mm3 200 228 223       Results from last 7 days   Lab Units 06/04/23  0026   TSH uIU/mL 0.710           Lab Results   Lab Value Date/Time    TROPONINT 31 (H) 06/04/2023 0310    TROPONINT 27 (H) 06/04/2023 0026    TROPONINT 9 05/20/2023 2125    TROPONINT <0.010 12/08/2022 0128    TROPONINT <0.010 05/04/2022 0335    TROPONINT <0.010 05/03/2022 2322    TROPONINT 0.013 05/03/2022 1804    TROPONINT <0.010 05/03/2022 1250    TROPONINT <0.010 10/29/2021 1444    TROPONINT <0.010 02/13/2018 0920         Results from last 7 days   Lab Units 06/04/23  0026   ALK PHOS U/L 131*   BILIRUBIN mg/dL 0.5   ALT (SGPT) U/L 78*   AST (SGOT) U/L 27       Results from last 7 days   Lab Units 06/04/23  0026   TSH uIU/mL 0.710           No results found for: POCGLU        Past Medical History:   Diagnosis Date    Atrial fibrillation with RVR 6/4/2023    Chronic diastolic congestive heart failure 11/17/2022    Depression     Emphysema lung     GERD (gastroesophageal reflux disease)     Heart failure     Hyperlipidemia     Hypertension     Hypothyroidism         Assessment:  Active Hospital Problems    Diagnosis  POA    **Atrial fibrillation with RVR [I48.91]  Yes    Chronic congestive heart failure [I50.9]  Yes    Anxiety [F41.9]  Yes    Chronic interstitial cystitis [N30.10]  Yes    Pulmonary emphysema [J43.9]  Yes    Gastroesophageal reflux disease [K21.9]  Yes    Fibromyalgia [M79.7]  Yes    Benign hypertension [I10]  Yes    Hyperlipidemia [E78.5]  Yes      Resolved Hospital Problems   No resolved problems to display.       Plan:  Continue with rate control. Follow lab. Holding Lasix.  As per Cardiology and Pulmonary.   PT eval DC planning probably needs a couple more days.    Amor Shane MD  6/8/2023  14:11 EDT

## 2023-06-08 NOTE — PLAN OF CARE
Goal Outcome Evaluation:  Plan of Care Reviewed With: patient, daughter           Outcome Evaluation: Pt is a 81 y/o F admitted to [facility] with work-up revealing Afib with RVR. Pt has a past med hx of chronic diastolic CHF, depression, COPD, GERD, hypertension, hyperlipidemia and hypothyroidism. Pt received UIC upon arrival and agreeable to PT eval. Pt reports she lives alone with 3 COLT and is (I) with mobility at BL. Pt presents to PT with decreased endurance. Pt stood and ambulated 125' c NO AD requiring SBA/CGA. Pt required HHA towards the end of ambulating with reports of B LE weakness (no buckling noted). Pt UIC at end of session and PT encoruaged pt to complete HEP and ambulate with familly or staff throughout the day. PT recommends home with assist and HHPT at D/C.

## 2023-06-09 LAB
ANION GAP SERPL CALCULATED.3IONS-SCNC: 8.9 MMOL/L (ref 5–15)
BASOPHILS # BLD AUTO: 0.02 10*3/MM3 (ref 0–0.2)
BASOPHILS NFR BLD AUTO: 0.2 % (ref 0–1.5)
BUN SERPL-MCNC: 21 MG/DL (ref 8–23)
BUN/CREAT SERPL: 19.1 (ref 7–25)
CALCIUM SPEC-SCNC: 8.6 MG/DL (ref 8.6–10.5)
CHLORIDE SERPL-SCNC: 96 MMOL/L (ref 98–107)
CO2 SERPL-SCNC: 32.1 MMOL/L (ref 22–29)
CREAT SERPL-MCNC: 1.1 MG/DL (ref 0.57–1)
DEPRECATED RDW RBC AUTO: 41.1 FL (ref 37–54)
EGFRCR SERPLBLD CKD-EPI 2021: 50.3 ML/MIN/1.73
EOSINOPHIL # BLD AUTO: 0.27 10*3/MM3 (ref 0–0.4)
EOSINOPHIL NFR BLD AUTO: 2.6 % (ref 0.3–6.2)
ERYTHROCYTE [DISTWIDTH] IN BLOOD BY AUTOMATED COUNT: 12.8 % (ref 12.3–15.4)
GLUCOSE SERPL-MCNC: 97 MG/DL (ref 65–99)
HCT VFR BLD AUTO: 40 % (ref 34–46.6)
HGB BLD-MCNC: 13.5 G/DL (ref 12–15.9)
IMM GRANULOCYTES # BLD AUTO: 0.26 10*3/MM3 (ref 0–0.05)
IMM GRANULOCYTES NFR BLD AUTO: 2.5 % (ref 0–0.5)
LYMPHOCYTES # BLD AUTO: 1.36 10*3/MM3 (ref 0.7–3.1)
LYMPHOCYTES NFR BLD AUTO: 12.9 % (ref 19.6–45.3)
MCH RBC QN AUTO: 29.5 PG (ref 26.6–33)
MCHC RBC AUTO-ENTMCNC: 33.8 G/DL (ref 31.5–35.7)
MCV RBC AUTO: 87.3 FL (ref 79–97)
MONOCYTES # BLD AUTO: 0.9 10*3/MM3 (ref 0.1–0.9)
MONOCYTES NFR BLD AUTO: 8.5 % (ref 5–12)
NEUTROPHILS NFR BLD AUTO: 7.73 10*3/MM3 (ref 1.7–7)
NEUTROPHILS NFR BLD AUTO: 73.3 % (ref 42.7–76)
NRBC BLD AUTO-RTO: 0 /100 WBC (ref 0–0.2)
PLATELET # BLD AUTO: 202 10*3/MM3 (ref 140–450)
PMV BLD AUTO: 9.9 FL (ref 6–12)
POTASSIUM SERPL-SCNC: 3.9 MMOL/L (ref 3.5–5.2)
RBC # BLD AUTO: 4.58 10*6/MM3 (ref 3.77–5.28)
SODIUM SERPL-SCNC: 137 MMOL/L (ref 136–145)
WBC NRBC COR # BLD: 10.54 10*3/MM3 (ref 3.4–10.8)

## 2023-06-09 PROCEDURE — 85025 COMPLETE CBC W/AUTO DIFF WBC: CPT | Performed by: HOSPITALIST

## 2023-06-09 PROCEDURE — 80048 BASIC METABOLIC PNL TOTAL CA: CPT | Performed by: HOSPITALIST

## 2023-06-09 PROCEDURE — 97530 THERAPEUTIC ACTIVITIES: CPT

## 2023-06-09 PROCEDURE — 94799 UNLISTED PULMONARY SVC/PX: CPT

## 2023-06-09 RX ORDER — ATENOLOL 25 MG/1
25 TABLET ORAL EVERY 12 HOURS SCHEDULED
Status: DISCONTINUED | OUTPATIENT
Start: 2023-06-09 | End: 2023-06-10

## 2023-06-09 RX ADMIN — EMPAGLIFLOZIN 10 MG: 10 TABLET, FILM COATED ORAL at 08:41

## 2023-06-09 RX ADMIN — PAROXETINE HYDROCHLORIDE HEMIHYDRATE 20 MG: 20 TABLET, FILM COATED ORAL at 08:41

## 2023-06-09 RX ADMIN — AMIODARONE HYDROCHLORIDE 200 MG: 200 TABLET ORAL at 20:35

## 2023-06-09 RX ADMIN — PANTOPRAZOLE SODIUM 40 MG: 40 TABLET, DELAYED RELEASE ORAL at 06:38

## 2023-06-09 RX ADMIN — APIXABAN 5 MG: 5 TABLET, FILM COATED ORAL at 20:35

## 2023-06-09 RX ADMIN — APIXABAN 5 MG: 5 TABLET, FILM COATED ORAL at 08:41

## 2023-06-09 RX ADMIN — ATENOLOL 25 MG: 25 TABLET ORAL at 08:42

## 2023-06-09 RX ADMIN — ATORVASTATIN CALCIUM 20 MG: 20 TABLET, FILM COATED ORAL at 20:35

## 2023-06-09 RX ADMIN — Medication 10 ML: at 08:43

## 2023-06-09 RX ADMIN — LEVOTHYROXINE, LIOTHYRONINE 30 MG: 19; 4.5 TABLET ORAL at 08:41

## 2023-06-09 RX ADMIN — LEVOTHYROXINE, LIOTHYRONINE 15 MG: 19; 4.5 TABLET ORAL at 08:41

## 2023-06-09 RX ADMIN — GUAIFENESIN 600 MG: 600 TABLET, EXTENDED RELEASE ORAL at 20:35

## 2023-06-09 RX ADMIN — Medication 10 ML: at 20:36

## 2023-06-09 RX ADMIN — ATENOLOL 25 MG: 25 TABLET ORAL at 20:35

## 2023-06-09 RX ADMIN — GUAIFENESIN 600 MG: 600 TABLET, EXTENDED RELEASE ORAL at 08:41

## 2023-06-09 RX ADMIN — AMIODARONE HYDROCHLORIDE 200 MG: 200 TABLET ORAL at 08:41

## 2023-06-09 NOTE — PROGRESS NOTES
[facility] Follow Up    Chief Complaint: Follow up CHF, afib    Interval History: Denies any chest pain or shortness of breath.  Denies any lightheadedness.  Blood pressures still remain relatively low.    Objective:     Objective:  Temp:  [97.7 °F (36.5 °C)-98.4 °F (36.9 °C)] 98.2 °F (36.8 °C)  Heart Rate:  [] 103  Resp:  [16-18] 18  BP: ()/(62-90) 88/62     Intake/Output Summary (Last 24 hours) at 6/9/2023 0731  Last data filed at 6/8/2023 2030  Gross per 24 hour   Intake 240 ml   Output --   Net 240 ml     Body mass index is 29.1 kg/m².      06/03/23  2310 06/04/23  0536 06/05/23  1521   Weight: 60.3 kg (133 lb) 68 kg (149 lb 14.6 oz) 67.6 kg (149 lb)     Weight change:       Physical Exam:   General : Alert, cooperative, in no acute distress.  Neuro: Alert,cooperative and oriented.  Lungs: CTAB. Normal respiratory effort and rate.  CV: Irregularly, irregular, normal S1 and S2, no murmurs, gallops or rubs.  ABD: Soft, nontender, nondistended. Positive bowel sounds.  Extr: No edema or cyanosis, moves all extremities.    Lab Review:   Results from last 7 days   Lab Units 06/09/23  0644 06/08/23  0520 06/06/23  0504 06/04/23  0026   SODIUM mmol/L 137 135*   < > 133*   POTASSIUM mmol/L 3.9 3.8   < > 4.5   CHLORIDE mmol/L 96* 97*   < > 99   CO2 mmol/L 32.1* 29.0   < > 22.1   BUN mg/dL 21 31*   < > 32*   CREATININE mg/dL 1.10* 1.23*   < > 0.97   GLUCOSE mg/dL 97 94   < > 161*   CALCIUM mg/dL 8.6 8.4*   < > 8.8   AST (SGOT) U/L  --   --   --  27   ALT (SGPT) U/L  --   --   --  78*    < > = values in this interval not displayed.     Results from last 7 days   Lab Units 06/04/23  0310 06/04/23  0026   HSTROP T ng/L 31* 27*     Results from last 7 days   Lab Units 06/09/23  0643 06/08/23  0520   WBC 10*3/mm3 10.54 9.98   HEMOGLOBIN g/dL 13.5 14.0   HEMATOCRIT % 40.0 42.2   PLATELETS 10*3/mm3 202 200         Results from last 7 days   Lab Units 06/04/23  0026   MAGNESIUM mg/dL 2.2            Invalid input(s): LDLCALC  Results from last 7 days   Lab Units 06/04/23  0026   PROBNP pg/mL 7,773.0*     Results from last 7 days   Lab Units 06/04/23  0026   TSH uIU/mL 0.710     I reviewed the patient's new clinical results.  I personally viewed and interpreted the patient's EKG  Current Medications:   Scheduled Meds:amiodarone, 200 mg, Oral, Q12H  apixaban, 5 mg, Oral, Q12H  atenolol, 50 mg, Oral, Q12H  atorvastatin, 20 mg, Oral, Nightly  empagliflozin, 10 mg, Oral, Daily  guaiFENesin, 600 mg, Oral, Q12H  pantoprazole, 40 mg, Oral, Q AM  PARoxetine, 20 mg, Oral, Daily  sodium chloride, 500 mL, Intravenous, Once  sodium chloride, 10 mL, Intravenous, Q12H  thyroid, 15 mg, Oral, Daily  Thyroid, 30 mg, Oral, Daily      Continuous Infusions:     Allergies:  Allergies   Allergen Reactions    Lansoprazole Nausea Only     NAUSEA  NAUSEA  NAUSEA    Diphenhydramine Hcl (Sleep) Irritability    Lisinopril Cough       Assessment/Plan:     1.  Acute systolic congestive heart failure.  Improved.  Oral furosemide stopped due to renal insufficiency and hypotension.  No evidence of volume overload today.  2.  Cardiomyopathy.  Although there is some concern for wall motion abnormalities I suspect her cardiomyopathy is tachycardia mediated due to atrial fibrillation with rapid rates.  She is on atenolol.  Jardiance started this admission.  Remains on losartan although had to decrease dosage.  Unfortunately not a lot of blood pressure room to push guideline directed management further.  2.  Atrial fibrillation.  New diagnosis last admission.  Initially thought this may be related to respiratory issues at the time.  However this is now persistent and she is having issues with rate control.   Started on apixaban this admission.  Diltiazem infusion discontinued.  Started on oral amiodarone on 6/7 with some improvement in heart rates.  3.  Acute hypoxic respiratory failure.  Resolved.  Now on room air.  4.  Hypertension.  With  relatively low blood pressures despite discontinuation of both losartan and furosemide.  5.  Asthma and COPD  6.  Pulmonary hypertension  7.  Acute kidney injury.  Improving with the discontinuation of losartan and furosemide.      -Unfortunately because of her ongoing issues with low blood pressures I am going to have to decrease her atenolol dosage to 25 mg twice a day.  - Hopefully her heart rates will continue to improve with oral amiodarone.  Continue current dose of amiodarone for 4 more days (total of a week) and then will need to decrease to 200 mg daily.  -If her blood pressures improve this morning and she remains asymptomatic I think she can be discharged home later today.    Nae Norman MD  06/09/23  07:31 EDT

## 2023-06-09 NOTE — PLAN OF CARE
Goal Outcome Evaluation:            Pt resting in bed, Aox4 , no sign of acute distress noted , A fib on tele , room air , Nursing will continue to monitor and  assist Pt as needed

## 2023-06-09 NOTE — DISCHARGE PLACEMENT REQUEST
"Dafne Mary (82 y.o. Female)       Date of Birth   1940    Social Security Number       Address   [address] [place] [address]    Home Phone   [phone]    MRN   [id number]       Evangelical   Zoroastrian    Marital Status                               Admission Date   6/3/23    Admission Type   Emergency    Admitting Provider   Ramos Griffin MD    Attending Provider   Amor Shane MD    Department, Room/Bed   [address], E664/1       Discharge Date       Discharge Disposition       Discharge Destination                                 Attending Provider: Amor Shane MD    Allergies: Lansoprazole, Diphenhydramine Hcl (Sleep), Lisinopril    Isolation: None   Infection: None   Code Status: CPR    Ht: 152.4 cm (60\")   Wt: 67.6 kg (149 lb)    Admission Cmt: None   Principal Problem: Atrial fibrillation with RVR [I48.91]                   Active Insurance as of 6/3/2023       Primary Coverage       Payor Plan Insurance Group Employer/Plan Group    MEDICARE MEDICARE A & B        Payor Plan Address Payor Plan Phone Number Payor Plan Fax Number Effective Dates    [address] [phone]  12/1/2005 - None Entered    [facility] [id number]         Subscriber Name Subscriber Birth Date Member ID       DAFNE MARY 1940 [id number]               Secondary Coverage       Payor Plan Insurance Group Employer/Plan Group    [employer]        Payor Plan Address Payor Plan Phone Number Payor Plan Fax Number Effective Dates    [address] [phone]  1/17/2006 - None Entered    [facility] [address]         Subscriber Name Subscriber Birth Date Member ID       DAFNE MARY 1940 [id number]                     Emergency Contacts        (Rel.) Home Phone Work Phone Mobile Phone    [place] (Daughter) [phone] -- [phone]    Balaji Mary (Son) [phone] -- [phone]    kristie gentile " (Daughter) [phone] -- --

## 2023-06-09 NOTE — CASE MANAGEMENT/SOCIAL WORK
Continued Stay Note  [facility]     Patient Name: Dafne Mary  MRN: [id number]  Today's Date: 6/9/2023    Admit Date: 6/3/2023    Plan: Plan [facility] for skilled care.  STEFANY Martins RN   Discharge Plan       Row Name 06/09/23 1539       Plan    Plan Plan [facility] for skilled care.  STEFANY Martins RN    Patient/Family in Agreement with Plan yes    Plan Comments Per [facility]uma  ( [phone]) [facility] will have a bed this weekend and can pt.  Pt's daughter (Felicia) can transport pt.  Plan [facility] for skilled care.   STEFANY Martins RN                   Discharge Codes    No documentation.                 Expected Discharge Date and Time       Expected Discharge Date Expected Discharge Time    [place] 10, 2023               Emily Martins RN

## 2023-06-09 NOTE — CASE MANAGEMENT/SOCIAL WORK
Continued Stay Note  [facility]     Patient Name: Dafne Mary  MRN: [id number]  Today's Date: 6/9/2023    Admit Date: 6/3/2023    Plan: Plan home with HH or SNF at accepting facility.   STEFANY Martins RN   Discharge Plan       Row Name 06/09/23 1227       Plan    Plan Plan home with HH or SNF at accepting facility.   STEFAYN Martins RN    Patient/Family in Agreement with Plan yes    Plan Comments Spoke with pt at bedside.  Pt states she would benefit from skilled care.  Pt 's choice for skilled care is [facility] ( [phone]) called to follow.  Pt;s daughter states she can transport pt.   Plan home with HH or SNF at accepting facility.   STEFANY Martins RN                   Discharge Codes    No documentation.                 Expected Discharge Date and Time       Expected Discharge Date Expected Discharge Time    [place] 10, 2023               Emily Martins RN

## 2023-06-09 NOTE — PLAN OF CARE
Goal Outcome Evaluation:  Plan of Care Reviewed With: patient           Outcome Evaluation: Pt participated with PT this PM. Pt reports she is up ad jessie in room to the bathroom but is agreeable to mobilize with PT. She completed bed mobility independently, stood with sba and  ambulated 140' cga with HHA. No overt loss of balance noted but pt reports feeling generally weak and off balance compared to her baseline. Discussed possible use of walker at home initially to improve her steadiness and confidence on her feet - pt interested in a rollator. Recommending d/c home with assist and HH.

## 2023-06-09 NOTE — PLAN OF CARE
Goal Outcome Evaluation:              Outcome Evaluation: Cont monitoring HR and BP.  Atenolol changed to 25mg.  Pt worked with PT.  Pt has been getting up to the BR and was told to evaluate herself to make sure she is not dizzy to get up.  Will cont to monitor.

## 2023-06-09 NOTE — THERAPY TREATMENT NOTE
Patient Name: Dafne Mary  : 1940    MRN: [id number]                              Today's Date: 2023       Admit Date: 6/3/2023    Visit Dx:     ICD-10-CM ICD-9-CM   1. Atrial fibrillation with RVR  I48.91 427.31   2. COPD exacerbation  J44.1 491.21   3. Congestive heart failure, unspecified HF chronicity, unspecified heart failure type  I50.9 428.0     Patient Active Problem List   Diagnosis   • Anxiety   • Arteriosclerosis of both carotid arteries   • Chronic interstitial cystitis   • Cough   • Pulmonary emphysema   • Gastroesophageal reflux disease   • Fibromyalgia   • Headache   • Hematuria   • Hoarseness   • Hyperlipidemia   • Benign hypertension   • Postoperative hypothyroidism   • Muscle spasms of both lower extremities   • Palpitations   • Shortness of breath   • Postmenopausal osteoporosis   • Chronic fatigue   • Hair loss disorder   • Dysuria   • Dry cough   • Spondylolysis, lumbar region   • Vocal cord paralysis   • Abnormal finding on thyroid function test   • Positional lightheadedness   • COPD with acute exacerbation   • Hypothyroid   • COPD (chronic obstructive pulmonary disease)   • COPD exacerbation   • Morbid obesity with BMI of 70 and over, adult   • RSV bronchiolitis   • Vitamin D deficiency   • B12 deficiency   • Iron deficiency   • Decreased hemoglobin   • Generalized anxiety disorder   • Chronic bilateral low back pain with left-sided sciatica   • Facet arthropathy, lumbar   • Spinal stenosis of lumbar region   • Spondylolisthesis of lumbar region   • Scoliosis of lumbar spine   • Chronic congestive heart failure   • History of non-ST elevation myocardial infarction (NSTEMI)   • Chronic respiratory failure   • Acute respiratory failure   • Atrial fibrillation with RVR     Past Medical History:   Diagnosis Date   • Atrial fibrillation with RVR 2023   • Chronic diastolic congestive heart failure 2022   • Depression    • Emphysema lung    • GERD (gastroesophageal reflux  disease)    • Heart failure    • Hyperlipidemia    • Hypertension    • Hypothyroidism      Past Surgical History:   Procedure Laterality Date   • EYE SURGERY Left    • INTUBATION  5/21/2023        • PARATHYROIDECTOMY      Patient reports thyroidectomy partial not a para thyroidectomy.   • THYROIDECTOMY, PARTIAL      1984      General Information     Row Name 06/09/23 1507          Physical Therapy Time and Intention    Document Type therapy note (daily note)  -CW     Mode of Treatment physical therapy;individual therapy  -CW     Row Name 06/09/23 1507          General Information    Patient Profile Reviewed yes  -CW     Existing Precautions/Restrictions fall  -CW     Row Name 06/09/23 1507          Cognition    Orientation Status (Cognition) oriented x 3  -CW     Row Name 06/09/23 1507          Safety Issues, Functional Mobility    Impairments Affecting Function (Mobility) endurance/activity tolerance;balance  -CW           User Key  (r) = Recorded By, (t) = Taken By, (c) = Cosigned By    Initials Name Provider Type    CW Christina Crenshaw, PT Physical Therapist               Mobility     Row Name 06/09/23 1507          Bed Mobility    Bed Mobility supine-sit  -CW     Supine-Sit Stephenson (Bed Mobility) independent  -CW     Assistive Device (Bed Mobility) head of bed elevated  -CW     Comment, (Bed Mobility) wanted to stay sitting EOB at end of session  -CW     Row Name 06/09/23 1507          Sit-Stand Transfer    Sit-Stand Stephenson (Transfers) standby assist  -     Row Name 06/09/23 1507          Gait/Stairs (Locomotion)    Stephenson Level (Gait) contact guard  -     Assistive Device (Gait) other (see comments)  HHA  -CW     Distance in Feet (Gait) 140'  -CW     Deviations/Abnormal Patterns (Gait) gait speed decreased;foreign decreased  -CW     Comment, (Gait/Stairs) No overt loss of balance, pt reports feeling a little unsteady on her feet  -CW           User Key  (r) = Recorded By, (t) = Taken By,  (c) = Cosigned By    Initials Name Provider Type    Christina Chun, ABIEL Physical Therapist               Obj/Interventions     Row Name 06/09/23 1508          Motor Skills    Therapeutic Exercise other (see comments)  jeana opnce x10 reps  -CW     Row Name 06/09/23 1508          Balance    Dynamic Sitting Balance independent  -CW     Static Standing Balance supervision  -CW     Dynamic Standing Balance contact guard  -CW     Comment, Balance HHA  -CW           User Key  (r) = Recorded By, (t) = Taken By, (c) = Cosigned By    Initials Name Provider Type    Christina Chun PT Physical Therapist               Goals/Plan    No documentation.                Clinical Impression     Row Name 06/09/23 1514          Pain    Pretreatment Pain Rating 0/10 - no pain  -CW     Posttreatment Pain Rating 0/10 - no pain  -CW     Row Name 06/09/23 1514          Plan of Care Review    Plan of Care Reviewed With patient  -CW     Outcome Evaluation Pt participated with PT this PM. Pt reports she is up ad jessie in room to the bathroom but is agreeable to mobilize with PT. She completed bed mobility independently, stood with sba and  ambulated 140' cga with HHA. No overt loss of balance noted but pt reports feeling generally weak and off balance compared to her baseline. Discussed possible use of walker at home initially to improve her steadiness and confidence on her feet - pt interested in a rollator. Recommending d/c home with assist and HH.  -CW     Row Name 06/09/23 1514          Vital Signs    O2 Delivery Pre Treatment room air  -CW     O2 Delivery Intra Treatment room air  -CW     O2 Delivery Post Treatment room air  -CW           User Key  (r) = Recorded By, (t) = Taken By, (c) = Cosigned By    Initials Name Provider Type    Christina Chun PT Physical Therapist               Outcome Measures     Row Name 06/09/23 1518 06/09/23 0821       How much help from another person do you currently need...    Turning from  your back to your side while in flat bed without using bedrails? 4  -CW 4  -RW    Moving from lying on back to sitting on the side of a flat bed without bedrails? 4  -CW 3  -RW    Moving to and from a bed to a chair (including a wheelchair)? 4  -CW 4  -RW    Standing up from a chair using your arms (e.g., wheelchair, bedside chair)? 4  -CW 4  -RW    Climbing 3-5 steps with a railing? 3  -CW 3  -RW    To walk in hospital room? 3  -CW 4  -RW    AM-PAC 6 Clicks Score (PT) 22  -CW 22  -RW    Highest level of mobility 7 --> Walked 25 feet or more  -CW 7 --> Walked 25 feet or more  -RW    Row Name 06/09/23 1518          Functional Assessment    Outcome Measure Options AM-PAC 6 Clicks Basic Mobility (PT)  -CW           User Key  (r) = Recorded By, (t) = Taken By, (c) = Cosigned By    Initials Name Provider Type    RW Colt Drew RN Registered Nurse    Christina Chun, ABIEL Physical Therapist                             Physical Therapy Education     Title: PT OT SLP Therapies (Done)     Topic: Physical Therapy (Done)     Point: Mobility training (Done)     Learning Progress Summary           Patient Acceptance, E,TB, VU by  at 6/9/2023 1048    Acceptance, E,TB, VU,DU by  at 6/8/2023 1404                   Point: Home exercise program (Done)     Learning Progress Summary           Patient Acceptance, E,TB, VU by  at 6/9/2023 1048    Acceptance, E,TB, VU,DU by  at 6/8/2023 1404                   Point: Body mechanics (Done)     Learning Progress Summary           Patient Acceptance, E,TB, VU by  at 6/9/2023 1048    Acceptance, E,TB, VU,DU by  at 6/8/2023 1404                   Point: Precautions (Done)     Learning Progress Summary           Patient Acceptance, E,TB, VU by  at 6/9/2023 1048    Acceptance, E,TB, VU,DU by  at 6/8/2023 1404                               User Key     Initials Effective Dates Name Provider Type Discipline     06/16/21 -  Colt Drew, TERRANCE Registered Nurse Nurse    BROOKS 09/22/22  -  Deepthi Turner, PT Physical Therapist PT              PT Recommendation and Plan     Plan of Care Reviewed With: patient  Outcome Evaluation: Pt participated with PT this PM. Pt reports she is up ad jessie in room to the bathroom but is agreeable to mobilize with PT. She completed bed mobility independently, stood with sba and  ambulated 140' cga with HHA. No overt loss of balance noted but pt reports feeling generally weak and off balance compared to her baseline. Discussed possible use of walker at home initially to improve her steadiness and confidence on her feet - pt interested in a rollator. Recommending d/c home with assist and HH.     Time Calculation:    PT Charges     Row Name 06/09/23 1506 06/09/23 0816          Time Calculation    Start Time 1359  -CW --     Stop Time 1410  -CW --     Time Calculation (min) 11 min  -CW --     PT Received On 06/09/23  -SWEETIE --     PT - Next Appointment 06/12/23  -CW 06/10/23  -NOVA           User Key  (r) = Recorded By, (t) = Taken By, (c) = Cosigned By    Initials Name Provider Type    Asha Puente, PT Physical Therapist    CW Christina Crenshaw, ABIEL Physical Therapist              Therapy Charges for Today     Code Description Service Date Service Provider Modifiers Qty    28228360392  PT THERAPEUTIC ACT EA 15 MIN 6/9/2023 Christina Crenshaw, ABIEL GP 1          PT G-Codes  Outcome Measure Options: AM-PAC 6 Clicks Basic Mobility (PT)  AM-PAC 6 Clicks Score (PT): 22  PT Discharge Summary  Anticipated Discharge Disposition (PT): home with assist, home with home health    Christina Crenshaw PT  6/9/2023

## 2023-06-09 NOTE — PROGRESS NOTES
"DAILY PROGRESS NOTE  [facility]    Patient Identification:  Name: Dafne Mary  Age: 82 y.o.  Sex: female  :  1940  MRN: [id number]         Primary Care Physician: Sintia Chatterjee PA    Subjective:  Interval History:She feels better On room air.  Still with rapid heart rate    Objective:    Scheduled Meds:amiodarone, 200 mg, Oral, Q12H  apixaban, 5 mg, Oral, Q12H  atenolol, 25 mg, Oral, Q12H  atorvastatin, 20 mg, Oral, Nightly  empagliflozin, 10 mg, Oral, Daily  guaiFENesin, 600 mg, Oral, Q12H  pantoprazole, 40 mg, Oral, Q AM  PARoxetine, 20 mg, Oral, Daily  sodium chloride, 500 mL, Intravenous, Once  sodium chloride, 10 mL, Intravenous, Q12H  thyroid, 15 mg, Oral, Daily  Thyroid, 30 mg, Oral, Daily      Continuous Infusions:     Vital signs in last 24 hours:  Temp:  [98.1 °F (36.7 °C)-98.4 °F (36.9 °C)] 98.2 °F (36.8 °C)  Heart Rate:  [102-119] 104  Resp:  [16-18] 18  BP: ()/(62-89) 119/89    Intake/Output:    Intake/Output Summary (Last 24 hours) at 2023 1429  Last data filed at 2023 0821  Gross per 24 hour   Intake 480 ml   Output --   Net 480 ml       Exam:  /89 (BP Location: Right arm, Patient Position: Lying)   Pulse 104   Temp 98.2 °F (36.8 °C) (Oral)   Resp 18   Ht 152.4 cm (60\")   Wt 67.6 kg (149 lb)   SpO2 97%   BMI 29.10 kg/m²     General Appearance:    Alert, cooperative, no distress   Head:    Normocephalic, without obvious abnormality, atraumatic   Eyes:       Throat:   Lips, tongue, gums normal   Neck:   Supple, symmetrical, trachea midline, no JVD   Lungs:     Clear to auscultation bilaterally, respirations unlabored   Chest Wall:    No tenderness or deformity    Heart:    Regular rate and rhythm, S1 and S2 normal, no murmur,no  Rub or gallop   Abdomen:     Soft, nontender, bowel sounds active, no masses, no organomegaly    Extremities:   Extremities normal, atraumatic, no cyanosis or edema   Pulses:      Skin:   Skin is warm and dry,  no rashes " "or palpable lesions   Neurologic:   no focal deficits noted      Lab Results (last 72 hours)       Procedure Component Value Units Date/Time    Procalcitonin [[id number]]  (Normal) Collected: 06/04/23 1058    Specimen: Blood Updated: 06/04/23 1143     Procalcitonin 0.08 ng/mL     Narrative:      As a Marker for Sepsis (Non-Neonates):    1. <0.5 ng/mL represents a low risk of severe sepsis and/or septic shock.  2. >2 ng/mL represents a high risk of severe sepsis and/or septic shock.    As a Marker for Lower Respiratory Tract Infections that require antibiotic therapy:    PCT on Admission    Antibiotic Therapy       6-12 Hrs later    >0.5                Strongly Recommended  >0.25 - <0.5        Recommended   0.1 - 0.25          Discouraged              Remeasure/reassess PCT  <0.1                Strongly Discouraged     Remeasure/reassess PCT    As 28 day mortality risk marker: \"Change in Procalcitonin Result\" (>80% or <=80%) if Day 0 (or Day 1) and Day 4 values are available. Refer to http://www.[employer]-pct-calculator.com    Change in PCT <=80%  A decrease of PCT levels below or equal to 80% defines a positive change in PCT test result representing a higher risk for 28-day all-cause mortality of patients diagnosed with severe sepsis for septic shock.    Change in PCT >80%  A decrease of PCT levels of more than 80% defines a negative change in PCT result representing a lower risk for 28-day all-cause mortality of patients diagnosed with severe sepsis or septic shock.       Blood Gas, Arterial - [[id number]]  (Abnormal) Collected: 06/04/23 1022    Specimen: Arterial Blood Updated: 06/04/23 1025     Site Arterial: left brachial     Ortiz's Test N/A     pH, Arterial 7.327 pH units      pCO2, Arterial 49.1 mm Hg      pO2, Arterial 74.0 mm Hg      HCO3, Arterial 25.7 mmol/L      Base Excess, Arterial -0.9 mmol/L      O2 Saturation Calculated 93.3 %      Comment: SAT 92% Meter: [id number] : [id number] Cristi Bateman "        Barometric Pressure for Blood Gas 745.8 mmHg      Modality Cannula     Flow Rate 3 lpm      Rate 30 Breaths/minute     Lactic Acid, Plasma [[id number]]  (Abnormal) Collected: 06/04/23 0310    Specimen: Blood Updated: 06/04/23 0346     Lactate 4.2 mmol/L     High Sensitivity Troponin T 2Hr [[id number]]  (Abnormal) Collected: 06/04/23 0310    Specimen: Blood Updated: 06/04/23 0345     HS Troponin T 31 ng/L      Troponin T Delta 4 ng/L     Narrative:      High Sensitive Troponin T Reference Range:  <10.0 ng/L- Negative Female for AMI  <15.0 ng/L- Negative Male for AMI  >=10 - Abnormal Female indicating possible myocardial injury.  >=15 - Abnormal Male indicating possible myocardial injury.   Clinicians would have to utilize clinical acumen, EKG, Troponin, and serial changes to determine if it is an Acute Myocardial Infarction or myocardial injury due to an underlying chronic condition.         TSH [[id number]]  (Normal) Collected: 06/04/23 0026    Specimen: Blood Updated: 06/04/23 0104     TSH 0.710 uIU/mL     BNP [[id number]]  (Abnormal) Collected: 06/04/23 0026    Specimen: Blood Updated: 06/04/23 0104     proBNP 7,773.0 pg/mL     Narrative:      Among patients with dyspnea, NT-proBNP is highly sensitive for the detection of acute congestive heart failure. In addition NT-proBNP of <300 pg/ml effectively rules out acute congestive heart failure with 99% negative predictive value.    Results may be falsely decreased if patient taking Biotin.      High Sensitivity Troponin T [[id number]]  (Abnormal) Collected: 06/04/23 0026    Specimen: Blood Updated: 06/04/23 0104     HS Troponin T 27 ng/L     Narrative:      High Sensitive Troponin T Reference Range:  <10.0 ng/L- Negative Female for AMI  <15.0 ng/L- Negative Male for AMI  >=10 - Abnormal Female indicating possible myocardial injury.  >=15 - Abnormal Male indicating possible myocardial injury.   Clinicians would have to utilize clinical acumen, EKG, Troponin, and  serial changes to determine if it is an Acute Myocardial Infarction or myocardial injury due to an underlying chronic condition.         Magnesium [[id number]]  (Normal) Collected: 06/04/23 0026    Specimen: Blood Updated: 06/04/23 0058     Magnesium 2.2 mg/dL     Comprehensive Metabolic Panel [[id number]]  (Abnormal) Collected: 06/04/23 0026    Specimen: Blood Updated: 06/04/23 0058     Glucose 161 mg/dL      BUN 32 mg/dL      Creatinine 0.97 mg/dL      Sodium 133 mmol/L      Potassium 4.5 mmol/L      Chloride 99 mmol/L      CO2 22.1 mmol/L      Calcium 8.8 mg/dL      Total Protein 5.9 g/dL      Albumin 3.7 g/dL      ALT (SGPT) 78 U/L      AST (SGOT) 27 U/L      Alkaline Phosphatase 131 U/L      Total Bilirubin 0.5 mg/dL      Globulin 2.2 gm/dL      A/G Ratio 1.7 g/dL      BUN/Creatinine Ratio 33.0     Anion Gap 11.9 mmol/L      eGFR 58.5 mL/min/1.73     Narrative:      GFR Normal >60  Chronic Kidney Disease <60  Kidney Failure <15    The GFR formula is only valid for adults with stable renal function between ages 18 and 70.    CBC & Differential [[id number]]  (Abnormal) Collected: 06/04/23 0026    Specimen: Blood Updated: 06/04/23 0038    Narrative:      The following orders were created for panel order CBC & Differential.  Procedure                               Abnormality         Status                     ---------                               -----------         ------                     CBC Auto Differential[[id number]]        Abnormal            Final result                 Please view results for these tests on the individual orders.    CBC Auto Differential [[id number]]  (Abnormal) Collected: 06/04/23 0026    Specimen: Blood Updated: 06/04/23 0038     WBC 15.90 10*3/mm3      RBC 4.13 10*6/mm3      Hemoglobin 11.9 g/dL      Hematocrit 36.4 %      MCV 88.1 fL      MCH 28.8 pg      MCHC 32.7 g/dL      RDW 13.2 %      RDW-SD 41.9 fl      MPV 9.9 fL      Platelets 260 10*3/mm3      Neutrophil % 87.5 %       Lymphocyte % 3.6 %      Monocyte % 5.7 %      Eosinophil % 0.0 %      Basophil % 0.1 %      Immature Grans % 3.1 %      Neutrophils, Absolute 13.92 10*3/mm3      Lymphocytes, Absolute 0.57 10*3/mm3      Monocytes, Absolute 0.90 10*3/mm3      Eosinophils, Absolute 0.00 10*3/mm3      Basophils, Absolute 0.02 10*3/mm3      Immature Grans, Absolute 0.49 10*3/mm3      nRBC 0.1 /100 WBC           Data Review:  Results from last 7 days   Lab Units 06/09/23  0644 06/08/23  0520 06/07/23  0517   SODIUM mmol/L 137 135* 138   POTASSIUM mmol/L 3.9 3.8 4.2   CHLORIDE mmol/L 96* 97* 96*   CO2 mmol/L 32.1* 29.0 30.0*   BUN mg/dL 21 31* 36*   CREATININE mg/dL 1.10* 1.23* 1.25*   GLUCOSE mg/dL 97 94 100*   CALCIUM mg/dL 8.6 8.4* 8.4*     Results from last 7 days   Lab Units 06/09/23  0643 06/08/23  0520 06/07/23  0517   WBC 10*3/mm3 10.54 9.98 10.26   HEMOGLOBIN g/dL 13.5 14.0 13.9   HEMATOCRIT % 40.0 42.2 41.9   PLATELETS 10*3/mm3 202 200 228     Results from last 7 days   Lab Units 06/04/23  0026   TSH uIU/mL 0.710         Lab Results   Lab Value Date/Time    TROPONINT 31 (H) 06/04/2023 0310    TROPONINT 27 (H) 06/04/2023 0026    TROPONINT 9 05/20/2023 2125    TROPONINT <0.010 12/08/2022 0128    TROPONINT <0.010 05/04/2022 0335    TROPONINT <0.010 05/03/2022 2322    TROPONINT 0.013 05/03/2022 1804    TROPONINT <0.010 05/03/2022 1250    TROPONINT <0.010 10/29/2021 1444    TROPONINT <0.010 02/13/2018 0920         Results from last 7 days   Lab Units 06/04/23  0026   ALK PHOS U/L 131*   BILIRUBIN mg/dL 0.5   ALT (SGPT) U/L 78*   AST (SGOT) U/L 27     Results from last 7 days   Lab Units 06/04/23  0026   TSH uIU/mL 0.710         No results found for: POCGLU        Past Medical History:   Diagnosis Date    Atrial fibrillation with RVR 6/4/2023    Chronic diastolic congestive heart failure 11/17/2022    Depression     Emphysema lung     GERD (gastroesophageal reflux disease)     Heart failure     Hyperlipidemia     Hypertension      Hypothyroidism        Assessment:  Active Hospital Problems    Diagnosis  POA    **Atrial fibrillation with RVR [I48.91]  Yes    Chronic congestive heart failure [I50.9]  Yes    Anxiety [F41.9]  Yes    Chronic interstitial cystitis [N30.10]  Yes    Pulmonary emphysema [J43.9]  Yes    Gastroesophageal reflux disease [K21.9]  Yes    Fibromyalgia [M79.7]  Yes    Benign hypertension [I10]  Yes    Hyperlipidemia [E78.5]  Yes      Resolved Hospital Problems   No resolved problems to display.   Acute on chronic combined systolic and diastolic chf    Plan:  Continue with rate control. Follow lab. Holding Lasix.  As per Cardiology and Pulmonary.   PT eval DC planning probably needs a 1-2 more days.    Amor Shane MD  6/9/2023  14:29 EDT

## 2023-06-10 LAB
ANION GAP SERPL CALCULATED.3IONS-SCNC: 7.5 MMOL/L (ref 5–15)
BASOPHILS # BLD AUTO: 0.03 10*3/MM3 (ref 0–0.2)
BASOPHILS NFR BLD AUTO: 0.3 % (ref 0–1.5)
BUN SERPL-MCNC: 17 MG/DL (ref 8–23)
BUN/CREAT SERPL: 18.9 (ref 7–25)
CALCIUM SPEC-SCNC: 8.5 MG/DL (ref 8.6–10.5)
CHLORIDE SERPL-SCNC: 100 MMOL/L (ref 98–107)
CO2 SERPL-SCNC: 30.5 MMOL/L (ref 22–29)
CREAT SERPL-MCNC: 0.9 MG/DL (ref 0.57–1)
DEPRECATED RDW RBC AUTO: 42.4 FL (ref 37–54)
EGFRCR SERPLBLD CKD-EPI 2021: 64 ML/MIN/1.73
EOSINOPHIL # BLD AUTO: 0.27 10*3/MM3 (ref 0–0.4)
EOSINOPHIL NFR BLD AUTO: 2.6 % (ref 0.3–6.2)
ERYTHROCYTE [DISTWIDTH] IN BLOOD BY AUTOMATED COUNT: 13.2 % (ref 12.3–15.4)
GLUCOSE SERPL-MCNC: 101 MG/DL (ref 65–99)
HCT VFR BLD AUTO: 36.4 % (ref 34–46.6)
HGB BLD-MCNC: 12 G/DL (ref 12–15.9)
IMM GRANULOCYTES # BLD AUTO: 0.17 10*3/MM3 (ref 0–0.05)
IMM GRANULOCYTES NFR BLD AUTO: 1.7 % (ref 0–0.5)
LYMPHOCYTES # BLD AUTO: 1.4 10*3/MM3 (ref 0.7–3.1)
LYMPHOCYTES NFR BLD AUTO: 13.7 % (ref 19.6–45.3)
MCH RBC QN AUTO: 29.1 PG (ref 26.6–33)
MCHC RBC AUTO-ENTMCNC: 33 G/DL (ref 31.5–35.7)
MCV RBC AUTO: 88.1 FL (ref 79–97)
MONOCYTES # BLD AUTO: 0.97 10*3/MM3 (ref 0.1–0.9)
MONOCYTES NFR BLD AUTO: 9.5 % (ref 5–12)
NEUTROPHILS NFR BLD AUTO: 7.37 10*3/MM3 (ref 1.7–7)
NEUTROPHILS NFR BLD AUTO: 72.2 % (ref 42.7–76)
NRBC BLD AUTO-RTO: 0 /100 WBC (ref 0–0.2)
PLATELET # BLD AUTO: 185 10*3/MM3 (ref 140–450)
PMV BLD AUTO: 9.8 FL (ref 6–12)
POTASSIUM SERPL-SCNC: 4.3 MMOL/L (ref 3.5–5.2)
RBC # BLD AUTO: 4.13 10*6/MM3 (ref 3.77–5.28)
SODIUM SERPL-SCNC: 138 MMOL/L (ref 136–145)
WBC NRBC COR # BLD: 10.21 10*3/MM3 (ref 3.4–10.8)

## 2023-06-10 PROCEDURE — 94799 UNLISTED PULMONARY SVC/PX: CPT

## 2023-06-10 PROCEDURE — 85025 COMPLETE CBC W/AUTO DIFF WBC: CPT | Performed by: HOSPITALIST

## 2023-06-10 PROCEDURE — 80048 BASIC METABOLIC PNL TOTAL CA: CPT | Performed by: HOSPITALIST

## 2023-06-10 PROCEDURE — 25010000002 DIGOXIN PER 500 MCG: Performed by: INTERNAL MEDICINE

## 2023-06-10 PROCEDURE — 94660 CPAP INITIATION&MGMT: CPT

## 2023-06-10 RX ORDER — DIGOXIN 0.25 MG/ML
500 INJECTION INTRAMUSCULAR; INTRAVENOUS ONCE
Status: COMPLETED | OUTPATIENT
Start: 2023-06-10 | End: 2023-06-10

## 2023-06-10 RX ORDER — CARVEDILOL 6.25 MG/1
6.25 TABLET ORAL 2 TIMES DAILY WITH MEALS
Status: DISCONTINUED | OUTPATIENT
Start: 2023-06-10 | End: 2023-06-12

## 2023-06-10 RX ADMIN — LEVOTHYROXINE, LIOTHYRONINE 30 MG: 19; 4.5 TABLET ORAL at 08:33

## 2023-06-10 RX ADMIN — AMIODARONE HYDROCHLORIDE 200 MG: 200 TABLET ORAL at 08:34

## 2023-06-10 RX ADMIN — GUAIFENESIN 600 MG: 600 TABLET, EXTENDED RELEASE ORAL at 21:43

## 2023-06-10 RX ADMIN — APIXABAN 5 MG: 5 TABLET, FILM COATED ORAL at 21:43

## 2023-06-10 RX ADMIN — CARVEDILOL 6.25 MG: 6.25 TABLET, FILM COATED ORAL at 17:50

## 2023-06-10 RX ADMIN — PANTOPRAZOLE SODIUM 40 MG: 40 TABLET, DELAYED RELEASE ORAL at 06:30

## 2023-06-10 RX ADMIN — EMPAGLIFLOZIN 10 MG: 10 TABLET, FILM COATED ORAL at 10:14

## 2023-06-10 RX ADMIN — Medication 10 ML: at 21:43

## 2023-06-10 RX ADMIN — DIGOXIN 500 MCG: 0.25 INJECTION INTRAMUSCULAR; INTRAVENOUS at 17:50

## 2023-06-10 RX ADMIN — Medication 10 ML: at 08:35

## 2023-06-10 RX ADMIN — PAROXETINE HYDROCHLORIDE HEMIHYDRATE 20 MG: 20 TABLET, FILM COATED ORAL at 08:33

## 2023-06-10 RX ADMIN — AMIODARONE HYDROCHLORIDE 200 MG: 200 TABLET ORAL at 21:43

## 2023-06-10 RX ADMIN — ATORVASTATIN CALCIUM 20 MG: 20 TABLET, FILM COATED ORAL at 21:43

## 2023-06-10 RX ADMIN — ATENOLOL 25 MG: 25 TABLET ORAL at 08:34

## 2023-06-10 RX ADMIN — LEVOTHYROXINE, LIOTHYRONINE 15 MG: 19; 4.5 TABLET ORAL at 08:33

## 2023-06-10 RX ADMIN — ACETAMINOPHEN 650 MG: 325 TABLET, FILM COATED ORAL at 18:01

## 2023-06-10 RX ADMIN — APIXABAN 5 MG: 5 TABLET, FILM COATED ORAL at 08:37

## 2023-06-10 NOTE — PROGRESS NOTES
[facility] Progress Note       Encounter Date:06/10/23  Patient:Dafne Mary  :1940  MRN:[id number]      Chief Complaint: F/u cardiomyopathy, atrial fibrillation with RVR      Subjective:      HR still going up to 140s intermittently  Patient denies shortness of breath      Medications:  Scheduled Meds:  amiodarone, 200 mg, Oral, Q12H  apixaban, 5 mg, Oral, Q12H  atenolol, 25 mg, Oral, Q12H  atorvastatin, 20 mg, Oral, Nightly  empagliflozin, 10 mg, Oral, Daily  guaiFENesin, 600 mg, Oral, Q12H  pantoprazole, 40 mg, Oral, Q AM  PARoxetine, 20 mg, Oral, Daily  sodium chloride, 500 mL, Intravenous, Once  sodium chloride, 10 mL, Intravenous, Q12H  thyroid, 15 mg, Oral, Daily  Thyroid, 30 mg, Oral, Daily    Continuous Infusions:   PRN Meds:  •  acetaminophen **OR** acetaminophen **OR** acetaminophen  •  artificial tears  •  ipratropium-albuterol  •  LORazepam  •  nitroglycerin  •  ondansetron  •  [COMPLETED] Insert Peripheral IV **AND** sodium chloride  •  sodium chloride  •  sodium chloride         Objective:       Vitals:    23 1915 23 2307 06/10/23 0746 06/10/23 0829   BP: 119/76 108/71 (!) 132/104 121/59   BP Location: Right arm Right arm Left arm Right arm   Patient Position: Lying Lying Lying Sitting   Pulse: 103 118 100    Resp: 18 18 18    Temp: 98.4 °F (36.9 °C) 98.4 °F (36.9 °C) 98.3 °F (36.8 °C)    TempSrc: Oral Oral Oral    SpO2: 90% 96% 99%    Weight:       Height:               Physical Exam:  Constitutional: Well appearing, well developed, no acute distress   HENT: Oropharynx clear and membrane moist  Eyes: Normal conjunctiva, no sclera icterus.  Neck: Supple, no carotid bruit bilaterally.  Cardiovascular: Irregularly irregular and Tachycardia rate and rhythm, No Murmur, No bilateral lower extremity edema.  Pulmonary: Normal respiratory effort, normal lung sounds, no wheezing.  Abdominal: Soft, nontender  Neurological: Alert and orient x 3.   Skin: Warm, dry, no  ecchymosis, no rash.  Psych: Appropriate mood and affect. Normal judgment and insight.           Lab Review:   Results from last 7 days   Lab Units 06/10/23  0456 06/09/23  0644 06/08/23  0520 06/07/23  0517 06/06/23  0504 06/04/23  0026   SODIUM mmol/L 138 137 135* 138 135* 133*   POTASSIUM mmol/L 4.3 3.9 3.8 4.2 3.7 4.5   CHLORIDE mmol/L 100 96* 97* 96* 93* 99   CO2 mmol/L 30.5* 32.1* 29.0 30.0* 31.7* 22.1   BUN mg/dL 17 21 31* 36* 37* 32*   CREATININE mg/dL 0.90 1.10* 1.23* 1.25* 1.21* 0.97   GLUCOSE mg/dL 101* 97 94 100* 87 161*   CALCIUM mg/dL 8.5* 8.6 8.4* 8.4* 8.5* 8.8   AST (SGOT) U/L  --   --   --   --   --  27   ALT (SGPT) U/L  --   --   --   --   --  78*     Results from last 7 days   Lab Units 06/04/23  0310 06/04/23  0026   HSTROP T ng/L 31* 27*     Results from last 7 days   Lab Units 06/10/23  0456 06/09/23  0643 06/08/23  0520 06/07/23  0517 06/06/23  0504 06/04/23  0026   WBC 10*3/mm3 10.21 10.54 9.98 10.26 12.01* 15.90*   HEMOGLOBIN g/dL 12.0 13.5 14.0 13.9 12.4 11.9*   HEMATOCRIT % 36.4 40.0 42.2 41.9 37.4 36.4   PLATELETS 10*3/mm3 185 202 200 228 223 260         Results from last 7 days   Lab Units 06/04/23  0026   MAGNESIUM mg/dL 2.2           Invalid input(s): LDLCALC  Results from last 7 days   Lab Units 06/04/23  0026   PROBNP pg/mL 7,773.0*     Results from last 7 days   Lab Units 06/04/23  0026   TSH uIU/mL 0.710            Assessment:          Diagnosis Plan   1. Atrial fibrillation with RVR        2. COPD exacerbation        3. Congestive heart failure, unspecified HF chronicity, unspecified heart failure type               Plan:       1. Acute on chronic systolic heart failure - diuresed this admission. Volume appears appropriate on exam  2. Cardiomyopathy - LVEF 36-40%. Presumed tachy-mediated with uncontrolled atrial fibrillation although there is concern for WMAs. GDMT limited for hypotension and tachycardia. She is on atenolol for better rate management, Jardiance  3. Atrial  fibrillation with RVR - recent diagnosis 5/2023 during COPD exacerbation admission. Now admitted with persistent atrial fibrillation. Apixaban started this admission. Oral amiodarone started 6/7 with some improvement. Plan to decrease to 200mg daily on 6/14  4. Asthma and COPD  5. MALATHI - resolved    Heart rate still intermittently increased to 130-140. IV digoxin. Transition to carvedilol and monitor response         Keara BERMAN  [facility]  06/10/23  08:53 EDT

## 2023-06-10 NOTE — PROGRESS NOTES
[facility] Medicine Services  PROGRESS NOTE    Patient Name: Dafne Mary  : 1940  MRN: [id number]    Date of Admission: 6/3/2023  Primary Care Physician: Sintia Chatterjee PA    Subjective   Subjective     CC:  Follow-up rapid heart rate  Subjective: Patient states that she is not symptomatic despite intermittent fast heart rate.  Her heart rate is currently ranging from 100s to 120s during my evaluation.    Review of Systems  No current fevers or chills  No current shortness of breath or cough  No current nausea, vomiting, or diarrhea      Objective   Objective     Vital Signs:   Temp:  [97.6 °F (36.4 °C)-98.4 °F (36.9 °C)] 98.3 °F (36.8 °C)  Heart Rate:  [] 100  Resp:  [18] 18  BP: (108-132)/() 121/59        Physical Exam:  Constitutional:Awake, alert  HENT: NCAT, mucous membranes moist, neck supple  Respiratory: Clear to auscultation bilaterally, respiratory effort normal, nonlabored breathing   Cardiovascular: Irregular and borderline tachycardic, normal radial pulses  Gastrointestinal:  soft, nontender, nondistended  Musculoskeletal: Elderly and somewhat debilitated appearance, BMI is 29, minimal lower extremity edema  Psychiatric: Appropriate affect, cooperative, conversational  Neurologic: No slurred speech or facial droop, follows commands  Skin: No rashes or jaundice, warm      Results Reviewed:  Results from last 7 days   Lab Units 06/10/23  0456 23  0643 23  0520 23  0504 23  1058   WBC 10*3/mm3 10.21 10.54 9.98   < >  --    HEMOGLOBIN g/dL 12.0 13.5 14.0   < >  --    HEMATOCRIT % 36.4 40.0 42.2   < >  --    PLATELETS 10*3/mm3 185 202 200   < >  --    PROCALCITONIN ng/mL  --   --   --   --  0.08    < > = values in this interval not displayed.     Results from last 7 days   Lab Units 06/10/23  0456 23  0644 23  0520 23  0504 23  0310 23  0026   SODIUM mmol/L 138 137 135*   < >  --  133*   POTASSIUM mmol/L 4.3 3.9 3.8   < >   --  4.5   CHLORIDE mmol/L 100 96* 97*   < >  --  99   CO2 mmol/L 30.5* 32.1* 29.0   < >  --  22.1   BUN mg/dL 17 21 31*   < >  --  32*   CREATININE mg/dL 0.90 1.10* 1.23*   < >  --  0.97   GLUCOSE mg/dL 101* 97 94   < >  --  161*   CALCIUM mg/dL 8.5* 8.6 8.4*   < >  --  8.8   ALT (SGPT) U/L  --   --   --   --   --  78*   AST (SGOT) U/L  --   --   --   --   --  27   HSTROP T ng/L  --   --   --   --  31* 27*   PROBNP pg/mL  --   --   --   --   --  7,773.0*    < > = values in this interval not displayed.     Estimated Creatinine Clearance: 41.3 mL/min (by C-G formula based on SCr of 0.9 mg/dL).    Microbiology Results Abnormal       None            Imaging Results (Last 24 Hours)       ** No results found for the last 24 hours. **            Results for orders placed during the hospital encounter of 06/03/23    Adult Transthoracic Echo Complete w/ Color, Spectral and Contrast if Necessary Per Protocol    Interpretation Summary    Left ventricular systolic function is moderately decreased. Left ventricular ejection fraction appears to be 36 - 40%.  Very difficult study regarding analysis of ejection fraction given the bundle branch block and arrhythmia as well as ectopy.  Severe hypokinesis of the inferior wall and septum.    Left ventricular diastolic function was indeterminate.    The left atrial cavity is mild to moderately dilated.    Mild aortic valve stenosis is present.    Moderate to severe tricuspid valve regurgitation is present.    Estimated right ventricular systolic pressure from tricuspid regurgitation is mildly elevated (35-45 mmHg).      I have reviewed the medications:  Scheduled Meds:amiodarone, 200 mg, Oral, Q12H  apixaban, 5 mg, Oral, Q12H  atenolol, 25 mg, Oral, Q12H  atorvastatin, 20 mg, Oral, Nightly  empagliflozin, 10 mg, Oral, Daily  guaiFENesin, 600 mg, Oral, Q12H  pantoprazole, 40 mg, Oral, Q AM  PARoxetine, 20 mg, Oral, Daily  sodium chloride, 500 mL, Intravenous, Once  sodium chloride, 10  mL, Intravenous, Q12H  thyroid, 15 mg, Oral, Daily  Thyroid, 30 mg, Oral, Daily      Continuous Infusions:   PRN Meds:.  acetaminophen **OR** acetaminophen **OR** acetaminophen    artificial tears    ipratropium-albuterol    LORazepam    nitroglycerin    ondansetron    [COMPLETED] Insert Peripheral IV **AND** sodium chloride    sodium chloride    sodium chloride    Assessment & Plan   Assessment & Plan     Active Hospital Problems    Diagnosis  POA    **Atrial fibrillation with RVR [I48.91]  Yes    Chronic congestive heart failure [I50.9]  Yes    Anxiety [F41.9]  Yes    Chronic interstitial cystitis [N30.10]  Yes    Pulmonary emphysema [J43.9]  Yes    Gastroesophageal reflux disease [K21.9]  Yes    Fibromyalgia [M79.7]  Yes    Benign hypertension [I10]  Yes    Hyperlipidemia [E78.5]  Yes      Resolved Hospital Problems   No resolved problems to display.        Brief Hospital Course to date:  Dafne Mary is a 82 y.o. female presents the hospital with rapid atrial fibrillation and acute systolic CHF.    Discussion/plan for today:  Plan to discuss case with cardiology as patient is continuing to have borderline rapid heart rate.    Continue current cardiac management for CHF.    Amiodarone and beta-blocker for atrial fibrillation.  Currently patient does not appear very symptomatic.  Recent low blood pressures noted.    Continue chronic medications for hypothyroid.  Stable.    Continue paroxetine for mood.  Stable.    Continue PPI for GERD.  Stable.    Continue atorvastatin for hyperlipidemia.    Echocardiogram reviewed and EF 36% noted.  RVSP is also mildly elevated felt to be consistent with some degree of pulmonary hypertension.    Hypoxia now resolved.  Continue to monitor.    Patient is debilitated and wishes to go to this skilled facility for subacute rehab    DVT Prophylaxis: Anticoagulated      Disposition: I expect the patient to be discharged to subacute in 1 to 2 days.    CODE STATUS:   Code Status and  Medical Interventions:   Ordered at: 06/04/23 0415     Code Status (Patient has no pulse and is not breathing):    CPR (Attempt to Resuscitate)     Medical Interventions (Patient has pulse or is breathing):    Full Support       Simon Moscoso MD  06/10/23

## 2023-06-10 NOTE — PLAN OF CARE
Pt oriented HR elevated Afib BP stable reports fatigued today      Problem: Adult Inpatient Plan of Care  Goal: Plan of Care Review  Outcome: Ongoing, Progressing  Goal: Patient-Specific Goal (Individualized)  Outcome: Ongoing, Progressing  Goal: Absence of Hospital-Acquired Illness or Injury  Outcome: Ongoing, Progressing  Intervention: Identify and Manage Fall Risk  Recent Flowsheet Documentation  Taken 6/10/2023 1400 by Angelica Ross RN  Safety Promotion/Fall Prevention:   activity supervised   assistive device/personal items within reach   clutter free environment maintained   fall prevention program maintained   nonskid shoes/slippers when out of bed   room organization consistent   safety round/check completed  Taken 6/10/2023 0830 by Angelica Ross RN  Safety Promotion/Fall Prevention:   activity supervised   assistive device/personal items within reach   clutter free environment maintained   fall prevention program maintained   nonskid shoes/slippers when out of bed   room organization consistent   safety round/check completed  Intervention: Prevent Skin Injury  Recent Flowsheet Documentation  Taken 6/10/2023 1600 by Angelica Ross RN  Body Position: position changed independently  Taken 6/10/2023 1400 by Angelica Ross RN  Body Position: position changed independently  Taken 6/10/2023 1200 by Angelica Ross RN  Body Position: position changed independently  Taken 6/10/2023 1000 by Angelica Ross RN  Body Position: position changed independently  Taken 6/10/2023 0830 by Angelica Ross RN  Body Position: position changed independently  Skin Protection: adhesive use limited  Intervention: Prevent and Manage VTE (Venous Thromboembolism) Risk  Recent Flowsheet Documentation  Taken 6/10/2023 1400 by Angelica Ross RN  Activity Management: activity encouraged  Taken 6/10/2023 0830 by Angelica Ross RN  Activity Management: activity encouraged  VTE Prevention/Management: (Eliquis) other (see  comments)  Range of Motion: active ROM (range of motion) encouraged  Intervention: Prevent Infection  Recent Flowsheet Documentation  Taken 6/10/2023 1400 by Angelica Ross RN  Infection Prevention:   rest/sleep promoted   single patient room provided  Taken 6/10/2023 0830 by Angelica Ross RN  Infection Prevention:   rest/sleep promoted   single patient room provided  Goal: Optimal Comfort and Wellbeing  Outcome: Ongoing, Progressing  Intervention: Provide Person-Centered Care  Recent Flowsheet Documentation  Taken 6/10/2023 1400 by Angelica Ross RN  Trust Relationship/Rapport: care explained  Taken 6/10/2023 0830 by Angelica Ross RN  Trust Relationship/Rapport:   care explained   thoughts/feelings acknowledged   reassurance provided   questions encouraged   questions answered   emotional support provided  Goal: Readiness for Transition of Care  Outcome: Ongoing, Progressing     Problem: COPD (Chronic Obstructive Pulmonary Disease) Comorbidity  Goal: Maintenance of COPD Symptom Control  Outcome: Ongoing, Progressing  Intervention: Maintain COPD-Symptom Control  Recent Flowsheet Documentation  Taken 6/10/2023 1400 by Angelica Ross RN  Medication Review/Management: medications reviewed  Taken 6/10/2023 0830 by Angelica Ross RN  Medication Review/Management: medications reviewed     Problem: Hypertension Comorbidity  Goal: Blood Pressure in Desired Range  Outcome: Ongoing, Progressing  Intervention: Maintain Blood Pressure Management  Recent Flowsheet Documentation  Taken 6/10/2023 1400 by Angelica Ross RN  Medication Review/Management: medications reviewed  Taken 6/10/2023 0830 by Angelica Ross RN  Medication Review/Management: medications reviewed     Problem: Adjustment to Illness (Sepsis/Septic Shock)  Goal: Optimal Coping  Outcome: Ongoing, Progressing  Intervention: Optimize Psychosocial Adjustment to Illness  Recent Flowsheet Documentation  Taken 6/10/2023 1400 by Angelica Ross  RN  Family/Support System Care:   self-care encouraged   presence promoted  Taken 6/10/2023 0830 by Angelica Ross RN  Family/Support System Care:   presence promoted   self-care encouraged     Problem: Bleeding (Sepsis/Septic Shock)  Goal: Absence of Bleeding  Outcome: Ongoing, Progressing     Problem: Glycemic Control Impaired (Sepsis/Septic Shock)  Goal: Blood Glucose Level Within Desired Range  Outcome: Ongoing, Progressing     Problem: Infection Progression (Sepsis/Septic Shock)  Goal: Absence of Infection Signs and Symptoms  Outcome: Ongoing, Progressing  Intervention: Initiate Sepsis Management  Recent Flowsheet Documentation  Taken 6/10/2023 1400 by Angelica Ross RN  Infection Prevention:   rest/sleep promoted   single patient room provided  Taken 6/10/2023 0830 by Angelica Ross RN  Infection Prevention:   rest/sleep promoted   single patient room provided  Intervention: Promote Recovery  Recent Flowsheet Documentation  Taken 6/10/2023 1400 by Angelica Ross RN  Activity Management: activity encouraged  Taken 6/10/2023 0830 by Angelica Ross RN  Activity Management: activity encouraged     Problem: Nutrition Impaired (Sepsis/Septic Shock)  Goal: Optimal Nutrition Intake  Outcome: Ongoing, Progressing     Problem: Fall Injury Risk  Goal: Absence of Fall and Fall-Related Injury  Outcome: Ongoing, Progressing  Intervention: Identify and Manage Contributors  Recent Flowsheet Documentation  Taken 6/10/2023 1400 by Angelica Ross RN  Medication Review/Management: medications reviewed  Self-Care Promotion:   independence encouraged   BADL personal objects within reach   BADL personal routines maintained  Taken 6/10/2023 0830 by Angelica Ross RN  Medication Review/Management: medications reviewed  Self-Care Promotion:   independence encouraged   BADL personal objects within reach   BADL personal routines maintained  Intervention: Promote Injury-Free Environment  Recent Flowsheet Documentation  Taken  6/10/2023 1400 by Angelica Ross, RN  Safety Promotion/Fall Prevention:   activity supervised   assistive device/personal items within reach   clutter free environment maintained   fall prevention program maintained   nonskid shoes/slippers when out of bed   room organization consistent   safety round/check completed  Taken 6/10/2023 0830 by Angelica Ross, RN  Safety Promotion/Fall Prevention:   activity supervised   assistive device/personal items within reach   clutter free environment maintained   fall prevention program maintained   nonskid shoes/slippers when out of bed   room organization consistent   safety round/check completed   Goal Outcome Evaluation:

## 2023-06-10 NOTE — PLAN OF CARE
Goal Outcome Evaluation:            Pt resting in bed , Aox4 , Afib on tele, Room air. Denies pain, SOA, fall precaution maintained. Will continue to monitor  and assist pt as needed.

## 2023-06-11 LAB
ANION GAP SERPL CALCULATED.3IONS-SCNC: 6 MMOL/L (ref 5–15)
BUN SERPL-MCNC: 18 MG/DL (ref 8–23)
BUN/CREAT SERPL: 20.7 (ref 7–25)
CALCIUM SPEC-SCNC: 8.2 MG/DL (ref 8.6–10.5)
CHLORIDE SERPL-SCNC: 102 MMOL/L (ref 98–107)
CO2 SERPL-SCNC: 30 MMOL/L (ref 22–29)
CREAT SERPL-MCNC: 0.87 MG/DL (ref 0.57–1)
DEPRECATED RDW RBC AUTO: 42.5 FL (ref 37–54)
DIGOXIN SERPL-MCNC: 1.9 NG/ML (ref 0.6–1.2)
EGFRCR SERPLBLD CKD-EPI 2021: 66.6 ML/MIN/1.73
ERYTHROCYTE [DISTWIDTH] IN BLOOD BY AUTOMATED COUNT: 12.8 % (ref 12.3–15.4)
GLUCOSE SERPL-MCNC: 86 MG/DL (ref 65–99)
HCT VFR BLD AUTO: 36.5 % (ref 34–46.6)
HGB BLD-MCNC: 11.8 G/DL (ref 12–15.9)
MCH RBC QN AUTO: 29.4 PG (ref 26.6–33)
MCHC RBC AUTO-ENTMCNC: 32.3 G/DL (ref 31.5–35.7)
MCV RBC AUTO: 91 FL (ref 79–97)
PLATELET # BLD AUTO: 159 10*3/MM3 (ref 140–450)
PMV BLD AUTO: 10.1 FL (ref 6–12)
POTASSIUM SERPL-SCNC: 4.2 MMOL/L (ref 3.5–5.2)
RBC # BLD AUTO: 4.01 10*6/MM3 (ref 3.77–5.28)
SODIUM SERPL-SCNC: 138 MMOL/L (ref 136–145)
WBC NRBC COR # BLD: 7.44 10*3/MM3 (ref 3.4–10.8)

## 2023-06-11 PROCEDURE — 80162 ASSAY OF DIGOXIN TOTAL: CPT | Performed by: NURSE PRACTITIONER

## 2023-06-11 PROCEDURE — 80048 BASIC METABOLIC PNL TOTAL CA: CPT | Performed by: INTERNAL MEDICINE

## 2023-06-11 PROCEDURE — 94799 UNLISTED PULMONARY SVC/PX: CPT

## 2023-06-11 PROCEDURE — 85027 COMPLETE CBC AUTOMATED: CPT | Performed by: INTERNAL MEDICINE

## 2023-06-11 RX ADMIN — LORAZEPAM 0.5 MG: 0.5 TABLET ORAL at 18:59

## 2023-06-11 RX ADMIN — APIXABAN 5 MG: 5 TABLET, FILM COATED ORAL at 09:15

## 2023-06-11 RX ADMIN — Medication 10 ML: at 21:01

## 2023-06-11 RX ADMIN — EMPAGLIFLOZIN 10 MG: 10 TABLET, FILM COATED ORAL at 09:15

## 2023-06-11 RX ADMIN — ATORVASTATIN CALCIUM 20 MG: 20 TABLET, FILM COATED ORAL at 21:01

## 2023-06-11 RX ADMIN — AMIODARONE HYDROCHLORIDE 200 MG: 200 TABLET ORAL at 21:01

## 2023-06-11 RX ADMIN — GUAIFENESIN 600 MG: 600 TABLET, EXTENDED RELEASE ORAL at 09:15

## 2023-06-11 RX ADMIN — PANTOPRAZOLE SODIUM 40 MG: 40 TABLET, DELAYED RELEASE ORAL at 06:40

## 2023-06-11 RX ADMIN — APIXABAN 5 MG: 5 TABLET, FILM COATED ORAL at 21:01

## 2023-06-11 RX ADMIN — Medication 10 ML: at 09:16

## 2023-06-11 RX ADMIN — CARVEDILOL 6.25 MG: 6.25 TABLET, FILM COATED ORAL at 17:33

## 2023-06-11 RX ADMIN — ACETAMINOPHEN 650 MG: 325 TABLET, FILM COATED ORAL at 18:59

## 2023-06-11 RX ADMIN — LEVOTHYROXINE, LIOTHYRONINE 30 MG: 19; 4.5 TABLET ORAL at 09:15

## 2023-06-11 RX ADMIN — AMIODARONE HYDROCHLORIDE 200 MG: 200 TABLET ORAL at 09:14

## 2023-06-11 RX ADMIN — GUAIFENESIN 600 MG: 600 TABLET, EXTENDED RELEASE ORAL at 21:01

## 2023-06-11 RX ADMIN — LEVOTHYROXINE, LIOTHYRONINE 15 MG: 19; 4.5 TABLET ORAL at 09:15

## 2023-06-11 RX ADMIN — CARVEDILOL 6.25 MG: 6.25 TABLET, FILM COATED ORAL at 09:15

## 2023-06-11 RX ADMIN — PAROXETINE HYDROCHLORIDE HEMIHYDRATE 20 MG: 20 TABLET, FILM COATED ORAL at 09:15

## 2023-06-11 NOTE — PROGRESS NOTES
[facility] Medicine [facility]  PROGRESS NOTE    Patient Name: Dafne Mary  : 1940  MRN: [id number]    Date of Admission: 6/3/2023  Primary Care Physician: Sintia Chatterjee PA    Subjective   Subjective     CC:  Follow-up rapid heart rate    Subjective:   Patient states she is doing better today.  She denies lightheadedness or palpitations.  Review of Systems  No current fevers or chills  No current shortness of breath or cough  No current nausea, vomiting, or diarrhea      Objective   Objective     Vital Signs:   Temp:  [97.4 °F (36.3 °C)-98 °F (36.7 °C)] 97.8 °F (36.6 °C)  Heart Rate:  [] 81  Resp:  [18] 18  BP: ()/(47-95) 122/59        Physical Exam:  Constitutional:Awake, alert  HENT: NCAT, mucous membranes moist, neck supple  Respiratory: Clear to auscultation bilaterally, respiratory effort normal, nonlabored breathing   Cardiovascular: Irregular rhythm and normal rate, normal radial pulses  Gastrointestinal:  soft, nontender, nondistended  Musculoskeletal: Elderly and somewhat debilitated appearance, BMI is 29, minimal lower extremity edema  Psychiatric: Appropriate affect, cooperative, conversational  Neurologic: No slurred speech or facial droop, follows commands  Skin: No rashes or jaundice, warm      Results Reviewed:  Results from last 7 days   Lab Units 23  0553 06/10/23  0456 23  0643   WBC 10*3/mm3 7.44 10.21 10.54   HEMOGLOBIN g/dL 11.8* 12.0 13.5   HEMATOCRIT % 36.5 36.4 40.0   PLATELETS 10*3/mm3 159 185 202       Results from last 7 days   Lab Units 23  0729 06/10/23  0456 23  0644   SODIUM mmol/L 138 138 137   POTASSIUM mmol/L 4.2 4.3 3.9   CHLORIDE mmol/L 102 100 96*   CO2 mmol/L 30.0* 30.5* 32.1*   BUN mg/dL 18 17 21   CREATININE mg/dL 0.87 0.90 1.10*   GLUCOSE mg/dL 86 101* 97   CALCIUM mg/dL 8.2* 8.5* 8.6       Results for orders placed during the hospital encounter of 23    Adult Transthoracic Echo Complete w/ Color, Spectral and  Contrast if Necessary Per Protocol    Interpretation Summary    Left ventricular systolic function is moderately decreased. Left ventricular ejection fraction appears to be 36 - 40%.  Very difficult study regarding analysis of ejection fraction given the bundle branch block and arrhythmia as well as ectopy.  Severe hypokinesis of the inferior wall and septum.    Left ventricular diastolic function was indeterminate.    The left atrial cavity is mild to moderately dilated.    Mild aortic valve stenosis is present.    Moderate to severe tricuspid valve regurgitation is present.    Estimated right ventricular systolic pressure from tricuspid regurgitation is mildly elevated (35-45 mmHg).      I have reviewed the medications:  Scheduled Meds:amiodarone, 200 mg, Oral, Q12H  apixaban, 5 mg, Oral, Q12H  atorvastatin, 20 mg, Oral, Nightly  carvedilol, 6.25 mg, Oral, BID With Meals  empagliflozin, 10 mg, Oral, Daily  guaiFENesin, 600 mg, Oral, Q12H  pantoprazole, 40 mg, Oral, Q AM  PARoxetine, 20 mg, Oral, Daily  sodium chloride, 500 mL, Intravenous, Once  sodium chloride, 10 mL, Intravenous, Q12H  thyroid, 15 mg, Oral, Daily  Thyroid, 30 mg, Oral, Daily      Continuous Infusions:   PRN Meds:.  acetaminophen **OR** acetaminophen **OR** acetaminophen    artificial tears    ipratropium-albuterol    LORazepam    nitroglycerin    ondansetron    [COMPLETED] Insert Peripheral IV **AND** sodium chloride    sodium chloride    sodium chloride    Assessment & Plan   Assessment & Plan     Active Hospital Problems    Diagnosis  POA    **Atrial fibrillation with RVR [I48.91]  Yes    Chronic congestive heart failure [I50.9]  Yes    Anxiety [F41.9]  Yes    Chronic interstitial cystitis [N30.10]  Yes    Pulmonary emphysema [J43.9]  Yes    Gastroesophageal reflux disease [K21.9]  Yes    Fibromyalgia [M79.7]  Yes    Benign hypertension [I10]  Yes    Hyperlipidemia [E78.5]  Yes      Resolved Hospital Problems   No resolved problems to display.         Brief Hospital Course to date:  Dafne Mary is a 82 y.o. female presents the hospital with rapid atrial fibrillation and acute systolic CHF.    Discussion/plan for today:  Beta-blocker transition to carvedilol which is appropriate with her low EF.  Heart rate seems to be improving.  Continue to monitor blood pressure.  Losartan had been stopped due to blood pressure.  Plan to monitor and could consider restarting when blood pressure is consistently stabilized.  Jardiance added for CHF.    Continue current cardiac management for CHF.    Amiodarone now oral for rhythm control.  Telemetry monitoring.  Patient has not symptomatic from her atrial fibrillation.     Continue chronic medications for hypothyroid.  Stable.    Continue paroxetine for mood.  Stable.    Continue PPI for GERD.  Stable.    Continue atorvastatin for hyperlipidemia.    Echocardiogram reviewed and EF 36% noted.  RVSP is also mildly elevated felt to be consistent with some degree of pulmonary hypertension.    Hypoxia now resolved.  Continue to monitor.    Patient is debilitated and wishes to go to this skilled facility for subacute rehab.  Consult case management to assist    Treatment plan discussed with the patient is in agreement.    DVT Prophylaxis: Anticoagulated      Disposition: I expect the patient to be discharged to subacute in 1 to 2 days.    CODE STATUS:   Code Status and Medical Interventions:   Ordered at: 06/04/23 0415     Code Status (Patient has no pulse and is not breathing):    CPR (Attempt to Resuscitate)     Medical Interventions (Patient has pulse or is breathing):    Full Support       Simon Moscoso MD  06/11/23

## 2023-06-11 NOTE — PLAN OF CARE
Goal Outcome Evaluation:   Pt resting on the bed, Alert and oriented, RA, afib on tele, no c/o pain or soa, BP on low side, no ss of acute distress noted, will continue to monitor.      Problem: Adult Inpatient Plan of Care  Goal: Plan of Care Review  Outcome: Ongoing, Progressing  Goal: Patient-Specific Goal (Individualized)  Outcome: Ongoing, Progressing  Goal: Absence of Hospital-Acquired Illness or Injury  Outcome: Ongoing, Progressing  Intervention: Identify and Manage Fall Risk  Recent Flowsheet Documentation  Taken 6/11/2023 0401 by Yared Wells RN  Safety Promotion/Fall Prevention:   activity supervised   safety round/check completed  Taken 6/11/2023 0205 by Yared Wells RN  Safety Promotion/Fall Prevention:   activity supervised   safety round/check completed  Taken 6/11/2023 0028 by Yared Wells RN  Safety Promotion/Fall Prevention:   activity supervised   room organization consistent   safety round/check completed  Taken 6/10/2023 2222 by Yared Wells RN  Safety Promotion/Fall Prevention:   activity supervised   safety round/check completed   room organization consistent  Taken 6/10/2023 2001 by Yared Wells RN  Safety Promotion/Fall Prevention:   activity supervised   safety round/check completed   room organization consistent  Intervention: Prevent Skin Injury  Recent Flowsheet Documentation  Taken 6/11/2023 0401 by Yared Wells RN  Body Position: position changed independently  Taken 6/11/2023 0205 by Yared Wells RN  Body Position: position changed independently  Taken 6/11/2023 0028 by Yared Wells RN  Body Position: position changed independently  Taken 6/10/2023 2222 by Yared Wells RN  Body Position: position changed independently  Taken 6/10/2023 2001 by Yared Wells RN  Body Position: position changed independently  Skin Protection: adhesive use limited  Intervention: Prevent and Manage VTE (Venous Thromboembolism) Risk  Recent  Flowsheet Documentation  Taken 6/11/2023 0028 by Yared Wells RN  Activity Management: activity encouraged  Range of Motion: active ROM (range of motion) encouraged  Taken 6/10/2023 2001 by Yared Wells RN  Activity Management: bedrest  VTE Prevention/Management: (eliquis) other (see comments)  Range of Motion: active ROM (range of motion) encouraged  Intervention: Prevent Infection  Recent Flowsheet Documentation  Taken 6/11/2023 0401 by Yared Wells RN  Infection Prevention:   rest/sleep promoted   hand hygiene promoted  Taken 6/11/2023 0205 by Yared Wells RN  Infection Prevention:   hand hygiene promoted   rest/sleep promoted  Taken 6/11/2023 0028 by Yared Wells RN  Infection Prevention:   rest/sleep promoted   hand hygiene promoted  Taken 6/10/2023 2222 by Yared Wells RN  Infection Prevention:   hand hygiene promoted   rest/sleep promoted  Taken 6/10/2023 2001 by Yared Wells RN  Infection Prevention:   hand hygiene promoted   rest/sleep promoted  Goal: Optimal Comfort and Wellbeing  Outcome: Ongoing, Progressing  Intervention: Provide Person-Centered Care  Recent Flowsheet Documentation  Taken 6/11/2023 0028 by Yared Wells RN  Trust Relationship/Rapport: care explained  Taken 6/10/2023 2001 by Yared Wells RN  Trust Relationship/Rapport: care explained  Goal: Readiness for Transition of Care  Outcome: Ongoing, Progressing     Problem: COPD (Chronic Obstructive Pulmonary Disease) Comorbidity  Goal: Maintenance of COPD Symptom Control  Outcome: Ongoing, Progressing  Intervention: Maintain COPD-Symptom Control  Recent Flowsheet Documentation  Taken 6/11/2023 0401 by Yared Wells RN  Medication Review/Management: medications reviewed  Taken 6/11/2023 0205 by Yared Wells RN  Medication Review/Management: medications reviewed  Taken 6/11/2023 0028 by Yared Wells RN  Supportive Measures: active listening utilized  Medication  Review/Management: medications reviewed  Taken 6/10/2023 2222 by Yared Wells RN  Medication Review/Management: medications reviewed  Taken 6/10/2023 2001 by Yared Wells RN  Supportive Measures: active listening utilized  Medication Review/Management: medications reviewed     Problem: Hypertension Comorbidity  Goal: Blood Pressure in Desired Range  Outcome: Ongoing, Progressing  Intervention: Maintain Blood Pressure Management  Recent Flowsheet Documentation  Taken 6/11/2023 0401 by Yared Wells RN  Medication Review/Management: medications reviewed  Taken 6/11/2023 0205 by Yared Wells RN  Medication Review/Management: medications reviewed  Taken 6/11/2023 0028 by Yared Wells RN  Medication Review/Management: medications reviewed  Taken 6/10/2023 2222 by Yared Wells RN  Medication Review/Management: medications reviewed  Taken 6/10/2023 2001 by Yared Wells RN  Medication Review/Management: medications reviewed     Problem: Adjustment to Illness (Sepsis/Septic Shock)  Goal: Optimal Coping  Outcome: Ongoing, Progressing  Intervention: Optimize Psychosocial Adjustment to Illness  Recent Flowsheet Documentation  Taken 6/11/2023 0028 by Yared Wells RN  Supportive Measures: active listening utilized  Taken 6/10/2023 2001 by Yared Wells RN  Supportive Measures: active listening utilized     Problem: Bleeding (Sepsis/Septic Shock)  Goal: Absence of Bleeding  Outcome: Ongoing, Progressing     Problem: Glycemic Control Impaired (Sepsis/Septic Shock)  Goal: Blood Glucose Level Within Desired Range  Outcome: Ongoing, Progressing     Problem: Infection Progression (Sepsis/Septic Shock)  Goal: Absence of Infection Signs and Symptoms  Outcome: Ongoing, Progressing  Intervention: Initiate Sepsis Management  Recent Flowsheet Documentation  Taken 6/11/2023 0401 by Yared Wells RN  Infection Prevention:   rest/sleep promoted   hand hygiene promoted  Taken 6/11/2023  0205 by Yared Wells RN  Infection Prevention:   hand hygiene promoted   rest/sleep promoted  Taken 6/11/2023 0028 by Yared Wells RN  Infection Management: aseptic technique maintained  Infection Prevention:   rest/sleep promoted   hand hygiene promoted  Taken 6/10/2023 2222 by Yared Wells RN  Infection Prevention:   hand hygiene promoted   rest/sleep promoted  Taken 6/10/2023 2001 by Yared Wells RN  Infection Management: aseptic technique maintained  Infection Prevention:   hand hygiene promoted   rest/sleep promoted  Intervention: Promote Recovery  Recent Flowsheet Documentation  Taken 6/11/2023 0028 by Yared Wells RN  Activity Management: activity encouraged  Taken 6/10/2023 2001 by Yared Wells RN  Activity Management: bedrest     Problem: Nutrition Impaired (Sepsis/Septic Shock)  Goal: Optimal Nutrition Intake  Outcome: Ongoing, Progressing     Problem: Fall Injury Risk  Goal: Absence of Fall and Fall-Related Injury  Outcome: Ongoing, Progressing  Intervention: Identify and Manage Contributors  Recent Flowsheet Documentation  Taken 6/11/2023 0401 by Yared Wells RN  Medication Review/Management: medications reviewed  Taken 6/11/2023 0205 by Yared Wells RN  Medication Review/Management: medications reviewed  Taken 6/11/2023 0028 by Yared Wells RN  Medication Review/Management: medications reviewed  Taken 6/10/2023 2222 by Yared Wells RN  Medication Review/Management: medications reviewed  Taken 6/10/2023 2001 by Yared Wells RN  Medication Review/Management: medications reviewed  Self-Care Promotion: independence encouraged  Intervention: Promote Injury-Free Environment  Recent Flowsheet Documentation  Taken 6/11/2023 0401 by Yared Wells RN  Safety Promotion/Fall Prevention:   activity supervised   safety round/check completed  Taken 6/11/2023 0205 by Yared Wells RN  Safety Promotion/Fall Prevention:   activity supervised    safety round/check completed  Taken 6/11/2023 0028 by Yared Wells, RN  Safety Promotion/Fall Prevention:   activity supervised   room organization consistent   safety round/check completed  Taken 6/10/2023 2222 by Yared Wells, RN  Safety Promotion/Fall Prevention:   activity supervised   safety round/check completed   room organization consistent  Taken 6/10/2023 2001 by Yared Wells, RN  Safety Promotion/Fall Prevention:   activity supervised   safety round/check completed   room organization consistent

## 2023-06-11 NOTE — PLAN OF CARE
Pt oriented vs stable HR range 70-110s in Afib, NAD reports feeling better today      Problem: Adult Inpatient Plan of Care  Goal: Plan of Care Review  Outcome: Ongoing, Progressing  Goal: Patient-Specific Goal (Individualized)  Outcome: Ongoing, Progressing  Goal: Absence of Hospital-Acquired Illness or Injury  Outcome: Ongoing, Progressing  Intervention: Identify and Manage Fall Risk  Recent Flowsheet Documentation  Taken 6/11/2023 0915 by Angelica Ross RN  Safety Promotion/Fall Prevention:   activity supervised   assistive device/personal items within reach   clutter free environment maintained   fall prevention program maintained   nonskid shoes/slippers when out of bed   room organization consistent   safety round/check completed  Intervention: Prevent Skin Injury  Recent Flowsheet Documentation  Taken 6/11/2023 1600 by Angelica Ross RN  Body Position:   position changed independently   side-lying   left  Taken 6/11/2023 1200 by Angelica Ross RN  Body Position: position changed independently  Taken 6/11/2023 1000 by Angelica Ross RN  Body Position: position changed independently  Taken 6/11/2023 0915 by Angelica Ross RN  Body Position: position changed independently  Intervention: Prevent and Manage VTE (Venous Thromboembolism) Risk  Recent Flowsheet Documentation  Taken 6/11/2023 0915 by Angelica Ross RN  Activity Management: activity encouraged  VTE Prevention/Management: (Eliquis) other (see comments)  Range of Motion: active ROM (range of motion) encouraged  Intervention: Prevent Infection  Recent Flowsheet Documentation  Taken 6/11/2023 0915 by Angelica Ross RN  Infection Prevention:   rest/sleep promoted   single patient room provided  Goal: Optimal Comfort and Wellbeing  Outcome: Ongoing, Progressing  Intervention: Provide Person-Centered Care  Recent Flowsheet Documentation  Taken 6/11/2023 0915 by Angelica Ross RN  Trust Relationship/Rapport:   care explained   thoughts/feelings  acknowledged   reassurance provided   questions encouraged   questions answered   emotional support provided  Goal: Readiness for Transition of Care  Outcome: Ongoing, Progressing     Problem: COPD (Chronic Obstructive Pulmonary Disease) Comorbidity  Goal: Maintenance of COPD Symptom Control  Outcome: Ongoing, Progressing  Intervention: Maintain COPD-Symptom Control  Recent Flowsheet Documentation  Taken 6/11/2023 0915 by Angelica Ross RN  Medication Review/Management: medications reviewed     Problem: Hypertension Comorbidity  Goal: Blood Pressure in Desired Range  Outcome: Ongoing, Progressing  Intervention: Maintain Blood Pressure Management  Recent Flowsheet Documentation  Taken 6/11/2023 0915 by Angelica Ross RN  Medication Review/Management: medications reviewed     Problem: Adjustment to Illness (Sepsis/Septic Shock)  Goal: Optimal Coping  Outcome: Ongoing, Progressing  Intervention: Optimize Psychosocial Adjustment to Illness  Recent Flowsheet Documentation  Taken 6/11/2023 0915 by Angelica Ross RN  Family/Support System Care:   self-care encouraged   presence promoted     Problem: Bleeding (Sepsis/Septic Shock)  Goal: Absence of Bleeding  Outcome: Ongoing, Progressing     Problem: Glycemic Control Impaired (Sepsis/Septic Shock)  Goal: Blood Glucose Level Within Desired Range  Outcome: Ongoing, Progressing     Problem: Infection Progression (Sepsis/Septic Shock)  Goal: Absence of Infection Signs and Symptoms  Outcome: Ongoing, Progressing  Intervention: Initiate Sepsis Management  Recent Flowsheet Documentation  Taken 6/11/2023 0915 by Angelica Ross RN  Infection Prevention:   rest/sleep promoted   single patient room provided  Intervention: Promote Recovery  Recent Flowsheet Documentation  Taken 6/11/2023 0915 by Angelica Ross RN  Activity Management: activity encouraged     Problem: Nutrition Impaired (Sepsis/Septic Shock)  Goal: Optimal Nutrition Intake  Outcome: Ongoing, Progressing      Problem: Fall Injury Risk  Goal: Absence of Fall and Fall-Related Injury  Outcome: Ongoing, Progressing  Intervention: Identify and Manage Contributors  Recent Flowsheet Documentation  Taken 6/11/2023 0915 by Angelica Ross, RN  Medication Review/Management: medications reviewed  Self-Care Promotion:   independence encouraged   BADL personal objects within reach   BADL personal routines maintained  Intervention: Promote Injury-Free Environment  Recent Flowsheet Documentation  Taken 6/11/2023 0915 by Angelica Ross, RN  Safety Promotion/Fall Prevention:   activity supervised   assistive device/personal items within reach   clutter free environment maintained   fall prevention program maintained   nonskid shoes/slippers when out of bed   room organization consistent   safety round/check completed   Goal Outcome Evaluation:

## 2023-06-11 NOTE — PROGRESS NOTES
LOS: 7 days   Patient Care Team:  Sitnia Chatterjee PA as PCP - General (Family Medicine)  Javier Nolan MD as Consulting Physician (Pulmonary Disease)  Nae Norman MD as Consulting Physician (Cardiology)    Chief Complaint: Follow-up cardiomyopathy, atrial fibrillation       Interval History: She feels better today.      Objective   Vital Signs  Temp:  [97.4 °F (36.3 °C)-98 °F (36.7 °C)] 97.8 °F (36.6 °C)  Heart Rate:  [] 81  Resp:  [18] 18  BP: ()/(47-95) 122/59    Intake/Output Summary (Last 24 hours) at 6/11/2023 1034  Last data filed at 6/10/2023 1400  Gross per 24 hour   Intake 480 ml   Output --   Net 480 ml         Physical Exam:  Constitutional: Well appearing, well developed, no acute distress   HENT: Oropharynx clear and membrane moist  Eyes: Normal conjunctiva, no sclera icterus.  Neck: Supple, no carotid bruit bilaterally.  Cardiovascular: Irregularly irregular rate and rhythm, No Murmur, No bilateral lower extremity edema.  Pulmonary: Normal respiratory effort, normal lung sounds, no wheezing.  Abdominal: Soft, nontender, no hepatosplenomegaly, liver is non-pulsatile.  Neurological: Alert and orient x 3.   Skin: Warm, dry, no ecchymosis, no rash.  Psych: Appropriate mood and affect. Normal judgment and insight.      Results Review:      Results from last 7 days   Lab Units 06/11/23  0729 06/10/23  0456 06/09/23  0644   SODIUM mmol/L 138 138 137   POTASSIUM mmol/L 4.2 4.3 3.9   CHLORIDE mmol/L 102 100 96*   CO2 mmol/L 30.0* 30.5* 32.1*   BUN mg/dL 18 17 21   CREATININE mg/dL 0.87 0.90 1.10*   GLUCOSE mg/dL 86 101* 97   CALCIUM mg/dL 8.2* 8.5* 8.6         Results from last 7 days   Lab Units 06/11/23  0553 06/10/23  0456 06/09/23  0643   WBC 10*3/mm3 7.44 10.21 10.54   HEMOGLOBIN g/dL 11.8* 12.0 13.5   HEMATOCRIT % 36.5 36.4 40.0   PLATELETS 10*3/mm3 159 185 202                       I reviewed the patient's new clinical results.  I personally viewed and interpreted the patient's  EKG/Telemetry data        Medication Review:   amiodarone, 200 mg, Oral, Q12H  apixaban, 5 mg, Oral, Q12H  atorvastatin, 20 mg, Oral, Nightly  carvedilol, 6.25 mg, Oral, BID With Meals  empagliflozin, 10 mg, Oral, Daily  guaiFENesin, 600 mg, Oral, Q12H  pantoprazole, 40 mg, Oral, Q AM  PARoxetine, 20 mg, Oral, Daily  sodium chloride, 500 mL, Intravenous, Once  sodium chloride, 10 mL, Intravenous, Q12H  thyroid, 15 mg, Oral, Daily  Thyroid, 30 mg, Oral, Daily             Assessment & Plan       Atrial fibrillation with RVR    Anxiety    Chronic interstitial cystitis    Pulmonary emphysema    Gastroesophageal reflux disease    Fibromyalgia    Hyperlipidemia    Benign hypertension    Chronic congestive heart failure      1.  Acute systolic CHF/cardiomyopathy.  EF 36 to 40%.  Suspected tachycardia mediated.  Jardiance started this admission.  She is on losartan and carvedilol.  Oral Lasix discontinued due to renal insufficiency and hypotension.  2.  Atrial fibrillation.  New diagnosis last admission.  Initially felt to be related to respiratory illness.  However, now persistent.  She has been having issues with rate control.   She was given IV digoxin yesterday and switched to carvedilol.  Heart rates have improved overall.  Also on oral amiodarone.  Plan to decrease to 200 mg daily on 6/14. Continue Tierra.      ANTONELLA Zelaya  06/11/23  10:34 EDT

## 2023-06-12 VITALS
HEIGHT: 60 IN | SYSTOLIC BLOOD PRESSURE: 160 MMHG | WEIGHT: 149 LBS | BODY MASS INDEX: 29.25 KG/M2 | OXYGEN SATURATION: 93 % | RESPIRATION RATE: 18 BRPM | TEMPERATURE: 98.1 F | HEART RATE: 107 BPM | DIASTOLIC BLOOD PRESSURE: 90 MMHG

## 2023-06-12 PROBLEM — I48.91 ATRIAL FIBRILLATION WITH RVR: Status: RESOLVED | Noted: 2023-06-04 | Resolved: 2023-06-12

## 2023-06-12 PROBLEM — I50.23 ACUTE ON CHRONIC HFREF (HEART FAILURE WITH REDUCED EJECTION FRACTION): Status: ACTIVE | Noted: 2023-06-12

## 2023-06-12 PROBLEM — I48.0 PAF (PAROXYSMAL ATRIAL FIBRILLATION): Status: ACTIVE | Noted: 2023-06-12

## 2023-06-12 LAB
ANION GAP SERPL CALCULATED.3IONS-SCNC: 9 MMOL/L (ref 5–15)
BUN SERPL-MCNC: 15 MG/DL (ref 8–23)
BUN/CREAT SERPL: 15.2 (ref 7–25)
CALCIUM SPEC-SCNC: 8.3 MG/DL (ref 8.6–10.5)
CHLORIDE SERPL-SCNC: 102 MMOL/L (ref 98–107)
CO2 SERPL-SCNC: 31 MMOL/L (ref 22–29)
CREAT SERPL-MCNC: 0.99 MG/DL (ref 0.57–1)
DEPRECATED RDW RBC AUTO: 42.5 FL (ref 37–54)
EGFRCR SERPLBLD CKD-EPI 2021: 57 ML/MIN/1.73
ERYTHROCYTE [DISTWIDTH] IN BLOOD BY AUTOMATED COUNT: 12.8 % (ref 12.3–15.4)
GLUCOSE SERPL-MCNC: 91 MG/DL (ref 65–99)
HCT VFR BLD AUTO: 36.2 % (ref 34–46.6)
HGB BLD-MCNC: 11.9 G/DL (ref 12–15.9)
MCH RBC QN AUTO: 29.6 PG (ref 26.6–33)
MCHC RBC AUTO-ENTMCNC: 32.9 G/DL (ref 31.5–35.7)
MCV RBC AUTO: 90 FL (ref 79–97)
PLATELET # BLD AUTO: 195 10*3/MM3 (ref 140–450)
PMV BLD AUTO: 9.8 FL (ref 6–12)
POTASSIUM SERPL-SCNC: 4.6 MMOL/L (ref 3.5–5.2)
RBC # BLD AUTO: 4.02 10*6/MM3 (ref 3.77–5.28)
SODIUM SERPL-SCNC: 142 MMOL/L (ref 136–145)
WBC NRBC COR # BLD: 6.58 10*3/MM3 (ref 3.4–10.8)

## 2023-06-12 PROCEDURE — 80048 BASIC METABOLIC PNL TOTAL CA: CPT | Performed by: INTERNAL MEDICINE

## 2023-06-12 PROCEDURE — 94799 UNLISTED PULMONARY SVC/PX: CPT

## 2023-06-12 PROCEDURE — 85027 COMPLETE CBC AUTOMATED: CPT | Performed by: INTERNAL MEDICINE

## 2023-06-12 RX ORDER — AMIODARONE HYDROCHLORIDE 200 MG/1
TABLET ORAL
Start: 2023-06-12 | End: 2023-06-16 | Stop reason: SDUPTHER

## 2023-06-12 RX ORDER — LORAZEPAM 0.5 MG/1
0.5 TABLET ORAL EVERY 12 HOURS PRN
Qty: 30 TABLET | Refills: 0 | Status: ON HOLD | OUTPATIENT
Start: 2023-06-12 | End: 2023-06-27

## 2023-06-12 RX ORDER — ATENOLOL 25 MG/1
25 TABLET ORAL EVERY 12 HOURS SCHEDULED
Status: DISCONTINUED | OUTPATIENT
Start: 2023-06-12 | End: 2023-06-12 | Stop reason: HOSPADM

## 2023-06-12 RX ORDER — ACETAMINOPHEN 325 MG/1
650 TABLET ORAL EVERY 6 HOURS PRN
Status: ON HOLD
Start: 2023-06-12

## 2023-06-12 RX ORDER — DIGOXIN 125 MCG
125 TABLET ORAL
Status: DISCONTINUED | OUTPATIENT
Start: 2023-06-12 | End: 2023-06-12 | Stop reason: HOSPADM

## 2023-06-12 RX ORDER — DIGOXIN 125 MCG
125 TABLET ORAL
Start: 2023-06-12 | End: 2023-06-16 | Stop reason: SDUPTHER

## 2023-06-12 RX ORDER — ATENOLOL 25 MG/1
25 TABLET ORAL EVERY 12 HOURS SCHEDULED
Start: 2023-06-12 | End: 2023-06-16 | Stop reason: SDUPTHER

## 2023-06-12 RX ORDER — LOSARTAN POTASSIUM 25 MG/1
12.5 TABLET ORAL DAILY
Start: 2023-06-12 | End: 2023-06-16 | Stop reason: SDUPTHER

## 2023-06-12 RX ORDER — GUAIFENESIN 600 MG/1
600 TABLET, EXTENDED RELEASE ORAL 2 TIMES DAILY PRN
Status: ON HOLD
Start: 2023-06-12

## 2023-06-12 RX ADMIN — DIGOXIN 125 MCG: 125 TABLET ORAL at 12:10

## 2023-06-12 RX ADMIN — EMPAGLIFLOZIN 10 MG: 10 TABLET, FILM COATED ORAL at 09:18

## 2023-06-12 RX ADMIN — ATENOLOL 25 MG: 25 TABLET ORAL at 12:10

## 2023-06-12 RX ADMIN — LEVOTHYROXINE, LIOTHYRONINE 30 MG: 19; 4.5 TABLET ORAL at 09:18

## 2023-06-12 RX ADMIN — APIXABAN 5 MG: 5 TABLET, FILM COATED ORAL at 09:18

## 2023-06-12 RX ADMIN — Medication 10 ML: at 09:20

## 2023-06-12 RX ADMIN — LORAZEPAM 0.5 MG: 0.5 TABLET ORAL at 10:54

## 2023-06-12 RX ADMIN — LORAZEPAM 0.5 MG: 0.5 TABLET ORAL at 16:14

## 2023-06-12 RX ADMIN — PANTOPRAZOLE SODIUM 40 MG: 40 TABLET, DELAYED RELEASE ORAL at 05:54

## 2023-06-12 RX ADMIN — PAROXETINE HYDROCHLORIDE HEMIHYDRATE 20 MG: 20 TABLET, FILM COATED ORAL at 09:18

## 2023-06-12 RX ADMIN — LEVOTHYROXINE, LIOTHYRONINE 15 MG: 19; 4.5 TABLET ORAL at 09:19

## 2023-06-12 RX ADMIN — AMIODARONE HYDROCHLORIDE 200 MG: 200 TABLET ORAL at 09:18

## 2023-06-12 RX ADMIN — GUAIFENESIN 600 MG: 600 TABLET, EXTENDED RELEASE ORAL at 09:18

## 2023-06-12 NOTE — PROGRESS NOTES
[facility] Services: Pharmacy Education - Direct Oral Anticoagulant - Apixaban    Dafne Mary has been ordered apixaban for a fib.     Counseling points included the following:  Mrs Mary's indication, patient's need for the medication, and dosing/frequency of this medication.  Enforced the importance of taking their medication as instructed every day and the reason why the medication is dosed that way.  Explained possible side effects of anticoagulation therapy, including increased risk of bleeding, and s/sx of bleeding. Also talked about ways to control bleeding for minor cuts and scrapes.  Emphasized the importance of going to the emergency room if any of the following occur: Falling and hitting your head; noticing bright red blood in urine or dark/tarry stools; vomiting up blood or vomit has a coffee-ground like texture; coughing up blood.  Discussed the importance of informing any physician or dentist that they have been started on a DOAC, in case they need to be taken off for a procedure.  Discussed all important drug interactions, including over-the-counter medications and supplements.  Instructed the patient not to begin or discontinue any medications without informing his/her physician/pharmacist.        Patient expressed understanding and had no further questions.      Bhavana Smith, [facility]  Clinical Pharmacist

## 2023-06-12 NOTE — DISCHARGE SUMMARY
[facility]  DISCHARGE SUMMARY    Patient Name: Dafne Mary  : 1940  MRN: [id number]    Date of Admission: 6/3/2023 11:35 PM  Date of Discharge:  2023  Primary Care Physician: Sintia Chatterjee PA    Consults       Date and Time Order Name Status Description    2023 10:10 AM Inpatient Pulmonology Consult Completed     2023  4:15 AM Inpatient Cardiology Consult Completed     2023  3:45 AM A (on-call MD unless specified) Details      2023  8:23 AM Inpatient Cardiology Consult Completed             Hospital Course       Active Hospital Problems    Diagnosis  POA    PAF (paroxysmal atrial fibrillation) [I48.0]  Yes    Acute on chronic HFrEF (heart failure with reduced ejection fraction) [I50.23]  Yes    Chronic congestive heart failure [I50.9]  Yes    Anxiety [F41.9]  Yes    Chronic interstitial cystitis [N30.10]  Yes    Pulmonary emphysema [J43.9]  Yes    Gastroesophageal reflux disease [K21.9]  Yes    Fibromyalgia [M79.7]  Yes    Benign hypertension [I10]  Yes    Hyperlipidemia [E78.5]  Yes      Resolved Hospital Problems    Diagnosis Date Resolved POA    Atrial fibrillation with RVR [I48.91] 2023 Yes          Hospital Course:  Dafne Mary is a 82 y.o. female presents to the hospital with acute systolic heart failure/cardiomyopathy with rapid atrial fibrillation and acute hypoxic respiratory failure.  She underwent titration of cardiac medications and diuresis.  Cardiology has been adjusting medications for rate control.  Her rate is currently now in the 100s range and right goal is preferably less than 110.  Patient is not currently symptomatic from her rate.  Cardiology is recommending to discharge her on digoxin and atenolol.  Although she has a cardiomyopathy they prefer atenolol due to better rate control then metoprolol succinate or carvedilol.  Patient's respiratory failure has resolved and she is currently breathing comfortably.  Patient is  eager to go to skilled nursing facility for subacute rehabilitation today.  I have spoken to cardiology who has cleared her to discharge.    At the time of discharge patient was told to take all medications as prescribed, keep all follow-up appointments, and call their doctor or return to the hospital with any worsening or concerning symptoms.    Please note that this note was made using Dragon voice recognition software            Day of Discharge     Subjective: Patient feels well.  No palpitations or chest pain.  She is eager to go to rehab.  Her heart rate is approximately 105 during my evaluation.    ROS:  No current fevers or chills  No current shortness of breath or cough  No current nausea, vomiting, or diarrhea  No current chest pain or palpitations    Vital Signs:   Temp:  [98 °F (36.7 °C)-98.4 °F (36.9 °C)] 98.1 °F (36.7 °C)  Heart Rate:  [100-122] 111  Resp:  [18] 18  BP: ()/(62-90) 112/72     Physical Exam:    Constitutional:Awake, alert  HENT: NCAT, mucous membranes moist, neck supple  Respiratory: Clear to auscultation bilaterally, respiratory effort normal, nonlabored breathing   Cardiovascular: Irregular rhythm, normal radial pulses  Gastrointestinal:  soft, nontender, nondistended  Musculoskeletal: Elderly and somewhat debilitated appearance, BMI is 29, minimal lower extremity edema  Psychiatric: Appropriate affect, cooperative, conversational  Neurologic: No slurred speech or facial droop, follows commands  Skin: No rashes or jaundice, warm    Pertinent  and/or Most Recent Results     Results from last 7 days   Lab Units 06/12/23  0521 06/11/23  0729 06/11/23  0553 06/10/23  0456 06/09/23  0644 06/09/23  0643 06/08/23  0520 06/07/23  0517 06/06/23  0504   WBC 10*3/mm3 6.58  --  7.44 10.21  --  10.54 9.98 10.26 12.01*   HEMOGLOBIN g/dL 11.9*  --  11.8* 12.0  --  13.5 14.0 13.9 12.4   HEMATOCRIT % 36.2  --  36.5 36.4  --  40.0 42.2 41.9 37.4   PLATELETS 10*3/mm3 195  --  159 185  --  202 200  228 223   SODIUM mmol/L 142 138  --  138 137  --  135* 138 135*   POTASSIUM mmol/L 4.6 4.2  --  4.3 3.9  --  3.8 4.2 3.7   CHLORIDE mmol/L 102 102  --  100 96*  --  97* 96* 93*   CO2 mmol/L 31.0* 30.0*  --  30.5* 32.1*  --  29.0 30.0* 31.7*   BUN mg/dL 15 18  --  17 21  --  31* 36* 37*   CREATININE mg/dL 0.99 0.87  --  0.90 1.10*  --  1.23* 1.25* 1.21*   GLUCOSE mg/dL 91 86  --  101* 97  --  94 100* 87   CALCIUM mg/dL 8.3* 8.2*  --  8.5* 8.6  --  8.4* 8.4* 8.5*     Imaging Results (All)       Procedure Component Value Units Date/Time    XR Chest 1 View [[id number]] Collected: 06/04/23 1008     Updated: 06/04/23 1013    Narrative:      XR CHEST 1 VW-     HISTORY: Female who is 82 years-old,  short of breath     TECHNIQUE: Frontal view of the chest     COMPARISON: 06/04/2023     FINDINGS: The heart is enlarged. Slight prominence of vascular and  interstitial markings. Mild bibasilar atelectasis/infiltrate, minimal  right pleural effusion. No pneumothorax. Otherwise stable.       Impression:      Mild bibasilar atelectasis/infiltrate, minimal right pleural  effusion. Cardiomegaly with slight prominence of vascular and  interstitial markings.     This report was finalized on 6/4/2023 10:10 AM by Dr. Ned Guzman M.D.       XR Chest 1 View [[id number]] Collected: 06/04/23 0311     Updated: 06/04/23 0311    Narrative:        Patient: YANG DELAROSA  Time Out: 03:11  Exam(s): XR CXR 1 VIEW     EXAM:    XR Chest, 1 View    CLINICAL HISTORY:     Reason for exam: leukocytosis.    TECHNIQUE:    Frontal view of the chest.    COMPARISON:    CT from May 3, 2022.    FINDINGS:    Lungs:  Prominent vascular and interstitial markings in both lung bases,   exaggerated by technique and large body habitus.  Possible mild CHF.    Pleural space:  Slight blunting the right gastritic angle suggest small   right pleural effusion.  No pneumothorax.    Heart:  The cardiac silhouette is moderately enlarged.    Mediastinum:  Unremarkable.     Bones joints:  Unremarkable.    Vasculature:  The aortic arch is calcified but nondilated.    Upper abdomen:  There is no pneumoperitoneum under the diaphragm.    IMPRESSION:         Prominent vascular and interstitial markings in both lung bases,   exaggerated by technique and large body habitus.  Possible mild CHF.      Impression:          Electronically signed by Jose Cabrera MD on 06-04-23 at 0311            Results for orders placed during the hospital encounter of 11/22/22    Duplex Carotid Ultrasound CAR    Interpretation Summary    Proximal right internal carotid artery plaque without significant stenosis.    Proximal left internal carotid artery plaque without significant stenosis.      Results for orders placed during the hospital encounter of 11/22/22    Duplex Carotid Ultrasound CAR    Interpretation Summary    Proximal right internal carotid artery plaque without significant stenosis.    Proximal left internal carotid artery plaque without significant stenosis.      Results for orders placed during the hospital encounter of 06/03/23    Adult Transthoracic Echo Complete w/ Color, Spectral and Contrast if Necessary Per Protocol    Interpretation Summary    Left ventricular systolic function is moderately decreased. Left ventricular ejection fraction appears to be 36 - 40%.  Very difficult study regarding analysis of ejection fraction given the bundle branch block and arrhythmia as well as ectopy.  Severe hypokinesis of the inferior wall and septum.    Left ventricular diastolic function was indeterminate.    The left atrial cavity is mild to moderately dilated.    Mild aortic valve stenosis is present.    Moderate to severe tricuspid valve regurgitation is present.    Estimated right ventricular systolic pressure from tricuspid regurgitation is mildly elevated (35-45 mmHg).        Discharge Details        Discharge Medications        New Medications        Instructions Start Date   acetaminophen 325 MG  tablet  Commonly known as: TYLENOL   650 mg, Oral, Every 6 Hours PRN      amiodarone 200 MG tablet  Commonly known as: PACERONE   Take 1 tab q12h Monday and Tuesday, then on Wednesday 6/14/23 take 1 tab daily from then on      apixaban 5 MG tablet tablet  Commonly known as: ELIQUIS   5 mg, Oral, Every 12 Hours Scheduled      artificial tears ophthalmic ointment   1 application, Both Eyes, Every 1 Hour PRN      digoxin 125 MCG tablet  Commonly known as: LANOXIN   125 mcg, Oral, Daily Digoxin      empagliflozin 10 MG tablet tablet  Commonly known as: JARDIANCE   10 mg, Oral, Daily   Start Date: June 13, 2023     guaiFENesin 600 MG 12 hr tablet  Commonly known as: MUCINEX   600 mg, Oral, 2 Times Daily PRN      LORazepam 0.5 MG tablet  Commonly known as: ATIVAN   0.5 mg, Oral, Every 12 Hours PRN             Changes to Medications        Instructions Start Date   atenolol 25 MG tablet  Commonly known as: TENORMIN  What changed:   medication strength  how much to take  when to take this   25 mg, Oral, Every 12 Hours Scheduled      losartan 25 MG tablet  Commonly known as: COZAAR  What changed:   medication strength  how much to take   12.5 mg, Oral, Daily             Continue These Medications        Instructions Start Date   atorvastatin 20 MG tablet  Commonly known as: LIPITOR   20 mg, Oral, Nightly      Breztri Aerosphere 160-9-4.8 MCG/ACT aerosol inhaler  Generic drug: Budeson-Glycopyrrol-Formoterol   2 puffs, 2 Times Daily      ipratropium-albuterol 0.5-2.5 mg/3 ml nebulizer  Commonly known as: DUO-NEB   3 mL, Nebulization, Every 4 Hours PRN      ipratropium-albuterol 0.5-2.5 mg/3 ml nebulizer  Commonly known as: DUO-NEB   3 mL, Nebulization, 4 Times Daily PRN      omeprazole 20 MG capsule  Commonly known as: priLOSEC   20 mg, Oral, Daily      PARoxetine 10 MG tablet  Commonly known as: PAXIL   20 mg, Oral, Daily      REFRESH DRY EYE THERAPY OP   1 drop, Ophthalmic, Daily, For dry eyes       thyroid 15 MG  tablet  Commonly known as: ARMOUR   15 mg, Oral, Daily, Take with 30mg for total daily dose of 45mg.       Thyroid 30 MG tablet  Commonly known as: ARMOUR   30 mg, Oral, Daily, Take with 15mg for total daily dose of 45mg.       Ventolin  (90 Base) MCG/ACT inhaler  Generic drug: albuterol sulfate HFA   2 puffs, Inhalation, Every 4 Hours PRN      vitamin B-12 500 MCG tablet  Commonly known as: CYANOCOBALAMIN   500 mcg, Oral, Daily      vitamin C 250 MG tablet  Commonly known as: ASCORBIC ACID   500 mg, Oral, Daily      vitamin D3 125 MCG (5000 UT) capsule capsule   5,000 Units, Oral, Daily             Stop These Medications      amLODIPine 2.5 MG tablet  Commonly known as: NORVASC     aspirin 81 MG chewable tablet     predniSONE 10 MG tablet  Commonly known as: DELTASONE     ZICAM ALLERGY RELIEF NA              Allergies   Allergen Reactions    Lansoprazole Nausea Only     NAUSEA  NAUSEA  NAUSEA    Diphenhydramine Hcl (Sleep) Irritability    Lisinopril Cough         Discharge Disposition:  Skilled Nursing Facility (DC - External)    Diet:  Hospital:  Diet Order   Procedures    Diet: Cardiac Diets; Healthy Heart (2-3 Na+); Texture: Regular Texture (IDDSI 7); Fluid Consistency: Thin (IDDSI 0)       Activity:  Activity Instructions       Activity as Tolerated      Up WIth Assist                   CODE STATUS:    Code Status and Medical Interventions:   Ordered at: 06/04/23 0415     Code Status (Patient has no pulse and is not breathing):    CPR (Attempt to Resuscitate)     Medical Interventions (Patient has pulse or is breathing):    Full Support       Future Appointments   Date Time Provider Department Center   6/16/2023 12:30 PM Shelbie Good APRN [facility]   6/20/2023 10:30 AM [facility]   6/27/2023  1:30 PM Sintia Chatterjee PA [facility]   8/9/2023  1:30 PM Shelbie Godo APRN [facility]   11/7/2023  2:00 PM [facility] MAMM 2 [facility]MO [facility]   11/7/2023  2:30 PM  [facility] DEXA 1  [facility] DEXA [facility]         Additional Instructions for the Follow-ups that You Need to Schedule       Discharge Follow-up with PCP   As directed       Currently Documented PCP:    Sintia Chatterjee PA    PCP Phone Number:    [phone]     Follow Up Details: Recommend primary care follow-up within 1 week after discharge from skilled facility for general hospital follow-up         Discharge Follow-up with Specified Provider: Follow-up in cardiology clinic as instructed.  Please call the clinic with any questions   As directed      To: Follow-up in cardiology clinic as instructed.  Please call the clinic with any questions                Contact information for follow-up providers       Sintia Chatterjee PA .    Specialty: Family Medicine  Why: Recommend primary care follow-up within 1 week after discharge from skilled facility for general hospital follow-up  Contact information:  [address] [facility] [address] [phone]               Nae Norman MD Follow up in 1 month(s).    Specialty: Cardiology  Contact information:  [address] [id number] [phone]                       Contact information for after-discharge care       Destination       [facility] .    Service: Skilled Nursing  Contact information:  [address] [facility] [address] [phone]                                       Simon Moscoso MD  06/12/23      Time Spent on Discharge:  I spent greater than 35 minutes on this discharge activity which included: face-to-face encounter with the patient, reviewing the data in the system, coordination of the care with the nursing staff as well as consultants, documentation, and entering orders.

## 2023-06-12 NOTE — CASE MANAGEMENT/SOCIAL WORK
Case Management Discharge Note      Final Note: [facility]. Rosemary RIZVI RN CCP         Selected Continued Care - Admitted Since 6/3/2023     Destination Coordination complete.    Service Provider Selected Services Address Phone Fax Patient Preferred    [facility] [address], [facility] [address] [phone] --          Durable Medical Equipment    No services have been selected for the patient.              Dialysis/Infusion    No services have been selected for the patient.              Home Medical Care    No services have been selected for the patient.              Therapy    No services have been selected for the patient.              Community Resources    No services have been selected for the patient.              Community & DME    No services have been selected for the patient.                       Final Discharge Disposition Code: 03 - skilled nursing facility (SNF)

## 2023-06-12 NOTE — PLAN OF CARE
Goal Outcome Evaluation:  Plan of Care Reviewed With: patient   Pt BP is slightly elevated but may be due to her agitation at the moment. HR remains in 100-120's post atenolol and digoxin dose, pt is asymptomatic, Dr. Moscoso is aware. Daughter is at bedside. Report called to nurse Amparo at [facility] ([phone]). IV is out. Discharge instructions given to pt and family. NAD noted.      Progress: improving

## 2023-06-12 NOTE — PROGRESS NOTES
[facility] Follow Up    Chief Complaint: Follow up atrial fibrillation, cardiomyopathy    Interval History: Feels a little out of breath with activity.  Unclear if this is new compared to a few days ago.  No chest pain.     Objective:     Objective:  Temp:  [97.8 °F (36.6 °C)-98.4 °F (36.9 °C)] 98 °F (36.7 °C)  Heart Rate:  [] 100  Resp:  [18] 18  BP: ()/(59-90) 108/62   No intake or output data in the 24 hours ending 06/12/23 0731  Body mass index is 29.1 kg/m².      06/03/23  2310 06/04/23  0536 06/05/23  1521   Weight: 60.3 kg (133 lb) 68 kg (149 lb 14.6 oz) 67.6 kg (149 lb)     Weight change:       Physical Exam:   General : Alert, cooperative, in no acute distress.  Neuro: Alert,cooperative and oriented.  Lungs: CTAB. Normal respiratory effort and rate.  CV: irregularly, irregular, normal S1 and S2, no murmurs, gallops or rubs.  ABD: Soft, nontender, nondistended. Positive bowel sounds.  Extr: No edema or cyanosis, moves all extremities.    Lab Review:   Results from last 7 days   Lab Units 06/12/23  0521 06/11/23  0729   SODIUM mmol/L 142 138   POTASSIUM mmol/L 4.6 4.2   CHLORIDE mmol/L 102 102   CO2 mmol/L 31.0* 30.0*   BUN mg/dL 15 18   CREATININE mg/dL 0.99 0.87   GLUCOSE mg/dL 91 86   CALCIUM mg/dL 8.3* 8.2*         Results from last 7 days   Lab Units 06/12/23  0521 06/11/23  0553   WBC 10*3/mm3 6.58 7.44   HEMOGLOBIN g/dL 11.9* 11.8*   HEMATOCRIT % 36.2 36.5   PLATELETS 10*3/mm3 195 159                   Invalid input(s): LDLCALC          I reviewed the patient's new clinical results.  I personally viewed and interpreted the patient's EKG  Current Medications:   Scheduled Meds:amiodarone, 200 mg, Oral, Q12H  apixaban, 5 mg, Oral, Q12H  atorvastatin, 20 mg, Oral, Nightly  carvedilol, 6.25 mg, Oral, BID With Meals  empagliflozin, 10 mg, Oral, Daily  guaiFENesin, 600 mg, Oral, Q12H  pantoprazole, 40 mg, Oral, Q AM  PARoxetine, 20 mg, Oral, Daily  sodium chloride, 500 mL,  Intravenous, Once  sodium chloride, 10 mL, Intravenous, Q12H  thyroid, 15 mg, Oral, Daily  Thyroid, 30 mg, Oral, Daily      Continuous Infusions:     Allergies:  Allergies   Allergen Reactions    Lansoprazole Nausea Only     NAUSEA  NAUSEA  NAUSEA    Diphenhydramine Hcl (Sleep) Irritability    Lisinopril Cough       Assessment/Plan:     1.  Acute systolic congestive heart failure.  Improved.  Oral furosemide stopped due to renal insufficiency and hypotension.  No evidence of volume overload today.  2.  Cardiomyopathy.  Although there is some concern for wall motion abnormalities I suspect her cardiomyopathy is tachycardia mediated due to atrial fibrillation with rapid rates.  Switched to carvedilol over the weekend from atenolol.  Jardiance started this admission.  Losartan discontinued due to low BP and MALATHI.    Unfortunately not a lot of blood pressure room to push guideline directed management further.  2.  Atrial fibrillation.  New diagnosis last admission.  Initially thought this may be related to respiratory issues at the time.  However this is now persistent and she is having issues with rate control.   Started on apixaban this admission.  Diltiazem infusion discontinued.  Started on oral amiodarone on 6/7 with some improvement in heart rates.  Switched from atenolol to carvedilol over the weekend and heart rates not as well controlled.   3.  Acute hypoxic respiratory failure.  Resolved.  Now on room air.  4.  Hypertension.  With relatively low blood pressures despite discontinuation of both losartan and furosemide.  5.  Asthma and COPD  6.  Pulmonary hypertension  7.  Acute kidney injury.  Resolved with the discontinuation of losartan and furosemide.        -Although carvedilol is the better choice in the setting of cardiomyopathy her heart rates are not as well-controlled as she was on atenolol which usually is more effective at heart rate control.  For this reason I am going to switch her back to atenolol  25 mg twice a day.  - Now that her renal function has normalized we will start oral digoxin 0.125 mg daily.  - Continue amiodarone for now.  - I think we will likely need to proceed with cardioversion at some point.  Ideally would like to wait until she has been on anticoagulation for 3 to 4 weeks before proceeding.  Otherwise we will need to consider LE and cardioversion.  - Working on subacute rehab bed at discharge.    Nae Norman MD  06/12/23  07:31 EDT

## 2023-06-15 NOTE — PROGRESS NOTES
"Enter Query Response Below      Query Response: -persistent atrial fibrillation  Electronically signed by Simon Moscoso MD, 06/15/23, 3:07 PM EDT.               If applicable, please update the problem list.     Patient: Dafne Mary        : 1940  Account: [id number]           Admit Date: 6/3/2023        How to Respond to this query:       a. Click New Note     b. Answer query within the yellow box.                c. Update the Problem List, if applicable.      If you have any questions about this query contact me at: [email or web address]    Dr. Moscoso,     Patient admitted with acute on chronic CHF, atrial fibrillation with RVR.  Heart rates to 110s-163 with treatment including atenolol, amiodarone, coreg and digoxin.  Paroxysmal atrial fibrillation documented in discharge summary. Per cardiology consult atrial fibrillation is now persistent \"Now admitted with persistent atrial fibrillation.\" and \"However this is now persistent and she is having issues with rate control.\"    Please further clarify if the patient is treated/monitored for the following:  -persistent atrial fibrillation  -paroxysmal atrial fibrillation  -other____________  -unable to clinically determine    By submitting this query, we are merely seeking further clarification of documentation to accurately reflect all conditions that you are monitoring, evaluating, treating or that extend the hospitalization or utilize additional resources of care. Please utilize your independent clinical judgment when addressing the question(s) above.     This query and your response, once completed, will be entered into the legal medical record.    Sincerely,  Hillary Coelho RN BSN  Clinical Documentation Integrity Program   "

## 2023-06-16 ENCOUNTER — OFFICE VISIT (OUTPATIENT)
Dept: CARDIOLOGY | Facility: CLINIC | Age: 83
End: 2023-06-16
Payer: MEDICARE

## 2023-06-16 VITALS
BODY MASS INDEX: 26.07 KG/M2 | WEIGHT: 132.8 LBS | HEIGHT: 60 IN | HEART RATE: 119 BPM | SYSTOLIC BLOOD PRESSURE: 130 MMHG | DIASTOLIC BLOOD PRESSURE: 86 MMHG

## 2023-06-16 DIAGNOSIS — E78.2 MIXED HYPERLIPIDEMIA: ICD-10-CM

## 2023-06-16 DIAGNOSIS — I65.23 ARTERIOSCLEROSIS OF BOTH CAROTID ARTERIES: Primary | ICD-10-CM

## 2023-06-16 DIAGNOSIS — I10 BENIGN HYPERTENSION: ICD-10-CM

## 2023-06-16 DIAGNOSIS — R00.2 PALPITATIONS: ICD-10-CM

## 2023-06-16 DIAGNOSIS — I48.0 PAF (PAROXYSMAL ATRIAL FIBRILLATION): ICD-10-CM

## 2023-06-16 DIAGNOSIS — I50.9 CHRONIC CONGESTIVE HEART FAILURE, UNSPECIFIED HEART FAILURE TYPE: ICD-10-CM

## 2023-06-16 DIAGNOSIS — I25.2 HISTORY OF NON-ST ELEVATION MYOCARDIAL INFARCTION (NSTEMI): ICD-10-CM

## 2023-06-16 RX ORDER — LOSARTAN POTASSIUM 25 MG/1
12.5 TABLET ORAL DAILY
Qty: 30 TABLET | Refills: 3 | Status: ON HOLD | OUTPATIENT
Start: 2023-06-16

## 2023-06-16 RX ORDER — AMIODARONE HYDROCHLORIDE 200 MG/1
TABLET ORAL
Qty: 30 TABLET | Refills: 3 | Status: ON HOLD | OUTPATIENT
Start: 2023-06-16

## 2023-06-16 RX ORDER — DIGOXIN 125 MCG
125 TABLET ORAL
Qty: 30 TABLET | Refills: 3 | Status: ON HOLD | OUTPATIENT
Start: 2023-06-16

## 2023-06-16 RX ORDER — ATENOLOL 50 MG/1
50 TABLET ORAL EVERY 12 HOURS SCHEDULED
Qty: 60 TABLET | Refills: 3 | Status: ON HOLD | OUTPATIENT
Start: 2023-06-16

## 2023-06-16 NOTE — PROGRESS NOTES
Subjective:     Encounter Date:12/28/2022      Patient ID: Dafne Mary is a 82 y.o. female.    Chief Complaint:follow up HTN, HLD HFpEF  History of Present Illness  This is a 83 y/o female who follows with Dr. Norman and is known to me. She has a pmhx of moderate to severe COPD, emphysema, granulomatous lung disease, depression/anxiety, hypertension, hypothyroidism and chronic diastolic CHF.     She was recently admitted 5/20 with complaints of shortness of breath. She was found to be in atrial fibrillation which was new onset. She was discharged on atenolol. She returned a few days later for palpitations and worsening shortness of breath. She was then started on digoxin and amiodarone in addition to her atenolol. She was also started on Jardiance for her cardiomyopathy. At discharge she was placed on apixaban.     She is here today for a follow up visit. She reports feeling ok. She is to be discharged from rehab tomorrow. She denies any chest pain. She does still have some shortness of breath and palpitations at times. She denies any swelling in her lower extremities, orthopnea or PND. She reports dizziness and fatigue and is quite concerned about how shaky her hands have become. She reports feeling anxious and is upset that she was not prescribed Ativan at discharge.    Prior History:  In 2021 she began seeing Dr. Hanson for palpitations and abnormal EKG. An echocardiogram showed normal left ventricular systolic function, EF 53%, grade 2 diastolic dysfunction, and hypokinesis of the mid inferior, basal inferoseptal, mid inferoseptal, basal inferior and basal inferoseptal walls, no significant valvular disease. Holter monitor was benign. She had a stress perfusion study in November 2021 that showed no evidence of ischemia.     She was hospitalized in May 2022 for respiratory failure requiring intubation. She was found to have pleural effusions, pulmonary edema, acute COPD exacerbation as well as COVID-19  and rhinovirus. A repeat echocardiogram at that time showed normal left ventricular systolic function, EF 52%, normal diastolic function, aortic valve calcification but no evidence of significant stenosis, mild mitral and tricuspid regurgitation, and mid inferior, basal inferoseptal, mid inferoseptal, basal inferior, and basal inferoseptal hypokinesis.    After discharge, she continued to have dyspnea. A repeat echocardiogram was performed in July 2022 that showed normal left ventricular systolic function, EF 53%, at least grade 2 diastolic dysfunction, basal inferoseptal and inferior wall hypokinesis, mildly dilated left atrium, mild to moderate mitral valve regurgitation, moderately elevated RVSP of 45 mmHg.   She was started on furosemide 20 mg daily.    She was then seen in November in follow up by Dr. Norman. She was having positional lightheadedness which was suspected to be orthostatic hypotension. Her amlodipine was stopped along with furosemide and she did not feel that the furosemide made a difference. She reported back that her blood pressure was increasing and she was instructed to increase atenolol to 50 mg BID. She continued to report blood pressure problems and ultimately ended up on carvedilol 12.5 mg BID.    When I saw her in December 2022, she had just been admitted to [facility] for acute respiratory failure. I did not make any changes at that visit. She followed up with Dr. Norman in February and continued to feel well.     I have reviewed and updated as appropriate allergies, current medications, past family history, past medical history, past surgical history and problem list.    Review of Systems   Constitutional: Positive for malaise/fatigue. Negative for fever, weight gain and weight loss.   HENT: Negative for congestion, hoarse voice and sore throat.    Eyes: Negative for blurred vision and double vision.   Cardiovascular: Positive for dyspnea on exertion and palpitations. Negative for  chest pain, leg swelling, orthopnea and syncope.   Respiratory: Positive for shortness of breath. Negative for cough and wheezing.    Gastrointestinal: Negative for abdominal pain, hematemesis, hematochezia and melena.   Genitourinary: Negative for dysuria and hematuria.   Neurological: Positive for dizziness. Negative for headaches, light-headedness and numbness.   Psychiatric/Behavioral: Negative for depression. The patient is nervous/anxious.          Current Outpatient Medications:   •  acetaminophen (TYLENOL) 325 MG tablet, Take 2 tablets by mouth Every 6 (Six) Hours As Needed for Mild Pain., Disp: , Rfl:   •  amiodarone (PACERONE) 200 MG tablet, 1 tab by mouth daily, Disp: 30 tablet, Rfl: 3  •  apixaban (ELIQUIS) 5 MG tablet tablet, Take 1 tablet by mouth Every 12 (Twelve) Hours. Indications: Atrial Fibrillation, Disp: 60 tablet, Rfl: 3  •  Artificial Tear Ointment (artificial tears) ophthalmic ointment, Administer 1 application to both eyes Every 1 (One) Hour As Needed (dry eye)., Disp: , Rfl:   •  atenolol (TENORMIN) 50 MG tablet, Take 1 tablet by mouth Every 12 (Twelve) Hours., Disp: 60 tablet, Rfl: 3  •  atorvastatin (LIPITOR) 20 MG tablet, Take 1 tablet by mouth Every Night., Disp: 90 tablet, Rfl: 1  •  Breztri Aerosphere 160-9-4.8 MCG/ACT aerosol inhaler, 2 puffs 2 (Two) Times a Day., Disp: , Rfl:   •  digoxin (LANOXIN) 125 MCG tablet, Take 1 tablet by mouth Daily., Disp: 30 tablet, Rfl: 3  •  empagliflozin (JARDIANCE) 10 MG tablet tablet, Take 1 tablet by mouth Daily., Disp: 30 tablet, Rfl: 3  •  Glycerin-Polysorbate 80 (REFRESH DRY EYE THERAPY OP), Apply 1 drop to eye(s) as directed by provider Daily. For dry eyes, Disp: , Rfl:   •  guaiFENesin (MUCINEX) 600 MG 12 hr tablet, Take 1 tablet by mouth 2 (Two) Times a Day As Needed for Cough or Congestion., Disp: , Rfl:   •  ipratropium-albuterol (DUO-NEB) 0.5-2.5 mg/3 ml nebulizer, Take 3 mL by nebulization Every 4 (Four) Hours As Needed for Wheezing.,  Disp: , Rfl:   •  ipratropium-albuterol (DUO-NEB) 0.5-2.5 mg/3 ml nebulizer, Take 3 mL by nebulization 4 (Four) Times a Day As Needed for Wheezing., Disp: 120 mL, Rfl: 5  •  LORazepam (ATIVAN) 0.5 MG tablet, Take 1 tablet by mouth Every 12 (Twelve) Hours As Needed for Anxiety for up to 15 days., Disp: 30 tablet, Rfl: 0  •  losartan (COZAAR) 25 MG tablet, Take 0.5 tablets by mouth Daily., Disp: 30 tablet, Rfl: 3  •  omeprazole (priLOSEC) 20 MG capsule, Take 1 capsule by mouth Daily., Disp: , Rfl:   •  PARoxetine (PAXIL) 10 MG tablet, Take 2 tablets by mouth Daily., Disp: 180 tablet, Rfl: 0  •  thyroid (ARMOUR) 15 MG tablet, Take 1 tablet by mouth Daily. Take with 30mg for total daily dose of 45mg., Disp: , Rfl:   •  Thyroid 30 MG PO tablet, Take 1 tablet by mouth Daily. Take with 15mg for total daily dose of 45mg., Disp: , Rfl:   •  Ventolin  (90 Base) MCG/ACT inhaler, Inhale 2 puffs Every 4 (Four) Hours As Needed., Disp: , Rfl:   •  vitamin B-12 (CYANOCOBALAMIN) 500 MCG tablet, Take 1 tablet by mouth Daily., Disp: , Rfl:   •  vitamin C (ASCORBIC ACID) 250 MG tablet, Take 2 tablets by mouth Daily., Disp: , Rfl:   •  vitamin D3 125 MCG (5000 UT) capsule capsule, Take 1 capsule by mouth Daily., Disp: , Rfl:     Past Medical History:   Diagnosis Date   • Atrial fibrillation with RVR 6/4/2023   • Chronic diastolic congestive heart failure 11/17/2022   • Depression    • Emphysema lung    • GERD (gastroesophageal reflux disease)    • Heart failure    • Hyperlipidemia    • Hypertension    • Hypothyroidism        Past Surgical History:   Procedure Laterality Date   • EYE SURGERY Left    • INTUBATION  5/21/2023        • PARATHYROIDECTOMY      Patient reports thyroidectomy partial not a para thyroidectomy.   • THYROIDECTOMY, PARTIAL      1984       Family History   Problem Relation Age of Onset   • Alzheimer's disease Mother    • Lung disease Father    • Alcohol abuse Father    • Heart disease Brother    • Hypertension  "Brother    • Lung disease Brother    • Alcohol abuse Brother    • Cancer Brother         LUNG  WAS A SMOKER   • Thyroid disease Maternal Grandmother        Social History     Tobacco Use   • Smoking status: Never     Passive exposure: Never   • Smokeless tobacco: Never   Vaping Use   • Vaping Use: Never used   Substance Use Topics   • Alcohol use: Yes     Comment: \"occ\"; caffeine use- tea   • Drug use: No         ECG 12 Lead    Date/Time: 6/16/2023 2:49 PM  Performed by: Shelbie Good APRN  Authorized by: Shelbie Good APRN   Comparison: compared with previous ECG from 6/6/2023  Similar to previous ECG  Rhythm: atrial fibrillation  BPM: 119  Conduction: non-specific intraventricular conduction delay               Objective:     Visit Vitals  /86   Pulse 119   Ht 152.4 cm (60\")   Wt 60.2 kg (132 lb 12.8 oz)   BMI 25.94 kg/m²             Physical Exam  Constitutional:       Appearance: Normal appearance. She is normal weight.   HENT:      Head: Normocephalic.   Neck:      Vascular: No carotid bruit.   Cardiovascular:      Rate and Rhythm: Normal rate. Rhythm irregularly irregular.      Chest Wall: PMI is not displaced.      Pulses: Normal pulses.           Radial pulses are 2+ on the right side and 2+ on the left side.        Posterior tibial pulses are 2+ on the right side and 2+ on the left side.      Heart sounds: Normal heart sounds. No murmur heard.    No friction rub. No gallop.   Pulmonary:      Effort: Pulmonary effort is normal.      Breath sounds: Normal breath sounds.   Abdominal:      General: Bowel sounds are normal. There is no distension.      Palpations: Abdomen is soft.   Musculoskeletal:      Right lower leg: No edema.      Left lower leg: No edema.   Skin:     General: Skin is warm and dry.      Capillary Refill: Capillary refill takes less than 2 seconds.   Neurological:      Mental Status: She is alert and oriented to person, place, and time.   Psychiatric:         Mood and Affect: Mood " normal.         Behavior: Behavior normal.         Thought Content: Thought content normal.          Lab Review:   Lipid Panel        10/4/2022    10:55 3/7/2023    10:54   Lipid Panel   Total Cholesterol 167  166    Triglycerides 64  99    HDL Cholesterol 73  69    VLDL Cholesterol 12  18    LDL Cholesterol  82  79    LDL/HDL Ratio 1.11  1.12          Cardiac Procedures:   1. Echocardiogram (performed at [facility]) 12/20/22:         Left ventricle size is normal, normal LV wall thickeness, EF 50-55%, normal right ventricle structure and function, mild tricuspid regurgitation, RVSP 45 mmHg.    2.   Echocardiogram 7/25/22:  • Calculated left ventricular EF = 52.6% Estimated left ventricular EF = 53% Estimated left ventricular EF was in agreement with the calculated left ventricular EF. Left ventricular systolic function is normal. Normal left ventricular cavity size and wall thickness noted. There are wall motion abnormalities as noted below Left ventricular diastolic function was indeterminate.  • The following segments are hypokinetic: basal inferoseptal and basal inferior. All other segments are normal.  • Left atrial volume is mildly increased.  • No aortic valve regurgitation or stenosis is present. The aortic valve is abnormal in structure. There is severe calcification of the aortic valve  • Severe mitral annular calcification is present. There is moderate, bileaflet mitral valve thickening present. Mild to moderate mitral valve regurgitation is present. No significant mitral valve stenosis is present.  • Tricuspid valve not well visualized. Trace tricuspid valve regurgitation is present. Estimated right ventricular systolic pressure from tricuspid regurgitation is moderately elevated (45-55 mmHg). Calculated right ventricular systolic pressure from tricuspid regurgitation is 45 mmHg.  • There is suggestion of atrial shunting by color-flow imaging subcostal views. Saline injection was not performed. We will  contact the patient to return to address further.     3.  Echocardiogram 5/4/22:  • Calculated left ventricular EF = 51.8% Estimated left ventricular EF was in agreement with the calculated left ventricular EF. Left ventricular systolic function is normal.  • Left ventricular diastolic function was normal.  • Left atrial volume is mildly increased.  • There is calcification of the aortic valve.  • Aortic valve area is 1.85 cm2.  • Aortic valve maximum pressure gradient is 15 mmHg. Aortic valve mean pressure gradient is 7.9 mmHg.  • Mild mitral valve regurgitation is present  • Mild tricuspid valve regurgitation is present  • Estimated right ventricular systolic pressure from tricuspid regurgitation is normal (<35 mmHg).  • The following left ventricular wall segments are hypokinetic: mid inferior, basal inferoseptal, mid inferoseptal, basal inferior and basal inferoseptal.     4.  Nuclear stress test 11/5/21:  • Left ventricular ejection fraction is normal. (Calculated EF = 58%).  • Myocardial perfusion imaging indicates a normal myocardial perfusion study with no evidence of ischemia.  • Impressions are consistent with a low risk study.       Assessment:         Diagnoses and all orders for this visit:    1. Arteriosclerosis of both carotid arteries (Primary)    2. Mixed hyperlipidemia    3. Benign hypertension    4. Palpitations    5. History of non-ST elevation myocardial infarction (NSTEMI)    6. Chronic congestive heart failure, unspecified heart failure type    7. PAF (paroxysmal atrial fibrillation)    Other orders  -     atenolol (TENORMIN) 50 MG tablet; Take 1 tablet by mouth Every 12 (Twelve) Hours.  Dispense: 60 tablet; Refill: 3  -     amiodarone (PACERONE) 200 MG tablet; 1 tab by mouth daily  Dispense: 30 tablet; Refill: 3  -     empagliflozin (JARDIANCE) 10 MG tablet tablet; Take 1 tablet by mouth Daily.  Dispense: 30 tablet; Refill: 3  -     losartan (COZAAR) 25 MG tablet; Take 0.5 tablets by mouth  Daily.  Dispense: 30 tablet; Refill: 3  -     digoxin (LANOXIN) 125 MCG tablet; Take 1 tablet by mouth Daily.  Dispense: 30 tablet; Refill: 3  -     apixaban (ELIQUIS) 5 MG tablet tablet; Take 1 tablet by mouth Every 12 (Twelve) Hours. Indications: Atrial Fibrillation  Dispense: 60 tablet; Refill: 3            Plan:       1. Persistent atrial fibrillation: rates remain a little elevated. She has had pretty difficult to control rates. On digoxin, amiodarone, and atenolol. I am going to increase her atenolol to 50 mg BID. Her blood pressure has been in 140s so she should be able to tolerate the increase now. Will continue with anticoagulation and have her follow up in a couple of weeks. If she is still in afib, will consider cardioversion.  2. HTN: BP stable. No changes.  3. Chronic diastolic CHF: appears euvolemic on exam and no complaints of symptoms suggesting volume overload  4. HLD  5. COPD: follows with Dr. Nolan    Thank you for allowing me to participate in this patient's care. Please call with any questions or concerns. Ms. Mary will follow up with Dr. Norman or myself in about 2 weeks.          Your medication list          Accurate as of June 16, 2023  2:17 PM. If you have any questions, ask your nurse or doctor.            CHANGE how you take these medications      Instructions Last Dose Given Next Dose Due   amiodarone 200 MG tablet  Commonly known as: PACERONE  What changed: additional instructions  Changed by: ANTONELLA Novak      1 tab by mouth daily       atenolol 50 MG tablet  Commonly known as: TENORMIN  What changed:   · medication strength  · how much to take  Changed by: ANTONELLA Novak      Take 1 tablet by mouth Every 12 (Twelve) Hours.          CONTINUE taking these medications      Instructions Last Dose Given Next Dose Due   acetaminophen 325 MG tablet  Commonly known as: TYLENOL      Take 2 tablets by mouth Every 6 (Six) Hours As Needed for Mild Pain.       apixaban 5 MG tablet  tablet  Commonly known as: ELIQUIS      Take 1 tablet by mouth Every 12 (Twelve) Hours. Indications: Atrial Fibrillation       artificial tears ophthalmic ointment      Administer 1 application to both eyes Every 1 (One) Hour As Needed (dry eye).       atorvastatin 20 MG tablet  Commonly known as: LIPITOR      Take 1 tablet by mouth Every Night.       Breztri Aerosphere 160-9-4.8 MCG/ACT aerosol inhaler  Generic drug: Budeson-Glycopyrrol-Formoterol      2 puffs 2 (Two) Times a Day.       digoxin 125 MCG tablet  Commonly known as: LANOXIN      Take 1 tablet by mouth Daily.       empagliflozin 10 MG tablet tablet  Commonly known as: JARDIANCE      Take 1 tablet by mouth Daily.       guaiFENesin 600 MG 12 hr tablet  Commonly known as: MUCINEX      Take 1 tablet by mouth 2 (Two) Times a Day As Needed for Cough or Congestion.       ipratropium-albuterol 0.5-2.5 mg/3 ml nebulizer  Commonly known as: DUO-NEB      Take 3 mL by nebulization Every 4 (Four) Hours As Needed for Wheezing.       ipratropium-albuterol 0.5-2.5 mg/3 ml nebulizer  Commonly known as: DUO-NEB      Take 3 mL by nebulization 4 (Four) Times a Day As Needed for Wheezing.       LORazepam 0.5 MG tablet  Commonly known as: ATIVAN      Take 1 tablet by mouth Every 12 (Twelve) Hours As Needed for Anxiety for up to 15 days.       losartan 25 MG tablet  Commonly known as: COZAAR      Take 0.5 tablets by mouth Daily.       omeprazole 20 MG capsule  Commonly known as: priLOSEC      Take 1 capsule by mouth Daily.       PARoxetine 10 MG tablet  Commonly known as: PAXIL      Take 2 tablets by mouth Daily.       REFRESH DRY EYE THERAPY OP      Apply 1 drop to eye(s) as directed by provider Daily. For dry eyes       thyroid 15 MG tablet  Commonly known as: ARMOUR      Take 1 tablet by mouth Daily. Take with 30mg for total daily dose of 45mg.       Thyroid 30 MG tablet  Commonly known as: ARMOUR      Take 1 tablet by mouth Daily. Take with 15mg for total daily dose of  45mg.       Ventolin  (90 Base) MCG/ACT inhaler  Generic drug: albuterol sulfate HFA      Inhale 2 puffs Every 4 (Four) Hours As Needed.       vitamin B-12 500 MCG tablet  Commonly known as: CYANOCOBALAMIN      Take 1 tablet by mouth Daily.       vitamin C 250 MG tablet  Commonly known as: ASCORBIC ACID      Take 2 tablets by mouth Daily.       vitamin D3 125 MCG (5000 UT) capsule capsule      Take 1 capsule by mouth Daily.             Where to Get Your Medications      These medications were sent to [facility] [address] [place] [address] - [address] [place] [address] - [phone]  - [phone]   [address] [facility] [place] [address]    Phone: [phone]   · amiodarone 200 MG tablet  · apixaban 5 MG tablet tablet  · atenolol 50 MG tablet  · digoxin 125 MCG tablet  · empagliflozin 10 MG tablet tablet  · losartan 25 MG tablet           ANTONELLA Novak  06/16/23  8:13 AM EST

## 2023-06-17 PROBLEM — J96.02 ACUTE RESPIRATORY FAILURE WITH HYPERCAPNIA: Status: ACTIVE | Noted: 2023-06-17

## 2023-06-19 ENCOUNTER — ANESTHESIA (OUTPATIENT)
Dept: POSTOP/PACU | Facility: HOSPITAL | Age: 83
DRG: 291 | End: 2023-06-19
Payer: MEDICARE

## 2023-06-19 ENCOUNTER — ANESTHESIA EVENT (OUTPATIENT)
Dept: POSTOP/PACU | Facility: HOSPITAL | Age: 83
DRG: 291 | End: 2023-06-19
Payer: MEDICARE

## 2023-06-19 VITALS — DIASTOLIC BLOOD PRESSURE: 71 MMHG | HEART RATE: 63 BPM | SYSTOLIC BLOOD PRESSURE: 117 MMHG | OXYGEN SATURATION: 100 %

## 2023-06-19 PROCEDURE — 25010000002 PROPOFOL 10 MG/ML EMULSION: Performed by: REGISTERED NURSE

## 2023-06-19 RX ORDER — SODIUM CHLORIDE, SODIUM LACTATE, POTASSIUM CHLORIDE, CALCIUM CHLORIDE 600; 310; 30; 20 MG/100ML; MG/100ML; MG/100ML; MG/100ML
INJECTION, SOLUTION INTRAVENOUS CONTINUOUS PRN
Status: DISCONTINUED | OUTPATIENT
Start: 2023-06-19 | End: 2023-06-19 | Stop reason: SURG

## 2023-06-19 RX ORDER — LIDOCAINE HYDROCHLORIDE 20 MG/ML
INJECTION, SOLUTION INFILTRATION; PERINEURAL AS NEEDED
Status: DISCONTINUED | OUTPATIENT
Start: 2023-06-19 | End: 2023-06-19 | Stop reason: SURG

## 2023-06-19 RX ORDER — PROPOFOL 10 MG/ML
VIAL (ML) INTRAVENOUS AS NEEDED
Status: DISCONTINUED | OUTPATIENT
Start: 2023-06-19 | End: 2023-06-19 | Stop reason: SURG

## 2023-06-19 RX ADMIN — PROPOFOL 50 MG: 10 INJECTION, EMULSION INTRAVENOUS at 07:30

## 2023-06-19 RX ADMIN — SODIUM CHLORIDE, POTASSIUM CHLORIDE, SODIUM LACTATE AND CALCIUM CHLORIDE: 600; 310; 30; 20 INJECTION, SOLUTION INTRAVENOUS at 07:00

## 2023-06-19 RX ADMIN — LIDOCAINE HYDROCHLORIDE 100 MG: 20 INJECTION, SOLUTION INFILTRATION; PERINEURAL at 07:26

## 2023-06-19 RX ADMIN — PROPOFOL 50 MG: 10 INJECTION, EMULSION INTRAVENOUS at 07:26

## 2023-06-19 RX ADMIN — PROPOFOL 50 MG: 10 INJECTION, EMULSION INTRAVENOUS at 07:38

## 2023-06-19 NOTE — ANESTHESIA PREPROCEDURE EVALUATION
Anesthesia Evaluation     NPO Solid Status: > 8 hours             Airway   Mallampati: II  Dental      Pulmonary    (+) COPD,shortness of breath (acute respiratory failure)  (-) sleep apnea, not a smoker    ROS comment: Negative patient screen for SERGE    Cardiovascular     (+) hypertensiondysrhythmias Paroxysmal Atrial Fib, CHF (acute on chronic heart failure-responding to diuresis) Systolic <55%, hyperlipidemia    ROS comment:  EF 40%   Moderate-severe TR  Mild AS    Neuro/Psych  (+) headaches, psychiatric history Anxiety and Depression  GI/Hepatic/Renal/Endo    (+) GERD, renal disease CRI, diabetes mellitus type 2, thyroid problem hypothyroidism    Musculoskeletal     Abdominal    Substance History      OB/GYN          Other                        Anesthesia Plan    ASA 4     MAC       Anesthetic plan, risks, benefits, and alternatives have been provided, discussed and informed consent has been obtained with: patient.      CODE STATUS:

## 2023-06-19 NOTE — ANESTHESIA POSTPROCEDURE EVALUATION
"Patient: Dafne Mary    Procedure Summary       Date: 06/19/23 Room / Location: [facility] PACU    Anesthesia Start: 0702 Anesthesia Stop: 0744    Procedure: ADULT TRANSESOPHAGEAL ECHO (LE) W/ CONT IF NECESSARY PER PROTOCOL Diagnosis:       (Arrhythmia)      (Guidance for Cardiac Intervention)      (Pre-cardioversion)    Scheduled Providers:  Provider: Sen Frazier MD    Anesthesia Type: MAC ASA Status: 4            Anesthesia Type: MAC    Vitals  Vitals Value Taken Time   /55 06/19/23 0800   Temp     Pulse 67 06/19/23 0805   Resp 20 06/19/23 0800   SpO2 100 % 06/19/23 0805   Vitals shown include unvalidated device data.        Post Anesthesia Care and Evaluation    Pain management: adequate    Airway patency: patent  Anesthetic complications: No anesthetic complications    Cardiovascular status: acceptable  Respiratory status: acceptable  Hydration status: acceptable    Comments: /55   Pulse 64   Temp 36.6 °C (97.9 °F) (Oral)   Resp 20   Ht 152.4 cm (60\")   Wt 56.3 kg (124 lb 1.9 oz)   SpO2 100%   BMI 24.24 kg/m²       "

## 2023-06-23 ENCOUNTER — TELEPHONE (OUTPATIENT)
Dept: FAMILY MEDICINE CLINIC | Facility: CLINIC | Age: 83
End: 2023-06-23

## 2023-06-23 NOTE — TELEPHONE ENCOUNTER
ROSALIE FROM [facility] ([phone])  CALLED TO LET MACO LARA KNOW THAT THEY WILL START PT  CARE ON MONDAY 6/26/2023 PER PT REQUEST

## 2023-06-29 ENCOUNTER — TELEPHONE (OUTPATIENT)
Dept: FAMILY MEDICINE CLINIC | Facility: CLINIC | Age: 83
End: 2023-06-29

## 2023-06-29 NOTE — TELEPHONE ENCOUNTER
Caller: [facility]    Relationship: Other    Best call back number: [phone]    Do you know the name of the person who called: ANNA,  AT [facility]     What was the call regarding: [facility] STATES THAT THE PATIENT HAS DECLINED CARE. [facility] STATES THAT THEY ATTEMPTED TO SCHEDULE AN APPOINTMENT FOR THE PATIENT ON 06/23 BUT THE PATIENT STATED THAT THIS WAS NOT A GOOD TIME. ANNA REACHED OUT TO THE PATIENT AND SCHEDULED HER FOR 06/26, BUT WHEN THE NURSE CALLED THE PATIENT TO START THE EVALUATION PROCESS, THE PATIENT CANCELLED THE APPOINTMENT STATING THAT SHE WAS TOO OLD TO HAVE ANYONE COME TO HER HOME AND SHE STATED THAT SHE DID NOT WANT TO HAVE ANY CLINICIANS IN HER HOME, BECAUSE SHE IS SCARED OF HAVING THE POSSIBILITY OF A CLINICAL BEING ILL AND IN HER HOME. ANNA STATES THAT SHE CALLED TO CHECK ON THE PATIENT YESTERDAY AND THE PATIENT STATES THAT SHE IS DOING OKAY, AND HAS STILL KEPT TO HER DECISION TO NOT RECEIVE SERVICES.

## 2023-06-30 NOTE — TELEPHONE ENCOUNTER
Please ensure patient is scheduled for hospital follow up and ensure she is aware of the discharging physician's recommendations for home health.

## 2023-07-04 PROBLEM — J96.01 ACUTE HYPOXEMIC RESPIRATORY FAILURE: Status: ACTIVE | Noted: 2023-07-04

## 2023-07-10 PROBLEM — E87.6 ACUTE HYPOKALEMIA: Status: ACTIVE | Noted: 2023-07-10

## 2023-07-10 PROBLEM — J44.89 ASTHMA WITH COPD: Status: ACTIVE | Noted: 2023-01-06

## 2023-07-10 PROBLEM — I50.23 ACUTE ON CHRONIC SYSTOLIC HEART FAILURE: Status: ACTIVE | Noted: 2023-07-10

## 2023-07-10 PROBLEM — I50.22 CHRONIC HFREF (HEART FAILURE WITH REDUCED EJECTION FRACTION): Status: ACTIVE | Noted: 2023-07-10

## 2023-07-10 PROBLEM — I25.10 CAD (CORONARY ARTERY DISEASE): Status: ACTIVE | Noted: 2023-07-10

## 2023-07-10 PROBLEM — I10 UNCONTROLLED HYPERTENSION: Status: ACTIVE | Noted: 2023-07-10

## 2023-07-10 PROBLEM — J15.9 COMMUNITY ACQUIRED BACTERIAL PNEUMONIA: Status: ACTIVE | Noted: 2023-07-10

## 2023-07-10 PROBLEM — I50.21 ACUTE SYSTOLIC HEART FAILURE: Status: ACTIVE | Noted: 2023-07-10

## 2023-07-10 PROBLEM — I50.22 CHRONIC SYSTOLIC HEART FAILURE: Status: ACTIVE | Noted: 2023-07-10

## 2023-07-10 PROBLEM — I34.0 MITRAL VALVE REGURGITATION: Status: ACTIVE | Noted: 2023-07-10

## 2023-07-12 ENCOUNTER — OFFICE VISIT (OUTPATIENT)
Dept: FAMILY MEDICINE CLINIC | Facility: CLINIC | Age: 83
End: 2023-07-12
Payer: MEDICARE

## 2023-07-12 VITALS
DIASTOLIC BLOOD PRESSURE: 70 MMHG | TEMPERATURE: 97.9 F | HEART RATE: 73 BPM | SYSTOLIC BLOOD PRESSURE: 150 MMHG | RESPIRATION RATE: 18 BRPM | OXYGEN SATURATION: 92 % | BODY MASS INDEX: 25.13 KG/M2 | WEIGHT: 128 LBS | HEIGHT: 60 IN

## 2023-07-12 DIAGNOSIS — R11.0 NAUSEA: ICD-10-CM

## 2023-07-12 DIAGNOSIS — R25.1 SHAKING: Primary | ICD-10-CM

## 2023-07-12 DIAGNOSIS — R42 DIZZINESS: ICD-10-CM

## 2023-07-12 DIAGNOSIS — R69 ILLNESS, UNSPECIFIED: ICD-10-CM

## 2023-07-12 DIAGNOSIS — R53.1 WEAKNESS: ICD-10-CM

## 2023-07-12 DIAGNOSIS — F41.1 GENERALIZED ANXIETY DISORDER: ICD-10-CM

## 2023-07-12 RX ORDER — PAROXETINE 10 MG/1
20 TABLET, FILM COATED ORAL DAILY
Qty: 180 TABLET | Refills: 0 | Status: SHIPPED | OUTPATIENT
Start: 2023-07-12

## 2023-07-12 RX ORDER — BUDESONIDE, GLYCOPYRROLATE, AND FORMOTEROL FUMARATE 160; 9; 4.8 UG/1; UG/1; UG/1
2 AEROSOL, METERED RESPIRATORY (INHALATION) 2 TIMES DAILY
Status: CANCELLED | OUTPATIENT
Start: 2023-07-12

## 2023-07-12 NOTE — PROGRESS NOTES
Subjective   Dafne Mary is a 82 y.o. female who presents today in follow up of hospitalization for acute respiratory failure x4 and in need of follow-up of hypertension, hyperlipidemia, hypothyroidism, vitamin D deficiency, B12 deficiency, abnormal renal function, elevated alk phos, moods, fibromyalgia, low back pain, leg pain, COPD, chronic respiratory failure, allergic rhinitis, GERD, dizziness, carotid stenosis, palpitations, OSORIO, thoracic back pain, CHF, NSTEMI, and specialists.     Hypertension  Associated symptoms include chest pain, headaches, palpitations and shortness of breath (chronic - increased OSORIO).   Dizziness  Associated symptoms include chest pain, coughing (chronic), fatigue and headaches. Pertinent negatives include no fever.   Hypothyroidism  Associated symptoms include chest pain, coughing (chronic), fatigue and headaches. Pertinent negatives include no fever.     Patient was hospitalized 5/20/2023 through 5/27/2023 for COPD exacerbation, chronic respiratory failure, acute respiratory failure, asthma/COPD overlap with acute exacerbation, acute hypoxic and hypercapnic respiratory failure, acute parainfluenza infection, chronic diastolic CHF, WHO group 2 pulmonary hypertension, SVT, hypertension, hyperlipidemia.    I have reviewed and discussed with patient hospital notes, including H&P, labs, imaging, consult notes, and discharge summary. Per discharge summary, she was sick for 2 to 3 days with congestion and sinus headache, coughing and sore throat as well as urinary frequency and urgency.  She uses Breztri and nebulizer but was unable to get albuterol for rescue.  She was feeling somewhat better from presentation to the ED with breathing treatments.  She was admitted for obstructive lung disease exacerbation respiratory failure and acute viral illness.  Symptoms improved.  Cardiology also saw her while admit and advised follow-up in 2 weeks.  Follow-up pulmonology in 4 weeks.    Patient was  hospitalized 6/3/2023 through 6/12/2023 for paroxysmal atrial fibrillation, acute on chronic CHF, anxiety, chronic interstitial cystitis, pulmonary emphysema, GERD, fibromyalgia, hypertension, hyperlipidemia.    I have reviewed and discussed with patient hospital notes, including H&P, labs, imaging, consult notes, and discharge summary. Per discharge summary, she presented to the hospital with atrial fibrillation with RVR and acute hypoxic respiratory failure.  She underwent titration of cardiac meds and diuresis.  Cardiology adjusted meds for rate control and rate was in the 100s with goal preferably less than 110.  Cardiology recommended discharge on digoxin and atenolol.  Although she has cardiomyopathy they preferred atenolol due to better rate control the metoprolol or carvedilol.  Respiratory failure resolved and she was breathing comfortably.  She was eager to go to skilled nursing facility or subacute rehab.  Cardiology agreed to discharge.  Advised keep appointments and follow-up.    Patient was hospitalized 6/17/2023 through 6/22/2023 for acute respiratory failure with hypercapnia, acute on chronic congestive heart failure, COPD, hypertension, hypothyroidism, hyperlipidemia.    I have reviewed and discussed with patient hospital notes, including H&P, labs, imaging, consult notes, and discharge summary. Per discharge summary, she presented to the ED with shortness of breath and chest pain for a few days.  She was placed on BiPAP in ED and able to wean to nasal cannula.  IV Lasix transition to p.o. and she continued atenolol and appeared to be euvolemic.  Echocardiogram with EF 31 to 35% and cardiology planned on ischemic evaluation on an outpatient basis.  Overall improvement and scheduled for discharge home.  Advised to weigh daily and notify cardiology if weight gain greater than 3 pounds.  For atrial fibrillation with RVR, advise continue Eliquis and atenolol.  Underwent LE cardioversion 6/19/2023 and  remained in normal sinus rhythm.  Respiratory failure thought secondary to heart failure and underlying COPD.  She underwent 6-minute walk test without the need for oxygen off Lasix and steroid.  Mild elevation of AST and ALT of uncertain etiology but advised to follow-up with PCP for repeat in if remained elevated, needed to see GI for evaluation.    Patient was hospitalized 7/4/2023 through 7/10/2023 for acute respiratory failure requiring noninvasive mechanical ventilation, bibasilar pneumonia, asthma with COPD-fixed obstruction, acute on chronic combined systolic and diastolic heart failure with EF of 35%, successful PCI to the LAD and circumflex 7/7/2023, moderate MR, uncontrolled hypertension, paroxysmal atrial fibrillation, and acute hypokalemia.    I have reviewed and discussed with patient hospital notes, including H&P, labs, imaging, consult notes, and discharge summary. Per discharge summary, patient presented with shortness of breath that was waxing and waning over 2 to 3 months with hospitalization 3 times over 8 weeks.  Shortness of breath gradually worsened.  Patient never smoked and per note has dubious diagnosis of COPD.  She was placed on noninvasive ventilation due to respiratory distress and hypoxia on arrival to ER.  Blood pressure was extremely elevated, systolic blood pressure greater than 200 and she was started on nitro drip.  She was beginning to feel better.  Last echo 1 week prior with EF 30 to 35%.  Per note, did not have spirometry to suggest COPD and had indeterminate spirometry results.  She did grow up surrounded by smoking brothers and father exposure secondhand smoke but no reason for severe COPD.  Has chronic diastolic CHF and hypertension.  She was admitted to the ICU, started on noninvasive ventilation and received diuretics.  She was seen by cardiology.  Additional diuretics given for optimization.  She was tolerating noninvasive ventilation and transferred to the floor.  She  underwent right and left heart catheterization was found to have mild LAD and proximal circumflex stenosis requiring drug-eluting stents.  She then did well and was to resume home meds including diuretic therapy with cardiology.  Patient requested noninvasive ventilation as needed at home.  Blood gas did not qualify her for this.  Explained rationale for noninvasive ventilation.  In case of respiratory difficulty, she should call her cardiologist and severe difficulty call EMS.  Try to qualify for home oxygen at least with exertion.  No desaturation with walking test.  Advise continue aspirin, clopidogrel, Eliquis, amiodarone for now and discontinue in next 1 to 2 months as long as maintains sinus rhythm, spironolactone, Jardiance, losartan, atenolol.  Follow-up cardiology 1 week.    Within 48 business hours after discharge, Dafne Mary was contacted via telephone to coordinate care and needs.   Current outpatient and discharge medications have been reconciled for the patient.  Risk for Readmission (LACE) No data recorded    She reports she was worried about home health coming in and giving germs from previous home they had been in. She continues to be weak and shaking- tremoring. Her legs will go out of control and tremor and will go down. She can usually grab something and lock her legs to keep from falling but still has symptoms.   She reports in the past, she has tried taking out Lasix and felt better prior. Stopped Lasix altogether for now. She is taking Spironolactone and Jardiance.     Was on Ativan in the hospital for anxiety. They then told told her she could not take the medication.     Wanted to     She is not feeling like she has any urinary symptoms and did not start antibiotics. Then started and still on antibiotics- macrobid.     Hypertension- blood pressure not getting low and only occasionally 150. Otherwise, has been better.   she reports her BP is still up and down. She initially started   She  was on Atenolol and Losartan. No BP cuff that is accurate. Feeling ok. Tired but does not think from BP. She was told to not take her Losartan prior to eyelid surgery. They were worried her BP would be too low. Her BP was 200s/ 100s. They had to give medication to get it down. Has been high but has not been able to take at home.   Hyperlipidemia- she reports feeling bad when she doubled medication with uncontrolled lipids.   Restarted Lipitor 12/2020 and did well- tolerated without AE.   Hypothyroidism- Vers[place] thyroid 45 mg- she is on [place] thyroid a 30 mg and 15 mg once daily.   Vitamin D deficiency- taking calcium, vit D3 5000 IU 4-5 x weekly   B12 deficiency- taking B12 1000 mcg 4-5 x weekly  Iron- taking iron 65 mg (325 ferrous sulfate) once daily.   Abnormal renal function- NSAIDS- None- stopped Aleve. Tylenol only- not very often. Every once in a while- less than once weekly alcohol use.   Elevated alkaline phosphatase- no symptoms- GI or bone.       Moods- Paxil- reports sometimes she increases to 2 tablets to help with increased stress and uncontrolled moods. This works well for her.   Fibromyalgia- has been stable. Using Tylenol only as needed. Leg pain and some fatigue but no other worsening pain/ symptoms.   Low back pain, DDD, spinal stenosis, OA, spondylolisthesis, scoliosis- still has all the time. Has to sit and rest between vacuuming. PT helps and dry needling helps.   Patient describes pain across the middle of her back- where waist is with pain down into the left hip. Aching, throbbing, stabbing at times but most of the time, spasm pain in center of back. 3-10/10. She can get to the point of being uncomfortable in bed. Her back is bothering worse than breathing when she is outside doing things. She has had improvement with PT and dry needling in the past. She has not had the pain down the leg again but does not want it to progress. Patient usually has worsening at the end of summer after working  in the yard all summer. Not having numbness but has had tingling in LE> a few times has had leg weakness but not recently. Hard to walk up steps because of pain and weakness- cannot put all her weight on 1 foot. She has to walk up both feet on a step. No saddle anesethia or new or changing GI/ symptoms.   With Dr Shultz- she had MRI and had pain down the leg to her toes and had to have epidurals. They worked for a long time. Then stopped going there and came here.   MRI lumbar spine 2017- mild dextroconvex curvature- centered at L4.  T12-L1- minimal disc bulging and facet arthropathy.   L1-2 prominent degenerative disc changes, endplate marrow changes, degenerative retrolisthesis L1 on L2 by 6 mm, mild to moderate bilateral foraminal narrowing from disc osteophyte complex, mild central canal stenosis.   L2-3 moderate central canal stenosis with disc bulge, ligamentum flavum thickening, facet arthropathy, degenerative retrolisthesis L2 on L3- 2-3 mm, mild to moderate bilateral foraminal narrowing from bulging disc, degenerative endplate changes.   L3-4- mild to moderate central canal stenosis from disc bulge, ligamentum flavum thickening, and facet arthropathy, bilateral foraminal narrowing.   L4-5- moderate to severe central canal stenosis from disc bulging, ligamentum flavum thickening, facet arthropathy, degenerative anterolisthesis L4 on L5 by 2 mm.   L5-S1- moderate to severe facet arthropathy  Referred to neurosurgery with 3 appts 1/2018- she cancelled 2 appts and no show the final appt.   Patient was seen by Dr. Dc 4/2019 for back pain going down her left side.  She was given prednisone for 5 days and Flexeril.  Patient was seen by ANTONELLA Jiménez 8/9/2021 for right low back pain and right hip pain going down the right leg.  She was working in her yard, going up and down a ladder and doing household chores with increased pain.  She had stabbing pain going down her right leg but denied numbness or  "tingling.  She wanted to consider physical therapy and epidural ejections.  Back still hurting and has to sit down when doing certain things but overall improved some.   Leg pain- no complaints today.   4/2021- she woke up with pain down her leg that did not feel like her normal sciatica and in the other leg. Mid- leg around the knee felt like the whole leg- she could not tell if anterior, posterior, medial, or lateral. No injury or activity change. Varicose veins in that leg. No edema, erythema, or heat.     Emphysema- Feeling like is back to as good as it will get now. Using nebulizer once daily. Trelegy helps- has been on for 2 years and albuterol as needed.  Never a smoker - father and brothers.    Dr Nolan- continues to follow.   Patient was hospitalized 5/3/2022-5/7/2022 for acute hypercapnic and hypoxemic respiratory failure, acute on chronic CHF, COPD with exacerbation by viral infection- rhinovirus and Covid 19 infection, bronchitis- acute on chronic, elevated LFT, hyperglycemia, hypothyroidism, htn, hyperlipidemia. Per discharge summary, she was having gradually increasing SOA on exertion for a few days. It was mild but her son reported she had some increased \"hacking as well\". She then had sudden onset SOA, called 9-1-1 and was brought to ER intubated. She improved quickly. Increased coughing with Trelegy- advised she try Breztri. Follow up Dr Nolan 4 weeks.    Labs- 5/20-blood gas- pCO2 61.5, pH7.243, WBC 20.25, elevated glucose, ALT, AST, BNP 2972, TSH 7.5, Free T4 low at 0.73, respiratory panel positive for rhinovirus and Covid 19. 5/4- WBC 12.91, improving LFT- elevated AST, blood gas- pH7.55, pCO2 improved to 27, 5/5- WBC 19, creatinine 1.26. 5/7- 1.3  CXR- 5/3- enlarged heart, aorta tortuous, vascular congestion, hazy opacity right mid to lower lung- edema or pneumonia, possible right pleural effusion. CXR post intubation- heart enlarged, aorta tortuous, pulmonary vascular congestion, patchy " pulmonary opacity- right mid to upper lung and both lower lobes possible infiltrate, ET tube in place.   CTA chest 5/3/2022- cardiac enlargement, coronary artery calcifications and atherosclerosis of aorta, bilateral small pleural effusions, vague groundglass opacity- pulmonary edema, mild bronchial wall thickening with mucus plugging right- bronchitis. ET tube in place.   CT head 5/3/2022- mucosal thickening left maxillary sinus and ethmoid air cells, martial opacification mastoid air cell bilaterally.   KUB 5/3- following NG tube placement.   Patient was hospitalized 12/8/2022-12/9/2022 for COPD with acute exacerbation, emphysema, anxiety, fibromyalgia, GERD, htn, hypothyroidism. Per discharge summary, she presented with SOA and dry cough. Oxygen saturation as low as 84%, CXR and CBC negative for underlying bacteria pneumonia. Aggressive bronchodilators and IV steroid. Improved overnight but continued with headache. Potassium was low and was given supplement in hospital. Advised oral steroid. Walking oxygen saturation good range.   Patient was hospitalized 12/19/2022-12/22/2022 requiring intubation for acute hypoxic respiratory failure, COPD with acute exacerbation, emphysema, severe sepsis, NSTEMI, acute kidney injury, lactic acidosis. Per discharge summary, she presented with SOA and was given 2 neulizer treatments without improvement. She was in severe distress, unable to verbalize, and was intubated. WBC 27, lactic acid 3, and HS Troponin 209. CXR unremarkable and given 1 L crystalloid and initiated heparin. Admit to MICU. Completed 3 days of Ceftriaxone, transferred out of ICU. Advised Symbicort and Spiriva, was to have nebulizer machine, and advised follow up with cardiology for stress test and pulmonology with PFT.   When she was seen in follow-up of ER 1/2023, I gave doxycycline.  Patient was hospitalized 1/6/2023 through 1/13/2023 for COPD with acute exacerbation, RSV bronchiolitis, chronic CHF,  hypothyroidism, anxiety, hypertension, arteriosclerosis bilateral carotid arteries, fibromyalgia, GERD, hyperlipidemia. Per discharge summary, she was found to be RSV + 1/7/2023, initial imaging with cardiomegaly but no infiltrate or effusion but did have bibasilar atelectasis.  Pulmonology consulted and following the patient through her stay-treated with bronchodilators and corticosteroids which were tapered over the course of her stay in addition to guaifenesin and Tessalon Perles.  They reported she had improved dyspnea, coughing, and wheezing at discharge.  She was on room air with oxygen saturation greater than 90%, afebrile, WBC within normal limits and no evidence of superimposed bacterial pneumonia.  Walking oximetry performed prior to discharge and advised to continue prednisone taper 60 mg for 3 days followed by 20 mg for 2 days then 10 mg for 2 days.  Close follow-up with pulmonology within 1 to 2 weeks of discharge.  Ordered  PT and skilled nursing at discharge.  Blood pressure was intermittently uncontrolled with medication adjustment.  She was told to continue atenolol 50 mg losartan 100 mg and changed to atenolol from 2.5 mg to 10 mg.  Chest x-ray with cardiomegaly, bibasilar atelectasis, advanced degenerative changes right shoulder and right AC.  Labs-respiratory panel positive for RSV, blood gas with PCO2 45, PO2 103, HCO3 28.8, base excess 3.5, negative procalcitonin, negative proBNP, hemoglobin decreased through hospitalization with hemoglobin 1/9/2022 10.9  Oximeter in hospital and did not have oxygen on at that time.  Breztri- will need to be approved.    1/17/2023-she was on Mucinex DM twice daily, Flonase, and using Breztri, nebulizer. She had not been seen by pulmonology- appt 2/13/2023. She had improvement in PND and left ear symptoms but right ear still clogged with decreased hearing. Using nasal spray every day.     Allergies- Nasacort daily.   GERD- controlled on Prilosec without  breakthrough symptoms. Not bothering at all.     Dizziness- No recurrence of dizziness.   She had some dizziness- intermittent- sometimes when 1st standing up.   She had 2 episodes that she felt like she was going to faint, weakness, and legs were shaking. Both times, improved with sitting down. She had no associated CP, SOA, palp, numbness, tingling, speech issues, confusion, speech symptoms. Her legs were shaking uncontrollably but stopped with sitting. She had water with improvement. No spinning dizziness.   Carotid stenosis- last carotid duplex 2022- plaque without stenosis.   Carotid duplex 2016 was ok but prior had stenosis noted on duplex.   Referred 2020 and 2021.    Palpitations- She has had flutter a couple times daily for years.   She was seen  for palpitations with echo and holter without the need for treatment.   She has also noticed increased SOA spring  after getting to work outside. She has chronic SOA with COPD, however, she had some increase from baseline. No CP, sweating, nausea with symptoms. She had palpitations here right before she had EKG but none while having tracing.     OSORIO, CP, thoracic back pain, intermittent palpitations, tachycardia, NSTEMI 2022 hospitalization-   Started with decreased exercise tolerance. She took albuterol and increased her pulse up to 114 and took a while to go back down. Felt worse after the albuterol.   She was folding clothes and was winded despite standing still and folding clothes. 15 steps to the bathroom and by the time she got back to the bed, she was panting and having a hard time breathing. It took a while but resolved. Sitting and resting, she was ok.   She was having pain in her chest and across. Pain in midback/upper back  as well. No SOA laying down. She was sent to [facility] [place].   Brother with MI at 64 or 65.       Mother lived until age 87 and  of dementia. Active      Patient's Specialists:  Cardiology- Dr Norman- last appt 2023 for  hypertension, CHF, hyperlipidemia, COPD, hypothyroidism. No evidence of volume overload. Continue BP regimen. LDL goal < 70. Follow up 6 months.   Endocrinology- Dr Sari Jose- last appt 8/2021 for postoperative hypothyroidism, abnormal thyroid function tests. 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 Discussed with patient that thyroid tests looked typical of someone on [place] Thyroid and did not feel well on levothyroxine. Recommended leave on [place] thyroid and just check TSH- keeping TSH > 2. Follow up PRN.   GI- Dr Rick Herrera- last appt 10/2007 for colonoscopy.   Neurosurgery- Dr Agarwal- scheduled 1/2018- patient cancelled 2 appts and No Show final appt.   PM&R- Dr Eduardo Boland- last appt 7/2014 for back pain. Given Vimovo. Advised warm water therapy.   Pulmonology- Dr Nolan- last appt 2/2023 for severe COPD, hypoxemic respiratory failure, and granulomatous disease. Continue triple therapy, oxygen as needed, and annual CXR.   1/2023 for COPD, chronic respiratory failure, pulmonary fibrosis, non-rheumatic aortic valve stenosis.   Ophthalmology- Dr Hunt- last appt 3/2022 for other abnormality of cornea, mechanical ptosis, blepharoconjunctivitis.   ENT- Dr Woody De La Fuente- last appt 2/2023 for hearing loss, tinnitus, eustachian tube issues, TMJ, tension headache. Advised hearing aids to help with hearing, tinnitus, and help prevent dementia. Advised she get treatment for TMJ. Follow up PRN.     The following portions of the patient's history were reviewed and updated as appropriate: allergies, current medications, past family history, past medical history, past social history, past surgical history and problem list.    Review of Systems   Constitutional:  Positive for fatigue. Negative for fever.   HENT:  Positive for hearing loss and tinnitus. Ear pain: ears popping.   Respiratory:  Positive for cough (chronic) and shortness of breath (chronic - increased OSORIO).    Cardiovascular:  Positive for chest pain and  palpitations.   Gastrointestinal: Negative.    Genitourinary: Negative.    Musculoskeletal:  Positive for back pain.        Leg pain   Neurological:  Positive for dizziness and headaches.   Psychiatric/Behavioral:  Positive for dysphoric mood. The patient is nervous/anxious.      Objective   Vitals:    07/12/23 1409   BP: 150/70   Pulse:    Resp:    Temp:    SpO2:      Body mass index is 25 kg/m².    Physical Exam  Vitals and nursing note reviewed.   Constitutional:       General: She is not in acute distress.     Appearance: Normal appearance. She is well-developed.   HENT:      Head: Normocephalic and atraumatic.      Right Ear: External ear normal.      Left Ear: External ear normal.      Nose: Nose normal.   Eyes:      General: Lids are normal.      Conjunctiva/sclera: Conjunctivae normal.   Neck:      Vascular: No carotid bruit.   Cardiovascular:      Rate and Rhythm: Normal rate and regular rhythm.      Pulses: Normal pulses.      Heart sounds: Normal heart sounds. No murmur heard.    No friction rub. No gallop.   Pulmonary:      Effort: Pulmonary effort is normal. No respiratory distress.      Breath sounds: Normal breath sounds. No wheezing, rhonchi or rales.   Musculoskeletal:         General: No deformity.      Cervical back: Neck supple.   Skin:     General: Skin is warm and dry.   Neurological:      Mental Status: She is alert and oriented to person, place, and time.      Gait: Gait normal.   Psychiatric:         Speech: Speech normal.         Behavior: Behavior normal.         Thought Content: Thought content normal.         Judgment: Judgment normal.     Assessment & Plan   Diagnoses and all orders for this visit:    1. Shaking (Primary)  -     Ambulatory Referral to Physical Therapy  -     CBC & Differential  -     Comprehensive Metabolic Panel  -     Magnesium  -     TSH  -     T4, free  -     T3, Free  -     Urinalysis With Culture If Indicated -  -     CK    2. Generalized anxiety disorder  -      PARoxetine (PAXIL) 10 MG tablet; Take 2 tablets by mouth Daily.  Dispense: 180 tablet; Refill: 0  -     CBC & Differential  -     Comprehensive Metabolic Panel  -     Magnesium  -     TSH  -     T4, free  -     T3, Free  -     Urinalysis With Culture If Indicated -  -     CK    3. Weakness  -     Ambulatory Referral to Physical Therapy  -     CBC & Differential  -     Comprehensive Metabolic Panel  -     Magnesium  -     TSH  -     T4, free  -     T3, Free  -     Urinalysis With Culture If Indicated -  -     CK    4. Nausea  -     CBC & Differential  -     Comprehensive Metabolic Panel  -     Magnesium  -     TSH  -     T4, free  -     T3, Free  -     Urinalysis With Culture If Indicated -  -     CK    5. Illness, unspecified  -     IgG, IgA, IgM  -     CBC & Differential  -     Comprehensive Metabolic Panel  -     Magnesium  -     TSH  -     T4, free  -     T3, Free  -     Urinalysis With Culture If Indicated -  -     CK    6. Dizziness  -     Ambulatory Referral to Physical Therapy  -     CBC & Differential  -     Comprehensive Metabolic Panel  -     Magnesium  -     TSH  -     T4, free  -     T3, Free  -     Urinalysis With Culture If Indicated -  -     CK    Other orders  -     Microscopic Examination -        Assessment and Plan  AWV completed today. She is not up to date on mammogram, DEXA, colonoscopy. I will refer for mammogram and DEXA. She needs to consider colonoscopy but should have cardiology and pulmonology clearance for sedation. Patient to contact her insurance to determine coverage and location of coverage for Prevnar 20 and Shingrix. She needs to ensure annual flu shot and Covid 19 booster if further available in the future.     I reviewed labs from 3/7/2023. Fasting labs in 3 months and follow up with me 3-5 days after labs. To be seen sooner if any concerns.   Hypertension- Blood pressure is slightly low. She has had elevation and low readings. I will have cardiology determine BP regimen and  continue to prescribe medication to avoid medication confusion and accidental changes.  She is currently taking Amlodipine, Losartan, and reports Atenolol.   Hyperlipidemia- Cholesterol is borderline. HDL remains good but LDL > 70. Continue Lipitor 20 mg at bedtime- consider increasing to 40 mg at follow up if persistently > 70.    Hypothyroidism- Thyroid was abnormal in the hospital but she was sick and required brief intubation. TSH remains in recommended range per endocrinology- advised TSH >2 and not checking other thyroid labs- reports thyroid labs consistent with someone taking [place] thyroid. Advised follow up here for thyroid. Continue [place] thyroid 45 mg.    Vitamin D deficiency- Continue calcium, vit D3 5000 IU 4-5 x weekly.  B12 deficiency-Continue B12 1000 mcg 4-5 x weekly.    Iron deficiency- Continue iron daily. I will recheck at follow up.   Abnormal renal function- Avoid NSAIDS and ensure proper hydration. I will continue to monitor and make recommendations. Consideration of nephrology if needed.  Elevated alkaline phosphatase- We will consider further workup if persistent elevation- normalized and only a couple points elevated now.   UTI- Finish Macrobid. To be seen if urinary symptoms. Otherwise, I will recheck at follow up.   Major depressive disorder with anxiety-Moods are more stable. Continue Paxil to 10 mg twice daily or 20 mg once daily. She can continue with the 2nd dose only as needed if this is helping but if she is having to take the second dose often, she should start taking 20 mg every day.    Fibromyalgia- She has been stable. Follow up if uncontrolled symptoms.   Low back pain, DDD, spinal stenosis, OA, spondylolisthesis, scoliosis- She was referred back to physical therapy and given Baclofen as needed. She should consider repeat MRI and to see specialist if worsening, new, or changing symptoms or persistent pain.   Emphysema- Continue treatment and follow up with Dr Nolan as  directed- they should fill all respiratory medications.  Allergic rhinitis-  Continue Nasacort as needed.    GERD- Symptoms are controlled. Continue Prilosec 20 mg once daily. To see GI if significant breakthrough symptoms  Carotid stenosis- Bilateral plaque without stenosis 11/2022. Recheck in 1 year.  OSORIO, CP, intermittent palpitations, CHF, NSTEMI-  Patient to continue follow up with cardiology and treatment as directed. I will ensure they have reviewed hospitalization from 12/2022 at [facility] with NSTEMI and recommendations for outpatient stress test with cardiology.    COPD with multiple exacerbations in the last year, 2 exacerbations requiring intubation, chronic respiratory failure, granulomatous disease- Continue follow up with pulmonology- Dr Nolan. There was recent note with diagnosis pulmonary fibrosis but no diagnosis on follow up 2/2023. I will continue to follow.      I spent 35 minutes caring for Dafne Mary on this date of service. This time includes time spent by me in the following activities as necessary: preparing for the visit, reviewing tests, specialists records and previous visits, obtaining and/or reviewing a separately obtained history, performing a medically appropriate exam and/or evaluation, counseling and educating the patient, family, caregiver, referring and/or communicating with other healthcare professionals, documenting information in the medical record, independently interpreting results and communicating that information with the patient, family, caregiver, and developing a medically appropriate treatment plan with consideration of other conditions, medications, and treatments.

## 2023-07-13 LAB
ALBUMIN SERPL-MCNC: 4.3 G/DL (ref 3.7–4.7)
ALBUMIN/GLOB SERPL: 2 {RATIO} (ref 1.2–2.2)
ALP SERPL-CCNC: 124 IU/L (ref 44–121)
ALT SERPL-CCNC: 19 IU/L (ref 0–32)
APPEARANCE UR: CLEAR
AST SERPL-CCNC: 25 IU/L (ref 0–40)
BACTERIA #/AREA URNS HPF: ABNORMAL /[HPF]
BASOPHILS # BLD AUTO: 0.1 X10E3/UL (ref 0–0.2)
BASOPHILS NFR BLD AUTO: 1 %
BILIRUB SERPL-MCNC: 0.5 MG/DL (ref 0–1.2)
BILIRUB UR QL STRIP: NEGATIVE
BUN SERPL-MCNC: 15 MG/DL (ref 8–27)
BUN/CREAT SERPL: 16 (ref 12–28)
CALCIUM SERPL-MCNC: 9.6 MG/DL (ref 8.7–10.3)
CASTS URNS QL MICRO: ABNORMAL /LPF
CHLORIDE SERPL-SCNC: 101 MMOL/L (ref 96–106)
CK SERPL-CCNC: 54 U/L (ref 26–161)
CO2 SERPL-SCNC: 24 MMOL/L (ref 20–29)
COLOR UR: YELLOW
CREAT SERPL-MCNC: 0.95 MG/DL (ref 0.57–1)
EGFRCR SERPLBLD CKD-EPI 2021: 60 ML/MIN/1.73
EOSINOPHIL # BLD AUTO: 0.3 X10E3/UL (ref 0–0.4)
EOSINOPHIL NFR BLD AUTO: 4 %
EPI CELLS #/AREA URNS HPF: ABNORMAL /HPF (ref 0–10)
ERYTHROCYTE [DISTWIDTH] IN BLOOD BY AUTOMATED COUNT: 13.8 % (ref 11.7–15.4)
GLOBULIN SER CALC-MCNC: 2.1 G/DL (ref 1.5–4.5)
GLUCOSE SERPL-MCNC: 96 MG/DL (ref 70–99)
GLUCOSE UR QL STRIP: ABNORMAL
HCT VFR BLD AUTO: 38.3 % (ref 34–46.6)
HGB BLD-MCNC: 12.2 G/DL (ref 11.1–15.9)
HGB UR QL STRIP: ABNORMAL
IGA SERPL-MCNC: 214 MG/DL (ref 64–422)
IGG SERPL-MCNC: 559 MG/DL (ref 586–1602)
IGM SERPL-MCNC: 39 MG/DL (ref 26–217)
IMM GRANULOCYTES # BLD AUTO: 0.1 X10E3/UL (ref 0–0.1)
IMM GRANULOCYTES NFR BLD AUTO: 1 %
KETONES UR QL STRIP: NEGATIVE
LEUKOCYTE ESTERASE UR QL STRIP: NEGATIVE
LYMPHOCYTES # BLD AUTO: 0.9 X10E3/UL (ref 0.7–3.1)
LYMPHOCYTES NFR BLD AUTO: 11 %
MAGNESIUM SERPL-MCNC: 2.4 MG/DL (ref 1.6–2.3)
MCH RBC QN AUTO: 29.3 PG (ref 26.6–33)
MCHC RBC AUTO-ENTMCNC: 31.9 G/DL (ref 31.5–35.7)
MCV RBC AUTO: 92 FL (ref 79–97)
MICRO URNS: ABNORMAL
MONOCYTES # BLD AUTO: 0.7 X10E3/UL (ref 0.1–0.9)
MONOCYTES NFR BLD AUTO: 9 %
NEUTROPHILS # BLD AUTO: 6.2 X10E3/UL (ref 1.4–7)
NEUTROPHILS NFR BLD AUTO: 74 %
NITRITE UR QL STRIP: NEGATIVE
PH UR STRIP: 6.5 [PH] (ref 5–7.5)
PLATELET # BLD AUTO: 295 X10E3/UL (ref 150–450)
POTASSIUM SERPL-SCNC: 4.7 MMOL/L (ref 3.5–5.2)
PROT SERPL-MCNC: 6.4 G/DL (ref 6–8.5)
PROT UR QL STRIP: NEGATIVE
RBC # BLD AUTO: 4.16 X10E6/UL (ref 3.77–5.28)
RBC #/AREA URNS HPF: >30 /HPF (ref 0–2)
SODIUM SERPL-SCNC: 141 MMOL/L (ref 134–144)
SP GR UR STRIP: 1.02 (ref 1–1.03)
T3FREE SERPL-MCNC: 2.9 PG/ML (ref 2–4.4)
T4 FREE SERPL-MCNC: 1.13 NG/DL (ref 0.82–1.77)
TSH SERPL DL<=0.005 MIU/L-ACNC: 1.93 UIU/ML (ref 0.45–4.5)
URINALYSIS REFLEX: ABNORMAL
UROBILINOGEN UR STRIP-MCNC: 0.2 MG/DL (ref 0.2–1)
WBC # BLD AUTO: 8.4 X10E3/UL (ref 3.4–10.8)
WBC #/AREA URNS HPF: ABNORMAL /HPF (ref 0–5)

## 2023-07-18 PROBLEM — Z95.820 STATUS POST ANGIOPLASTY WITH STENT: Status: ACTIVE | Noted: 2023-07-18

## 2023-07-25 ENCOUNTER — OFFICE VISIT (OUTPATIENT)
Dept: FAMILY MEDICINE CLINIC | Facility: CLINIC | Age: 83
End: 2023-07-25
Payer: MEDICARE

## 2023-07-25 VITALS
BODY MASS INDEX: 25.01 KG/M2 | RESPIRATION RATE: 18 BRPM | DIASTOLIC BLOOD PRESSURE: 80 MMHG | TEMPERATURE: 97 F | OXYGEN SATURATION: 98 % | WEIGHT: 127.4 LBS | HEART RATE: 76 BPM | SYSTOLIC BLOOD PRESSURE: 144 MMHG | HEIGHT: 60 IN

## 2023-07-25 DIAGNOSIS — F41.1 GENERALIZED ANXIETY DISORDER: ICD-10-CM

## 2023-07-25 DIAGNOSIS — J96.10 CHRONIC RESPIRATORY FAILURE, UNSPECIFIED WHETHER WITH HYPOXIA OR HYPERCAPNIA: ICD-10-CM

## 2023-07-25 DIAGNOSIS — I48.0 PAF (PAROXYSMAL ATRIAL FIBRILLATION): ICD-10-CM

## 2023-07-25 DIAGNOSIS — J44.9 ASTHMA WITH COPD: ICD-10-CM

## 2023-07-25 DIAGNOSIS — I10 BENIGN HYPERTENSION: ICD-10-CM

## 2023-07-25 DIAGNOSIS — I50.23 ACUTE ON CHRONIC HFREF (HEART FAILURE WITH REDUCED EJECTION FRACTION): ICD-10-CM

## 2023-07-25 DIAGNOSIS — I50.22 CHRONIC HFREF (HEART FAILURE WITH REDUCED EJECTION FRACTION): ICD-10-CM

## 2023-07-25 DIAGNOSIS — I25.2 HISTORY OF NON-ST ELEVATION MYOCARDIAL INFARCTION (NSTEMI): ICD-10-CM

## 2023-07-25 DIAGNOSIS — R31.9 HEMATURIA, UNSPECIFIED TYPE: Primary | ICD-10-CM

## 2023-07-25 DIAGNOSIS — Z95.820 STATUS POST ANGIOPLASTY WITH STENT: ICD-10-CM

## 2023-07-25 NOTE — PROGRESS NOTES
Subjective   Dafne Mary is a 82 y.o. female who presents today in follow up of ER for fall with head injury as well as hypertension, hyperlipidemia, hypothyroidism, vitamin D deficiency, B12 deficiency, abnormal renal function, elevated alk phos, moods, fibromyalgia, low back pain, leg pain, COPD, chronic respiratory failure, allergic rhinitis, GERD, dizziness, carotid stenosis, palpitations, OSORIO, thoracic back pain, CHF, NSTEMI, and specialists.     Hypertension  Associated symptoms include chest pain, headaches, palpitations and shortness of breath (chronic - increased OSORIO).   Dizziness  Associated symptoms include chest pain, coughing (chronic), fatigue and headaches. Pertinent negatives include no fever.   Hypothyroidism  Associated symptoms include chest pain, coughing (chronic), fatigue and headaches. Pertinent negatives include no fever.   Fall  Associated symptoms include headaches. Pertinent negatives include no fever.     She reports she had a shaking episode- starts in her head and can feel dizziness then has shaking in her legs.     Nausea is still there but not nearly as bad. She was not able to eat and is now able to eat more.     Went to cardiology- they advised she stop ASA and decrease Amiodarone to 1/2 dosage. She had Losartan decreased to 25 mg. She reports to take 50 mg if  for 3 days in a row. 160/? Once had to take an extra Losartan. The next day was low- 103/? Has not taken extra since then. Since cardiology- about 130/72. Has not been that bad.     She still has when she stands up- feels her leg jerk- sometimes with sitting but happens when stands up to walk. Has happened a couple times today. Could happen at any time. She cannot relate to certain medication timing or other timing. Lasts < 1 minute- tries to sit down when occurs. She thinks it would stop even if standing. Not immediately with standing. Can be going along and will occur.     She sometimes gets upset about not being  able to breathe or nervous about falling and about her health.   She was taking once every couple days. Taking 2-3/week- she would take when she felt nervous and unable to sleep. Before getting sick- she was given Paxil for anxiety. It evened her out. It is no longer evening her out. Cannot tell it does anything at this point. Doubled the Paxil- did not increase anxiety but did not help anxiety. She noticed no difference at all.     She reports she was worried about home health coming in and giving germs from previous home they had been in. She continues to be weak and shaking- tremoring. Her legs will go out of control and tremor and will go down. She can usually grab something and lock her legs to keep from falling but still has symptoms.   She reports in the past, she has tried taking out Lasix and felt better prior. Stopped Lasix altogether for now. She is taking Spironolactone and Jardiance.     Was on Ativan in the hospital for anxiety. They then told told her she could not take the medication.     Wanted to     She is not feeling like she has any urinary symptoms and did not start antibiotics. Then started and still on antibiotics- macrobid.     Hypertension- blood pressure not getting low and only occasionally 150. Otherwise, has been better.   she reports her BP is still up and down. She initially started   She was on Atenolol and Losartan. No BP cuff that is accurate. Feeling ok. Tired but does not think from BP. She was told to not take her Losartan prior to eyelid surgery. They were worried her BP would be too low. Her BP was 200s/ 100s. They had to give medication to get it down. Has been high but has not been able to take at home.   Hyperlipidemia- she reports feeling bad when she doubled medication with uncontrolled lipids.   Restarted Lipitor 12/2020 and did well- tolerated without AE.   Hypothyroidism- [place] thyroid 45 mg- she is on [place] thyroid a 30 mg and 15 mg once daily.   Vitamin D  deficiency- taking calcium, vit D3 5000 IU 4-5 x weekly   B12 deficiency- taking B12 1000 mcg 4-5 x weekly  Iron- taking iron 65 mg (325 ferrous sulfate) once daily.   Abnormal renal function- NSAIDS- None- stopped Aleve. Tylenol only- not very often. Every once in a while- less than once weekly alcohol use.   Elevated alkaline phosphatase- no symptoms- GI or bone.       Moods- Paxil- reports sometimes she increases to 2 tablets to help with increased stress and uncontrolled moods. This works well for her.   Fibromyalgia- has been stable. Using Tylenol only as needed. Leg pain and some fatigue but no other worsening pain/ symptoms.   Low back pain, DDD, spinal stenosis, OA, spondylolisthesis, scoliosis- still has all the time. Has to sit and rest between vacuuming. PT helps and dry needling helps.   Patient describes pain across the middle of her back- where waist is with pain down into the left hip. Aching, throbbing, stabbing at times but most of the time, spasm pain in center of back. 3-10/10. She can get to the point of being uncomfortable in bed. Her back is bothering worse than breathing when she is outside doing things. She has had improvement with PT and dry needling in the past. She has not had the pain down the leg again but does not want it to progress. Patient usually has worsening at the end of summer after working in the yard all summer. Not having numbness but has had tingling in LE> a few times has had leg weakness but not recently. Hard to walk up steps because of pain and weakness- cannot put all her weight on 1 foot. She has to walk up both feet on a step. No saddle anesethia or new or changing GI/ symptoms.   With Dr Shultz- she had MRI and had pain down the leg to her toes and had to have epidurals. They worked for a long time. Then stopped going there and came here.   MRI lumbar spine 2017- mild dextroconvex curvature- centered at L4.  T12-L1- minimal disc bulging and facet arthropathy.    L1-2 prominent degenerative disc changes, endplate marrow changes, degenerative retrolisthesis L1 on L2 by 6 mm, mild to moderate bilateral foraminal narrowing from disc osteophyte complex, mild central canal stenosis.   L2-3 moderate central canal stenosis with disc bulge, ligamentum flavum thickening, facet arthropathy, degenerative retrolisthesis L2 on L3- 2-3 mm, mild to moderate bilateral foraminal narrowing from bulging disc, degenerative endplate changes.   L3-4- mild to moderate central canal stenosis from disc bulge, ligamentum flavum thickening, and facet arthropathy, bilateral foraminal narrowing.   L4-5- moderate to severe central canal stenosis from disc bulging, ligamentum flavum thickening, facet arthropathy, degenerative anterolisthesis L4 on L5 by 2 mm.   L5-S1- moderate to severe facet arthropathy  Referred to neurosurgery with 3 appts 1/2018- she cancelled 2 appts and no show the final appt.   Patient was seen by Dr. Dc 4/2019 for back pain going down her left side.  She was given prednisone for 5 days and Flexeril.  Patient was seen by ANTONELLA Jiménez 8/9/2021 for right low back pain and right hip pain going down the right leg.  She was working in her yard, going up and down a ladder and doing household chores with increased pain.  She had stabbing pain going down her right leg but denied numbness or tingling.  She wanted to consider physical therapy and epidural ejections.  Back still hurting and has to sit down when doing certain things but overall improved some.   Leg pain- no complaints today.   4/2021- she woke up with pain down her leg that did not feel like her normal sciatica and in the other leg. Mid- leg around the knee felt like the whole leg- she could not tell if anterior, posterior, medial, or lateral. No injury or activity change. Varicose veins in that leg. No edema, erythema, or heat.     Emphysema- Trelegy helps- has been on for 2 years and albuterol as needed.  Never a  "smoker - father and brothers.    Dr Nolan- continues to follow.   Patient was hospitalized 5/3/2022-5/7/2022 for acute hypercapnic and hypoxemic respiratory failure, acute on chronic CHF, COPD with exacerbation by viral infection- rhinovirus and Covid 19 infection, bronchitis- acute on chronic, elevated LFT, hyperglycemia, hypothyroidism, htn, hyperlipidemia. Per discharge summary, she was having gradually increasing SOA on exertion for a few days. It was mild but her son reported she had some increased \"hacking as well\". She then had sudden onset SOA, called 9-1-1 and was brought to ER intubated. She improved quickly. Increased coughing with Trelegy- advised she try Breztri. Follow up Dr Nolan 4 weeks.    Labs- 5/20-blood gas- pCO2 61.5, pH7.243, WBC 20.25, elevated glucose, ALT, AST, BNP 2972, TSH 7.5, Free T4 low at 0.73, respiratory panel positive for rhinovirus and Covid 19. 5/4- WBC 12.91, improving LFT- elevated AST, blood gas- pH7.55, pCO2 improved to 27, 5/5- WBC 19, creatinine 1.26. 5/7- 1.3  CXR- 5/3- enlarged heart, aorta tortuous, vascular congestion, hazy opacity right mid to lower lung- edema or pneumonia, possible right pleural effusion. CXR post intubation- heart enlarged, aorta tortuous, pulmonary vascular congestion, patchy pulmonary opacity- right mid to upper lung and both lower lobes possible infiltrate, ET tube in place.   CTA chest 5/3/2022- cardiac enlargement, coronary artery calcifications and atherosclerosis of aorta, bilateral small pleural effusions, vague groundglass opacity- pulmonary edema, mild bronchial wall thickening with mucus plugging right- bronchitis. ET tube in place.   CT head 5/3/2022- mucosal thickening left maxillary sinus and ethmoid air cells, martial opacification mastoid air cell bilaterally.   KUB 5/3- following NG tube placement.   Patient was hospitalized 12/8/2022-12/9/2022 for COPD with acute exacerbation, emphysema, anxiety, fibromyalgia, GERD, htn, " hypothyroidism. Per discharge summary, she presented with SOA and dry cough. Oxygen saturation as low as 84%, CXR and CBC negative for underlying bacteria pneumonia. Aggressive bronchodilators and IV steroid. Improved overnight but continued with headache. Potassium was low and was given supplement in hospital. Advised oral steroid. Walking oxygen saturation good range.   Patient was hospitalized 12/19/2022-12/22/2022 requiring intubation for acute hypoxic respiratory failure, COPD with acute exacerbation, emphysema, severe sepsis, NSTEMI, acute kidney injury, lactic acidosis. Per discharge summary, she presented with SOA and was given 2 neulizer treatments without improvement. She was in severe distress, unable to verbalize, and was intubated. WBC 27, lactic acid 3, and HS Troponin 209. CXR unremarkable and given 1 L crystalloid and initiated heparin. Admit to MICU. Completed 3 days of Ceftriaxone, transferred out of ICU. Advised Symbicort and Spiriva, was to have nebulizer machine, and advised follow up with cardiology for stress test and pulmonology with PFT.   When she was seen in follow-up of ER 1/2023, I gave doxycycline.  Patient was hospitalized 1/6/2023 through 1/13/2023 for COPD with acute exacerbation, RSV bronchiolitis, chronic CHF, hypothyroidism, anxiety, hypertension, arteriosclerosis bilateral carotid arteries, fibromyalgia, GERD, hyperlipidemia. Per discharge summary, she was found to be RSV + 1/7/2023, initial imaging with cardiomegaly but no infiltrate or effusion but did have bibasilar atelectasis.  Pulmonology consulted and following the patient through her stay-treated with bronchodilators and corticosteroids which were tapered over the course of her stay in addition to guaifenesin and Tessalon Perles.  They reported she had improved dyspnea, coughing, and wheezing at discharge.  She was on room air with oxygen saturation greater than 90%, afebrile, WBC within normal limits and no evidence of  superimposed bacterial pneumonia.  Walking oximetry performed prior to discharge and advised to continue prednisone taper 60 mg for 3 days followed by 20 mg for 2 days then 10 mg for 2 days.  Close follow-up with pulmonology within 1 to 2 weeks of discharge.  Ordered  PT and skilled nursing at discharge.  Blood pressure was intermittently uncontrolled with medication adjustment.  She was told to continue atenolol 50 mg losartan 100 mg and changed to atenolol from 2.5 mg to 10 mg.  Chest x-ray with cardiomegaly, bibasilar atelectasis, advanced degenerative changes right shoulder and right AC.  Labs-respiratory panel positive for RSV, blood gas with PCO2 45, PO2 103, HCO3 28.8, base excess 3.5, negative procalcitonin, negative proBNP, hemoglobin decreased through hospitalization with hemoglobin 1/9/2022 10.9  Oximeter in hospital and did not have oxygen on at that time.  Breztri- will need to be approved.    1/17/2023-she was on Mucinex DM twice daily, Flonase, and using Breztri, nebulizer. She had not been seen by pulmonology- appt 2/13/2023. She had improvement in PND and left ear symptoms but right ear still clogged with decreased hearing. Using nasal spray every day.   Patient was hospitalized 5/20/2023 through 5/27/2023 for COPD exacerbation, chronic respiratory failure, acute respiratory failure, asthma/COPD overlap with acute exacerbation, acute hypoxic and hypercapnic respiratory failure, acute parainfluenza infection, chronic diastolic CHF, WHO group 2 pulmonary hypertension, SVT, hypertension, hyperlipidemia. Per discharge summary, she was sick for 2 to 3 days with congestion and sinus headache, coughing and sore throat as well as urinary frequency and urgency.  She uses Breztri and nebulizer but was unable to get albuterol for rescue.  She was feeling somewhat better from presentation to the ED with breathing treatments.  She was admitted for obstructive lung disease exacerbation respiratory failure and  acute viral illness.  Symptoms improved.  Cardiology also saw her while admit and advised follow-up in 2 weeks.  Follow-up pulmonology in 4 weeks.  Patient was hospitalized 6/3/2023 through 6/12/2023 for paroxysmal atrial fibrillation, acute on chronic CHF, anxiety, chronic interstitial cystitis, pulmonary emphysema, GERD, fibromyalgia, hypertension, hyperlipidemia. Per discharge summary, she presented to the hospital with atrial fibrillation with RVR and acute hypoxic respiratory failure.  She underwent titration of cardiac meds and diuresis.  Cardiology adjusted meds for rate control and rate was in the 100s with goal preferably less than 110.  Cardiology recommended discharge on digoxin and atenolol.  Although she has cardiomyopathy they preferred atenolol due to better rate control the metoprolol or carvedilol.  Respiratory failure resolved and she was breathing comfortably.  She was eager to go to skilled nursing facility or subacute rehab.  Cardiology agreed to discharge.  Advised keep appointments and follow-up.  Patient was hospitalized 6/17/2023 through 6/22/2023 for acute respiratory failure with hypercapnia, acute on chronic congestive heart failure, COPD, hypertension, hypothyroidism, hyperlipidemia. Per discharge summary, she presented to the ED with shortness of breath and chest pain for a few days.  She was placed on BiPAP in ED and able to wean to nasal cannula.  IV Lasix transition to p.o. and she continued atenolol and appeared to be euvolemic.  Echocardiogram with EF 31 to 35% and cardiology planned on ischemic evaluation on an outpatient basis.  Overall improvement and scheduled for discharge home.  Advised to weigh daily and notify cardiology if weight gain greater than 3 pounds.  For atrial fibrillation with RVR, advise continue Eliquis and atenolol.  Underwent LE cardioversion 6/19/2023 and remained in normal sinus rhythm.  Respiratory failure thought secondary to heart failure and underlying  COPD.  She underwent 6-minute walk test without the need for oxygen off Lasix and steroid.  Mild elevation of AST and ALT of uncertain etiology but advised to follow-up with PCP for repeat in if remained elevated, needed to see GI for evaluation  Patient was hospitalized 7/4/2023 through 7/10/2023 for acute respiratory failure requiring noninvasive mechanical ventilation, bibasilar pneumonia, asthma with COPD-fixed obstruction, acute on chronic combined systolic and diastolic heart failure with EF of 35%, successful PCI to the LAD and circumflex 7/7/2023, moderate MR, uncontrolled hypertension, paroxysmal atrial fibrillation, and acute hypokalemia. Per discharge summary, patient presented with shortness of breath that was waxing and waning over 2 to 3 months with hospitalization 3 times over 8 weeks.  Shortness of breath gradually worsened.  Patient never smoked and per note has dubious diagnosis of COPD.  She was placed on noninvasive ventilation due to respiratory distress and hypoxia on arrival to ER.  Blood pressure was extremely elevated, systolic blood pressure greater than 200 and she was started on nitro drip.  She was beginning to feel better.  Last echo 1 week prior with EF 30 to 35%.  Per note, did not have spirometry to suggest COPD and had indeterminate spirometry results.  She did grow up surrounded by smoking brothers and father exposure secondhand smoke but no reason for severe COPD.  Has chronic diastolic CHF and hypertension.  She was admitted to the ICU, started on noninvasive ventilation and received diuretics.  She was seen by cardiology.  Additional diuretics given for optimization.  She was tolerating noninvasive ventilation and transferred to the floor.  She underwent right and left heart catheterization was found to have mild LAD and proximal circumflex stenosis requiring drug-eluting stents.  She then did well and was to resume home meds including diuretic therapy with cardiology.  Patient  requested noninvasive ventilation as needed at home.  Blood gas did not qualify her for this.  Explained rationale for noninvasive ventilation.  In case of respiratory difficulty, she should call her cardiologist and severe difficulty call EMS.  Try to qualify for home oxygen at least with exertion.  No desaturation with walking test.  Advise continue aspirin, clopidogrel, Eliquis, amiodarone for now and discontinue in next 1 to 2 months as long as maintains sinus rhythm, spironolactone, Jardiance, losartan, atenolol.  Follow-up cardiology 1 week.    Allergies- Nasacort daily.   GERD- controlled on Prilosec without breakthrough symptoms. Not bothering at all.     Dizziness- No recurrence of dizziness.   She had some dizziness- intermittent- sometimes when 1st standing up.   She had 2 episodes that she felt like she was going to faint, weakness, and legs were shaking. Both times, improved with sitting down. She had no associated CP, SOA, palp, numbness, tingling, speech issues, confusion, speech symptoms. Her legs were shaking uncontrollably but stopped with sitting. She had water with improvement. No spinning dizziness.   Carotid stenosis- last carotid duplex 11/2022- plaque without stenosis.   Carotid duplex 2016 was ok but prior had stenosis noted on duplex.   Referred 12/2020 and 5/2021.    Palpitations- She has had flutter a couple times daily for years.   She was seen 2015 for palpitations with echo and holter without the need for treatment.   She has also noticed increased SOA spring 2021 after getting to work outside. She has chronic SOA with COPD, however, she had some increase from baseline. No CP, sweating, nausea with symptoms. She had palpitations here right before she had EKG but none while having tracing.     CHF, OSORIO, CP, thoracic back pain, intermittent palpitations, tachycardia, NSTEMI 12/2022 hospitalization-   Started with decreased exercise tolerance. She took albuterol and increased her pulse up  to 114 and took a while to go back down. Felt worse after the albuterol.   She was folding clothes and was winded despite standing still and folding clothes. 15 steps to the bathroom and by the time she got back to the bed, she was panting and having a hard time breathing. It took a while but resolved. Sitting and resting, she was ok.   She was having pain in her chest and across. Pain in midback/upper back  as well. No SOA laying down. She was sent to [facility].   Brother with MI at 64 or 65.       Mother lived until age 87 and  of dementia. Active      Patient's Specialists:  Cardiology- Dr Norman- last appt 2023 for hypertension, CHF, hyperlipidemia, COPD, hypothyroidism. No evidence of volume overload. Continue BP regimen. LDL goal < 70. Follow up 6 months.   Endocrinology- Dr Sari Jose- last appt 2021 for postoperative hypothyroidism, abnormal thyroid function tests. 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 Discussed with patient that thyroid tests looked typical of someone on [place] Thyroid and did not feel well on levothyroxine. Recommended leave on [place] thyroid and just check TSH- keeping TSH > 2. Follow up PRN.   GI- Dr Rick Herrera- last appt 10/2007 for colonoscopy.   Neurosurgery- Dr Agarwal- scheduled 2018- patient cancelled 2 appts and No Show final appt.   PM&R- Dr Eduardo Boland- last appt 2014 for back pain. Given Vimovo. Advised warm water therapy.   Pulmonology- Dr Nolan- last appt 2023 for severe COPD, hypoxemic respiratory failure, and granulomatous disease. Continue triple therapy, oxygen as needed, and annual CXR.   2023 for COPD, chronic respiratory failure, pulmonary fibrosis, non-rheumatic aortic valve stenosis.   Ophthalmology- Dr Hunt- last appt 3/2022 for other abnormality of cornea, mechanical ptosis, blepharoconjunctivitis.   ENT- Dr Woody De La Fuente- last appt 2023 for hearing loss, tinnitus, eustachian tube issues, TMJ, tension headache. Advised hearing aids to help  with hearing, tinnitus, and help prevent dementia. Advised she get treatment for TMJ. Follow up PRN.     The following portions of the patient's history were reviewed and updated as appropriate: allergies, current medications, past family history, past medical history, past social history, past surgical history and problem list.    Review of Systems   Constitutional:  Positive for fatigue. Negative for fever.   HENT:  Positive for hearing loss and tinnitus. Ear pain: ears popping.   Respiratory:  Positive for cough (chronic) and shortness of breath (chronic - increased OSORIO).    Cardiovascular:  Positive for chest pain and palpitations.   Gastrointestinal: Negative.    Genitourinary: Negative.    Musculoskeletal:  Positive for back pain.        Leg pain   Neurological:  Positive for dizziness and headaches.   Psychiatric/Behavioral:  Positive for dysphoric mood. The patient is nervous/anxious.      Objective   Vitals:    07/25/23 1302   BP: 144/80   Pulse: 76   Resp: 18   Temp: 97 øF (36.1 øC)   SpO2: 98%     Body mass index is 24.88 kg/mý.    Physical Exam  Vitals and nursing note reviewed.   Constitutional:       General: She is not in acute distress.     Appearance: Normal appearance. She is well-developed.   HENT:      Head: Normocephalic and atraumatic.      Right Ear: External ear normal.      Left Ear: External ear normal.      Nose: Nose normal.   Eyes:      General: Lids are normal.      Conjunctiva/sclera: Conjunctivae normal.   Neck:      Vascular: No carotid bruit.   Cardiovascular:      Rate and Rhythm: Normal rate and regular rhythm.      Pulses: Normal pulses.      Heart sounds: Normal heart sounds. No murmur heard.    No friction rub. No gallop.   Pulmonary:      Effort: Pulmonary effort is normal. No respiratory distress.      Breath sounds: Normal breath sounds. No wheezing, rhonchi or rales.   Musculoskeletal:         General: No deformity.      Cervical back: Neck supple.   Skin:     General: Skin  is warm and dry.   Neurological:      Mental Status: She is alert and oriented to person, place, and time.      Gait: Gait normal.   Psychiatric:         Speech: Speech normal.         Behavior: Behavior normal.         Thought Content: Thought content normal.         Judgment: Judgment normal.     Assessment & Plan   Diagnoses and all orders for this visit:    1. Hematuria, unspecified type (Primary)  -     Urinalysis With Microscopic - Urine, Clean Catch  -     Urine Culture - Urine, Urine, Clean Catch    2. Chronic respiratory failure, unspecified whether with hypoxia or hypercapnia    3. Asthma with COPD    4. Generalized anxiety disorder    5. Chronic HFrEF (heart failure with reduced ejection fraction)    6. Acute on chronic HFrEF (heart failure with reduced ejection fraction)    7. History of non-ST elevation myocardial infarction (NSTEMI)    8. Status post angioplasty with stent    9. Benign hypertension    10. PAF (paroxysmal atrial fibrillation)    Other orders  -     Microscopic Examination -        Assessment and Plan  Patient has had multiple hospitalizations for COPD exacerbation and CHF with multiple intubation and noninvasive ventilation treatment. She is following with cardiology and pulmonology. With last hospitalization 7/2023, she had acute respiratory failure requiring noninvasive mechanical ventilation, bibasilar pneumonia, asthma with COPD-fixed obstruction, acute on chronic combined systolic and diastolic heart failure with EF of 35%, and underwent successful PCI to the LAD and circumflex 7/7/2023, moderate MR, uncontrolled hypertension, paroxysmal atrial fibrillation, and acute hypokalemia. noninvasive ventilation due to respiratory distress and hypoxia on arrival to ER.  Blood pressure was extremely elevated, systolic blood pressure greater than 200 and she was started on nitro drip.  Last echo 1 week prior with EF 30 to 35%.  She has chronic diastolic CHF and hypertension.  She was advised  to continue aspirin, clopidogrel, Eliquis, amiodarone and discontinue in 1 to 2 months as long as maintains sinus rhythm, spironolactone, Jardiance, losartan, atenolol.  She was shaky and weak after discharge. I counseled her at her hospital follow up about the need for physical therapy- she did not want home health in her home for fear of getting sick. She was to see physical therapy. I discussed my concern for falls with blood thinner. She did not go to PT. Patient then had a fall and hit her head. She went back to ER with negative CT. She followed up with cardiology, who stopped aspirin, decreased Amiodarone to 1/2 and decreased Losartan to 25 mg daily- to take an extra dose if elevated BP. She took once then had hypotension again. She continues with episodes of anxiety and shaking but not as severe as prior to medication change. We were awaiting direction for Xanax (stopped on discharge from the hospital and will need cardiology and pulmonology clearance to restart PRN). She did have low IgG- I referred to Immunology and ask that she see them as referred. She did have blood in urine. I will recheck with urine culture. I will check with cardiology and pulmonology again about her Xanax Rx but she will need to start PT for strengthening. She should ensure follow upw ith cardiology as scheduled.     Hypertension- Blood pressure is elevated. She has had elevation and low readings. I will have cardiology determine BP regimen and continue to prescribe medication to avoid medication confusion and accidental changes.     From 3/2023-    Hyperlipidemia- Cholesterol is borderline. HDL remains good but LDL > 70. Continue Lipitor 20 mg at bedtime- consider increasing to 40 mg at follow up if persistently > 70.    Hypothyroidism- Thyroid was abnormal in the hospital but she was sick and required brief intubation. TSH remains in recommended range per endocrinology- advised TSH >2 and not checking other thyroid labs- reports  thyroid labs consistent with someone taking Spencer thyroid. Advised follow up here for thyroid. Continue Spencer thyroid 45 mg.    Vitamin D deficiency- Continue calcium, vit D3 5000 IU 4-5 x weekly.  B12 deficiency-Continue B12 1000 mcg 4-5 x weekly.    Iron deficiency- Continue iron daily. I will recheck at follow up.   Abnormal renal function- Avoid NSAIDS and ensure proper hydration. I will continue to monitor and make recommendations. Consideration of nephrology if needed.  Elevated alkaline phosphatase- We will consider further workup if persistent elevation- normalized and only a couple points elevated now.   UTI- Finish Macrobid. To be seen if urinary symptoms. Otherwise, I will recheck at follow up.   Major depressive disorder with anxiety-Moods are more stable. Continue Paxil to 10 mg twice daily or 20 mg once daily. She can continue with the 2nd dose only as needed if this is helping but if she is having to take the second dose often, she should start taking 20 mg every day.    Fibromyalgia- She has been stable. Follow up if uncontrolled symptoms.   Low back pain, DDD, spinal stenosis, OA, spondylolisthesis, scoliosis- She was referred back to physical therapy and given Baclofen as needed. She should consider repeat MRI and to see specialist if worsening, new, or changing symptoms or persistent pain.   Emphysema- Continue treatment and follow up with Dr Nolan as directed- they should fill all respiratory medications.  Allergic rhinitis-  Continue Nasacort as needed.    GERD- Symptoms are controlled. Continue Prilosec 20 mg once daily. To see GI if significant breakthrough symptoms  Carotid stenosis- Bilateral plaque without stenosis 11/2022. Recheck in 1 year.  OSORIO, CP, intermittent palpitations, CHF, NSTEMI-  Patient to continue follow up with cardiology and treatment as directed. I will ensure they have reviewed hospitalization from 12/2022 at [facility] with NSTEMI and recommendations for outpatient  stress test with cardiology.    COPD with multiple exacerbations in the last year, 2 exacerbations requiring intubation, chronic respiratory failure, granulomatous disease- Continue follow up with pulmonology- Dr Nolan. There was recent note with diagnosis pulmonary fibrosis but no diagnosis on follow up 2/2023. I will continue to follow.      From [facility] 3/2023- She is not up to date on mammogram, DEXA, colonoscopy. I will refer for mammogram and DEXA. She needs to consider colonoscopy but should have cardiology and pulmonology clearance for sedation. Patient to contact her insurance to determine coverage and location of coverage for Prevnar 20 and Shingrix. She needs to ensure annual flu shot and Covid 19 booster if further available in the future.    I spent 30 minutes caring for Dafne Mary on this date of service. This time includes time spent by me in the following activities as necessary: preparing for the visit, reviewing tests, specialists records and previous visits, obtaining and/or reviewing a separately obtained history, performing a medically appropriate exam and/or evaluation, counseling and educating the patient, family, caregiver, referring and/or communicating with other healthcare professionals, documenting information in the medical record, independently interpreting results and communicating that information with the patient, family, caregiver, and developing a medically appropriate treatment plan with consideration of other conditions, medications, and treatments.

## 2023-07-27 LAB
APPEARANCE UR: CLEAR
BACTERIA #/AREA URNS HPF: NORMAL /[HPF]
BACTERIA UR CULT: NORMAL
BACTERIA UR CULT: NORMAL
BILIRUB UR QL STRIP: NEGATIVE
CASTS URNS QL MICRO: NORMAL /LPF
COLOR UR: YELLOW
EPI CELLS #/AREA URNS HPF: NORMAL /HPF (ref 0–10)
GLUCOSE UR QL STRIP: ABNORMAL
HGB UR QL STRIP: NEGATIVE
KETONES UR QL STRIP: NEGATIVE
LEUKOCYTE ESTERASE UR QL STRIP: NEGATIVE
MICRO URNS: ABNORMAL
MICRO URNS: ABNORMAL
NITRITE UR QL STRIP: NEGATIVE
PH UR STRIP: 6 [PH] (ref 5–7.5)
PROT UR QL STRIP: NEGATIVE
RBC #/AREA URNS HPF: NORMAL /HPF (ref 0–2)
SP GR UR STRIP: 1.01 (ref 1–1.03)
UROBILINOGEN UR STRIP-MCNC: 0.2 MG/DL (ref 0.2–1)
WBC #/AREA URNS HPF: NORMAL /HPF (ref 0–5)

## 2023-07-28 ENCOUNTER — READMISSION MANAGEMENT (OUTPATIENT)
Dept: CALL CENTER | Facility: HOSPITAL | Age: 83
End: 2023-07-28
Payer: MEDICARE

## 2023-07-28 NOTE — OUTREACH NOTE
Medical Week 3 Survey      Flowsheet Row Responses   [facility], [place], operated by [facility] patient discharged from? [place]   Does the patient have one of the following disease processes/diagnoses(primary or secondary)? Other   Week 3 attempt successful? Yes   Call start time 1506   Call end time 1508   Discharge diagnosis acute hyoxemic resp failure-right and left heart cath   Is the patient taking all medications as directed (includes completed medication regime)? Yes   Comments regarding appointments f/u with cardiology on 8/22   Does the patient have a primary care provider?  Yes   Comments regarding PCP seen PCP on 7/25   Has the patient kept scheduled appointments due by today? Yes   Psychosocial issues? No   What is the patient's perception of their health status since discharge? Improving   Is the patient/caregiver able to teach back signs and symptoms related to disease process for when to call PCP? Yes   Is the patient/caregiver able to teach back signs and symptoms related to disease process for when to call 911? Yes   Is the patient/caregiver able to teach back the hierarchy of who to call/visit for symptoms/problems? PCP, Specialist, Home health nurse, Urgent Care, ED, 911 Yes   Week 3 Call Completed? Yes   Graduated Yes   Is the patient interested in additional calls from an ambulatory ?  NOTE:  applies to high risk patients requiring additional follow-up. No   Would this patient benefit from a Referral to Amb Social Work? No   Graduated/Revoked comments Doing well, all concerns addressed.   Call end time 1508            Aarti FREEMAN - Registered Nurse

## 2023-07-29 ENCOUNTER — DOCUMENTATION (OUTPATIENT)
Dept: CARDIOLOGY | Facility: CLINIC | Age: 83
End: 2023-07-29
Payer: MEDICARE

## 2023-07-30 NOTE — PROGRESS NOTES
I am the on call provider. Patient called the service with concern over elevated /80s. She presented to ER in [place] yesterday and was reportedly given one dose of 0.1mg clonidine and released. BP is elevated today as well on losartan 25mg BID and atenolol 50mg BID. She also has amlodipine 2.5 mg available at home as this medication was stopped after her stents 3 weeks ago. I advised her to increase losartan to 50 mg BID and  if BP remained over 140/80 she could take amlodipine 2.5 mg daily as needed. She will call the office on Monday with an update on BP after these changes.

## 2023-08-04 ENCOUNTER — TELEPHONE (OUTPATIENT)
Dept: CARDIOLOGY | Facility: CLINIC | Age: 83
End: 2023-08-04
Payer: MEDICARE

## 2023-08-11 RX ORDER — THYROID 30 MG/1
TABLET ORAL
Qty: 90 TABLET | Refills: 0 | Status: SHIPPED | OUTPATIENT
Start: 2023-08-11

## 2023-08-11 RX ORDER — THYROID,PORK 15 MG
TABLET ORAL
Qty: 90 TABLET | Refills: 0 | Status: SHIPPED | OUTPATIENT
Start: 2023-08-11

## 2023-08-14 ENCOUNTER — TELEPHONE (OUTPATIENT)
Dept: FAMILY MEDICINE CLINIC | Facility: CLINIC | Age: 83
End: 2023-08-14
Payer: MEDICARE

## 2023-08-14 ENCOUNTER — TELEPHONE (OUTPATIENT)
Dept: CARDIOLOGY | Facility: CLINIC | Age: 83
End: 2023-08-14
Payer: MEDICARE

## 2023-08-14 NOTE — TELEPHONE ENCOUNTER
----- Message from ANTONELLA Novak sent at 8/11/2023 10:06 AM EDT -----  Regarding: RE: [place] patient  Good morning. From a cardiac standpoint there is no reason she cannot take it. She REALLY needs it too. So we have no problem with continuing this.  ----- Message -----  From: Sintia Chatterjee PA  Sent: 8/11/2023   9:12 AM EDT  To: ANTONELLA Novak  Subject: [place] patient                                   This patient had Xanax in the past for PRN use. This was stopped at her last hospitalization with cardiac issues. I wanted to see why medication was stopped and if she has contraindication to taking PRN Xanax.

## 2023-08-14 NOTE — TELEPHONE ENCOUNTER
I am ok with her stopping the lasix for now.  She should just keep taking her current meds and write down her BP when she checks it.  (She doesn't have to keep checking everyday.)  otherwise will follow up with her as scheduled on 8/22

## 2023-08-14 NOTE — TELEPHONE ENCOUNTER
Please see if we can find out a reason her Xanax was stopped at the hospital and if they see any reason she cannot take Xanax as needed. I have left message but received no call back.

## 2023-08-14 NOTE — TELEPHONE ENCOUNTER
Patient been having high blood pressure for the last 3 weeks , until Pt started using Amlodipine 5 mg due to high BP. Patient says this did bring BP down ,its a little low but it is better , Pt having trouble hearing BP when she uses her machine with her stethoscope attached.Patient also has had swelling in her legs not a lot but enough that patient needed to take Lasix 20 mg, pt said it had her kept having to use the bathroom , legs has gone down but lasix has also made patient dizzy , can't walk across the room without feeling like she will pass out , her legs is not jerking just dealing with being lightheaded/ dizzy in the head. Pt believes she will not take anymore Lasix due to her being dizzy . Patient also state she's not sure if she's using her BP cuff right.     Called patient to get her BP reading from when her BP was high, Pt said that it was in the 170's over something high and today BP was 120/74 at 1:00 pm with 2.5 of Amlodipine , along with other BP med's.    Please advise Patient can be reached at [phone].

## 2023-08-15 NOTE — TELEPHONE ENCOUNTER
I spoke with the patient she said that she was not taking xanax that it was Ativan, they did not inform her why they stopped it.

## 2023-08-18 NOTE — TELEPHONE ENCOUNTER
I apologize- I noticed that. There was another message somewhere with this but I am not sure what happened to the more detailed message. Thank you.

## 2023-08-18 NOTE — TELEPHONE ENCOUNTER
This was to find out from her pulmonologist. I have already talked to her about this multiple times but have had trouble reaching her specialist about it. This was stopped at her discharge from the hospital. She is not aware why it was stopped. Cardiology has ok restarting benzo but I am not sure with pulmonology, as she was in the hospital for COPD and CHF. We need clearance for medication from her pulmonologist.

## 2023-08-18 NOTE — TELEPHONE ENCOUNTER
My apologies, pulmonary was not mentioned in the previous message. I have called and left a message for his assistant to speak with him and they will call us back

## 2023-08-21 ENCOUNTER — TELEPHONE (OUTPATIENT)
Dept: FAMILY MEDICINE CLINIC | Facility: CLINIC | Age: 83
End: 2023-08-21

## 2023-08-21 NOTE — TELEPHONE ENCOUNTER
Caller: Dafne Mary    Relationship: Self    Best call back number    [phone]        Requested Prescriptions: ANTI ANXIETY MEDICATION         Pharmacy where request should be sent:    [facility] [address] [place] [address] - [phone]  - [phone]  [phone]   Last office visit with prescribing clinician: 7/25/2023   Last telemedicine visit with prescribing clinician: Visit date not found   Next office visit with prescribing clinician: Visit date not found     Additional details provided by patient: PATIENT IS CALLING TO ASK IF [facility] IS GOING TO PRESCRIBE HER AN ANTI ANXIETY MEDICATION.    Does the patient have less than a 3 day supply:  [x] Yes  [] No    Would you like a call back once the refill request has been completed: [] Yes [] No    If the office needs to give you a call back, can they leave a voicemail: [] Yes [] No    Jg Anderson Rep   08/21/23 15:29 EDT     PLEASE ADVISE.

## 2023-08-21 NOTE — PROGRESS NOTES
Subjective:     Encounter Date:08/22/2023      Patient ID: Dafne Mary is a 82 y.o. female.    Chief Complaint:  History of Present Illness    This is a 82-year-old with severe COPD, emphysema, granulomatous lung disease, depression/anxiety, hypertension, hypothyroidism, persistent atrial fibrillation status post cardioversion, cardiomyopathy, coronary artery disease status post drug-eluting stent placement of the mid LAD and proximal OM1 in 7/2023, who presents for follow up.       She was admitted to the hospital in 5/2023 with COPD exacerbation due to parainfluenza virus.  During that admission she went into atrial fibrillation with rates in the 130s to 140s.  She did have symptoms of palpitations but no worsening shortness of breath at rest.  She also reported some chest tightness.  Worked on rate control by increasing her atenolol to 50 mg twice a day give her IV digoxin.  Rate control still struggled to be an issue.  It was felt that her rate control would improve with treatment of her COPD.    She returned to the hospital the following month with volume overload and atrial fibrillation with rapid ventricular response.  She was diuresed.  She was started on apixaban for anticoagulation due to ongoing atrial fibrillation.  Medications were adjusted for rate control.  She underwent an echocardiogram that admission which showed moderately depressed left ventricular systolic function with an EF of 36 to 40%, mild aortic valve stenosis, moderate to severe tricuspid valve regurgitation with mild pulmonary hypertension.  Of note during that admission she is noted to have a new left bundle branch block that appear to be rate related.  We will continue to struggle with rate control and ended up starting her on amiodarone.  The plan was to bring her back in a month for LE/cardioversion if she persisted in atrial fibrillation.    When she is seen back in the office on 6/16/2023 by ANTONELLA Novak her heart  rate still remained elevated despite being on digoxin, amiodarone, and atenolol.  Her atenolol dosage was increased further.    Unfortunately she was readmitted to the hospital a couple of days later again with volume overload and issues with elevated rates.  It is recommended at that point that she undergo a LE followed by cardioversion.  This was performed on 6/19/2023.  She was continued on both amiodarone and apixaban.    She was seen back in the office again for hospital follow-up on 6/30/2023.  She reported shortness of breath that office visit.  She was maintaining sinus rhythm.  There was concern for recurrent volume overload so she was treated with IV bumetanide.  Despite this she returned back to the hospital with hypoxia, shortness of breath, and chest pressure.  She was noted to be hypertensive and started on IV nitroglycerin but developed hypotension.  She was diuresed.  She remained in sinus rhythm during that admission.  She was ultimately recommended to undergo cardiac catheterization for further ischemic work-up.  This was performed by Dr. Cole on 7/7/2023 and showed 90% proximal OM1 and mid LAD 80% stenosis.  She subsequently underwent drug-eluting stent placement of both.  Her guideline directed management of her cardiomyopathy was also uptitrated with the addition of spironolactone, Jardiance, and the continuation of losartan.    She was seen back in the office by ANTONELLA Bowen in 7/2023.  The patient developed a significant weakness to the point that resulted in a fall resulting in a left scalp hematoma.  Her aspirin was stopped at that office visit.  She was continued on clopidogrel and apixaban.  Amiodarone was decreased to 100 mg daily with hopes to discontinue this altogether eventually.  Diuretics were stopped.  The patient did call later that month complaining of elevated blood pressures.  Her losartan dosage was increased.  I also advised the patient to start amlodipine 2.5 mg  daily.    She presents today for routine follow-up.  Since I saw her last she has increased her amlodipine to 5 mg twice a day.  She is on losartan 50 mg twice a day.  She remains on atenolol 50 mg twice a day.  She remains on the Jardiance without any significant issues.  She is on amiodarone 100 mg daily.  She reports intermittent lightheadedness but none of the weakness that she had before and her episodes of lightheadedness overall have improved.  She denies any chest pain.  She continues to have dyspnea on exertion but this is largely stable.  She reports worsening lower extremity swelling recently.  Her weight is up a few pounds additionally.  She denies any palpitations or any symptoms consistent with atrial fibrillation.       Prior History:  The patient was previously followed by Dr. Hanson.  She initially saw him in 5/2021 with complaints of palpitations and an abnormal EKG.  She reported stable dyspnea on exertion at the time.  She reportedly had a stress test in 2015 that was normal.  He recommended proceeding with an echocardiogram and a Holter monitor at that time.  These were performed in 6/2021.  Her echocardiogram showed normal left ventricular systolic function wall motion with an EF of 53%, grade 2 diastolic dysfunction, hypokinesis of the mid inferior, basal inferoseptal, mid inferoseptal, basal inferior and basal inferoseptal walls, and no significant valvular disease within normal right ventricular systolic pressure.  Holter monitor was benign.  It was felt that the septal wall motion abnormalities were due to interventricular conduction delay.     She was not seen back in the office until 10/2021 when she was seen by Dr. Hanson in the cardiac evaluation center.  She reported an increase in dyspnea on exertion and onset of chest pressure.  A D-dimer was performed that was elevated so a CT angiogram of the chest was performed that was negative for pulmonary embolism.  She returned for a  perfusion stress test on 11/5/2021 which showed no evidence of ischemia.     She was seen last by Dr. Hanson in 11/2021 for routine follow-up.  No changes were made to her management and was felt that she could be seen as needed.       She was hospitalized in 5/2021 with acute hypercapnic and hypoxic respiratory failure.  She ended up requiring intubation on admission.  Her work-up was significant for a mildly elevated BNP.  Chest imaging showed evidence of pleural effusions and evidence of pulmonary edema.  In addition she was felt to have an excuse exacerbation of her COPD along with acute exacerbation of chronic bronchitis.  She was also positive for COVID-19 and rhinovirus.  She was diuresed and along with supportive treatment of her infections.   During that admission she underwent a repeat echocardiogram on 5/4/2022 which showed normal left ventricular systolic function with an EF of 52%, normal diastolic function, aortic valve calcification but no evidence of significant stenosis, mild mitral and tricuspid regurgitation, normal right ventricular systolic pressure, and again evidence of mid inferior, basal inferoseptal, mid inferoseptal, basal inferior, and basal inferoseptal hypokinesis.  Of note her heart rates were elevated around 130 bpm during that echocardiogram.     Following her discharge she continued to have issues with dyspnea on exertion which was progressing.  There was concerned that her CHF has not been adequately addressed so she was referred here for further evaluation by KAJAL Wood.  I recommended proceeding with a repeat echocardiogram which was performed on 7/25/2022 and showed normal left ventricular systolic function wall motion with an EF of 53%, basal inferoseptal and inferior wall hypokinesis, mildly dilated left atrium, mild to moderate mitral valve regurgitation, moderately elevated right ventricular systolic pressure of 45 mmHg.  Reviewed the images with Dr. Hanson and  we felt that the patient actually had at least grade 2 diastolic dysfunction.  Based on that finding along with evidence of moderate pulmonary hypertension I recommended starting furosemide 20 mg daily.    In 11/2022 she complained of positional lightheadedness.  She was noted to have orthostatic hypotension when this was checked in KAJAL Wood's office.  Recommended that she decreasing her amlodipine dosage.  Furosemide was also discontinued.  She continued to have issues with lightheadedness so I had her discontinue the amlodipine completely.  She then developed issues with elevated blood pressure so I switch atenolol to carvedilol.  When she saw ANTONELLA Novak in follow-up in 12/2022 blood pressures appear to be doing well and her dizziness had improved.  She still had some occasional low blood pressure readings at that time.     The patient was recently admitted in 1/2022 with COPD exacerbation and RSV infection.  Prior to that admission the patient reports that she was having issues with elevated blood pressure so she decided to stop the carvedilol on her own and had gone back to taking both the amlodipine and atenolol.  Fortunately she did not have any further issues with lightheadedness.     Review of Systems   Constitutional: Positive for malaise/fatigue.   HENT:  Negative for hearing loss, hoarse voice, nosebleeds and sore throat.    Eyes:  Negative for pain.   Cardiovascular:  Positive for dyspnea on exertion and leg swelling. Negative for chest pain, claudication, cyanosis, irregular heartbeat, near-syncope, orthopnea, palpitations, paroxysmal nocturnal dyspnea and syncope.   Respiratory:  Negative for shortness of breath and snoring.    Endocrine: Negative for cold intolerance, heat intolerance, polydipsia, polyphagia and polyuria.   Skin:  Negative for itching and rash.   Musculoskeletal:  Negative for arthritis, falls, joint pain, joint swelling, muscle cramps, muscle weakness and myalgias.    Gastrointestinal:  Negative for constipation, diarrhea, dysphagia, heartburn, hematemesis, hematochezia, melena, nausea and vomiting.   Genitourinary:  Negative for frequency, hematuria and hesitancy.   Neurological:  Positive for light-headedness. Negative for excessive daytime sleepiness, dizziness, headaches, numbness and weakness.   Psychiatric/Behavioral:  Negative for depression. The patient is nervous/anxious.        Current Outpatient Medications:     acetaminophen (TYLENOL) 325 MG tablet, Take 2 tablets by mouth Every 6 (Six) Hours As Needed for Mild Pain., Disp: , Rfl:     amiodarone (PACERONE) 200 MG tablet, Take 0.5 tablets by mouth Daily. 1 tab by mouth daily, Disp: 30 tablet, Rfl: 11    apixaban (ELIQUIS) 2.5 MG tablet tablet, Take 1 tablet by mouth Every 12 (Twelve) Hours. Indications: Atrial Fibrillation, Disp: 60 tablet, Rfl: 5    [place] Thyroid 15 MG tablet, Take 1 tablet by mouth once daily, Disp: 90 tablet, Rfl: 0    [place] Thyroid 30 MG tablet, Take 1 tablet by mouth once daily, Disp: 90 tablet, Rfl: 0    Artificial Tear Ointment (artificial tears) ophthalmic ointment, Administer 1 application to both eyes Every 1 (One) Hour As Needed (dry eye)., Disp: , Rfl:     atenolol (TENORMIN) 50 MG tablet, Take 1 tablet by mouth Every 12 (Twelve) Hours., Disp: 60 tablet, Rfl: 3    atorvastatin (LIPITOR) 40 MG tablet, Take 1 tablet by mouth Every Night for 90 days., Disp: 30 tablet, Rfl: 2    Breztri Aerosphere 160-9-4.8 MCG/ACT aerosol inhaler, 2 puffs 2 (Two) Times a Day., Disp: , Rfl:     clopidogrel (PLAVIX) 75 MG tablet, Take 1 tablet by mouth Daily., Disp: 30 tablet, Rfl: 11    empagliflozin (JARDIANCE) 10 MG tablet tablet, Take 1 tablet by mouth Daily., Disp: 30 tablet, Rfl: 3    Glycerin-Polysorbate 80 (REFRESH DRY EYE THERAPY OP), Apply 1 drop to eye(s) as directed by provider Daily. For dry eyes, Disp: , Rfl:     guaiFENesin (MUCINEX) 600 MG 12 hr tablet, Take 1 tablet by mouth 2 (Two) Times a  Day As Needed for Cough or Congestion., Disp: , Rfl:     ipratropium-albuterol (DUO-NEB) 0.5-2.5 mg/3 ml nebulizer, Take 3 mL by nebulization Every 4 (Four) Hours As Needed for Wheezing., Disp: , Rfl:     losartan (COZAAR) 50 MG tablet, Take 1 tablet by mouth 2 (Two) Times a Day., Disp: , Rfl:     PARoxetine (PAXIL) 10 MG tablet, Take 2 tablets by mouth Daily., Disp: 180 tablet, Rfl: 0    Ventolin  (90 Base) MCG/ACT inhaler, Inhale 2 puffs Every 4 (Four) Hours As Needed., Disp: , Rfl:     vitamin B-12 (CYANOCOBALAMIN) 500 MCG tablet, Take 1 tablet by mouth Daily., Disp: , Rfl:     vitamin C (ASCORBIC ACID) 250 MG tablet, Take 2 tablets by mouth Daily., Disp: , Rfl:     vitamin D3 125 MCG (5000 UT) capsule capsule, Take 1 capsule by mouth Daily., Disp: , Rfl:     amLODIPine (NORVASC) 5 MG tablet, Take 1 tablet by mouth 2 (Two) Times a Day., Disp: , Rfl:     hydroCHLOROthiazide (HYDRODIURIL) 25 MG tablet, Take 1 tablet by mouth Daily., Disp: 30 tablet, Rfl: 5    Past Medical History:   Diagnosis Date    Atrial fibrillation with RVR 6/4/2023    CAD (coronary artery disease) 7/10/2023    Chronic diastolic congestive heart failure 11/17/2022    Depression     Emphysema lung     GERD (gastroesophageal reflux disease)     Heart failure     Hyperlipidemia     Hypertension     Hypothyroidism     Mitral valve regurgitation 7/10/2023       Past Surgical History:   Procedure Laterality Date    CARDIAC CATHETERIZATION N/A 7/7/2023    Procedure: Right and Left Heart Cath;  Surgeon: Ra Cole MD;  Location: [facility] INVASIVE LOCATION;  Service: Cardiology;  Laterality: N/A;    CARDIAC CATHETERIZATION N/A 7/7/2023    Procedure: Percutaneous Coronary Intervention;  Surgeon: Ra Cole MD;  Location: [facility] INVASIVE LOCATION;  Service: Cardiology;  Laterality: N/A;    CARDIAC CATHETERIZATION N/A 7/7/2023    Procedure: Stent ANDRZEJ coronary;  Surgeon: Ra Cole MD;  Location: [facility]  "CATH INVASIVE LOCATION;  Service: Cardiology;  Laterality: N/A;    CARDIAC CATHETERIZATION N/A 7/7/2023    Procedure: Coronary angiography;  Surgeon: Ra Cole MD;  Location: [facility] CATH INVASIVE LOCATION;  Service: Cardiology;  Laterality: N/A;    CARDIAC CATHETERIZATION N/A 7/7/2023    Procedure: Left ventriculography;  Surgeon: Ra Cole MD;  Location: [facility] CATH INVASIVE LOCATION;  Service: Cardiology;  Laterality: N/A;    EYE SURGERY Left     INTUBATION  5/21/2023         PARATHYROIDECTOMY      Patient reports thyroidectomy partial not a para thyroidectomy.    THYROIDECTOMY, PARTIAL      1984       Family History   Problem Relation Age of Onset    Alzheimer's disease Mother     Lung disease Father     Alcohol abuse Father     Heart disease Brother     Hypertension Brother     Lung disease Brother     Alcohol abuse Brother     Cancer Brother         LUNG  WAS A SMOKER    Thyroid disease Maternal Grandmother        Social History     Tobacco Use    Smoking status: Never     Passive exposure: Never    Smokeless tobacco: Never   Vaping Use    Vaping Use: Never used   Substance Use Topics    Alcohol use: Yes     Comment: \"occ\"; caffeine use- tea    Drug use: No         ECG 12 Lead    Date/Time: 8/22/2023 2:34 PM  Performed by: Nae Norman MD  Authorized by: Nae Norman MD   Comparison: compared with previous ECG   Similar to previous ECG  Rhythm: sinus rhythm  Conduction: left bundle branch block           Objective:     Visit Vitals  /80 (BP Location: Left arm, Patient Position: Sitting, Cuff Size: Adult)   Pulse 71   Ht 152.4 cm (60\")   Wt 59.4 kg (131 lb)   SpO2 97%   BMI 25.58 kg/mý         Constitutional:       Appearance: Normal appearance. Well-developed.   Eyes:      General: Lids are normal.      Conjunctiva/sclera: Conjunctivae normal.      Pupils: Pupils are equal, round, and reactive to light.   HENT:      Head: Normocephalic and atraumatic.   Neck:      " Vascular: No carotid bruit or JVD.      Lymphadenopathy: No cervical adenopathy.   Pulmonary:      Effort: Pulmonary effort is normal.      Breath sounds: Normal breath sounds.   Cardiovascular:      Normal rate. Regular rhythm.      No gallop.    Pulses:     Radial: 2+ bilaterally.  Edema:     Peripheral edema absent.   Abdominal:      Palpations: Abdomen is soft.   Musculoskeletal:      Cervical back: Full passive range of motion without pain, normal range of motion and neck supple. Skin:     General: Skin is warm and dry.   Neurological:      Mental Status: Alert and oriented to person, place, and time.             Assessment:          Diagnosis Plan   1. Coronary artery disease involving native coronary artery of native heart without angina pectoris        2. Status post angioplasty with stent        3. Benign hypertension        4. Mixed hyperlipidemia        5. History of non-ST elevation myocardial infarction (NSTEMI)        6. PAF (paroxysmal atrial fibrillation)        7. Chronic HFrEF (heart failure with reduced ejection fraction)  Adult Transthoracic Echo Limited W/ Cont if Necessary Per Protocol      8. Nonrheumatic mitral valve regurgitation        9. Asthma with COPD               Plan:       1.  Cardiomyopathy.  EF of 36 to 40% previously.  She is on guideline directed management with beta-blockers, losartan, and Jardiance.  I am hopeful that we will see an improvement in her LV function now that she is maintaining sinus rhythm and has undergone coronary artery revascularization.  We will plan on a repeat echocardiogram at her next follow-up.  2.  Paroxysmal atrial fibrillation.  Maintaining sinus rhythm following a cardioversion and on amiodarone therapy.  Would like to avoid keeping her on amiodarone chronically.  I have asked the patient to finish out her current supply of amiodarone and then to discontinue it.  In the meanwhile continue anticoagulation with apixaban.  3.  Hypertension.   Well-controlled on current regimen medications.  4.  Chronic systolic congestive heart failure.  She does have evidence of lower extremity swelling.  Her dyspnea is largely stable.  She has had issues tolerating diuretic therapy because the results in lightheadedness and significant weakness.  Due to evidence of volume overload on exam I recommended that we try hydrochlorothiazide to see if this will be enough to help her volume overload but avoid some of the symptoms she had with loop diuretics.  We will try her on 25 mg.  Of asked her to try taking it for 2 to 3 days or until the swelling resolves and then stop it and try taking only as needed.  5.  Coronary artery disease.  Status post drug-eluting stent placement of the LAD and obtuse marginal branch in 7/2023.  She is on clopidogrel monotherapy since she is also on chronic anticoagulation.  No symptoms of angina at this time.  Continue current management.  6.  Hyperlipidemia.  On atorvastatin for goal LDL of 70 or below.  7.  Mitral valve regurgitation.  Hopefully will see improvement in this with improvement in her LV function.  Plan for echocardiogram as above.  8.  Left bundle branch block.  Previously appear to be rate related but now appears to be persistent.  9.  Severe COPD.  Followed by Dr. Nolan.  10.  Hypothyroidism.    We will plan on seeing the patient back again in 2 months with a limited echocardiogram.

## 2023-08-22 ENCOUNTER — OFFICE VISIT (OUTPATIENT)
Dept: CARDIOLOGY | Facility: CLINIC | Age: 83
End: 2023-08-22
Payer: MEDICARE

## 2023-08-22 VITALS
SYSTOLIC BLOOD PRESSURE: 120 MMHG | BODY MASS INDEX: 25.72 KG/M2 | HEART RATE: 71 BPM | WEIGHT: 131 LBS | HEIGHT: 60 IN | OXYGEN SATURATION: 97 % | DIASTOLIC BLOOD PRESSURE: 80 MMHG

## 2023-08-22 DIAGNOSIS — I48.0 PAF (PAROXYSMAL ATRIAL FIBRILLATION): ICD-10-CM

## 2023-08-22 DIAGNOSIS — I50.22 CHRONIC HFREF (HEART FAILURE WITH REDUCED EJECTION FRACTION): ICD-10-CM

## 2023-08-22 DIAGNOSIS — I34.0 NONRHEUMATIC MITRAL VALVE REGURGITATION: ICD-10-CM

## 2023-08-22 DIAGNOSIS — E78.2 MIXED HYPERLIPIDEMIA: ICD-10-CM

## 2023-08-22 DIAGNOSIS — J44.9 ASTHMA WITH COPD: ICD-10-CM

## 2023-08-22 DIAGNOSIS — I25.2 HISTORY OF NON-ST ELEVATION MYOCARDIAL INFARCTION (NSTEMI): ICD-10-CM

## 2023-08-22 DIAGNOSIS — I25.10 CORONARY ARTERY DISEASE INVOLVING NATIVE CORONARY ARTERY OF NATIVE HEART WITHOUT ANGINA PECTORIS: Primary | ICD-10-CM

## 2023-08-22 DIAGNOSIS — I10 BENIGN HYPERTENSION: ICD-10-CM

## 2023-08-22 DIAGNOSIS — Z95.820 STATUS POST ANGIOPLASTY WITH STENT: ICD-10-CM

## 2023-08-22 PROBLEM — I50.23 ACUTE ON CHRONIC HFREF (HEART FAILURE WITH REDUCED EJECTION FRACTION): Status: RESOLVED | Noted: 2023-06-12 | Resolved: 2023-08-22

## 2023-08-22 PROBLEM — I50.9 CHRONIC CONGESTIVE HEART FAILURE: Status: RESOLVED | Noted: 2023-04-09 | Resolved: 2023-08-22

## 2023-08-22 PROBLEM — I50.23 ACUTE ON CHRONIC SYSTOLIC HEART FAILURE: Status: RESOLVED | Noted: 2023-07-10 | Resolved: 2023-08-22

## 2023-08-22 PROBLEM — I50.21 ACUTE SYSTOLIC HEART FAILURE: Status: RESOLVED | Noted: 2023-07-10 | Resolved: 2023-08-22

## 2023-08-22 RX ORDER — AMLODIPINE BESYLATE 5 MG/1
5 TABLET ORAL 2 TIMES DAILY
COMMUNITY

## 2023-08-22 RX ORDER — HYDROCHLOROTHIAZIDE 25 MG/1
25 TABLET ORAL DAILY
Qty: 30 TABLET | Refills: 5 | Status: SHIPPED | OUTPATIENT
Start: 2023-08-22

## 2023-08-22 RX ORDER — LOSARTAN POTASSIUM 50 MG/1
50 TABLET ORAL 2 TIMES DAILY
Start: 2023-08-22

## 2023-08-22 NOTE — TELEPHONE ENCOUNTER
Please see previous telephone note regarding this-I am awaiting callback from pulmonology.  I was going to advise that we contact pulmonology again, as I have left a message and we called last week regarding approval from pulmonology to find out why this was stopped at discharge.  I received clearance from cardiology but not pulmonology.  However, it looks like patient will see a new primary care on Thursday.

## 2023-08-22 NOTE — LETTER
August 22, 2023       No Recipients    Patient: Dafne Mary   YOB: 1940   Date of Visit: 8/22/2023       Dear KAJAL Wood    Dafne Mary was in my office today. Below is a copy of my note.    If you have questions, please do not hesitate to call me. I look forward to following Dafne along with you.         Sincerely,        Nae Norman MD        CC:   No Recipients        Subjective:     Encounter Date:08/22/2023      Patient ID: Dafne Mary is a 82 y.o. female.    Chief Complaint:  History of Present Illness    This is a 82-year-old with severe COPD, emphysema, granulomatous lung disease, depression/anxiety, hypertension, hypothyroidism, persistent atrial fibrillation status post cardioversion, cardiomyopathy, coronary artery disease status post drug-eluting stent placement of the mid LAD and proximal OM1 in 7/2023, who presents for follow up.       She was admitted to the hospital in 5/2023 with COPD exacerbation due to parainfluenza virus.  During that admission she went into atrial fibrillation with rates in the 130s to 140s.  She did have symptoms of palpitations but no worsening shortness of breath at rest.  She also reported some chest tightness.  Worked on rate control by increasing her atenolol to 50 mg twice a day give her IV digoxin.  Rate control still struggled to be an issue.  It was felt that her rate control would improve with treatment of her COPD.    She returned to the hospital the following month with volume overload and atrial fibrillation with rapid ventricular response.  She was diuresed.  She was started on apixaban for anticoagulation due to ongoing atrial fibrillation.  Medications were adjusted for rate control.  She underwent an echocardiogram that admission which showed moderately depressed left ventricular systolic function with an EF of 36 to 40%, mild aortic valve stenosis, moderate to severe tricuspid valve regurgitation with mild pulmonary  hypertension.  Of note during that admission she is noted to have a new left bundle branch block that appear to be rate related.  We will continue to struggle with rate control and ended up starting her on amiodarone.  The plan was to bring her back in a month for LE/cardioversion if she persisted in atrial fibrillation.    When she is seen back in the office on 6/16/2023 by ANTONELLA Novak her heart rate still remained elevated despite being on digoxin, amiodarone, and atenolol.  Her atenolol dosage was increased further.    Unfortunately she was readmitted to the hospital a couple of days later again with volume overload and issues with elevated rates.  It is recommended at that point that she undergo a LE followed by cardioversion.  This was performed on 6/19/2023.  She was continued on both amiodarone and apixaban.    She was seen back in the office again for hospital follow-up on 6/30/2023.  She reported shortness of breath that office visit.  She was maintaining sinus rhythm.  There was concern for recurrent volume overload so she was treated with IV bumetanide.  Despite this she returned back to the hospital with hypoxia, shortness of breath, and chest pressure.  She was noted to be hypertensive and started on IV nitroglycerin but developed hypotension.  She was diuresed.  She remained in sinus rhythm during that admission.  She was ultimately recommended to undergo cardiac catheterization for further ischemic work-up.  This was performed by Dr. Cole on 7/7/2023 and showed 90% proximal OM1 and mid LAD 80% stenosis.  She subsequently underwent drug-eluting stent placement of both.  Her guideline directed management of her cardiomyopathy was also uptitrated with the addition of spironolactone, Jardiance, and the continuation of losartan.    She was seen back in the office by ANTONELLA Bowen in 7/2023.  The patient developed a significant weakness to the point that resulted in a fall resulting in a  left scalp hematoma.  Her aspirin was stopped at that office visit.  She was continued on clopidogrel and apixaban.  Amiodarone was decreased to 100 mg daily with hopes to discontinue this altogether eventually.  Diuretics were stopped.  The patient did call later that month complaining of elevated blood pressures.  Her losartan dosage was increased.  I also advised the patient to start amlodipine 2.5 mg daily.           Prior History:  The patient was previously followed by Dr. Hanson.  She initially saw him in 5/2021 with complaints of palpitations and an abnormal EKG.  She reported stable dyspnea on exertion at the time.  She reportedly had a stress test in 2015 that was normal.  He recommended proceeding with an echocardiogram and a Holter monitor at that time.  These were performed in 6/2021.  Her echocardiogram showed normal left ventricular systolic function wall motion with an EF of 53%, grade 2 diastolic dysfunction, hypokinesis of the mid inferior, basal inferoseptal, mid inferoseptal, basal inferior and basal inferoseptal walls, and no significant valvular disease within normal right ventricular systolic pressure.  Holter monitor was benign.  It was felt that the septal wall motion abnormalities were due to interventricular conduction delay.     She was not seen back in the office until 10/2021 when she was seen by Dr. Hanson in the cardiac evaluation center.  She reported an increase in dyspnea on exertion and onset of chest pressure.  A D-dimer was performed that was elevated so a CT angiogram of the chest was performed that was negative for pulmonary embolism.  She returned for a perfusion stress test on 11/5/2021 which showed no evidence of ischemia.     She was seen last by Dr. Hanson in 11/2021 for routine follow-up.  No changes were made to her management and was felt that she could be seen as needed.       She was hospitalized in 5/2021 with acute hypercapnic and hypoxic respiratory failure.   She ended up requiring intubation on admission.  Her work-up was significant for a mildly elevated BNP.  Chest imaging showed evidence of pleural effusions and evidence of pulmonary edema.  In addition she was felt to have an excuse exacerbation of her COPD along with acute exacerbation of chronic bronchitis.  She was also positive for COVID-19 and rhinovirus.  She was diuresed and along with supportive treatment of her infections.   During that admission she underwent a repeat echocardiogram on 5/4/2022 which showed normal left ventricular systolic function with an EF of 52%, normal diastolic function, aortic valve calcification but no evidence of significant stenosis, mild mitral and tricuspid regurgitation, normal right ventricular systolic pressure, and again evidence of mid inferior, basal inferoseptal, mid inferoseptal, basal inferior, and basal inferoseptal hypokinesis.  Of note her heart rates were elevated around 130 bpm during that echocardiogram.     Following her discharge she continued to have issues with dyspnea on exertion which was progressing.  There was concerned that her CHF has not been adequately addressed so she was referred here for further evaluation by KAJAL Wood.  I recommended proceeding with a repeat echocardiogram which was performed on 7/25/2022 and showed normal left ventricular systolic function wall motion with an EF of 53%, basal inferoseptal and inferior wall hypokinesis, mildly dilated left atrium, mild to moderate mitral valve regurgitation, moderately elevated right ventricular systolic pressure of 45 mmHg.  Reviewed the images with Dr. Hanson and we felt that the patient actually had at least grade 2 diastolic dysfunction.  Based on that finding along with evidence of moderate pulmonary hypertension I recommended starting furosemide 20 mg daily.    In 11/2022 she complained of positional lightheadedness.  She was noted to have orthostatic hypotension when this was  checked in KAJAL Wood's office.  Recommended that she decreasing her amlodipine dosage.  Furosemide was also discontinued.  She continued to have issues with lightheadedness so I had her discontinue the amlodipine completely.  She then developed issues with elevated blood pressure so I switch atenolol to carvedilol.  When she saw ANTONELLA Novak in follow-up in 12/2022 blood pressures appear to be doing well and her dizziness had improved.  She still had some occasional low blood pressure readings at that time.     The patient was recently admitted in 1/2022 with COPD exacerbation and RSV infection.  Prior to that admission the patient reports that she was having issues with elevated blood pressure so she decided to stop the carvedilol on her own and had gone back to taking both the amlodipine and atenolol.  Fortunately she did not have any further issues with lightheadedness.     ROS      Current Outpatient Medications:     acetaminophen (TYLENOL) 325 MG tablet, Take 2 tablets by mouth Every 6 (Six) Hours As Needed for Mild Pain., Disp: , Rfl:     amiodarone (PACERONE) 200 MG tablet, Take 0.5 tablets by mouth Daily. 1 tab by mouth daily, Disp: 30 tablet, Rfl: 11    apixaban (ELIQUIS) 2.5 MG tablet tablet, Take 1 tablet by mouth Every 12 (Twelve) Hours. Indications: Atrial Fibrillation, Disp: 60 tablet, Rfl: 5    [place] Thyroid 15 MG tablet, Take 1 tablet by mouth once daily, Disp: 90 tablet, Rfl: 0    Deport Thyroid 30 MG tablet, Take 1 tablet by mouth once daily, Disp: 90 tablet, Rfl: 0    Artificial Tear Ointment (artificial tears) ophthalmic ointment, Administer 1 application to both eyes Every 1 (One) Hour As Needed (dry eye)., Disp: , Rfl:     atenolol (TENORMIN) 50 MG tablet, Take 1 tablet by mouth Every 12 (Twelve) Hours., Disp: 60 tablet, Rfl: 3    atorvastatin (LIPITOR) 40 MG tablet, Take 1 tablet by mouth Every Night for 90 days., Disp: 30 tablet, Rfl: 2    Breztri Aerosphere  160-9-4.8 MCG/ACT aerosol inhaler, 2 puffs 2 (Two) Times a Day., Disp: , Rfl:     clopidogrel (PLAVIX) 75 MG tablet, Take 1 tablet by mouth Daily., Disp: 30 tablet, Rfl: 11    empagliflozin (JARDIANCE) 10 MG tablet tablet, Take 1 tablet by mouth Daily., Disp: 30 tablet, Rfl: 3    Glycerin-Polysorbate 80 (REFRESH DRY EYE THERAPY OP), Apply 1 drop to eye(s) as directed by provider Daily. For dry eyes, Disp: , Rfl:     guaiFENesin (MUCINEX) 600 MG 12 hr tablet, Take 1 tablet by mouth 2 (Two) Times a Day As Needed for Cough or Congestion., Disp: , Rfl:     ipratropium-albuterol (DUO-NEB) 0.5-2.5 mg/3 ml nebulizer, Take 3 mL by nebulization Every 4 (Four) Hours As Needed for Wheezing., Disp: , Rfl:     losartan (COZAAR) 25 MG tablet, Take 1 tablet by mouth 2 (Two) Times a Day., Disp: 60 tablet, Rfl: 5    PARoxetine (PAXIL) 10 MG tablet, Take 2 tablets by mouth Daily., Disp: 180 tablet, Rfl: 0    Ventolin  (90 Base) MCG/ACT inhaler, Inhale 2 puffs Every 4 (Four) Hours As Needed., Disp: , Rfl:     vitamin B-12 (CYANOCOBALAMIN) 500 MCG tablet, Take 1 tablet by mouth Daily., Disp: , Rfl:     vitamin C (ASCORBIC ACID) 250 MG tablet, Take 2 tablets by mouth Daily., Disp: , Rfl:     vitamin D3 125 MCG (5000 UT) capsule capsule, Take 1 capsule by mouth Daily., Disp: , Rfl:     Past Medical History:   Diagnosis Date    Atrial fibrillation with RVR 6/4/2023    CAD (coronary artery disease) 7/10/2023    Chronic diastolic congestive heart failure 11/17/2022    Depression     Emphysema lung     GERD (gastroesophageal reflux disease)     Heart failure     Hyperlipidemia     Hypertension     Hypothyroidism     Mitral valve regurgitation 7/10/2023       Past Surgical History:   Procedure Laterality Date    CARDIAC CATHETERIZATION N/A 7/7/2023    Procedure: Right and Left Heart Cath;  Surgeon: Ra Cole MD;  Location: [facility] CATH INVASIVE LOCATION;  Service: Cardiology;  Laterality: N/A;     "CARDIAC CATHETERIZATION N/A 7/7/2023    Procedure: Percutaneous Coronary Intervention;  Surgeon: Ra Cole MD;  Location:  [facility] CATH INVASIVE LOCATION;  Service: Cardiology;  Laterality: N/A;    CARDIAC CATHETERIZATION N/A 7/7/2023    Procedure: Stent ANDRZEJ coronary;  Surgeon: Ra Cole MD;  Location:  [facility] CATH INVASIVE LOCATION;  Service: Cardiology;  Laterality: N/A;    CARDIAC CATHETERIZATION N/A 7/7/2023    Procedure: Coronary angiography;  Surgeon: Ra Cole MD;  Location:  [facility] CATH INVASIVE LOCATION;  Service: Cardiology;  Laterality: N/A;    CARDIAC CATHETERIZATION N/A 7/7/2023    Procedure: Left ventriculography;  Surgeon: Ra Cole MD;  Location:  [facility] CATH INVASIVE LOCATION;  Service: Cardiology;  Laterality: N/A;    EYE SURGERY Left     INTUBATION  5/21/2023         PARATHYROIDECTOMY      Patient reports thyroidectomy partial not a para thyroidectomy.    THYROIDECTOMY, PARTIAL      1984       Family History   Problem Relation Age of Onset    Alzheimer's disease Mother     Lung disease Father     Alcohol abuse Father     Heart disease Brother     Hypertension Brother     Lung disease Brother     Alcohol abuse Brother     Cancer Brother         LUNG  WAS A SMOKER    Thyroid disease Maternal Grandmother        Social History     Tobacco Use    Smoking status: Never     Passive exposure: Never    Smokeless tobacco: Never   Vaping Use    Vaping Use: Never used   Substance Use Topics    Alcohol use: Yes     Comment: \"occ\"; caffeine use- tea    Drug use: No       Procedures       Objective:     Visit Vitals  /80 (BP Location: Left arm, Patient Position: Sitting, Cuff Size: Adult)   Pulse 71   Ht 152.4 cm (60\")   Wt 59.4 kg (131 lb)   SpO2 (!) 89%   BMI 25.58 kg/mý         Physical Exam    Lab Review:       Assessment:          Diagnosis Plan   1. Coronary artery disease involving native coronary artery of native heart without " angina pectoris        2. Status post angioplasty with stent        3. Benign hypertension        4. Mixed hyperlipidemia        5. History of non-ST elevation myocardial infarction (NSTEMI)        6. PAF (paroxysmal atrial fibrillation)        7. Chronic HFrEF (heart failure with reduced ejection fraction)        8. Nonrheumatic mitral valve regurgitation        9. Asthma with COPD               Plan:

## 2023-09-13 ENCOUNTER — TELEPHONE (OUTPATIENT)
Dept: FAMILY MEDICINE CLINIC | Facility: CLINIC | Age: 83
End: 2023-09-13

## 2023-09-13 NOTE — TELEPHONE ENCOUNTER
"    Caller: Dafne Mary    Relationship: Self    Best call back number: [phone]     What medication are you requesting: ANXIETY MEDICATION    If a prescription is needed, what is your preferred pharmacy and phone number: [facility] [address] [place] [address] RD - [phone]  - [phone] FX     Additional notes: PATIENT STATES THAT THEY HAVE DISCUSSED THIS IN THE PAST AND PROVIDER \"KNOWS HER WELL\"          "

## 2023-09-13 NOTE — TELEPHONE ENCOUNTER
Caller: Dafne Mary    Relationship: Self    Best call back number:     [phone]       What is the medical concern/diagnosis: WEAKNESS    What specialty or service is being requested: PHYSICAL THERAPY     What is the provider, practice or medical service name: [facility] PHYSICAL [facility]ERAP[facility]    Any additional details: HER REFERRAL  AND SHE IS NEEDING ANOTHER ONE

## 2023-09-13 NOTE — TELEPHONE ENCOUNTER
See telephone message from 8/21/2023.  This patient has transferring care to a new primary care per chart.  She had 2 appointments in August that she canceled due to transportation or scheduling and has appointment with new primary care tomorrow.

## 2023-09-14 ENCOUNTER — OFFICE VISIT (OUTPATIENT)
Dept: FAMILY MEDICINE CLINIC | Facility: CLINIC | Age: 83
End: 2023-09-14
Payer: MEDICARE

## 2023-09-14 VITALS
HEIGHT: 60 IN | DIASTOLIC BLOOD PRESSURE: 70 MMHG | SYSTOLIC BLOOD PRESSURE: 140 MMHG | HEART RATE: 62 BPM | RESPIRATION RATE: 20 BRPM | BODY MASS INDEX: 25.6 KG/M2 | WEIGHT: 130.4 LBS | TEMPERATURE: 98.7 F | OXYGEN SATURATION: 98 %

## 2023-09-14 DIAGNOSIS — F41.9 ANXIETY: Primary | ICD-10-CM

## 2023-09-14 NOTE — PROGRESS NOTES
Chief Complaint  Establish Care and Med Refill    Subjective         Dafne Mary presents to [facility]IMARY [facility]  History of Present Illness  History obtained from patient and medical records    82-year-old female with multiple medical problems presents to the office today to establish care and to discuss worsening anxiety.    Patient has coronary and bilateral carotid atherosclerosis, she had NSTEMI, she is s/p coronary angioplasty, she also has HFrEF and COPD, hypothyroidism, generalized anxiety disorder.  She was admitted multiple times during this year for COPD exacerbation and heart failure exacerbation.    She is currently on Plavix, Eliquis, hydrochlorothiazide, losartan, atenolol, Jardiance, Lipitor, amlodipine, [place] Thyroid, Breztri, DuoNeb and Ventolin.    Patient is currently on Paxil 20 mg daily.  Patient is asking to be prescribed Ativan.  Patient states that she was given Ativan when she was in the hospital and she felt so calm on it.    I informed patient that I do not prescribe Ativan or Xanax, patient became angry fast and started yelling at me.    I informed the patient that Xanax and Ativan are highly addictive and that I do not prescribe them.  I offered patient to try other medications or referral to psychiatry.      Patient became angry and started yelling at me.  Patient eventually stormed out of the office and told me that she does not want me to be her primary caregiver.      Objective     Review of Systems     Past Medical History:   Diagnosis Date    Atrial fibrillation with RVR 6/4/2023    CAD (coronary artery disease) 7/10/2023    Chronic diastolic congestive heart failure 11/17/2022    Depression     Emphysema lung     GERD (gastroesophageal reflux disease)     Heart failure     Hyperlipidemia     Hypertension     Hypothyroidism     Mitral valve regurgitation 7/10/2023        Current Outpatient Medications:     acetaminophen (TYLENOL) 325 MG tablet, Take 2  tablets by mouth Every 6 (Six) Hours As Needed for Mild Pain., Disp: , Rfl:     amLODIPine (NORVASC) 5 MG tablet, Take 1 tablet by mouth 2 (Two) Times a Day., Disp: , Rfl:     apixaban (ELIQUIS) 2.5 MG tablet tablet, Take 1 tablet by mouth Every 12 (Twelve) Hours. Indications: Atrial Fibrillation, Disp: 60 tablet, Rfl: 5    [place] Thyroid 15 MG tablet, Take 1 tablet by mouth once daily, Disp: 90 tablet, Rfl: 0    Julian Thyroid 30 MG tablet, Take 1 tablet by mouth once daily, Disp: 90 tablet, Rfl: 0    Artificial Tear Ointment (artificial tears) ophthalmic ointment, Administer 1 application to both eyes Every 1 (One) Hour As Needed (dry eye)., Disp: , Rfl:     atenolol (TENORMIN) 50 MG tablet, Take 1 tablet by mouth Every 12 (Twelve) Hours., Disp: 60 tablet, Rfl: 3    atorvastatin (LIPITOR) 40 MG tablet, Take 1 tablet by mouth Every Night for 90 days., Disp: 30 tablet, Rfl: 2    Breztri Aerosphere 160-9-4.8 MCG/ACT aerosol inhaler, 2 puffs 2 (Two) Times a Day., Disp: , Rfl:     Cimetidine (TAGAMET PO), Take  by mouth., Disp: , Rfl:     clopidogrel (PLAVIX) 75 MG tablet, Take 1 tablet by mouth Daily., Disp: 30 tablet, Rfl: 11    empagliflozin (JARDIANCE) 10 MG tablet tablet, Take 1 tablet by mouth Daily., Disp: 30 tablet, Rfl: 3    Glycerin-Polysorbate 80 (REFRESH DRY EYE THERAPY OP), Apply 1 drop to eye(s) as directed by provider Daily. For dry eyes, Disp: , Rfl:     guaiFENesin (MUCINEX) 600 MG 12 hr tablet, Take 1 tablet by mouth 2 (Two) Times a Day As Needed for Cough or Congestion., Disp: , Rfl:     hydroCHLOROthiazide (HYDRODIURIL) 25 MG tablet, Take 1 tablet by mouth Daily., Disp: 30 tablet, Rfl: 5    ipratropium-albuterol (DUO-NEB) 0.5-2.5 mg/3 ml nebulizer, Take 3 mL by nebulization Every 4 (Four) Hours As Needed for Wheezing., Disp: , Rfl:     losartan (COZAAR) 50 MG tablet, Take 1 tablet by mouth 2 (Two) Times a Day., Disp: , Rfl:     Ventolin  (90 Base) MCG/ACT inhaler, Inhale 2 puffs Every 4  "(Four) Hours As Needed., Disp: , Rfl:     vitamin B-12 (CYANOCOBALAMIN) 500 MCG tablet, Take 1 tablet by mouth Daily., Disp: , Rfl:     vitamin C (ASCORBIC ACID) 250 MG tablet, Take 2 tablets by mouth Daily., Disp: , Rfl:     amiodarone (PACERONE) 200 MG tablet, Take 0.5 tablets by mouth Daily. 1 tab by mouth daily (Patient not taking: Reported on 9/14/2023), Disp: 30 tablet, Rfl: 11    PARoxetine (PAXIL) 10 MG tablet, Take 2 tablets by mouth Daily., Disp: 180 tablet, Rfl: 0    vitamin D3 125 MCG (5000 UT) capsule capsule, Take 1 capsule by mouth Daily., Disp: , Rfl:    Social History     Socioeconomic History    Marital status:    Tobacco Use    Smoking status: Never     Passive exposure: Never    Smokeless tobacco: Never   Vaping Use    Vaping Use: Never used   Substance and Sexual Activity    Alcohol use: Yes     Comment: rare    Drug use: No    Sexual activity: Defer      Vital Signs:   /70   Pulse 62   Temp 98.7 °F (37.1 °C)   Resp 20   Ht 152.4 cm (60\")   Wt 59.1 kg (130 lb 6.4 oz)   SpO2 98%   BMI 25.47 kg/m²       Physical Exam  Psychiatric:         Mood and Affect: Mood is anxious.         Behavior: Behavior is agitated and aggressive.      Comments: Patient was picking at her fingers and nails during the entire visit   I was unable to do any other physical exam as patient stormed out the office    Result Review :                 Assessment and Plan    Diagnoses and all orders for this visit:    1. Anxiety (Primary)      Medical records reviewed  Patient has anxiety, currently on Paxil 20 mg.  Patient is demanding to be prescribed Ativan.    Patient is not open to any other treatment options besides Ativan.    I offered trying other treatments or referral to psychiatry but patient declined, she became angry and started yelling at me.  Patient expressed her feelings about not wanting me to be her primary care.  I will terminate myself as her primary care.      Follow Up   No follow-ups " on file.  Patient was given instructions and counseling regarding her condition or for health maintenance advice. Please see specific information pulled into the AVS if appropriate.

## 2023-10-10 ENCOUNTER — TELEPHONE (OUTPATIENT)
Dept: FAMILY MEDICINE CLINIC | Facility: CLINIC | Age: 83
End: 2023-10-10

## 2023-10-10 DIAGNOSIS — F41.1 GENERALIZED ANXIETY DISORDER: ICD-10-CM

## 2023-10-10 NOTE — TELEPHONE ENCOUNTER
Patient changed PCP. She was seen by new PCP. Please let me know what we find out from pulmonology. If they are not willing to prescribe medication, she will have to see psychiatry to consider medication/ treatment for anxiety. We will need to let her know and ensure she is agreeable to plan before we can consider re-establishing care with me. She will need to schedule appt with me if she is re-establishing. Please let me know when we have a response from her pulmonologist.

## 2023-10-10 NOTE — TELEPHONE ENCOUNTER
Caller: Dafne Mary    Relationship: Self    Best call back number: [phone]     What is the best time to reach you: ANYTIME    Who are you requesting to speak with (clinical staff, provider,  specific staff member): CLINICAL    What was the call regarding: PATIENT CALLING STATING THAT SHE CALLED TO FOLLOW UP ON THE PHYSICAL THERAPY REFERRAL SHE STATED THAT IT WAS NOTED THAT SHE NEEDS TO BE SEEN BEFORE SHE CAN GET THE REFERRAL PLEASE ADVISE IF ANOTHER REFERRAL CAN BE SENT     Is it okay if the provider responds through MyChart:

## 2023-10-11 RX ORDER — PAROXETINE 10 MG/1
20 TABLET, FILM COATED ORAL DAILY
Qty: 180 TABLET | Refills: 0 | OUTPATIENT
Start: 2023-10-11

## 2023-10-11 NOTE — TELEPHONE ENCOUNTER
She is not my patient, she only saw me once and did not want to continue seeing me.  I was terminated as her PCP, I do not know who added me again as her PCP.  Please forward back to the prescribing provider.

## 2023-10-12 ENCOUNTER — TELEPHONE (OUTPATIENT)
Dept: FAMILY MEDICINE CLINIC | Facility: CLINIC | Age: 83
End: 2023-10-12
Payer: MEDICARE

## 2023-10-12 NOTE — TELEPHONE ENCOUNTER
"Spoke with Mrs. Mary this morning- patient wanted from my understanding Sintia Sen to write for her prescriptions.  I explained to Mrs. Mary that she could not do that as she had left out practice ([facility]) and estabished care with Dr. Bobby Copeland MD. ([place]).  She became very frustrated, upset, and stated that she was done with [facility].  I voiced understanding of her, and her being upset- at the time she said \"BYE\" and hung up the phone.      1320  10/12/2023  "

## 2023-10-13 NOTE — TELEPHONE ENCOUNTER
Thank you for handling this. She transferred care to another provider and is no longer a patient of this practice. I am sorry for her frustration.

## 2023-10-26 ENCOUNTER — HOSPITAL ENCOUNTER (OUTPATIENT)
Dept: CARDIOLOGY | Facility: HOSPITAL | Age: 83
Discharge: HOME | End: 2023-10-26
Payer: MEDICARE

## 2023-10-26 ENCOUNTER — OFFICE VISIT (OUTPATIENT)
Age: 83
End: 2023-10-26
Payer: MEDICARE

## 2023-10-26 VITALS
HEART RATE: 72 BPM | DIASTOLIC BLOOD PRESSURE: 60 MMHG | OXYGEN SATURATION: 96 % | HEIGHT: 60 IN | SYSTOLIC BLOOD PRESSURE: 120 MMHG | BODY MASS INDEX: 25.52 KG/M2 | WEIGHT: 130 LBS

## 2023-10-26 VITALS
OXYGEN SATURATION: 95 % | DIASTOLIC BLOOD PRESSURE: 70 MMHG | BODY MASS INDEX: 25.52 KG/M2 | HEIGHT: 60 IN | SYSTOLIC BLOOD PRESSURE: 122 MMHG | WEIGHT: 130 LBS | HEART RATE: 76 BPM

## 2023-10-26 DIAGNOSIS — Z95.820 STATUS POST ANGIOPLASTY WITH STENT: Primary | ICD-10-CM

## 2023-10-26 DIAGNOSIS — I34.0 NONRHEUMATIC MITRAL VALVE REGURGITATION: ICD-10-CM

## 2023-10-26 DIAGNOSIS — I65.23 ARTERIOSCLEROSIS OF BOTH CAROTID ARTERIES: ICD-10-CM

## 2023-10-26 DIAGNOSIS — I25.10 CORONARY ARTERY DISEASE INVOLVING NATIVE CORONARY ARTERY OF NATIVE HEART WITHOUT ANGINA PECTORIS: ICD-10-CM

## 2023-10-26 DIAGNOSIS — I50.22 CHRONIC HFREF (HEART FAILURE WITH REDUCED EJECTION FRACTION): ICD-10-CM

## 2023-10-26 DIAGNOSIS — I48.0 PAF (PAROXYSMAL ATRIAL FIBRILLATION): ICD-10-CM

## 2023-10-26 DIAGNOSIS — I10 BENIGN HYPERTENSION: ICD-10-CM

## 2023-10-26 DIAGNOSIS — R00.2 PALPITATIONS: ICD-10-CM

## 2023-10-26 DIAGNOSIS — I25.2 HISTORY OF NON-ST ELEVATION MYOCARDIAL INFARCTION (NSTEMI): ICD-10-CM

## 2023-10-26 DIAGNOSIS — E78.2 MIXED HYPERLIPIDEMIA: ICD-10-CM

## 2023-10-26 PROBLEM — I21.4 NSTEMI (NON-ST ELEVATED MYOCARDIAL INFARCTION): Status: ACTIVE | Noted: 2023-10-25

## 2023-10-26 PROBLEM — Z91.199 NONCOMPLIANCE: Status: ACTIVE | Noted: 2023-10-26

## 2023-10-26 LAB
BH CV ECHO MEAS - EDV(CUBED): 117.6 ML
BH CV ECHO MEAS - EDV(MOD-SP2): 87 ML
BH CV ECHO MEAS - EDV(MOD-SP4): 73 ML
BH CV ECHO MEAS - EF(MOD-BP): 52.7 %
BH CV ECHO MEAS - EF(MOD-SP2): 55.2 %
BH CV ECHO MEAS - EF(MOD-SP4): 50.7 %
BH CV ECHO MEAS - ESV(CUBED): 46.1 ML
BH CV ECHO MEAS - ESV(MOD-SP2): 39 ML
BH CV ECHO MEAS - ESV(MOD-SP4): 36 ML
BH CV ECHO MEAS - FS: 26.8 %
BH CV ECHO MEAS - IVS/LVPW: 0.82 CM
BH CV ECHO MEAS - IVSD: 0.9 CM
BH CV ECHO MEAS - LV DIASTOLIC VOL/BSA (35-75): 47 CM2
BH CV ECHO MEAS - LV MASS(C)D: 176 GRAMS
BH CV ECHO MEAS - LV SYSTOLIC VOL/BSA (12-30): 23.2 CM2
BH CV ECHO MEAS - LVIDD: 4.9 CM
BH CV ECHO MEAS - LVIDS: 3.6 CM
BH CV ECHO MEAS - LVPWD: 1.1 CM
BH CV ECHO MEAS - SI(MOD-SP2): 30.9 ML/M2
BH CV ECHO MEAS - SI(MOD-SP4): 23.8 ML/M2
BH CV ECHO MEAS - SV(MOD-SP2): 48 ML
BH CV ECHO MEAS - SV(MOD-SP4): 37 ML

## 2023-10-26 PROCEDURE — 93308 TTE F-UP OR LMTD: CPT

## 2023-10-26 PROCEDURE — 25510000001 PERFLUTREN (DEFINITY) 8.476 MG IN SODIUM CHLORIDE (PF) 0.9 % 10 ML INJECTION: Performed by: INTERNAL MEDICINE

## 2023-10-26 RX ORDER — LOSARTAN POTASSIUM 50 MG/1
100 TABLET ORAL DAILY
Start: 2023-10-26

## 2023-10-26 RX ADMIN — PERFLUTREN 1.5 ML: 6.52 INJECTION, SUSPENSION INTRAVENOUS at 14:49

## 2023-10-26 NOTE — PROGRESS NOTES
Subjective:     Encounter Date:12/28/2022      Patient ID: Dafne Mary is a 82 y.o. female.    Chief Complaint:follow up HTN, HLD HFpEF  History of Present Illness  This is a 81 y/o female who follows with Dr. Norman and is known to me. She has a pmhx of moderate to severe COPD, emphysema, granulomatous lung disease, depression/anxiety, hypertension, hypothyroidism and chronic diastolic CHF.     She is here today for a follow up visit. She reports feeling better than she has in a while. She feels her shortness of breath has improved to what is likely her baseline shortness of breath. She denies any chest pain, palpitations, dizziness or syncope. She reports improvement in her swelling. She has only taken the PRN hydrochlorothiazide about 3 times since she saw Dr. Norman a few months ago. She is wanting to try to simplify her medication regimen as she is on several twice daily medications.     Prior History:  In 2021 she began seeing Dr. Hanson for palpitations and abnormal EKG. An echocardiogram showed normal left ventricular systolic function, EF 53%, grade 2 diastolic dysfunction, and hypokinesis of the mid inferior, basal inferoseptal, mid inferoseptal, basal inferior and basal inferoseptal walls, no significant valvular disease. Holter monitor was benign. She had a stress perfusion study in November 2021 that showed no evidence of ischemia.     She was hospitalized in May 2022 for respiratory failure requiring intubation. She was found to have pleural effusions, pulmonary edema, acute COPD exacerbation as well as COVID-19 and rhinovirus. A repeat echocardiogram at that time showed normal left ventricular systolic function, EF 52%, normal diastolic function, aortic valve calcification but no evidence of significant stenosis, mild mitral and tricuspid regurgitation, and mid inferior, basal inferoseptal, mid inferoseptal, basal inferior, and basal inferoseptal hypokinesis.    After discharge, she continued  to have dyspnea. A repeat echocardiogram was performed in July 2022 that showed normal left ventricular systolic function, EF 53%, at least grade 2 diastolic dysfunction, basal inferoseptal and inferior wall hypokinesis, mildly dilated left atrium, mild to moderate mitral valve regurgitation, moderately elevated RVSP of 45 mmHg. She was started on furosemide 20 mg daily.    She was then seen in November in follow up by Dr. Norman. She was having positional lightheadedness which was suspected to be orthostatic hypotension. Her amlodipine was stopped along with furosemide and she did not feel that the furosemide made a difference. She reported back that her blood pressure was increasing and she was instructed to increase atenolol to 50 mg BID. She continued to report blood pressure problems and ultimately ended up on carvedilol 12.5 mg BID.    When I saw her in December 2022, she had just been admitted to [facility] for acute respiratory failure. I did not make any changes at that visit. She followed up with Dr. Norman in February and continued to feel well.     She was recently admitted 5/20 with complaints of shortness of breath. She was found to be in atrial fibrillation which was new onset. She was discharged on atenolol. She returned a few days later for palpitations and worsening shortness of breath. She was then started on digoxin and amiodarone in addition to her atenolol. She was also started on Jardiance for her cardiomyopathy. At discharge she was placed on apixaban. When I saw her in follow up, she reported shortness of breath, palpitations, dizziness, fatigue and shakiness. Her EKG showed afib with rates in the low 100s. We had struggled with rate control in the past. I increased her atenolol and asked her to return in 2 weeks and if still in afib, we would cardiovert. Unfortunately, she ended up presenting to the ED the next day with acute HFpEF and afib with RVR. She was diuresed and ultimately  underwent a LE with cardioversion.     I saw her in June 2023 and she was struggling with her oxygen and shortness of breath again. I obtained lab results that showed she was a little volume overloaded. I started her spironolactone at that time and gave her some IV bumex in [facility].     On July 4, she presented to the ED with hypoxia, shortness of breath and hypertension. She had elevated troponins and underwent coronary angiography which showed 2 lesions, one in the mid LAD and one in the proximal circumflex extending into the proximal OM1. She received drug-eluting stent placement to both. She was started on aspirin and clopidogrel in addition to apixaban. At follow up in office with Dana Andrew, she had just had a fall which resulted in a hematoma on the left arm and left hip. She was quite frustrated with not feeling well. Aspirin was stopped and her antihypertensives were adjusted.    She then saw Dr. Norman in August and was feeling a little better. She was maintaining sinus rhythm and amiodarone was stopped. She was started on HCTZ PRN due to a little volume overload. She was instructed to follow up with me in 2 months with a LTD echo.    I have reviewed and updated as appropriate allergies, current medications, past family history, past medical history, past surgical history and problem list.    Review of Systems   Constitutional: Negative for fever, malaise/fatigue, weight gain and weight loss.   HENT:  Negative for congestion, hoarse voice and sore throat.    Eyes:  Negative for blurred vision and double vision.   Cardiovascular:  Negative for chest pain, dyspnea on exertion, leg swelling, orthopnea, palpitations and syncope.   Respiratory:  Positive for shortness of breath. Negative for cough and wheezing.    Gastrointestinal:  Negative for abdominal pain, hematemesis, hematochezia and melena.   Genitourinary:  Negative for dysuria and hematuria.   Neurological:  Negative for dizziness, headaches,  light-headedness and numbness.   Psychiatric/Behavioral:  Negative for depression. The patient is not nervous/anxious.          Current Outpatient Medications:     acetaminophen (TYLENOL) 325 MG tablet, Take 2 tablets by mouth Every 6 (Six) Hours As Needed for Mild Pain., Disp: , Rfl:     amLODIPine (NORVASC) 5 MG tablet, Take 1 tablet by mouth Daily., Disp: , Rfl:     apixaban (ELIQUIS) 2.5 MG tablet tablet, Take 1 tablet by mouth Every 12 (Twelve) Hours. Indications: Atrial Fibrillation, Disp: 60 tablet, Rfl: 5    [place] Thyroid 15 MG tablet, Take 1 tablet by mouth once daily, Disp: 90 tablet, Rfl: 0    [place] Thyroid 30 MG tablet, Take 1 tablet by mouth once daily, Disp: 90 tablet, Rfl: 0    Artificial Tear Ointment (artificial tears) ophthalmic ointment, Administer 1 application to both eyes Every 1 (One) Hour As Needed (dry eye)., Disp: , Rfl:     atenolol (TENORMIN) 50 MG tablet, Take 1 tablet by mouth Every 12 (Twelve) Hours., Disp: 60 tablet, Rfl: 3    Breztri Aerosphere 160-9-4.8 MCG/ACT aerosol inhaler, 2 puffs 2 (Two) Times a Day., Disp: , Rfl:     Cimetidine (TAGAMET PO), Take  by mouth., Disp: , Rfl:     clopidogrel (PLAVIX) 75 MG tablet, Take 1 tablet by mouth Daily., Disp: 30 tablet, Rfl: 11    empagliflozin (JARDIANCE) 10 MG tablet tablet, Take 1 tablet by mouth Daily., Disp: 30 tablet, Rfl: 3    Glycerin-Polysorbate 80 (REFRESH DRY EYE THERAPY OP), Apply 1 drop to eye(s) as directed by provider Daily. For dry eyes, Disp: , Rfl:     guaiFENesin (MUCINEX) 600 MG 12 hr tablet, Take 1 tablet by mouth 2 (Two) Times a Day As Needed for Cough or Congestion., Disp: , Rfl:     hydroCHLOROthiazide (HYDRODIURIL) 25 MG tablet, Take 1 tablet by mouth Daily., Disp: 30 tablet, Rfl: 5    ipratropium-albuterol (DUO-NEB) 0.5-2.5 mg/3 ml nebulizer, Take 3 mL by nebulization Every 4 (Four) Hours As Needed for Wheezing., Disp: , Rfl:     PARoxetine (PAXIL) 10 MG tablet, Take 2 tablets by mouth Daily., Disp: 180 tablet,  Rfl: 0    Ventolin  (90 Base) MCG/ACT inhaler, Inhale 2 puffs Every 4 (Four) Hours As Needed., Disp: , Rfl:     vitamin B-12 (CYANOCOBALAMIN) 500 MCG tablet, Take 1 tablet by mouth Daily., Disp: , Rfl:     vitamin C (ASCORBIC ACID) 250 MG tablet, Take 2 tablets by mouth Daily., Disp: , Rfl:     vitamin D3 125 MCG (5000 UT) capsule capsule, Take 1 capsule by mouth Daily., Disp: , Rfl:     losartan (COZAAR) 50 MG tablet, Take 2 tablets by mouth Daily., Disp: , Rfl:   No current facility-administered medications for this visit.    Past Medical History:   Diagnosis Date    Atrial fibrillation with RVR 6/4/2023    CAD (coronary artery disease) 7/10/2023    Chronic diastolic congestive heart failure 11/17/2022    Depression     Emphysema lung     GERD (gastroesophageal reflux disease)     Heart failure     Hyperlipidemia     Hypertension     Hypothyroidism     Mitral valve regurgitation 7/10/2023    NSTEMI (non-ST elevated myocardial infarction) 10/25/2023       Past Surgical History:   Procedure Laterality Date    CARDIAC CATHETERIZATION N/A 7/7/2023    Procedure: Right and Left Heart Cath;  Surgeon: Ra Cole MD;  Location: [facility] CATH INVASIVE LOCATION;  Service: Cardiology;  Laterality: N/A;    CARDIAC CATHETERIZATION N/A 7/7/2023    Procedure: Percutaneous Coronary Intervention;  Surgeon: Ra Cole MD;  Location: [facility] CATH INVASIVE LOCATION;  Service: Cardiology;  Laterality: N/A;    CARDIAC CATHETERIZATION N/A 7/7/2023    Procedure: Stent ANDRZEJ coronary;  Surgeon: Ra Cole MD;  Location: [facility] CATH INVASIVE LOCATION;  Service: Cardiology;  Laterality: N/A;    CARDIAC CATHETERIZATION N/A 7/7/2023    Procedure: Coronary angiography;  Surgeon: Ra Cole MD;  Location: [facility] CATH INVASIVE LOCATION;  Service: Cardiology;  Laterality: N/A;    CARDIAC CATHETERIZATION N/A 7/7/2023    Procedure: Left ventriculography;  Surgeon: Ra Cole MD;   "Location: [facility] INVASIVE LOCATION;  Service: Cardiology;  Laterality: N/A;    EYE SURGERY Left     INTUBATION  5/21/2023         PARATHYROIDECTOMY      Patient reports thyroidectomy partial not a para thyroidectomy.    THYROIDECTOMY, PARTIAL      1984       Family History   Problem Relation Age of Onset    Alzheimer's disease Mother     Lung disease Father     Alcohol abuse Father     Heart disease Brother     Hypertension Brother     Lung disease Brother     Alcohol abuse Brother     Cancer Brother         LUNG  WAS A SMOKER    Thyroid disease Maternal Grandmother        Social History     Tobacco Use    Smoking status: Never     Passive exposure: Never    Smokeless tobacco: Never   Vaping Use    Vaping Use: Never used   Substance Use Topics    Alcohol use: Yes     Comment: rare    Drug use: No         ECG 12 Lead    Date/Time: 10/26/2023 3:46 PM  Performed by: Shelbie Good APRN    Authorized by: Shelbie Good APRN  Comparison: compared with previous ECG from 8/22/2023  Similar to previous ECG  Rhythm: sinus rhythm  Ectopy: unifocal PVCs  Conduction: right bundle branch block and left anterior fascicular block             Objective:     Visit Vitals  /70   Pulse 76   Ht 152.4 cm (60\")   Wt 59 kg (130 lb)   SpO2 95%   BMI 25.39 kg/m²                 Physical Exam  Constitutional:       Appearance: Normal appearance. She is normal weight.   HENT:      Head: Normocephalic.   Neck:      Vascular: No carotid bruit.   Cardiovascular:      Rate and Rhythm: Normal rate and regular rhythm.      Chest Wall: PMI is not displaced.      Pulses: Normal pulses.           Radial pulses are 2+ on the right side and 2+ on the left side.        Posterior tibial pulses are 2+ on the right side and 2+ on the left side.      Heart sounds: Normal heart sounds. No murmur heard.     No friction rub. No gallop.   Pulmonary:      Effort: Pulmonary effort is normal.      Breath sounds: Normal breath sounds.   Abdominal:      " General: Bowel sounds are normal. There is no distension.      Palpations: Abdomen is soft.   Musculoskeletal:      Right lower leg: No edema.      Left lower leg: No edema.   Skin:     General: Skin is warm and dry.      Capillary Refill: Capillary refill takes less than 2 seconds.   Neurological:      Mental Status: She is alert and oriented to person, place, and time.   Psychiatric:         Mood and Affect: Mood normal.         Behavior: Behavior normal.         Thought Content: Thought content normal.          Lab Review:   Lipid Panel          3/7/2023    10:54   Lipid Panel   Total Cholesterol 166    Triglycerides 99    HDL Cholesterol 69    VLDL Cholesterol 18    LDL Cholesterol  79    LDL/HDL Ratio 1.12          Cardiac Procedures:   Echocardiogram (performed at [facility]) 12/20/22:         Left ventricle size is normal, normal LV wall thickeness, EF 50-55%, normal right ventricle structure and function, mild tricuspid regurgitation, RVSP 45 mmHg.    2.   Echocardiogram 7/25/22:  Calculated left ventricular EF = 52.6% Estimated left ventricular EF = 53% Estimated left ventricular EF was in agreement with the calculated left ventricular EF. Left ventricular systolic function is normal. Normal left ventricular cavity size and wall thickness noted. There are wall motion abnormalities as noted below Left ventricular diastolic function was indeterminate.  The following segments are hypokinetic: basal inferoseptal and basal inferior. All other segments are normal.  Left atrial volume is mildly increased.  No aortic valve regurgitation or stenosis is present. The aortic valve is abnormal in structure. There is severe calcification of the aortic valve  Severe mitral annular calcification is present. There is moderate, bileaflet mitral valve thickening present. Mild to moderate mitral valve regurgitation is present. No significant mitral valve stenosis is present.  Tricuspid valve not well visualized. Trace tricuspid  valve regurgitation is present. Estimated right ventricular systolic pressure from tricuspid regurgitation is moderately elevated (45-55 mmHg). Calculated right ventricular systolic pressure from tricuspid regurgitation is 45 mmHg.  There is suggestion of atrial shunting by color-flow imaging subcostal views. Saline injection was not performed. We will contact the patient to return to address further.     3.  Echocardiogram 5/4/22:  Calculated left ventricular EF = 51.8% Estimated left ventricular EF was in agreement with the calculated left ventricular EF. Left ventricular systolic function is normal.  Left ventricular diastolic function was normal.  Left atrial volume is mildly increased.  There is calcification of the aortic valve.  Aortic valve area is 1.85 cm2.  Aortic valve maximum pressure gradient is 15 mmHg. Aortic valve mean pressure gradient is 7.9 mmHg.  Mild mitral valve regurgitation is present  Mild tricuspid valve regurgitation is present  Estimated right ventricular systolic pressure from tricuspid regurgitation is normal (<35 mmHg).  The following left ventricular wall segments are hypokinetic: mid inferior, basal inferoseptal, mid inferoseptal, basal inferior and basal inferoseptal.     4.  Nuclear stress test 11/5/21:  Left ventricular ejection fraction is normal. (Calculated EF = 58%).  Myocardial perfusion imaging indicates a normal myocardial perfusion study with no evidence of ischemia.  Impressions are consistent with a low risk study.       Assessment:         Diagnoses and all orders for this visit:    1. Arteriosclerosis of both carotid arteries (Primary)    2. Benign hypertension    3. Coronary artery disease involving native coronary artery of native heart without angina pectoris    4. Chronic HFrEF (heart failure with reduced ejection fraction)    5. History of non-ST elevation myocardial infarction (NSTEMI)    6. Nonrheumatic mitral valve regurgitation    7. Mixed hyperlipidemia    8.  PAF (paroxysmal atrial fibrillation)    9. Status post angioplasty with stent    10. Palpitations    Other orders  -     losartan (COZAAR) 50 MG tablet; Take 2 tablets by mouth Daily.  -     ECG 12 Lead            Plan:       Shortness of breath: she reports being back at what is likely her baseline with COPD. She does not appear volume overloaded and is not having to take PRN HCTZ very often.   PAF: s/p cardioversion on 6/19. She remains in sinus rhythm on EKG today. Will continue with atenolol and apixaban.   HTN: BP is stable. I am going to change her losartan to 100mg daily from 50 BID and her amlodipine to 5 mg in the evening from 2.5 mg BID. Will monitor at home.  Chronic diastolic CHF: appears euvolemic on exam and no complaints of symptoms suggesting volume overload  HLD   COPD: follows with Dr. Nolan    Thank you for allowing me to participate in this patient's care. Please call with any questions or concerns. Ms. Mary will follow up with Dr. Norman or myself in about 3 months.          Your medication list            Accurate as of October 26, 2023  3:48 PM. If you have any questions, ask your nurse or doctor.                CHANGE how you take these medications        Instructions Last Dose Given Next Dose Due   losartan 50 MG tablet  Commonly known as: COZAAR  What changed:   how much to take  when to take this  Changed by: ANTONELLA Novak      Take 2 tablets by mouth Daily.              CONTINUE taking these medications        Instructions Last Dose Given Next Dose Due   acetaminophen 325 MG tablet  Commonly known as: TYLENOL      Take 2 tablets by mouth Every 6 (Six) Hours As Needed for Mild Pain.       amLODIPine 5 MG tablet  Commonly known as: NORVASC      Take 1 tablet by mouth Daily.       [place] Thyroid 15 MG tablet  Generic drug: thyroid      Take 1 tablet by mouth once daily       [place] Thyroid 30 MG tablet  Generic drug: Thyroid      Take 1 tablet by mouth once daily       artificial  tears ophthalmic ointment      Administer 1 application to both eyes Every 1 (One) Hour As Needed (dry eye).       atenolol 50 MG tablet  Commonly known as: TENORMIN      Take 1 tablet by mouth Every 12 (Twelve) Hours.       Breztri Aerosphere 160-9-4.8 MCG/ACT aerosol inhaler  Generic drug: Budeson-Glycopyrrol-Formoterol      2 puffs 2 (Two) Times a Day.       clopidogrel 75 MG tablet  Commonly known as: PLAVIX      Take 1 tablet by mouth Daily.       Eliquis 2.5 MG tablet tablet  Generic drug: apixaban      Take 1 tablet by mouth Every 12 (Twelve) Hours. Indications: Atrial Fibrillation       empagliflozin 10 MG tablet tablet  Commonly known as: JARDIANCE      Take 1 tablet by mouth Daily.       guaiFENesin 600 MG 12 hr tablet  Commonly known as: MUCINEX      Take 1 tablet by mouth 2 (Two) Times a Day As Needed for Cough or Congestion.       hydroCHLOROthiazide 25 MG tablet  Commonly known as: HYDRODIURIL      Take 1 tablet by mouth Daily.       ipratropium-albuterol 0.5-2.5 mg/3 ml nebulizer  Commonly known as: DUO-NEB      Take 3 mL by nebulization Every 4 (Four) Hours As Needed for Wheezing.       PARoxetine 10 MG tablet  Commonly known as: PAXIL      Take 2 tablets by mouth Daily.       REFRESH DRY EYE THERAPY OP      Apply 1 drop to eye(s) as directed by provider Daily. For dry eyes       TAGAMET PO      Take  by mouth.       Ventolin  (90 Base) MCG/ACT inhaler  Generic drug: albuterol sulfate HFA      Inhale 2 puffs Every 4 (Four) Hours As Needed.       vitamin B-12 500 MCG tablet  Commonly known as: CYANOCOBALAMIN      Take 1 tablet by mouth Daily.       vitamin C 250 MG tablet  Commonly known as: ASCORBIC ACID      Take 2 tablets by mouth Daily.       vitamin D3 125 MCG (5000 UT) capsule capsule      Take 1 capsule by mouth Daily.              STOP taking these medications      amiodarone 200 MG tablet  Commonly known as: PACERONE  Stopped by: ANTONELLA Novak                  Where to Get Your  Medications        Information about where to get these medications is not yet available    Ask your nurse or doctor about these medications  losartan 50 MG tablet           Shelbie Good, APRN  10/26/23  8:13 AM EST

## 2023-11-02 RX ORDER — EMPAGLIFLOZIN 10 MG/1
10 TABLET, FILM COATED ORAL DAILY
Qty: 30 TABLET | Refills: 0 | Status: SHIPPED | OUTPATIENT
Start: 2023-11-02

## 2023-11-03 DIAGNOSIS — I10 ESSENTIAL HYPERTENSION: ICD-10-CM

## 2023-11-07 RX ORDER — AMLODIPINE BESYLATE 5 MG/1
5 TABLET ORAL DAILY
Qty: 90 TABLET | Refills: 1 | Status: SHIPPED | OUTPATIENT
Start: 2023-11-07

## 2023-11-20 RX ORDER — ATENOLOL 50 MG/1
50 TABLET ORAL EVERY 12 HOURS
Qty: 60 TABLET | Refills: 0 | Status: SHIPPED | OUTPATIENT
Start: 2023-11-20

## 2023-12-08 RX ORDER — EMPAGLIFLOZIN 10 MG/1
10 TABLET, FILM COATED ORAL DAILY
Qty: 30 TABLET | Refills: 0 | Status: SHIPPED | OUTPATIENT
Start: 2023-12-08

## 2023-12-20 RX ORDER — ATENOLOL 50 MG/1
50 TABLET ORAL EVERY 12 HOURS
Qty: 60 TABLET | Refills: 0 | Status: SHIPPED | OUTPATIENT
Start: 2023-12-20

## 2024-01-04 RX ORDER — EMPAGLIFLOZIN 10 MG/1
10 TABLET, FILM COATED ORAL DAILY
Qty: 30 TABLET | Refills: 0 | Status: SHIPPED | OUTPATIENT
Start: 2024-01-04

## 2024-01-05 DIAGNOSIS — I10 ESSENTIAL HYPERTENSION: ICD-10-CM

## 2024-01-05 RX ORDER — CLOPIDOGREL BISULFATE 75 MG/1
75 TABLET ORAL DAILY
Qty: 90 TABLET | Refills: 3 | Status: SHIPPED | OUTPATIENT
Start: 2024-01-05

## 2024-01-05 RX ORDER — AMLODIPINE BESYLATE 5 MG/1
5 TABLET ORAL DAILY
Qty: 90 TABLET | Refills: 1 | Status: SHIPPED | OUTPATIENT
Start: 2024-01-05

## 2024-01-05 RX ORDER — LOSARTAN POTASSIUM 100 MG/1
100 TABLET ORAL DAILY
Qty: 90 TABLET | Refills: 1
Start: 2024-01-05

## 2024-01-09 RX ORDER — ATENOLOL 50 MG/1
50 TABLET ORAL EVERY 12 HOURS
Qty: 180 TABLET | Refills: 1 | Status: SHIPPED | OUTPATIENT
Start: 2024-01-09

## 2024-02-02 ENCOUNTER — TELEPHONE (OUTPATIENT)
Age: 84
End: 2024-02-02
Payer: MEDICARE

## 2024-02-02 RX ORDER — LOSARTAN POTASSIUM 50 MG/1
50 TABLET ORAL DAILY
Qty: 90 TABLET | Refills: 1 | Status: SHIPPED | OUTPATIENT
Start: 2024-02-02

## 2024-02-02 RX ORDER — ATORVASTATIN CALCIUM 40 MG/1
40 TABLET, FILM COATED ORAL DAILY
Qty: 90 TABLET | Refills: 1 | Status: SHIPPED | OUTPATIENT
Start: 2024-02-02

## 2024-02-02 NOTE — TELEPHONE ENCOUNTER
Just received VM from patient that she will no longer be able to come due to insurance [employer] HMO we do not accept.

## 2024-02-02 NOTE — TELEPHONE ENCOUNTER
Patient requesting that Losartan 50 MG an Atorvastatin 40 MG to be sent to refill at [facility]

## 2024-02-06 NOTE — PATIENT INSTRUCTIONS
Poison Ivy Dermatitis  Poison ivy dermatitis is redness and soreness of the skin caused by chemicals in the leaves of the poison ivy plant. You may have very bad itching, swelling, a rash, and blisters.  What are the causes?  · Touching a poison ivy plant.  · Touching something that has the chemical on it. This may include animals or objects that have come in contact with the plant.  What increases the risk?  · Going outdoors often in wooded or marshy areas.  · Going outdoors without wearing protective clothing, such as closed shoes, long pants, and a long-sleeved shirt.  What are the signs or symptoms?    · Skin redness.  · Very bad itching.  · A rash that often includes bumps and blisters.  ? The rash usually appears 48 hours after exposure, if you have been exposed before.  ? If this is the first time you have been exposed, the rash may not appear until a week after exposure.  · Swelling. This may occur if the reaction is very bad.  Symptoms usually last for 1-2 weeks. The first time you develop this condition, symptoms may last 3-4 weeks.  How is this treated?  This condition may be treated with:  · Hydrocortisone cream or calamine lotion to relieve itching.  · Oatmeal baths to soothe the skin.  · Medicines, such as over-the-counter antihistamine tablets.  · Oral steroid medicine for more severe reactions.  Follow these instructions at home:  Medicines  · Take or apply over-the-counter and prescription medicines only as told by your doctor.  · Use hydrocortisone cream or calamine lotion as needed to help with itching.  General instructions  · Do not scratch or rub your skin.  · Put a cold, wet cloth (cold compress) on the affected areas or take baths in cool water. This will help with itching.  · Avoid hot baths and showers.  · Take oatmeal baths as needed. Use colloidal oatmeal. You can get this at a pharmacy or grocery store. Follow the instructions on the package.  · While you have the rash, wash your clothes  right after you wear them.  · Keep all follow-up visits as told by your health care provider. This is important.  How is this prevented?    · Know what poison ivy looks like, so you can avoid it.  ? This plant has three leaves with flowering branches on a single stem.  ? The leaves are glossy.  ? The leaves have uneven edges that come to a point at the front.  · If you touch poison ivy, wash your skin with soap and water right away. Be sure to wash under your fingernails.  · When hiking or camping, wear long pants, a long-sleeved shirt, tall socks, and hiking boots. You can also use a lotion on your skin that helps to prevent contact with poison ivy.  · If you think that your clothes or outdoor gear came in contact with poison ivy, rinse them off with a garden hose before you bring them inside your house.  · When doing yard work or gardening, wear gloves, long sleeves, long pants, and boots. Wash your garden tools and gloves if they come in contact with poison ivy.  · If you think that your pet has come into contact with poison ivy, wash him or her with pet shampoo and water. Make sure to wear gloves while washing your pet.  Contact a doctor if:  · You have open sores in the rash area.  · You have more redness, swelling, or pain in the rash area.  · You have redness that spreads beyond the rash area.  · You have fluid, blood, or pus coming from the rash area.  · You have a fever.  · You have a rash over a large area of your body.  · You have a rash on your eyes, mouth, or genitals.  · Your rash does not get better after a few weeks.  Get help right away if:  · Your face swells or your eyes swell shut.  · You have trouble breathing.  · You have trouble swallowing.  These symptoms may be an emergency. Do not wait to see if the symptoms will go away. Get medical help right away. Call your local emergency services (911 in the [place] Do not drive yourself to the hospital.  Summary  · Poison ivy dermatitis is redness and  soreness of the skin caused by chemicals in the leaves of the poison ivy plant.  · You may have skin redness, very bad itching, swelling, and a rash.  · Do not scratch or rub your skin.  · Take or apply over-the-counter and prescription medicines only as told by your doctor.  This information is not intended to replace advice given to you by your health care provider. Make sure you discuss any questions you have with your health care provider.  Document Released: 01/20/2012 Document Revised: 12/13/2019 Document Reviewed: 12/13/2019  [employer] Patient Education © 2020 [employer]     Return to your regular way of eating.  Complete vaginal rest, no tampons, no douching, no tub bathing, no sexual activities for 2 weeks unless otherwise instructed by your doctor.  Call your doctor with any signs and symptoms of infection such as fever, chills, nausea or vomiting.  Call your doctor if you're unable to tolerate food or have difficulty urinating.  Call your doctor if you have pain that is not relieved by Tylenol/Motrin. Notify your doctor with any other concerns.  Follow up with Dr. Vides in 6 weeks.

## 2024-05-08 NOTE — PROGRESS NOTES
[facility] Follow Up    Chief Complaint: Follow up CHF    Interval History: No chest pain or shortness of breath.  She does report feeling a little lightheaded and weak when she is walking back from the bathroom yesterday.    Objective:     Objective:  Temp:  [97.7 °F (36.5 °C)-98 °F (36.7 °C)] 97.7 °F (36.5 °C)  Heart Rate:  [100-125] 100  Resp:  [16-20] 16  BP: ()/() 95/79     Intake/Output Summary (Last 24 hours) at 6/8/2023 0749  Last data filed at 6/8/2023 0344  Gross per 24 hour   Intake --   Output 1050 ml   Net -1050 ml     Body mass index is 29.1 kg/m².      06/03/23  2310 06/04/23  0536 06/05/23  1521   Weight: 60.3 kg (133 lb) 68 kg (149 lb 14.6 oz) 67.6 kg (149 lb)     Weight change:       Physical Exam:   General : Alert, cooperative, in no acute distress.  Neuro: Alert,cooperative and oriented.  Lungs: CTAB. Normal respiratory effort and rate.  CV: Irregularly, irregular, normal S1 and S2, no murmurs, gallops or rubs.  ABD: Soft, nontender, nondistended. Positive bowel sounds.  Extr: No edema or cyanosis, moves all extremities.    Lab Review:   Results from last 7 days   Lab Units 06/08/23  0520 06/07/23  0517 06/06/23  0504 06/04/23  0026   SODIUM mmol/L 135* 138   < > 133*   POTASSIUM mmol/L 3.8 4.2   < > 4.5   CHLORIDE mmol/L 97* 96*   < > 99   CO2 mmol/L 29.0 30.0*   < > 22.1   BUN mg/dL 31* 36*   < > 32*   CREATININE mg/dL 1.23* 1.25*   < > 0.97   GLUCOSE mg/dL 94 100*   < > 161*   CALCIUM mg/dL 8.4* 8.4*   < > 8.8   AST (SGOT) U/L  --   --   --  27   ALT (SGPT) U/L  --   --   --  78*    < > = values in this interval not displayed.     Results from last 7 days   Lab Units 06/04/23  0310 06/04/23  0026   HSTROP T ng/L 31* 27*     Results from last 7 days   Lab Units 06/08/23  0520 06/07/23  0517   WBC 10*3/mm3 9.98 10.26   HEMOGLOBIN g/dL 14.0 13.9   HEMATOCRIT % 42.2 41.9   PLATELETS 10*3/mm3 200 228         Results from last 7 days   Lab Units 06/04/23  0026  Detail Level: Detailed   MAGNESIUM mg/dL 2.2           Invalid input(s): LDLCALC  Results from last 7 days   Lab Units 06/04/23  0026   PROBNP pg/mL 7,773.0*     Results from last 7 days   Lab Units 06/04/23  0026   TSH uIU/mL 0.710     I reviewed the patient's new clinical results.  I personally viewed and interpreted the patient's EKG  Current Medications:   Scheduled Meds:amiodarone, 200 mg, Oral, Q12H  apixaban, 5 mg, Oral, Q12H  atenolol, 50 mg, Oral, Q12H  atorvastatin, 20 mg, Oral, Nightly  empagliflozin, 10 mg, Oral, Daily  furosemide, 20 mg, Oral, Daily  guaiFENesin, 600 mg, Oral, Q12H  losartan, 12.5 mg, Oral, Daily  pantoprazole, 40 mg, Oral, Q AM  PARoxetine, 20 mg, Oral, Daily  sodium chloride, 500 mL, Intravenous, Once  sodium chloride, 10 mL, Intravenous, Q12H  thyroid, 15 mg, Oral, Daily  Thyroid, 30 mg, Oral, Daily      Continuous Infusions:     Allergies:  Allergies   Allergen Reactions    Lansoprazole Nausea Only     NAUSEA  NAUSEA  NAUSEA    Diphenhydramine Hcl (Sleep) Irritability    Lisinopril Cough       Assessment/Plan:     1.  Acute systolic congestive heart failure.  Improved.  She is now on oral furosemide.      2.  Cardiomyopathy.  Although there is some concern for wall motion abnormalities I suspect her cardiomyopathy is tachycardia mediated due to atrial fibrillation with rapid rates.  She is on atenolol.  Jardiance started this admission.  Remains on losartan although had to decrease dosage.  Unfortunately not a lot of blood pressure room to push guideline directed management further.  2.  Atrial fibrillation.  New diagnosis last admission.  Initially thought this may be related to respiratory issues at the time.  However this is now persistent and she is having issues with rate control.   Started on apixaban this admission.  Diltiazem infusion discontinued.  Started on oral amiodarone yesterday with some improvement in heart rates.  3.  Acute hypoxic respiratory failure.  Resolved.  Now on room air.  4.   Hypertension.  Blood pressures remain low normal this morning despite decreasing furosemide and losartan dosage.  5.  Asthma and COPD  6.  Pulmonary hypertension  7.  Acute kidney injury.  No much improvement despite decreasing losartan and furosemide dosage.  Likely due to overdiuresis and possibly some low blood pressures.    -Discontinue furosemide and losartan in light of relatively low blood pressures stated with possible symptoms.  - Continue amiodarone and atenolol at current dose.  I appears her heart rates are little bit better with the addition of the amiodarone.  - We will see how she does with the discontinuation of the furosemide and losartan.  If her blood pressures improve and she does not have any further symptoms of weakness or lightheadedness with activity I think she can be discharged.  - We will arrange for office follow-up in 1 to 2 weeks.    Nae Norman MD  06/08/23  07:49 EDT

## 2024-05-30 DIAGNOSIS — I10 ESSENTIAL HYPERTENSION: ICD-10-CM

## 2024-05-30 RX ORDER — AMLODIPINE BESYLATE 5 MG/1
5 TABLET ORAL DAILY
Qty: 90 TABLET | Refills: 1 | Status: SHIPPED | OUTPATIENT
Start: 2024-05-30

## 2024-05-30 RX ORDER — ATENOLOL 50 MG/1
50 TABLET ORAL EVERY 12 HOURS
Qty: 180 TABLET | Refills: 0 | Status: SHIPPED | OUTPATIENT
Start: 2024-05-30

## 2024-05-30 NOTE — TELEPHONE ENCOUNTER
I will refill her medication for 6 months.  Can you see if she can reschedule an appointment with us now that we except Roseanne again?

## 2024-08-08 RX ORDER — ATENOLOL 50 MG/1
50 TABLET ORAL EVERY 12 HOURS
Qty: 180 TABLET | Refills: 0 | Status: SHIPPED | OUTPATIENT
Start: 2024-08-08

## 2024-09-23 ENCOUNTER — TELEPHONE (OUTPATIENT)
Dept: CARDIOLOGY | Facility: CLINIC | Age: 84
End: 2024-09-23
Payer: MEDICARE

## 2024-10-20 DIAGNOSIS — I10 ESSENTIAL HYPERTENSION: ICD-10-CM

## 2024-10-21 RX ORDER — AMLODIPINE BESYLATE 5 MG/1
5 TABLET ORAL DAILY
Qty: 90 TABLET | Refills: 3 | OUTPATIENT
Start: 2024-10-21

## 2024-10-21 RX ORDER — ATENOLOL 50 MG/1
50 TABLET ORAL EVERY 12 HOURS
Qty: 180 TABLET | Refills: 3 | OUTPATIENT
Start: 2024-10-21

## 2024-10-21 NOTE — TELEPHONE ENCOUNTER
Per prior telephone notes she does not plan on following up in our office any longer.  She will need to have these prescriptions transferred to her new cardiologist or her PCP.

## 2024-12-27 ENCOUNTER — TELEPHONE (OUTPATIENT)
Dept: CARDIOLOGY | Facility: CLINIC | Age: 84
End: 2024-12-27
Payer: MEDICARE

## 2024-12-27 NOTE — TELEPHONE ENCOUNTER
Please reach out to pt as pt will need an appointment before clearance can be sent. She has not been seen in over a year.

## 2024-12-27 NOTE — TELEPHONE ENCOUNTER
Yesica from [facility] reached out today lmm re: needing surgical clearance for pt to have Lumbar epidural, she would like to have pt hold Eliquis 3 days prior to injection. She requested return call in case clearance was not received.     MA reached out to Yesica [phone] LM re: needing forms faxed over and fax number provided on .

## 2025-01-16 RX ORDER — ATENOLOL 50 MG/1
50 TABLET ORAL EVERY 12 HOURS
Qty: 60 TABLET | Refills: 0 | OUTPATIENT
Start: 2025-01-16

## 2025-01-16 NOTE — TELEPHONE ENCOUNTER
Protocol Failed.     NOV No future appointment is scheduled with a provider.     LOV 10/26/2023 (AJL)

## 2025-01-17 ENCOUNTER — HOSPITAL ENCOUNTER (INPATIENT)
Facility: HOSPITAL | Age: 85
LOS: 5 days | Discharge: HOME HEALTH | End: 2025-01-23
Attending: EMERGENCY MEDICINE | Admitting: STUDENT IN AN ORGANIZED HEALTH CARE EDUCATION/TRAINING PROGRAM
Payer: MEDICARE

## 2025-01-17 ENCOUNTER — APPOINTMENT (OUTPATIENT)
Dept: GENERAL RADIOLOGY | Facility: HOSPITAL | Age: 85
End: 2025-01-17
Payer: MEDICARE

## 2025-01-17 DIAGNOSIS — F41.1 GENERALIZED ANXIETY DISORDER: ICD-10-CM

## 2025-01-17 DIAGNOSIS — R79.89 ELEVATED TROPONIN: ICD-10-CM

## 2025-01-17 DIAGNOSIS — I10 ESSENTIAL HYPERTENSION: ICD-10-CM

## 2025-01-17 DIAGNOSIS — F41.9 ANXIETY: ICD-10-CM

## 2025-01-17 DIAGNOSIS — I21.4 NSTEMI (NON-ST ELEVATED MYOCARDIAL INFARCTION): ICD-10-CM

## 2025-01-17 DIAGNOSIS — J81.0 FLASH PULMONARY EDEMA: Primary | ICD-10-CM

## 2025-01-17 LAB
ALBUMIN SERPL-MCNC: 4.1 G/DL (ref 3.5–5.2)
ALBUMIN/GLOB SERPL: 1.1 G/DL
ALP SERPL-CCNC: 140 U/L (ref 39–117)
ALT SERPL W P-5'-P-CCNC: 16 U/L (ref 1–33)
ANION GAP SERPL CALCULATED.3IONS-SCNC: 17.1 MMOL/L (ref 5–15)
AST SERPL-CCNC: 32 U/L (ref 1–32)
BASOPHILS # BLD AUTO: 0.04 10*3/MM3 (ref 0–0.2)
BASOPHILS NFR BLD AUTO: 0.5 % (ref 0–1.5)
BILIRUB SERPL-MCNC: 0.5 MG/DL (ref 0–1.2)
BUN SERPL-MCNC: 12 MG/DL (ref 8–23)
BUN/CREAT SERPL: 11.7 (ref 7–25)
CALCIUM SPEC-SCNC: 9.3 MG/DL (ref 8.6–10.5)
CHLORIDE SERPL-SCNC: 102 MMOL/L (ref 98–107)
CO2 SERPL-SCNC: 20.9 MMOL/L (ref 22–29)
CREAT SERPL-MCNC: 1.03 MG/DL (ref 0.57–1)
DEPRECATED RDW RBC AUTO: 38.6 FL (ref 37–54)
EGFRCR SERPLBLD CKD-EPI 2021: 53.7 ML/MIN/1.73
EOSINOPHIL # BLD AUTO: 0.37 10*3/MM3 (ref 0–0.4)
EOSINOPHIL NFR BLD AUTO: 4.2 % (ref 0.3–6.2)
ERYTHROCYTE [DISTWIDTH] IN BLOOD BY AUTOMATED COUNT: 11.7 % (ref 12.3–15.4)
GEN 5 1HR TROPONIN T REFLEX: 146 NG/L
GLOBULIN UR ELPH-MCNC: 3.6 GM/DL
GLUCOSE SERPL-MCNC: 114 MG/DL (ref 65–99)
HCT VFR BLD AUTO: 44.7 % (ref 34–46.6)
HGB BLD-MCNC: 15.1 G/DL (ref 12–15.9)
IMM GRANULOCYTES # BLD AUTO: 0.08 10*3/MM3 (ref 0–0.05)
IMM GRANULOCYTES NFR BLD AUTO: 0.9 % (ref 0–0.5)
LYMPHOCYTES # BLD AUTO: 0.6 10*3/MM3 (ref 0.7–3.1)
LYMPHOCYTES NFR BLD AUTO: 6.8 % (ref 19.6–45.3)
MCH RBC QN AUTO: 30.4 PG (ref 26.6–33)
MCHC RBC AUTO-ENTMCNC: 33.8 G/DL (ref 31.5–35.7)
MCV RBC AUTO: 90.1 FL (ref 79–97)
MONOCYTES # BLD AUTO: 0.64 10*3/MM3 (ref 0.1–0.9)
MONOCYTES NFR BLD AUTO: 7.2 % (ref 5–12)
NEUTROPHILS NFR BLD AUTO: 7.15 10*3/MM3 (ref 1.7–7)
NEUTROPHILS NFR BLD AUTO: 80.4 % (ref 42.7–76)
NRBC BLD AUTO-RTO: 0 /100 WBC (ref 0–0.2)
NT-PROBNP SERPL-MCNC: 1527 PG/ML (ref 0–1800)
PLATELET # BLD AUTO: 355 10*3/MM3 (ref 140–450)
PMV BLD AUTO: 8.9 FL (ref 6–12)
POTASSIUM SERPL-SCNC: 3.8 MMOL/L (ref 3.5–5.2)
PROT SERPL-MCNC: 7.7 G/DL (ref 6–8.5)
RBC # BLD AUTO: 4.96 10*6/MM3 (ref 3.77–5.28)
SODIUM SERPL-SCNC: 140 MMOL/L (ref 136–145)
TROPONIN T % DELTA: 83
TROPONIN T NUMERIC DELTA: 66 NG/L
TROPONIN T SERPL HS-MCNC: 80 NG/L
WBC NRBC COR # BLD AUTO: 8.88 10*3/MM3 (ref 3.4–10.8)

## 2025-01-17 PROCEDURE — 84484 ASSAY OF TROPONIN QUANT: CPT | Performed by: EMERGENCY MEDICINE

## 2025-01-17 PROCEDURE — 80053 COMPREHEN METABOLIC PANEL: CPT | Performed by: EMERGENCY MEDICINE

## 2025-01-17 PROCEDURE — 99285 EMERGENCY DEPT VISIT HI MDM: CPT

## 2025-01-17 PROCEDURE — 36415 COLL VENOUS BLD VENIPUNCTURE: CPT

## 2025-01-17 PROCEDURE — 93010 ELECTROCARDIOGRAM REPORT: CPT | Performed by: STUDENT IN AN ORGANIZED HEALTH CARE EDUCATION/TRAINING PROGRAM

## 2025-01-17 PROCEDURE — 83880 ASSAY OF NATRIURETIC PEPTIDE: CPT | Performed by: EMERGENCY MEDICINE

## 2025-01-17 PROCEDURE — 93005 ELECTROCARDIOGRAM TRACING: CPT | Performed by: EMERGENCY MEDICINE

## 2025-01-17 PROCEDURE — 71045 X-RAY EXAM CHEST 1 VIEW: CPT

## 2025-01-17 PROCEDURE — 85025 COMPLETE CBC W/AUTO DIFF WBC: CPT | Performed by: EMERGENCY MEDICINE

## 2025-01-17 RX ORDER — ASPIRIN 81 MG/1
324 TABLET, CHEWABLE ORAL ONCE
Status: COMPLETED | OUTPATIENT
Start: 2025-01-17 | End: 2025-01-17

## 2025-01-17 RX ORDER — ACETAMINOPHEN 500 MG
1000 TABLET ORAL ONCE
Status: COMPLETED | OUTPATIENT
Start: 2025-01-17 | End: 2025-01-17

## 2025-01-17 RX ORDER — SODIUM CHLORIDE 0.9 % (FLUSH) 0.9 %
10 SYRINGE (ML) INJECTION AS NEEDED
Status: DISCONTINUED | OUTPATIENT
Start: 2025-01-17 | End: 2025-01-23 | Stop reason: HOSPADM

## 2025-01-17 RX ADMIN — ASPIRIN 324 MG: 81 TABLET, CHEWABLE ORAL at 23:23

## 2025-01-17 RX ADMIN — ACETAMINOPHEN 1000 MG: 500 TABLET, FILM COATED ORAL at 23:19

## 2025-01-17 NOTE — Clinical Note
A 5 fr sheath was successfully inserted using micropuncture technique with ultrasound guidance into the right femoral artery. Sheath insertion not delayed. Angio RFA

## 2025-01-18 ENCOUNTER — APPOINTMENT (OUTPATIENT)
Dept: CARDIOLOGY | Facility: HOSPITAL | Age: 85
End: 2025-01-18
Payer: MEDICARE

## 2025-01-18 PROBLEM — N18.31 STAGE 3A CHRONIC KIDNEY DISEASE: Status: ACTIVE | Noted: 2025-01-18

## 2025-01-18 PROBLEM — R79.89 ELEVATED TROPONIN: Status: ACTIVE | Noted: 2025-01-18

## 2025-01-18 LAB
ANION GAP SERPL CALCULATED.3IONS-SCNC: 11 MMOL/L (ref 5–15)
AORTIC DIMENSIONLESS INDEX: 0.61 (DI)
APTT PPP: 135.6 SECONDS (ref 22.7–35.4)
APTT PPP: 32.1 SECONDS (ref 22.7–35.4)
ARTERIAL PATENCY WRIST A: POSITIVE
ASCENDING AORTA: 3 CM
ATMOSPHERIC PRESS: 743.1 MMHG
AV MEAN PRESS GRAD SYS DOP V1V2: 5.1 MMHG
AV VMAX SYS DOP: 162.1 CM/SEC
BASE EXCESS BLDA CALC-SCNC: -0.2 MMOL/L (ref 0–2)
BDY SITE: ABNORMAL
BH CV ECHO MEAS - ACS: 1.39 CM
BH CV ECHO MEAS - AO MAX PG: 10.5 MMHG
BH CV ECHO MEAS - AO V2 VTI: 32.9 CM
BH CV ECHO MEAS - AVA(I,D): 1.89 CM2
BH CV ECHO MEAS - EDV(CUBED): 66.7 ML
BH CV ECHO MEAS - EDV(MOD-SP2): 84 ML
BH CV ECHO MEAS - EDV(MOD-SP4): 82 ML
BH CV ECHO MEAS - EF(MOD-SP2): 54.8 %
BH CV ECHO MEAS - EF(MOD-SP4): 52.4 %
BH CV ECHO MEAS - ESV(CUBED): 26.5 ML
BH CV ECHO MEAS - ESV(MOD-SP2): 38 ML
BH CV ECHO MEAS - ESV(MOD-SP4): 39 ML
BH CV ECHO MEAS - FS: 26.5 %
BH CV ECHO MEAS - IVS/LVPW: 1.03 CM
BH CV ECHO MEAS - IVSD: 1.11 CM
BH CV ECHO MEAS - LAT PEAK E' VEL: 6.4 CM/SEC
BH CV ECHO MEAS - LV MASS(C)D: 148.6 GRAMS
BH CV ECHO MEAS - LV MAX PG: 3.4 MMHG
BH CV ECHO MEAS - LV MEAN PG: 1.71 MMHG
BH CV ECHO MEAS - LV V1 MAX: 91.7 CM/SEC
BH CV ECHO MEAS - LV V1 VTI: 18.2 CM
BH CV ECHO MEAS - LVIDD: 4.1 CM
BH CV ECHO MEAS - LVIDS: 3 CM
BH CV ECHO MEAS - LVOT AREA: 3.4 CM2
BH CV ECHO MEAS - LVOT DIAM: 2.09 CM
BH CV ECHO MEAS - LVPWD: 1.08 CM
BH CV ECHO MEAS - MED PEAK E' VEL: 6.1 CM/SEC
BH CV ECHO MEAS - MR MAX PG: 102.7 MMHG
BH CV ECHO MEAS - MR MAX VEL: 506.6 CM/SEC
BH CV ECHO MEAS - MV A DUR: 0.1 SEC
BH CV ECHO MEAS - MV A MAX VEL: 78.1 CM/SEC
BH CV ECHO MEAS - MV DEC SLOPE: 718.6 CM/SEC2
BH CV ECHO MEAS - MV DEC TIME: 0.2 SEC
BH CV ECHO MEAS - MV E MAX VEL: 116.6 CM/SEC
BH CV ECHO MEAS - MV E/A: 1.49
BH CV ECHO MEAS - MV MAX PG: 6.8 MMHG
BH CV ECHO MEAS - MV MEAN PG: 2.19 MMHG
BH CV ECHO MEAS - MV P1/2T: 54.1 MSEC
BH CV ECHO MEAS - MV V2 VTI: 30.6 CM
BH CV ECHO MEAS - MVA(P1/2T): 4.1 CM2
BH CV ECHO MEAS - MVA(VTI): 2.03 CM2
BH CV ECHO MEAS - PA ACC TIME: 0.11 SEC
BH CV ECHO MEAS - PA V2 MAX: 91.3 CM/SEC
BH CV ECHO MEAS - PULM A REVS DUR: 0.06 SEC
BH CV ECHO MEAS - PULM A REVS VEL: 32.5 CM/SEC
BH CV ECHO MEAS - PULM DIAS VEL: 51.9 CM/SEC
BH CV ECHO MEAS - PULM S/D: 0.98
BH CV ECHO MEAS - PULM SYS VEL: 50.8 CM/SEC
BH CV ECHO MEAS - QP/QS: 0.68
BH CV ECHO MEAS - RAP SYSTOLE: 3 MMHG
BH CV ECHO MEAS - RV MAX PG: 2.5 MMHG
BH CV ECHO MEAS - RV V1 MAX: 79.7 CM/SEC
BH CV ECHO MEAS - RV V1 VTI: 13.7 CM
BH CV ECHO MEAS - RVOT DIAM: 1.98 CM
BH CV ECHO MEAS - SV(LVOT): 62.1 ML
BH CV ECHO MEAS - SV(MOD-SP2): 46 ML
BH CV ECHO MEAS - SV(MOD-SP4): 43 ML
BH CV ECHO MEAS - SV(RVOT): 42.3 ML
BH CV ECHO MEAS - TAPSE (>1.6): 1.9 CM
BH CV ECHO MEASUREMENTS AVERAGE E/E' RATIO: 18.66
BH CV XLRA - RV BASE: 2.47 CM
BH CV XLRA - RV LENGTH: 6.2 CM
BH CV XLRA - RV MID: 2.12 CM
BH CV XLRA - TDI S': 12.3 CM/SEC
BUN SERPL-MCNC: 10 MG/DL (ref 8–23)
BUN/CREAT SERPL: 12.7 (ref 7–25)
CALCIUM SPEC-SCNC: 8.4 MG/DL (ref 8.6–10.5)
CHLORIDE SERPL-SCNC: 108 MMOL/L (ref 98–107)
CO2 BLDA-SCNC: 26.1 MMOL/L (ref 23–27)
CO2 SERPL-SCNC: 25 MMOL/L (ref 22–29)
CREAT SERPL-MCNC: 0.79 MG/DL (ref 0.57–1)
D DIMER PPP FEU-MCNC: 0.57 MCGFEU/ML (ref 0–0.84)
DEPRECATED RDW RBC AUTO: 40.4 FL (ref 37–54)
DEVICE COMMENT: ABNORMAL
EGFRCR SERPLBLD CKD-EPI 2021: 73.9 ML/MIN/1.73
ERYTHROCYTE [DISTWIDTH] IN BLOOD BY AUTOMATED COUNT: 12.2 % (ref 12.3–15.4)
GLUCOSE SERPL-MCNC: 91 MG/DL (ref 65–99)
HCO3 BLDA-SCNC: 24.8 MMOL/L (ref 22–28)
HCT VFR BLD AUTO: 37.8 % (ref 34–46.6)
HEMODILUTION: NO
HGB BLD-MCNC: 12.7 G/DL (ref 12–15.9)
INR PPP: 1.05 (ref 0.9–1.1)
LEFT ATRIUM VOLUME INDEX: 31.4 ML/M2
LV EF BIPLANE MOD: 51.2 %
MCH RBC QN AUTO: 30.5 PG (ref 26.6–33)
MCHC RBC AUTO-ENTMCNC: 33.6 G/DL (ref 31.5–35.7)
MCV RBC AUTO: 90.6 FL (ref 79–97)
MODALITY: ABNORMAL
PCO2 BLDA: 41.1 MM HG (ref 35–45)
PH BLDA: 7.39 PH UNITS (ref 7.35–7.45)
PLATELET # BLD AUTO: 287 10*3/MM3 (ref 140–450)
PMV BLD AUTO: 8.9 FL (ref 6–12)
PO2 BLDA: 64.1 MM HG (ref 80–100)
POTASSIUM SERPL-SCNC: 3.5 MMOL/L (ref 3.5–5.2)
PROTHROMBIN TIME: 13.9 SECONDS (ref 11.7–14.2)
QT INTERVAL: 373 MS
QT INTERVAL: 396 MS
QTC INTERVAL: 428 MS
QTC INTERVAL: 444 MS
RBC # BLD AUTO: 4.17 10*6/MM3 (ref 3.77–5.28)
SAO2 % BLDCOA: 91.9 % (ref 92–98.5)
SINUS: 3.1 CM
SODIUM SERPL-SCNC: 144 MMOL/L (ref 136–145)
STJ: 2.6 CM
TOTAL RATE: 24 BREATHS/MINUTE
TROPONIN T SERPL HS-MCNC: 190 NG/L
TROPONIN T SERPL HS-MCNC: 218 NG/L
WBC NRBC COR # BLD AUTO: 6.51 10*3/MM3 (ref 3.4–10.8)

## 2025-01-18 PROCEDURE — 93010 ELECTROCARDIOGRAM REPORT: CPT | Performed by: STUDENT IN AN ORGANIZED HEALTH CARE EDUCATION/TRAINING PROGRAM

## 2025-01-18 PROCEDURE — 25510000001 PERFLUTREN 6.52 MG/ML SUSPENSION 2 ML VIAL

## 2025-01-18 PROCEDURE — 94640 AIRWAY INHALATION TREATMENT: CPT

## 2025-01-18 PROCEDURE — 25010000002 HEPARIN (PORCINE) 25000-0.45 UT/250ML-% SOLUTION: Performed by: INTERNAL MEDICINE

## 2025-01-18 PROCEDURE — 94799 UNLISTED PULMONARY SVC/PX: CPT

## 2025-01-18 PROCEDURE — 85027 COMPLETE CBC AUTOMATED: CPT

## 2025-01-18 PROCEDURE — 80048 BASIC METABOLIC PNL TOTAL CA: CPT

## 2025-01-18 PROCEDURE — 36600 WITHDRAWAL OF ARTERIAL BLOOD: CPT

## 2025-01-18 PROCEDURE — 99214 OFFICE O/P EST MOD 30 MIN: CPT | Performed by: INTERNAL MEDICINE

## 2025-01-18 PROCEDURE — 93306 TTE W/DOPPLER COMPLETE: CPT | Performed by: INTERNAL MEDICINE

## 2025-01-18 PROCEDURE — 85610 PROTHROMBIN TIME: CPT | Performed by: INTERNAL MEDICINE

## 2025-01-18 PROCEDURE — 63710000001 REVEFENACIN 175 MCG/3ML SOLUTION

## 2025-01-18 PROCEDURE — 82803 BLOOD GASES ANY COMBINATION: CPT

## 2025-01-18 PROCEDURE — 94761 N-INVAS EAR/PLS OXIMETRY MLT: CPT

## 2025-01-18 PROCEDURE — 84484 ASSAY OF TROPONIN QUANT: CPT | Performed by: INTERNAL MEDICINE

## 2025-01-18 PROCEDURE — 93005 ELECTROCARDIOGRAM TRACING: CPT | Performed by: INTERNAL MEDICINE

## 2025-01-18 PROCEDURE — 84484 ASSAY OF TROPONIN QUANT: CPT

## 2025-01-18 PROCEDURE — 93306 TTE W/DOPPLER COMPLETE: CPT

## 2025-01-18 PROCEDURE — 25010000002 HEPARIN (PORCINE) PER 1000 UNITS: Performed by: INTERNAL MEDICINE

## 2025-01-18 PROCEDURE — 85379 FIBRIN DEGRADATION QUANT: CPT

## 2025-01-18 PROCEDURE — 85730 THROMBOPLASTIN TIME PARTIAL: CPT | Performed by: INTERNAL MEDICINE

## 2025-01-18 RX ORDER — LORAZEPAM 0.5 MG/1
0.5 TABLET ORAL AS NEEDED
Status: ON HOLD | COMMUNITY
End: 2025-01-23

## 2025-01-18 RX ORDER — NITROGLYCERIN 0.4 MG/1
0.4 TABLET SUBLINGUAL
Status: DISCONTINUED | OUTPATIENT
Start: 2025-01-18 | End: 2025-01-23 | Stop reason: HOSPADM

## 2025-01-18 RX ORDER — MULTIVITAMIN WITH IRON
500 TABLET ORAL DAILY
Status: DISCONTINUED | OUTPATIENT
Start: 2025-01-18 | End: 2025-01-23 | Stop reason: HOSPADM

## 2025-01-18 RX ORDER — BUDESONIDE AND FORMOTEROL FUMARATE DIHYDRATE 160; 4.5 UG/1; UG/1
2 AEROSOL RESPIRATORY (INHALATION)
Status: DISCONTINUED | OUTPATIENT
Start: 2025-01-18 | End: 2025-01-18

## 2025-01-18 RX ORDER — CHOLECALCIFEROL (VITAMIN D3) 25 MCG
5000 TABLET ORAL DAILY
Status: DISCONTINUED | OUTPATIENT
Start: 2025-01-18 | End: 2025-01-23 | Stop reason: HOSPADM

## 2025-01-18 RX ORDER — HEPARIN SODIUM 5000 [USP'U]/ML
30-60 INJECTION, SOLUTION INTRAVENOUS; SUBCUTANEOUS EVERY 6 HOURS PRN
Status: DISCONTINUED | OUTPATIENT
Start: 2025-01-18 | End: 2025-01-20

## 2025-01-18 RX ORDER — ACETAMINOPHEN 325 MG/1
650 TABLET ORAL EVERY 4 HOURS PRN
Status: DISCONTINUED | OUTPATIENT
Start: 2025-01-18 | End: 2025-01-23 | Stop reason: HOSPADM

## 2025-01-18 RX ORDER — AMOXICILLIN 250 MG
2 CAPSULE ORAL 2 TIMES DAILY PRN
Status: DISCONTINUED | OUTPATIENT
Start: 2025-01-18 | End: 2025-01-23 | Stop reason: HOSPADM

## 2025-01-18 RX ORDER — HEPARIN SODIUM 10000 [USP'U]/100ML
12 INJECTION, SOLUTION INTRAVENOUS
Status: DISCONTINUED | OUTPATIENT
Start: 2025-01-18 | End: 2025-01-20

## 2025-01-18 RX ORDER — LORAZEPAM 0.5 MG/1
0.5 TABLET ORAL EVERY 6 HOURS PRN
Status: DISCONTINUED | OUTPATIENT
Start: 2025-01-18 | End: 2025-01-22

## 2025-01-18 RX ORDER — HEPARIN SODIUM 5000 [USP'U]/ML
60 INJECTION, SOLUTION INTRAVENOUS; SUBCUTANEOUS ONCE
Status: COMPLETED | OUTPATIENT
Start: 2025-01-18 | End: 2025-01-18

## 2025-01-18 RX ORDER — BISACODYL 5 MG/1
5 TABLET, DELAYED RELEASE ORAL DAILY PRN
Status: DISCONTINUED | OUTPATIENT
Start: 2025-01-18 | End: 2025-01-23 | Stop reason: HOSPADM

## 2025-01-18 RX ORDER — ONDANSETRON 4 MG/1
4 TABLET, ORALLY DISINTEGRATING ORAL EVERY 6 HOURS PRN
Status: DISCONTINUED | OUTPATIENT
Start: 2025-01-18 | End: 2025-01-23 | Stop reason: HOSPADM

## 2025-01-18 RX ORDER — ASCORBIC ACID 500 MG
500 TABLET ORAL DAILY
Status: DISCONTINUED | OUTPATIENT
Start: 2025-01-18 | End: 2025-01-23 | Stop reason: HOSPADM

## 2025-01-18 RX ORDER — LOSARTAN POTASSIUM 50 MG/1
50 TABLET ORAL 2 TIMES DAILY
Status: DISCONTINUED | OUTPATIENT
Start: 2025-01-18 | End: 2025-01-23 | Stop reason: HOSPADM

## 2025-01-18 RX ORDER — PAROXETINE 20 MG/1
20 TABLET, FILM COATED ORAL DAILY
Status: DISCONTINUED | OUTPATIENT
Start: 2025-01-18 | End: 2025-01-23 | Stop reason: HOSPADM

## 2025-01-18 RX ORDER — POLYETHYLENE GLYCOL 3350 17 G/17G
17 POWDER, FOR SOLUTION ORAL DAILY PRN
Status: DISCONTINUED | OUTPATIENT
Start: 2025-01-18 | End: 2025-01-23 | Stop reason: HOSPADM

## 2025-01-18 RX ORDER — IPRATROPIUM BROMIDE AND ALBUTEROL SULFATE 2.5; .5 MG/3ML; MG/3ML
3 SOLUTION RESPIRATORY (INHALATION) EVERY 4 HOURS PRN
Status: DISCONTINUED | OUTPATIENT
Start: 2025-01-18 | End: 2025-01-23 | Stop reason: HOSPADM

## 2025-01-18 RX ORDER — THYROID 30 MG/1
30 TABLET ORAL DAILY
Status: DISCONTINUED | OUTPATIENT
Start: 2025-01-18 | End: 2025-01-23 | Stop reason: HOSPADM

## 2025-01-18 RX ORDER — BISACODYL 10 MG
10 SUPPOSITORY, RECTAL RECTAL DAILY PRN
Status: DISCONTINUED | OUTPATIENT
Start: 2025-01-18 | End: 2025-01-23 | Stop reason: HOSPADM

## 2025-01-18 RX ORDER — ATORVASTATIN CALCIUM 20 MG/1
40 TABLET, FILM COATED ORAL DAILY
Status: DISCONTINUED | OUTPATIENT
Start: 2025-01-18 | End: 2025-01-23 | Stop reason: HOSPADM

## 2025-01-18 RX ORDER — ATENOLOL 50 MG/1
50 TABLET ORAL DAILY
Status: DISCONTINUED | OUTPATIENT
Start: 2025-01-18 | End: 2025-01-19

## 2025-01-18 RX ORDER — LORAZEPAM 0.5 MG/1
0.5 TABLET ORAL EVERY 6 HOURS PRN
Status: DISCONTINUED | OUTPATIENT
Start: 2025-01-18 | End: 2025-01-23 | Stop reason: HOSPADM

## 2025-01-18 RX ORDER — CLOPIDOGREL BISULFATE 75 MG/1
75 TABLET ORAL DAILY
Status: DISCONTINUED | OUTPATIENT
Start: 2025-01-18 | End: 2025-01-23 | Stop reason: HOSPADM

## 2025-01-18 RX ORDER — ONDANSETRON 2 MG/ML
4 INJECTION INTRAMUSCULAR; INTRAVENOUS EVERY 6 HOURS PRN
Status: DISCONTINUED | OUTPATIENT
Start: 2025-01-18 | End: 2025-01-23 | Stop reason: HOSPADM

## 2025-01-18 RX ADMIN — ACETAMINOPHEN 325MG 650 MG: 325 TABLET ORAL at 09:45

## 2025-01-18 RX ADMIN — LOSARTAN POTASSIUM 50 MG: 50 TABLET, FILM COATED ORAL at 09:35

## 2025-01-18 RX ADMIN — HEPARIN SODIUM 12 UNITS/KG/HR: 10000 INJECTION, SOLUTION INTRAVENOUS at 13:37

## 2025-01-18 RX ADMIN — LEVOTHYROXINE, LIOTHYRONINE 30 MG: 19; 4.5 TABLET ORAL at 13:30

## 2025-01-18 RX ADMIN — LORAZEPAM 0.5 MG: 0.5 TABLET ORAL at 22:38

## 2025-01-18 RX ADMIN — REVEFENACIN 175 MCG: 175 SOLUTION RESPIRATORY (INHALATION) at 11:11

## 2025-01-18 RX ADMIN — Medication 500 MCG: at 09:35

## 2025-01-18 RX ADMIN — HEPARIN SODIUM 3500 UNITS: 5000 INJECTION INTRAVENOUS; SUBCUTANEOUS at 13:36

## 2025-01-18 RX ADMIN — ATORVASTATIN CALCIUM 40 MG: 20 TABLET, FILM COATED ORAL at 09:35

## 2025-01-18 RX ADMIN — ATENOLOL 50 MG: 50 TABLET ORAL at 09:35

## 2025-01-18 RX ADMIN — CLOPIDOGREL BISULFATE 75 MG: 75 TABLET ORAL at 13:30

## 2025-01-18 RX ADMIN — Medication 5000 UNITS: at 09:35

## 2025-01-18 RX ADMIN — BUDESONIDE AND FORMOTEROL FUMARATE DIHYDRATE 2 PUFF: 160; 4.5 AEROSOL RESPIRATORY (INHALATION) at 11:16

## 2025-01-18 RX ADMIN — NITROGLYCERIN 0.5 INCH: 20 OINTMENT TOPICAL at 13:30

## 2025-01-18 RX ADMIN — NITROGLYCERIN 0.5 INCH: 20 OINTMENT TOPICAL at 18:54

## 2025-01-18 RX ADMIN — ACETAMINOPHEN 325MG 650 MG: 325 TABLET ORAL at 20:57

## 2025-01-18 RX ADMIN — PERFLUTREN 2 ML: 6.52 INJECTION, SUSPENSION INTRAVENOUS at 08:53

## 2025-01-18 RX ADMIN — OXYCODONE HYDROCHLORIDE AND ACETAMINOPHEN 500 MG: 500 TABLET ORAL at 09:35

## 2025-01-18 RX ADMIN — PAROXETINE HYDROCHLORIDE HEMIHYDRATE 20 MG: 20 TABLET, FILM COATED ORAL at 13:30

## 2025-01-18 RX ADMIN — LOSARTAN POTASSIUM 50 MG: 50 TABLET, FILM COATED ORAL at 20:57

## 2025-01-18 RX ADMIN — LORAZEPAM 0.5 MG: 0.5 TABLET ORAL at 10:57

## 2025-01-18 NOTE — ED NOTES
Nursing report ED to floor  Dafne Mary  84 y.o.  female    HPI :  HPI  Stated Reason for Visit: Patient from home via EMS. Patient called EMS for shortness of breath, she was found to be hypoxic in the 70's upon EMS arrival on scene. She has a history of pulmonary edema, was given 2 SL nitro and placed on 6 liters NC. Initial /144 with an O2 sat of 77%. Patient now 98% on 2 L.    Chief Complaint  Chief Complaint   Patient presents with    Shortness of Breath       Admitting doctor:   Beckie Treviño MD    Admitting diagnosis:   The primary encounter diagnosis was Flash pulmonary edema. A diagnosis of Elevated troponin was also pertinent to this visit.    Code status:   Current Code Status       Date Active Code Status Order ID Comments User Context       Prior            Allergies:   Lansoprazole, Buspirone, Diphenhydramine hcl (sleep), and Lisinopril    Isolation:   No active isolations    Intake and Output  No intake or output data in the 24 hours ending 01/17/25 2355    Weight:   There were no vitals filed for this visit.    Most recent vitals:   Vitals:    01/17/25 2127 01/17/25 2137 01/17/25 2301 01/17/25 2311   BP: 130/85 116/95 134/73 134/73   BP Location:  Right arm  Right arm   Patient Position:  Lying  Sitting   Pulse: 86 89 72 75   Resp: 16 20  20   Temp: 98.2 °F (36.8 °C)      SpO2: 98% 94% 95% 94%       Active LDAs/IV Access:   Lines, Drains & Airways       Active LDAs       Name Placement date Placement time Site Days    Peripheral IV 01/17/25 2232 Left Antecubital 01/17/25 2232  Antecubital  less than 1                    Labs (abnormal labs have a star):   Labs Reviewed   COMPREHENSIVE METABOLIC PANEL - Abnormal; Notable for the following components:       Result Value    Glucose 114 (*)     Creatinine 1.03 (*)     CO2 20.9 (*)     Alkaline Phosphatase 140 (*)     Anion Gap 17.1 (*)     eGFR 53.7 (*)     All other components within normal limits    Narrative:     GFR Categories in Chronic  Kidney Disease (CKD)      GFR Category          GFR (mL/min/1.73)    Interpretation  G1                     90 or greater         Normal or high (1)  G2                      60-89                Mild decrease (1)  G3a                   45-59                Mild to moderate decrease  G3b                   30-44                Moderate to severe decrease  G4                    15-29                Severe decrease  G5                    14 or less           Kidney failure          (1)In the absence of evidence of kidney disease, neither GFR category G1 or G2 fulfill the criteria for CKD.    eGFR calculation 2021 CKD-EPI creatinine equation, which does not include race as a factor   TROPONIN - Abnormal; Notable for the following components:    HS Troponin T 80 (*)     All other components within normal limits    Narrative:     High Sensitive Troponin T Reference Range:  <14.0 ng/L- Negative Female for AMI  <22.0 ng/L- Negative Male for AMI  >=14 - Abnormal Female indicating possible myocardial injury.  >=22 - Abnormal Male indicating possible myocardial injury.   Clinicians would have to utilize clinical acumen, EKG, Troponin, and serial changes to determine if it is an Acute Myocardial Infarction or myocardial injury due to an underlying chronic condition.        CBC WITH AUTO DIFFERENTIAL - Abnormal; Notable for the following components:    RDW 11.7 (*)     Neutrophil % 80.4 (*)     Lymphocyte % 6.8 (*)     Immature Grans % 0.9 (*)     Neutrophils, Absolute 7.15 (*)     Lymphocytes, Absolute 0.60 (*)     Immature Grans, Absolute 0.08 (*)     All other components within normal limits   HIGH SENSITIVITIY TROPONIN T 1HR - Abnormal; Notable for the following components:    HS Troponin T 146 (*)     Troponin T Numeric Delta 66 (*)     Troponin T % Delta 83 (*)     All other components within normal limits    Narrative:     High Sensitive Troponin T Reference Range:  <14.0 ng/L- Negative Female for AMI  <22.0 ng/L- Negative  Male for AMI  >=14 - Abnormal Female indicating possible myocardial injury.  >=22 - Abnormal Male indicating possible myocardial injury.   Clinicians would have to utilize clinical acumen, EKG, Troponin, and serial changes to determine if it is an Acute Myocardial Infarction or myocardial injury due to an underlying chronic condition.        BNP (IN-HOUSE) - Normal    Narrative:     This assay is used as an aid in the diagnosis of individuals suspected of having heart failure. It can be used as an aid in the diagnosis of acute decompensated heart failure (ADHF) in patients presenting with signs and symptoms of ADHF to the emergency department (ED). In addition, NT-proBNP of <300 pg/mL indicates ADHF is not likely.    Age Range Result Interpretation  NT-proBNP Concentration (pg/mL:      <50             Positive            >450                   Gray                 300-450                    Negative             <300    50-75           Positive            >900                  Gray                300-900                  Negative            <300      >75             Positive            >1800                  Gray                300-1800                  Negative            <300   CBC AND DIFFERENTIAL    Narrative:     The following orders were created for panel order CBC & Differential.  Procedure                               Abnormality         Status                     ---------                               -----------         ------                     CBC Auto Differential[[id number]]        Abnormal            Final result                 Please view results for these tests on the individual orders.       EKG:   ECG 12 Lead Dyspnea   Preliminary Result   HEART RATE=85  bpm   RR Badwtstv=677  ms   DE Qorhqlhl=843  ms   P Horizontal Axis=-23  deg   P Front Axis=94  deg   QRSD Haiqenia=694  ms   QT Wcxwynzw=096  ms   OIaY=415  ms   QRS Axis=40  deg   T Wave Axis=168  deg   - ABNORMAL ECG -   Sinus rhythm    Nonspecific repol abnormality, diffuse leads   Date and Time of Study:2025-01-17 21:39:51          Meds given in ED:   Medications   sodium chloride 0.9 % flush 10 mL (has no administration in time range)   acetaminophen (TYLENOL) tablet 1,000 mg (1,000 mg Oral Given 1/17/25 0922)   aspirin chewable tablet 324 mg (324 mg Oral Given 1/17/25 0333)       Imaging results:  XR Chest 1 View    Result Date: 1/17/2025  No acute findings.  This report was finalized on 1/17/2025 11:09 PM by Dr. Kelly Chirinos M.D on Workstation: [id number]       Ambulatory status:   - ad jessie    Social issues:   Social History     Socioeconomic History    Marital status:    Tobacco Use    Smoking status: Never     Passive exposure: Never    Smokeless tobacco: Never   Vaping Use    Vaping status: Never Used   Substance and Sexual Activity    Alcohol use: Yes     Comment: rare    Drug use: No    Sexual activity: Defer       Peripheral Neurovascular  Peripheral Neurovascular (Adult)  Peripheral Neurovascular WDL: WDL    Neuro Cognitive  Neuro Cognitive (Adult)  Cognitive/Neuro/Behavioral WDL: WDL    Learning  Learning Assessment  Learning Readiness and Ability: no barriers identified    Respiratory  Respiratory  Airway WDL: .WDL except  Additional Documentation:  (pt SOB. on 2L NC, normally not on oxygen)  Respiratory WDL  Respiratory WDL: .WDL except    Abdominal Pain       Pain Assessments  Pain (Adult)  (0-10) Pain Rating: Rest: 7  Pain Location: head  Pain Side/Orientation: generalized    NIH Stroke Scale       Courtney Shay RN  01/17/25 23:55 EST

## 2025-01-18 NOTE — PLAN OF CARE
Goal Outcome Evaluation:  Plan of Care Reviewed With: patient        Progress: improving  Pt was admitted from the ER, alert & oriented x4 with stable vital signs, on room air, no shortness of breath noticed or reported, I will continue to monitor.

## 2025-01-18 NOTE — CONSULTS
[facility]         CONSULT NOTE    Patient Identification:  Dafne Mary  84 y.o.  female  1940  [id number]            Requesting physician: Dr Pérez    Reason for Consultation: Shortness of breath    CC: Shortness of breath    History of Present Illness:  84-year-old female who presents with shortness of breath.  Has been present for 12 years.  Got much worse yesterday, started at 7 PM, associated with severe hypertension, systolic in the 200s on presentation and oxygen saturation 70% on room air.  She says when she took nitroglycerin her symptoms improved.  At baseline she has shortness of breath which is chronic, has been there for many years and she has seen my colleague, Dr. Nolan.  I reviewed those notes.  She has never smoked, was never diagnosed with asthma as a child but grew up surrounded by parents and siblings who were smokers.  She underwent thyroid surgery in the 1970s and since then she had some vocal cord dysfunction.  Chart reviewed  She also has other reasons for shortness of breath including atrial fibrillation with RVR, chronic diastolic heart failure and mitral regurgitation.  She has chronic leg edema which seems to be stable.  Takes diuretics      Review of Systems   Constitutional:  Positive for fatigue. Negative for diaphoresis and fever.   HENT:  Negative for ear discharge and sore throat.    Eyes:  Negative for pain and visual disturbance.   Respiratory:  Positive for shortness of breath. Negative for cough.    Cardiovascular:  Positive for leg swelling. Negative for chest pain.   Gastrointestinal:  Negative for abdominal pain and diarrhea.   Endocrine: Negative for cold intolerance and polyuria.   Genitourinary:  Negative for dysuria and hematuria.   Musculoskeletal:  Negative for joint swelling and myalgias.   Skin:  Negative for rash and wound.   Neurological:  Negative for speech difficulty and numbness.   Hematological:  Negative for adenopathy. Does  not bruise/bleed easily.   Psychiatric/Behavioral:  Negative for agitation and confusion.        Past Medical History:  Past Medical History:   Diagnosis Date    Atrial fibrillation with RVR 6/4/2023    CAD (coronary artery disease) 7/10/2023    Chronic diastolic congestive heart failure 11/17/2022    Depression     Emphysema lung     GERD (gastroesophageal reflux disease)     Heart failure     Hyperlipidemia     Hypertension     Hypothyroidism     Mitral valve regurgitation 7/10/2023    NSTEMI (non-ST elevated myocardial infarction) 10/25/2023       Past Surgical History:  Past Surgical History:   Procedure Laterality Date    CARDIAC CATHETERIZATION N/A 7/7/2023    Procedure: Right and Left Heart Cath;  Surgeon: Ra Cole MD;  Location:  [facility];  Service: Cardiology;  Laterality: N/A;    CARDIAC CATHETERIZATION N/A 7/7/2023    Procedure: Percutaneous Coronary Intervention;  Surgeon: Ra Cole MD;  Location:  [facility];  Service: Cardiology;  Laterality: N/A;    CARDIAC CATHETERIZATION N/A 7/7/2023    Procedure: Stent ANDRZEJ coronary;  Surgeon: Ra Cole MD;  Location:  [facility];  Service: Cardiology;  Laterality: N/A;    CARDIAC CATHETERIZATION N/A 7/7/2023    Procedure: Coronary angiography;  Surgeon: Ra Cole MD;  Location:  [facility];  Service: Cardiology;  Laterality: N/A;    CARDIAC CATHETERIZATION N/A 7/7/2023    Procedure: Left ventriculography;  Surgeon: Ra Cole MD;  Location:  [facility];  Service: Cardiology;  Laterality: N/A;    EYE SURGERY Left     INTUBATION  5/21/2023         PARATHYROIDECTOMY      Patient reports thyroidectomy partial not a para thyroidectomy.    THYROIDECTOMY, PARTIAL      1984        Home Meds:  Medications Prior to Admission   Medication Sig Dispense Refill Last Dose/Taking    acetaminophen (TYLENOL) 325 MG tablet Take 2  tablets by mouth Every 6 (Six) Hours As Needed for Mild Pain.   Taking As Needed    apixaban (ELIQUIS) 2.5 MG tablet tablet Take 1 tablet by mouth Every 12 (Twelve) Hours. Indications: Atrial Fibrillation 180 tablet 1 Taking    [place] Thyroid 15 MG tablet Take 1 tablet by mouth once daily 90 tablet 0 Taking    [place] Thyroid 30 MG tablet Take 1 tablet by mouth once daily 90 tablet 0 Taking    Artificial Tear Ointment (artificial tears) ophthalmic ointment Administer 1 application to both eyes Every 1 (One) Hour As Needed (dry eye).   Taking As Needed    atenolol (TENORMIN) 50 MG tablet TAKE 1 TABLET EVERY 12 HOURS 180 tablet 0 Taking    atorvastatin (LIPITOR) 40 MG tablet Take 1 tablet by mouth Daily. (Patient taking differently: Take 0.5 tablets by mouth Daily.) 90 tablet 1 Taking Differently    Breztri Aerosphere 160-9-4.8 MCG/ACT aerosol inhaler 2 puffs 2 (Two) Times a Day.   Taking    Cimetidine (TAGAMET PO) Take  by mouth.   Taking    clopidogrel (PLAVIX) 75 MG tablet Take 1 tablet by mouth Daily. 90 tablet 3 Taking    Glycerin-Polysorbate 80 (REFRESH DRY EYE THERAPY OP) Apply 1 drop to eye(s) as directed by provider Daily. For dry eyes   Taking    guaiFENesin (MUCINEX) 600 MG 12 hr tablet Take 1 tablet by mouth 2 (Two) Times a Day As Needed for Cough or Congestion.   Taking As Needed    hydroCHLOROthiazide (HYDRODIURIL) 25 MG tablet Take 1 tablet by mouth Daily. (Patient taking differently: Take 1 tablet by mouth As Needed.) 30 tablet 5 Patient Taking Differently    ipratropium-albuterol (DUO-NEB) 0.5-2.5 mg/3 ml nebulizer Take 3 mL by nebulization Every 4 (Four) Hours As Needed for Wheezing.   Taking As Needed    LORazepam (ATIVAN) 0.5 MG tablet Take 1 tablet by mouth As Needed for Anxiety.   Past Week    losartan (COZAAR) 50 MG tablet Take 1 tablet by mouth Daily. (Patient taking differently: Take 1 tablet by mouth 2 (Two) Times a Day.) 90 tablet 1 Taking Differently    PARoxetine (PAXIL) 10 MG tablet Take 2  "tablets by mouth Daily. 180 tablet 0 Taking    vitamin B-12 (CYANOCOBALAMIN) 500 MCG tablet Take 1 tablet by mouth Daily.   Taking    vitamin C (ASCORBIC ACID) 250 MG tablet Take 2 tablets by mouth Daily.   Taking    vitamin D3 125 MCG (5000 UT) capsule capsule Take 1 capsule by mouth Daily.   Taking    amLODIPine (NORVASC) 5 MG tablet TAKE 1 TABLET EVERY DAY 90 tablet 1     empagliflozin (Jardiance) 10 MG tablet tablet Take 1 tablet by mouth Daily. 90 tablet 1     Ventolin  (90 Base) MCG/ACT inhaler Inhale 2 puffs Every 4 (Four) Hours As Needed.          Allergies:  Allergies   Allergen Reactions    Lansoprazole Nausea Only     NAUSEA  NAUSEA  NAUSEA    Buspirone Other (See Comments)     agitation    Diphenhydramine Hcl (Sleep) Irritability    Lisinopril Cough       Social History:   Social History     Socioeconomic History    Marital status:    Tobacco Use    Smoking status: Never     Passive exposure: Never    Smokeless tobacco: Never   Vaping Use    Vaping status: Never Used   Substance and Sexual Activity    Alcohol use: Yes     Comment: rare    Drug use: No    Sexual activity: Defer       Family History:  Family History   Problem Relation Age of Onset    Alzheimer's disease Mother     Lung disease Father     Alcohol abuse Father     Heart disease Brother     Hypertension Brother     Lung disease Brother     Alcohol abuse Brother     Cancer Brother         LUNG  WAS A SMOKER    Thyroid disease Maternal Grandmother        Physical Exam:  /80 (BP Location: Right arm, Patient Position: Lying)   Pulse 71   Temp 97.7 °F (36.5 °C) (Oral)   Resp 18   Ht 152.4 cm (60\")   Wt 58.3 kg (128 lb 8.5 oz)   SpO2 97%   BMI 25.10 kg/m²  Body mass index is 25.1 kg/m². 97% 58.3 kg (128 lb 8.5 oz)  Physical Exam  HENT:      Right Ear: External ear normal.      Left Ear: External ear normal.      Nose: Nose normal.   Eyes:      Conjunctiva/sclera: Conjunctivae normal.      Pupils: Pupils are equal, round, " and reactive to light.   Neck:      Thyroid: No thyromegaly.      Vascular: No JVD.      Trachea: No tracheal deviation.   Cardiovascular:      Rate and Rhythm: Normal rate and regular rhythm.      Heart sounds: Normal heart sounds. No murmur heard.  Pulmonary:      Effort: Pulmonary effort is normal.      Breath sounds: Normal breath sounds.   Abdominal:      Palpations: Abdomen is soft.      Tenderness: There is no abdominal tenderness. There is no rebound.      Comments: Cannot palpate liver or spleen enlargement   Musculoskeletal:         General: No deformity. Normal range of motion.      Cervical back: Neck supple. No rigidity.   Skin:     General: Skin is warm.      Findings: No rash.      Comments: No palpable nodules   Neurological:      General: No focal deficit present.      Mental Status: She is alert and oriented to person, place, and time.      Cranial Nerves: No cranial nerve deficit.      Motor: No weakness.   Psychiatric:         Mood and Affect: Mood normal.         Thought Content: Thought content normal.         LABS:  COVID19   Date Value Ref Range Status   07/04/2023 Not Detected Not Detected - Ref. Range Final       Lab Results   Component Value Date    CALCIUM 8.4 (L) 01/18/2025    PHOS 4.0 07/10/2023     Results from last 7 days   Lab Units 01/18/25  0525 01/17/25 2207   SODIUM mmol/L 144 140   POTASSIUM mmol/L 3.5 3.8   CHLORIDE mmol/L 108* 102   CO2 mmol/L 25.0 20.9*   BUN mg/dL 10 12   CREATININE mg/dL 0.79 1.03*   GLUCOSE mg/dL 91 114*   CALCIUM mg/dL 8.4* 9.3   WBC 10*3/mm3 6.51 8.88   HEMOGLOBIN g/dL 12.7 15.1   PLATELETS 10*3/mm3 287 355   ALT (SGPT) U/L  --  16   AST (SGOT) U/L  --  32   PROBNP pg/mL  --  1,527.0     Lab Results   Component Value Date    CKTOTAL 54 07/12/2023    TROPONINT 218 (C) 01/18/2025     Results from last 7 days   Lab Units 01/18/25  0525 01/17/25  2312 01/17/25 2207   HSTROP T ng/L 218* 146* 80*             Results from last 7 days   Lab Units  01/18/25  0223   PH, ARTERIAL pH units 7.389   PCO2, ARTERIAL mm Hg 41.1   PO2 ART mm Hg 64.1*   O2 SATURATION ART % 91.9*   MODALITY  Room Air                 Lab Results   Component Value Date    TSH 1.930 07/12/2023     Estimated Creatinine Clearance: 42.3 mL/min (by C-G formula based on SCr of 0.79 mg/dL).         Imaging: I personally visualized the images of poor quality portable chest x-ray done yesterday evening showing no acute pulmonary findings.  There is cardiomegaly.      Assessment:  Flash pulmonary edema  Acute hypoxemia  Atrial fibrillation  Severely dilated left atrial cavity  Severe uncontrolled hypertension  Dizzy on exertion  Elevated creatinine  Elevated troponin  Vocal cord dysfunction  Coronary artery disease, post stenting      Recommendations:  Patient has had chronic shortness of breath for many decades.  At this time the main reason to believe that she is short of breath is that she has pulmonary edema, due to decompensated heart failure, likely due to atrial fibrillation.  Echocardiogram in 2023 was reviewed.  It shows severely dilated left atrium.  This does not occur overnight.  This indicates longstanding increased left ventricular end-diastolic pressures.  There is no evidence of actual lung disease.  She may have some mild emphysema from secondhand smoke exposure when she grew up with her parents who smoked heavily and her siblings as well.  At this time I recommend adjusting cardiac medications to control her heart failure and atrial fibrillation.  Her vocal cord dysfunction is a nonissue at this time.  Cardiology has been consulted.  The patient is diagnosed with non-ST elevation MI and they are managing her medications.  Agree with heart catheterization on Monday.  Again, no direct active pulmonary pathology at this time.  Initial pulmonary edema associated with severe hypertension and non-ST elevation MI.    Chest x-ray images reviewed from last night, was unremarkable, may have  had some mild pulmonary edema which responded well to diuretics.  Perform walking oximetry tomorrow or Monday to see if she can be weaned off of oxygen.  Last but not least, avoid beta agonist scheduled in this patient who is having non-ST elevation MI and atrial fibrillation.  There is no wheezing on exam.    We will be following along peripherally      Td Mendez MD  1/18/2025  13:03 EST      Much of this encounter note is an electronic transcription/translation of spoken language to printed text using Dragon Software.

## 2025-01-18 NOTE — NURSING NOTE
RN contacted cardiology due to pt complainants of 6/10 midsternal chest pain, pt describes as burning from abdomen up into throat; spoke with Debi.    RN also requested diet modifications to allow pt to eat.

## 2025-01-18 NOTE — H&P
[facility]   HISTORY AND PHYSICAL    Patient Name: Dafne Mary  : 1940  MRN: [id number]  Primary Care Physician:  Jossy Sauceda MD  Date of admission: 2025    Subjective   Subjective     Chief Complaint: soa    History of Present Illness  Dafne Mary is a 84 y.o. female who presents to the ED c/o acute shortness of breath.  Onset at 7 PM.  It began rather abruptly.  Her blood pressure upon EMS arrival was 200 systolic and she was satting in the 70s.  She received sublingual nitroglycerin x 2 with rather rapid improvement.  She states that she feels basically like her normal self at this time.  No current fever, cough, chest pain.     Review of Systems     Personal History     Past Medical History:   Diagnosis Date    Atrial fibrillation with RVR 2023    CAD (coronary artery disease) 7/10/2023    Chronic diastolic congestive heart failure 2022    Depression     Emphysema lung     GERD (gastroesophageal reflux disease)     Heart failure     Hyperlipidemia     Hypertension     Hypothyroidism     Mitral valve regurgitation 7/10/2023    NSTEMI (non-ST elevated myocardial infarction) 10/25/2023       Past Surgical History:   Procedure Laterality Date    CARDIAC CATHETERIZATION N/A 2023    Procedure: Right and Left Heart Cath;  Surgeon: Ra Cole MD;  Location: [facility] CATH INVASIVE LOCATION;  Service: Cardiology;  Laterality: N/A;    CARDIAC CATHETERIZATION N/A 2023    Procedure: Percutaneous Coronary Intervention;  Surgeon: Ra Cole MD;  Location: [facility] CATH INVASIVE LOCATION;  Service: Cardiology;  Laterality: N/A;    CARDIAC CATHETERIZATION N/A 2023    Procedure: Stent ANDRZEJ coronary;  Surgeon: Ra Cole MD;  [facility];  Service: Cardiology;  Laterality: N/A;    CARDIAC CATHETERIZATION N/A 2023    Procedure: Coronary angiography;  Surgeon: Ra Cole MD;  Location: [facility] CATH INVASIVE  LOCATION;  Service: Cardiology;  Laterality: N/A;    CARDIAC CATHETERIZATION N/A 7/7/2023    Procedure: Left ventriculography;  Surgeon: Ra Cole MD;  Location: [facility] INVASIVE LOCATION;  Service: Cardiology;  Laterality: N/A;    EYE SURGERY Left     INTUBATION  5/21/2023         PARATHYROIDECTOMY      Patient reports thyroidectomy partial not a para thyroidectomy.    THYROIDECTOMY, PARTIAL      1984       Family History: family history includes Alcohol abuse in her brother and father; Alzheimer's disease in her mother; Cancer in her brother; Heart disease in her brother; Hypertension in her brother; Lung disease in her brother and father; Thyroid disease in her maternal grandmother. Otherwise pertinent FHx was reviewed and not pertinent to current issue.    Social History:  reports that she has never smoked. She has never been exposed to tobacco smoke. She has never used smokeless tobacco. She reports current alcohol use. She reports that she does not use drugs.    Home Medications:  Budeson-Glycopyrrol-Formoterol, Cimetidine, Glycerin-Polysorbate 80, LORazepam, PARoxetine, Thyroid, acetaminophen, albuterol sulfate HFA, amLODIPine, apixaban, artificial tears, atenolol, atorvastatin, clopidogrel, empagliflozin, guaiFENesin, hydroCHLOROthiazide, ipratropium-albuterol, losartan, thyroid, vitamin B-12, vitamin C, and vitamin D3    Allergies:  Allergies   Allergen Reactions    Lansoprazole Nausea Only     NAUSEA  NAUSEA  NAUSEA    Buspirone Other (See Comments)     agitation    Diphenhydramine Hcl (Sleep) Irritability    Lisinopril Cough       Objective    Objective     Vitals:   Temp:  [97.7 °F (36.5 °C)-98.2 °F (36.8 °C)] 97.9 °F (36.6 °C)  Heart Rate:  [72-89] 87  Resp:  [16-20] 18  BP: (116-215)/() 174/83  Flow (L/min) (Oxygen Therapy):  [2] 2    Physical Exam  Constitutional:       General: She is not in acute distress.  Pulmonary:      Breath sounds: Wheezing present.      Comments:  Decreased BS  Abdominal:      General: There is no distension.      Palpations: Abdomen is soft.      Tenderness: There is no abdominal tenderness.   Skin:     General: Skin is warm and dry.   Neurological:      General: No focal deficit present.      Mental Status: She is alert.   Psychiatric:         Mood and Affect: Mood normal.         Behavior: Behavior normal.         Result Review    Result Review:  I have personally reviewed the results from the time of this admission to 1/18/2025 09:58 EST and agree with these findings:  []  Laboratory list / accordion  [x]  Microbiology  [x]  Radiology  [x]  EKG/Telemetry   [x]  Cardiology/Vascular   [x]  Pathology  [x]  Old records  []  Other:  Most notable findings include: elevated BP and troponin       Assessment & Plan   Assessment / Plan     Brief Patient Summary:  Dafne Mary is a 84 y.o. female who SOA and elevated troponin    Active Hospital Problems:  Active Hospital Problems    Diagnosis     **Flash pulmonary edema     Elevated troponin     Stage 3a chronic kidney disease     Chronic HFrEF (heart failure with reduced ejection fraction)     COPD (chronic obstructive pulmonary disease)     Benign hypertension      Plan:   Acute Chf  -better with nitro  -echo this am  -cardiology consult  -no diuretics given, patient dong better  -minimal ankle edema    CAD with prior stent  -aspirin given  -on plavix at home, awaiting cardiology input on resuming plavix  -on eliquis, hold until ok to give per cardiology  -Continue statin therapy  -Continue beta-blocker and Cozaar  -Troponins trended from 80 up to 218 now down to 190    Anxiety-ativan prn    Chronic dyspnea  -Vocal cord dysfunction, felt to be a nonissue per pulmonary  -She is followed by followed by Dr Nolan  -We have consulted pulmonary    Hypothyroidism continue with armour  -Check TSH in a.m.    Mild acute kidney injury which is already better    Addendum:  Patient seen by cardiology and started on a  heparin drip, continue with aspirin and Plavix, Nitropaste    VTE Prophylaxis:  Mechanical VTE prophylaxis orders are present.        CODE STATUS:    Code Status (Patient has no pulse and is not breathing): CPR (Attempt to Resuscitate)  Medical Interventions (Patient has pulse or is breathing): Full    Admission Status:  I believe this patient meets inpatient status.    Simon Pérez MD

## 2025-01-18 NOTE — ED PROVIDER NOTES
EMERGENCY DEPARTMENT ENCOUNTER    Room Number:  24/24  PCP: Jossy Sauceda MD    HPI:  Chief Complaint: Shortness of breath  A complete HPI/ROS/PMH/PSH/SH/FH are unobtainable due to: None  Context: Dafne Mary is a 84 y.o. female who presents to the ED c/o acute shortness of breath.  Onset at 7 PM.  It began rather abruptly.  Her blood pressure upon EMS arrival was 200 systolic and she was satting in the 70s.  She received sublingual nitroglycerin x 2 with rather rapid improvement.  She states that she feels basically like her normal self at this time.  No current fever, cough, chest pain.        PAST MEDICAL HISTORY  Active Ambulatory Problems     Diagnosis Date Noted    Anxiety 07/26/2016    Arteriosclerosis of both carotid arteries 07/26/2016    Chronic interstitial cystitis 07/26/2016    Cough 07/26/2016    Pulmonary emphysema 07/26/2016    Gastroesophageal reflux disease 07/26/2016    Fibromyalgia 07/26/2016    Headache 07/26/2016    Hematuria 07/26/2016    Hoarseness 07/26/2016    Hyperlipidemia 06/08/2012    Benign hypertension 07/26/2016    Postoperative hypothyroidism 06/08/2012    Muscle spasms of both lower extremities 07/26/2016    Palpitations 07/26/2016    Shortness of breath 07/26/2016    Postmenopausal osteoporosis 10/17/2016    Chronic fatigue 10/17/2016    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/17/2016    Dysuria 12/15/2017    Dry cough 12/15/2017    Spondylolysis, lumbar region 06/08/2012    Vocal cord paralysis 08/24/2021    Abnormal finding on thyroid function test 08/24/2021    Positional lightheadedness 11/17/2022    COPD with acute exacerbation 12/08/2022    Hypothyroid 01/06/2023    Asthma with COPD 01/06/2023    COPD exacerbation 01/06/2023    Morbid obesity with BMI of 70 and over, adult 01/06/2023    RSV bronchiolitis 01/07/2023    Vitamin D deficiency 04/09/2023    B12 deficiency 04/09/2023    Iron deficiency 04/09/2023    Decreased hemoglobin 04/09/2023    Generalized anxiety disorder 04/09/2023     Chronic bilateral low back pain with left-sided sciatica 04/09/2023    Facet arthropathy, lumbar 04/09/2023    Spinal stenosis of lumbar region 04/09/2023    Spondylolisthesis of lumbar region 04/09/2023    Scoliosis of lumbar spine 04/09/2023    History of non-ST elevation myocardial infarction (NSTEMI) 04/09/2023    Chronic respiratory failure 05/21/2023    Acute respiratory failure 05/21/2023    PAF (paroxysmal atrial fibrillation) 06/12/2023    Acute respiratory failure with hypercapnia 06/17/2023    Acute hypoxemic respiratory failure 07/04/2023    Chronic HFrEF (heart failure with reduced ejection fraction) 07/10/2023    Community acquired bacterial pneumonia 07/10/2023    CAD (coronary artery disease) 07/10/2023    Mitral valve regurgitation 07/10/2023    Acute hypokalemia 07/10/2023    Status post angioplasty with stent 07/18/2023    Noncompliance 10/26/2023    NSTEMI (non-ST elevated myocardial infarction) 10/25/2023     Resolved Ambulatory Problems     Diagnosis Date Noted    Leukocytes in urine 12/15/2017    Cystitis with hematuria 12/15/2017    Acute cystitis without hematuria 12/15/2017    Acute respiratory failure with hypoxia and hypercapnia 05/03/2022    Chronic diastolic congestive heart failure 11/17/2022    Chronic congestive heart failure 04/09/2023    Atrial fibrillation with RVR 06/04/2023    Acute on chronic HFrEF (heart failure with reduced ejection fraction) 06/12/2023    Acute systolic heart failure 07/10/2023    Acute on chronic systolic heart failure 07/10/2023    Chronic systolic heart failure 07/10/2023    Uncontrolled hypertension 07/10/2023     Past Medical History:   Diagnosis Date    Depression     Emphysema lung     GERD (gastroesophageal reflux disease)     Heart failure     Hypertension     Hypothyroidism          PAST SURGICAL HISTORY  Past Surgical History:   Procedure Laterality Date    CARDIAC CATHETERIZATION N/A 7/7/2023    Procedure: Right and Left Heart Cath;  Surgeon:  Ra Cole MD;  Location:  [facility];  Service: Cardiology;  Laterality: N/A;    CARDIAC CATHETERIZATION N/A 7/7/2023    Procedure: Percutaneous Coronary Intervention;  Surgeon: Ra Cole MD;  Location:  [facility];  Service: Cardiology;  Laterality: N/A;    CARDIAC CATHETERIZATION N/A 7/7/2023    Procedure: Stent ANDRZEJ coronary;  Surgeon: Ra Cole MD;  Location:  [facility];  Service: Cardiology;  Laterality: N/A;    CARDIAC CATHETERIZATION N/A 7/7/2023    Procedure: Coronary angiography;  Surgeon: Ra Cole MD;  Location:  [facility];  Service: Cardiology;  Laterality: N/A;    CARDIAC CATHETERIZATION N/A 7/7/2023    Procedure: Left ventriculography;  Surgeon: Ra Cole MD;  Location:  [facility];  Service: Cardiology;  Laterality: N/A;    EYE SURGERY Left     INTUBATION  5/21/2023         PARATHYROIDECTOMY      Patient reports thyroidectomy partial not a para thyroidectomy.    THYROIDECTOMY, PARTIAL      1984         FAMILY HISTORY  Family History   Problem Relation Age of Onset    Alzheimer's disease Mother     Lung disease Father     Alcohol abuse Father     Heart disease Brother     Hypertension Brother     Lung disease Brother     Alcohol abuse Brother     Cancer Brother         LUNG  WAS A SMOKER    Thyroid disease Maternal Grandmother          SOCIAL HISTORY  Social History     Socioeconomic History    Marital status:    Tobacco Use    Smoking status: Never     Passive exposure: Never    Smokeless tobacco: Never   Vaping Use    Vaping status: Never Used   Substance and Sexual Activity    Alcohol use: Yes     Comment: rare    Drug use: No    Sexual activity: Defer         ALLERGIES  Lansoprazole, Buspirone, Diphenhydramine hcl (sleep), and Lisinopril        REVIEW OF SYSTEMS  Review of Systems     Included in HPI  All systems reviewed and negative  except for those discussed in HPI.       PHYSICAL EXAM  ED Triage Vitals   Temp Heart Rate Resp BP SpO2   01/17/25 2127 01/17/25 2127 01/17/25 2127 01/17/25 2127 01/17/25 2127   98.2 °F (36.8 °C) 86 16 130/85 98 %      Temp src Heart Rate Source Patient Position BP Location FiO2 (%)   -- 01/17/25 2137 01/17/25 2137 01/17/25 2137 --    Monitor Lying Right arm        Physical Exam      GENERAL: no acute distress  HENT: nares patent  EYES: no scleral icterus  CV: regular rhythm, normal rate  RESPIRATORY: normal effort, clear to auscultation bilaterally  ABDOMEN: soft, nontender  MUSCULOSKELETAL: no deformity, no lower extremity edema or tenderness  NEURO: alert, moves all extremities, follows commands  PSYCH:  calm, cooperative  SKIN: warm, dry    Vital signs and nursing notes reviewed.          LAB RESULTS  Recent Results (from the past 24 hours)   ECG 12 Lead Dyspnea    Collection Time: 01/17/25  9:39 PM   Result Value Ref Range    QT Interval 373 ms    QTC Interval 444 ms   Comprehensive Metabolic Panel    Collection Time: 01/17/25 10:07 PM    Specimen: Blood   Result Value Ref Range    Glucose 114 (H) 65 - 99 mg/dL    BUN 12 8 - 23 mg/dL    Creatinine 1.03 (H) 0.57 - 1.00 mg/dL    Sodium 140 136 - 145 mmol/L    Potassium 3.8 3.5 - 5.2 mmol/L    Chloride 102 98 - 107 mmol/L    CO2 20.9 (L) 22.0 - 29.0 mmol/L    Calcium 9.3 8.6 - 10.5 mg/dL    Total Protein 7.7 6.0 - 8.5 g/dL    Albumin 4.1 3.5 - 5.2 g/dL    ALT (SGPT) 16 1 - 33 U/L    AST (SGOT) 32 1 - 32 U/L    Alkaline Phosphatase 140 (H) 39 - 117 U/L    Total Bilirubin 0.5 0.0 - 1.2 mg/dL    Globulin 3.6 gm/dL    A/G Ratio 1.1 g/dL    BUN/Creatinine Ratio 11.7 7.0 - 25.0    Anion Gap 17.1 (H) 5.0 - 15.0 mmol/L    eGFR 53.7 (L) >60.0 mL/min/1.73   BNP    Collection Time: 01/17/25 10:07 PM    Specimen: Blood   Result Value Ref Range    proBNP 1,527.0 0.0 - 1,800.0 pg/mL   High Sensitivity Troponin T    Collection Time: 01/17/25 10:07 PM    Specimen: Blood   Result  Value Ref Range    HS Troponin T 80 (C) <14 ng/L   CBC Auto Differential    Collection Time: 01/17/25 10:07 PM    Specimen: Blood   Result Value Ref Range    WBC 8.88 3.40 - 10.80 10*3/mm3    RBC 4.96 3.77 - 5.28 10*6/mm3    Hemoglobin 15.1 12.0 - 15.9 g/dL    Hematocrit 44.7 34.0 - 46.6 %    MCV 90.1 79.0 - 97.0 fL    MCH 30.4 26.6 - 33.0 pg    MCHC 33.8 31.5 - 35.7 g/dL    RDW 11.7 (L) 12.3 - 15.4 %    RDW-SD 38.6 37.0 - 54.0 fl    MPV 8.9 6.0 - 12.0 fL    Platelets 355 140 - 450 10*3/mm3    Neutrophil % 80.4 (H) 42.7 - 76.0 %    Lymphocyte % 6.8 (L) 19.6 - 45.3 %    Monocyte % 7.2 5.0 - 12.0 %    Eosinophil % 4.2 0.3 - 6.2 %    Basophil % 0.5 0.0 - 1.5 %    Immature Grans % 0.9 (H) 0.0 - 0.5 %    Neutrophils, Absolute 7.15 (H) 1.70 - 7.00 10*3/mm3    Lymphocytes, Absolute 0.60 (L) 0.70 - 3.10 10*3/mm3    Monocytes, Absolute 0.64 0.10 - 0.90 10*3/mm3    Eosinophils, Absolute 0.37 0.00 - 0.40 10*3/mm3    Basophils, Absolute 0.04 0.00 - 0.20 10*3/mm3    Immature Grans, Absolute 0.08 (H) 0.00 - 0.05 10*3/mm3    nRBC 0.0 0.0 - 0.2 /100 WBC       Ordered the above labs and reviewed the results.        RADIOLOGY  XR Chest 1 View    Result Date: 1/17/2025  SINGLE VIEW OF THE CHEST  HISTORY: Shortness of air  COMPARISON: July 10, 2023  FINDINGS: Heart is enlarged. There is calcification of the aorta. There is no vascular congestion. No pneumothorax, pleural effusion, or acute infiltrate is seen. There are asymmetric degenerative changes of the right shoulder. There is thoracolumbar scoliosis.      No acute findings.  This report was finalized on 1/17/2025 11:09 PM by Dr. Kelly Chirinos M.D on Workstation: [id number]       Ordered the above noted radiological studies. Reviewed by me in PACS.        MEDICATIONS GIVEN IN ER  Medications   sodium chloride 0.9 % flush 10 mL (has no administration in time range)   acetaminophen (TYLENOL) tablet 1,000 mg (1,000 mg Oral Given 1/17/25 3300)   aspirin chewable tablet 324 mg  (324 mg Oral Given 1/17/25 8546)         ORDERS PLACED DURING THIS VISIT:  Orders Placed This Encounter   Procedures    XR Chest 1 View    Comprehensive Metabolic Panel    BNP    High Sensitivity Troponin T    CBC Auto Differential    High Sensitivity Troponin T 1Hr    Monitor Blood Pressure    Continuous Pulse Oximetry    LCG (on-call MD unless specified)    LHA (on-call MD unless specified) Details    ECG 12 Lead Dyspnea    Insert Peripheral IV    Initiate Observation Status    CBC & Differential         OUTPATIENT MEDICATION MANAGEMENT:  Current Facility-Administered Medications Ordered in Epic   Medication Dose Route Frequency Provider Last Rate Last Admin    sodium chloride 0.9 % flush 10 mL  10 mL Intravenous PRN Sen Duffy II, MD         Current Outpatient Medications Ordered in Epic   Medication Sig Dispense Refill    acetaminophen (TYLENOL) 325 MG tablet Take 2 tablets by mouth Every 6 (Six) Hours As Needed for Mild Pain.      apixaban (ELIQUIS) 2.5 MG tablet tablet Take 1 tablet by mouth Every 12 (Twelve) Hours. Indications: Atrial Fibrillation 180 tablet 1    [place] Thyroid 15 MG tablet Take 1 tablet by mouth once daily 90 tablet 0    [place] Thyroid 30 MG tablet Take 1 tablet by mouth once daily 90 tablet 0    Artificial Tear Ointment (artificial tears) ophthalmic ointment Administer 1 application to both eyes Every 1 (One) Hour As Needed (dry eye).      atenolol (TENORMIN) 50 MG tablet TAKE 1 TABLET EVERY 12 HOURS 180 tablet 0    atorvastatin (LIPITOR) 40 MG tablet Take 1 tablet by mouth Daily. (Patient taking differently: Take 0.5 tablets by mouth Daily.) 90 tablet 1    Breztri Aerosphere 160-9-4.8 MCG/ACT aerosol inhaler 2 puffs 2 (Two) Times a Day.      Cimetidine (TAGAMET PO) Take  by mouth.      clopidogrel (PLAVIX) 75 MG tablet Take 1 tablet by mouth Daily. 90 tablet 3    Glycerin-Polysorbate 80 (REFRESH DRY EYE THERAPY OP) Apply 1 drop to eye(s) as directed by provider Daily. For dry  eyes      guaiFENesin (MUCINEX) 600 MG 12 hr tablet Take 1 tablet by mouth 2 (Two) Times a Day As Needed for Cough or Congestion.      hydroCHLOROthiazide (HYDRODIURIL) 25 MG tablet Take 1 tablet by mouth Daily. (Patient taking differently: Take 1 tablet by mouth As Needed.) 30 tablet 5    ipratropium-albuterol (DUO-NEB) 0.5-2.5 mg/3 ml nebulizer Take 3 mL by nebulization Every 4 (Four) Hours As Needed for Wheezing.      losartan (COZAAR) 50 MG tablet Take 1 tablet by mouth Daily. (Patient taking differently: Take 1 tablet by mouth 2 (Two) Times a Day.) 90 tablet 1    PARoxetine (PAXIL) 10 MG tablet Take 2 tablets by mouth Daily. 180 tablet 0    vitamin B-12 (CYANOCOBALAMIN) 500 MCG tablet Take 1 tablet by mouth Daily.      vitamin C (ASCORBIC ACID) 250 MG tablet Take 2 tablets by mouth Daily.      vitamin D3 125 MCG (5000 UT) capsule capsule Take 1 capsule by mouth Daily.      amLODIPine (NORVASC) 5 MG tablet TAKE 1 TABLET EVERY DAY 90 tablet 1    empagliflozin (Jardiance) 10 MG tablet tablet Take 1 tablet by mouth Daily. 90 tablet 1    Ventolin  (90 Base) MCG/ACT inhaler Inhale 2 puffs Every 4 (Four) Hours As Needed.         PROCEDURES  Procedures          MEDICAL DECISION MAKING, PROGRESS, and CONSULTS    Discussion below represents my analysis of pertinent findings related to patient's condition, differential diagnosis, treatment plan and final disposition.      Additional sources:  - Discussed/obtained information from independent historians: EMS  Additional information was obtained to confirm the patient's history.          Differential diagnosis:    Differential diagnosis includes but not limited to CHF, acute coronary syndrome, pulmonary embolism, pneumothorax, pneumonia, asthma/COPD, pulmonary hypertension, deconditioning, anemia, other hematologic etiologies such as CO poisoning, anxiety, flash pulmonary edema                     Independent interpretation of labs, radiology studies, and discussions  with consultants:  ED Course as of 01/17/25 2348 Fri Jan 17, 2025 2143 EKG independently interpreted by myself.  Time 9:39 PM.  Sinus rhythm.  Heart rate 85.  Normal intervals and axis.  Nonspecific ST depression in multiple leads primarily in the inferior leads as well as in leads V4 through V6. [TD]   2309 HS Troponin T(!!): 80 [TD]   2310 Chest x-ray independently interpreted by myself.  No evidence of pneumonia. [TD]   2310 proBNP: 1,527.0 [TD]   2346 I discussed the case with Dr. Boyce, cardiology.  He recommends admission to the hospitalist service.  No acute intervention as the patient is currently chest pain-free and shortness of breath free [TD]   2346 I discussed the case with Kasia who will admit to the hospital for Dr. Treviño. [TD]      ED Course User Index  [TD] Sen Duffy II, MD           DIAGNOSIS  Final diagnoses:   Flash pulmonary edema   Elevated troponin         DISPOSITION  Admit      Latest Documented Vital Signs:  As of 23:48 EST  BP- 134/73 HR- 75 Temp- 98.2 °F (36.8 °C) O2 sat- 94%      --    Please note that portions of this were completed with a voice recognition program.       Note Disclaimer: At [facility], we believe that sharing information builds trust and better relationships. You are receiving this note because you are receiving care at [facility] or recently visited. It is possible you will see health information before a provider has talked with you about it. This kind of information can be easy to misunderstand. To help you fully understand what it means for your health, we urge you to discuss this note with your provider.         Sen Duffy II, MD  01/17/25 8236

## 2025-01-18 NOTE — CONSULTS
Patient Name: Dafne Mary  :1940  84 y.o.    Date of Admission: 2025  Encounter Provider: Maricarmen Gar MD  Date of Encounter Visit: 25  Place of Service: [facility] CARDIOLOGY  Referring Provider: Sen Duffy II,*  Patient Care Team:  Jossy Sauceda MD as PCP - General (Urgent Care)  Javier Nolan MD as Consulting Physician (Pulmonary Disease)  Nae Norman MD as Consulting Physician (Cardiology)      Chief complaint: Shortness of breath      History of Present Illness:    This is a pleasant 84 year old female with a past medical history significant for coronary artery disease, PAF, hypertension, hyperlipidemia, hypothyroidism, and GERD. She follows with [facility]. She was last seen  and complained of worsening exertional dyspnea. Echocardiogram and stress test were ordered.     She presented to [facility] yesterday complaining of worsening shortness of breath. EMS noted her systolic blood pressure to be over 200. She was also hypoxic with saturations in the 70s. She received 2 SL nitroglycerin with rapid improvement in her symptoms. Her initial troponin was 80, currently 218. She had a negative d-dimer and proBNP. Sinus rhythm with nonspecific ST depression in multiple leads primarily in the inferior leads as well as in leads V4 through V6 on EKG. She was admitted to [facility] for further workup.     She previously followed with Dr. Norman but during all of the [facility] problems she saw Dr. Alonzo.  She just saw him on Thursday and complained of worsening shortness of breath.  He ordered an echo and a stress test because she supposed to be having some sort of pain procedure.  Thursday night she had some GI issues.  She continued to feel nauseous on Friday and then got really short of breath and could not get it to stop so she presented to the emergency room.  She was given 2 nitrates and EMS and felt better.      ECHO 10/26/2023    Limited  study    Left ventricular systolic function is normal. Calculated left ventricular EF = 52.7% Normal left ventricular cavity size and wall thickness noted. All left ventricular wall segments contract normally. Left ventricular diastolic function was indeterminate.    The left atrial cavity is mild to moderately dilated.    Stress Test 11/5/2021  Left ventricular ejection fraction is normal. (Calculated EF = 58%).  Myocardial perfusion imaging indicates a normal myocardial perfusion study with no evidence of ischemia.  Impressions are consistent with a low risk study.     Cardiac Catheterization 7/7/2023  Conclusions:   1. Left main: Normal.  2. LAD: Diffuse, calcified 80% mid vessel stenosis.  3. LCX: Tubular 90% ostial to proximal OM1 stenosis  4. RCA: Diffuse 30 to 40% mid vessel stenosis  5.  Normal right and left-sided filling pressures.  6.  Decreased cardiac index  7.  Successful PCI of the mid LAD with a 2.75 x 28 mm Xience [place] drug-eluting stent, postdilated to high-pressure with a 2.75 x 20 mm noncompliant trek balloon.  8.  Successful PCI of the proximal circumflex into proximal OM1 with a 2.25 x 23 mm Xience [place] drug-eluting stent.     Recommendations: Okay to restart direct oral anticoagulant on 7/9.  Recommend triple therapy for 1 month and then direct oral anticoagulant and Plavix.       Past Medical History:   Diagnosis Date    Atrial fibrillation with RVR 6/4/2023    CAD (coronary artery disease) 7/10/2023    Chronic diastolic congestive heart failure 11/17/2022    Depression     Emphysema lung     GERD (gastroesophageal reflux disease)     Heart failure     Hyperlipidemia     Hypertension     Hypothyroidism     Mitral valve regurgitation 7/10/2023    NSTEMI (non-ST elevated myocardial infarction) 10/25/2023       Past Surgical History:   Procedure Laterality Date    CARDIAC CATHETERIZATION N/A 7/7/2023    Procedure: Right and Left Heart Cath;  Surgeon: Ra Cole MD;   Location:  [facility]ASIVE [facility];  Service: Cardiology;  Laterality: N/A;    CARDIAC CATHETERIZATION N/A 7/7/2023    Procedure: Percutaneous Coronary Intervention;  Surgeon: Ra Cole MD;  Location:  [facility]VASIVE [facility]ATION;  Service: Cardiology;  Laterality: N/A;    CARDIAC CATHETERIZATION N/A 7/7/2023    Procedure: Stent ANDRZEJ coronary;  Surgeon: Ra Cole MD;  Location:  [facility] C[facility]VASIVE LOCATION;  Service: Cardiology;  Laterality: N/A;    CARDIAC CATHETERIZATION N/A 7/7/2023    Procedure: Coronary angiography;  Surgeon: Ra Cole MD;  Location:  [facility]ASIVE LOCATION;  Service: Cardiology;  Laterality: N/A;    CARDIAC CATHETERIZATION N/A 7/7/2023    Procedure: Left ventriculography;  Surgeon: Ra Cole MD;  Location:  [facility]VASIVE LOCATION;  Service: Cardiology;  Laterality: N/A;    EYE SURGERY Left     INTUBATION  5/21/2023         PARATHYROIDECTOMY      Patient reports thyroidectomy partial not a para thyroidectomy.    THYROIDECTOMY, PARTIAL      1984         Prior to Admission medications    Medication Sig Start Date End Date Taking? Authorizing Provider   acetaminophen (TYLENOL) 325 MG tablet Take 2 tablets by mouth Every 6 (Six) Hours As Needed for Mild Pain. 6/12/23  Yes Simon Moscoso MD   apixaban (ELIQUIS) 2.5 MG tablet tablet Take 1 tablet by mouth Every 12 (Twelve) Hours. Indications: Atrial Fibrillation 1/9/24  Yes Shelbie Aguilera APRN   [place] Thyroid 15 MG tablet Take 1 tablet by mouth once daily 8/11/23  Yes Sintia Chatterjee PA   [place] Thyroid 30 MG tablet Take 1 tablet by mouth once daily 8/11/23  Yes Sintia Chatterjee PA   Artificial Tear Ointment (artificial tears) ophthalmic ointment Administer 1 application to both eyes Every 1 (One) Hour As Needed (dry eye). 6/12/23  Yes Simon Moscoso MD   atenolol (TENORMIN) 50 MG tablet TAKE 1 TABLET EVERY 12 HOURS 8/8/24  Yes Nae Norman MD    atorvastatin (LIPITOR) 40 MG tablet Take 1 tablet by mouth Daily.  Patient taking differently: Take 0.5 tablets by mouth Daily. 2/2/24  Yes Nae Norman MD   Bregamatrjeffrey Aerosphere 160-9-4.8 MCG/ACT aerosol inhaler 2 puffs 2 (Two) Times a Day. 6/6/22  Yes Anurag Palacios MD   Cimetidine (TAGAMET PO) Take  by mouth.   Yes Anurag Palacios MD   clopidogrel (PLAVIX) 75 MG tablet Take 1 tablet by mouth Daily. 1/5/24  Yes Nae Norman MD   Glycerin-Polysorbate 80 (REFRESH DRY EYE THERAPY OP) Apply 1 drop to eye(s) as directed by provider Daily. For dry eyes   Yes Anurag Palacios MD   guaiFENesin (MUCINEX) 600 MG 12 hr tablet Take 1 tablet by mouth 2 (Two) Times a Day As Needed for Cough or Congestion. 6/12/23  Yes Simon Moscoso MD   hydroCHLOROthiazide (HYDRODIURIL) 25 MG tablet Take 1 tablet by mouth Daily.  Patient taking differently: Take 1 tablet by mouth As Needed. 8/22/23  Yes Nae Norman MD   ipratropium-albuterol (DUO-NEB) 0.5-2.5 mg/3 ml nebulizer Take 3 mL by nebulization Every 4 (Four) Hours As Needed for Wheezing.   Yes ProviderAnurag MD   losartan (COZAAR) 50 MG tablet Take 1 tablet by mouth Daily.  Patient taking differently: Take 1 tablet by mouth 2 (Two) Times a Day. 2/2/24  Yes Nae Norman MD   PARoxetine (PAXIL) 10 MG tablet Take 2 tablets by mouth Daily. 7/12/23  Yes Sintia Chatterjee PA   vitamin B-12 (CYANOCOBALAMIN) 500 MCG tablet Take 1 tablet by mouth Daily.   Yes Anurag Palacios MD   vitamin C (ASCORBIC ACID) 250 MG tablet Take 2 tablets by mouth Daily.   Yes Anurag Palacios MD   vitamin D3 125 MCG (5000 UT) capsule capsule Take 1 capsule by mouth Daily.   Yes Anurag Palacios MD   amLODIPine (NORVASC) 5 MG tablet TAKE 1 TABLET EVERY DAY 5/30/24   Nae Norman MD   empagliflozin (Jardiance) 10 MG tablet tablet Take 1 tablet by mouth Daily. 1/9/24   Shelbie Aguilera, ANTONELLA   Ventolin  (90 Base) MCG/ACT inhaler Inhale 2  "puffs Every 4 (Four) Hours As Needed. 12/20/22   Provider, Anurag, MD       Allergies   Allergen Reactions    Lansoprazole Nausea Only     NAUSEA  NAUSEA  NAUSEA    Buspirone Other (See Comments)     agitation    Diphenhydramine Hcl (Sleep) Irritability    Lisinopril Cough       Social History     Socioeconomic History    Marital status:    Tobacco Use    Smoking status: Never     Passive exposure: Never    Smokeless tobacco: Never   Vaping Use    Vaping status: Never Used   Substance and Sexual Activity    Alcohol use: Yes     Comment: rare    Drug use: No    Sexual activity: Defer       Family History   Problem Relation Age of Onset    Alzheimer's disease Mother     Lung disease Father     Alcohol abuse Father     Heart disease Brother     Hypertension Brother     Lung disease Brother     Alcohol abuse Brother     Cancer Brother         LUNG  WAS A SMOKER    Thyroid disease Maternal Grandmother        REVIEW OF SYSTEMS:   All other systems reviewed and negative.        Objective:     Vitals:    01/18/25 0505 01/18/25 0739 01/18/25 0908 01/18/25 0934   BP:  (!) 195/84 (!) 215/104 174/83   BP Location:   Left arm Right arm   Patient Position:   Lying Lying   Pulse:  85 87    Resp:   18    Temp:   97.9 °F (36.6 °C)    TempSrc:   Oral    SpO2:  95% 94% 96%   Weight: 58.3 kg (128 lb 8 oz) 58.3 kg (128 lb 8.5 oz)     Height:  152.4 cm (60\")       Body mass index is 25.1 kg/m².  No intake or output data in the 24 hours ending 01/18/25 1020    General: Alert, no acute distress  Neck: No JVD  Cardiac: regular rate and rhythm. No murmurs.   Pulmonary: normal rate. No wheezes, crackles or rales. Clear to auscultation  Extremities: No edema.    Lab Review:     Results from last 7 days   Lab Units 01/18/25  0525 01/17/25  2207   SODIUM mmol/L 144 140   POTASSIUM mmol/L 3.5 3.8   CHLORIDE mmol/L 108* 102   CO2 mmol/L 25.0 20.9*   BUN mg/dL 10 12   CREATININE mg/dL 0.79 1.03*   CALCIUM mg/dL 8.4* 9.3   BILIRUBIN " mg/dL  --  0.5   ALK PHOS U/L  --  140*   ALT (SGPT) U/L  --  16   AST (SGOT) U/L  --  32   GLUCOSE mg/dL 91 114*     Results from last 7 days   Lab Units 01/18/25  0525 01/17/25  2312 01/17/25  2207   HSTROP T ng/L 218* 146* 80*     Results from last 7 days   Lab Units 01/18/25  0525   WBC 10*3/mm3 6.51   HEMOGLOBIN g/dL 12.7   HEMATOCRIT % 37.8   PLATELETS 10*3/mm3 287                 I personally viewed and interpreted the patient's EKG/Telemetry data.              Assessment and Plan:       1.  Non-ST elevation myocardial infarction.  High-sensitivity troponin 80 -> 146 -> 218.  EKG with ST depression.  Repeat EKG shows she still has ST depressions but they are somewhat improved.  Echocardiogram this morning does not show focal wall motion abnormality.  Ejection fraction is unchanged from prior.  She is not having symptoms at the moment.  -Hold Eliquis  -Start heparin  -Add Nitropaste  -Continue atenolol and atorvastatin and plavix  -She is very anxious to eat so we will give her a diet today.  -Recheck EKG and troponin in the morning.  -N.p.o. after midnight in case she needs to go to the lab tomorrow.  -Tentative plan is for heart cath on Monday unless her symptoms worsen.  2.  Known coronary artery disease.  July 7, 2023 she had stenting of the mid left anterior descending coronary artery (2.75 x 20 mm Xience) and first obtuse marginal branch (2.25 x 23 mm Xience).  She really liked Dr. Norman but has been following with [facility] cardiology since the [facility] problem.  3.  Paroxysmal atrial fibrillation.  She is anticoagulated with Eliquis.  Will hold as above.  4.  Systemic hypertension.  5.  Hyperlipidemia    Maricarmen Gar MD  01/18/25  10:20 EST

## 2025-01-18 NOTE — NURSING NOTE
RN contacted cardiology nursing to verify plavix administration after continuous IV heparin has been ordered. Protocol to verify that all anticoagulants have been discontinued due to heparin infusion have been ordered. Cardiology nursing stated to continue with plavix administration as well as the continuous heparin infusion.     IV heparin is currently not in the omnicell. RN messaged pharmacy requesting the dose at 1308. Will wait for medication to be available to begin continuous infusion per MD order.

## 2025-01-19 LAB
APTT PPP: 66.8 SECONDS (ref 22.7–35.4)
APTT PPP: 69.7 SECONDS (ref 22.7–35.4)
BASOPHILS # BLD AUTO: 0.05 10*3/MM3 (ref 0–0.2)
BASOPHILS NFR BLD AUTO: 0.7 % (ref 0–1.5)
DEPRECATED RDW RBC AUTO: 40.8 FL (ref 37–54)
EOSINOPHIL # BLD AUTO: 0.55 10*3/MM3 (ref 0–0.4)
EOSINOPHIL NFR BLD AUTO: 8 % (ref 0.3–6.2)
ERYTHROCYTE [DISTWIDTH] IN BLOOD BY AUTOMATED COUNT: 12.1 % (ref 12.3–15.4)
HCT VFR BLD AUTO: 37.6 % (ref 34–46.6)
HGB BLD-MCNC: 12.6 G/DL (ref 12–15.9)
IMM GRANULOCYTES # BLD AUTO: 0.06 10*3/MM3 (ref 0–0.05)
IMM GRANULOCYTES NFR BLD AUTO: 0.9 % (ref 0–0.5)
LYMPHOCYTES # BLD AUTO: 1.34 10*3/MM3 (ref 0.7–3.1)
LYMPHOCYTES NFR BLD AUTO: 19.5 % (ref 19.6–45.3)
MCH RBC QN AUTO: 30.7 PG (ref 26.6–33)
MCHC RBC AUTO-ENTMCNC: 33.5 G/DL (ref 31.5–35.7)
MCV RBC AUTO: 91.5 FL (ref 79–97)
MONOCYTES # BLD AUTO: 0.67 10*3/MM3 (ref 0.1–0.9)
MONOCYTES NFR BLD AUTO: 9.8 % (ref 5–12)
NEUTROPHILS NFR BLD AUTO: 4.19 10*3/MM3 (ref 1.7–7)
NEUTROPHILS NFR BLD AUTO: 61.1 % (ref 42.7–76)
NRBC BLD AUTO-RTO: 0 /100 WBC (ref 0–0.2)
PLATELET # BLD AUTO: 275 10*3/MM3 (ref 140–450)
PMV BLD AUTO: 8.8 FL (ref 6–12)
QT INTERVAL: 398 MS
QTC INTERVAL: 433 MS
RBC # BLD AUTO: 4.11 10*6/MM3 (ref 3.77–5.28)
TROPONIN T SERPL HS-MCNC: 203 NG/L
TSH SERPL DL<=0.05 MIU/L-ACNC: 3.79 UIU/ML (ref 0.27–4.2)
WBC NRBC COR # BLD AUTO: 6.86 10*3/MM3 (ref 3.4–10.8)

## 2025-01-19 PROCEDURE — 93005 ELECTROCARDIOGRAM TRACING: CPT | Performed by: INTERNAL MEDICINE

## 2025-01-19 PROCEDURE — 85730 THROMBOPLASTIN TIME PARTIAL: CPT | Performed by: STUDENT IN AN ORGANIZED HEALTH CARE EDUCATION/TRAINING PROGRAM

## 2025-01-19 PROCEDURE — 85730 THROMBOPLASTIN TIME PARTIAL: CPT | Performed by: INTERNAL MEDICINE

## 2025-01-19 PROCEDURE — 84443 ASSAY THYROID STIM HORMONE: CPT | Performed by: HOSPITALIST

## 2025-01-19 PROCEDURE — 84484 ASSAY OF TROPONIN QUANT: CPT | Performed by: INTERNAL MEDICINE

## 2025-01-19 PROCEDURE — 85025 COMPLETE CBC W/AUTO DIFF WBC: CPT | Performed by: INTERNAL MEDICINE

## 2025-01-19 PROCEDURE — 25010000002 HYDRALAZINE PER 20 MG: Performed by: INTERNAL MEDICINE

## 2025-01-19 PROCEDURE — 99214 OFFICE O/P EST MOD 30 MIN: CPT | Performed by: INTERNAL MEDICINE

## 2025-01-19 PROCEDURE — 93010 ELECTROCARDIOGRAM REPORT: CPT | Performed by: INTERNAL MEDICINE

## 2025-01-19 PROCEDURE — 93005 ELECTROCARDIOGRAM TRACING: CPT

## 2025-01-19 RX ORDER — CARVEDILOL 25 MG/1
25 TABLET ORAL 2 TIMES DAILY WITH MEALS
Status: DISCONTINUED | OUTPATIENT
Start: 2025-01-19 | End: 2025-01-23 | Stop reason: HOSPADM

## 2025-01-19 RX ORDER — HYDRALAZINE HYDROCHLORIDE 20 MG/ML
20 INJECTION INTRAMUSCULAR; INTRAVENOUS EVERY 4 HOURS PRN
Status: DISCONTINUED | OUTPATIENT
Start: 2025-01-19 | End: 2025-01-23 | Stop reason: HOSPADM

## 2025-01-19 RX ADMIN — ATORVASTATIN CALCIUM 40 MG: 20 TABLET, FILM COATED ORAL at 08:50

## 2025-01-19 RX ADMIN — LOSARTAN POTASSIUM 50 MG: 50 TABLET, FILM COATED ORAL at 08:49

## 2025-01-19 RX ADMIN — CARVEDILOL 25 MG: 25 TABLET, FILM COATED ORAL at 18:27

## 2025-01-19 RX ADMIN — LORAZEPAM 0.5 MG: 0.5 TABLET ORAL at 20:03

## 2025-01-19 RX ADMIN — Medication 500 MCG: at 08:50

## 2025-01-19 RX ADMIN — ACETAMINOPHEN 325MG 650 MG: 325 TABLET ORAL at 10:58

## 2025-01-19 RX ADMIN — HYDRALAZINE HYDROCHLORIDE 20 MG: 20 INJECTION INTRAMUSCULAR; INTRAVENOUS at 13:04

## 2025-01-19 RX ADMIN — LOSARTAN POTASSIUM 50 MG: 50 TABLET, FILM COATED ORAL at 20:03

## 2025-01-19 RX ADMIN — NITROGLYCERIN 1 INCH: 20 OINTMENT TOPICAL at 18:27

## 2025-01-19 RX ADMIN — PAROXETINE HYDROCHLORIDE HEMIHYDRATE 20 MG: 20 TABLET, FILM COATED ORAL at 08:48

## 2025-01-19 RX ADMIN — OXYCODONE HYDROCHLORIDE AND ACETAMINOPHEN 500 MG: 500 TABLET ORAL at 08:50

## 2025-01-19 RX ADMIN — LEVOTHYROXINE, LIOTHYRONINE 30 MG: 19; 4.5 TABLET ORAL at 08:48

## 2025-01-19 RX ADMIN — NITROGLYCERIN 0.5 INCH: 20 OINTMENT TOPICAL at 08:50

## 2025-01-19 RX ADMIN — ACETAMINOPHEN 325MG 650 MG: 325 TABLET ORAL at 22:20

## 2025-01-19 RX ADMIN — Medication 5000 UNITS: at 08:49

## 2025-01-19 RX ADMIN — ATENOLOL 50 MG: 50 TABLET ORAL at 08:49

## 2025-01-19 RX ADMIN — CLOPIDOGREL BISULFATE 75 MG: 75 TABLET ORAL at 08:49

## 2025-01-19 RX ADMIN — LORAZEPAM 0.5 MG: 0.5 TABLET ORAL at 10:58

## 2025-01-19 RX ADMIN — NITROGLYCERIN 1 INCH: 20 OINTMENT TOPICAL at 13:04

## 2025-01-19 NOTE — PROGRESS NOTES
Dr. LATANYA Mendez    [address]        Patient ID:  Name:  Dafne Mary  MRN:  [id number]  1940  84 y.o.  female            CC/Reason for visit: Shortness of breath    Interval hx: No shortness of breath at rest  Not requiring oxygen since yesterday  An episode of very elevated blood pressure, systolic in the 200s again last night    ROS: No cough, no sputum, no hemoptysis    Vitals:  Vitals:    01/19/25 0406 01/19/25 0514 01/19/25 0726 01/19/25 1101   BP: 147/74  177/77 (!) 199/99   BP Location: Right arm  Right arm Right arm   Patient Position: Lying  Lying Lying   Pulse: 73  85 84   Resp: 18 18 18   Temp: 98.2 °F (36.8 °C)   97.9 °F (36.6 °C)   TempSrc: Oral  Oral Oral   SpO2: 96%  95% 97%   Weight:  58.3 kg (128 lb 8.5 oz)     Height:               Body mass index is 25.1 kg/m².  No intake or output data in the 24 hours ending 01/19/25 1423    Exam:  GEN:  No distress  Alert, oriented x 3.   LUNGS: Clear breath sounds bilat, no use of accessory muscles  CV:  Normal S1S2, without murmur, no edema  ABD:  Non tender, no enlarged liver or masses      Scheduled meds:  vitamin C, 500 mg, Oral, Daily  atorvastatin, 40 mg, Oral, Daily  carvedilol, 25 mg, Oral, BID With Meals  cholecalciferol, 5,000 Units, Oral, Daily  clopidogrel, 75 mg, Oral, Daily  losartan, 50 mg, Oral, BID  nitroglycerin, 1 inch, Topical, TID - Nitrates  PARoxetine, 20 mg, Oral, Daily  Thyroid, 30 mg, Oral, Daily  vitamin B-12, 500 mcg, Oral, Daily      IV meds:                      heparin, 12 Units/kg/hr, Last Rate: 9 Units/kg/hr (01/19/25 1219)        Data Review:   I reviewed the patient's medications and new clinical results.            Results from last 7 days   Lab Units 01/19/25  0332 01/18/25  1257 01/18/25  0525 01/17/25  2207   SODIUM mmol/L  --   --  144 140   POTASSIUM mmol/L  --   --  3.5 3.8   CHLORIDE mmol/L  --   --  108* 102   CO2 mmol/L  --   --  25.0 20.9*   BUN mg/dL  --   --  10 12   CREATININE mg/dL   --   --  0.79 1.03*   CALCIUM mg/dL  --   --  8.4* 9.3   BILIRUBIN mg/dL  --   --   --  0.5   ALK PHOS U/L  --   --   --  140*   ALT (SGPT) U/L  --   --   --  16   AST (SGOT) U/L  --   --   --  32   GLUCOSE mg/dL  --   --  91 114*   WBC 10*3/mm3 6.86  --  6.51 8.88   HEMOGLOBIN g/dL 12.6  --  12.7 15.1   PLATELETS 10*3/mm3 275  --  287 355   INR   --  1.05  --   --    PROBNP pg/mL  --   --   --  1,527.0             Results from last 7 days   Lab Units 01/19/25  0332 01/18/25  1257 01/18/25  0525   HSTROP T ng/L 203* 190* 218*     Results from last 7 days   Lab Units 01/18/25  0223   PH, ARTERIAL pH units 7.389   PCO2, ARTERIAL mm Hg 41.1   PO2 ART mm Hg 64.1*   O2 SATURATION ART % 91.9*   MODALITY  Room Air              ASSESSMENT:     Flash pulmonary edema    Benign hypertension    COPD (chronic obstructive pulmonary disease)    Chronic HFrEF (heart failure with reduced ejection fraction)    Elevated troponin    Stage 3a chronic kidney disease  Hypoxemia  Hypertension, uncontrolled      PLAN:  Unsure if she had flash pulmonary edema and responded to Lasix, but at this time with negative D-dimer, clear chest exam and clear chest x-ray, no reason to suspect pulmonary embolism or any ongoing acute pulmonary issue.  Agree with proceeding with heart catheterization.  Her oxygenation is improved since admission and 1 dose of Lasix.  Her hypertension is still uncontrolled.  We will be available if needed        Td Mendez MD  1/19/2025

## 2025-01-19 NOTE — NURSING NOTE
Problem: Patient Care Overview  Goal: Plan of Care Review  Flowsheets (Taken 8/3/2020 5579)  Plan of Care Reviewed With: patient  Outcome Summary: IP PT eval completed. Pt ambulated 150 ft with RW and CGA. Distance limited by fatigue, weakness, and SOA. VSS on 2LO2NC. Demonstrates mildly impaired endurance, balance, strength, aerobic capacity, and safety awareness. Recommend appropriate for home with 24/7 spv/assist and HH PT, pending performance on stairs next session (pt has 12+8 stairs with B handrails to access primary bed/bath). Will continue to progress pt as able per POC.      "Pt called out at 1726 stating feeling of shortness of breathe. RN asked if pt felt chest pain, pt stated \"no I don't have chest pain, but I feel funky\".    HR and SpO2 on RA are stable. Multifocal PVCs noted on continuous cardiac monitor. STAT EKG is ordered.    Cardiology rounding nurses call initiated. Spoke with MD Carli; notified him of pt and current symptoms. Will call if additional concerns arise.   "

## 2025-01-19 NOTE — PROGRESS NOTES
"    Name: Dafne Mary ADMIT: 2025   : 1940  PCP: Jossy Sauceda MD    MRN: [id number] LOS: 1 days   AGE/SEX: 84 y.o. female  ROOM: [facility]     Subjective   Subjective   Resting in bed.  Denies any chest pain.  No other acute problems.    Objective   Objective   Vital Signs  Temp:  [97.2 °F (36.2 °C)-98.2 °F (36.8 °C)] 98.2 °F (36.8 °C)  Heart Rate:  [71-87] 85  Resp:  [16-20] 18  BP: (147-215)/() 177/77  SpO2:  [93 %-100 %] 95 %  on  Flow (L/min) (Oxygen Therapy):  [0] 0;   Device (Oxygen Therapy): room air  Body mass index is 25.1 kg/m².  Physical Exam  Constitutional:       General: She is not in acute distress.  Pulmonary:      Effort: Pulmonary effort is normal. No respiratory distress.      Breath sounds: No stridor.   Musculoskeletal:      Right lower leg: No edema.      Left lower leg: No edema.   Skin:     Coloration: Skin is not jaundiced.      Findings: No bruising.   Neurological:      General: No focal deficit present.      Mental Status: She is alert.   Psychiatric:         Mood and Affect: Mood normal.         Behavior: Behavior normal.         Results Review     I reviewed the patient's new clinical results.  Results from last 7 days   Lab Units 25  0332 25  0525 25  2207   WBC 10*3/mm3 6.86 6.51 8.88   HEMOGLOBIN g/dL 12.6 12.7 15.1   PLATELETS 10*3/mm3 275 287 355     Results from last 7 days   Lab Units 25  0525 25  2207   SODIUM mmol/L 144 140   POTASSIUM mmol/L 3.5 3.8   CHLORIDE mmol/L 108* 102   CO2 mmol/L 25.0 20.9*   BUN mg/dL 10 12   CREATININE mg/dL 0.79 1.03*   GLUCOSE mg/dL 91 114*   EGFR mL/min/1.73 73.9 53.7*     Results from last 7 days   Lab Units 25  2207   ALBUMIN g/dL 4.1   BILIRUBIN mg/dL 0.5   ALK PHOS U/L 140*   AST (SGOT) U/L 32   ALT (SGPT) U/L 16     Results from last 7 days   Lab Units 25  0525 25  2207   CALCIUM mg/dL 8.4* 9.3   ALBUMIN g/dL  --  4.1       No results found for: \"HGBA1C\", \"POCGLU\"    XR " Chest 1 View    Result Date: 1/17/2025  No acute findings.  This report was finalized on 1/17/2025 11:09 PM by Dr. Kelly Chirinos M.D on Workstation: [id number]     Scheduled Medications  vitamin C, 500 mg, Oral, Daily  atenolol, 50 mg, Oral, Daily  atorvastatin, 40 mg, Oral, Daily  cholecalciferol, 5,000 Units, Oral, Daily  clopidogrel, 75 mg, Oral, Daily  losartan, 50 mg, Oral, BID  nitroglycerin, 0.5 inch, Topical, TID - Nitrates  PARoxetine, 20 mg, Oral, Daily  Thyroid, 30 mg, Oral, Daily  vitamin B-12, 500 mcg, Oral, Daily    Infusions  heparin, 12 Units/kg/hr, Last Rate: 9 Units/kg/hr (01/18/25 2114)    Diet  NPO Diet NPO Type: Strict NPO       Assessment/Plan     Active Hospital Problems    Diagnosis  POA    **Flash pulmonary edema [J81.0]  Yes    Elevated troponin [R79.89]  Yes    Stage 3a chronic kidney disease [N18.31]  Yes    Chronic HFrEF (heart failure with reduced ejection fraction) [I50.22]  Yes    COPD (chronic obstructive pulmonary disease) [J44.9]  Yes    Benign hypertension [I10]  Yes      Resolved Hospital Problems   No resolved problems to display.       84 y.o. female admitted with Flash pulmonary edema.      01/19/25  Tentative plans for heart cath tomorrow.     NSTEMI  CAD with prior stent  -trop 89 > 146 > 218  -TTE w/out wall motion abnormalities  -Cardiology following  -Statin, Coreg, Plavix, losartan    Chronic dyspnea  -Vocal cord dysfunction, felt to be a nonissue per pulmonary  -She is followed by followed by Dr Nolan      Hypothyroid  -TSH WNL  [place] thyroid      Flow (L/min) (Oxygen Therapy):  [0] 0    DVT prophylaxis: Heparin gtt  Discussed with patient and family.  Anticipated discharge home, when cleared by consultants            Ric Pierre MD  [facility]  01/19/25  08:55 EST

## 2025-01-19 NOTE — NURSING NOTE
Pt has had 2 consecutive therapeutic aPTT lab draws; per order do not have to repeat lab draw. No change in dose of continuous heparin drip throughout shift.    Per provider order, continuous heparin drip must be stopped at 0600 on 1/20/25. Heart catheterization to be completed Monday 1/20/25.

## 2025-01-19 NOTE — PROGRESS NOTES
"Patient Name: Dafne Mary  :1940  84 y.o.      Patient Care Team:  Jossy Sauceda MD as PCP - General (Urgent Care)  Javier Nolan MD as Consulting Physician (Pulmonary Disease)  Nae Norman MD as Consulting Physician (Cardiology)    Interval History:   Started on heparin and Nitropaste.  Blood pressure not controlled.    Subjective:  Following for non-STEMI    Objective   Vital Signs  Temp:  [97.2 °F (36.2 °C)-98.2 °F (36.8 °C)] 98.2 °F (36.8 °C)  Heart Rate:  [71-85] 85  Resp:  [16-20] 18  BP: (147-184)/(66-86) 177/77  No intake or output data in the 24 hours ending 25 1048  Flowsheet Rows      Flowsheet Row First Filed Value   Admission Height 152.4 cm (60\") Documented at 2025 0122   Admission Weight 58.3 kg (128 lb 8 oz) Documented at 2025 0122            Results Review:    Results from last 7 days   Lab Units 25  0525   SODIUM mmol/L 144   POTASSIUM mmol/L 3.5   CHLORIDE mmol/L 108*   CO2 mmol/L 25.0   BUN mg/dL 10   CREATININE mg/dL 0.79   GLUCOSE mg/dL 91   CALCIUM mg/dL 8.4*     Results from last 7 days   Lab Units 25  0332 25  1257 25  0525   HSTROP T ng/L 203* 190* 218*     Results from last 7 days   Lab Units 25  0332   WBC 10*3/mm3 6.86   HEMOGLOBIN g/dL 12.6   HEMATOCRIT % 37.6   PLATELETS 10*3/mm3 275     Results from last 7 days   Lab Units 25  0332 25  1929 25  1257   INR   --   --  1.05   APTT seconds 69.7* 135.6* 32.1                     Medication Review:   vitamin C, 500 mg, Oral, Daily  atenolol, 50 mg, Oral, Daily  atorvastatin, 40 mg, Oral, Daily  cholecalciferol, 5,000 Units, Oral, Daily  clopidogrel, 75 mg, Oral, Daily  losartan, 50 mg, Oral, BID  nitroglycerin, 0.5 inch, Topical, TID - Nitrates  PARoxetine, 20 mg, Oral, Daily  Thyroid, 30 mg, Oral, Daily  vitamin B-12, 500 mcg, Oral, Daily         heparin, 12 Units/kg/hr, Last Rate: 9 Units/kg/hr (258)        Assessment & Plan     1.  Non-ST " elevation myocardial infarction.  High-sensitivity troponin 80 -> 146 -> 218 -> 190 -> 203. Echocardiogram 1/19/25 does not show focal wall motion abnormality.  EKG shows persistent ST depression.    -Hold Eliquis  -Continue heparin  -Increase Nitropaste  -Continue atorvastatin and plavix  -Hold heparin in the morning  -Plan is for heart catheterization on Monday.  2.  Known coronary artery disease.  July 7, 2023 she had stenting of the mid left anterior descending coronary artery (2.75 x 20 mm Xience) and first obtuse marginal branch (2.25 x 23 mm Xience).  She really liked Dr. Norman but has been following with [facility] cardiology since the [facility] problem.  3.  Paroxysmal atrial fibrillation.  She is anticoagulated with Eliquis.  Will hold as above.  4.  Systemic hypertension.  -Atenolol 50 mg twice daily.  Change to carvedilol 25 mg twice daily.  -Losartan 50 mg twice daily  -Increase Nitropaste to 1 inch 3 times daily  -IV hydralazine as needed  5.  Hyperlipidemia    Maricarmen Gar MD, [facility]  01/19/25  10:48 EST

## 2025-01-20 PROBLEM — I50.33 ACUTE ON CHRONIC HEART FAILURE WITH PRESERVED EJECTION FRACTION (HFPEF): Status: ACTIVE | Noted: 2025-01-20

## 2025-01-20 PROBLEM — I21.A1 TYPE 2 MYOCARDIAL INFARCTION: Status: ACTIVE | Noted: 2025-01-20

## 2025-01-20 LAB
APTT PPP: 68.9 SECONDS (ref 22.7–35.4)
BASOPHILS # BLD AUTO: 0.07 10*3/MM3 (ref 0–0.2)
BASOPHILS NFR BLD AUTO: 0.9 % (ref 0–1.5)
DEPRECATED RDW RBC AUTO: 40.4 FL (ref 37–54)
EOSINOPHIL # BLD AUTO: 0.47 10*3/MM3 (ref 0–0.4)
EOSINOPHIL NFR BLD AUTO: 6.3 % (ref 0.3–6.2)
ERYTHROCYTE [DISTWIDTH] IN BLOOD BY AUTOMATED COUNT: 12 % (ref 12.3–15.4)
GEN 5 1HR TROPONIN T REFLEX: 187 NG/L
HCT VFR BLD AUTO: 37.4 % (ref 34–46.6)
HGB BLD-MCNC: 11.9 G/DL (ref 12–15.9)
IMM GRANULOCYTES # BLD AUTO: 0.09 10*3/MM3 (ref 0–0.05)
IMM GRANULOCYTES NFR BLD AUTO: 1.2 % (ref 0–0.5)
LYMPHOCYTES # BLD AUTO: 0.82 10*3/MM3 (ref 0.7–3.1)
LYMPHOCYTES NFR BLD AUTO: 11 % (ref 19.6–45.3)
MCH RBC QN AUTO: 29.1 PG (ref 26.6–33)
MCHC RBC AUTO-ENTMCNC: 31.8 G/DL (ref 31.5–35.7)
MCV RBC AUTO: 91.4 FL (ref 79–97)
MONOCYTES # BLD AUTO: 0.75 10*3/MM3 (ref 0.1–0.9)
MONOCYTES NFR BLD AUTO: 10.1 % (ref 5–12)
NEUTROPHILS NFR BLD AUTO: 5.24 10*3/MM3 (ref 1.7–7)
NEUTROPHILS NFR BLD AUTO: 70.5 % (ref 42.7–76)
NRBC BLD AUTO-RTO: 0 /100 WBC (ref 0–0.2)
PLATELET # BLD AUTO: 268 10*3/MM3 (ref 140–450)
PMV BLD AUTO: 8.9 FL (ref 6–12)
QT INTERVAL: 345 MS
QT INTERVAL: 448 MS
QTC INTERVAL: 410 MS
QTC INTERVAL: 504 MS
RBC # BLD AUTO: 4.09 10*6/MM3 (ref 3.77–5.28)
TROPONIN T NUMERIC DELTA: ABNORMAL
WBC NRBC COR # BLD AUTO: 7.44 10*3/MM3 (ref 3.4–10.8)

## 2025-01-20 PROCEDURE — 25810000003 SODIUM CHLORIDE 0.9 % SOLUTION: Performed by: INTERNAL MEDICINE

## 2025-01-20 PROCEDURE — 99232 SBSQ HOSP IP/OBS MODERATE 35: CPT | Performed by: INTERNAL MEDICINE

## 2025-01-20 PROCEDURE — 85025 COMPLETE CBC W/AUTO DIFF WBC: CPT | Performed by: INTERNAL MEDICINE

## 2025-01-20 PROCEDURE — C1894 INTRO/SHEATH, NON-LASER: HCPCS | Performed by: INTERNAL MEDICINE

## 2025-01-20 PROCEDURE — B2111ZZ FLUOROSCOPY OF MULTIPLE CORONARY ARTERIES USING LOW OSMOLAR CONTRAST: ICD-10-PCS | Performed by: INTERNAL MEDICINE

## 2025-01-20 PROCEDURE — 99152 MOD SED SAME PHYS/QHP 5/>YRS: CPT | Performed by: INTERNAL MEDICINE

## 2025-01-20 PROCEDURE — 94761 N-INVAS EAR/PLS OXIMETRY MLT: CPT

## 2025-01-20 PROCEDURE — 84484 ASSAY OF TROPONIN QUANT: CPT | Performed by: STUDENT IN AN ORGANIZED HEALTH CARE EDUCATION/TRAINING PROGRAM

## 2025-01-20 PROCEDURE — 85730 THROMBOPLASTIN TIME PARTIAL: CPT | Performed by: INTERNAL MEDICINE

## 2025-01-20 PROCEDURE — 99153 MOD SED SAME PHYS/QHP EA: CPT | Performed by: INTERNAL MEDICINE

## 2025-01-20 PROCEDURE — 25010000002 FENTANYL CITRATE (PF) 50 MCG/ML SOLUTION: Performed by: INTERNAL MEDICINE

## 2025-01-20 PROCEDURE — 94799 UNLISTED PULMONARY SVC/PX: CPT

## 2025-01-20 PROCEDURE — C1769 GUIDE WIRE: HCPCS | Performed by: INTERNAL MEDICINE

## 2025-01-20 PROCEDURE — 94664 DEMO&/EVAL PT USE INHALER: CPT

## 2025-01-20 PROCEDURE — 93458 L HRT ARTERY/VENTRICLE ANGIO: CPT | Performed by: INTERNAL MEDICINE

## 2025-01-20 PROCEDURE — 25010000002 LIDOCAINE 2% SOLUTION: Performed by: INTERNAL MEDICINE

## 2025-01-20 PROCEDURE — 25010000002 HYDRALAZINE PER 20 MG: Performed by: INTERNAL MEDICINE

## 2025-01-20 PROCEDURE — 4A023N7 MEASUREMENT OF CARDIAC SAMPLING AND PRESSURE, LEFT HEART, PERCUTANEOUS APPROACH: ICD-10-PCS | Performed by: INTERNAL MEDICINE

## 2025-01-20 PROCEDURE — 25510000001 IOPAMIDOL PER 1 ML: Performed by: INTERNAL MEDICINE

## 2025-01-20 PROCEDURE — 25010000002 MIDAZOLAM PER 1 MG: Performed by: INTERNAL MEDICINE

## 2025-01-20 RX ORDER — ONDANSETRON 4 MG/1
4 TABLET, ORALLY DISINTEGRATING ORAL EVERY 6 HOURS PRN
Status: DISCONTINUED | OUTPATIENT
Start: 2025-01-20 | End: 2025-01-23 | Stop reason: HOSPADM

## 2025-01-20 RX ORDER — IOPAMIDOL 755 MG/ML
INJECTION, SOLUTION INTRAVASCULAR
Status: DISCONTINUED | OUTPATIENT
Start: 2025-01-20 | End: 2025-01-20 | Stop reason: HOSPADM

## 2025-01-20 RX ORDER — MIDAZOLAM HYDROCHLORIDE 1 MG/ML
INJECTION, SOLUTION INTRAMUSCULAR; INTRAVENOUS
Status: DISCONTINUED | OUTPATIENT
Start: 2025-01-20 | End: 2025-01-20 | Stop reason: HOSPADM

## 2025-01-20 RX ORDER — FENTANYL CITRATE 50 UG/ML
INJECTION, SOLUTION INTRAMUSCULAR; INTRAVENOUS
Status: DISCONTINUED | OUTPATIENT
Start: 2025-01-20 | End: 2025-01-20 | Stop reason: HOSPADM

## 2025-01-20 RX ORDER — IPRATROPIUM BROMIDE AND ALBUTEROL SULFATE 2.5; .5 MG/3ML; MG/3ML
3 SOLUTION RESPIRATORY (INHALATION) EVERY 6 HOURS PRN
Status: DISCONTINUED | OUTPATIENT
Start: 2025-01-20 | End: 2025-01-23 | Stop reason: HOSPADM

## 2025-01-20 RX ORDER — ACETAMINOPHEN 325 MG/1
650 TABLET ORAL EVERY 4 HOURS PRN
Status: DISCONTINUED | OUTPATIENT
Start: 2025-01-20 | End: 2025-01-23 | Stop reason: HOSPADM

## 2025-01-20 RX ORDER — AMLODIPINE BESYLATE 5 MG/1
5 TABLET ORAL
Status: DISCONTINUED | OUTPATIENT
Start: 2025-01-20 | End: 2025-01-23 | Stop reason: HOSPADM

## 2025-01-20 RX ORDER — SODIUM CHLORIDE 9 MG/ML
75 INJECTION, SOLUTION INTRAVENOUS CONTINUOUS
Status: ACTIVE | OUTPATIENT
Start: 2025-01-20 | End: 2025-01-20

## 2025-01-20 RX ORDER — HYDROCODONE BITARTRATE AND ACETAMINOPHEN 5; 325 MG/1; MG/1
1 TABLET ORAL EVERY 4 HOURS PRN
Status: DISCONTINUED | OUTPATIENT
Start: 2025-01-20 | End: 2025-01-23 | Stop reason: HOSPADM

## 2025-01-20 RX ORDER — NALOXONE HCL 0.4 MG/ML
0.4 VIAL (ML) INJECTION
Status: DISCONTINUED | OUTPATIENT
Start: 2025-01-20 | End: 2025-01-23 | Stop reason: HOSPADM

## 2025-01-20 RX ORDER — ONDANSETRON 2 MG/ML
4 INJECTION INTRAMUSCULAR; INTRAVENOUS EVERY 6 HOURS PRN
Status: DISCONTINUED | OUTPATIENT
Start: 2025-01-20 | End: 2025-01-23 | Stop reason: HOSPADM

## 2025-01-20 RX ORDER — MORPHINE SULFATE 2 MG/ML
1 INJECTION, SOLUTION INTRAMUSCULAR; INTRAVENOUS EVERY 4 HOURS PRN
Status: DISCONTINUED | OUTPATIENT
Start: 2025-01-20 | End: 2025-01-23 | Stop reason: HOSPADM

## 2025-01-20 RX ORDER — LIDOCAINE HYDROCHLORIDE 20 MG/ML
INJECTION, SOLUTION INFILTRATION; PERINEURAL
Status: DISCONTINUED | OUTPATIENT
Start: 2025-01-20 | End: 2025-01-20 | Stop reason: HOSPADM

## 2025-01-20 RX ORDER — SODIUM CHLORIDE 9 MG/ML
250 INJECTION, SOLUTION INTRAVENOUS ONCE AS NEEDED
Status: ACTIVE | OUTPATIENT
Start: 2025-01-20 | End: 2025-01-21

## 2025-01-20 RX ADMIN — PAROXETINE HYDROCHLORIDE HEMIHYDRATE 20 MG: 20 TABLET, FILM COATED ORAL at 12:42

## 2025-01-20 RX ADMIN — HYDRALAZINE HYDROCHLORIDE 20 MG: 20 INJECTION INTRAMUSCULAR; INTRAVENOUS at 14:29

## 2025-01-20 RX ADMIN — AMLODIPINE BESYLATE 5 MG: 5 TABLET ORAL at 17:47

## 2025-01-20 RX ADMIN — LOSARTAN POTASSIUM 50 MG: 50 TABLET, FILM COATED ORAL at 08:59

## 2025-01-20 RX ADMIN — IPRATROPIUM BROMIDE AND ALBUTEROL SULFATE 3 ML: .5; 3 SOLUTION RESPIRATORY (INHALATION) at 16:05

## 2025-01-20 RX ADMIN — CARVEDILOL 25 MG: 25 TABLET, FILM COATED ORAL at 08:59

## 2025-01-20 RX ADMIN — CLOPIDOGREL BISULFATE 75 MG: 75 TABLET ORAL at 12:29

## 2025-01-20 RX ADMIN — LEVOTHYROXINE, LIOTHYRONINE 30 MG: 19; 4.5 TABLET ORAL at 12:43

## 2025-01-20 RX ADMIN — ACETAMINOPHEN 325MG 650 MG: 325 TABLET ORAL at 14:21

## 2025-01-20 RX ADMIN — LORAZEPAM 0.5 MG: 0.5 TABLET ORAL at 14:21

## 2025-01-20 RX ADMIN — Medication 5000 UNITS: at 12:28

## 2025-01-20 RX ADMIN — SODIUM CHLORIDE 75 ML/HR: 9 INJECTION, SOLUTION INTRAVENOUS at 12:28

## 2025-01-20 RX ADMIN — Medication 500 MCG: at 12:29

## 2025-01-20 RX ADMIN — CARVEDILOL 25 MG: 25 TABLET, FILM COATED ORAL at 17:47

## 2025-01-20 RX ADMIN — LORAZEPAM 0.5 MG: 0.5 TABLET ORAL at 05:37

## 2025-01-20 RX ADMIN — OXYCODONE HYDROCHLORIDE AND ACETAMINOPHEN 500 MG: 500 TABLET ORAL at 12:29

## 2025-01-20 RX ADMIN — NITROGLYCERIN 1 INCH: 20 OINTMENT TOPICAL at 08:59

## 2025-01-20 RX ADMIN — ATORVASTATIN CALCIUM 40 MG: 20 TABLET, FILM COATED ORAL at 12:28

## 2025-01-20 NOTE — PROGRESS NOTES
"    Name: Dafne Mary ADMIT: 2025   : 1940  PCP: Jossy Sauceda MD    MRN: [id number] LOS: 2 days   AGE/SEX: 84 y.o. female  ROOM: [facility]     Subjective   Subjective   Still feels short of breath.  No chest pain.    Objective   Objective   Vital Signs  Temp:  [97.7 °F (36.5 °C)-98.2 °F (36.8 °C)] 97.7 °F (36.5 °C)  Heart Rate:  [75-88] 75  Resp:  [18-28] 18  BP: (107-177)/() 130/73  SpO2:  [94 %-98 %] 96 %  on   ;   Device (Oxygen Therapy): room air  Body mass index is 25 kg/m².  Physical Exam  Constitutional:       General: She is not in acute distress.  Pulmonary:      Effort: Pulmonary effort is normal. No respiratory distress.      Breath sounds: No stridor.   Musculoskeletal:      Right lower leg: No edema.      Left lower leg: No edema.   Skin:     Coloration: Skin is not jaundiced.      Findings: No bruising.   Neurological:      General: No focal deficit present.      Mental Status: She is alert.   Psychiatric:         Mood and Affect: Mood normal.         Behavior: Behavior normal.         Results Review     I reviewed the patient's new clinical results.  Results from last 7 days   Lab Units 25  0612 25  0332 25  0525 25  2207   WBC 10*3/mm3 7.44 6.86 6.51 8.88   HEMOGLOBIN g/dL 11.9* 12.6 12.7 15.1   PLATELETS 10*3/mm3 268 275 287 355     Results from last 7 days   Lab Units 25  0525 25  2207   SODIUM mmol/L 144 140   POTASSIUM mmol/L 3.5 3.8   CHLORIDE mmol/L 108* 102   CO2 mmol/L 25.0 20.9*   BUN mg/dL 10 12   CREATININE mg/dL 0.79 1.03*   GLUCOSE mg/dL 91 114*   EGFR mL/min/1.73 73.9 53.7*     Results from last 7 days   Lab Units 25  2207   ALBUMIN g/dL 4.1   BILIRUBIN mg/dL 0.5   ALK PHOS U/L 140*   AST (SGOT) U/L 32   ALT (SGPT) U/L 16     Results from last 7 days   Lab Units 25  0525 25  2207   CALCIUM mg/dL 8.4* 9.3   ALBUMIN g/dL  --  4.1       No results found for: \"HGBA1C\", \"POCGLU\"    No radiology results for the last " day  Scheduled Medications  vitamin C, 500 mg, Oral, Daily  atorvastatin, 40 mg, Oral, Daily  carvedilol, 25 mg, Oral, BID With Meals  cholecalciferol, 5,000 Units, Oral, Daily  clopidogrel, 75 mg, Oral, Daily  losartan, 50 mg, Oral, BID  PARoxetine, 20 mg, Oral, Daily  Thyroid, 30 mg, Oral, Daily  vitamin B-12, 500 mcg, Oral, Daily    Infusions     Diet  Diet: Cardiac; Healthy Heart (2-3 Na+); Fluid Consistency: Thin (IDDSI 0)       Assessment/Plan     Active Hospital Problems    Diagnosis  POA    **NSTEMI (non-ST elevated myocardial infarction) [I21.4]  Yes    Type 2 myocardial infarction [I21.A1]  Yes    Acute on chronic heart failure with preserved ejection fraction (HFpEF) [I50.33]  Yes    Elevated troponin [R79.89]  Yes    Stage 3a chronic kidney disease [N18.31]  Yes    Chronic HFrEF (heart failure with reduced ejection fraction) [I50.22]  Yes    COPD (chronic obstructive pulmonary disease) [J44.9]  Yes    Benign hypertension [I10]  Yes      Resolved Hospital Problems   No resolved problems to display.       84 y.o. female admitted with NSTEMI (non-ST elevated myocardial infarction).      01/20/25  PT OT evaluations pending.     NSTEMI  CAD with prior stent  -trop 89 > 146 > 218  -TTE w/out wall motion abnormalities  -Cardiology following  -Cath 1/20/2025 stable CAD  -Statin, Coreg, Plavix, losartan    Chronic dyspnea  -Vocal cord dysfunction, felt to be a nonissue per pulmonary  -She is followed by followed by Dr Nolan      Hypothyroid  -TSH WNL  [place] thyroid         DVT prophylaxis: SCDs Eliquis, ON HOLD  Discussed with patient and family.  Anticipated discharge Pending PT and/or OT eval., when cleared by consultants            Ric Pierre MD  [facility]  01/20/25  14:45 EST

## 2025-01-20 NOTE — PROGRESS NOTES
"                                              LOS: 2 days   Patient Care Team:  Jossy Sauceda MD as PCP - General (Urgent Care)  Javier Nolan MD as Consulting Physician (Pulmonary Disease)  Nae Norman MD as Consulting Physician (Cardiology)    Chief Complaint:  F/up hypoxia and abnormal lung imaging    Subjective   Interval History  I reviewed the admission note, progress notes, PMH, PSH, Family hx, social history, imagings and prior records on this admission, summarized the finding in my note and formulated a transition of care plan.      Noted heart cath.  LVEDP 19.  Patient is currently on RA.  SpO2 95%.  However when I saw her this afternoon, she indicated shortness of breath.  She sounds a little hoarse/wheezy with perhaps stridor.  She reported anxiety.  Her blood pressure is elevated.    REVIEW OF SYSTEMS:   CARDIOVASCULAR: No chest pain, chest pressure or chest discomfort. No palpitations or edema.      GASTROINTESTINAL: No anorexia, nausea, vomiting or diarrhea. No abdominal pain.  CONSTITUTIONAL: No fever or chills.     Ventilator/Non-Invasive Ventilation Settings (From admission, onward)      None                  Physical Exam:     Vital Signs  Temp:  [97.7 °F (36.5 °C)-98.2 °F (36.8 °C)] 97.7 °F (36.5 °C)  Heart Rate:  [75-88] 75  Resp:  [18-28] 18  BP: (107-177)/() 130/73    Intake/Output Summary (Last 24 hours) at 1/20/2025 1413  Last data filed at 1/20/2025 1101  Gross per 24 hour   Intake 120 ml   Output --   Net 120 ml     Flowsheet Rows      Flowsheet Row First Filed Value   Admission Height 152.4 cm (60\") Documented at 01/18/2025 0122   Admission Weight 58.3 kg (128 lb 8 oz) Documented at 01/18/2025 0122            PPE used per hospital policy    General Appearance:   Alert, cooperative, in no acute distress   ENMT:  Mallampati score 3, moist mucous membrane   Eyes:  Pupils equal and reactive to light. EOMI   Neck:   Large. Trachea midline. No thyromegaly.   Lungs:   Equal " but diminished air entry throughout.  Bilateral expiratory wheezing.  No crackles.  No labored breathing    Heart:   Regular rhythm and normal rate, normal S1 and S2, no         murmur   Skin:   No rash or ecchymosis   Abdomen:    Obese. Soft. No tenderness. No HSM.   Neuro/psych:  Conscious, alert, oriented x3. Strength 5/5 in upper and lower  ext.  Appropriate mood and affect   Extremities:  No cyanosis, clubbing or edema.  Warm extremities and well-perfused          Results Review:        Results from last 7 days   Lab Units 01/18/25  0525 01/17/25  2207   SODIUM mmol/L 144 140   POTASSIUM mmol/L 3.5 3.8   CHLORIDE mmol/L 108* 102   CO2 mmol/L 25.0 20.9*   BUN mg/dL 10 12   CREATININE mg/dL 0.79 1.03*   GLUCOSE mg/dL 91 114*   CALCIUM mg/dL 8.4* 9.3     Results from last 7 days   Lab Units 01/20/25  0612 01/19/25  0332 01/18/25  1257   HSTROP T ng/L 187* 203* 190*     Results from last 7 days   Lab Units 01/20/25  0612 01/19/25  0332 01/18/25  0525   WBC 10*3/mm3 7.44 6.86 6.51   HEMOGLOBIN g/dL 11.9* 12.6 12.7   HEMATOCRIT % 37.4 37.6 37.8   PLATELETS 10*3/mm3 268 275 287     Results from last 7 days   Lab Units 01/20/25  0612 01/19/25  1110 01/19/25  0332 01/18/25  1929 01/18/25  1257   INR   --   --   --   --  1.05   APTT seconds 68.9* 66.8* 69.7*   < > 32.1    < > = values in this interval not displayed.     Results from last 7 days   Lab Units 01/17/25  2207   PROBNP pg/mL 1,527.0       Results from last 7 days   Lab Units 01/18/25  0525   D DIMER QUANT MCGFEU/mL 0.57             Results from last 7 days   Lab Units 01/18/25  0223   PH, ARTERIAL pH units 7.389   PCO2, ARTERIAL mm Hg 41.1   PO2 ART mm Hg 64.1*   O2 SATURATION ART % 91.9*   MODALITY  Room Air         I reviewed the patient's new clinical results.        Medication Review:   vitamin C, 500 mg, Oral, Daily  atorvastatin, 40 mg, Oral, Daily  carvedilol, 25 mg, Oral, BID With Meals  cholecalciferol, 5,000 Units, Oral, Daily  clopidogrel, 75 mg,  Oral, Daily  losartan, 50 mg, Oral, BID  PARoxetine, 20 mg, Oral, Daily  Thyroid, 30 mg, Oral, Daily  vitamin B-12, 500 mcg, Oral, Daily        sodium chloride, 75 mL/hr, Last Rate: 75 mL/hr (01/20/25 1228)        Diagnostic imaging:  I personally and independently reviewed the following images:  CXR 1/17/2025: Increased pulmonary interstitial markings in the lower lobes.      Assessment     Flash pulmonary edema, improved/resolved  Hypertensive emergency  Acute on chronic HFpEF  Hypoxia, resolved  Regarding shortness of breath and hoarseness.  Possible VCD.  Anxiety probably contributing.  Mild MR  Elevated LVEDP 15-19    All problems new to me    Plan     Hydralazine x 1 will be administered now for hypertension.  SBP is around 190.  Start DuoNeb 4 times a day as needed.  Patient takes Breztri and DuoNeb at home.  She follows with Dr. Nolan however she is not thought to have obstructive lung disease and she is in the process of seeing psych for behavioral therapy as anxiety is felt to be significantly contributing to her symptoms.  Could benefit from low-dose diuretics when possible.  Defer to cardiology.  Noted slightly elevated LVEDP    Dr. Nolan to take over tomorrow.    Manuela Castanon MD  01/20/25  14:13 EST          This note was dictated utilizing Dragon dictation

## 2025-01-20 NOTE — CASE MANAGEMENT/SOCIAL WORK
Discharge Planning Assessment  [facility]     Patient Name: Dafne Mary  MRN: [id number]  Today's Date: 1/20/2025    Admit Date: 1/17/2025    Plan: Home with possible home health   Discharge Needs Assessment       Row Name 01/20/25 1509       Living Environment    People in Home alone    Current Living Arrangements home    Potentially Unsafe Housing Conditions none    Primary Care Provided by self    Provides Primary Care For no one, unable/limited ability to care for self    Family Caregiver if Needed child(tiffanie), adult    Quality of Family Relationships helpful;supportive    Able to Return to Prior Arrangements yes       Resource/Environmental Concerns    Resource/Environmental Concerns none    Transportation Concerns none       Transportation Needs    In the past 12 months, has lack of transportation kept you from medical appointments or from getting medications? no    In the past 12 months, has lack of transportation kept you from meetings, work, or from getting things needed for daily living? No       Food Insecurity    Within the past 12 months, you worried that your food would run out before you got the money to buy more. Never true    Within the past 12 months, the food you bought just didn't last and you didn't have money to get more. Never true       Transition Planning    Patient/Family Anticipates Transition to home with help/services    Patient/Family Anticipated Services at Transition home health care    Transportation Anticipated family or friend will provide;car, drives self       Discharge Needs Assessment    Readmission Within the Last 30 Days no previous admission in last 30 days    Equipment Currently Used at Home rollator;nebulizer;other (see comments)  Medical alert button    Concerns to be Addressed discharge planning    Do you want help finding or keeping work or a job? I do not need or want help    Do you want help with school or training? For example, starting or completing job  training or getting a high school diploma, GED or equivalent No    Anticipated Changes Related to Illness none    Equipment Needed After Discharge none    Outpatient/Agency/Support Group Needs homecare agency    Discharge Facility/Level of Care Needs home with home health    Provided Post Acute Provider List? Yes    Post Acute Provider List Home Health    Provided Post Acute Provider Quality & Resource List? Yes    Post Acute Provider Quality and Resource List Home Health    Delivered To Patient    Method of Delivery In person    Offered/Gave Vendor List yes    Current Discharge Risk lives alone                   Discharge Plan       Row Name 01/20/25 1512       Plan    Plan Home with possible home health    Plan Comments S/W pt and her dtr Felicia at bedside.  Facesheet info confirmed.  Pt lives alone in a house.  Felicia checks on her and does her grocery shopping for her.  Home DME includes a rollator, nebulizer and medical alert button.  Pt is able to drive.  Pt has used [facility] in the past and has been to [facility] for rehab.  Pt plans to return home upon DC and would like to have home health.  Per her request, referral sent to [facility] - al pending. Family will provide transport home upon DC.  CCP will continue to follow and assist as needed. ............Lorrie VENTURA/ CCP                  Continued Care and Services - Admitted Since 1/17/2025       Home Medical Care       Service Provider Request Status Services Address Phone Fax Patient Preferred    [facility] [facility] -- [address], [address], [facility] [address] [phone] --                     Demographic Summary       Row Name 01/20/25 8892       General Information    Admission Type inpatient    Arrived From home    Required Notices Provided Important Message from Medicare    Referral Source admission list    Reason for Consult discharge planning    Preferred Language English                    Functional Status       Row Name 01/20/25 1508       Functional Status    Usual Activity Tolerance moderate       Functional Status, IADL    Medications independent    Meal Preparation independent    Housekeeping independent    Laundry independent    Shopping assistive person    If for any reason you need help with day-to-day activities such as bathing, preparing meals, shopping, managing finances, etc., do you get the help you need? I get all the help I need       Mental Status    General Appearance WDL WDL       Mental Status Summary    Recent Changes in Mental Status/Cognitive Functioning no changes       Employment/    Employment Status retired                               Lorrie Verma, TERRANCE

## 2025-01-20 NOTE — PROGRESS NOTES
"[facility] Cardiology [facility] Follow Up    Chief Complaint: Follow up NSTEMI    Interval History:  No chest pain.  Continues to have dyspnea.     Objective:     Objective:  Temp:  [97.9 °F (36.6 °C)-98.2 °F (36.8 °C)] 98 °F (36.7 °C)  Heart Rate:  [81-88] 81  Resp:  [18] 18  BP: (115-199)/(52-99) 177/95   No intake or output data in the 24 hours ending 01/20/25 0740  Body mass index is 25 kg/m².      01/18/25  0739 01/19/25  0514 01/20/25  0532   Weight: 58.3 kg (128 lb 8.5 oz) 58.3 kg (128 lb 8.5 oz) 58.1 kg (128 lb)     Weight change: -0.24 kg (-8.5 oz)      Physical Exam:   General : Alert, cooperative, in no acute distress.  Neuro: Alert,cooperative and oriented.  Lungs: CTAB. Normal respiratory effort and rate.  CV: Regular rate and rhythm, normal S1 and S2, no murmurs, gallops or rubs.  ABD: Soft, nontender, nondistended. Positive bowel sounds.  Extr: No edema or cyanosis, moves all extremities.    Lab Review:   Results from last 7 days   Lab Units 01/18/25  0525 01/17/25  2207   SODIUM mmol/L 144 140   POTASSIUM mmol/L 3.5 3.8   CHLORIDE mmol/L 108* 102   CO2 mmol/L 25.0 20.9*   BUN mg/dL 10 12   CREATININE mg/dL 0.79 1.03*   GLUCOSE mg/dL 91 114*   CALCIUM mg/dL 8.4* 9.3   AST (SGOT) U/L  --  32   ALT (SGPT) U/L  --  16     Results from last 7 days   Lab Units 01/20/25  0612 01/19/25  0332 01/18/25  1257 01/18/25  0525 01/17/25  2312 01/17/25  2207   HSTROP T ng/L 187* 203* 190* 218* 146* 80*     Results from last 7 days   Lab Units 01/20/25  0612 01/19/25  0332   WBC 10*3/mm3 7.44 6.86   HEMOGLOBIN g/dL 11.9* 12.6   HEMATOCRIT % 37.4 37.6   PLATELETS 10*3/mm3 268 275     Results from last 7 days   Lab Units 01/20/25  0612 01/19/25  1110 01/18/25  1929 01/18/25  1257   INR   --   --   --  1.05   APTT seconds 68.9* 66.8*   < > 32.1    < > = values in this interval not displayed.               Invalid input(s): \"LDLCALC\"  Results from last 7 days   Lab Units 01/17/25  2207   PROBNP pg/mL 1,527.0     Results " from last 7 days   Lab Units 01/19/25  0332   TSH uIU/mL 3.790     I reviewed the patient's new clinical results.  I personally viewed and interpreted the patient's EKG  Current Medications:   Scheduled Meds:vitamin C, 500 mg, Oral, Daily  atorvastatin, 40 mg, Oral, Daily  carvedilol, 25 mg, Oral, BID With Meals  cholecalciferol, 5,000 Units, Oral, Daily  clopidogrel, 75 mg, Oral, Daily  losartan, 50 mg, Oral, BID  nitroglycerin, 1 inch, Topical, TID - Nitrates  PARoxetine, 20 mg, Oral, Daily  Thyroid, 30 mg, Oral, Daily  vitamin B-12, 500 mcg, Oral, Daily      Continuous Infusions:heparin, 12 Units/kg/hr, Last Rate: Stopped (01/20/25 0600)        Allergies:  Allergies   Allergen Reactions    Lansoprazole Nausea Only     NAUSEA  NAUSEA  NAUSEA    Buspirone Other (See Comments)     agitation    Diphenhydramine Hcl (Sleep) Irritability    Lisinopril Cough       Assessment/Plan:     NSTEMI.  Symptoms of worsening dyspnea.  No chest pain.  EKG with inferolateral repolarization abnormalities.   Coronary artery disease.  Prior stents to LAD and OM.   Hypertension.  Remains mildly elevated.   Hyperlipidemia.  On atorvastatin.   Paroxsymal atrial fibrillation.  Normally on apixaban which is on hold.  Has been on heparin here.   Chronic heart failure with preserved ejection fraction.  Diuresed with IV furosemide on admission.    CKD stage 3a  Hypothyroidism    - For cardiac catheterization today.    Nae Norman MD  01/20/25  07:40 EST

## 2025-01-20 NOTE — PLAN OF CARE
Problem: Adult Inpatient Plan of Care  Goal: Plan of Care Review  Outcome: Progressing  Flowsheets (Taken 1/20/2025 0634)  Progress: no change  Outcome Evaluation: Pt A&Ox4.  Heart Cath today and consent signed and in chart.  CHG bath given.  NPO since midnight.  VSS.  No requests at this time.  Call light within reach  Plan of Care Reviewed With: patient  Goal: Patient-Specific Goal (Individualized)  Outcome: Progressing  Goal: Absence of Hospital-Acquired Illness or Injury  Outcome: Progressing  Intervention: Identify and Manage Fall Risk  Recent Flowsheet Documentation  Taken 1/20/2025 0633 by Tiffanie Hill RN  Safety Promotion/Fall Prevention: safety round/check completed  Taken 1/20/2025 0401 by Tiffanie Hill RN  Safety Promotion/Fall Prevention: safety round/check completed  Taken 1/20/2025 0223 by Tiffanie Hill RN  Safety Promotion/Fall Prevention: safety round/check completed  Taken 1/20/2025 0010 by Tiffanie Hill RN  Safety Promotion/Fall Prevention: safety round/check completed  Taken 1/19/2025 2245 by Tiffanie Hill RN  Safety Promotion/Fall Prevention: safety round/check completed  Taken 1/19/2025 2000 by Tiffanie Hill RN  Safety Promotion/Fall Prevention: safety round/check completed  Intervention: Prevent Skin Injury  Recent Flowsheet Documentation  Taken 1/20/2025 0010 by Tiffanie Hill RN  Body Position: position changed independently  Taken 1/19/2025 2000 by Tiffanie Hill RN  Body Position: position changed independently  Intervention: Prevent and Manage VTE (Venous Thromboembolism) Risk  Recent Flowsheet Documentation  Taken 1/20/2025 0010 by Tiffanie Hill RN  VTE Prevention/Management: (heparin gtt) --  Taken 1/19/2025 2000 by Tiffanie Hill RN  VTE Prevention/Management: (heparin gtt) other (see comments)  Intervention: Prevent Infection  Recent Flowsheet Documentation  Taken 1/20/2025 0010 by Tiffanie Hill RN  Infection  Prevention: single patient room provided  Taken 1/19/2025 2000 by Tiffanie Hill RN  Infection Prevention: single patient room provided  Goal: Optimal Comfort and Wellbeing  Outcome: Progressing  Intervention: Monitor Pain and Promote Comfort  Recent Flowsheet Documentation  Taken 1/19/2025 2220 by Tiffanie Hill RN  Pain Management Interventions: pain medication given  Intervention: Provide Person-Centered Care  Recent Flowsheet Documentation  Taken 1/20/2025 0010 by Tiffanie Hill RN  Trust Relationship/Rapport: care explained  Taken 1/19/2025 2000 by Tiffanie Hill RN  Trust Relationship/Rapport:   care explained   choices provided   questions answered   questions encouraged  Goal: Readiness for Transition of Care  Outcome: Progressing     Problem: Fall Injury Risk  Goal: Absence of Fall and Fall-Related Injury  Outcome: Progressing  Intervention: Identify and Manage Contributors  Recent Flowsheet Documentation  Taken 1/20/2025 0010 by Tiffanie Hill RN  Self-Care Promotion: independence encouraged  Taken 1/19/2025 2000 by Tiffanie Hill RN  Medication Review/Management: medications reviewed  Self-Care Promotion: independence encouraged  Intervention: Promote Injury-Free Environment  Recent Flowsheet Documentation  Taken 1/20/2025 0633 by Tiffanie Hill RN  Safety Promotion/Fall Prevention: safety round/check completed  Taken 1/20/2025 0401 by Tiffanie Hill RN  Safety Promotion/Fall Prevention: safety round/check completed  Taken 1/20/2025 0223 by Tiffanie Hill RN  Safety Promotion/Fall Prevention: safety round/check completed  Taken 1/20/2025 0010 by Tiffanie Hill RN  Safety Promotion/Fall Prevention: safety round/check completed  Taken 1/19/2025 2245 by Tiffanie Hill RN  Safety Promotion/Fall Prevention: safety round/check completed  Taken 1/19/2025 2000 by Tiffanie Hill RN  Safety Promotion/Fall Prevention: safety round/check  completed     Problem: Pulmonary Impairment  Goal: Improved Activity Tolerance  Outcome: Progressing  Goal: Effective Airway Clearance  Outcome: Progressing  Goal: Optimal Gas Exchange  Outcome: Progressing   Goal Outcome Evaluation:  Plan of Care Reviewed With: patient        Progress: no change  Outcome Evaluation: Pt A&Ox4.  Heart Cath today and consent signed and in chart.  CHG bath given.  NPO since midnight.  VSS.  No requests at this time.  Call light within reach

## 2025-01-20 NOTE — DISCHARGE INSTRUCTIONS
[facility]  [address] [place] [address]      Coronary Angiogram (Femoral Approach) After Care     Refer to this sheet in the next few weeks. These instructions provide you with information on caring for yourself after your procedure. Your health care provider may also give you more specific instructions. Your treatment has been planned according to current medical practices, but problems sometimes occur. Call your health care provider if you have any problems or questions after your procedure.      What to Expect After the Procedure:  After your procedure, it is typical to have the following sensations:  Minor discomfort or tenderness and a small bump at the catheter insertion site. The bump should usually decrease in size and tenderness within 1 to 2 weeks.  Any bruising will usually fade within 2 to 4 weeks.    Home Care Instructions:  Do not apply powder or lotion to the site.  Do not take baths, swim, or use a hot tub until your health care provider approves and the site is completely healed.  Do not bend, squat, or lift anything over 20 lb (9 kg) or as directed by your health care provider. However, we recommend lifting nothing heavier than a gallon of milk.    You may shower 24 hours after the procedure. Remove the bandage (dressing) and gently wash the site with plain soap and water. Gently pat the site dry. You may apply a band aid daily for 2 days if desired.    Inspect the site at least twice daily.  Increase your fluid intake for the next 2 days.    Limit your activity for the first 48 hours. .    Avoid strenuous activity for 1 week or as advised by your physician.    Follow instructions about when you can drive or return to work as directed by your physician.    Hold direct pressure over the site when you cough, sneeze, laugh or change positions.  Do this for the next 2 days.    Do not operate machinery or power tools for 24 hours.  A responsible adult should be with you for the  first 24 hours after you arrive home. Do not make any important legal decisions or sign legal papers for 24 hours.  Do not drink alcohol for 24 hours.  Metformin or any medications containing Metformin should not be taken for 48 hours after your procedure.      Call Your Doctor If:  You have drainage (other than a small amount of blood on the dressing).  You have chills or a fever > 101.  You have redness, warmth, swelling(larger than a walnut), or pain at the insertion site  You develop chest pain or shortness of breath, feel faint, or pass out.  You develop pain, discoloration, coldness, numbness, tingling, or severe bruising in the leg that held the catheter.  You develop bleeding from any other place, such as the bowels.  You have heavy bleeding from the site, hold pressure on the site for 20 minutes.  If the bleeding stops, apply a fresh bandage and call your cardiologist.  However, if you continue to have bleeding, call 911.  You have any symptoms of a stroke.  Remember BE FAST  B-balance. Sudden trouble walking or loss of balance.  E-eyes.  Sudden changes in how you see or a sudden onset of a very bad headache.   F-face. Sudden weakness or loss of feeling of the face or facial droop on one side.   A-arms Sudden weakness or numbness in one arm.  One arm drifts down if they are both held out in front of you. This happens suddenly and usually on one side of the body.  S-speech.  Sudden trouble speaking, slurred speech or trouble understanding what people are saying.   T-time  Time to call emergency services.  Write down the symptoms and the time they started.        Make Sure You:  Understand these instructions.  Will watch your condition.  Will get help right away if you are not doing well or get worse.

## 2025-01-20 NOTE — NURSING NOTE
Dressing clean, dry ,intact and soft to right radial site, attempt site for cath but no sheath inserted and changed to groin.

## 2025-01-20 NOTE — CONSULTS
Met with patient to discuss the benefits of cardiac rehab. Provided phase II information along with the contact information for cardiac rehab here at [facility]. Pt states she will call if interested in attending.

## 2025-01-20 NOTE — DISCHARGE PLACEMENT REQUEST
"Dafne Mary (84 y.o. Female)       Date of Birth   1940    Social Security Number       Address   [address] [place] [address]    Home Phone   [phone]    MRN   [id number]       Gn[employer]ist   [employer]    Marital Status                               Admission Date   1/17/25    Admission Type   Emergency    Admitting Provider   Beckie Treviño MD    Attending Provider   Ric Pierre MD    Department, Room/Bed   [address], [address]/1       Discharge Date       Discharge Disposition       Discharge Destination                                 Attending Provider: Ric Pierre MD    Allergies: Lansoprazole, Buspirone, Diphenhydramine Hcl (Sleep), Lisinopril    Isolation: None   Infection: None   Code Status: CPR    Ht: 152.4 cm (60\")   Wt: 58.1 kg (128 lb)    Admission Cmt: None   Principal Problem: NSTEMI (non-ST elevated myocardial infarction) [I21.4]                   Active Insurance as of 1/17/2025       Primary Coverage       Payor Plan Insurance Group Employer/Plan Group    HUMANA MEDICARE REPLACEMENT HUMANA MED ADV SNP HMO [id number]       Payor Plan Address Payor Plan Phone Number Payor Plan Fax Number Effective Dates    [address] [address] [phone]  1/1/2024 - None Entered    Formerly [facility] [id number]         Subscriber Name Subscriber Birth Date Member ID       DAFNE MARY 1940 [id number]                     Emergency Contacts        (Rel.) Home Phone Work Phone Mobile Phone    [place] (Daughter) [phone] -- [phone]    Balaji Mary (Son) [phone] -- [phone]    kristie gentile (Daughter) [phone] -- --                "

## 2025-01-21 LAB
ANION GAP SERPL CALCULATED.3IONS-SCNC: 14.1 MMOL/L (ref 5–15)
BASOPHILS # BLD AUTO: 0.05 10*3/MM3 (ref 0–0.2)
BASOPHILS NFR BLD AUTO: 0.7 % (ref 0–1.5)
BUN SERPL-MCNC: 12 MG/DL (ref 8–23)
BUN/CREAT SERPL: 12.8 (ref 7–25)
CALCIUM SPEC-SCNC: 8.7 MG/DL (ref 8.6–10.5)
CHLORIDE SERPL-SCNC: 103 MMOL/L (ref 98–107)
CO2 SERPL-SCNC: 20.9 MMOL/L (ref 22–29)
CREAT SERPL-MCNC: 0.94 MG/DL (ref 0.57–1)
DEPRECATED RDW RBC AUTO: 38.7 FL (ref 37–54)
EGFRCR SERPLBLD CKD-EPI 2021: 60 ML/MIN/1.73
EOSINOPHIL # BLD AUTO: 0.41 10*3/MM3 (ref 0–0.4)
EOSINOPHIL NFR BLD AUTO: 5.4 % (ref 0.3–6.2)
ERYTHROCYTE [DISTWIDTH] IN BLOOD BY AUTOMATED COUNT: 11.8 % (ref 12.3–15.4)
GLUCOSE SERPL-MCNC: 90 MG/DL (ref 65–99)
HCT VFR BLD AUTO: 35.5 % (ref 34–46.6)
HGB BLD-MCNC: 11.7 G/DL (ref 12–15.9)
IMM GRANULOCYTES # BLD AUTO: 0.08 10*3/MM3 (ref 0–0.05)
IMM GRANULOCYTES NFR BLD AUTO: 1 % (ref 0–0.5)
LYMPHOCYTES # BLD AUTO: 0.82 10*3/MM3 (ref 0.7–3.1)
LYMPHOCYTES NFR BLD AUTO: 10.7 % (ref 19.6–45.3)
MCH RBC QN AUTO: 29.9 PG (ref 26.6–33)
MCHC RBC AUTO-ENTMCNC: 33 G/DL (ref 31.5–35.7)
MCV RBC AUTO: 90.8 FL (ref 79–97)
MONOCYTES # BLD AUTO: 0.72 10*3/MM3 (ref 0.1–0.9)
MONOCYTES NFR BLD AUTO: 9.4 % (ref 5–12)
NEUTROPHILS NFR BLD AUTO: 5.55 10*3/MM3 (ref 1.7–7)
NEUTROPHILS NFR BLD AUTO: 72.8 % (ref 42.7–76)
NRBC BLD AUTO-RTO: 0 /100 WBC (ref 0–0.2)
PLATELET # BLD AUTO: 267 10*3/MM3 (ref 140–450)
PMV BLD AUTO: 9.3 FL (ref 6–12)
POTASSIUM SERPL-SCNC: 3.7 MMOL/L (ref 3.5–5.2)
RBC # BLD AUTO: 3.91 10*6/MM3 (ref 3.77–5.28)
SODIUM SERPL-SCNC: 138 MMOL/L (ref 136–145)
WBC NRBC COR # BLD AUTO: 7.63 10*3/MM3 (ref 3.4–10.8)

## 2025-01-21 PROCEDURE — 94799 UNLISTED PULMONARY SVC/PX: CPT

## 2025-01-21 PROCEDURE — 94761 N-INVAS EAR/PLS OXIMETRY MLT: CPT

## 2025-01-21 PROCEDURE — 99232 SBSQ HOSP IP/OBS MODERATE 35: CPT | Performed by: INTERNAL MEDICINE

## 2025-01-21 PROCEDURE — 94664 DEMO&/EVAL PT USE INHALER: CPT

## 2025-01-21 PROCEDURE — 85025 COMPLETE CBC W/AUTO DIFF WBC: CPT | Performed by: INTERNAL MEDICINE

## 2025-01-21 PROCEDURE — 97535 SELF CARE MNGMENT TRAINING: CPT

## 2025-01-21 PROCEDURE — 97165 OT EVAL LOW COMPLEX 30 MIN: CPT

## 2025-01-21 PROCEDURE — 80048 BASIC METABOLIC PNL TOTAL CA: CPT | Performed by: INTERNAL MEDICINE

## 2025-01-21 RX ORDER — GUAIFENESIN 200 MG/10ML
200 LIQUID ORAL EVERY 4 HOURS PRN
Status: DISCONTINUED | OUTPATIENT
Start: 2025-01-21 | End: 2025-01-23 | Stop reason: HOSPADM

## 2025-01-21 RX ORDER — ECHINACEA PURPUREA EXTRACT 125 MG
2 TABLET ORAL AS NEEDED
Status: DISCONTINUED | OUTPATIENT
Start: 2025-01-21 | End: 2025-01-23 | Stop reason: HOSPADM

## 2025-01-21 RX ORDER — FUROSEMIDE 20 MG/1
20 TABLET ORAL DAILY
Status: DISCONTINUED | OUTPATIENT
Start: 2025-01-21 | End: 2025-01-23 | Stop reason: HOSPADM

## 2025-01-21 RX ORDER — OXYMETAZOLINE HYDROCHLORIDE 0.05 G/100ML
2 SPRAY NASAL 2 TIMES DAILY
Status: DISCONTINUED | OUTPATIENT
Start: 2025-01-21 | End: 2025-01-23 | Stop reason: HOSPADM

## 2025-01-21 RX ADMIN — LORAZEPAM 0.5 MG: 0.5 TABLET ORAL at 21:48

## 2025-01-21 RX ADMIN — Medication 5000 UNITS: at 09:20

## 2025-01-21 RX ADMIN — FUROSEMIDE 20 MG: 20 TABLET ORAL at 09:21

## 2025-01-21 RX ADMIN — LOSARTAN POTASSIUM 50 MG: 50 TABLET, FILM COATED ORAL at 09:20

## 2025-01-21 RX ADMIN — ACETAMINOPHEN 650 MG: 325 TABLET ORAL at 02:07

## 2025-01-21 RX ADMIN — LORAZEPAM 0.5 MG: 0.5 TABLET ORAL at 11:34

## 2025-01-21 RX ADMIN — CARVEDILOL 25 MG: 25 TABLET, FILM COATED ORAL at 09:21

## 2025-01-21 RX ADMIN — LEVOTHYROXINE, LIOTHYRONINE 30 MG: 19; 4.5 TABLET ORAL at 09:20

## 2025-01-21 RX ADMIN — IPRATROPIUM BROMIDE AND ALBUTEROL SULFATE 3 ML: .5; 3 SOLUTION RESPIRATORY (INHALATION) at 11:01

## 2025-01-21 RX ADMIN — AMLODIPINE BESYLATE 5 MG: 5 TABLET ORAL at 09:21

## 2025-01-21 RX ADMIN — ATORVASTATIN CALCIUM 40 MG: 20 TABLET, FILM COATED ORAL at 09:20

## 2025-01-21 RX ADMIN — HYDROCODONE BITARTRATE AND ACETAMINOPHEN 1 TABLET: 5; 325 TABLET ORAL at 18:58

## 2025-01-21 RX ADMIN — OXYMETAZOLINE HYDROCHLORIDE 2 SPRAY: 0.5 SPRAY NASAL at 21:50

## 2025-01-21 RX ADMIN — OXYMETAZOLINE HYDROCHLORIDE 2 SPRAY: 0.5 SPRAY NASAL at 11:35

## 2025-01-21 RX ADMIN — IPRATROPIUM BROMIDE AND ALBUTEROL SULFATE 3 ML: .5; 3 SOLUTION RESPIRATORY (INHALATION) at 05:43

## 2025-01-21 RX ADMIN — CARVEDILOL 25 MG: 25 TABLET, FILM COATED ORAL at 18:35

## 2025-01-21 RX ADMIN — Medication 500 MCG: at 09:21

## 2025-01-21 RX ADMIN — LOSARTAN POTASSIUM 50 MG: 50 TABLET, FILM COATED ORAL at 20:21

## 2025-01-21 RX ADMIN — PAROXETINE HYDROCHLORIDE HEMIHYDRATE 20 MG: 20 TABLET, FILM COATED ORAL at 09:21

## 2025-01-21 RX ADMIN — ACETAMINOPHEN 325MG 650 MG: 325 TABLET ORAL at 11:34

## 2025-01-21 RX ADMIN — OXYCODONE HYDROCHLORIDE AND ACETAMINOPHEN 500 MG: 500 TABLET ORAL at 09:20

## 2025-01-21 RX ADMIN — IPRATROPIUM BROMIDE AND ALBUTEROL SULFATE 3 ML: .5; 3 SOLUTION RESPIRATORY (INHALATION) at 02:39

## 2025-01-21 RX ADMIN — CLOPIDOGREL BISULFATE 75 MG: 75 TABLET ORAL at 09:21

## 2025-01-21 RX ADMIN — Medication 10 ML: at 20:21

## 2025-01-21 NOTE — PLAN OF CARE
Problem: Adult Inpatient Plan of Care  Goal: Plan of Care Review  Outcome: Progressing  Goal: Patient-Specific Goal (Individualized)  Outcome: Progressing  Goal: Absence of Hospital-Acquired Illness or Injury  Outcome: Progressing  Intervention: Identify and Manage Fall Risk  Recent Flowsheet Documentation  Taken 1/20/2025 1800 by Neena Bill RN  Safety Promotion/Fall Prevention:   activity supervised   assistive device/personal items within reach   clutter free environment maintained   fall prevention program maintained   nonskid shoes/slippers when out of bed   room organization consistent   safety round/check completed  Taken 1/20/2025 1600 by Neena Bill, RN  Safety Promotion/Fall Prevention:   activity supervised   assistive device/personal items within reach   clutter free environment maintained   fall prevention program maintained   nonskid shoes/slippers when out of bed   room organization consistent   safety round/check completed  Taken 1/20/2025 1400 by Neena Bill, RN  Safety Promotion/Fall Prevention:   activity supervised   assistive device/personal items within reach   clutter free environment maintained   fall prevention program maintained   nonskid shoes/slippers when out of bed   room organization consistent   safety round/check completed  Taken 1/20/2025 1225 by Neena Bill, RN  Safety Promotion/Fall Prevention:   activity supervised   assistive device/personal items within reach   clutter free environment maintained   fall prevention program maintained   nonskid shoes/slippers when out of bed   room organization consistent   safety round/check completed  Taken 1/20/2025 1000 by Neena Bill, RN  Safety Promotion/Fall Prevention: patient off unit  Taken 1/20/2025 0859 by Neena Bill, RN  Safety Promotion/Fall Prevention:   activity supervised   assistive device/personal items within reach   clutter free environment maintained   fall prevention program maintained   nonskid  shoes/slippers when out of bed   room organization consistent   safety round/check completed  Intervention: Prevent Skin Injury  Recent Flowsheet Documentation  Taken 1/20/2025 1800 by Neena Bill RN  Body Position: position changed independently  Taken 1/20/2025 1600 by Neena Bill RN  Body Position: position changed independently  Taken 1/20/2025 1400 by Neena Bill RN  Body Position: position changed independently  Taken 1/20/2025 1225 by Neena Bill RN  Body Position: position changed independently  Taken 1/20/2025 0859 by Neena Bill RN  Body Position: position changed independently  Intervention: Prevent Infection  Recent Flowsheet Documentation  Taken 1/20/2025 1800 by Neena Bill RN  Infection Prevention:   hand hygiene promoted   personal protective equipment utilized   rest/sleep promoted  Taken 1/20/2025 1600 by Neena Bill RN  Infection Prevention:   hand hygiene promoted   personal protective equipment utilized   rest/sleep promoted  Taken 1/20/2025 1400 by Neena Bill RN  Infection Prevention:   hand hygiene promoted   personal protective equipment utilized   rest/sleep promoted  Taken 1/20/2025 1225 by Neena Bill RN  Infection Prevention:   hand hygiene promoted   personal protective equipment utilized   rest/sleep promoted  Taken 1/20/2025 0859 by Neena Bill RN  Infection Prevention:   hand hygiene promoted   personal protective equipment utilized   rest/sleep promoted  Goal: Optimal Comfort and Wellbeing  Outcome: Progressing  Intervention: Provide Person-Centered Care  Recent Flowsheet Documentation  Taken 1/20/2025 1600 by Neena Bill RN  Trust Relationship/Rapport:   care explained   choices provided  Taken 1/20/2025 1151 by Neena Bill RN  Trust Relationship/Rapport:   care explained   choices provided  Taken 1/20/2025 0859 by Neena Bill RN  Trust Relationship/Rapport:   care explained   choices provided  Goal: Readiness for Transition of Care  Outcome:  Progressing   Goal Outcome Evaluation:

## 2025-01-21 NOTE — PROGRESS NOTES
[facility] INPATIENT PROGRESS NOTE         [address]    2025      PATIENT IDENTIFICATION:  Name: Dafne Mary ADMIT: 2025   : 1940  PCP: Jossy Sauceda MD    MRN: [id number] LOS: 3 days   AGE/SEX: 84 y.o. female  ROOM: [facility]                     LOS 3    Reason for visit: Flash pulmonary edema with hypoxemia      SUBJECTIVE:      Complains of stuffy nose and shortness of breath.  Had complaints of some wheezing last night but this has improved.  Adding nasal saline and short course of Afrin. I am seeing the patient for the first time today.  All patient problems are new to me.      Objective   OBJECTIVE:    Vital Sign Min/Max for last 24 hours  Temp  Min: 97.3 °F (36.3 °C)  Max: 98.2 °F (36.8 °C)   BP  Min: 102/60  Max: 198/94   Pulse  Min: 75  Max: 104   Resp  Min: 16  Max: 34   SpO2  Min: 92 %  Max: 100 %   No data recorded   Weight  Min: 58.7 kg (129 lb 6.6 oz)  Max: 58.7 kg (129 lb 6.6 oz)    Vitals:    25 0458 25 0543 25 0551 25 0743   BP:    113/58   BP Location:    Right arm   Patient Position:    Lying   Pulse:  92 87 87   Resp:  22  22   Temp:    98.2 °F (36.8 °C)   TempSrc:    Oral   SpO2:  97% 100% 94%   Weight: 58.7 kg (129 lb 6.6 oz)      Height:                25  0514 25  0532 25  0458   Weight: 58.3 kg (128 lb 8.5 oz) 58.1 kg (128 lb) 58.7 kg (129 lb 6.6 oz)       Body mass index is 25.27 kg/m².                          Body mass index is 25.27 kg/m².    Intake/Output Summary (Last 24 hours) at 2025 0914  Last data filed at 2025 0330  Gross per 24 hour   Intake 120 ml   Output 500 ml   Net -380 ml         Exam:  GEN:  No distress, appears stated age  EYES:   PERRL, anicteric sclerae  ENT:    External ears/nose normal, OP clear  NECK:  No adenopathy, midline trachea  LUNGS: Normal chest on inspection, palpation and auscultation  CV:  Normal S1S2, without murmur  ABD:  Nontender, nondistended, no  "hepatosplenomegaly, +BS  EXT:  No edema.  No cyanosis or clubbing.  No mottling and normal cap refill.    Assessment     Scheduled meds:  amLODIPine, 5 mg, Oral, Q24H  vitamin C, 500 mg, Oral, Daily  atorvastatin, 40 mg, Oral, Daily  carvedilol, 25 mg, Oral, BID With Meals  cholecalciferol, 5,000 Units, Oral, Daily  clopidogrel, 75 mg, Oral, Daily  furosemide, 20 mg, Oral, Daily  losartan, 50 mg, Oral, BID  oxymetazoline, 2 spray, Each Nare, BID  PARoxetine, 20 mg, Oral, Daily  Thyroid, 30 mg, Oral, Daily  vitamin B-12, 500 mcg, Oral, Daily      IV meds:                         Data Review:  Results from last 7 days   Lab Units 01/18/25  0525 01/17/25  2207   SODIUM mmol/L 144 140   POTASSIUM mmol/L 3.5 3.8   CHLORIDE mmol/L 108* 102   CO2 mmol/L 25.0 20.9*   BUN mg/dL 10 12   CREATININE mg/dL 0.79 1.03*   GLUCOSE mg/dL 91 114*   CALCIUM mg/dL 8.4* 9.3         Estimated Creatinine Clearance: 42.5 mL/min (by C-G formula based on SCr of 0.79 mg/dL).  Results from last 7 days   Lab Units 01/21/25  0603 01/20/25  0612 01/19/25  0332 01/18/25  0525 01/17/25  2207   WBC 10*3/mm3 7.63 7.44 6.86 6.51 8.88   HEMOGLOBIN g/dL 11.7* 11.9* 12.6 12.7 15.1   PLATELETS 10*3/mm3 267 268 275 287 355     Results from last 7 days   Lab Units 01/18/25  1257   INR  1.05     Results from last 7 days   Lab Units 01/17/25  2207   ALT (SGPT) U/L 16   AST (SGOT) U/L 32     Results from last 7 days   Lab Units 01/18/25  0223   PH, ARTERIAL pH units 7.389   PO2 ART mm Hg 64.1*   PCO2, ARTERIAL mm Hg 41.1   HCO3 ART mmol/L 24.8             No results found for: \"HGBA1C\", \"POCGLU\"      Imaging reviewed  2D echo reviewed: EF 51%                Active Hospital Problems    Diagnosis  POA    **NSTEMI (non-ST elevated myocardial infarction) [I21.4]  Yes    Type 2 myocardial infarction [I21.A1]  Yes    Acute on chronic heart failure with preserved ejection fraction (HFpEF) [I50.33]  Yes    Elevated troponin [R79.89]  Yes    Stage 3a chronic kidney " disease [N18.31]  Yes    Chronic HFrEF (heart failure with reduced ejection fraction) [I50.22]  Yes    COPD (chronic obstructive pulmonary disease) [J44.9]  Yes    Benign hypertension [I10]  Yes      Resolved Hospital Problems   No resolved problems to display.         ASSESSMENT:  Flash pulmonary edema, improved/resolved  Hypertensive emergency  Acute on chronic HFpEF  Hypoxia, resolved  Regarding shortness of breath and hoarseness.  Possible VCD.  Anxiety probably contributing.  Mild MR  Elevated LVEDP 15-19      PLAN:  Bronchodilators for obstructive lung disease symptoms.  Anxiety is a significant contributor to her symptoms of dyspnea at times and she is scheduled to see psychiatry as an outpatient to help in that regard.  Control blood pressure.  Cardiac issues clearly contribute to dyspnea as well.  Defer further diuretics to cardiology service.        Javier Nolan MD  Pulmonary and Critical Care Medicine  [facility], [facility]  1/21/2025    09:14 EST

## 2025-01-21 NOTE — PROGRESS NOTES
[facility] Follow Up    Chief Complaint: follow up CHF, NSTEMI    Interval History: She reports that she had difficulty breathing yesterday evening that improved following a breathing treatment.  She denies any shortness of breath presently.  She denies any chest pain.    Objective:     Objective:  Temp:  [97.3 °F (36.3 °C)-98.1 °F (36.7 °C)] 98.1 °F (36.7 °C)  Heart Rate:  [] 87  Resp:  [16-34] 22  BP: (102-198)/() 126/55     Intake/Output Summary (Last 24 hours) at 1/21/2025 0727  Last data filed at 1/21/2025 0330  Gross per 24 hour   Intake 600 ml   Output 500 ml   Net 100 ml     Body mass index is 25.27 kg/m².      01/19/25  0514 01/20/25  0532 01/21/25  0458   Weight: 58.3 kg (128 lb 8.5 oz) 58.1 kg (128 lb) 58.7 kg (129 lb 6.6 oz)     Weight change: 0.64 kg (1 lb 6.6 oz)      Physical Exam:   General : Alert, cooperative, in no acute distress.  Neuro: Alert,cooperative and oriented.  Lungs: CTAB. Normal respiratory effort and rate.  CV: Regular rate and rhythm, normal S1 and S2, no murmurs, gallops or rubs.  ABD: Soft, nontender, nondistended. Positive bowel sounds.  Extr: No edema or cyanosis, moves all extremities.    Lab Review:   Results from last 7 days   Lab Units 01/18/25  0525 01/17/25  2207   SODIUM mmol/L 144 140   POTASSIUM mmol/L 3.5 3.8   CHLORIDE mmol/L 108* 102   CO2 mmol/L 25.0 20.9*   BUN mg/dL 10 12   CREATININE mg/dL 0.79 1.03*   GLUCOSE mg/dL 91 114*   CALCIUM mg/dL 8.4* 9.3   AST (SGOT) U/L  --  32   ALT (SGPT) U/L  --  16     Results from last 7 days   Lab Units 01/20/25  0612 01/19/25  0332 01/18/25  1257 01/18/25  0525 01/17/25  2312 01/17/25  2207   HSTROP T ng/L 187* 203* 190* 218* 146* 80*     Results from last 7 days   Lab Units 01/20/25  0612 01/19/25  0332   WBC 10*3/mm3 7.44 6.86   HEMOGLOBIN g/dL 11.9* 12.6   HEMATOCRIT % 37.4 37.6   PLATELETS 10*3/mm3 268 275     Results from last 7 days   Lab Units 01/20/25  0612 01/19/25  1110 01/18/25  1929  "01/18/25  1257   INR   --   --   --  1.05   APTT seconds 68.9* 66.8*   < > 32.1    < > = values in this interval not displayed.               Invalid input(s): \"LDLCALC\"  Results from last 7 days   Lab Units 01/17/25  2207   PROBNP pg/mL 1,527.0     Results from last 7 days   Lab Units 01/19/25  0332   TSH uIU/mL 3.790     I reviewed the patient's new clinical results.  I personally viewed and interpreted the patient's EKG  Current Medications:   Scheduled Meds:amLODIPine, 5 mg, Oral, Q24H  vitamin C, 500 mg, Oral, Daily  atorvastatin, 40 mg, Oral, Daily  carvedilol, 25 mg, Oral, BID With Meals  cholecalciferol, 5,000 Units, Oral, Daily  clopidogrel, 75 mg, Oral, Daily  furosemide, 20 mg, Oral, Daily  losartan, 50 mg, Oral, BID  PARoxetine, 20 mg, Oral, Daily  Thyroid, 30 mg, Oral, Daily  vitamin B-12, 500 mcg, Oral, Daily      Continuous Infusions:     Allergies:  Allergies   Allergen Reactions    Lansoprazole Nausea Only     NAUSEA  NAUSEA  NAUSEA    Buspirone Other (See Comments)     agitation    Diphenhydramine Hcl (Sleep) Irritability    Lisinopril Cough       Assessment/Plan:     NSTEMI.  Symptoms of worsening dyspnea.  No chest pain.  EKG with inferolateral repolarization abnormalities and intermittent left bundle branch block.  Cath on 1/20 with no acute disease and patent stents.  Suspect type 2 NSTEMI.   Coronary artery disease.  Prior stents to LAD and OM patent.    Hypertension.  Improved with addition of amlodipine.   Hyperlipidemia.  On atorvastatin.   Paroxsymal atrial fibrillation.  Normally on apixaban which is on hold.  Has been on heparin here.   Chronic heart failure with preserved ejection fraction.  Diuresed with IV furosemide on admission.  LVEDP only mildly elevated on cath at 17-19 mmHg.  CKD stage 3a  Hypothyroidism    -Will add oral furosemide 20 mg to start and uptitrate as needed.  - Continue current antihypertensive regimen.    Nae Norman MD  01/21/25  07:27 EST  "

## 2025-01-21 NOTE — PROGRESS NOTES
Name: Dafne Mary ADMIT: 2025   : 1940  PCP: Jossy Sauceda MD    MRN: [id number] LOS: 3 days   AGE/SEX: 84 y.o. female  ROOM: [facility]/     Subjective   Subjective   She complains of a non productive cough,wheezing,sneezing, post nasal drip and shortness of breath. She is requesting antibiotics and Robitussin but understands that antibiotics are not indicated at this time.  She is in agreement with checking a RVP. She denies any current chest pain or other acute distress.  Family is present at bedside.      Objective   Objective   Vital Signs  Temp:  [97.3 °F (36.3 °C)-98.2 °F (36.8 °C)] 98.1 °F (36.7 °C)  Heart Rate:  [] 89  Resp:  [16-34] 18  BP: (102-198)/(54-99) 131/54  SpO2:  [91 %-100 %] 91 %  on  Flow (L/min) (Oxygen Therapy):  [1-2] 1;   Device (Oxygen Therapy): nasal cannula  Body mass index is 25.27 kg/m².  Physical Exam  Vitals and nursing note reviewed.   Constitutional:       General: She is awake.      Appearance: She is ill-appearing.      Interventions: Nasal cannula in place.   HENT:      Head: Atraumatic.      Mouth/Throat:      Mouth: Mucous membranes are dry.   Cardiovascular:      Rate and Rhythm: Normal rate and regular rhythm.      Pulses: Normal pulses.           Radial pulses are 2+ on the right side and 2+ on the left side.      Heart sounds: Normal heart sounds.   Skin:     General: Skin is warm and dry.      Capillary Refill: Capillary refill takes less than 2 seconds.      Coloration: Skin is pale.   Neurological:      Mental Status: She is alert and oriented to person, place, and time. Mental status is at baseline.   Psychiatric:         Behavior: Behavior is cooperative.       Results Review     I reviewed the patient's new clinical results.  Results from last 7 days   Lab Units 25  0603 25  0612 25  0332 25  0525   WBC 10*3/mm3 7.63 7.44 6.86 6.51   HEMOGLOBIN g/dL 11.7* 11.9* 12.6 12.7   PLATELETS 10*3/mm3 267 268 275 287     Results  "from last 7 days   Lab Units 01/21/25  0804 01/18/25  0525 01/17/25  2207   SODIUM mmol/L 138 144 140   POTASSIUM mmol/L 3.7 3.5 3.8   CHLORIDE mmol/L 103 108* 102   CO2 mmol/L 20.9* 25.0 20.9*   BUN mg/dL 12 10 12   CREATININE mg/dL 0.94 0.79 1.03*   GLUCOSE mg/dL 90 91 114*   EGFR mL/min/1.73 60.0* 73.9 53.7*     Results from last 7 days   Lab Units 01/17/25  2207   ALBUMIN g/dL 4.1   BILIRUBIN mg/dL 0.5   ALK PHOS U/L 140*   AST (SGOT) U/L 32   ALT (SGPT) U/L 16     Results from last 7 days   Lab Units 01/21/25  0804 01/18/25  0525 01/17/25  2207   CALCIUM mg/dL 8.7 8.4* 9.3   ALBUMIN g/dL  --   --  4.1       No results found for: \"HGBA1C\", \"POCGLU\"    No radiology results for the last day    I have personally reviewed all medications:  Scheduled Medications  amLODIPine, 5 mg, Oral, Q24H  vitamin C, 500 mg, Oral, Daily  atorvastatin, 40 mg, Oral, Daily  carvedilol, 25 mg, Oral, BID With Meals  cholecalciferol, 5,000 Units, Oral, Daily  clopidogrel, 75 mg, Oral, Daily  furosemide, 20 mg, Oral, Daily  losartan, 50 mg, Oral, BID  oxymetazoline, 2 spray, Each Nare, BID  PARoxetine, 20 mg, Oral, Daily  Thyroid, 30 mg, Oral, Daily  vitamin B-12, 500 mcg, Oral, Daily    Infusions   Diet  Diet: Cardiac; Healthy Heart (2-3 Na+); Fluid Consistency: Thin (IDDSI 0)    I have personally reviewed:  [x]  Laboratory   [x]  Microbiology   [x]  Radiology   [x]  EKG/Telemetry  [x]  Cardiology/Vascular   []  Pathology    []  Records       Assessment/Plan     Active Hospital Problems    Diagnosis  POA    **NSTEMI (non-ST elevated myocardial infarction) [I21.4]  Yes    Type 2 myocardial infarction [I21.A1]  Yes    Acute on chronic heart failure with preserved ejection fraction (HFpEF) [I50.33]  Yes    Elevated troponin [R79.89]  Yes    Stage 3a chronic kidney disease [N18.31]  Yes    Chronic HFrEF (heart failure with reduced ejection fraction) [I50.22]  Yes    COPD (chronic obstructive pulmonary disease) [J44.9]  Yes    Benign " hypertension [I10]  Yes      Resolved Hospital Problems   No resolved problems to display.       84 y.o. female admitted with   NSTEMI (non-ST elevated myocardial infarction)  Type II myocardial infarction  Elevated troponin level at time of admission   Cardiac cath 01/20/2025- showed no acute disease, patent stents in LAD and OEM   She is on Plavix, and statin-  Currently denies any chest pain, but does continue to have dyspnea-     Paroxysmal atrial fibrillation  Eliquis continues to be held post cardiac cath although will restart tomorrow if okay with cardiology-subq heparin until home  ac restarted   Rate controlled with carvedilol  Continue cardiac monitoring    HFp EF  Essential hypertension-blood pressure stable and acceptable  She appears to be euvolemic  Losartan, furosemide, carvedilol, and amlodipine  Daily weights   Monitor I&O's     COPD (chronic obstructive pulmonary disease)  Chronic  Not in acute exacerbation  Reports shortness of breath, cough, and other URI symptoms  Checking RVP  Afrin and normal saline per pulmonology  Continue IS, and OPEP   Pulmonary hygiene turn cough deep breathe  Continue supplemental oxygen to maintain oxygen saturation below 90%.  She had episode of increased shortness of breath that improved with DuoNebs.  Pulmonary following appreciate their assistance and recommendations.    Stage III CKD  Chronic  Creatinine stable  Continue to monitor I's and O's, daily weights  Avoid nephrotoxins, hypovolemia, hypotension      SCDs for DVT prophylaxis.  Full code.  Discussed with patient, family, nursing staff, and care team on multidisciplinary rounds.  Anticipate discharge home in 1-2 days.  Expected discharge date/ time has not been documented.      ANTONELLA Masters  [facility]  01/21/25  12:57 EST

## 2025-01-21 NOTE — PLAN OF CARE
"Goal Outcome Evaluation:  Plan of Care Reviewed With: patient, daughter           Outcome Evaluation: Pt is an 83 y/o F admitted to [facility] 1/17 with acute SOA, pulmonary edema, NSTEMI, and hypertension. PMH: anxiety, pulmonary edema, HTN, palpitations, COPD w AE. Pt initially agreeable to OT evaluation and sat up EOB unsupported for LBD. Pt became anxious and watching spo2 on heart monitor and verb'd 90% on RA is not feasible and causes her SOA. With PLB and increased monitoring Spo2 95% seated EOB on RA. Pt stood to pull pants up, and declined to further perform activity. Daugther present in room verb'd \"this is just too much\". Upon asking Pt if shed like to lay back down, she stood again and asking if OT was going to hold onto her, and began ambulating w/ CGA for safety to doorway/back. Spo2 reading 85% briefly on RA, (unsure of accuracy w/ pleth reading). PLB seated, reapplied 1L NC and quickly recovered to 98%. Pt voiced frustrations to OT that she doesn't understand why they dont just give her oxygen. OT briefly explained RT process and qualifications, and educated her on benefits of PULM IP rehab given her OSORIO affecting her ADLs. Would benefit from OT f/u while IP as Pt tolerates, and may benefit from IP PULM rehab vs Cardiac rehab given NSTEMI this hospitalization.                             "

## 2025-01-21 NOTE — THERAPY EVALUATION
Patient Name: Dafne Mary  : 1940    MRN: [id number]                              Today's Date: 2025       Admit Date: 2025    Visit Dx:     ICD-10-CM ICD-9-CM   1. Flash pulmonary edema  J81.0 518.4   2. Elevated troponin  R79.89 790.6   3. NSTEMI (non-ST elevated myocardial infarction)  I21.4 410.70     Patient Active Problem List   Diagnosis    Anxiety    Arteriosclerosis of both carotid arteries    Chronic interstitial cystitis    Cough    Pulmonary emphysema    Gastroesophageal reflux disease    Fibromyalgia    Headache    Hematuria    Hoarseness    Hyperlipidemia    Benign hypertension    Postoperative hypothyroidism    Muscle spasms of both lower extremities    Palpitations    Shortness of breath    Postmenopausal osteoporosis    Chronic fatigue    Hair loss disorder    Dysuria    Dry cough    Spondylolysis, lumbar region    Vocal cord paralysis    Abnormal finding on thyroid function test    Positional lightheadedness    COPD with acute exacerbation    Hypothyroid    COPD (chronic obstructive pulmonary disease)    COPD exacerbation    Morbid obesity with BMI of 70 and over, adult    RSV bronchiolitis    Vitamin D deficiency    B12 deficiency    Iron deficiency    Decreased hemoglobin    Generalized anxiety disorder    Chronic bilateral low back pain with left-sided sciatica    Facet arthropathy, lumbar    Spinal stenosis of lumbar region    Spondylolisthesis of lumbar region    Scoliosis of lumbar spine    History of non-ST elevation myocardial infarction (NSTEMI)    Chronic respiratory failure    Acute respiratory failure    PAF (paroxysmal atrial fibrillation)    Acute respiratory failure with hypercapnia    Acute hypoxemic respiratory failure    Chronic HFrEF (heart failure with reduced ejection fraction)    Community acquired bacterial pneumonia    CAD (coronary artery disease)    Mitral valve regurgitation    Acute hypokalemia    Status post angioplasty with stent    Noncompliance     NSTEMI (non-ST elevated myocardial infarction)    Flash pulmonary edema    Elevated troponin    Stage 3a chronic kidney disease    Type 2 myocardial infarction    Acute on chronic heart failure with preserved ejection fraction (HFpEF)     Past Medical History:   Diagnosis Date    Atrial fibrillation with RVR 6/4/2023    CAD (coronary artery disease) 7/10/2023    Chronic diastolic congestive heart failure 11/17/2022    Depression     Emphysema lung     GERD (gastroesophageal reflux disease)     Heart failure     Hyperlipidemia     Hypertension     Hypothyroidism     Mitral valve regurgitation 7/10/2023    NSTEMI (non-ST elevated myocardial infarction) 10/25/2023     Past Surgical History:   Procedure Laterality Date    CARDIAC CATHETERIZATION N/A 7/7/2023    Procedure: Right and Left Heart Cath;  Surgeon: Ra Cole MD;  [facility];  Service: Cardiology;  Laterality: N/A;    CARDIAC CATHETERIZATION N/A 7/7/2023    Procedure: Percutaneous Coronary Intervention;  Surgeon: Ra Cole MD;  [facility];  Service: Cardiology;  Laterality: N/A;    CARDIAC CATHETERIZATION N/A 7/7/2023    Procedure: Stent ANDRZEJ coronary;  Surgeon: Ra Cole MD;  [facility];  Service: Cardiology;  Laterality: N/A;    CARDIAC CATHETERIZATION N/A 7/7/2023    Procedure: Coronary angiography;  Surgeon: Ra Cole MD;  [facility];  Service: Cardiology;  Laterality: N/A;    CARDIAC CATHETERIZATION N/A 7/7/2023    Procedure: Left ventriculography;  Surgeon: Ra Cole MD;  [facility];  Service: Cardiology;  Laterality: N/A;    CARDIAC CATHETERIZATION N/A 1/20/2025    Procedure: Left Heart Cath;  Surgeon: Nae Norman MD;  [facility];  Service: Cardiovascular;  Laterality: N/A;    CARDIAC CATHETERIZATION N/A  1/20/2025    Procedure: Coronary angiography;  Surgeon: Nae Norman MD;  Location: [facility] INVASIVE LOCATION;  Service: Cardiovascular;  Laterality: N/A;    EYE SURGERY Left     INTUBATION  5/21/2023         PARATHYROIDECTOMY      Patient reports thyroidectomy partial not a para thyroidectomy.    THYROIDECTOMY, PARTIAL      1984      General Information       Row Name 01/21/25 1425          OT Time and Intention    Subjective Information complains of;dyspnea  -BC     Document Type evaluation  -BC     Mode of Treatment occupational therapy  -BC     Patient Effort adequate  -BC     Symptoms Noted During/After Treatment shortness of breath;other (see comments)  anxious  -BC       Row Name 01/21/25 1425          General Information    Patient Profile Reviewed yes  -BC     Prior Level of Function independent:;all household mobility;ADL's  sometimes uses a rollator  -BC     Existing Precautions/Restrictions oxygen therapy device and L/min;fall  +dizziness; Pt reports 2/2 OSORIO.  -BC     Barriers to Rehab ineffective coping  -BC       Row Name 01/21/25 1425          Living Environment    People in Home alone  -BC       Row Name 01/21/25 1425          Home Main Entrance    Number of Stairs, Main Entrance three  -BC       Row Name 01/21/25 1425          Stairs Within Home, Primary    Number of Stairs, Within Home, Primary none  -BC       Row Name 01/21/25 1425          Cognition    Orientation Status (Cognition) oriented x 3  -BC       Row Name 01/21/25 1425          Safety Issues/Impairments Affecting Functional Mobility    Impairments Affecting Function (Mobility) endurance/activity tolerance;strength;shortness of breath  -BC               User Key  (r) = Recorded By, (t) = Taken By, (c) = Cosigned By      Initials Name Provider Type    BC Kel Godwin OT Occupational Therapist                     Mobility/ADL's       Row Name 01/21/25 1427          Bed Mobility    Bed Mobility supine-sit;sit-supine  -BC      Supine-Sit [place] (Bed Mobility) supervision  -BC     Sit-Supine [place] (Bed Mobility) supervision  -BC     Assistive Device (Bed Mobility) bed rails;head of bed elevated  -BC       Row Name 01/21/25 1427          Transfers    Transfers stand-sit transfer;sit-stand transfer  -BC       Row Name 01/21/25 1427          Sit-Stand Transfer    Sit-Stand [place] (Transfers) standby assist;verbal cues  -BC     Comment, (Sit-Stand Transfer) w/o AD  -BC       Row Name 01/21/25 1427          Stand-Sit Transfer    Stand-Sit [place] (Transfers) standby assist;verbal cues  -BC       Row Name 01/21/25 1427          Functional Mobility    Functional Mobility- Ind. Level contact guard assist  -BC     Functional Mobility- Comment CGA w/o AD from EOB to doorway/back, increased OSORIO, on RA. Pt spo2 reading 86% initially w/ quickly returning to 94% w/ seated PLB/recovery breathing but Pt re-applied 1L NC, anxious.  -BC     Patient was able to Ambulate yes  -BC       Row Name 01/21/25 1427          Activities of Daily Living    BADL Assessment/Intervention lower body dressing  -BC       Row Name 01/21/25 1427          Lower Body Dressing Assessment/Training    [place] Level (Lower Body Dressing) don;pants/bottoms;standby assist  -BC     Position (Lower Body Dressing) unsupported standing;unsupported sitting  -BC               User Key  (r) = Recorded By, (t) = Taken By, (c) = Cosigned By      Initials Name Provider Type    BC Kel Godwin OT Occupational Therapist                   Obj/Interventions       Row Name 01/21/25 1428          Sensory Assessment (Somatosensory)    Sensory Assessment (Somatosensory) not tested  -BC       Row Name 01/21/25 1428          Range of Motion Comprehensive    General Range of Motion no range of motion deficits identified  -BC       Row Name 01/21/25 1428          Strength Comprehensive (MMT)    Comment, General Manual Muscle Testing (MMT) Assessment gen'd weakness  -BC        Row Name 01/21/25 1428          Balance    Balance Assessment sitting static balance;sitting dynamic balance;standing static balance;standing dynamic balance  -BC     Static Sitting Balance independent  -BC     Dynamic Sitting Balance supervision  -BC     Position, Sitting Balance unsupported;sitting edge of bed  -BC     Static Standing Balance standby assist  -BC     Dynamic Standing Balance contact guard  -BC     Balance Interventions standing  -BC     Comment, Balance sitting up EOB w/ (I) for balance, SBA w/ dynamic reaching and increased time to thread BLEs into pants. No LOB. Standing Close SBA for clothing mgmt. CGA for dynamic mvmts w/ gen'd weakness.  -BC               User Key  (r) = Recorded By, (t) = Taken By, (c) = Cosigned By      Initials Name Provider Type    BC Kel Godwin OT Occupational Therapist                   Goals/Plan       Row Name 01/21/25 1443          Bed Mobility Goal 1 (OT)    Activity/Assistive Device (Bed Mobility Goal 1, OT) bed mobility activities, all  -BC     [place] Level/Cues Needed (Bed Mobility Goal 1, OT) independent  -BC     Time Frame (Bed Mobility Goal 1, OT) short term goal (STG);2 weeks  -BC     Progress/Outcomes (Bed Mobility Goal 1, OT) new goal  -BC       Row Name 01/21/25 1443          Transfer Goal 1 (OT)    Activity/Assistive Device (Transfer Goal 1, OT) sit-to-stand/stand-to-sit;bed-to-chair/chair-to-bed;toilet  -BC     [place] Level/Cues Needed (Transfer Goal 1, OT) modified independence  -BC     Time Frame (Transfer Goal 1, OT) short term goal (STG);2 weeks  -BC     Strategies/Barriers (Transfers Goal 1, OT) Spo2 >91%  -BC     Progress/Outcome (Transfer Goal 1, OT) new goal  -BC       Row Name 01/21/25 1442          Dressing Goal 1 (OT)    Activity/Device (Dressing Goal 1, OT) upper body dressing;lower body dressing  -BC     [place]/Cues Needed (Dressing Goal 1, OT) modified independence  -BC     Time Frame (Dressing Goal 1, OT) short  term goal (STG);2 weeks  -BC     Strategies/Barriers (Dressing Goal 1, OT) Spo2 >91%  -BC     Progress/Outcome (Dressing Goal 1, OT) new goal  -BC       Row Name 01/21/25 1443          Toileting Goal 1 (OT)    Activity/Device (Toileting Goal 1, OT) toileting skills, all;adjust/manage clothing;perform perineal hygiene  -BC     [place] Level/Cues Needed (Toileting Goal 1, OT) independent  -BC     Time Frame (Toileting Goal 1, OT) short term goal (STG);2 weeks  -BC     Progress/Outcome (Toileting Goal 1, OT) new goal  -BC       Row Name 01/21/25 1443          Grooming Goal 1 (OT)    Activity/Device (Grooming Goal 1, OT) grooming skills, all  -BC     [place] (Grooming Goal 1, OT) independent  -BC     Time Frame (Grooming Goal 1, OT) 2 weeks;short term goal (STG)  -BC     Strategies/Barriers (Grooming Goal 1, OT) standing sinkside for ADL IND Spo2 >91%  -BC     Progress/Outcome (Grooming Goal 1, OT) new goal  -BC       Row Name 01/21/25 1443          Therapy Assessment/Plan (OT)    Planned Therapy Interventions (OT) activity tolerance training;BADL retraining;functional balance retraining;IADL retraining;neuromuscular control/coordination retraining;occupation/activity based interventions;patient/caregiver education/training;strengthening exercise;transfer/mobility retraining  -BC               User Key  (r) = Recorded By, (t) = Taken By, (c) = Cosigned By      Initials Name Provider Type    Kel Lemons, OT Occupational Therapist                   Clinical Impression       Row Name 01/21/25 1429          Pain Assessment    Pretreatment Pain Rating 0/10 - no pain  -BC     Posttreatment Pain Rating 0/10 - no pain  -BC     Pre/Posttreatment Pain Comment no c/o pain just SOA.  -BC       Row Name 01/21/25 1429          Plan of Care Review    Plan of Care Reviewed With patient;daughter  -BC     Outcome Evaluation Pt is an 85 y/o F admitted to [facility] 1/17 with acute SOA, pulmonary edema, NSTEMI, and hypertension.  "PMH: anxiety, pulmonary edema, HTN, palpitations, COPD w AE. Pt initially agreeable to OT evaluation and sat up EOB unsupported for LBD. Pt became anxious and watching spo2 on heart monitor and verb'd 90% on RA is not feasible and causes her SOA. With PLB and increased monitoring Spo2 95% seated EOB on RA. Pt stood to pull pants up, and declined to further perform activity. Daugther present in room verb'd \"this is just too much\". Upon asking Pt if shed like to lay back down, she stood again and asking if OT was going to hold onto her, and began ambulating w/ CGA for safety to doorway/back. Spo2 reading 85% briefly on RA, (unsure of accuracy w/ pleth reading). PLB seated, reapplied 1L NC and quickly recovered to 98%. Pt voiced frustrations to OT that she doesn't understand why they dont just give her oxygen. OT briefly explained RT process and qualifications, and educated her on benefits of PULM IP rehab given her OSORIO affecting her ADLs. Would benefit from OT f/u while IP as Pt tolerates, and may benefit from IP PULM rehab vs Cardiac rehab given NSTEMI this hospitalization.  -BC       Row Name 01/21/25 1422          Therapy Assessment/Plan (OT)    Rehab Potential (OT) fair  -BC     Criteria for Skilled Therapeutic Interventions Met (OT) yes;meets criteria  -BC     Therapy Frequency (OT) 3 times/wk  -BC     Predicted Duration of Therapy Intervention (OT) 2 weeks  -BC       Row Name 01/21/25 1420          Vital Signs    Pre SpO2 (%) 95  -BC     O2 Delivery Pre Treatment supplemental O2  RN requesting OT to remove NC to asses spo2 on RA. Pt re-applied 1L NC by end of session  -BC     Intra SpO2 (%) 85  -BC     O2 Delivery Intra Treatment room air  -BC     Post SpO2 (%) 99  -BC     O2 Delivery Post Treatment room air  -BC     Pre Patient Position Supine  -BC     Intra Patient Position Standing  -BC     Post Patient Position Supine  -BC       Row Name 01/21/25 1424          Positioning and Restraints    Pre-Treatment " Position in bed  -BC     Post Treatment Position bed  -BC     In Bed notified nsg;call light within reach;encouraged to call for assist;exit alarm on;with family/caregiver  -BC               User Key  (r) = Recorded By, (t) = Taken By, (c) = Cosigned By      Initials Name Provider Type    Kel Lemons OT Occupational Therapist                   Outcome Measures       Row Name 01/21/25 1445          How much help from another is currently needed...    Putting on and taking off regular lower body clothing? 3  -BC     Bathing (including washing, rinsing, and drying) 3  -BC     Toileting (which includes using toilet bed pan or urinal) 3  -BC     Putting on and taking off regular upper body clothing 3  -BC     Taking care of personal grooming (such as brushing teeth) 3  -BC     Eating meals 4  -BC     AM-PAC 6 Clicks Score (OT) 19  -BC       Row Name 01/21/25 0800          How much help from another person do you currently need...    Turning from your back to your side while in flat bed without using bedrails? 4  -AT     Moving from lying on back to sitting on the side of a flat bed without bedrails? 3  -AT     Moving to and from a bed to a chair (including a wheelchair)? 3  -AT     Standing up from a chair using your arms (e.g., wheelchair, bedside chair)? 3  -AT     Climbing 3-5 steps with a railing? 3  -AT     To walk in hospital room? 3  -AT     AM-PAC 6 Clicks Score (PT) 19  -AT       Row Name 01/21/25 1445          Modified [place] Scale    Modified [place] Scale 0 - No Symptoms at all.  -[facility] Name 01/21/25 1445          Functional Assessment    Outcome Measure Options AM-PAC 6 Clicks Daily Activity (OT);Modified [place]  -BC               User Key  (r) = Recorded By, (t) = Taken By, (c) = Cosigned By      Initials Name Provider Type    AT Niya Novoa RN Registered Nurse    Kel Lemons OT Occupational Therapist                    Occupational Therapy Education       Title: PT OT SLP  Therapies (In Progress)       Topic: Occupational Therapy (In Progress)       Point: ADL training (Done)       Description:   Instruct learner(s) on proper safety adaptation and remediation techniques during self care or transfers.   Instruct in proper use of assistive devices.                  Learning Progress Summary            Patient Acceptance, E, NR,VU by BC at 1/21/2025 1445    Comment: role of OT at acute level and possible benefits from cont'd rehab, ADL participation and DC recs/planning   Family Acceptance, E, NR,VU by BC at 1/21/2025 1445    Comment: role of OT at acute level and possible benefits from cont'd rehab, ADL participation and DC recs/planning                      Point: Home exercise program (Not Started)       Description:   Instruct learner(s) on appropriate technique for monitoring, assisting and/or progressing therapeutic exercises/activities.                  Learner Progress:  Not documented in this visit.              Point: Precautions (Not Started)       Description:   Instruct learner(s) on prescribed precautions during self-care and functional transfers.                  Learner Progress:  Not documented in this visit.              Point: Body mechanics (Not Started)       Description:   Instruct learner(s) on proper positioning and spine alignment during self-care, functional mobility activities and/or exercises.                  Learner Progress:  Not documented in this visit.                              User Key       Initials Effective Dates Name Provider Type Discipline    BC 07/29/24 -  Kel Godwin OT Occupational Therapist OT                  OT Recommendation and Plan  Planned Therapy Interventions (OT): activity tolerance training, BADL retraining, functional balance retraining, IADL retraining, neuromuscular control/coordination retraining, occupation/activity based interventions, patient/caregiver education/training, strengthening exercise, transfer/mobility  "retraining  Therapy Frequency (OT): 3 times/wk  Plan of Care Review  Plan of Care Reviewed With: patient, daughter  Outcome Evaluation: Pt is an 83 y/o F admitted to [facility] 1/17 with acute SOA, pulmonary edema, NSTEMI, and hypertension. PMH: anxiety, pulmonary edema, HTN, palpitations, COPD w AE. Pt initially agreeable to OT evaluation and sat up EOB unsupported for LBD. Pt became anxious and watching spo2 on heart monitor and verb'd 90% on RA is not feasible and causes her SOA. With PLB and increased monitoring Spo2 95% seated EOB on RA. Pt stood to pull pants up, and declined to further perform activity. Daugther present in room verb'd \"this is just too much\". Upon asking Pt if shed like to lay back down, she stood again and asking if OT was going to hold onto her, and began ambulating w/ CGA for safety to doorway/back. Spo2 reading 85% briefly on RA, (unsure of accuracy w/ pleth reading). PLB seated, reapplied 1L NC and quickly recovered to 98%. Pt voiced frustrations to OT that she doesn't understand why they dont just give her oxygen. OT briefly explained RT process and qualifications, and educated her on benefits of PULM IP rehab given her OSORIO affecting her ADLs. Would benefit from OT f/u while IP as Pt tolerates, and may benefit from IP PULM rehab vs Cardiac rehab given NSTEMI this hospitalization.     Time Calculation:   Evaluation Complexity (OT)  Review Occupational Profile/Medical/Therapy History Complexity: brief/low complexity  Assessment, Occupational Performance/Identification of Deficit Complexity: 1-3 performance deficits  Clinical Decision Making Complexity (OT): problem focused assessment/low complexity  Overall Complexity of Evaluation (OT): low complexity     Time Calculation- OT       Row Name 01/21/25 1446             Time Calculation- OT    OT Start Time 1350  -BC      OT Stop Time 1413  -BC      OT Time Calculation (min) 23 min  -BC      Total Timed Code Minutes- OT 10 minute(s)  -BC      OT " Received On 01/21/25  -BC      OT - Next Appointment 01/23/25  -BC      OT Goal Re-Cert Due Date 02/04/25  -BC         Timed Charges    71965 - OT Self Care/Mgmt Minutes 10  -BC         Total Minutes    Timed Charges Total Minutes 10  -BC       Total Minutes 10  -BC                User Key  (r) = Recorded By, (t) = Taken By, (c) = Cosigned By      Initials Name Provider Type    BC Kel Godwin OT Occupational Therapist                  Therapy Charges for Today       Code Description Service Date Service Provider Modifiers Qty    [id number]  OT SELF CARE/MGMT/TRAIN EA 15 MIN 1/21/2025 Kel Godwin OT GO 1    [id number]  OT EVAL LOW COMPLEXITY 2 1/21/2025 Kel Godwin OT GO 1                 Kel Godwin OT  1/21/2025

## 2025-01-21 NOTE — SIGNIFICANT NOTE
01/21/25 1416   OTHER   Discipline physical therapist   Rehab Time/Intention   Session Not Performed patient/family declined evaluation  (pt/family currently denies PT due to poor activity tolerance. will reattempt as able.)   Recommendation   PT - Next Appointment 01/22/25

## 2025-01-21 NOTE — PLAN OF CARE
Problem: Adult Inpatient Plan of Care  Goal: Plan of Care Review  Outcome: Progressing  Flowsheets (Taken 1/21/2025 0413)  Progress: no change  Outcome Evaluation: Patient A&Ox4.  Report of shortness of air and having expiratory wheezing and prn duo neb tx performed with improvement afterwards.  VSS.  No requests at this time.  Call light within reach.  Plan of Care Reviewed With: patient  Goal: Patient-Specific Goal (Individualized)  Outcome: Progressing  Goal: Absence of Hospital-Acquired Illness or Injury  Outcome: Progressing  Intervention: Identify and Manage Fall Risk  Recent Flowsheet Documentation  Taken 1/21/2025 0207 by Tiffanie Hill RN  Safety Promotion/Fall Prevention: safety round/check completed  Taken 1/21/2025 0035 by Tiffanie Hill RN  Safety Promotion/Fall Prevention: safety round/check completed  Taken 1/20/2025 2249 by Tiffanie Hill RN  Safety Promotion/Fall Prevention: safety round/check completed  Taken 1/20/2025 2132 by Tiffanie Hill RN  Safety Promotion/Fall Prevention: safety round/check completed  Taken 1/20/2025 2020 by Hill, Tiffanie Maggy, RN  Safety Promotion/Fall Prevention: safety round/check completed  Intervention: Prevent Skin Injury  Recent Flowsheet Documentation  Taken 1/21/2025 0035 by Tiffanie Hill RN  Body Position: position changed independently  Taken 1/20/2025 2249 by Tiffanie Hill RN  Body Position: position changed independently  Taken 1/20/2025 2132 by Tiffanie Hill RN  Body Position: position changed independently  Taken 1/20/2025 2020 by Tiffanie Hill RN  Body Position: position changed independently  Intervention: Prevent Infection  Recent Flowsheet Documentation  Taken 1/21/2025 0035 by Tiffanie Hill RN  Infection Prevention: single patient room provided  Taken 1/20/2025 2249 by Tiffanie Hill RN  Infection Prevention: single patient room provided  Taken 1/20/2025 2132 by Tiffanie Hill  RN  Infection Prevention: hand hygiene promoted  Goal: Optimal Comfort and Wellbeing  Outcome: Progressing  Intervention: Monitor Pain and Promote Comfort  Recent Flowsheet Documentation  Taken 1/21/2025 0207 by Tiffanie Hill RN  Pain Management Interventions: pain medication given  Intervention: Provide Person-Centered Care  Recent Flowsheet Documentation  Taken 1/21/2025 0035 by Tiffanie Hill RN  Trust Relationship/Rapport: care explained  Taken 1/20/2025 2132 by Tiffanie Hill RN  Trust Relationship/Rapport:   care explained   choices provided   questions encouraged   questions answered  Goal: Readiness for Transition of Care  Outcome: Progressing     Problem: Fall Injury Risk  Goal: Absence of Fall and Fall-Related Injury  Outcome: Progressing  Intervention: Identify and Manage Contributors  Recent Flowsheet Documentation  Taken 1/21/2025 0035 by Tiffanie Hill RN  Self-Care Promotion:   independence encouraged   BADL personal objects within reach  Taken 1/20/2025 2132 by Tiffanie Hill RN  Medication Review/Management: medications reviewed  Self-Care Promotion:   independence encouraged   BADL personal objects within reach  Intervention: Promote Injury-Free Environment  Recent Flowsheet Documentation  Taken 1/21/2025 0207 by Tiffanie Hill RN  Safety Promotion/Fall Prevention: safety round/check completed  Taken 1/21/2025 0035 by Tiffanie Hill RN  Safety Promotion/Fall Prevention: safety round/check completed  Taken 1/20/2025 2249 by Tiffanie Hill RN  Safety Promotion/Fall Prevention: safety round/check completed  Taken 1/20/2025 2132 by Tiffanie Hill RN  Safety Promotion/Fall Prevention: safety round/check completed  Taken 1/20/2025 2020 by Hill, Tiffanie Maggy, RN  Safety Promotion/Fall Prevention: safety round/check completed     Problem: Pulmonary Impairment  Goal: Improved Activity Tolerance  Outcome: Progressing  Goal: Effective Airway  Clearance  Outcome: Progressing  Goal: Optimal Gas Exchange  Outcome: Progressing   Goal Outcome Evaluation:  Plan of Care Reviewed With: patient        Progress: no change  Outcome Evaluation: Patient A&Ox4.  Report of shortness of air and having expiratory wheezing and prn duo neb tx performed with improvement afterwards.  VSS.  No requests at this time.  Call light within reach.

## 2025-01-22 ENCOUNTER — APPOINTMENT (OUTPATIENT)
Dept: GENERAL RADIOLOGY | Facility: HOSPITAL | Age: 85
End: 2025-01-22
Payer: MEDICARE

## 2025-01-22 LAB
ANION GAP SERPL CALCULATED.3IONS-SCNC: 12 MMOL/L (ref 5–15)
B PARAPERT DNA SPEC QL NAA+PROBE: NOT DETECTED
B PERT DNA SPEC QL NAA+PROBE: NOT DETECTED
BASOPHILS # BLD AUTO: 0.08 10*3/MM3 (ref 0–0.2)
BASOPHILS NFR BLD AUTO: 1.3 % (ref 0–1.5)
BUN SERPL-MCNC: 11 MG/DL (ref 8–23)
BUN/CREAT SERPL: 12.8 (ref 7–25)
C PNEUM DNA NPH QL NAA+NON-PROBE: NOT DETECTED
CALCIUM SPEC-SCNC: 8.5 MG/DL (ref 8.6–10.5)
CHLORIDE SERPL-SCNC: 104 MMOL/L (ref 98–107)
CO2 SERPL-SCNC: 20 MMOL/L (ref 22–29)
CREAT SERPL-MCNC: 0.86 MG/DL (ref 0.57–1)
DEPRECATED RDW RBC AUTO: 41.4 FL (ref 37–54)
EGFRCR SERPLBLD CKD-EPI 2021: 66.7 ML/MIN/1.73
EOSINOPHIL # BLD AUTO: 0.59 10*3/MM3 (ref 0–0.4)
EOSINOPHIL NFR BLD AUTO: 9.9 % (ref 0.3–6.2)
ERYTHROCYTE [DISTWIDTH] IN BLOOD BY AUTOMATED COUNT: 11.9 % (ref 12.3–15.4)
FLUAV SUBTYP SPEC NAA+PROBE: NOT DETECTED
FLUBV RNA ISLT QL NAA+PROBE: NOT DETECTED
GLUCOSE SERPL-MCNC: 105 MG/DL (ref 65–99)
HADV DNA SPEC NAA+PROBE: NOT DETECTED
HCOV 229E RNA SPEC QL NAA+PROBE: NOT DETECTED
HCOV HKU1 RNA SPEC QL NAA+PROBE: NOT DETECTED
HCOV NL63 RNA SPEC QL NAA+PROBE: NOT DETECTED
HCOV OC43 RNA SPEC QL NAA+PROBE: NOT DETECTED
HCT VFR BLD AUTO: 39.3 % (ref 34–46.6)
HGB BLD-MCNC: 12.7 G/DL (ref 12–15.9)
HMPV RNA NPH QL NAA+NON-PROBE: NOT DETECTED
HPIV1 RNA ISLT QL NAA+PROBE: NOT DETECTED
HPIV2 RNA SPEC QL NAA+PROBE: NOT DETECTED
HPIV3 RNA NPH QL NAA+PROBE: NOT DETECTED
HPIV4 P GENE NPH QL NAA+PROBE: NOT DETECTED
IMM GRANULOCYTES # BLD AUTO: 0.09 10*3/MM3 (ref 0–0.05)
IMM GRANULOCYTES NFR BLD AUTO: 1.5 % (ref 0–0.5)
LYMPHOCYTES # BLD AUTO: 0.78 10*3/MM3 (ref 0.7–3.1)
LYMPHOCYTES NFR BLD AUTO: 13 % (ref 19.6–45.3)
M PNEUMO IGG SER IA-ACNC: NOT DETECTED
MCH RBC QN AUTO: 30.7 PG (ref 26.6–33)
MCHC RBC AUTO-ENTMCNC: 32.3 G/DL (ref 31.5–35.7)
MCV RBC AUTO: 94.9 FL (ref 79–97)
MONOCYTES # BLD AUTO: 0.84 10*3/MM3 (ref 0.1–0.9)
MONOCYTES NFR BLD AUTO: 14 % (ref 5–12)
NEUTROPHILS NFR BLD AUTO: 3.6 10*3/MM3 (ref 1.7–7)
NEUTROPHILS NFR BLD AUTO: 60.3 % (ref 42.7–76)
NRBC BLD AUTO-RTO: 0 /100 WBC (ref 0–0.2)
PLATELET # BLD AUTO: 248 10*3/MM3 (ref 140–450)
PMV BLD AUTO: 8.9 FL (ref 6–12)
POTASSIUM SERPL-SCNC: 3.9 MMOL/L (ref 3.5–5.2)
RBC # BLD AUTO: 4.14 10*6/MM3 (ref 3.77–5.28)
RHINOVIRUS RNA SPEC NAA+PROBE: NOT DETECTED
RSV RNA NPH QL NAA+NON-PROBE: NOT DETECTED
SARS-COV-2 RNA NPH QL NAA+NON-PROBE: NOT DETECTED
SODIUM SERPL-SCNC: 136 MMOL/L (ref 136–145)
WBC NRBC COR # BLD AUTO: 5.98 10*3/MM3 (ref 3.4–10.8)

## 2025-01-22 PROCEDURE — 80048 BASIC METABOLIC PNL TOTAL CA: CPT | Performed by: NURSE PRACTITIONER

## 2025-01-22 PROCEDURE — 94664 DEMO&/EVAL PT USE INHALER: CPT

## 2025-01-22 PROCEDURE — 94799 UNLISTED PULMONARY SVC/PX: CPT

## 2025-01-22 PROCEDURE — 99232 SBSQ HOSP IP/OBS MODERATE 35: CPT | Performed by: INTERNAL MEDICINE

## 2025-01-22 PROCEDURE — 94761 N-INVAS EAR/PLS OXIMETRY MLT: CPT

## 2025-01-22 PROCEDURE — 71046 X-RAY EXAM CHEST 2 VIEWS: CPT

## 2025-01-22 PROCEDURE — 85025 COMPLETE CBC W/AUTO DIFF WBC: CPT | Performed by: INTERNAL MEDICINE

## 2025-01-22 PROCEDURE — 97162 PT EVAL MOD COMPLEX 30 MIN: CPT

## 2025-01-22 PROCEDURE — 0202U NFCT DS 22 TRGT SARS-COV-2: CPT | Performed by: NURSE PRACTITIONER

## 2025-01-22 PROCEDURE — 97110 THERAPEUTIC EXERCISES: CPT

## 2025-01-22 RX ORDER — IPRATROPIUM BROMIDE AND ALBUTEROL SULFATE 2.5; .5 MG/3ML; MG/3ML
3 SOLUTION RESPIRATORY (INHALATION)
Status: DISCONTINUED | OUTPATIENT
Start: 2025-01-22 | End: 2025-01-23 | Stop reason: HOSPADM

## 2025-01-22 RX ORDER — BUDESONIDE AND FORMOTEROL FUMARATE DIHYDRATE 160; 4.5 UG/1; UG/1
2 AEROSOL RESPIRATORY (INHALATION)
Status: DISCONTINUED | OUTPATIENT
Start: 2025-01-22 | End: 2025-01-23 | Stop reason: HOSPADM

## 2025-01-22 RX ORDER — GUAIFENESIN 600 MG/1
600 TABLET, EXTENDED RELEASE ORAL EVERY 12 HOURS SCHEDULED
Status: DISCONTINUED | OUTPATIENT
Start: 2025-01-22 | End: 2025-01-23 | Stop reason: HOSPADM

## 2025-01-22 RX ADMIN — AMLODIPINE BESYLATE 5 MG: 5 TABLET ORAL at 09:22

## 2025-01-22 RX ADMIN — APIXABAN 2.5 MG: 2.5 TABLET, FILM COATED ORAL at 14:28

## 2025-01-22 RX ADMIN — LORAZEPAM 0.5 MG: 0.5 TABLET ORAL at 09:21

## 2025-01-22 RX ADMIN — FUROSEMIDE 20 MG: 20 TABLET ORAL at 09:21

## 2025-01-22 RX ADMIN — IPRATROPIUM BROMIDE AND ALBUTEROL SULFATE 3 ML: 2.5; .5 SOLUTION RESPIRATORY (INHALATION) at 14:33

## 2025-01-22 RX ADMIN — OXYMETAZOLINE HYDROCHLORIDE 2 SPRAY: 0.5 SPRAY NASAL at 20:51

## 2025-01-22 RX ADMIN — BUDESONIDE AND FORMOTEROL FUMARATE DIHYDRATE 2 PUFF: 160; 4.5 AEROSOL RESPIRATORY (INHALATION) at 11:12

## 2025-01-22 RX ADMIN — CLOPIDOGREL BISULFATE 75 MG: 75 TABLET ORAL at 09:22

## 2025-01-22 RX ADMIN — BUDESONIDE AND FORMOTEROL FUMARATE DIHYDRATE 2 PUFF: 160; 4.5 AEROSOL RESPIRATORY (INHALATION) at 20:09

## 2025-01-22 RX ADMIN — IPRATROPIUM BROMIDE AND ALBUTEROL SULFATE 3 ML: 2.5; .5 SOLUTION RESPIRATORY (INHALATION) at 20:08

## 2025-01-22 RX ADMIN — LOSARTAN POTASSIUM 50 MG: 50 TABLET, FILM COATED ORAL at 09:21

## 2025-01-22 RX ADMIN — ATORVASTATIN CALCIUM 40 MG: 20 TABLET, FILM COATED ORAL at 09:30

## 2025-01-22 RX ADMIN — OXYMETAZOLINE HYDROCHLORIDE 2 SPRAY: 0.5 SPRAY NASAL at 09:30

## 2025-01-22 RX ADMIN — ACETAMINOPHEN 325MG 650 MG: 325 TABLET ORAL at 09:21

## 2025-01-22 RX ADMIN — LOSARTAN POTASSIUM 50 MG: 50 TABLET, FILM COATED ORAL at 20:51

## 2025-01-22 RX ADMIN — OXYCODONE HYDROCHLORIDE AND ACETAMINOPHEN 500 MG: 500 TABLET ORAL at 09:22

## 2025-01-22 RX ADMIN — PAROXETINE HYDROCHLORIDE HEMIHYDRATE 20 MG: 20 TABLET, FILM COATED ORAL at 09:21

## 2025-01-22 RX ADMIN — CARVEDILOL 25 MG: 25 TABLET, FILM COATED ORAL at 18:35

## 2025-01-22 RX ADMIN — IPRATROPIUM BROMIDE AND ALBUTEROL SULFATE 3 ML: 2.5; .5 SOLUTION RESPIRATORY (INHALATION) at 11:11

## 2025-01-22 RX ADMIN — CARVEDILOL 25 MG: 25 TABLET, FILM COATED ORAL at 09:22

## 2025-01-22 RX ADMIN — GUAIFENESIN 600 MG: 600 TABLET ORAL at 20:51

## 2025-01-22 RX ADMIN — GUAIFENESIN 600 MG: 600 TABLET ORAL at 14:28

## 2025-01-22 RX ADMIN — Medication 500 MCG: at 09:22

## 2025-01-22 RX ADMIN — APIXABAN 2.5 MG: 2.5 TABLET, FILM COATED ORAL at 20:51

## 2025-01-22 RX ADMIN — LEVOTHYROXINE, LIOTHYRONINE 30 MG: 19; 4.5 TABLET ORAL at 09:21

## 2025-01-22 RX ADMIN — Medication 5000 UNITS: at 09:21

## 2025-01-22 NOTE — CASE MANAGEMENT/SOCIAL WORK
Continued Stay Note  [facility]     Patient Name: Dafne Mary  MRN: [id number]  Today's Date: 1/22/2025    Admit Date: 1/17/2025    Plan: PLan at present is home alone with [facility] .   Discharge Plan       Row Name 01/22/25 0928       Plan    Plan PLan at present is home alone with [facility].    Plan Comments S/W pt at bedside.  OT notes from yesterday reviewed w/ pt.  Recommendation noted for possible inaptient pulm rehab.  CCP discussed DC options with pt including this vs subacute rehab vs home w/ HH, as well as outpt cardiac rehab.  Pt states she is not interested in going to a facility.  She plans to return home upon DC and is in agreement w/ [facility].  CCP encouraged pt to work with therapies here to ensure she is safe to return home upon DC. ...........Lorrie VENTURA/ RANDI.                   Discharge Codes    No documentation.                 Expected Discharge Date and Time       Expected Discharge Date Expected Discharge Time    Jan 23, 2025               Lorrie Verma RN

## 2025-01-22 NOTE — PROGRESS NOTES
[facility] INPATIENT PROGRESS NOTE         [address]    2025      PATIENT IDENTIFICATION:  Name: Dafne Mary ADMIT: 2025   : 1940  PCP: Jossy Sauceda MD    MRN: [id number] LOS: 4 days   AGE/SEX: 84 y.o. female  ROOM: [facility]                     LOS 4    Reason for visit: Flash pulmonary edema with hypoxemia      SUBJECTIVE:      Complains of shortness of breath with wheezing.  Feels that she has some chest tightness.  Moderate wheezing noted on auscultation.  She says that the nasal sprays that I ordered yesterday were “not given to her because the nurse could not figure out how to open them” adding nebulized bronchodilators and Symbicort for increased wheezing and chest tightness.      Objective   OBJECTIVE:    Vital Sign Min/Max for last 24 hours  Temp  Min: 97.7 °F (36.5 °C)  Max: 98.1 °F (36.7 °C)   BP  Min: 121/96  Max: 180/90   Pulse  Min: 70  Max: 95   Resp  Min: 16  Max: 20   SpO2  Min: 91 %  Max: 100 %   No data recorded   Weight  Min: 58.5 kg (128 lb 15.5 oz)  Max: 58.5 kg (128 lb 15.5 oz)    Vitals:    25 2021 25 2339 25 0424 25 0509   BP:   180/90    BP Location:   Left arm    Patient Position:   Lying    Pulse: 86  95    Resp: 18 16 16    Temp:  98.1 °F (36.7 °C) 97.9 °F (36.6 °C)    TempSrc:  Oral Oral    SpO2: 97%  99%    Weight:    58.5 kg (128 lb 15.5 oz)   Height:                25  0532 25  0458 25  0509   Weight: 58.1 kg (128 lb) 58.7 kg (129 lb 6.6 oz) 58.5 kg (128 lb 15.5 oz)       Body mass index is 25.19 kg/m².                          Body mass index is 25.19 kg/m².    Intake/Output Summary (Last 24 hours) at 2025 0818  Last data filed at 2025 0607  Gross per 24 hour   Intake --   Output 2100 ml   Net -2100 ml         Exam:  GEN:  No distress, appears stated age  EYES:   PERRL, anicteric sclerae  ENT:    External ears/nose normal, OP clear  NECK:  No adenopathy, midline trachea  LUNGS: Normal chest  "on inspection, palpation and moderate wheezing bilaterally on auscultation  CV:  Normal S1S2, without murmur  ABD:  Nontender, nondistended, no hepatosplenomegaly, +BS  EXT:  No edema.  No cyanosis or clubbing.  No mottling and normal cap refill.    Assessment     Scheduled meds:  amLODIPine, 5 mg, Oral, Q24H  vitamin C, 500 mg, Oral, Daily  atorvastatin, 40 mg, Oral, Daily  budesonide-formoterol, 2 puff, Inhalation, BID - RT  carvedilol, 25 mg, Oral, BID With Meals  cholecalciferol, 5,000 Units, Oral, Daily  clopidogrel, 75 mg, Oral, Daily  furosemide, 20 mg, Oral, Daily  ipratropium-albuterol, 3 mL, Nebulization, 4x Daily - RT  losartan, 50 mg, Oral, BID  oxymetazoline, 2 spray, Each Nare, BID  PARoxetine, 20 mg, Oral, Daily  Thyroid, 30 mg, Oral, Daily  vitamin B-12, 500 mcg, Oral, Daily      IV meds:                         Data Review:  Results from last 7 days   Lab Units 01/22/25  0418 01/21/25  0804 01/18/25  0525 01/17/25  2207   SODIUM mmol/L 136 138 144 140   POTASSIUM mmol/L 3.9 3.7 3.5 3.8   CHLORIDE mmol/L 104 103 108* 102   CO2 mmol/L 20.0* 20.9* 25.0 20.9*   BUN mg/dL 11 12 10 12   CREATININE mg/dL 0.86 0.94 0.79 1.03*   GLUCOSE mg/dL 105* 90 91 114*   CALCIUM mg/dL 8.5* 8.7 8.4* 9.3         Estimated Creatinine Clearance: 39 mL/min (by C-G formula based on SCr of 0.86 mg/dL).  Results from last 7 days   Lab Units 01/22/25  0418 01/21/25  0603 01/20/25  0612 01/19/25  0332 01/18/25  0525   WBC 10*3/mm3 5.98 7.63 7.44 6.86 6.51   HEMOGLOBIN g/dL 12.7 11.7* 11.9* 12.6 12.7   PLATELETS 10*3/mm3 248 267 268 275 287     Results from last 7 days   Lab Units 01/18/25  1257   INR  1.05     Results from last 7 days   Lab Units 01/17/25  2207   ALT (SGPT) U/L 16   AST (SGOT) U/L 32     Results from last 7 days   Lab Units 01/18/25  0223   PH, ARTERIAL pH units 7.389   PO2 ART mm Hg 64.1*   PCO2, ARTERIAL mm Hg 41.1   HCO3 ART mmol/L 24.8             No results found for: \"HGBA1C\", \"POCGLU\"      Imaging " reviewed  2D echo reviewed: EF 51%                Active Hospital Problems    Diagnosis  POA    **NSTEMI (non-ST elevated myocardial infarction) [I21.4]  Yes    Type 2 myocardial infarction [I21.A1]  Yes    Acute on chronic heart failure with preserved ejection fraction (HFpEF) [I50.33]  Yes    Elevated troponin [R79.89]  Yes    Stage 3a chronic kidney disease [N18.31]  Yes    Chronic HFrEF (heart failure with reduced ejection fraction) [I50.22]  Yes    COPD (chronic obstructive pulmonary disease) [J44.9]  Yes    Benign hypertension [I10]  Yes      Resolved Hospital Problems   No resolved problems to display.         ASSESSMENT:  Flash pulmonary edema, improved/resolved  Hypertensive emergency  Acute on chronic HFpEF  Hypoxia, resolved  Asthma with exacerbation  Suspected vocal cord dysfunction   Anxiety disorder: Severe  Mild MR  Elevated LVEDP 15-19      PLAN:  Bronchodilators for obstructive lung disease symptoms.  Adding Symbicort and scheduled nebulized medicines with worsening wheezing.  Anxiety is a significant contributor to her symptoms of dyspnea at times and she is scheduled to see psychiatry as an outpatient to help in that regard.  Control blood pressure.  Cardiac issues clearly contribute to dyspnea as well.  Defer further diuretics to cardiology service.        Javier Nolan MD  Pulmonary and Critical Care Medicine  [facility], [facility]  1/22/2025    08:18 EST

## 2025-01-22 NOTE — PROGRESS NOTES
Name: Dafne Mary ADMIT: 2025   : 1940  PCP: Jossy Sauceda MD    MRN: [id number] LOS: 4 days   AGE/SEX: 84 y.o. female  ROOM: [facility]/     Subjective   Subjective   She complains of continued but worsening wheezing and shortness of breath and generally feeling unwell. She otherwise offers no complaints. We discussed with family at bedside the importance of good pulmonary hygiene, and getting up for meals and to use the bathroom. Chest X ray and RVP are pending      Objective   Objective   Vital Signs  Temp:  [97.7 °F (36.5 °C)-98.4 °F (36.9 °C)] 98.4 °F (36.9 °C)  Heart Rate:  [] 95  Resp:  [16-20] 18  BP: (121-184)/(54-97) 184/97  SpO2:  [91 %-99 %] 99 %  on  Flow (L/min) (Oxygen Therapy):  [1-2] 2;   Device (Oxygen Therapy): nasal cannula  Body mass index is 25.19 kg/m².  Physical Exam  Vitals and nursing note reviewed.   Constitutional:       General: She is awake.      Appearance: She is ill-appearing.      Interventions: Nasal cannula in place.   HENT:      Head: Atraumatic.      Mouth/Throat:      Mouth: Mucous membranes are dry.      Pharynx: No posterior oropharyngeal erythema.   Cardiovascular:      Rate and Rhythm: Normal rate and regular rhythm.      Pulses: Normal pulses.           Radial pulses are 2+ on the right side and 2+ on the left side.      Heart sounds: Normal heart sounds.   Pulmonary:      Effort: Pulmonary effort is normal.      Breath sounds: Examination of the right-upper field reveals wheezing. Examination of the left-upper field reveals wheezing. Examination of the right-middle field reveals wheezing. Examination of the right-lower field reveals wheezing. Examination of the left-lower field reveals wheezing. Wheezing present.   Abdominal:      General: Bowel sounds are normal.      Palpations: Abdomen is soft.   Skin:     General: Skin is warm and dry.      Capillary Refill: Capillary refill takes less than 2 seconds.      Coloration: Skin is pale.  "  Neurological:      General: No focal deficit present.      Mental Status: She is alert and oriented to person, place, and time. Mental status is at baseline.   Psychiatric:         Mood and Affect: Mood normal.         Behavior: Behavior is cooperative.       Results Review     I reviewed the patient's new clinical results.  Results from last 7 days   Lab Units 01/22/25 0418 01/21/25  0603 01/20/25  0612 01/19/25  0332   WBC 10*3/mm3 5.98 7.63 7.44 6.86   HEMOGLOBIN g/dL 12.7 11.7* 11.9* 12.6   PLATELETS 10*3/mm3 248 267 268 275     Results from last 7 days   Lab Units 01/22/25 0418 01/21/25  0804 01/18/25  0525 01/17/25  2207   SODIUM mmol/L 136 138 144 140   POTASSIUM mmol/L 3.9 3.7 3.5 3.8   CHLORIDE mmol/L 104 103 108* 102   CO2 mmol/L 20.0* 20.9* 25.0 20.9*   BUN mg/dL 11 12 10 12   CREATININE mg/dL 0.86 0.94 0.79 1.03*   GLUCOSE mg/dL 105* 90 91 114*   EGFR mL/min/1.73 66.7 60.0* 73.9 53.7*     Results from last 7 days   Lab Units 01/17/25  2207   ALBUMIN g/dL 4.1   BILIRUBIN mg/dL 0.5   ALK PHOS U/L 140*   AST (SGOT) U/L 32   ALT (SGPT) U/L 16     Results from last 7 days   Lab Units 01/22/25 0418 01/21/25  0804 01/18/25  0525 01/17/25  2207   CALCIUM mg/dL 8.5* 8.7 8.4* 9.3   ALBUMIN g/dL  --   --   --  4.1       No results found for: \"HGBA1C\", \"POCGLU\"    No radiology results for the last day    I have personally reviewed all medications:  Scheduled Medications  amLODIPine, 5 mg, Oral, Q24H  vitamin C, 500 mg, Oral, Daily  atorvastatin, 40 mg, Oral, Daily  budesonide-formoterol, 2 puff, Inhalation, BID - RT  carvedilol, 25 mg, Oral, BID With Meals  cholecalciferol, 5,000 Units, Oral, Daily  clopidogrel, 75 mg, Oral, Daily  furosemide, 20 mg, Oral, Daily  ipratropium-albuterol, 3 mL, Nebulization, 4x Daily - RT  losartan, 50 mg, Oral, BID  oxymetazoline, 2 spray, Each Nare, BID  PARoxetine, 20 mg, Oral, Daily  Thyroid, 30 mg, Oral, Daily  vitamin B-12, 500 mcg, Oral, Daily    Infusions   Diet  Diet: " Cardiac; Healthy Heart (2-3 Na+); Fluid Consistency: Thin (IDDSI 0)    I have personally reviewed:  [x]  Laboratory   [x]  Microbiology   [x]  Radiology   [x]  EKG/Telemetry  [x]  Cardiology/Vascular   []  Pathology    []  Records       Assessment/Plan     Active Hospital Problems    Diagnosis  POA    **NSTEMI (non-ST elevated myocardial infarction) [I21.4]  Yes    Type 2 myocardial infarction [I21.A1]  Yes    Acute on chronic heart failure with preserved ejection fraction (HFpEF) [I50.33]  Yes    Elevated troponin [R79.89]  Yes    Stage 3a chronic kidney disease [N18.31]  Yes    Chronic HFrEF (heart failure with reduced ejection fraction) [I50.22]  Yes    COPD (chronic obstructive pulmonary disease) [J44.9]  Yes    Benign hypertension [I10]  Yes      Resolved Hospital Problems   No resolved problems to display.       84 y.o. female admitted with   NSTEMI (non-ST elevated myocardial infarction)  Type II myocardial infarction  Elevated troponin level at time of admission   Cardiac cath 01/20/2025- showed no acute disease, patent stents in LAD and OEM   She is on Plavix, and statin-  Currently denies any chest pain, but does continue to have dyspnea-     Paroxysmal atrial fibrillation  Eliquis continues to be held post cardiac cath although will restart tomorrow if okay with cardiology  Rate controlled with carvedilol  Continue cardiac monitoring    HFp EF  Essential hypertension-blood pressure stable and acceptable  She appears to be euvolemic  Losartan, furosemide, carvedilol, and amlodipine  Daily weights   Monitor I&O's     COPD (chronic obstructive pulmonary disease)  Chronic  Significantly, more wheezing today   Reports shortness of breath, cough, and other URI symptoms  Checking RVP today   Chest X ray pending   Afrin and normal saline per pulmonology   Twice daily Mucinex  Continue IS, and OPEP   Pulmonary hygiene turn cough deep breathe  Continue supplemental oxygen to maintain oxygen saturation below  90%.  She had episode of increased shortness of breath that improved with DuoNebs.  Pulmonary following appreciate their assistance and recommendations.    Stage III CKD  Chronic  Creatinine stable  Continue to monitor I's and O's, daily weights  Avoid nephrotoxins, hypovolemia, hypotension      SCDs for DVT prophylaxis.  Full code.  Discussed with patient, family, nursing staff, and care team on multidisciplinary rounds.  Anticipate discharge home in 1-2 days.  Expected Discharge Date: 1/23/2025; Expected Discharge Time:       ANTONELLA Masters  [facility]  01/22/25  11:55 EST

## 2025-01-22 NOTE — PROGRESS NOTES
[facility] Follow Up    Chief Complaint: Follow up type 2 nstemi    Interval History: No chest pain.  Reports issues with wheezing and cough.    Objective:     Objective:  Temp:  [97.5 °F (36.4 °C)-98.4 °F (36.9 °C)] 97.5 °F (36.4 °C)  Heart Rate:  [] 95  Resp:  [16-20] 18  BP: (112-184)/(70-97) 112/70     Intake/Output Summary (Last 24 hours) at 1/22/2025 1322  Last data filed at 1/22/2025 1211  Gross per 24 hour   Intake 480 ml   Output 2100 ml   Net -1620 ml     Body mass index is 25.19 kg/m².      01/20/25  0532 01/21/25  0458 01/22/25  0509   Weight: 58.1 kg (128 lb) 58.7 kg (129 lb 6.6 oz) 58.5 kg (128 lb 15.5 oz)     Weight change: -0.2 kg (-7.1 oz)      Physical Exam:   General : Alert, cooperative, in no acute distress.  Neuro: Alert,cooperative and oriented.  Lungs: Expiratory wheezes on exam  CV: Regular rate and rhythm, normal S1 and S2, no murmurs, gallops or rubs.  ABD: Soft, nontender, nondistended. Positive bowel sounds.  Extr: No edema or cyanosis, moves all extremities.    Lab Review:   Results from last 7 days   Lab Units 01/22/25  0418 01/21/25  0804 01/18/25  0525 01/17/25  2207   SODIUM mmol/L 136 138   < > 140   POTASSIUM mmol/L 3.9 3.7   < > 3.8   CHLORIDE mmol/L 104 103   < > 102   CO2 mmol/L 20.0* 20.9*   < > 20.9*   BUN mg/dL 11 12   < > 12   CREATININE mg/dL 0.86 0.94   < > 1.03*   GLUCOSE mg/dL 105* 90   < > 114*   CALCIUM mg/dL 8.5* 8.7   < > 9.3   AST (SGOT) U/L  --   --   --  32   ALT (SGPT) U/L  --   --   --  16    < > = values in this interval not displayed.     Results from last 7 days   Lab Units 01/20/25  0612 01/19/25  0332 01/18/25  1257 01/18/25  0525 01/17/25  2312 01/17/25  2207   HSTROP T ng/L 187* 203* 190* 218* 146* 80*     Results from last 7 days   Lab Units 01/22/25  0418 01/21/25  0603   WBC 10*3/mm3 5.98 7.63   HEMOGLOBIN g/dL 12.7 11.7*   HEMATOCRIT % 39.3 35.5   PLATELETS 10*3/mm3 248 267     Results from last 7 days   Lab Units  "01/20/25  0612 01/19/25  1110 01/18/25  1929 01/18/25  1257   INR   --   --   --  1.05   APTT seconds 68.9* 66.8*   < > 32.1    < > = values in this interval not displayed.               Invalid input(s): \"LDLCALC\"  Results from last 7 days   Lab Units 01/17/25  2207   PROBNP pg/mL 1,527.0     Results from last 7 days   Lab Units 01/19/25  0332   TSH uIU/mL 3.790     I reviewed the patient's new clinical results.  I personally viewed and interpreted the patient's EKG  Current Medications:   Scheduled Meds:amLODIPine, 5 mg, Oral, Q24H  apixaban, 2.5 mg, Oral, Q12H  vitamin C, 500 mg, Oral, Daily  atorvastatin, 40 mg, Oral, Daily  budesonide-formoterol, 2 puff, Inhalation, BID - RT  carvedilol, 25 mg, Oral, BID With Meals  cholecalciferol, 5,000 Units, Oral, Daily  clopidogrel, 75 mg, Oral, Daily  furosemide, 20 mg, Oral, Daily  guaiFENesin, 600 mg, Oral, Q12H  ipratropium-albuterol, 3 mL, Nebulization, 4x Daily - RT  losartan, 50 mg, Oral, BID  oxymetazoline, 2 spray, Each Nare, BID  PARoxetine, 20 mg, Oral, Daily  Thyroid, 30 mg, Oral, Daily  vitamin B-12, 500 mcg, Oral, Daily      Continuous Infusions:     Allergies:  Allergies   Allergen Reactions    Lansoprazole Nausea Only     NAUSEA  NAUSEA  NAUSEA    Buspirone Other (See Comments)     agitation    Diphenhydramine Hcl (Sleep) Irritability    Lisinopril Cough       Assessment/Plan:     NSTEMI.  Symptoms of worsening dyspnea.  No chest pain.  EKG with inferolateral repolarization abnormalities and intermittent left bundle branch block.  Cath on 1/20 with no acute disease and patent stents.  Suspect type 2 NSTEMI.   Coronary artery disease.  Prior stents to LAD and OM patent.    Hypertension.  Improved with addition of amlodipine.   Hyperlipidemia.  On atorvastatin.   Paroxsymal atrial fibrillation.  Normally on apixaban which is on hold.  Has been on heparin here.   Chronic heart failure with preserved ejection fraction.  Diuresed with IV furosemide on admission. "  LVEDP only mildly elevated on cath at 17-19 mmHg.  CKD stage 3a  Hypothyroidism    -Continue current dose of furosemide.  She appears to be net negative with just a low-dose of oral furosemide.  - Okay to resume apixaban today.  -Continue current antihypertensive regimen.    Nae Norman MD  01/22/25  13:22 EST

## 2025-01-22 NOTE — PLAN OF CARE
Goal Outcome Evaluation:  Plan of Care Reviewed With: patient        Progress: no change    Problem: Adult Inpatient Plan of Care  Goal: Plan of Care Review  Outcome: Progressing  Flowsheets (Taken 1/22/2025 0610)  Progress: no change  Plan of Care Reviewed With: patient  Goal: Patient-Specific Goal (Individualized)  Outcome: Progressing  Goal: Absence of Hospital-Acquired Illness or Injury  Outcome: Progressing  Intervention: Identify and Manage Fall Risk  Recent Flowsheet Documentation  Taken 1/22/2025 0605 by Tiffanie Hill RN  Safety Promotion/Fall Prevention: safety round/check completed  Taken 1/22/2025 0416 by Tiffanie Hill RN  Safety Promotion/Fall Prevention: safety round/check completed  Taken 1/22/2025 0235 by Tiffanie Hill RN  Safety Promotion/Fall Prevention: safety round/check completed  Taken 1/22/2025 0051 by Tiffanie Hill RN  Safety Promotion/Fall Prevention: safety round/check completed  Taken 1/21/2025 2218 by Tiffanie Hill RN  Safety Promotion/Fall Prevention: safety round/check completed  Taken 1/21/2025 2021 by Hill, Tiffanie Maggy, RN  Safety Promotion/Fall Prevention:   safety round/check completed   room organization consistent   nonskid shoes/slippers when out of bed   fall prevention program maintained  Intervention: Prevent Skin Injury  Recent Flowsheet Documentation  Taken 1/22/2025 0051 by Tiffanie Hill RN  Body Position: position changed independently  Taken 1/21/2025 2021 by Tiffanie Hill RN  Body Position: position changed independently  Intervention: Prevent Infection  Recent Flowsheet Documentation  Taken 1/22/2025 0051 by Tiffanie Hill RN  Infection Prevention: single patient room provided  Taken 1/21/2025 2021 by Tiffanie Hill RN  Infection Prevention: hand hygiene promoted  Goal: Optimal Comfort and Wellbeing  Outcome: Progressing  Intervention: Provide Person-Centered Care  Recent Flowsheet Documentation  Taken  1/22/2025 0051 by Tiffanie Hill RN  Trust Relationship/Rapport: care explained  Taken 1/21/2025 2021 by Tiffanie Hill RN  Trust Relationship/Rapport: care explained  Goal: Readiness for Transition of Care  Outcome: Progressing     Problem: Fall Injury Risk  Goal: Absence of Fall and Fall-Related Injury  Outcome: Progressing  Intervention: Identify and Manage Contributors  Recent Flowsheet Documentation  Taken 1/22/2025 0051 by Tiffanie Hill RN  Self-Care Promotion: independence encouraged  Taken 1/21/2025 2021 by Tiffanie Hill RN  Medication Review/Management: medications reviewed  Self-Care Promotion:   independence encouraged   BADL personal objects within reach  Intervention: Promote Injury-Free Environment  Recent Flowsheet Documentation  Taken 1/22/2025 0605 by Tiffanie Hill RN  Safety Promotion/Fall Prevention: safety round/check completed  Taken 1/22/2025 0416 by Tiffanie Hill RN  Safety Promotion/Fall Prevention: safety round/check completed  Taken 1/22/2025 0235 by Tiffanie Hill RN  Safety Promotion/Fall Prevention: safety round/check completed  Taken 1/22/2025 0051 by Tiffanie Hill RN  Safety Promotion/Fall Prevention: safety round/check completed  Taken 1/21/2025 2218 by Tiffanie Hill RN  Safety Promotion/Fall Prevention: safety round/check completed  Taken 1/21/2025 2021 by Hill, Tiffanie Maggy, RN  Safety Promotion/Fall Prevention:   safety round/check completed   room organization consistent   nonskid shoes/slippers when out of bed   fall prevention program maintained     Problem: Pulmonary Impairment  Goal: Improved Activity Tolerance  Outcome: Progressing  Goal: Effective Airway Clearance  Outcome: Progressing  Goal: Optimal Gas Exchange  Outcome: Progressing

## 2025-01-22 NOTE — THERAPY EVALUATION
Acute Care - Physical Therapy Initial Evaluation  [facility]     Patient Name: Dafne Mary  : 1940  MRN: [id number]  Today's Date: 2025      Visit Dx:     ICD-10-CM ICD-9-CM   1. Flash pulmonary edema  J81.0 518.4   2. Elevated troponin  R79.89 790.6   3. NSTEMI (non-ST elevated myocardial infarction)  I21.4 410.70     Patient Active Problem List   Diagnosis    Anxiety    Arteriosclerosis of both carotid arteries    Chronic interstitial cystitis    Cough    Pulmonary emphysema    Gastroesophageal reflux disease    Fibromyalgia    Headache    Hematuria    Hoarseness    Hyperlipidemia    Benign hypertension    Postoperative hypothyroidism    Muscle spasms of both lower extremities    Palpitations    Shortness of breath    Postmenopausal osteoporosis    Chronic fatigue    Hair loss disorder    Dysuria    Dry cough    Spondylolysis, lumbar region    Vocal cord paralysis    Abnormal finding on thyroid function test    Positional lightheadedness    COPD with acute exacerbation    Hypothyroid    COPD (chronic obstructive pulmonary disease)    COPD exacerbation    Morbid obesity with BMI of 70 and over, adult    RSV bronchiolitis    Vitamin D deficiency    B12 deficiency    Iron deficiency    Decreased hemoglobin    Generalized anxiety disorder    Chronic bilateral low back pain with left-sided sciatica    Facet arthropathy, lumbar    Spinal stenosis of lumbar region    Spondylolisthesis of lumbar region    Scoliosis of lumbar spine    History of non-ST elevation myocardial infarction (NSTEMI)    Chronic respiratory failure    Acute respiratory failure    PAF (paroxysmal atrial fibrillation)    Acute respiratory failure with hypercapnia    Acute hypoxemic respiratory failure    Chronic HFrEF (heart failure with reduced ejection fraction)    Community acquired bacterial pneumonia    CAD (coronary artery disease)    Mitral valve regurgitation    Acute hypokalemia    Status post angioplasty with stent     Noncompliance    NSTEMI (non-ST elevated myocardial infarction)    Flash pulmonary edema    Elevated troponin    Stage 3a chronic kidney disease    Type 2 myocardial infarction    Acute on chronic heart failure with preserved ejection fraction (HFpEF)     Past Medical History:   Diagnosis Date    Atrial fibrillation with RVR 6/4/2023    CAD (coronary artery disease) 7/10/2023    Chronic diastolic congestive heart failure 11/17/2022    Depression     Emphysema lung     GERD (gastroesophageal reflux disease)     Heart failure     Hyperlipidemia     Hypertension     Hypothyroidism     Mitral valve regurgitation 7/10/2023    NSTEMI (non-ST elevated myocardial infarction) 10/25/2023     Past Surgical History:   Procedure Laterality Date    CARDIAC CATHETERIZATION N/A 7/7/2023    Procedure: Right and Left Heart Cath;  Surgeon: Ra Cole MD;  Location: [facility] C[facility]AS[facility] LOC[facility];  Service: Cardiology;  Laterality: N/A;    CARDIAC CATHETERIZATION N/A 7/7/2023    Procedure: Percutaneous Coronary Intervention;  Surgeon: Ra Cole MD;  Location: [facility] CATH [facility]VASIVE LOC[facility];  Service: Cardiology;  Laterality: N/A;    CARDIAC CATHETERIZATION N/A 7/7/2023    Procedure: Stent ANDRZEJ coronary;  Surgeon: Ra Cole MD;  Location: [facility] CATH [facility]ASIVE LOC[facility];  Service: Cardiology;  Laterality: N/A;    CARDIAC CATHETERIZATION N/A 7/7/2023    Procedure: Coronary angiography;  Surgeon: Ra Cole MD;  Location: [facility] CATH [facility] LOC[facility];  Service: Cardiology;  Laterality: N/A;    CARDIAC CATHETERIZATION N/A 7/7/2023    Procedure: Left ventriculography;  Surgeon: Ra Cole MD;  Location: [facility] CATH [facility]AS[facility] LOC[facility];  Service: Cardiology;  Laterality: N/A;    CARDIAC CATHETERIZATION N/A 1/20/2025    Procedure: Left Heart Cath;  Surgeon: Nae Norman MD;  Location: [facility] C[facility];  Service: Cardiovascular;  Laterality: N/A;    CARDIAC  CATHETERIZATION N/A 1/20/2025    Procedure: Coronary angiography;  Surgeon: Nae Norman MD;  Location:  [facility] CATH INVASIVE LOCATION;  Service: Cardiovascular;  Laterality: N/A;    EYE SURGERY Left     INTUBATION  5/21/2023         PARATHYROIDECTOMY      Patient reports thyroidectomy partial not a para thyroidectomy.    THYROIDECTOMY, PARTIAL      1984     PT Assessment (Last 12 Hours)       PT Evaluation and Treatment       Row Name 01/22/25 1100          Physical Therapy Time and Intention    Subjective Information complains of;dyspnea  -     Document Type evaluation  -     Mode of Treatment individual therapy;physical therapy  -     Patient Effort adequate  -     Symptoms Noted During/After Treatment shortness of breath  -       Row Name 01/22/25 1100          General Information    Patient Profile Reviewed yes  -     Patient Observations alert  -     Patient/Family/Caregiver Comments/Observations pt supine in bed no acute distress  -     Prior Level of Function independent:;gait  -     Equipment Currently Used at Home none  -     Pertinent History of Current Functional Problem SOA  -     Existing Precautions/Restrictions fall  -     Benefits Reviewed patient and family:  -     Barriers to Rehab medically complex  -       Row Name 01/22/25 1100          Living Environment    Current Living Arrangements home  -     People in Home alone  -       Row Name 01/22/25 1100          Pain    Pretreatment Pain Rating 0/10 - no pain  -     Posttreatment Pain Rating 0/10 - no pain  -       Row Name 01/22/25 1100          Cognition    Orientation Status (Cognition) oriented x 3  -       Row Name 01/22/25 1100          Range of Motion Comprehensive    General Range of Motion bilateral lower extremity ROM WFL  -       Row Name 01/22/25 1100          Strength Comprehensive (MMT)    Comment, General Manual Muscle Testing (MMT) Assessment generalized weakness, LEs at least 3/5  -   "     Row Name 01/22/25 1100          Bed Mobility    Supine-Sit [place] (Bed Mobility) supervision  -     Sit-Supine [place] (Bed Mobility) supervision  -     Assistive Device (Bed Mobility) bed rails;head of bed elevated  -       Row Name 01/22/25 1100          Transfers    Transfers sit-stand transfer;stand-sit transfer  -       Row Name 01/22/25 1100          Sit-Stand Transfer    Sit-Stand [place] (Transfers) standby assist  -       Row Name 01/22/25 1100          Stand-Sit Transfer    Stand-Sit [place] (Transfers) standby assist  -       Row Name 01/22/25 1100          Gait/Stairs (Locomotion)    [place] Level (Gait) standby assist  -     Distance in Feet (Gait) 30  x2 (seated rest break)  -     Pattern (Gait) step-through  -     Deviations/Abnormal Patterns (Gait) foreign decreased  -     Comment, (Gait/Stairs) Vcs for PLB w mobility  -       Row Name 01/22/25 1100          Balance    Static Sitting Balance independent  -     Position, Sitting Balance unsupported;sitting edge of bed  -     Static Standing Balance standby assist  -       Row Name 01/22/25 1100          Plan of Care Review    Plan of Care Reviewed With patient;daughter  -     Outcome Evaluation Pt 85 yo female presented to ED w SOA + pulmonary edema and acute CHF, PMH HTN, CKD, COPD, CAD, anxiety. On PT exam pt ambulated 30ft x 2 (seated rest break due to SOA) sao2 = or > 90% rm air w ambulation VCs for pursed lip breathing/relaxation strategies. Pt requested to be placed back on 1L post mobilization for comfort sa02 93%. ENcouraged pt to continue ambulation as tolerated. Attempted to DC purewick however pt/daughter refused due to pt \"still being on lasix.\" Pt hopes to return home at [place]. consider HHC/OP.  -       Row Name 01/22/25 1100          Vital Signs    Pre SpO2 (%) 99  -     O2 Delivery Pre Treatment supplemental O2  -     Intra SpO2 (%) 90  -     O2 Delivery Intra Treatment " room air  -     Post SpO2 (%) 93  -     O2 Delivery Post Treatment supplemental O2  -       Row Name 01/22/25 1100          Positioning and Restraints    Pre-Treatment Position in bed  -     Post Treatment Position bed  -     In Bed notified nsg;fowlers;call light within reach;encouraged to call for assist;exit alarm on;with family/caregiver  -       Row Name 01/22/25 1100          Therapy Assessment/Plan (PT)    Rehab Potential (PT) fair  -     Criteria for Skilled Interventions Met (PT) yes  -     Therapy Frequency (PT) 5 times/wk  -       Row Name 01/22/25 1100          PT Evaluation Complexity    History, PT Evaluation Complexity 1-2 personal factors and/or comorbidities  -     Examination of Body Systems (PT Eval Complexity) total of 3 or more elements  -     Clinical Presentation (PT Evaluation Complexity) evolving  -     Clinical Decision Making (PT Evaluation Complexity) moderate complexity  -     Overall Complexity (PT Evaluation Complexity) moderate complexity  -[facility] Name 01/22/25 1100          Physical Therapy Goals    Bed Mobility Goal Selection (PT) bed mobility, PT goal 1  -     Transfer Goal Selection (PT) transfer, PT goal 1  [facility]     Gait Training Goal Selection (PT) gait training, PT goal 1  -[facility] Name 01/22/25 1100          Bed Mobility Goal 1 (PT)    Activity/Assistive Device (Bed Mobility Goal 1, PT) bed mobility activities, all  -     [place] Level/Cues Needed (Bed Mobility Goal 1, PT) independent  -     Time Frame (Bed Mobility Goal 1, PT) 2 weeks  -[facility] Name 01/22/25 1100          Transfer Goal 1 (PT)    Activity/Assistive Device (Transfer Goal 1, PT) bed-to-chair/chair-to-bed;sit-to-stand/stand-to-sit  -     [place] Level/Cues Needed (Transfer Goal 1, PT) independent  -     Time Frame (Transfer Goal 1, PT) 2 weeks  -       Row Name 01/22/25 1100          Gait Training Goal 1 (PT)    Activity/Assistive Device (Gait  Training Goal 1, PT) gait (walking locomotion)  -     [place] Level (Gait Training Goal 1, PT) independent  -     Distance (Gait Training Goal 1, PT) 150  -     Time Frame (Gait Training Goal 1, PT) 2 weeks  -               User Key  (r) = Recorded By, (t) = Taken By, (c) = Cosigned By      Initials Name Provider Type     Ivone Marinelli, PT Physical Therapist                    Physical Therapy Education       Title: PT OT SLP Therapies (In Progress)       Topic: Physical Therapy (In Progress)       Point: Mobility training (In Progress)       Learning Progress Summary            Patient Nonacceptance, E, NR by  at 1/22/2025 1123   Family Nonacceptance, E, NR by  at 1/22/2025 1123                      Point: Home exercise program (In Progress)       Learning Progress Summary            Patient Nonacceptance, E, NR by  at 1/22/2025 1123   Family Nonacceptance, E, NR by  at 1/22/2025 1123                      Point: Body mechanics (In Progress)       Learning Progress Summary            Patient Nonacceptance, E, NR by  at 1/22/2025 1123   Family Nonacceptance, E, NR by  at 1/22/2025 1123                      Point: Precautions (In Progress)       Learning Progress Summary            Patient Nonacceptance, E, NR by  at 1/22/2025 1123   Family Nonacceptance, E, NR by  at 1/22/2025 1123                                      User Key       Initials Effective Dates Name Provider Type Discipline     06/16/21 -  Ivone Marinelli, PT Physical Therapist PT                  PT Recommendation and Plan  Anticipated Discharge Disposition (PT): home with home health, home with outpatient therapy services  Planned Therapy Interventions (PT): balance training, bed mobility training, home exercise program, gait training, ROM (range of motion), stair training, strengthening, stretching, transfer training  Therapy Frequency (PT): 5 times/wk  Plan of Care Reviewed With: patient, daughter  Outcome Evaluation:  "Pt 83 yo female presented to ED w SOA + pulmonary edema and acute CHF, PMH HTN, CKD, COPD, CAD, anxiety. On PT exam pt ambulated 30ft x 2 (seated rest break due to SOA) sao2 = or > 90% rm air w ambulation VCs for pursed lip breathing/relaxation strategies. Pt requested to be placed back on 1L post mobilization for comfort sa02 93%. ENcouraged pt to continue ambulation as tolerated. Attempted to DC purewick however pt/daughter refused due to pt \"still being on lasix.\" Pt hopes to return home at [place]. consider HHC/OP.   Outcome Measures       Row Name 01/22/25 1100             How much help from another person do you currently need...    Turning from your back to your side while in flat bed without using bedrails? 4  -LH      Moving from lying on back to sitting on the side of a flat bed without bedrails? 4  -LH      Moving to and from a bed to a chair (including a wheelchair)? 4  -LH      Standing up from a chair using your arms (e.g., wheelchair, bedside chair)? 4  -LH      Climbing 3-5 steps with a railing? 3  -LH      To walk in hospital room? 3  -LH      AM-PAC 6 Clicks Score (PT) 22  -         Functional Assessment    Outcome Measure Options AM-PAC 6 Clicks Basic Mobility (PT)  -                User Key  (r) = Recorded By, (t) = Taken By, (c) = Cosigned By      Initials Name Provider Type     Ivone Marinelli, PT Physical Therapist                     Time Calculation:    PT Charges       Row Name 01/22/25 1124             Time Calculation    Start Time 1050  -      Stop Time 1108  -      Time Calculation (min) 18 min  -      PT Received On 01/22/25  -      PT - Next Appointment 01/23/25  -      PT Goal Re-Cert Due Date 02/05/25  -         Time Calculation- PT    Total Timed Code Minutes- PT 10 minute(s)  -                User Key  (r) = Recorded By, (t) = Taken By, (c) = Cosigned By      Initials Name Provider Type     Ivone Marinelli, PT Physical Therapist                  Therapy Charges for " Today       Code Description Service Date Service Provider Modifiers Qty    10150315454 HC PT EVAL MOD COMPLEXITY 3 1/22/2025 Ivone Marinelli, PT GP 1    83519615511 HC PT THER PROC EA 15 MIN 1/22/2025 Ivone Marinelli, PT GP 1            PT G-Codes  Outcome Measure Options: AM-PAC 6 Clicks Basic Mobility (PT)  AM-PAC 6 Clicks Score (PT): 22  AM-PAC 6 Clicks Score (OT): 19  Modified Keyon Scale: 0 - No Symptoms at all.    Ivone Marinelli, PT  1/22/2025

## 2025-01-22 NOTE — PLAN OF CARE
"Goal Outcome Evaluation:  Plan of Care Reviewed With: patient, daughter           Outcome Evaluation: Pt 83 yo female presented to ED w SOA + pulmonary edema and acute CHF, PMH HTN, CKD, COPD, CAD, anxiety. On PT exam pt ambulated 30ft x 2 (seated rest break due to SOA) sao2 = or > 90% rm air w ambulation VCs for pursed lip breathing/relaxation strategies. Pt requested to be placed back on 1L post mobilization for comfort sa02 93%. ENcouraged pt to continue ambulation as tolerated. Attempted to DC purewick however pt/daughter refused due to pt \"still being on lasix.\" Pt hopes to return home at [place]. consider HHC/OP.    Anticipated Discharge Disposition (PT): home with home health, home with outpatient therapy services                        "

## 2025-01-23 ENCOUNTER — READMISSION MANAGEMENT (OUTPATIENT)
Dept: CALL CENTER | Facility: HOSPITAL | Age: 85
End: 2025-01-23
Payer: MEDICARE

## 2025-01-23 VITALS
TEMPERATURE: 98.1 F | SYSTOLIC BLOOD PRESSURE: 134 MMHG | BODY MASS INDEX: 23.55 KG/M2 | RESPIRATION RATE: 18 BRPM | OXYGEN SATURATION: 94 % | HEART RATE: 92 BPM | DIASTOLIC BLOOD PRESSURE: 71 MMHG | WEIGHT: 119.93 LBS | HEIGHT: 60 IN

## 2025-01-23 LAB
ANION GAP SERPL CALCULATED.3IONS-SCNC: 10.1 MMOL/L (ref 5–15)
BUN SERPL-MCNC: 7 MG/DL (ref 8–23)
BUN/CREAT SERPL: 8.6 (ref 7–25)
CALCIUM SPEC-SCNC: 8.7 MG/DL (ref 8.6–10.5)
CHLORIDE SERPL-SCNC: 103 MMOL/L (ref 98–107)
CO2 SERPL-SCNC: 26.9 MMOL/L (ref 22–29)
CREAT SERPL-MCNC: 0.81 MG/DL (ref 0.57–1)
DEPRECATED RDW RBC AUTO: 39.6 FL (ref 37–54)
EGFRCR SERPLBLD CKD-EPI 2021: 71.7 ML/MIN/1.73
ERYTHROCYTE [DISTWIDTH] IN BLOOD BY AUTOMATED COUNT: 11.9 % (ref 12.3–15.4)
GLUCOSE SERPL-MCNC: 110 MG/DL (ref 65–99)
HCT VFR BLD AUTO: 35.3 % (ref 34–46.6)
HGB BLD-MCNC: 11.7 G/DL (ref 12–15.9)
MCH RBC QN AUTO: 30.3 PG (ref 26.6–33)
MCHC RBC AUTO-ENTMCNC: 33.1 G/DL (ref 31.5–35.7)
MCV RBC AUTO: 91.5 FL (ref 79–97)
PLATELET # BLD AUTO: 264 10*3/MM3 (ref 140–450)
PMV BLD AUTO: 9.3 FL (ref 6–12)
POTASSIUM SERPL-SCNC: 3.4 MMOL/L (ref 3.5–5.2)
RBC # BLD AUTO: 3.86 10*6/MM3 (ref 3.77–5.28)
SODIUM SERPL-SCNC: 140 MMOL/L (ref 136–145)
WBC NRBC COR # BLD AUTO: 6.47 10*3/MM3 (ref 3.4–10.8)

## 2025-01-23 PROCEDURE — 85027 COMPLETE CBC AUTOMATED: CPT | Performed by: NURSE PRACTITIONER

## 2025-01-23 PROCEDURE — 94664 DEMO&/EVAL PT USE INHALER: CPT

## 2025-01-23 PROCEDURE — 80048 BASIC METABOLIC PNL TOTAL CA: CPT | Performed by: NURSE PRACTITIONER

## 2025-01-23 PROCEDURE — 94799 UNLISTED PULMONARY SVC/PX: CPT

## 2025-01-23 PROCEDURE — 97535 SELF CARE MNGMENT TRAINING: CPT

## 2025-01-23 PROCEDURE — 99232 SBSQ HOSP IP/OBS MODERATE 35: CPT | Performed by: INTERNAL MEDICINE

## 2025-01-23 RX ORDER — LOSARTAN POTASSIUM 50 MG/1
50 TABLET ORAL 2 TIMES DAILY
Qty: 90 TABLET | Refills: 1 | Status: SHIPPED | OUTPATIENT
Start: 2025-01-23 | End: 2025-02-01 | Stop reason: HOSPADM

## 2025-01-23 RX ORDER — CARVEDILOL 25 MG/1
25 TABLET ORAL 2 TIMES DAILY WITH MEALS
Qty: 60 TABLET | Refills: 0 | Status: SHIPPED | OUTPATIENT
Start: 2025-01-23 | End: 2025-02-01 | Stop reason: HOSPADM

## 2025-01-23 RX ORDER — LORAZEPAM 0.5 MG/1
0.5 TABLET ORAL EVERY 6 HOURS PRN
Qty: 6 TABLET | Refills: 0 | Status: ON HOLD | OUTPATIENT
Start: 2025-01-23 | End: 2025-01-30

## 2025-01-23 RX ORDER — FUROSEMIDE 20 MG/1
20 TABLET ORAL DAILY
Qty: 30 TABLET | Refills: 0 | Status: ON HOLD | OUTPATIENT
Start: 2025-01-24 | End: 2025-02-01

## 2025-01-23 RX ORDER — ATORVASTATIN CALCIUM 40 MG/1
40 TABLET, FILM COATED ORAL DAILY
Qty: 90 TABLET | Refills: 1 | Status: SHIPPED | OUTPATIENT
Start: 2025-01-23

## 2025-01-23 RX ORDER — AMLODIPINE BESYLATE 5 MG/1
5 TABLET ORAL DAILY
Qty: 90 TABLET | Refills: 1 | Status: SHIPPED | OUTPATIENT
Start: 2025-01-23

## 2025-01-23 RX ADMIN — OXYMETAZOLINE HYDROCHLORIDE 2 SPRAY: 0.5 SPRAY NASAL at 08:58

## 2025-01-23 RX ADMIN — LEVOTHYROXINE, LIOTHYRONINE 30 MG: 19; 4.5 TABLET ORAL at 08:57

## 2025-01-23 RX ADMIN — LOSARTAN POTASSIUM 50 MG: 50 TABLET, FILM COATED ORAL at 08:56

## 2025-01-23 RX ADMIN — IPRATROPIUM BROMIDE AND ALBUTEROL SULFATE 3 ML: 2.5; .5 SOLUTION RESPIRATORY (INHALATION) at 07:20

## 2025-01-23 RX ADMIN — LORAZEPAM 0.5 MG: 0.5 TABLET ORAL at 07:22

## 2025-01-23 RX ADMIN — PAROXETINE HYDROCHLORIDE HEMIHYDRATE 20 MG: 20 TABLET, FILM COATED ORAL at 11:10

## 2025-01-23 RX ADMIN — ACETAMINOPHEN 325MG 650 MG: 325 TABLET ORAL at 07:16

## 2025-01-23 RX ADMIN — CARVEDILOL 25 MG: 25 TABLET, FILM COATED ORAL at 08:56

## 2025-01-23 RX ADMIN — APIXABAN 2.5 MG: 2.5 TABLET, FILM COATED ORAL at 08:56

## 2025-01-23 RX ADMIN — Medication 5000 UNITS: at 08:57

## 2025-01-23 RX ADMIN — Medication 500 MCG: at 08:56

## 2025-01-23 RX ADMIN — GUAIFENESIN 600 MG: 600 TABLET ORAL at 08:56

## 2025-01-23 RX ADMIN — AMLODIPINE BESYLATE 5 MG: 5 TABLET ORAL at 08:56

## 2025-01-23 RX ADMIN — CLOPIDOGREL BISULFATE 75 MG: 75 TABLET ORAL at 08:56

## 2025-01-23 RX ADMIN — IPRATROPIUM BROMIDE AND ALBUTEROL SULFATE 3 ML: 2.5; .5 SOLUTION RESPIRATORY (INHALATION) at 10:55

## 2025-01-23 RX ADMIN — OXYCODONE HYDROCHLORIDE AND ACETAMINOPHEN 500 MG: 500 TABLET ORAL at 08:56

## 2025-01-23 RX ADMIN — ATORVASTATIN CALCIUM 40 MG: 20 TABLET, FILM COATED ORAL at 08:56

## 2025-01-23 RX ADMIN — FUROSEMIDE 20 MG: 20 TABLET ORAL at 08:57

## 2025-01-23 RX ADMIN — BUDESONIDE AND FORMOTEROL FUMARATE DIHYDRATE 2 PUFF: 160; 4.5 AEROSOL RESPIRATORY (INHALATION) at 07:20

## 2025-01-23 NOTE — PLAN OF CARE
Goal Outcome Evaluation:           Progress: no change  Possible going home today with HH

## 2025-01-23 NOTE — PROGRESS NOTES
[facility] Follow Up    Chief Complaint: Follow up NSTEMI    Interval History: She feels better today.  She is off the oxygen.  Still with wheezing but this is improved.    Objective:     Objective:  Temp:  [97.5 °F (36.4 °C)-98.8 °F (37.1 °C)] 98.8 °F (37.1 °C)  Heart Rate:  [] 105  Resp:  [16-20] 18  BP: (112-184)/(70-97) 151/79     Intake/Output Summary (Last 24 hours) at 1/23/2025 0740  Last data filed at 1/22/2025 2058  Gross per 24 hour   Intake 960 ml   Output 100 ml   Net 860 ml     Body mass index is 23.42 kg/m².      01/21/25  0458 01/22/25  0509 01/23/25  0637   Weight: 58.7 kg (129 lb 6.6 oz) 58.5 kg (128 lb 15.5 oz) 54.4 kg (119 lb 14.9 oz)     Weight change: -4.1 kg (-9 lb 0.6 oz)      Physical Exam:   General : Alert, cooperative, in no acute distress.  Neuro: Alert,cooperative and oriented.  Lungs: Mild expiratory wheezes bilaterally  CV: Regular rate and rhythm, normal S1 and S2, no murmurs, gallops or rubs.  ABD: Soft, nontender, nondistended. Positive bowel sounds.  Extr: No edema or cyanosis, moves all extremities.    Lab Review:   Results from last 7 days   Lab Units 01/23/25  0539 01/22/25  0418 01/18/25  0525 01/17/25  2207   SODIUM mmol/L 140 136   < > 140   POTASSIUM mmol/L 3.4* 3.9   < > 3.8   CHLORIDE mmol/L 103 104   < > 102   CO2 mmol/L 26.9 20.0*   < > 20.9*   BUN mg/dL 7* 11   < > 12   CREATININE mg/dL 0.81 0.86   < > 1.03*   GLUCOSE mg/dL 110* 105*   < > 114*   CALCIUM mg/dL 8.7 8.5*   < > 9.3   AST (SGOT) U/L  --   --   --  32   ALT (SGPT) U/L  --   --   --  16    < > = values in this interval not displayed.     Results from last 7 days   Lab Units 01/20/25  0612 01/19/25  0332 01/18/25  1257 01/18/25  0525 01/17/25  2312 01/17/25  2207   HSTROP T ng/L 187* 203* 190* 218* 146* 80*     Results from last 7 days   Lab Units 01/23/25  0539 01/22/25  0418   WBC 10*3/mm3 6.47 5.98   HEMOGLOBIN g/dL 11.7* 12.7   HEMATOCRIT % 35.3 39.3   PLATELETS 10*3/mm3 264 248  "    Results from last 7 days   Lab Units 01/20/25  0612 01/19/25  1110 01/18/25  1929 01/18/25  1257   INR   --   --   --  1.05   APTT seconds 68.9* 66.8*   < > 32.1    < > = values in this interval not displayed.               Invalid input(s): \"LDLCALC\"  Results from last 7 days   Lab Units 01/17/25  2207   PROBNP pg/mL 1,527.0     Results from last 7 days   Lab Units 01/19/25  0332   TSH uIU/mL 3.790     I reviewed the patient's new clinical results.  I personally viewed and interpreted the patient's EKG  Current Medications:   Scheduled Meds:amLODIPine, 5 mg, Oral, Q24H  apixaban, 2.5 mg, Oral, Q12H  vitamin C, 500 mg, Oral, Daily  atorvastatin, 40 mg, Oral, Daily  budesonide-formoterol, 2 puff, Inhalation, BID - RT  carvedilol, 25 mg, Oral, BID With Meals  cholecalciferol, 5,000 Units, Oral, Daily  clopidogrel, 75 mg, Oral, Daily  furosemide, 20 mg, Oral, Daily  guaiFENesin, 600 mg, Oral, Q12H  ipratropium-albuterol, 3 mL, Nebulization, 4x Daily - RT  losartan, 50 mg, Oral, BID  oxymetazoline, 2 spray, Each Nare, BID  PARoxetine, 20 mg, Oral, Daily  Thyroid, 30 mg, Oral, Daily  vitamin B-12, 500 mcg, Oral, Daily      Continuous Infusions:     Allergies:  Allergies   Allergen Reactions    Lansoprazole Nausea Only     NAUSEA  NAUSEA  NAUSEA    Buspirone Other (See Comments)     agitation    Diphenhydramine Hcl (Sleep) Irritability    Lisinopril Cough       Assessment/Plan:     NSTEMI.  Symptoms of worsening dyspnea.  No chest pain.  EKG with inferolateral repolarization abnormalities and intermittent left bundle branch block.  Cath on 1/20 with no acute disease and patent stents.  Suspect type 2 NSTEMI.   Coronary artery disease.  Prior stents to LAD and OM patent.    Hypertension.  Improved with addition of amlodipine.   Hyperlipidemia.  On atorvastatin.   Paroxsymal atrial fibrillation.  This rhythm here.  Back on apixaban.  Chronic heart failure with preserved ejection fraction.  Diuresed with IV furosemide on " admission.  LVEDP only mildly elevated on cath at 17-19 mmHg.  CKD stage 3a  Hypothyroidism    -Continue current cardiac management.  - Okay for discharge from my standpoint.  Will arrange for follow-up in our office in 1-2 weeks with myself or Addie Lennox, APRN.    Nae Norman MD  01/23/25  07:40 EST

## 2025-01-23 NOTE — PLAN OF CARE
Goal Outcome Evaluation:  Plan of Care Reviewed With: patient        Progress: improving  Outcome Evaluation: Pt tolerated OT session well this AM withi mproved ADL tolerance and performance to complete in room mobility w/o AD MOD (I), Standing sinkside w/ seated RB's as needed for grooming (I)/participation. Up to chair, Pt declined to have chair alarm and reports has been up ad jessie in the room, bed alarm not active upon entering room. REc home at [place].    Anticipated Discharge Disposition (OT): home with home health

## 2025-01-23 NOTE — PROGRESS NOTES
Discharge Planning Assessment  [facility]     Patient Name: Dafne Mary  MRN: [id number]  Today's Date: 1/23/2025    Admit Date: 1/17/2025    Plan: Home with [facility]   Discharge Needs Assessment    No documentation.                  Discharge Plan       Row Name 01/23/25 1704       Plan    Plan Home with [facility]    Final Discharge Disposition Code 06 - home with home health care    Final Note Home with [facility]                   Continued Care and Services - Discharged on 1/23/2025 Admission date: 1/17/2025 - Discharge disposition: Home-Health Care [facility]      Home Medical Care       Service Provider Request Status Services Address Phone Fax Patient Preferred    [facility] [address], [address], [facility] [phone] --                  Expected Discharge Date and Time       Expected Discharge Date Expected Discharge Time    Jan 23, 2025            Demographic Summary    No documentation.                  Functional Status    No documentation.                  Psychosocial    No documentation.                  Abuse/Neglect    No documentation.                  Legal    No documentation.                  Substance Abuse    No documentation.                  Patient Forms    No documentation.                     Margarita Espino, RN

## 2025-01-23 NOTE — PROGRESS NOTES
[facility] INPATIENT PROGRESS NOTE         [address]    2025      PATIENT IDENTIFICATION:  Name: Dafne Mary ADMIT: 2025   : 1940  PCP: Jossy Sauceda MD    MRN: [id number] LOS: 5 days   AGE/SEX: 84 y.o. female  ROOM: [facility]                     LOS 5    Reason for visit: Flash pulmonary edema with hypoxemia      SUBJECTIVE:      Resting comfortably.  On room air.  Cough and shortness of breath have improved.  Still with mild expiratory wheeze but much improved aeration bilaterally on auscultation.      Objective   OBJECTIVE:    Vital Sign Min/Max for last 24 hours  Temp  Min: 97.5 °F (36.4 °C)  Max: 98.8 °F (37.1 °C)   BP  Min: 112/70  Max: 166/81   Pulse  Min: 87  Max: 109   Resp  Min: 16  Max: 18   SpO2  Min: 91 %  Max: 99 %   No data recorded   Weight  Min: 54.4 kg (119 lb 14.9 oz)  Max: 54.4 kg (119 lb 14.9 oz)    Vitals:    25 2345 25 0637 25 0720 25 0730   BP: 151/79      BP Location: Left arm      Patient Position: Lying      Pulse: 90  109 105   Resp: 16  18 18   Temp: 98.8 °F (37.1 °C)      TempSrc: Oral      SpO2: 98%  91% 99%   Weight:  54.4 kg (119 lb 14.9 oz)     Height:                25  0458 25  0509 25  0637   Weight: 58.7 kg (129 lb 6.6 oz) 58.5 kg (128 lb 15.5 oz) 54.4 kg (119 lb 14.9 oz)       Body mass index is 23.42 kg/m².                          Body mass index is 23.42 kg/m².    Intake/Output Summary (Last 24 hours) at 2025 0840  Last data filed at 2025  Gross per 24 hour   Intake 720 ml   Output 100 ml   Net 620 ml         Exam:  GEN:  No distress, appears stated age  EYES:   PERRL, anicteric sclerae  ENT:    External ears/nose normal, OP clear  NECK:  No adenopathy, midline trachea  LUNGS: Normal chest on inspection, palpation and moderate wheezing bilaterally on auscultation  CV:  Normal S1S2, without murmur  ABD:  Nontender, nondistended, no hepatosplenomegaly, +BS  EXT:  No edema.  No  "cyanosis or clubbing.  No mottling and normal cap refill.    Assessment     Scheduled meds:  amLODIPine, 5 mg, Oral, Q24H  apixaban, 2.5 mg, Oral, Q12H  vitamin C, 500 mg, Oral, Daily  atorvastatin, 40 mg, Oral, Daily  budesonide-formoterol, 2 puff, Inhalation, BID - RT  carvedilol, 25 mg, Oral, BID With Meals  cholecalciferol, 5,000 Units, Oral, Daily  clopidogrel, 75 mg, Oral, Daily  furosemide, 20 mg, Oral, Daily  guaiFENesin, 600 mg, Oral, Q12H  ipratropium-albuterol, 3 mL, Nebulization, 4x Daily - RT  losartan, 50 mg, Oral, BID  oxymetazoline, 2 spray, Each Nare, BID  PARoxetine, 20 mg, Oral, Daily  Thyroid, 30 mg, Oral, Daily  vitamin B-12, 500 mcg, Oral, Daily      IV meds:                         Data Review:  Results from last 7 days   Lab Units 01/23/25  0539 01/22/25  0418 01/21/25  0804 01/18/25  0525 01/17/25  2207   SODIUM mmol/L 140 136 138 144 140   POTASSIUM mmol/L 3.4* 3.9 3.7 3.5 3.8   CHLORIDE mmol/L 103 104 103 108* 102   CO2 mmol/L 26.9 20.0* 20.9* 25.0 20.9*   BUN mg/dL 7* 11 12 10 12   CREATININE mg/dL 0.81 0.86 0.94 0.79 1.03*   GLUCOSE mg/dL 110* 105* 90 91 114*   CALCIUM mg/dL 8.7 8.5* 8.7 8.4* 9.3         Estimated Creatinine Clearance: 44.4 mL/min (by C-G formula based on SCr of 0.81 mg/dL).  Results from last 7 days   Lab Units 01/23/25  0539 01/22/25  0418 01/21/25  0603 01/20/25  0612 01/19/25  0332   WBC 10*3/mm3 6.47 5.98 7.63 7.44 6.86   HEMOGLOBIN g/dL 11.7* 12.7 11.7* 11.9* 12.6   PLATELETS 10*3/mm3 264 248 267 268 275     Results from last 7 days   Lab Units 01/18/25  1257   INR  1.05     Results from last 7 days   Lab Units 01/17/25  2207   ALT (SGPT) U/L 16   AST (SGOT) U/L 32     Results from last 7 days   Lab Units 01/18/25  0223   PH, ARTERIAL pH units 7.389   PO2 ART mm Hg 64.1*   PCO2, ARTERIAL mm Hg 41.1   HCO3 ART mmol/L 24.8             No results found for: \"HGBA1C\", \"POCGLU\"      Imaging reviewed  2D echo reviewed: EF 51%                Active Hospital Problems    " Diagnosis  POA    **NSTEMI (non-ST elevated myocardial infarction) [I21.4]  Yes    Type 2 myocardial infarction [I21.A1]  Yes    Acute on chronic heart failure with preserved ejection fraction (HFpEF) [I50.33]  Yes    Elevated troponin [R79.89]  Yes    Stage 3a chronic kidney disease [N18.31]  Yes    Chronic HFrEF (heart failure with reduced ejection fraction) [I50.22]  Yes    COPD (chronic obstructive pulmonary disease) [J44.9]  Yes    Benign hypertension [I10]  Yes      Resolved Hospital Problems   No resolved problems to display.         ASSESSMENT:  Flash pulmonary edema, improved/resolved  Hypertensive emergency  Acute on chronic HFpEF  Hypoxia, resolved  Asthma with exacerbation  Suspected vocal cord dysfunction   Anxiety disorder: Severe  Mild MR  Elevated LVEDP 15-19      PLAN:  Bronchodilators for obstructive lung disease symptoms.  Added Symbicort and scheduled nebulized medicines with worsening wheezing yesterday with improvement noted.  Anxiety is a significant contributor to her symptoms of dyspnea at times and she is scheduled to see psychiatry as an outpatient to help in that regard.  Control blood pressure.  Cardiac issues clearly contribute to dyspnea as well.          Javier Nolan MD  Pulmonary and Critical Care Medicine  [facility], [facility]  1/23/2025    08:40 EST

## 2025-01-23 NOTE — DISCHARGE SUMMARY
Patient Name: Dafne Mary  : 1940  MRN: [id number]    Date of Admission: 2025  Date of Discharge:  2025  Primary Care Physician: Jossy Sauceda MD      Chief Complaint:   Shortness of Breath      Discharge Diagnoses     Active Hospital Problems    Diagnosis  POA    **NSTEMI (non-ST elevated myocardial infarction) [I21.4]  Yes    Type 2 myocardial infarction [I21.A1]  Yes    Acute on chronic heart failure with preserved ejection fraction (HFpEF) [I50.33]  Yes    Elevated troponin [R79.89]  Yes    Stage 3a chronic kidney disease [N18.31]  Yes    Chronic HFrEF (heart failure with reduced ejection fraction) [I50.22]  Yes    COPD (chronic obstructive pulmonary disease) [J44.9]  Yes    Benign hypertension [I10]  Yes    Anxiety [F41.9]  Yes      Resolved Hospital Problems   No resolved problems to display.        Hospital Course     Ms. Mary is a 84 y.o. female with a history of HFrEF, COPD, CKD stage IIIa, HTN, and anxiety who presented to [facility] initially complaining of worsening shortness of breath.  Please see the admitting history and physical for further details.  She was found to have elevated high-sensitivity troponin, significantly elevated systolic blood pressure, ST depression on EKG, consistent with non-STEMI and was admitted to the hospital for further evaluation and treatment.  EKG with inferolateral repolarization abnormalities and intermittent left bundle branch block.  She has been followed by cardiology throughout her hospitalization, with echocardiogram showing LVEF 51.2%, Cardiac Cath  on 25 with Dr Norman found stable coronary artery disease, patent mid LAD stent with no stent restenosis, stable 60 to 70% stenosis of the mid small caliber diagonal branch.  Patent proximal left circumflex of the proximal first obtuse marginal branch stent with no in-stent restenosis.  Stable 20 to 30% mid RCA stenosis and diffuse distal ectasia, recommended resuming her  Eliquis, and continuing medical management of her hypertension and CAD.  Her Eliquis was held for cardiac catheterization and was restarted on 01/22/25 for her PAF with no issues at cath site.  Her hypertension has been managed with medication changes her atenolol has been changed to carvedilol, her HCTZ has been discontinued, she restarted on 5 mg of amlodipine, losartan changed from once daily to twice daily dosing.  Started on 20 mg of furosemide.Her blood pressure has stabilized.  Cardiology has cleared her for discharge home today with family to follow-up in in 1 to 2 weeks with Dr. Norman for Addie Lennox APRN.  She has been followed by pulmonology for acute shortness of breath, and wheezing. She was treated with her home regimen with the addition of DuoNebs. Her wheezing has since improved, she has been able to maintain her oxygen saturations on room air.  And has been cleared for discharge home by pulmonology with follow up outpatient as previously scheduled or if she was to experience return or worsening symptoms. She felt that she was developing a upper respiratory infection, and was requesting antibiotics. A chest x-ray PA and lateral was completed yesterday to rule out any pneumonia or other upper respiratory infection was unremarkable.  RVP was negative for any acute viral illness, her WBC have remained stable, she had no other concerning symptoms of pneumonia or other URI with the exception of a cough, that was resolved with oral Mucinex her hospital course has been complicated by severe anxiety. She was continued on her home ativan, Paxil. She has been encouraged to follow up with her PCP in one week for continued management . She has been medication changes, and verbalizes understanding.  She is encouraged to take all as prescribed, and to attend all follow-up appointments as scheduled.        Day of Discharge     Subjective: She is on room air, no respiratory distress, she reports cough has  resolved, she feels much better and is eager to be discharged home today with home health and family.    Physical Exam:  Temp:  [98.1 °F (36.7 °C)-98.8 °F (37.1 °C)] 98.1 °F (36.7 °C)  Heart Rate:  [] 92  Resp:  [16-18] 18  BP: (120-166)/(71-81) 134/71  Body mass index is 23.42 kg/m².  Physical Exam  Vitals and nursing note reviewed.   Constitutional:       Appearance: She is ill-appearing.   HENT:      Head: Atraumatic.      Nose: Nose normal.      Mouth/Throat:      Mouth: Mucous membranes are dry.   Eyes:      Conjunctiva/sclera: Conjunctivae normal.   Cardiovascular:      Rate and Rhythm: Normal rate. Rhythm irregular.      Pulses: Normal pulses.      Heart sounds: Normal heart sounds.   Pulmonary:      Effort: Pulmonary effort is normal.      Breath sounds: Wheezing present.   Abdominal:      General: Bowel sounds are normal.   Musculoskeletal:         General: Normal range of motion.   Skin:     General: Skin is warm and dry.      Capillary Refill: Capillary refill takes less than 2 seconds.      Coloration: Skin is pale.   Neurological:      General: No focal deficit present.      Mental Status: She is alert and oriented to person, place, and time.   Psychiatric:         Mood and Affect: Mood normal.         Consultants     Consult Orders (all) (From admission, onward)       Start     Ordered    01/20/25 1007  Cardiac Rehab Evaluation and Enrollment  Once        Provider:  (Not yet assigned)    01/20/25 1006    01/18/25 0957  Inpatient Pulmonology Consult  Once        Specialty:  Pulmonary Disease  Provider:  Javier Nolan MD    01/18/25 0957    01/18/25 0851  Inpatient Cardiology Consult  Once        Specialty:  Cardiology  Provider:  Jorge Boyce MD    01/18/25 0851    01/17/25 2318  LHLILIANA (on-call MD unless specified) Details  Once        Specialty:  Hospitalist  Provider:  (Not yet assigned)    01/17/25 2317    01/17/25 2311  LCSHELL (on-call MD unless specified)  Once        Specialty:  Cardiology   Provider:  Jorge Boyce MD    01/17/25 2310                  Procedures     Left Heart Cath, Coronary angiography    Imaging Results (All)       Procedure Component Value Units Date/Time    XR Chest PA & Lateral [[id number]] Collected: 01/22/25 1301     Updated: 01/22/25 1306    Narrative:      XR CHEST PA AND LATERAL-1/22/2025     HISTORY: Shortness of breath.     Heart size is mildly enlarged. Lungs appear free of acute infiltrates.  There is some aortic calcification. Mild to moderate degenerative  disease of the right shoulder is seen.     A coronary stent is seen.       Impression:      1. Mild cardiomegaly.  2. Lungs appear clear.        This report was finalized on 1/22/2025 1:03 PM by Dr. Chapin Abrams M.D on Workstation: [id number]       XR Chest 1 View [[id number]] Collected: 01/17/25 2304     Updated: 01/17/25 2312    Narrative:      SINGLE VIEW OF THE CHEST     HISTORY: Shortness of air     COMPARISON: July 10, 2023     FINDINGS:  Heart is enlarged. There is calcification of the aorta. There is no  vascular congestion. No pneumothorax, pleural effusion, or acute  infiltrate is seen. There are asymmetric degenerative changes of the  right shoulder. There is thoracolumbar scoliosis.       Impression:      No acute findings.     This report was finalized on 1/17/2025 11:09 PM by Dr. Kelly Chirinos M.D on Workstation: [id number]             Results for orders placed during the hospital encounter of 11/22/22    Duplex Carotid Ultrasound CAR    Interpretation Summary    Proximal right internal carotid artery plaque without significant stenosis.    Proximal left internal carotid artery plaque without significant stenosis.    Results for orders placed during the hospital encounter of 01/17/25    Adult Transthoracic Echo Complete W/ Cont if Necessary Per Protocol    Interpretation Summary    Left ventricular systolic function is normal. Calculated left ventricular EF = 51.2%    Septal wall motion is  "abnormal, consistent with a bundle branch block.    Left ventricular diastolic function is consistent with age.    No aortic valve regurgitation or stenosis is present.  There is moderate calcification of the aortic valve.    Mild mitral annular calcification is present. There is moderate, bileaflet mitral valve thickening present. Mild mitral valve regurgitation is present. No significant mitral valve stenosis is present.    Pertinent Labs     Results from last 7 days   Lab Units 01/23/25  0539 01/22/25  0418 01/21/25  0603 01/20/25  0612   WBC 10*3/mm3 6.47 5.98 7.63 7.44   HEMOGLOBIN g/dL 11.7* 12.7 11.7* 11.9*   PLATELETS 10*3/mm3 264 248 267 268     Results from last 7 days   Lab Units 01/23/25  0539 01/22/25  0418 01/21/25  0804 01/18/25  0525   SODIUM mmol/L 140 136 138 144   POTASSIUM mmol/L 3.4* 3.9 3.7 3.5   CHLORIDE mmol/L 103 104 103 108*   CO2 mmol/L 26.9 20.0* 20.9* 25.0   BUN mg/dL 7* 11 12 10   CREATININE mg/dL 0.81 0.86 0.94 0.79   GLUCOSE mg/dL 110* 105* 90 91   EGFR mL/min/1.73 71.7 66.7 60.0* 73.9     Results from last 7 days   Lab Units 01/17/25  2207   ALBUMIN g/dL 4.1   BILIRUBIN mg/dL 0.5   ALK PHOS U/L 140*   AST (SGOT) U/L 32   ALT (SGPT) U/L 16     Results from last 7 days   Lab Units 01/23/25  0539 01/22/25  0418 01/21/25  0804 01/18/25  0525 01/17/25  2207   CALCIUM mg/dL 8.7 8.5* 8.7 8.4* 9.3   ALBUMIN g/dL  --   --   --   --  4.1       Results from last 7 days   Lab Units 01/20/25  0612 01/19/25  0332 01/18/25  1257 01/18/25  0525 01/17/25  2312 01/17/25  2207   HSTROP T ng/L 187* 203* 190* 218*   < > 80*   PROBNP pg/mL  --   --   --   --   --  1,527.0   D DIMER QUANT MCGFEU/mL  --   --   --  0.57  --   --     < > = values in this interval not displayed.           Invalid input(s): \"LDLCALC\"      Results from last 7 days   Lab Units 01/22/25  1430   COVID19  Not Detected       Test Results Pending at Discharge     Pending Results       Procedure [Order ID] Specimen - Date/Time    CBC & " Differential [[id number]]     Specimen: Blood     Narrative:      The following orders were created for panel order CBC & Differential.  Procedure                               Abnormality         Status                     ---------                               -----------         ------                     CBC Auto Differential[[id number]]                                                         Please view results for these tests on the individual orders.    CBC Auto Differential [[id number]]     Specimen: Blood               Discharge Details        Discharge Medications        New Medications        Instructions Start Date   carvedilol 25 MG tablet  Commonly known as: COREG   25 mg, Oral, 2 Times Daily With Meals      furosemide 20 MG tablet  Commonly known as: LASIX   20 mg, Oral, Daily   Start Date: January 24, 2025            Changes to Medications        Instructions Start Date   atorvastatin 40 MG tablet  Commonly known as: LIPITOR  What changed: how much to take   40 mg, Oral, Daily      LORazepam 0.5 MG tablet  Commonly known as: ATIVAN  What changed: when to take this   0.5 mg, Oral, Every 6 Hours PRN             Continue These Medications        Instructions Start Date   acetaminophen 325 MG tablet  Commonly known as: TYLENOL   650 mg, Oral, Every 6 Hours PRN      amLODIPine 5 MG tablet  Commonly known as: NORVASC   5 mg, Oral, Daily      apixaban 2.5 MG tablet tablet  Commonly known as: ELIQUIS   2.5 mg, Oral, Every 12 Hours Scheduled      [place] Thyroid 15 MG tablet  Generic drug: thyroid   Take 1 tablet by mouth once daily      [place] Thyroid 30 MG tablet  Generic drug: Thyroid   Take 1 tablet by mouth once daily      artificial tears ophthalmic ointment   1 application , Both Eyes, Every 1 Hour PRN      Breztri Aerosphere 160-9-4.8 MCG/ACT aerosol inhaler  Generic drug: Budeson-Glycopyrrol-Formoterol   2 puffs, 2 Times Daily      clopidogrel 75 MG tablet  Commonly known as: PLAVIX   75 mg, Oral,  Daily      empagliflozin 10 MG tablet tablet  Commonly known as: Jardiance   10 mg, Oral, Daily      guaiFENesin 600 MG 12 hr tablet  Commonly known as: MUCINEX   600 mg, Oral, 2 Times Daily PRN      ipratropium-albuterol 0.5-2.5 mg/3 ml nebulizer  Commonly known as: DUO-NEB   3 mL, Every 4 Hours PRN      losartan 50 MG tablet  Commonly known as: COZAAR   50 mg, Oral, 2 Times Daily      PARoxetine 10 MG tablet  Commonly known as: PAXIL   20 mg, Oral, Daily      REFRESH DRY EYE THERAPY OP   1 drop, Daily      TAGAMET PO   Take  by mouth.      Ventolin  (90 Base) MCG/ACT inhaler  Generic drug: albuterol sulfate HFA   2 puffs, Inhalation, Every 4 Hours PRN      vitamin B-12 500 MCG tablet  Commonly known as: CYANOCOBALAMIN   500 mcg, Daily      vitamin C 250 MG tablet  Commonly known as: ASCORBIC ACID   500 mg, Daily      vitamin D3 125 MCG (5000 UT) capsule capsule   5,000 Units, Daily             Stop These Medications      atenolol 50 MG tablet  Commonly known as: TENORMIN     hydroCHLOROthiazide 25 MG tablet              Allergies   Allergen Reactions    Lansoprazole Nausea Only     NAUSEA  NAUSEA  NAUSEA    Buspirone Other (See Comments)     agitation    Diphenhydramine Hcl (Sleep) Irritability    Lisinopril Cough       Discharge Disposition:  Home-Health Care Svc      Discharge Diet:  Diet Order   Procedures    Diet: Cardiac; Healthy Heart (2-3 Na+); Fluid Consistency: Thin (IDDSI 0)       Discharge Activity:       CODE STATUS:    Code Status and Medical Interventions: CPR (Attempt to Resuscitate); Full   Ordered at: 01/18/25 0122     Code Status (Patient has no pulse and is not breathing):    CPR (Attempt to Resuscitate)     Medical Interventions (Patient has pulse or is breathing):    Full       Future Appointments   Date Time Provider Department Center   1/31/2025 10:45 AM Nae Norman MD [facility] None     Additional Instructions for the Follow-ups that You Need to Schedule       Ambulatory  Referral to Cardiac Rehab   As directed             Contact information for follow-up providers       [facility] .    Specialty: Cardiac Rehabilitation  Contact information:  [address] [facility] [address] [phone]             Jossy Sauceda MD Follow up in 1 week(s).    Specialties: Urgent Care, Emergency Medicine  Why: 1-2 weeks post hospitlization follow up  Contact information:  [address] [facility] [id number] [phone]               Nae Norman MD Follow up in 1 week(s).    Specialty: Cardiology  Why: 1-2 weeks  Contact information:  [address]  [facility] [id number] [phone]               Javier Nloan MD. Schedule an appointment as soon as possible for a visit.    Specialties: Pulmonary Disease, Intensive Care  Why: As needed, If symptoms worsen  Contact information:  [address]  [facility] [id number] [phone]                       Contact information for after-discharge care       Home Medical Care       [facility] .    Service: Home Health Services  Contact information:  [address], [address]  [facility] [address] [phone]                                   Additional Instructions for the Follow-ups that You Need to Schedule       Ambulatory Referral to Cardiac Rehab   As directed          Time Spent on Discharge:  Greater than 30 minutes      ANTONELLA Masters  [facility]  01/23/25  10:33 EST

## 2025-01-23 NOTE — THERAPY TREATMENT NOTE
Patient Name: Dafne Mary  : 1940    MRN: [id number]                              Today's Date: 2025       Admit Date: 2025    Visit Dx:     ICD-10-CM ICD-9-CM   1. Flash pulmonary edema  J81.0 518.4   2. Elevated troponin  R79.89 790.6   3. NSTEMI (non-ST elevated myocardial infarction)  I21.4 410.70   4. Generalized anxiety disorder  F41.1 300.02   5. Essential hypertension  I10 401.9   6. Anxiety  F41.9 300.00     Patient Active Problem List   Diagnosis    Anxiety    Arteriosclerosis of both carotid arteries    Chronic interstitial cystitis    Cough    Pulmonary emphysema    Gastroesophageal reflux disease    Fibromyalgia    Headache    Hematuria    Hoarseness    Hyperlipidemia    Benign hypertension    Postoperative hypothyroidism    Muscle spasms of both lower extremities    Palpitations    Shortness of breath    Postmenopausal osteoporosis    Chronic fatigue    Hair loss disorder    Dysuria    Dry cough    Spondylolysis, lumbar region    Vocal cord paralysis    Abnormal finding on thyroid function test    Positional lightheadedness    COPD with acute exacerbation    Hypothyroid    COPD (chronic obstructive pulmonary disease)    COPD exacerbation    Morbid obesity with BMI of 70 and over, adult    RSV bronchiolitis    Vitamin D deficiency    B12 deficiency    Iron deficiency    Decreased hemoglobin    Generalized anxiety disorder    Chronic bilateral low back pain with left-sided sciatica    Facet arthropathy, lumbar    Spinal stenosis of lumbar region    Spondylolisthesis of lumbar region    Scoliosis of lumbar spine    History of non-ST elevation myocardial infarction (NSTEMI)    Chronic respiratory failure    Acute respiratory failure    PAF (paroxysmal atrial fibrillation)    Acute respiratory failure with hypercapnia    Acute hypoxemic respiratory failure    Chronic HFrEF (heart failure with reduced ejection fraction)    Community acquired bacterial pneumonia    CAD (coronary artery  disease)    Mitral valve regurgitation    Acute hypokalemia    Status post angioplasty with stent    Noncompliance    NSTEMI (non-ST elevated myocardial infarction)    Flash pulmonary edema    Elevated troponin    Stage 3a chronic kidney disease    Type 2 myocardial infarction    Acute on chronic heart failure with preserved ejection fraction (HFpEF)     Past Medical History:   Diagnosis Date    Atrial fibrillation with RVR 6/4/2023    CAD (coronary artery disease) 7/10/2023    Chronic diastolic congestive heart failure 11/17/2022    Depression     Emphysema lung     GERD (gastroesophageal reflux disease)     Heart failure     Hyperlipidemia     Hypertension     Hypothyroidism     Mitral valve regurgitation 7/10/2023    NSTEMI (non-ST elevated myocardial infarction) 10/25/2023     Past Surgical History:   Procedure Laterality Date    CARDIAC CATHETERIZATION N/A 7/7/2023    Procedure: Right and Left Heart Cath;  Surgeon: Ra Cole MD;  Location: [facility] CATH INVASIVE LOCATION;  Service: Cardiology;  Laterality: N/A;    CARDIAC CATHETERIZATION N/A 7/7/2023    Procedure: Percutaneous Coronary Intervention;  Surgeon: Ra Cole MD;  Location: [facility] CATH INVASIVE LOCATION;  Service: Cardiology;  Laterality: N/A;    CARDIAC CATHETERIZATION N/A 7/7/2023    Procedure: Stent ANDRZEJ coronary;  Surgeon: Ra Cole MD;  Location: [facility] C[facility] LOC[facility];  Service: Cardiology;  Laterality: N/A;    CARDIAC CATHETERIZATION N/A 7/7/2023    Procedure: Coronary angiography;  Surgeon: Ra Cole MD;  Location: [facility] CATH INVASIVE LOCATION;  Service: Cardiology;  Laterality: N/A;    CARDIAC CATHETERIZATION N/A 7/7/2023    Procedure: Left ventriculography;  Surgeon: Ra Cole MD;  Location: [facility] CATH INVASIVE LOCATION;  Service: Cardiology;  Laterality: N/A;    CARDIAC CATHETERIZATION N/A 1/20/2025    Procedure: Left Heart Cath;  Surgeon: Nae Norman MD;  Location:   [facility] CATH INVASIVE LOCATION;  Service: Cardiovascular;  Laterality: N/A;    CARDIAC CATHETERIZATION N/A 1/20/2025    Procedure: Coronary angiography;  Surgeon: Nae Norman MD;  Location:  [facility] CATH INVASIVE LOCATION;  Service: Cardiovascular;  Laterality: N/A;    EYE SURGERY Left     INTUBATION  5/21/2023         PARATHYROIDECTOMY      Patient reports thyroidectomy partial not a para thyroidectomy.    THYROIDECTOMY, PARTIAL      1984      General Information       Row Name 01/23/25 1026          OT Time and Intention    Subjective Information no complaints  -BC     Document Type therapy note (daily note)  -BC     Mode of Treatment occupational therapy  -BC     Patient Effort adequate  -BC       Row Name 01/23/25 1026          General Information    Patient Profile Reviewed yes  -BC     Existing Precautions/Restrictions fall  -BC       Row Name 01/23/25 1026          Cognition    Orientation Status (Cognition) oriented x 3  -BC       Row Name 01/23/25 1026          Safety Issues/Impairments Affecting Functional Mobility    Impairments Affecting Function (Mobility) endurance/activity tolerance;strength  -BC               User Key  (r) = Recorded By, (t) = Taken By, (c) = Cosigned By      Initials Name Provider Type    BC Kel Godwin OT Occupational Therapist                     Mobility/ADL's       Row Name 01/23/25 1027          Bed Mobility    Bed Mobility supine-sit  -BC     Supine-Sit [place] (Bed Mobility) modified independence  -BC     Assistive Device (Bed Mobility) bed rails;head of bed elevated  -BC       Row Name 01/23/25 1027          Transfers    Transfers sit-stand transfer;stand-sit transfer  -BC       Row Name 01/23/25 1027          Sit-Stand Transfer    Sit-Stand [place] (Transfers) modified independence  -BC       Row Name 01/23/25 1027          Stand-Sit Transfer    Stand-Sit [place] (Transfers) modified independence  -BC       Row Name 01/23/25 1027          Functional  Mobility    Functional Mobility- Ind. Level conditional independence  -BC     Functional Mobility- Comment w/o AD to sinkside for standing grooming tasks, good awareness of endurance deficits, sitting on BSC as needed for rest breaks during grooming task. (I) from sinkside around room up to chair, no OSORIO, spo2 stable and no LOB or safety concers noted today  -BC     Patient was able to Ambulate yes  -BC       Row Name 01/23/25 1027          Activities of Daily Living    BADL Assessment/Intervention grooming;feeding  -BC       Row Name 01/23/25 1027          Grooming Assessment/Training    [place] Level (Grooming) oral care regimen;wash face, hands;hair care, combing/brushing;modified independence  -BC     Position (Grooming) unsupported standing;unsupported sitting  -BC       Row Name 01/23/25 1027          Self-Feeding Assessment/Training    [place] Level (Feeding) feeding skills;independent  -BC               User Key  (r) = Recorded By, (t) = Taken By, (c) = Cosigned By      Initials Name Provider Type    Kel Lemons OT Occupational Therapist                   Obj/Interventions       Row Name 01/23/25 1028          Balance    Balance Assessment sitting static balance;sitting dynamic balance;standing static balance;standing dynamic balance  -BC     Static Sitting Balance independent  -BC     Dynamic Sitting Balance supervision  -BC     Static Standing Balance modified independence  -BC     Dynamic Standing Balance modified independence  -BC     Comment, Balance no balance impairments noted.  -BC               User Key  (r) = Recorded By, (t) = Taken By, (c) = Cosigned By      Initials Name Provider Type    Kel Lemons OT Occupational Therapist                   Goals/Plan    No documentation.                  Clinical Impression       Row Name 01/23/25 1029          Pain Assessment    Pretreatment Pain Rating 0/10 - no pain  -BC       Row Name 01/23/25 1029          Plan of Care Review     Plan of Care Reviewed With patient  -BC     Progress improving  -BC     Outcome Evaluation Pt tolerated OT session well this AM withi mproved ADL tolerance and performance to complete in room mobility w/o AD MOD (I), Standing sinkside w/ seated RB's as needed for grooming (I)/participation. Up to chair, Pt declined to have chair alarm and reports has been up ad jessie in the room, bed alarm not active upon entering room. REc home at AL.  -BC       Row Name 01/23/25 1029          Therapy Assessment/Plan (OT)    Rehab Potential (OT) fair  -BC     Criteria for Skilled Therapeutic Interventions Met (OT) yes;meets criteria  -BC       Row Name 01/23/25 1029          Therapy Plan Review/Discharge Plan (OT)    Anticipated Discharge Disposition (OT) home with home health  -BC       Row Name 01/23/25 1029          Vital Signs    Pre SpO2 (%) 95  -BC     O2 Delivery Pre Treatment room air  -BC     Intra SpO2 (%) 91  -BC     O2 Delivery Intra Treatment room air  -BC     Post SpO2 (%) 98  -BC     O2 Delivery Post Treatment room air  -BC     Pre Patient Position Supine  -BC     Intra Patient Position Standing  -BC     Post Patient Position Sitting  -BC       Row Name 01/23/25 1029          Positioning and Restraints    Pre-Treatment Position in bed  -BC     Post Treatment Position chair  -BC     In Chair notified nsg;sitting;call light within reach  Pt refused chair alarm, bed alarm was not active and she is cognizant and endorses has been able to stand (I)'ly in room  -BC               User Key  (r) = Recorded By, (t) = Taken By, (c) = Cosigned By      Initials Name Provider Type    Kel Lemons OT Occupational Therapist                   Outcome Measures       Row Name 01/23/25 1031          How much help from another is currently needed...    Putting on and taking off regular lower body clothing? 3  -BC     Bathing (including washing, rinsing, and drying) 3  -BC     Toileting (which includes using toilet bed pan or urinal)  4  -BC     Putting on and taking off regular upper body clothing 4  -BC     Taking care of personal grooming (such as brushing teeth) 4  -BC     Eating meals 4  -BC     AM-PAC 6 Clicks Score (OT) 22  -BC       Row Name 01/23/25 0800 01/23/25 0600       How much help from another person do you currently need...    Turning from your back to your side while in flat bed without using bedrails? 4  -JJ 4  -SK    Moving from lying on back to sitting on the side of a flat bed without bedrails? 4  -JJ 4  -SK    Moving to and from a bed to a chair (including a wheelchair)? 4  -JJ 4  -SK    Standing up from a chair using your arms (e.g., wheelchair, bedside chair)? 4  -JJ 4  -SK    Climbing 3-5 steps with a railing? 3  -JJ 3  -SK    To walk in hospital room? 3  -JJ 3  -SK    AM-PAC 6 Clicks Score (PT) 22  - 22  -SK    Highest Level of Mobility Goal 7 --> Walk 25 feet or more  - 7 --> Walk 25 feet or more  -SK      Row Name 01/23/25 1031          Functional Assessment    Outcome Measure Options AM-PAC 6 Clicks Daily Activity (OT)  -BC               User Key  (r) = Recorded By, (t) = Taken By, (c) = Cosigned By      Initials Name Provider Type    Lata Viera RN Registered Nurse    Jacklyn Vora RN Registered Nurse    Kel Lemons OT Occupational Therapist                    Occupational Therapy Education       Title: PT OT SLP Therapies (In Progress)       Topic: Occupational Therapy (In Progress)       Point: ADL training (Done)       Description:   Instruct learner(s) on proper safety adaptation and remediation techniques during self care or transfers.   Instruct in proper use of assistive devices.                  Learning Progress Summary            Patient Acceptance, E, NR,VU by BC at 1/21/2025 1445    Comment: role of OT at acute level and possible benefits from cont'd rehab, ADL participation and DC recs/planning   Family Acceptance, E, NR,VU by BC at 1/21/2025 1445    Comment: role of OT at  acute level and possible benefits from cont'd rehab, ADL participation and DC recs/planning                      Point: Home exercise program (Not Started)       Description:   Instruct learner(s) on appropriate technique for monitoring, assisting and/or progressing therapeutic exercises/activities.                  Learner Progress:  Not documented in this visit.              Point: Precautions (Not Started)       Description:   Instruct learner(s) on prescribed precautions during self-care and functional transfers.                  Learner Progress:  Not documented in this visit.              Point: Body mechanics (Not Started)       Description:   Instruct learner(s) on proper positioning and spine alignment during self-care, functional mobility activities and/or exercises.                  Learner Progress:  Not documented in this visit.                              User Key       Initials Effective Dates Name Provider Type Discipline    BC 07/29/24 -  Kel Godwin OT Occupational Therapist OT                  OT Recommendation and Plan  Planned Therapy Interventions (OT): activity tolerance training, BADL retraining, functional balance retraining, IADL retraining, neuromuscular control/coordination retraining, occupation/activity based interventions, patient/caregiver education/training, strengthening exercise, transfer/mobility retraining  Therapy Frequency (OT): 3 times/wk  Plan of Care Review  Plan of Care Reviewed With: patient  Progress: improving  Outcome Evaluation: Pt tolerated OT session well this AM withi mproved ADL tolerance and performance to complete in room mobility w/o AD MOD (I), Standing sinkside w/ seated RB's as needed for grooming (I)/participation. Up to chair, Pt declined to have chair alarm and reports has been up ad jessie in the room, bed alarm not active upon entering room. REc home at DC.     Time Calculation:   Evaluation Complexity (OT)  Review Occupational Profile/Medical/Therapy  History Complexity: brief/low complexity  Assessment, Occupational Performance/Identification of Deficit Complexity: 1-3 performance deficits  Clinical Decision Making Complexity (OT): problem focused assessment/low complexity  Overall Complexity of Evaluation (OT): low complexity     Time Calculation- OT       Row Name 01/23/25 1032             Time Calculation- OT    OT Start Time 0817  -BC      OT Stop Time 0838  -BC      OT Time Calculation (min) 21 min  -BC      Total Timed Code Minutes- OT 21 minute(s)  -BC      OT Received On 01/23/25  -BC         Timed Charges    36711 - OT Self Care/Mgmt Minutes 21  -BC         Total Minutes    Timed Charges Total Minutes 21  -BC       Total Minutes 21  -BC                User Key  (r) = Recorded By, (t) = Taken By, (c) = Cosigned By      Initials Name Provider Type    BC Kel Godwin OT Occupational Therapist                  Therapy Charges for Today       Code Description Service Date Service Provider Modifiers Qty    [id number] HC OT SELF CARE/MGMT/TRAIN EA 15 MIN 1/23/2025 Kel Godwin OT GO 1                 Kel Godwin OT  1/23/2025

## 2025-01-24 ENCOUNTER — HOSPITAL ENCOUNTER (INPATIENT)
Facility: HOSPITAL | Age: 85
LOS: 7 days | Discharge: NURSING, REHAB OR LONG-TERM CARE | End: 2025-02-01
Attending: EMERGENCY MEDICINE | Admitting: HOSPITALIST
Payer: MEDICARE

## 2025-01-24 ENCOUNTER — APPOINTMENT (OUTPATIENT)
Dept: CT IMAGING | Facility: HOSPITAL | Age: 85
End: 2025-01-24
Payer: MEDICARE

## 2025-01-24 ENCOUNTER — APPOINTMENT (OUTPATIENT)
Dept: GENERAL RADIOLOGY | Facility: HOSPITAL | Age: 85
End: 2025-01-24
Payer: MEDICARE

## 2025-01-24 DIAGNOSIS — F41.9 ANXIETY: ICD-10-CM

## 2025-01-24 DIAGNOSIS — J44.1 COPD WITH ACUTE EXACERBATION: Primary | ICD-10-CM

## 2025-01-24 DIAGNOSIS — J38.3 VOCAL CORD DYSFUNCTION: ICD-10-CM

## 2025-01-24 LAB
ALBUMIN SERPL-MCNC: 4.1 G/DL (ref 3.5–5.2)
ALBUMIN/GLOB SERPL: 1.5 G/DL
ALP SERPL-CCNC: 127 U/L (ref 39–117)
ALT SERPL W P-5'-P-CCNC: 27 U/L (ref 1–33)
ANION GAP SERPL CALCULATED.3IONS-SCNC: 12 MMOL/L (ref 5–15)
ARTERIAL PATENCY WRIST A: POSITIVE
AST SERPL-CCNC: 36 U/L (ref 1–32)
ATMOSPHERIC PRESS: 758.1 MMHG
B PARAPERT DNA SPEC QL NAA+PROBE: NOT DETECTED
B PERT DNA SPEC QL NAA+PROBE: NOT DETECTED
BASE EXCESS BLDA CALC-SCNC: 4.9 MMOL/L (ref 0–2)
BASOPHILS # BLD AUTO: 0.09 10*3/MM3 (ref 0–0.2)
BASOPHILS NFR BLD AUTO: 0.8 % (ref 0–1.5)
BDY SITE: ABNORMAL
BILIRUB SERPL-MCNC: 0.5 MG/DL (ref 0–1.2)
BUN SERPL-MCNC: 10 MG/DL (ref 8–23)
BUN/CREAT SERPL: 11.2 (ref 7–25)
C PNEUM DNA NPH QL NAA+NON-PROBE: NOT DETECTED
CALCIUM SPEC-SCNC: 9.3 MG/DL (ref 8.6–10.5)
CHLORIDE SERPL-SCNC: 98 MMOL/L (ref 98–107)
CO2 BLDA-SCNC: >32.45 MMOL/L (ref 23–27)
CO2 SERPL-SCNC: 30 MMOL/L (ref 22–29)
CREAT SERPL-MCNC: 0.89 MG/DL (ref 0.57–1)
DEPRECATED RDW RBC AUTO: 39.7 FL (ref 37–54)
DEVICE COMMENT: ABNORMAL
EGFRCR SERPLBLD CKD-EPI 2021: 64 ML/MIN/1.73
EOSINOPHIL # BLD AUTO: 0.28 10*3/MM3 (ref 0–0.4)
EOSINOPHIL NFR BLD AUTO: 2.6 % (ref 0.3–6.2)
ERYTHROCYTE [DISTWIDTH] IN BLOOD BY AUTOMATED COUNT: 11.8 % (ref 12.3–15.4)
FLUAV SUBTYP SPEC NAA+PROBE: NOT DETECTED
FLUBV RNA ISLT QL NAA+PROBE: NOT DETECTED
GAS FLOW AIRWAY: 4 LPM
GEN 5 1HR TROPONIN T REFLEX: 181 NG/L
GLOBULIN UR ELPH-MCNC: 2.7 GM/DL
GLUCOSE SERPL-MCNC: 153 MG/DL (ref 65–99)
HADV DNA SPEC NAA+PROBE: NOT DETECTED
HCO3 BLDA-SCNC: 32.1 MMOL/L (ref 22–28)
HCOV 229E RNA SPEC QL NAA+PROBE: NOT DETECTED
HCOV HKU1 RNA SPEC QL NAA+PROBE: NOT DETECTED
HCOV NL63 RNA SPEC QL NAA+PROBE: NOT DETECTED
HCOV OC43 RNA SPEC QL NAA+PROBE: NOT DETECTED
HCT VFR BLD AUTO: 40.9 % (ref 34–46.6)
HEMODILUTION: NO
HGB BLD-MCNC: 13.6 G/DL (ref 12–15.9)
HMPV RNA NPH QL NAA+NON-PROBE: NOT DETECTED
HPIV1 RNA ISLT QL NAA+PROBE: NOT DETECTED
HPIV2 RNA SPEC QL NAA+PROBE: NOT DETECTED
HPIV3 RNA NPH QL NAA+PROBE: NOT DETECTED
HPIV4 P GENE NPH QL NAA+PROBE: NOT DETECTED
IMM GRANULOCYTES # BLD AUTO: 0.12 10*3/MM3 (ref 0–0.05)
IMM GRANULOCYTES NFR BLD AUTO: 1.1 % (ref 0–0.5)
LYMPHOCYTES # BLD AUTO: 0.63 10*3/MM3 (ref 0.7–3.1)
LYMPHOCYTES NFR BLD AUTO: 5.7 % (ref 19.6–45.3)
Lab: ABNORMAL
M PNEUMO IGG SER IA-ACNC: NOT DETECTED
MCH RBC QN AUTO: 30.2 PG (ref 26.6–33)
MCHC RBC AUTO-ENTMCNC: 33.3 G/DL (ref 31.5–35.7)
MCV RBC AUTO: 90.9 FL (ref 79–97)
MODALITY: ABNORMAL
MONOCYTES # BLD AUTO: 0.74 10*3/MM3 (ref 0.1–0.9)
MONOCYTES NFR BLD AUTO: 6.7 % (ref 5–12)
NEUTROPHILS NFR BLD AUTO: 83.1 % (ref 42.7–76)
NEUTROPHILS NFR BLD AUTO: 9.11 10*3/MM3 (ref 1.7–7)
NOTIFIED WHO: ABNORMAL
NRBC BLD AUTO-RTO: 0 /100 WBC (ref 0–0.2)
NT-PROBNP SERPL-MCNC: 1521 PG/ML (ref 0–1800)
PCO2 BLDA: 57.6 MM HG (ref 35–45)
PH BLDA: 7.35 PH UNITS (ref 7.35–7.45)
PLATELET # BLD AUTO: 326 10*3/MM3 (ref 140–450)
PMV BLD AUTO: 8.9 FL (ref 6–12)
PO2 BLDA: 174.8 MM HG (ref 80–100)
POTASSIUM SERPL-SCNC: 3.8 MMOL/L (ref 3.5–5.2)
PROT SERPL-MCNC: 6.8 G/DL (ref 6–8.5)
RBC # BLD AUTO: 4.5 10*6/MM3 (ref 3.77–5.28)
READ BACK: YES
RHINOVIRUS RNA SPEC NAA+PROBE: NOT DETECTED
RSV RNA NPH QL NAA+NON-PROBE: NOT DETECTED
SAO2 % BLDCOA: 99.5 % (ref 92–98.5)
SARS-COV-2 RNA NPH QL NAA+NON-PROBE: NOT DETECTED
SODIUM SERPL-SCNC: 140 MMOL/L (ref 136–145)
TOTAL RATE: 18 BREATHS/MINUTE
TROPONIN T % DELTA: 19
TROPONIN T NUMERIC DELTA: 29 NG/L
TROPONIN T SERPL HS-MCNC: 122 NG/L
TROPONIN T SERPL HS-MCNC: 152 NG/L
WBC NRBC COR # BLD AUTO: 10.97 10*3/MM3 (ref 3.4–10.8)

## 2025-01-24 PROCEDURE — 0202U NFCT DS 22 TRGT SARS-COV-2: CPT | Performed by: EMERGENCY MEDICINE

## 2025-01-24 PROCEDURE — 93005 ELECTROCARDIOGRAM TRACING: CPT | Performed by: EMERGENCY MEDICINE

## 2025-01-24 PROCEDURE — 63710000001 PREDNISONE PER 1 MG: Performed by: NURSE PRACTITIONER

## 2025-01-24 PROCEDURE — 36600 WITHDRAWAL OF ARTERIAL BLOOD: CPT | Performed by: EMERGENCY MEDICINE

## 2025-01-24 PROCEDURE — 36415 COLL VENOUS BLD VENIPUNCTURE: CPT

## 2025-01-24 PROCEDURE — G0378 HOSPITAL OBSERVATION PER HR: HCPCS

## 2025-01-24 PROCEDURE — 94640 AIRWAY INHALATION TREATMENT: CPT

## 2025-01-24 PROCEDURE — 99285 EMERGENCY DEPT VISIT HI MDM: CPT

## 2025-01-24 PROCEDURE — 71045 X-RAY EXAM CHEST 1 VIEW: CPT

## 2025-01-24 PROCEDURE — 94799 UNLISTED PULMONARY SVC/PX: CPT

## 2025-01-24 PROCEDURE — 25810000003 SODIUM CHLORIDE 0.9 % SOLUTION: Performed by: EMERGENCY MEDICINE

## 2025-01-24 PROCEDURE — 36415 COLL VENOUS BLD VENIPUNCTURE: CPT | Performed by: EMERGENCY MEDICINE

## 2025-01-24 PROCEDURE — 83880 ASSAY OF NATRIURETIC PEPTIDE: CPT | Performed by: EMERGENCY MEDICINE

## 2025-01-24 PROCEDURE — 94664 DEMO&/EVAL PT USE INHALER: CPT

## 2025-01-24 PROCEDURE — 82803 BLOOD GASES ANY COMBINATION: CPT | Performed by: EMERGENCY MEDICINE

## 2025-01-24 PROCEDURE — 94761 N-INVAS EAR/PLS OXIMETRY MLT: CPT

## 2025-01-24 PROCEDURE — 25510000001 IOPAMIDOL PER 1 ML: Performed by: EMERGENCY MEDICINE

## 2025-01-24 PROCEDURE — 84484 ASSAY OF TROPONIN QUANT: CPT | Performed by: NURSE PRACTITIONER

## 2025-01-24 PROCEDURE — 80053 COMPREHEN METABOLIC PANEL: CPT | Performed by: EMERGENCY MEDICINE

## 2025-01-24 PROCEDURE — 85025 COMPLETE CBC W/AUTO DIFF WBC: CPT | Performed by: EMERGENCY MEDICINE

## 2025-01-24 PROCEDURE — 25010000002 MAGNESIUM SULFATE 2 GM/50ML SOLUTION: Performed by: EMERGENCY MEDICINE

## 2025-01-24 PROCEDURE — 84484 ASSAY OF TROPONIN QUANT: CPT | Performed by: EMERGENCY MEDICINE

## 2025-01-24 PROCEDURE — 25010000002 METHYLPREDNISOLONE PER 125 MG: Performed by: EMERGENCY MEDICINE

## 2025-01-24 PROCEDURE — 93010 ELECTROCARDIOGRAM REPORT: CPT | Performed by: INTERNAL MEDICINE

## 2025-01-24 PROCEDURE — 71275 CT ANGIOGRAPHY CHEST: CPT

## 2025-01-24 RX ORDER — LOSARTAN POTASSIUM 50 MG/1
50 TABLET ORAL 2 TIMES DAILY
Status: DISCONTINUED | OUTPATIENT
Start: 2025-01-24 | End: 2025-02-01 | Stop reason: HOSPADM

## 2025-01-24 RX ORDER — ACETAMINOPHEN 325 MG/1
650 TABLET ORAL EVERY 6 HOURS PRN
Status: DISCONTINUED | OUTPATIENT
Start: 2025-01-24 | End: 2025-02-01 | Stop reason: HOSPADM

## 2025-01-24 RX ORDER — GUAIFENESIN 600 MG/1
600 TABLET, EXTENDED RELEASE ORAL 2 TIMES DAILY PRN
Status: DISCONTINUED | OUTPATIENT
Start: 2025-01-24 | End: 2025-02-01 | Stop reason: HOSPADM

## 2025-01-24 RX ORDER — THYROID 30 MG/1
30 TABLET ORAL
Status: DISCONTINUED | OUTPATIENT
Start: 2025-01-25 | End: 2025-02-01 | Stop reason: HOSPADM

## 2025-01-24 RX ORDER — CLOPIDOGREL BISULFATE 75 MG/1
75 TABLET ORAL DAILY
Status: DISCONTINUED | OUTPATIENT
Start: 2025-01-24 | End: 2025-02-01 | Stop reason: HOSPADM

## 2025-01-24 RX ORDER — PAROXETINE 20 MG/1
20 TABLET, FILM COATED ORAL DAILY
Status: DISCONTINUED | OUTPATIENT
Start: 2025-01-24 | End: 2025-02-01 | Stop reason: HOSPADM

## 2025-01-24 RX ORDER — ATORVASTATIN CALCIUM 20 MG/1
40 TABLET, FILM COATED ORAL DAILY
Status: DISCONTINUED | OUTPATIENT
Start: 2025-01-24 | End: 2025-02-01 | Stop reason: HOSPADM

## 2025-01-24 RX ORDER — PREDNISONE 20 MG/1
40 TABLET ORAL DAILY
Status: COMPLETED | OUTPATIENT
Start: 2025-01-24 | End: 2025-01-31

## 2025-01-24 RX ORDER — SODIUM CHLORIDE 0.9 % (FLUSH) 0.9 %
10 SYRINGE (ML) INJECTION AS NEEDED
Status: DISCONTINUED | OUTPATIENT
Start: 2025-01-24 | End: 2025-02-01 | Stop reason: HOSPADM

## 2025-01-24 RX ORDER — FUROSEMIDE 20 MG/1
20 TABLET ORAL DAILY
Status: DISCONTINUED | OUTPATIENT
Start: 2025-01-24 | End: 2025-02-01 | Stop reason: HOSPADM

## 2025-01-24 RX ORDER — IPRATROPIUM BROMIDE AND ALBUTEROL SULFATE 2.5; .5 MG/3ML; MG/3ML
3 SOLUTION RESPIRATORY (INHALATION) ONCE
Status: COMPLETED | OUTPATIENT
Start: 2025-01-24 | End: 2025-01-24

## 2025-01-24 RX ORDER — CARVEDILOL 25 MG/1
25 TABLET ORAL 2 TIMES DAILY WITH MEALS
Status: DISCONTINUED | OUTPATIENT
Start: 2025-01-24 | End: 2025-01-30

## 2025-01-24 RX ORDER — MAGNESIUM SULFATE HEPTAHYDRATE 40 MG/ML
2 INJECTION, SOLUTION INTRAVENOUS ONCE
Status: COMPLETED | OUTPATIENT
Start: 2025-01-24 | End: 2025-01-24

## 2025-01-24 RX ORDER — IOPAMIDOL 755 MG/ML
100 INJECTION, SOLUTION INTRAVASCULAR
Status: COMPLETED | OUTPATIENT
Start: 2025-01-25 | End: 2025-01-24

## 2025-01-24 RX ORDER — LORAZEPAM 0.5 MG/1
0.5 TABLET ORAL EVERY 6 HOURS PRN
Status: DISCONTINUED | OUTPATIENT
Start: 2025-01-24 | End: 2025-02-01 | Stop reason: HOSPADM

## 2025-01-24 RX ORDER — BUDESONIDE AND FORMOTEROL FUMARATE DIHYDRATE 160; 4.5 UG/1; UG/1
2 AEROSOL RESPIRATORY (INHALATION)
Status: DISCONTINUED | OUTPATIENT
Start: 2025-01-24 | End: 2025-02-01 | Stop reason: HOSPADM

## 2025-01-24 RX ORDER — THYROID 30 MG/1
15 TABLET ORAL
Status: DISCONTINUED | OUTPATIENT
Start: 2025-01-25 | End: 2025-02-01 | Stop reason: HOSPADM

## 2025-01-24 RX ORDER — METHYLPREDNISOLONE SODIUM SUCCINATE 125 MG/2ML
125 INJECTION, POWDER, LYOPHILIZED, FOR SOLUTION INTRAMUSCULAR; INTRAVENOUS ONCE
Status: COMPLETED | OUTPATIENT
Start: 2025-01-24 | End: 2025-01-24

## 2025-01-24 RX ORDER — IPRATROPIUM BROMIDE AND ALBUTEROL SULFATE 2.5; .5 MG/3ML; MG/3ML
3 SOLUTION RESPIRATORY (INHALATION)
Status: DISCONTINUED | OUTPATIENT
Start: 2025-01-24 | End: 2025-01-29

## 2025-01-24 RX ORDER — AMLODIPINE BESYLATE 5 MG/1
5 TABLET ORAL DAILY
Status: DISCONTINUED | OUTPATIENT
Start: 2025-01-24 | End: 2025-02-01 | Stop reason: HOSPADM

## 2025-01-24 RX ADMIN — CARVEDILOL 25 MG: 25 TABLET, FILM COATED ORAL at 17:39

## 2025-01-24 RX ADMIN — PREDNISONE 40 MG: 20 TABLET ORAL at 17:39

## 2025-01-24 RX ADMIN — ACETAMINOPHEN 650 MG: 325 TABLET, FILM COATED ORAL at 21:37

## 2025-01-24 RX ADMIN — IPRATROPIUM BROMIDE AND ALBUTEROL SULFATE 3 ML: .5; 3 SOLUTION RESPIRATORY (INHALATION) at 19:49

## 2025-01-24 RX ADMIN — IPRATROPIUM BROMIDE AND ALBUTEROL SULFATE 3 ML: .5; 3 SOLUTION RESPIRATORY (INHALATION) at 15:07

## 2025-01-24 RX ADMIN — LORAZEPAM 0.5 MG: 0.5 TABLET ORAL at 21:47

## 2025-01-24 RX ADMIN — APIXABAN 2.5 MG: 2.5 TABLET, FILM COATED ORAL at 21:24

## 2025-01-24 RX ADMIN — BUDESONIDE AND FORMOTEROL FUMARATE DIHYDRATE 2 PUFF: 160; 4.5 AEROSOL RESPIRATORY (INHALATION) at 19:49

## 2025-01-24 RX ADMIN — SODIUM CHLORIDE 1000 ML: 9 INJECTION, SOLUTION INTRAVENOUS at 13:43

## 2025-01-24 RX ADMIN — METHYLPREDNISOLONE SODIUM SUCCINATE 125 MG: 125 INJECTION, POWDER, FOR SOLUTION INTRAMUSCULAR; INTRAVENOUS at 13:03

## 2025-01-24 RX ADMIN — LOSARTAN POTASSIUM 50 MG: 50 TABLET, FILM COATED ORAL at 21:24

## 2025-01-24 RX ADMIN — MAGNESIUM SULFATE HEPTAHYDRATE 2 G: 40 INJECTION, SOLUTION INTRAVENOUS at 13:05

## 2025-01-24 RX ADMIN — IPRATROPIUM BROMIDE AND ALBUTEROL SULFATE 3 ML: .5; 3 SOLUTION RESPIRATORY (INHALATION) at 12:46

## 2025-01-24 RX ADMIN — IOPAMIDOL 95 ML: 755 INJECTION, SOLUTION INTRAVENOUS at 23:32

## 2025-01-24 NOTE — DISCHARGE PLACEMENT REQUEST
"Dafne Delarosa (84 y.o. Female)       Date of Birth   1940    Social Security Number       Address   [address] [address]    Home Phone   [phone]    MRN   [id number]       Buddhist   Gnosticist    Marital Status                               Admission Date   1/24/25    Admission Type   Emergency    Admitting Provider       Attending Provider   Sen Duffy II, MD    Department, Room/Bed   [facility] EMERGENCY DEPARTMENT, 28/28       Discharge Date       Discharge Disposition       Discharge Destination                                 Attending Provider: Sen Duffy II, MD    Allergies: Lansoprazole, Buspirone, Diphenhydramine Hcl (Sleep), Lisinopril    Isolation: None   Infection: None   Code Status: Prior    Ht: 152.4 cm (60\")   Wt: 54.4 kg (119 lb 14.9 oz)    Admission Cmt: None   Principal Problem: None                  Active Insurance as of 1/24/2025       Primary Coverage       Payor Plan Insurance Group Employer/Plan Group    HU[employer]NA MEDICARE REPLACEMENT HUMANA MED ADV SNP HMO [id number]       Payor Plan Address Payor Plan Phone Number Payor Plan Fax Number Effective Dates    [address] [address] [phone]  1/1/2024 - None Entered    [facility] [id number]         Subscriber Name Subscriber Birth Date Member ID       DAFNE DELAROSA 1940 [id number]                     Emergency Contacts        (Rel.) Home Phone Work Phone Mobile Phone    Felicia Delarosa (Daughter) [phone] -- [phone]    Balaji Delarosa (Son) [phone] -- [phone]    kristie gentile (Daughter) [phone] -- --                "

## 2025-01-24 NOTE — OUTREACH NOTE
Prep Survey      Flowsheet Row Responses   [facility] patient discharged from? [place]   Is LACE score < 7 ? No   Eligibility Readm Mgmt   Discharge diagnosis NSTEMI, heart cath - stable CAD   Does the patient have one of the following disease processes/diagnoses(primary or secondary)? Acute MI (STEMI,NSTEMI)   Does the patient have Home health ordered? Yes   What is the Home health agency?  [facility]   Is there a DME ordered? No   Prep survey completed? Yes            Katey GARCIA - Registered Nurse

## 2025-01-24 NOTE — ED NOTES
Patient to ER via ems from home for SOA  82% room air upon EMS arrival     Patient recently dc after heart cath

## 2025-01-24 NOTE — PLAN OF CARE
Goal Outcome Evaluation:           Progress: improving             Patient is alert and oriented on 2L O2 nasal cannula. She is short of breath. She is to see pulm tomorrow. VSS. Patient bruises very easilt and has many bruises. Assist x1

## 2025-01-24 NOTE — ED PROVIDER NOTES
EMERGENCY DEPARTMENT ENCOUNTER    Room Number:  28/28  PCP: Jossy Sauceda MD    HPI:  Chief Complaint: Shortness of breath  A complete HPI/ROS/PMH/PSH/SH/FH are unobtainable due to: None  Context: Dafne Mary is a 84 y.o. female who presents to the ED c/o acute shortness of breath.  Onset of symptoms 330 this morning.  Symptoms are constant.  Upon EMS arrival, she was satting 80% on room air.  She received continuous DuoNebs by EMS.  No prehospital steroids.  She reports having associated cough.  No fever.  Chest tightness.  She had a cardiac catheterization performed yesterday.        PAST MEDICAL HISTORY  Active Ambulatory Problems     Diagnosis Date Noted    Anxiety 07/26/2016    Arteriosclerosis of both carotid arteries 07/26/2016    Chronic interstitial cystitis 07/26/2016    Cough 07/26/2016    Pulmonary emphysema 07/26/2016    Gastroesophageal reflux disease 07/26/2016    Fibromyalgia 07/26/2016    Headache 07/26/2016    Hematuria 07/26/2016    Hoarseness 07/26/2016    Hyperlipidemia 06/08/2012    Benign hypertension 07/26/2016    Postoperative hypothyroidism 06/08/2012    Muscle spasms of both lower extremities 07/26/2016    Palpitations 07/26/2016    Shortness of breath 07/26/2016    Postmenopausal osteoporosis 10/17/2016    Chronic fatigue 10/17/2016    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/17/2016    Dysuria 12/15/2017    Dry cough 12/15/2017    Spondylolysis, lumbar region 06/08/2012    Vocal cord paralysis 08/24/2021    Abnormal finding on thyroid function test 08/24/2021    Positional lightheadedness 11/17/2022    COPD with acute exacerbation 12/08/2022    Hypothyroid 01/06/2023    COPD (chronic obstructive pulmonary disease) 01/06/2023    COPD exacerbation 01/06/2023    Morbid obesity with BMI of 70 and over, adult 01/06/2023    RSV bronchiolitis 01/07/2023    Vitamin D deficiency 04/09/2023    B12 deficiency 04/09/2023    Iron deficiency 04/09/2023    Decreased hemoglobin 04/09/2023    Generalized anxiety  disorder 04/09/2023    Chronic bilateral low back pain with left-sided sciatica 04/09/2023    Facet arthropathy, lumbar 04/09/2023    Spinal stenosis of lumbar region 04/09/2023    Spondylolisthesis of lumbar region 04/09/2023    Scoliosis of lumbar spine 04/09/2023    History of non-ST elevation myocardial infarction (NSTEMI) 04/09/2023    Chronic respiratory failure 05/21/2023    Acute respiratory failure 05/21/2023    PAF (paroxysmal atrial fibrillation) 06/12/2023    Acute respiratory failure with hypercapnia 06/17/2023    Acute hypoxemic respiratory failure 07/04/2023    Chronic HFrEF (heart failure with reduced ejection fraction) 07/10/2023    Community acquired bacterial pneumonia 07/10/2023    CAD (coronary artery disease) 07/10/2023    Mitral valve regurgitation 07/10/2023    Acute hypokalemia 07/10/2023    Status post angioplasty with stent 07/18/2023    Noncompliance 10/26/2023    NSTEMI (non-ST elevated myocardial infarction) 10/25/2023    Flash pulmonary edema 01/17/2025    Elevated troponin 01/18/2025    Stage 3a chronic kidney disease 01/18/2025    Type 2 myocardial infarction 01/20/2025    Acute on chronic heart failure with preserved ejection fraction (HFpEF) 01/20/2025     Resolved Ambulatory Problems     Diagnosis Date Noted    Leukocytes in urine 12/15/2017    Cystitis with hematuria 12/15/2017    Acute cystitis without hematuria 12/15/2017    Acute respiratory failure with hypoxia and hypercapnia 05/03/2022    Chronic diastolic congestive heart failure 11/17/2022    Chronic congestive heart failure 04/09/2023    Atrial fibrillation with RVR 06/04/2023    Acute on chronic HFrEF (heart failure with reduced ejection fraction) 06/12/2023    Acute systolic heart failure 07/10/2023    Acute on chronic systolic heart failure 07/10/2023    Chronic systolic heart failure 07/10/2023    Uncontrolled hypertension 07/10/2023     Past Medical History:   Diagnosis Date    Depression     Emphysema lung     GERD  (gastroesophageal reflux disease)     Heart failure     Hypertension     Hypothyroidism          PAST SURGICAL HISTORY  Past Surgical History:   Procedure Laterality Date    CARDIAC CATHETERIZATION N/A 7/7/2023    Procedure: Right and Left Heart Cath;  Surgeon: Ra Cole MD;  Location:  [facility];  Service: Cardiology;  Laterality: N/A;    CARDIAC CATHETERIZATION N/A 7/7/2023    Procedure: Percutaneous Coronary Intervention;  Surgeon: Ra Cole MD;  Location:  [facility];  Service: Cardiology;  Laterality: N/A;    CARDIAC CATHETERIZATION N/A 7/7/2023    Procedure: Stent ANDRZEJ coronary;  Surgeon: Ra Cole MD;  Location:  [facility];  Service: Cardiology;  Laterality: N/A;    CARDIAC CATHETERIZATION N/A 7/7/2023    Procedure: Coronary angiography;  Surgeon: Ra Cole MD;  Location:  [facility];  Service: Cardiology;  Laterality: N/A;    CARDIAC CATHETERIZATION N/A 7/7/2023    Procedure: Left ventriculography;  Surgeon: Ra Cole MD;  Location:  [facility];  Service: Cardiology;  Laterality: N/A;    CARDIAC CATHETERIZATION N/A 1/20/2025    Procedure: Left Heart Cath;  Surgeon: Nae Norman MD;  [facility];  Service: Cardiovascular;  Laterality: N/A;    CARDIAC CATHETERIZATION N/A 1/20/2025    Procedure: Coronary angiography;  Surgeon: Nae Norman MD;  [facility];  Service: Cardiovascular;  Laterality: N/A;    EYE SURGERY Left     INTUBATION  5/21/2023         PARATHYROIDECTOMY      Patient reports thyroidectomy partial not a para thyroidectomy.    THYROIDECTOMY, PARTIAL      1984         FAMILY HISTORY  Family History   Problem Relation Age of Onset    Alzheimer's disease Mother     Lung disease Father     Alcohol abuse Father     Heart disease Brother     Hypertension Brother     Lung disease Brother      Alcohol abuse Brother     Cancer Brother         LUNG  WAS A SMOKER    Thyroid disease Maternal Grandmother          SOCIAL HISTORY  Social History     Socioeconomic History    Marital status:    Tobacco Use    Smoking status: Never     Passive exposure: Never    Smokeless tobacco: Never   Vaping Use    Vaping status: Never Used   Substance and Sexual Activity    Alcohol use: Yes     Comment: rare    Drug use: No    Sexual activity: Defer         ALLERGIES  Lansoprazole, Buspirone, Diphenhydramine hcl (sleep), and Lisinopril        REVIEW OF SYSTEMS  Review of Systems     Included in HPI  All systems reviewed and negative except for those discussed in HPI.       PHYSICAL EXAM  ED Triage Vitals [01/24/25 1224]   Temp Heart Rate Resp BP SpO2   99 °F (37.2 °C) 104 20 170/90 98 %      Temp src Heart Rate Source Patient Position BP Location FiO2 (%)   -- -- -- -- --       Physical Exam      GENERAL: no acute distress  HENT: nares patent  EYES: no scleral icterus  CV: regular rhythm, tachycardic  RESPIRATORY: Tachypneic, speaks in 1-2 word phrases, expiratory and inspiratory wheezing bilaterally  ABDOMEN: soft, nontender  MUSCULOSKELETAL: no deformity  NEURO: alert, moves all extremities, follows commands  PSYCH:  calm, cooperative  SKIN: warm, dry, bruising to the right groin with no hematoma    Vital signs and nursing notes reviewed.          LAB RESULTS  Recent Results (from the past 24 hours)   ECG 12 Lead Dyspnea    Collection Time: 01/24/25 12:39 PM   Result Value Ref Range    QT Interval 409 ms    QTC Interval 525 ms   BNP    Collection Time: 01/24/25 12:49 PM    Specimen: Arm, Right; Blood   Result Value Ref Range    proBNP 1,521.0 0.0 - 1,800.0 pg/mL   High Sensitivity Troponin T    Collection Time: 01/24/25 12:49 PM    Specimen: Arm, Right; Blood   Result Value Ref Range    HS Troponin T 152 (C) <14 ng/L   Comprehensive Metabolic Panel    Collection Time: 01/24/25 12:49 PM    Specimen: Arm, Right;  Blood   Result Value Ref Range    Glucose 153 (H) 65 - 99 mg/dL    BUN 10 8 - 23 mg/dL    Creatinine 0.89 0.57 - 1.00 mg/dL    Sodium 140 136 - 145 mmol/L    Potassium 3.8 3.5 - 5.2 mmol/L    Chloride 98 98 - 107 mmol/L    CO2 30.0 (H) 22.0 - 29.0 mmol/L    Calcium 9.3 8.6 - 10.5 mg/dL    Total Protein 6.8 6.0 - 8.5 g/dL    Albumin 4.1 3.5 - 5.2 g/dL    ALT (SGPT) 27 1 - 33 U/L    AST (SGOT) 36 (H) 1 - 32 U/L    Alkaline Phosphatase 127 (H) 39 - 117 U/L    Total Bilirubin 0.5 0.0 - 1.2 mg/dL    Globulin 2.7 gm/dL    A/G Ratio 1.5 g/dL    BUN/Creatinine Ratio 11.2 7.0 - 25.0    Anion Gap 12.0 5.0 - 15.0 mmol/L    eGFR 64.0 >60.0 mL/min/1.73   CBC Auto Differential    Collection Time: 01/24/25 12:49 PM    Specimen: Arm, Right; Blood   Result Value Ref Range    WBC 10.97 (H) 3.40 - 10.80 10*3/mm3    RBC 4.50 3.77 - 5.28 10*6/mm3    Hemoglobin 13.6 12.0 - 15.9 g/dL    Hematocrit 40.9 34.0 - 46.6 %    MCV 90.9 79.0 - 97.0 fL    MCH 30.2 26.6 - 33.0 pg    MCHC 33.3 31.5 - 35.7 g/dL    RDW 11.8 (L) 12.3 - 15.4 %    RDW-SD 39.7 37.0 - 54.0 fl    MPV 8.9 6.0 - 12.0 fL    Platelets 326 140 - 450 10*3/mm3    Neutrophil % 83.1 (H) 42.7 - 76.0 %    Lymphocyte % 5.7 (L) 19.6 - 45.3 %    Monocyte % 6.7 5.0 - 12.0 %    Eosinophil % 2.6 0.3 - 6.2 %    Basophil % 0.8 0.0 - 1.5 %    Immature Grans % 1.1 (H) 0.0 - 0.5 %    Neutrophils, Absolute 9.11 (H) 1.70 - 7.00 10*3/mm3    Lymphocytes, Absolute 0.63 (L) 0.70 - 3.10 10*3/mm3    Monocytes, Absolute 0.74 0.10 - 0.90 10*3/mm3    Eosinophils, Absolute 0.28 0.00 - 0.40 10*3/mm3    Basophils, Absolute 0.09 0.00 - 0.20 10*3/mm3    Immature Grans, Absolute 0.12 (H) 0.00 - 0.05 10*3/mm3    nRBC 0.0 0.0 - 0.2 /100 WBC   Blood Gas, Arterial -    Collection Time: 01/24/25 12:50 PM    Specimen: Arterial Blood   Result Value Ref Range    Site Left Radial     Ortiz's Test Positive     pH, Arterial 7.354 7.350 - 7.450 pH units    pCO2, Arterial 57.6 (C) 35.0 - 45.0 mm Hg    pO2, Arterial 174.8 (H)  80.0 - 100.0 mm Hg    HCO3, Arterial 32.1 (H) 22.0 - 28.0 mmol/L    Base Excess, Arterial 4.9 (H) 0.0 - 2.0 mmol/L    O2 Saturation, Arterial 99.5 (H) 92.0 - 98.5 %    CO2 Content >32.45 (H) 23 - 27 mmol/L    Barometric Pressure for Blood Gas 758.1000 mmHg    Modality Cannula     Flow Rate 4.0000 lpm    Rate 18 Breaths/minute    Notified Who Dr Duffy     Read Back Yes     Notified Time      Hemodilution No     Device Comment 99%    Respiratory Panel PCR w/COVID-19(SARS-CoV-2) [facility], NP Swab in UTM/VTM, 2 HR TAT - Swab, Nasopharynx    Collection Time: 01/24/25  1:07 PM    Specimen: Nasopharynx; Swab   Result Value Ref Range    ADENOVIRUS, PCR Not Detected Not Detected    Coronavirus 229E Not Detected Not Detected    Coronavirus HKU1 Not Detected Not Detected    Coronavirus NL63 Not Detected Not Detected    Coronavirus OC43 Not Detected Not Detected    COVID19 Not Detected Not Detected - Ref. Range    Human Metapneumovirus Not Detected Not Detected    Human Rhinovirus/Enterovirus Not Detected Not Detected    Influenza A PCR Not Detected Not Detected    Influenza B PCR Not Detected Not Detected    Parainfluenza Virus 1 Not Detected Not Detected    Parainfluenza Virus 2 Not Detected Not Detected    Parainfluenza Virus 3 Not Detected Not Detected    Parainfluenza Virus 4 Not Detected Not Detected    RSV, PCR Not Detected Not Detected    Bordetella pertussis pcr Not Detected Not Detected    Bordetella parapertussis PCR Not Detected Not Detected    Chlamydophila pneumoniae PCR Not Detected Not Detected    Mycoplasma pneumo by PCR Not Detected Not Detected       Ordered the above labs and reviewed the results.        RADIOLOGY  XR Chest 1 View    Result Date: 1/24/2025  XR CHEST 1 VW-  HISTORY: Female who is 84 years-old, short of breath  TECHNIQUE: Frontal view of the chest  COMPARISON: 1/22/2025  FINDINGS: The heart is enlarged. Aorta is tortuous, calcified. Pulmonary vasculature is  unremarkable. No focal pulmonary consolidation, pleural effusion, or pneumothorax. No acute osseous process.      No focal pulmonary consolidation. Cardiomegaly. Tortuous aorta. Follow-up as clinically indicated.    This report was finalized on 1/24/2025 1:29 PM by Dr. Ned Guzman M.D on Workstation: [id number]       Ordered the above noted radiological studies. Reviewed by me in PACS.        MEDICATIONS GIVEN IN ER  Medications   sodium chloride 0.9 % flush 10 mL (has no administration in time range)   magnesium sulfate 2g/50 mL (PREMIX) infusion (0 g Intravenous Stopped 1/24/25 1348)   ipratropium-albuterol (DUO-NEB) nebulizer solution 3 mL (3 mL Nebulization Given 1/24/25 1246)   methylPREDNISolone sodium succinate (SOLU-Medrol) injection 125 mg (125 mg Intravenous Given 1/24/25 1303)   sodium chloride 0.9 % bolus 1,000 mL (1,000 mL Intravenous New Bag 1/24/25 1343)         ORDERS PLACED DURING THIS VISIT:  Orders Placed This Encounter   Procedures    Respiratory Panel PCR w/COVID-19(SARS-CoV-2) [facility], NP Swab in UTM/VTM, 2 HR TAT - Swab, Nasopharynx    XR Chest 1 View    Blood Gas, Arterial -    BNP    High Sensitivity Troponin T    Comprehensive Metabolic Panel    CBC Auto Differential    High Sensitivity Troponin T 1Hr    Monitor Blood Pressure    Continuous Pulse Oximetry    ECG 12 Lead Dyspnea    Insert Peripheral IV    CBC & Differential         OUTPATIENT MEDICATION MANAGEMENT:  Current Facility-Administered Medications Ordered in Epic   Medication Dose Route Frequency Provider Last Rate Last Admin    sodium chloride 0.9 % flush 10 mL  10 mL Intravenous PRN Sen Duffy II, MD         Current Outpatient Medications Ordered in Epic   Medication Sig Dispense Refill    acetaminophen (TYLENOL) 325 MG tablet Take 2 tablets by mouth Every 6 (Six) Hours As Needed for Mild Pain.      amLODIPine (NORVASC) 5 MG tablet Take 1 tablet by mouth Daily. 90 tablet 1    apixaban  (ELIQUIS) 2.5 MG tablet tablet Take 1 tablet by mouth Every 12 (Twelve) Hours. Indications: Atrial Fibrillation 180 tablet 1    Lorman Thyroid 15 MG tablet Take 1 tablet by mouth once daily 90 tablet 0    Lorman Thyroid 30 MG tablet Take 1 tablet by mouth once daily 90 tablet 0    Artificial Tear Ointment (artificial tears) ophthalmic ointment Administer 1 application to both eyes Every 1 (One) Hour As Needed (dry eye).      atorvastatin (LIPITOR) 40 MG tablet Take 1 tablet by mouth Daily. 90 tablet 1    Breztri Aerosphere 160-9-4.8 MCG/ACT aerosol inhaler 2 puffs 2 (Two) Times a Day.      carvedilol (COREG) 25 MG tablet Take 1 tablet by mouth 2 (Two) Times a Day With Meals. 60 tablet 0    Cimetidine (TAGAMET PO) Take  by mouth.      clopidogrel (PLAVIX) 75 MG tablet Take 1 tablet by mouth Daily. 90 tablet 3    empagliflozin (Jardiance) 10 MG tablet tablet Take 1 tablet by mouth Daily. 90 tablet 1    furosemide (LASIX) 20 MG tablet Take 1 tablet by mouth Daily. 30 tablet 0    Glycerin-Polysorbate 80 (REFRESH DRY EYE THERAPY OP) Apply 1 drop to eye(s) as directed by provider Daily. For dry eyes      guaiFENesin (MUCINEX) 600 MG 12 hr tablet Take 1 tablet by mouth 2 (Two) Times a Day As Needed for Cough or Congestion.      ipratropium-albuterol (DUO-NEB) 0.5-2.5 mg/3 ml nebulizer Take 3 mL by nebulization Every 4 (Four) Hours As Needed for Wheezing.      LORazepam (ATIVAN) 0.5 MG tablet Take 1 tablet by mouth Every 6 (Six) Hours As Needed for Anxiety. 6 tablet 0    losartan (COZAAR) 50 MG tablet Take 1 tablet by mouth 2 (Two) Times a Day. 90 tablet 1    PARoxetine (PAXIL) 10 MG tablet Take 2 tablets by mouth Daily. 180 tablet 0    Ventolin  (90 Base) MCG/ACT inhaler Inhale 2 puffs Every 4 (Four) Hours As Needed.      vitamin B-12 (CYANOCOBALAMIN) 500 MCG tablet Take 1 tablet by mouth Daily.      vitamin C (ASCORBIC ACID) 250 MG tablet Take 2 tablets by mouth Daily.      vitamin D3 125 MCG (5000 UT) capsule  capsule Take 1 capsule by mouth Daily.         PROCEDURES  Procedures          MEDICAL DECISION MAKING, PROGRESS, and CONSULTS    Discussion below represents my analysis of pertinent findings related to patient's condition, differential diagnosis, treatment plan and final disposition.            Differential diagnosis:    Differential diagnosis includes but not limited to CHF, acute coronary syndrome, pulmonary embolism, pneumothorax, pneumonia, asthma/COPD, pulmonary hypertension, deconditioning, anemia, other hematologic etiologies such as CO poisoning, anxiety.                      Independent interpretation of labs, radiology studies, and discussions with consultants:  ED Course as of 01/24/25 1431   Fri Jan 24, 2025   1236 On medical chart review, I reviewed the most recent progress note from cardiology from yesterday.  Patient had an NSTEMI.  Worsened dyspnea.  No chest pain.  Cath in 1/20 showed no acute disease and patent stents.  Suspected type II NSTEMI.  She has a history of congestive heart failure with preserved ejection fraction.  Diuresed with IV Lasix on admission. [TD]   1250 EKG independently interpreted by myself.  Time 12:39 PM.  Sinus rhythm.  Frequent PVCs.  Heart rate 99.  IVCD.  LVH. [TD]   1323 HS Troponin T(!!): 152 [TD]   1411 proBNP: 1,521.0 [TD]   1415 Creatinine: 0.89 [TD]   1422 On repeat evaluation, patient appears overall clinically better.  She still has some wheezing, however.  She states that she feels scared to go home.  Will watch her overnight. [TD]      ED Course User Index  [TD] Sen Duffy II, MD       I discussed the case with ANTONELLA Perez.  She will admit to the observation unit.        DIAGNOSIS  Final diagnoses:   COPD with acute exacerbation         DISPOSITION  Admit      Latest Documented Vital Signs:  As of 14:31 EST  BP- 112/60 HR- 75 Temp- 99 °F (37.2 °C) O2 sat- 100%      --    Please note that portions of this were completed with a voice  recognition program.       Note Disclaimer: At [facility], we believe that sharing information builds trust and better relationships. You are receiving this note because you are receiving care at [facility] or recently visited. It is possible you will see health information before a provider has talked with you about it. This kind of information can be easy to misunderstand. To help you fully understand what it means for your health, we urge you to discuss this note with your provider.         Sen Duffy II, MD  01/24/25 9235

## 2025-01-24 NOTE — CONSULTS
Referring Provider: Dr. Devlin  Reason for Consultation: Dyspnea and respiratory failure    Patient Care Team:  Jossy Sauceda MD as PCP - General (Urgent Care)  Javier Nolan MD as Consulting Physician (Pulmonary Disease)  Nae Norman MD as Consulting Physician (Cardiology)    Chief complaint:   Dyspnea    History of present illness:    Subjective   This is an 84-year-old female patient, lifelong non-smoker with history of asthma and dyspnea out of proportion of her condition for which she follows at [facility] clinic with Dr. Nolan.  It was suspected that she had a strong component of anxiety.    Patient was just recently hospitalized with non-STEMI, acute on chronic diastolic CHF and flash pulmonary edema.  She was discharged from the hospital yesterday.  S/p LHC 1/19/2024 which revealed LVEDP of 19.    She presented to the hospital for shortness of breath.  She called EMS and she was found to be hypoxic with SpO2 of 82% on RA.  She was placed on oxygen and transferred to the ED.  In the ED she was noted to be wheezing and was prescribed steroid and DuoNeb.  On my assessment now, patient appears to have stridor.  She has hoarseness.  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  She described intermittent episodes of shortness of breath usually occurring on activities but they are intermittent in pattern and not consistent.  She is not on oxygen at home but she believes that she needs it.  She uses Breztri and albuterol without relief.  She is already feeling better since she got here but attributed that to being in bed and not moving.    ABG 7.35/75/174.  WBC 10; neutrophils 83%.    Review of Systems  Constitutional: No fever or chills.   ENMT: No sinus congestion  Cardiovascular: No chest pain, palpitation or legs swelling.     Respiratory: See above.  Gastrointestinal: No constipation, diarrhea or abdominal pain   Neurology: No headache, weakness, numbness or dizziness.   Musculoskeletal: No joints pain, stiffness or swelling.   Psychiatry: No depression.  Genitourinary: No dysuria or frequent urination  Endo: No weight changes. No cold or warm intolerance.  Lymphatic: No swollen glands.  Integumentary: No rash.    History  Past Medical History:   Diagnosis Date    Atrial fibrillation with RVR 6/4/2023    CAD (coronary artery disease) 7/10/2023    Chronic diastolic congestive heart failure 11/17/2022    Depression     Emphysema lung     GERD (gastroesophageal reflux disease)     Heart failure     Hyperlipidemia     Hypertension     Hypothyroidism     Mitral valve regurgitation 7/10/2023    NSTEMI (non-ST elevated myocardial infarction) 10/25/2023   ,   Past Surgical History:   Procedure Laterality Date    CARDIAC CATHETERIZATION N/A 7/7/2023    Procedure: Right and Left Heart Cath;  Surgeon: Ra Cole MD;  Location: [facility] CATH INVASIVE LOCATION;  Service: Cardiology;  Laterality: N/A;    CARDIAC CATHETERIZATION N/A 7/7/2023    Procedure: Percutaneous Coronary Intervention;  Surgeon: Ra Cole MD;  Location: [facility] CATH [facility]VAS[facility] LOC[facility];  Service: Cardiology;  Laterality: N/A;    CARDIAC CATHETERIZATION N/A 7/7/2023    Procedure: Stent ANDRZEJ coronary;  Surgeon: Ra Cole MD;  [facility];  Service: Cardiology;  Laterality: N/A;    CARDIAC CATHETERIZATION N/A 7/7/2023    Procedure: Coronary angiography;  Surgeon: Ra Cole MD;  [facility];  Service: Cardiology;  Laterality: N/A;    CARDIAC CATHETERIZATION N/A 7/7/2023    Procedure: Left ventriculography;  Surgeon: Ra Cole MD;  [facility]: [facility];  Service: Cardiology;  Laterality: N/A;    CARDIAC CATHETERIZATION N/A 1/20/2025    Procedure: Left  Heart Cath;  Surgeon: Nae Norman MD;  Location:  [facility] CATH INVASIVE LOCATION;  Service: Cardiovascular;  Laterality: N/A;    CARDIAC CATHETERIZATION N/A 1/20/2025    Procedure: Coronary angiography;  Surgeon: Nae Norman MD;  Location:  [facility] CATH INVASIVE LOCATION;  Service: Cardiovascular;  Laterality: N/A;    EYE SURGERY Left     INTUBATION  5/21/2023         PARATHYROIDECTOMY      Patient reports thyroidectomy partial not a para thyroidectomy.    THYROIDECTOMY, PARTIAL      1984   ,   Family History   Problem Relation Age of Onset    Alzheimer's disease Mother     Lung disease Father     Alcohol abuse Father     Heart disease Brother     Hypertension Brother     Lung disease Brother     Alcohol abuse Brother     Cancer Brother         LUNG  WAS A SMOKER    Thyroid disease Maternal Grandmother    ,   Social History     Socioeconomic History    Marital status:    Tobacco Use    Smoking status: Never     Passive exposure: Never    Smokeless tobacco: Never   Vaping Use    Vaping status: Never Used   Substance and Sexual Activity    Alcohol use: Yes     Comment: rare    Drug use: No    Sexual activity: Defer     E-cigarette/Vaping    E-cigarette/Vaping Use Never User     Passive Exposure No     Counseling Given No      E-cigarette/Vaping Substances    Nicotine No     THC No     CBD No     Flavoring No      E-cigarette/Vaping Devices    Disposable No     Pre-filled or Refillable Cartridge No     Refillable Tank No     Pre-filled Pod No          ,   Medications Prior to Admission   Medication Sig Dispense Refill Last Dose/Taking    acetaminophen (TYLENOL) 325 MG tablet Take 2 tablets by mouth Every 6 (Six) Hours As Needed for Mild Pain.       amLODIPine (NORVASC) 5 MG tablet Take 1 tablet by mouth Daily. 90 tablet 1     apixaban (ELIQUIS) 2.5 MG tablet tablet Take 1 tablet by mouth Every 12 (Twelve) Hours. Indications: Atrial Fibrillation 180 tablet 1     [place] Thyroid 15 MG tablet Take 1 tablet by  mouth once daily 90 tablet 0     [place] Thyroid 30 MG tablet Take 1 tablet by mouth once daily 90 tablet 0     Artificial Tear Ointment (artificial tears) ophthalmic ointment Administer 1 application to both eyes Every 1 (One) Hour As Needed (dry eye).       atorvastatin (LIPITOR) 40 MG tablet Take 1 tablet by mouth Daily. 90 tablet 1     Breztri Aerosphere 160-9-4.8 MCG/ACT aerosol inhaler 2 puffs 2 (Two) Times a Day.       carvedilol (COREG) 25 MG tablet Take 1 tablet by mouth 2 (Two) Times a Day With Meals. 60 tablet 0     Cimetidine (TAGAMET PO) Take  by mouth.       clopidogrel (PLAVIX) 75 MG tablet Take 1 tablet by mouth Daily. 90 tablet 3     empagliflozin (Jardiance) 10 MG tablet tablet Take 1 tablet by mouth Daily. 90 tablet 1     furosemide (LASIX) 20 MG tablet Take 1 tablet by mouth Daily. 30 tablet 0     Glycerin-Polysorbate 80 (REFRESH DRY EYE THERAPY OP) Apply 1 drop to eye(s) as directed by provider Daily. For dry eyes       guaiFENesin (MUCINEX) 600 MG 12 hr tablet Take 1 tablet by mouth 2 (Two) Times a Day As Needed for Cough or Congestion.       ipratropium-albuterol (DUO-NEB) 0.5-2.5 mg/3 ml nebulizer Take 3 mL by nebulization Every 4 (Four) Hours As Needed for Wheezing.       LORazepam (ATIVAN) 0.5 MG tablet Take 1 tablet by mouth Every 6 (Six) Hours As Needed for Anxiety. 6 tablet 0     losartan (COZAAR) 50 MG tablet Take 1 tablet by mouth 2 (Two) Times a Day. 90 tablet 1     PARoxetine (PAXIL) 10 MG tablet Take 2 tablets by mouth Daily. 180 tablet 0     Ventolin  (90 Base) MCG/ACT inhaler Inhale 2 puffs Every 4 (Four) Hours As Needed.       vitamin B-12 (CYANOCOBALAMIN) 500 MCG tablet Take 1 tablet by mouth Daily.       vitamin C (ASCORBIC ACID) 250 MG tablet Take 2 tablets by mouth Daily.       vitamin D3 125 MCG (5000 UT) capsule capsule Take 1 capsule by mouth Daily.      , Scheduled Meds:  amLODIPine, 5 mg, Oral, Daily  apixaban, 2.5 mg, Oral, Q12H  atorvastatin, 40 mg, Oral,  Daily  budesonide-formoterol, 2 puff, Inhalation, BID - RT   And  revefenacin, 175 mcg, Nebulization, Daily - RT  carvedilol, 25 mg, Oral, BID With Meals  clopidogrel, 75 mg, Oral, Daily  furosemide, 20 mg, Oral, Daily  ipratropium-albuterol, 3 mL, Nebulization, Q6H - RT  losartan, 50 mg, Oral, BID  PARoxetine, 20 mg, Oral, Daily  predniSONE, 40 mg, Oral, Daily  [START ON 1/25/2025] thyroid, 15 mg, Oral, Q AM  [START ON 1/25/2025] Thyroid, 30 mg, Oral, Q AM   , Continuous Infusions:   , PRN Meds:    acetaminophen    guaiFENesin    LORazepam    [COMPLETED] Insert Peripheral IV **AND** sodium chloride, and Allergies:  Lansoprazole, Buspirone, Diphenhydramine hcl (sleep), and Lisinopril    Objective     Vital Signs   Temp:  [97.7 °F (36.5 °C)-99 °F (37.2 °C)] 97.7 °F (36.5 °C)  Heart Rate:  [] 89  Resp:  [18-20] 20  BP: (102-170)/(51-90) 159/84    PPE used per hospital policy    Physical Exam:  Constitutional: Not in acute distress.  Eyes: Injected conjunctivae, EOMI. pupils equal reactive to light.  ENMT: Venegas 3.  Stridor on auscultation of the neck.  Neck:Trachea midline. No thyromegaly  Heart: RRR, no murmur  Lungs: Equal but diminished air entry throughout.  Minimal end expiratory wheezing, referred.  Abdomen:  Soft. No tenderness or dullness. No HSM.  Extremities: No cyanosis, clubbing or pitting edema.  Warm extremities and well-perfused.  Neuro: Conscious, alert, oriented x3.  Strength 5/5 in arms.  Psych: Appropriate mood and affect.    Integumentary: No rash.  Normal skin turgor  Lymphatic: No palpable cervical or supraclavicular lymph nodes.      Diagnostic imaging:  I personally and independently reviewed the following images:   CXR 1/24/2025: Clear    Laboratory workup:    Results from last 7 days   Lab Units 01/24/25  1249 01/23/25  0539 01/22/25  0418   SODIUM mmol/L 140 140 136   POTASSIUM mmol/L 3.8 3.4* 3.9   CHLORIDE mmol/L 98 103 104   CO2 mmol/L 30.0* 26.9 20.0*   BUN mg/dL 10 7* 11    CREATININE mg/dL 0.89 0.81 0.86   GLUCOSE mg/dL 153* 110* 105*   CALCIUM mg/dL 9.3 8.7 8.5*     Results from last 7 days   Lab Units 01/24/25  1416 01/24/25  1249 01/20/25  0612   HSTROP T ng/L 181* 152* 187*     Results from last 7 days   Lab Units 01/24/25  1249 01/23/25  0539 01/22/25  0418   WBC 10*3/mm3 10.97* 6.47 5.98   HEMOGLOBIN g/dL 13.6 11.7* 12.7   HEMATOCRIT % 40.9 35.3 39.3   PLATELETS 10*3/mm3 326 264 248     Results from last 7 days   Lab Units 01/20/25  0612 01/19/25  1110 01/19/25  0332 01/18/25  1929 01/18/25  1257   INR   --   --   --   --  1.05   APTT seconds 68.9* 66.8* 69.7*   < > 32.1    < > = values in this interval not displayed.     Results from last 7 days   Lab Units 01/24/25  1249   PROBNP pg/mL 1,521.0       Assessment     Acute hypoxic and hypercapnic respiratory failure, secondary to possible asthma exacerbation on top of underlying vocal cord disease but also suspect part of it is related to oxygen administration as her prior serum bicarb were normal.  Asthma ? Exacerbation  VCD/vocal cord paralysis      Recommendations:    Symbicort 2 puffs twice daily.  Yupelri neb once daily.  Albuterol neb 4 times a day as needed  Prednisone 40 mg daily.  Repeat ABG in a.m.  If persistent hypercapnia that can consider outpatient NIPPV therapy  Outpatient referral to ENT for evaluation and management of vocal cord disease.  Continue previous plan for VCD evaluation per Dr. An Castanon MD  01/24/25  17:40 EST

## 2025-01-24 NOTE — ED NOTES
.Nursing report ED to floor  Dafne Mary  84 y.o.  female    HPI :  HPI  Stated Reason for Visit: SOA    Chief Complaint  Chief Complaint   Patient presents with    Shortness of Breath       Admitting doctor:   Gabby Devlin MD    Admitting diagnosis:   The encounter diagnosis was COPD with acute exacerbation.    Code status:   Current Code Status       Date Active Code Status Order ID Comments User Context       Prior            Allergies:   Lansoprazole, Buspirone, Diphenhydramine hcl (sleep), and Lisinopril    Isolation:   No active isolations    Intake and Output    Intake/Output Summary (Last 24 hours) at 1/24/2025 1443  Last data filed at 1/24/2025 1348  Gross per 24 hour   Intake 50 ml   Output --   Net 50 ml       Weight:   There were no vitals filed for this visit.    Most recent vitals:   Vitals:    01/24/25 1411 01/24/25 1421 01/24/25 1431 01/24/25 1441   BP: 112/60 115/66 124/64 102/51   Pulse: 75 79 78 75   Resp:       Temp:       SpO2:  100% 100% 100%       Active LDAs/IV Access:   Lines, Drains & Airways       Active LDAs       Name Placement date Placement time Site Days    Peripheral IV 01/24/25 1302 Left Hand 01/24/25  1302  Hand  less than 1                    Labs (abnormal labs have a star):   Labs Reviewed   BLOOD GAS, ARTERIAL - Abnormal; Notable for the following components:       Result Value    pCO2, Arterial 57.6 (*)     pO2, Arterial 174.8 (*)     HCO3, Arterial 32.1 (*)     Base Excess, Arterial 4.9 (*)     O2 Saturation, Arterial 99.5 (*)     CO2 Content >32.45 (*)     All other components within normal limits   TROPONIN - Abnormal; Notable for the following components:    HS Troponin T 152 (*)     All other components within normal limits    Narrative:     High Sensitive Troponin T Reference Range:  <14.0 ng/L- Negative Female for AMI  <22.0 ng/L- Negative Male for AMI  >=14 - Abnormal Female indicating possible myocardial injury.  >=22 - Abnormal Male indicating possible  myocardial injury.   Clinicians would have to utilize clinical acumen, EKG, Troponin, and serial changes to determine if it is an Acute Myocardial Infarction or myocardial injury due to an underlying chronic condition.        COMPREHENSIVE METABOLIC PANEL - Abnormal; Notable for the following components:    Glucose 153 (*)     CO2 30.0 (*)     AST (SGOT) 36 (*)     Alkaline Phosphatase 127 (*)     All other components within normal limits    Narrative:     GFR Categories in Chronic Kidney Disease (CKD)      GFR Category          GFR (mL/min/1.73)    Interpretation  G1                     90 or greater         Normal or high (1)  G2                      60-89                Mild decrease (1)  G3a                   45-59                Mild to moderate decrease  G3b                   30-44                Moderate to severe decrease  G4                    15-29                Severe decrease  G5                    14 or less           Kidney failure          (1)In the absence of evidence of kidney disease, neither GFR category G1 or G2 fulfill the criteria for CKD.    eGFR calculation 2021 CKD-EPI creatinine equation, which does not include race as a factor   CBC WITH AUTO DIFFERENTIAL - Abnormal; Notable for the following components:    WBC 10.97 (*)     RDW 11.8 (*)     Neutrophil % 83.1 (*)     Lymphocyte % 5.7 (*)     Immature Grans % 1.1 (*)     Neutrophils, Absolute 9.11 (*)     Lymphocytes, Absolute 0.63 (*)     Immature Grans, Absolute 0.12 (*)     All other components within normal limits   RESPIRATORY PANEL PCR W/ COVID-19 (SARS-COV-2), NP SWAB IN UTM/VTP, 2 HR TAT - Normal    Narrative:     In the setting of a positive respiratory panel with a viral infection PLUS a negative procalcitonin without other underlying concern for bacterial infection, consider observing off antibiotics or discontinuation of antibiotics and continue supportive care. If the respiratory panel is positive for atypical bacterial  infection (Bordetella pertussis, Chlamydophila pneumoniae, or Mycoplasma pneumoniae), consider antibiotic de-escalation to target atypical bacterial infection.   BNP (IN-HOUSE) - Normal    Narrative:     This assay is used as an aid in the diagnosis of individuals suspected of having heart failure. It can be used as an aid in the diagnosis of acute decompensated heart failure (ADHF) in patients presenting with signs and symptoms of ADHF to the emergency department (ED). In addition, NT-proBNP of <300 pg/mL indicates ADHF is not likely.    Age Range Result Interpretation  NT-proBNP Concentration (pg/mL:      <50             Positive            >450                   Gray                 300-450                    Negative             <300    50-75           Positive            >900                  Gray                300-900                  Negative            <300      >75             Positive            >1800                  Gray                300-1800                  Negative            <300   HIGH SENSITIVITIY TROPONIN T 1HR   CBC AND DIFFERENTIAL    Narrative:     The following orders were created for panel order CBC & Differential.  Procedure                               Abnormality         Status                     ---------                               -----------         ------                     CBC Auto Differential[[id number]]        Abnormal            Final result                 Please view results for these tests on the individual orders.       EKG:   ECG 12 Lead Dyspnea   Preliminary Result   HEART RATE=99  bpm   RR Mmngudne=935  ms   CO Pmmduahs=949  ms   P Horizontal Axis=2  deg   P Front Axis=82  deg   QRSD Slidmkta=989  ms   QT Lhugoldj=936  ms   XDqD=876  ms   QRS Axis=-77  deg   T Wave Axis=93  deg   - ABNORMAL ECG -   Sinus rhythm   Paired ventricular premature complexes   IVCD, consider atypical RBBB   LVH with secondary repolarization abnormality   Anterior infarct, old   Date and  Time of Study:2025-01-24 12:39:26          Meds given in ED:   Medications   sodium chloride 0.9 % flush 10 mL (has no administration in time range)   magnesium sulfate 2g/50 mL (PREMIX) infusion (0 g Intravenous Stopped 1/24/25 1348)   ipratropium-albuterol (DUO-NEB) nebulizer solution 3 mL (3 mL Nebulization Given 1/24/25 1246)   methylPREDNISolone sodium succinate (SOLU-Medrol) injection 125 mg (125 mg Intravenous Given 1/24/25 1303)   sodium chloride 0.9 % bolus 1,000 mL (1,000 mL Intravenous New Bag 1/24/25 1343)       Imaging results:  XR Chest 1 View    Result Date: 1/24/2025  No focal pulmonary consolidation. Cardiomegaly. Tortuous aorta. Follow-up as clinically indicated.    This report was finalized on 1/24/2025 1:29 PM by Dr. Ned Guzman M.D on Workstation: [employer]       Ambulatory status:   - asstx2    Social issues:   Social History     Socioeconomic History    Marital status:    Tobacco Use    Smoking status: Never     Passive exposure: Never    Smokeless tobacco: Never   Vaping Use    Vaping status: Never Used   Substance and Sexual Activity    Alcohol use: Yes     Comment: rare    Drug use: No    Sexual activity: Defer       Peripheral Neurovascular  Peripheral Neurovascular (Adult)  Peripheral Neurovascular WDL: WDL    Neuro Cognitive  Neuro Cognitive (Adult)  Cognitive/Neuro/Behavioral WDL: WDL, orientation  Orientation: oriented x 4    Learning       Respiratory  Respiratory WDL  Respiratory WDL: .WDL except, all, cough  Rhythm/Pattern, Respiratory: shortness of breath  Cough Type: nonproductive  Breath Sounds  All Lung Fields Breath Sounds: All Fields  All Lung Fields Breath Sounds: diminished, wheezes, expiratory  Breath Sounds Post-Respiratory Treatment  Breath Sounds Posttreatment All Fields: All Fields  Breath Sounds Posttreatment All Fields: Anterior:, Lateral:, aeration increased    Abdominal Pain       Pain Assessments  Pain (Adult)  (0-10) Pain Rating: Rest: 7            Krys Lynne RN  01/24/25 14:43 EST

## 2025-01-24 NOTE — H&P
[facility]   HISTORY AND PHYSICAL    Patient Name: Dafne Mary  : 1940  MRN: [id number]  Primary Care Physician:  Jossy Sauceda MD  Date of admission: 2025    Subjective   Subjective     Chief Complaint:   Chief Complaint   Patient presents with   • Shortness of Breath         HPI:    Dafne Mary is a 84 y.o. female COPD, hypertension, CAD, CHF, hypothyroidism, hyperlipidemia who presents with shortness of breath.  Patient states that around 3 AM she had an episode of severe shortness of breath that lasted about an hour and then improved on its own.  Later that morning she began feeling more short of breath and was brought in by EMS.  She was found to be 80% on room air.  She does not wear oxygen at home.  She was given steroids, magnesium, DuoNebs in the emergency department and was able to titrate her oxygen down to 2 L.  She states that her breathing has significantly improved.  She denies any associated fever, chills, chest pain, increased welling in her legs.  Of note, she was discharged from the hospital yesterday after having a cardiac cath that was unremarkable.  During that hospital course she had several medication changes when she verbalizes understanding of at this time.  She has not been on any recent steroids.  She is followed with Dr. Nolan with pulmonology.     Review of Systems   All systems were reviewed and negative except for: shortness of breath.     Personal History     Past Medical History:   Diagnosis Date   • Atrial fibrillation with RVR 2023   • CAD (coronary artery disease) 7/10/2023   • Chronic diastolic congestive heart failure 2022   • Depression    • Emphysema lung    • GERD (gastroesophageal reflux disease)    • Heart failure    • Hyperlipidemia    • Hypertension    • Hypothyroidism    • Mitral valve regurgitation 7/10/2023   • NSTEMI (non-ST elevated myocardial infarction) 10/25/2023       Past Surgical History:   Procedure Laterality Date   •  CARDIAC CATHETERIZATION N/A 7/7/2023    Procedure: Right and Left Heart Cath;  Surgeon: Ra Cole MD;  Location:  [facility];  Service: Cardiology;  Laterality: N/A;   • CARDIAC CATHETERIZATION N/A 7/7/2023    Procedure: Percutaneous Coronary Intervention;  Surgeon: Ra Cole MD;  Location:  [facility];  Service: Cardiology;  Laterality: N/A;   • CARDIAC CATHETERIZATION N/A 7/7/2023    Procedure: Stent ANDRZEJ coronary;  Surgeon: Ra Cole MD;  Location:  [facility];  Service: Cardiology;  Laterality: N/A;   • CARDIAC CATHETERIZATION N/A 7/7/2023    Procedure: Coronary angiography;  Surgeon: Ra Cole MD;  Location:  [facility];  Service: Cardiology;  Laterality: N/A;   • CARDIAC CATHETERIZATION N/A 7/7/2023    Procedure: Left ventriculography;  Surgeon: Ra Cole MD;  Location:  [facility];  Service: Cardiology;  Laterality: N/A;   • CARDIAC CATHETERIZATION N/A 1/20/2025    Procedure: Left Heart Cath;  Surgeon: Nae Norman MD;  Location:  [facility];  Service: Cardiovascular;  Laterality: N/A;   • CARDIAC CATHETERIZATION N/A 1/20/2025    Procedure: Coronary angiography;  Surgeon: Nae Norman MD;  Location:  [facility];  Service: Cardiovascular;  Laterality: N/A;   • EYE SURGERY Left    • INTUBATION  5/21/2023        • PARATHYROIDECTOMY      Patient reports thyroidectomy partial not a para thyroidectomy.   • THYROIDECTOMY, PARTIAL      1984       Family History: family history includes Alcohol abuse in her brother and father; Alzheimer's disease in her mother; Cancer in her brother; Heart disease in her brother; Hypertension in her brother; Lung disease in her brother and father; Thyroid disease in her maternal grandmother. Otherwise pertinent FHx was reviewed and not pertinent to current issue.    Social History:  reports  that she has never smoked. She has never been exposed to tobacco smoke. She has never used smokeless tobacco. She reports current alcohol use. She reports that she does not use drugs.    Home Medications:  Budeson-Glycopyrrol-Formoterol, Cimetidine, Glycerin-Polysorbate 80, LORazepam, PARoxetine, Thyroid, acetaminophen, albuterol sulfate HFA, amLODIPine, apixaban, artificial tears, atorvastatin, carvedilol, clopidogrel, empagliflozin, furosemide, guaiFENesin, ipratropium-albuterol, losartan, thyroid, vitamin B-12, vitamin C, and vitamin D3    Allergies:  Allergies   Allergen Reactions   • Lansoprazole Nausea Only     NAUSEA  NAUSEA  NAUSEA   • Buspirone Other (See Comments)     agitation   • Diphenhydramine Hcl (Sleep) Irritability   • Lisinopril Cough       Objective   Objective     Vitals:   Temp:  [97.7 °F (36.5 °C)-99 °F (37.2 °C)] 97.7 °F (36.5 °C)  Heart Rate:  [] 89  Resp:  [18-20] 20  BP: (102-170)/(51-90) 159/84  Flow (L/min) (Oxygen Therapy):  [2-6] 2  Physical Exam    Constitutional: Awake, alert, frail appearing.    Eyes: PERRLA, sclerae anicteric, no conjunctival injection   HENT: NCAT, mucous membranes moist   Neck: Supple, no thyromegaly, no lymphadenopathy, trachea midline   Respiratory: mild expiratory wheezing noted. No rales or rhonchi.    Cardiovascular: RRR, no murmurs, rubs, or gallops, palpable pedal pulses bilaterally   Gastrointestinal: Positive bowel sounds, soft, nontender, nondistended   Musculoskeletal: No bilateral ankle edema, no clubbing or cyanosis to extremities   Psychiatric: Appropriate affect, cooperative   Neurologic: Oriented x 3, strength symmetric in all extremities, Cranial Nerves grossly intact to confrontation, speech clear   Skin: No rashes     Result Review    Result Review:  I have personally reviewed the results from the time of this admission to 1/24/2025 16:05 EST and agree with these findings:  [x]  Laboratory list / accordion  []  Microbiology  [x]   Radiology  [x]  EKG/Telemetry   []  Cardiology/Vascular   []  Pathology  [x]  Old records  []  Other:  Most notable findings include: Elevated troponin of 152 expected as patient had cardiac cath yesterday.  Will continue to trend.  Patient shows no signs of leukocytosis.  She had a negative respiratory panel.      Assessment & Plan   Assessment / Plan     Brief Patient Summary:  Dafne Mary is a 84 y.o. female who was brought in by EMS for shortness of breath.  She was noted to be hypoxic to 80% on room air.  She was given steroids, magnesium, DuoNebs and her oxygen was titrated down to 2 L.  She is currently oxygenating well on 2L NC and respiratory rate/ work of breathing has significantly improved.     Active Hospital Problems:  Active Hospital Problems    Diagnosis    • **COPD exacerbation      Plan:   COPD exacerbation:  DuoNebs   Steroids  Pulmonology consult    Elevated Troponin:  Will trend    Hypertension:  Continue home meds    Hyperlipidemia:  Continue home meds    VTE Prophylaxis:  Pharmacologic VTE prophylaxis orders are present.        CODE STATUS:    Level Of Support Discussed With: Patient  Code Status (Patient has no pulse and is not breathing): CPR (Attempt to Resuscitate)  Medical Interventions (Patient has pulse or is breathing): Full Support    Admission Status:  I believe this patient meets observation status.    Electronically signed by ANTONELLA Perez, 01/24/25, 2:53 PM EST.        75 minutes has been spent by [facility] providers in the care of this patient while under observation status      I have worn appropriate PPE during this patient encounter, sanitized my hands both with entering and exiting patient's room.    I have discussed plan of care with patient including advance care plan and/or surrogate decision maker.  Patient advises that their daughter, Felicia, will be their primary surrogate decision maker

## 2025-01-24 NOTE — CASE MANAGEMENT/SOCIAL WORK
Discharge Planning Assessment  [facility]     Patient Name: Dafne Mary  MRN: [id number]  Today's Date: 1/24/2025    Admit Date: 1/24/2025        Discharge Needs Assessment    No documentation.                  Discharge Plan       Row Name 01/24/25 1634       Plan    Plan Comments [facility]- pt is current w/[facility]- referral sent to [facility] and pt accepted.                  Continued Care and Services - Admitted Since 1/24/2025       Home Medical Care       Service Provider Request Status Services Address Phone Fax Patient Preferred    [facility] Accepted -- [address], [address], [facility] [address] [phone]        Internal Comment last updated by Tonny Martins RN 1/24/2025 1337    Current with [facility].                             Selected Continued Care - Prior Encounters Includes continued care and service providers with selected services from prior encounters from 10/26/2024 to 1/24/2025      Discharged on 1/23/2025 Admission date: 1/17/2025 - Discharge disposition: Home-Health Care [facility]      Home Medical Care       Service Provider Services Address Phone Fax Patient Preferred    [facility] [address], [address], [facility] [address] [phone] --                             Demographic Summary    No documentation.                  Functional Status    No documentation.                  Psychosocial    No documentation.                  Abuse/Neglect    No documentation.                  Legal    No documentation.                  Substance Abuse    No documentation.                  Patient Forms    No documentation.                     Sari Raymundo, RN

## 2025-01-25 ENCOUNTER — READMISSION MANAGEMENT (OUTPATIENT)
Dept: CALL CENTER | Facility: HOSPITAL | Age: 85
End: 2025-01-25
Payer: MEDICARE

## 2025-01-25 PROBLEM — R53.1 GENERALIZED WEAKNESS: Status: ACTIVE | Noted: 2025-01-25

## 2025-01-25 PROBLEM — R09.02 HYPOXIA: Status: ACTIVE | Noted: 2025-01-25

## 2025-01-25 LAB
ANION GAP SERPL CALCULATED.3IONS-SCNC: 14.9 MMOL/L (ref 5–15)
ARTERIAL PATENCY WRIST A: POSITIVE
ATMOSPHERIC PRESS: 754.4 MMHG
BASE EXCESS BLDA CALC-SCNC: 1.3 MMOL/L (ref 0–2)
BASOPHILS # BLD AUTO: 0.02 10*3/MM3 (ref 0–0.2)
BASOPHILS NFR BLD AUTO: 0.2 % (ref 0–1.5)
BDY SITE: ABNORMAL
BUN SERPL-MCNC: 14 MG/DL (ref 8–23)
BUN/CREAT SERPL: 15.2 (ref 7–25)
CALCIUM SPEC-SCNC: 9.1 MG/DL (ref 8.6–10.5)
CHLORIDE SERPL-SCNC: 97 MMOL/L (ref 98–107)
CO2 BLDA-SCNC: 27.9 MMOL/L (ref 23–27)
CO2 SERPL-SCNC: 23.1 MMOL/L (ref 22–29)
CREAT SERPL-MCNC: 0.92 MG/DL (ref 0.57–1)
DEPRECATED RDW RBC AUTO: 39.2 FL (ref 37–54)
EGFRCR SERPLBLD CKD-EPI 2021: 61.5 ML/MIN/1.73
EOSINOPHIL # BLD AUTO: 0 10*3/MM3 (ref 0–0.4)
EOSINOPHIL NFR BLD AUTO: 0 % (ref 0.3–6.2)
ERYTHROCYTE [DISTWIDTH] IN BLOOD BY AUTOMATED COUNT: 11.8 % (ref 12.3–15.4)
GAS FLOW AIRWAY: 2 LPM
GLUCOSE SERPL-MCNC: 141 MG/DL (ref 65–99)
HCO3 BLDA-SCNC: 26.6 MMOL/L (ref 22–28)
HCT VFR BLD AUTO: 39.2 % (ref 34–46.6)
HEMODILUTION: NO
HGB BLD-MCNC: 13 G/DL (ref 12–15.9)
IMM GRANULOCYTES # BLD AUTO: 0.13 10*3/MM3 (ref 0–0.05)
IMM GRANULOCYTES NFR BLD AUTO: 1.2 % (ref 0–0.5)
LYMPHOCYTES # BLD AUTO: 0.61 10*3/MM3 (ref 0.7–3.1)
LYMPHOCYTES NFR BLD AUTO: 5.6 % (ref 19.6–45.3)
MCH RBC QN AUTO: 30.2 PG (ref 26.6–33)
MCHC RBC AUTO-ENTMCNC: 33.2 G/DL (ref 31.5–35.7)
MCV RBC AUTO: 91.2 FL (ref 79–97)
MODALITY: ABNORMAL
MONOCYTES # BLD AUTO: 0.3 10*3/MM3 (ref 0.1–0.9)
MONOCYTES NFR BLD AUTO: 2.7 % (ref 5–12)
NEUTROPHILS NFR BLD AUTO: 9.86 10*3/MM3 (ref 1.7–7)
NEUTROPHILS NFR BLD AUTO: 90.3 % (ref 42.7–76)
NRBC BLD AUTO-RTO: 0 /100 WBC (ref 0–0.2)
PCO2 BLDA: 43.7 MM HG (ref 35–45)
PH BLDA: 7.39 PH UNITS (ref 7.35–7.45)
PLATELET # BLD AUTO: 369 10*3/MM3 (ref 140–450)
PMV BLD AUTO: 9.2 FL (ref 6–12)
PO2 BLDA: 61.5 MM HG (ref 80–100)
POTASSIUM SERPL-SCNC: 3.7 MMOL/L (ref 3.5–5.2)
QT INTERVAL: 409 MS
QTC INTERVAL: 525 MS
RBC # BLD AUTO: 4.3 10*6/MM3 (ref 3.77–5.28)
SAO2 % BLDCOA: 90.9 % (ref 92–98.5)
SODIUM SERPL-SCNC: 135 MMOL/L (ref 136–145)
TOTAL RATE: 18 BREATHS/MINUTE
WBC NRBC COR # BLD AUTO: 10.92 10*3/MM3 (ref 3.4–10.8)

## 2025-01-25 PROCEDURE — 85025 COMPLETE CBC W/AUTO DIFF WBC: CPT | Performed by: NURSE PRACTITIONER

## 2025-01-25 PROCEDURE — 82803 BLOOD GASES ANY COMBINATION: CPT

## 2025-01-25 PROCEDURE — 80048 BASIC METABOLIC PNL TOTAL CA: CPT | Performed by: NURSE PRACTITIONER

## 2025-01-25 PROCEDURE — 94799 UNLISTED PULMONARY SVC/PX: CPT

## 2025-01-25 PROCEDURE — 97162 PT EVAL MOD COMPLEX 30 MIN: CPT | Performed by: PHYSICAL THERAPIST

## 2025-01-25 PROCEDURE — 36600 WITHDRAWAL OF ARTERIAL BLOOD: CPT

## 2025-01-25 PROCEDURE — 63710000001 REVEFENACIN 175 MCG/3ML SOLUTION: Performed by: NURSE PRACTITIONER

## 2025-01-25 PROCEDURE — 63710000001 PREDNISONE PER 1 MG: Performed by: NURSE PRACTITIONER

## 2025-01-25 PROCEDURE — 94664 DEMO&/EVAL PT USE INHALER: CPT

## 2025-01-25 PROCEDURE — 94761 N-INVAS EAR/PLS OXIMETRY MLT: CPT

## 2025-01-25 RX ADMIN — LORAZEPAM 0.5 MG: 0.5 TABLET ORAL at 16:20

## 2025-01-25 RX ADMIN — LOSARTAN POTASSIUM 50 MG: 50 TABLET, FILM COATED ORAL at 21:00

## 2025-01-25 RX ADMIN — AMLODIPINE BESYLATE 5 MG: 5 TABLET ORAL at 08:26

## 2025-01-25 RX ADMIN — IPRATROPIUM BROMIDE AND ALBUTEROL SULFATE 3 ML: .5; 3 SOLUTION RESPIRATORY (INHALATION) at 11:36

## 2025-01-25 RX ADMIN — LORAZEPAM 0.5 MG: 0.5 TABLET ORAL at 11:00

## 2025-01-25 RX ADMIN — LEVOTHYROXINE, LIOTHYRONINE 15 MG: 19; 4.5 TABLET ORAL at 05:34

## 2025-01-25 RX ADMIN — ACETAMINOPHEN 650 MG: 325 TABLET, FILM COATED ORAL at 13:57

## 2025-01-25 RX ADMIN — CLOPIDOGREL BISULFATE 75 MG: 75 TABLET ORAL at 08:26

## 2025-01-25 RX ADMIN — FUROSEMIDE 20 MG: 20 TABLET ORAL at 08:25

## 2025-01-25 RX ADMIN — CARVEDILOL 25 MG: 25 TABLET, FILM COATED ORAL at 08:26

## 2025-01-25 RX ADMIN — APIXABAN 2.5 MG: 2.5 TABLET, FILM COATED ORAL at 08:26

## 2025-01-25 RX ADMIN — PREDNISONE 40 MG: 20 TABLET ORAL at 08:26

## 2025-01-25 RX ADMIN — LOSARTAN POTASSIUM 50 MG: 50 TABLET, FILM COATED ORAL at 08:26

## 2025-01-25 RX ADMIN — PAROXETINE HYDROCHLORIDE 20 MG: 20 TABLET, FILM COATED ORAL at 08:26

## 2025-01-25 RX ADMIN — CARVEDILOL 25 MG: 25 TABLET, FILM COATED ORAL at 18:02

## 2025-01-25 RX ADMIN — IPRATROPIUM BROMIDE AND ALBUTEROL SULFATE 3 ML: .5; 3 SOLUTION RESPIRATORY (INHALATION) at 20:36

## 2025-01-25 RX ADMIN — IPRATROPIUM BROMIDE AND ALBUTEROL SULFATE 3 ML: .5; 3 SOLUTION RESPIRATORY (INHALATION) at 08:01

## 2025-01-25 RX ADMIN — IPRATROPIUM BROMIDE AND ALBUTEROL SULFATE 3 ML: .5; 3 SOLUTION RESPIRATORY (INHALATION) at 03:29

## 2025-01-25 RX ADMIN — BUDESONIDE AND FORMOTEROL FUMARATE DIHYDRATE 2 PUFF: 160; 4.5 AEROSOL RESPIRATORY (INHALATION) at 08:04

## 2025-01-25 RX ADMIN — APIXABAN 2.5 MG: 2.5 TABLET, FILM COATED ORAL at 21:00

## 2025-01-25 RX ADMIN — BUDESONIDE AND FORMOTEROL FUMARATE DIHYDRATE 2 PUFF: 160; 4.5 AEROSOL RESPIRATORY (INHALATION) at 20:36

## 2025-01-25 RX ADMIN — LEVOTHYROXINE, LIOTHYRONINE 30 MG: 19; 4.5 TABLET ORAL at 05:35

## 2025-01-25 RX ADMIN — REVEFENACIN 175 MCG: 175 SOLUTION RESPIRATORY (INHALATION) at 08:03

## 2025-01-25 RX ADMIN — ATORVASTATIN CALCIUM 40 MG: 20 TABLET, FILM COATED ORAL at 08:26

## 2025-01-25 NOTE — DISCHARGE PLACEMENT REQUEST
"Dafne Delarosa (84 y.o. Female)       Date of Birth   1940    Social Security Number       Address   [address] [address]    Home Phone   [phone]    MRN   [id number]       Sikhism   Uatsdin    Marital Status                               Admission Date   1/24/25    Admission Type   Emergency    Admitting Provider   Rock Deutsch MD    Attending Provider   Rock Deutsch MD    Department, Room/Bed   [facility] OBSERVATION, 106/1       Discharge Date       Discharge Disposition       Discharge Destination                                 Attending Provider: Rock Deutsch MD    Allergies: Lansoprazole, Buspirone, Diphenhydramine Hcl (Sleep), Lisinopril    Isolation: None   Infection: None   Code Status: CPR    Ht: 157.5 cm (62\")   Wt: 54 kg (119 lb)    Admission Cmt: None   Principal Problem: COPD exacerbation [J44.1]                   Active Insurance as of 1/24/2025       Primary Coverage       Payor Plan Insurance Group Employer/Plan Group    HUMANA MEDICARE REPLACEMENT HUMANA MED ADV SNP HMO [id number]       Payor Plan Address Payor Plan Phone Number Payor Plan Fax Number Effective Dates    [address] [address] [phone]  1/1/2024 - None Entered    [facility] [address]         Subscriber Name Subscriber Birth Date Member ID       DAFNE DELAROSA 1940 [id number]                     Emergency Contacts        (Rel.) Home Phone Work Phone Mobile Phone    TyraFelicia (Daughter) [phone] -- [phone]    Balaji Delarosa (Son) [phone] -- [phone]    kristie gentile (Daughter) [phone] -- --                "

## 2025-01-25 NOTE — CASE MANAGEMENT/SOCIAL WORK
Discharge Planning Assessment  [facility]     Patient Name: Dafne Mary  MRN: [id number]  Today's Date: 1/25/2025    Admit Date: 1/24/2025    Plan: Patient seen by PT and they recommend rehab upon discharge. I will provide patient with Patient Choice List of rehab facilities in the area and request that patient/family select several to which they are agreeable to referral. CCP to follow.   Discharge Needs Assessment    No documentation.                  Discharge Plan       Row Name 01/25/25 1703       Plan    Plan Patient seen by PT and they recommend rehab upon discharge. I will provide patient with Patient Choice List of rehab facilities in the area and request that patient/family select several to which they are agreeable to referral. CCP to follow.                  Continued Care and Services - Admitted Since 1/24/2025       Home Medical Care       Service Provider Request Status Services Address Phone Fax Patient Preferred    [facility] Accepted -- [address], [address], [facility] [address] [phone]        Internal Comment last updated by Tonny Martins RN 1/24/2025 1337    Current with [facility].                             Selected Continued Care - Prior Encounters Includes continued care and service providers with selected services from prior encounters from 10/26/2024 to 1/25/2025      Discharged on 1/23/2025 Admission date: 1/17/2025 - Discharge disposition: Home-Health Care [facility]      Home Medical Care       Service Provider Services Address Phone Fax Patient Preferred    [facility] [address], [address], [facility] [address] [phone] --                             Demographic Summary    No documentation.                  Functional Status    No documentation.                  Psychosocial    No documentation.                  Abuse/Neglect    No documentation.                   Legal    No documentation.                  Substance Abuse    No documentation.                  Patient Forms    No documentation.                     Tonny Martins RN

## 2025-01-25 NOTE — PLAN OF CARE
Goal Outcome Evaluation:  Plan of Care Reviewed With: patient           Outcome Evaluation: Patient with history of COPD, CAD and CHF is admitted to observation unit for further management of persistent dyspnea.  At baseline, patient lives alone and ambulates independently with no assistive device.  Patient was in bed and agreeable to therapy when PT arrived.  She was on 1 L of O2.  Patient was able to transition to the edge of the bed stand unassisted.  Patient denied a need for assistive device but was reaching for hand-held assist after few feet.  Patient was able to ambulate approximately 30 feet with CGA and HHA.  Min assist needed at times when turning and more so as patient fatigued.  Patient presents with generalized weakness, decreased endurance, impaired balance and unsteady gait.  Patient would benefit from SNF placement to further address these impairments as well as improve independence and safety prior to returning home.    Anticipated Discharge Disposition (PT): skilled nursing facility

## 2025-01-25 NOTE — PROGRESS NOTES
[facility] INPATIENT PROGRESS NOTE         [facility] OBSERVATION    2025      PATIENT IDENTIFICATION:  Name: Dafne Mary ADMIT: 2025   : 1940  PCP: Jossy Sauceda MD    MRN: [id number] LOS: 0 days   AGE/SEX: 84 y.o. female  [place] [facility] [place]                     LOS 0    Reason for visit: Acute hypoxemia, vocal cord dysfunction and asked      SUBJECTIVE:      Resting comfortably.  No productive cough or chest pain.  Clear breath sounds bilaterally.  No objection to discharge.  Suspect her issues is predominantly vocal cord dysfunction and anxiety.  She needs to follow-up with ENT and speech therapy after discharge.  If her saturations drop below 89% we can have supplemental oxygen available.    Objective   OBJECTIVE:    Vital Sign Min/Max for last 24 hours  Temp  Min: 97.7 °F (36.5 °C)  Max: 99 °F (37.2 °C)   BP  Min: 102/51  Max: 170/90   Pulse  Min: 75  Max: 104   Resp  Min: 18  Max: 20   SpO2  Min: 89 %  Max: 100 %   No data recorded   Weight  Min: 54 kg (119 lb)  Max: 54 kg (119 lb)    Vitals:    25 0803 25 0804 25 0900 25 0954   BP:       BP Location:       Patient Position:       Pulse: 101 101     Resp:       Temp:       TempSrc:       SpO2:   93% (!) 89%   Weight:       Height:                25  1624   Weight: 54 kg (119 lb)       Body mass index is 21.77 kg/m².                          Body mass index is 21.77 kg/m².    Intake/Output Summary (Last 24 hours) at 2025 1120  Last data filed at 2025 1513  Gross per 24 hour   Intake 1050 ml   Output --   Net 1050 ml         Exam:  GEN:  No distress, appears stated age  EYES:   PERRL, anicteric sclerae  ENT:    External ears/nose normal, OP clear  NECK:  No adenopathy, midline trachea  LUNGS: Normal chest on inspection, palpation and auscultation  CV:  Normal S1S2, without murmur  ABD:  Nontender, nondistended, no hepatosplenomegaly, +BS  EXT:  No edema.  No cyanosis or clubbing.  No mottling and  "normal cap refill.    Assessment     Scheduled meds:  amLODIPine, 5 mg, Oral, Daily  apixaban, 2.5 mg, Oral, Q12H  atorvastatin, 40 mg, Oral, Daily  budesonide-formoterol, 2 puff, Inhalation, BID - RT   And  revefenacin, 175 mcg, Nebulization, Daily - RT  carvedilol, 25 mg, Oral, BID With Meals  clopidogrel, 75 mg, Oral, Daily  furosemide, 20 mg, Oral, Daily  ipratropium-albuterol, 3 mL, Nebulization, Q6H - RT  losartan, 50 mg, Oral, BID  PARoxetine, 20 mg, Oral, Daily  predniSONE, 40 mg, Oral, Daily  thyroid, 15 mg, Oral, Q AM  Thyroid, 30 mg, Oral, Q AM      IV meds:                         Data Review:  Results from last 7 days   Lab Units 01/25/25  0832 01/24/25  1249 01/23/25  0539 01/22/25  0418 01/21/25  0804   SODIUM mmol/L 135* 140 140 136 138   POTASSIUM mmol/L 3.7 3.8 3.4* 3.9 3.7   CHLORIDE mmol/L 97* 98 103 104 103   CO2 mmol/L 23.1 30.0* 26.9 20.0* 20.9*   BUN mg/dL 14 10 7* 11 12   CREATININE mg/dL 0.92 0.89 0.81 0.86 0.94   GLUCOSE mg/dL 141* 153* 110* 105* 90   CALCIUM mg/dL 9.1 9.3 8.7 8.5* 8.7         Estimated Creatinine Clearance: 38.8 mL/min (by C-G formula based on SCr of 0.92 mg/dL).  Results from last 7 days   Lab Units 01/25/25  0832 01/24/25  1249 01/23/25  0539 01/22/25  0418 01/21/25  0603   WBC 10*3/mm3 10.92* 10.97* 6.47 5.98 7.63   HEMOGLOBIN g/dL 13.0 13.6 11.7* 12.7 11.7*   PLATELETS 10*3/mm3 369 326 264 248 267     Results from last 7 days   Lab Units 01/18/25  1257   INR  1.05     Results from last 7 days   Lab Units 01/24/25  1249   ALT (SGPT) U/L 27   AST (SGOT) U/L 36*     Results from last 7 days   Lab Units 01/25/25  0334 01/24/25  1250   PH, ARTERIAL pH units 7.392 7.354   PO2 ART mm Hg 61.5* 174.8*   PCO2, ARTERIAL mm Hg 43.7 57.6*   HCO3 ART mmol/L 26.6 32.1*             No results found for: \"HGBA1C\", \"POCGLU\"      Imaging reviewed  Chest x-ray and CT chest 1/24 reviewed: No acute    Microbiology reviewed  RVP negative          Active Hospital Problems    Diagnosis  POA "    **COPD exacerbation [J44.1]  Yes      Resolved Hospital Problems   No resolved problems to display.         ASSESSMENT:  Acute hypoxemic and hypercapnic respiratory failure  Asthma  Vocal cord dysfunction      PLAN:  Suspect her anxiety and vocal cord dysfunction is the biggest contributor to her return to hospital.  She does not seem to really have an asthma exacerbation.  No wheezing on auscultation and she is on room air.  Plan for evaluation by ENT and speech therapy for vocal cord dysfunction and evaluation for anatomical abnormalities of vocal cords.  She also has a significant contributing factor with anxiety and she has a referral to outpatient psychiatry clinic to help in that regard.  She is asking about having oxygen available.  If she desaturates below 89% with ambulation we would be able to order oxygen at discharge.        Javier Nolan MD  Pulmonary and Critical Care Medicine  [facility]  1/25/2025    11:20 EST

## 2025-01-25 NOTE — PROGRESS NOTES
GEORGE SARKAR ATTESTATION NOTE    SHARED VISIT: This visit was performed by BOTH a physician and an APC. The substantive portion of the medical decision making was performed by this attesting physician who made or approved the management plan and takes responsibility for patient management. All studies in the APC note (if performed) were independently interpreted by me.     The CEM and I have discussed this patient's history, physical exam, and treatment plan.  I have reviewed the documentation and personally had a face to face interaction with the patient. I provided a substantive portion of the care of the patient.  I affirm the documentation and agree with the treatment and plan.  The note below describes my personal findings:         S: Pt says she got acutely short of breath when off of oxygen and had a hard time getting back into bed.  I hooked patient back up to her oxygen and turned it up to 4L with quick recovery back to baseline and weaned back down to 2L.     O:  Exam  General : Lying partially on the bed holding her nasal cannula in her nose with her hand after taking it off to go to bedside potty--assisted into bed and into semi upright position but patient with resp distress--tremulous and tachypneic with audible wheezing; once settled in bed and on 4L patient conversant and felt better  HEENT: NCAT, eomi, no scleral icterus  Neck: supple, trachea midline  CV: normal rate and rhythm  Resp: diminished at the bases with insp and exp wheezing throughout  Abd: soft, nt, nd, no rebound, no guarding  Skin: no obvious rashes  Exts: no obvious deformities  Neuro: alert and appropriate, face symmetric, nl speech         Assessment and Plan: 83 yo F with complicated recent pmh with hospital stay X 5 day with cardiac cath who returned after dsicharged to the ED acutely short of breath. Pt admitted to obs unit for further eval of acute hypoxic and hypercapneic resp failure not normally on any oxygen.  After ED care  patient weaned down to 2 L via NC and pulm consulted with rec for ongoing neb treatments, prednisone po, and repeat ABG in am.  Pt also with CAD and elevated trop--plan trend in setting of cath yesterday.    Pt with an episode of acute resp distress with min activity in her room on my eval but did recover quickly after placed back on supp oxygen. Will cont to monitor closely overnight with low threshold for inc supp oxygen or even starting Bipap and admitting if any worsening overnight.

## 2025-01-25 NOTE — NURSING NOTE
Walking 02 she became short of air and dizzy at about 20-25 ft O2 89 room air. She can maintain O2 at sitting.

## 2025-01-25 NOTE — CASE MANAGEMENT/SOCIAL WORK
Discharge Planning Assessment  [facility]     Patient Name: Dafne Mary  MRN: [id number]  Today's Date: 1/25/2025    Admit Date: 1/24/2025    Plan: Patient and daughter given Patient Choice List, which they will review. They request that a referral be sent to [facility] now, as [facility] is close to daughter's home. Referral sent on patient's behalf. BRANDON Gallardo RN   Discharge Needs Assessment    No documentation.                  Discharge Plan       Row Name 01/25/25 1710       Plan    Plan Patient and daughter given Patient Choice List, which they will review. They request that a referral be sent to [facility] now, as it is close to daughter's home. Referral sent on patient's behalf. BRANDON Gallardo RN      Row Name 01/25/25 1706       Plan    Plan Patient seen by PT and they recommend rehab upon discharge. I will provide patient with Patient Choice List of rehab facilities in the area and request that patient/family select several to which they are agreeable to referral. CCP to follow.                  Continued Care and Services - Admitted Since 1/24/2025       Home Medical Care       Service Provider Request Status Services Address Phone Fax Patient Preferred    [facility] Accepted -- [address], [address], [facility] [address] [phone]        Internal Comment last updated by Tonny Martins, RN 1/24/2025 1337    Current with [facility].                             Selected Continued Care - Prior Encounters Includes continued care and service providers with selected services from prior encounters from 10/26/2024 to 1/25/2025      Discharged on 1/23/2025 Admission date: 1/17/2025 - Discharge disposition: Home-Health Care [facility] [place]      Home Medical Care       Service Provider Services Address Phone Fax Patient Preferred    [facility] [address], [address], [facility] [address] [phone] --                              Demographic Summary    No documentation.                  Functional Status    No documentation.                  Psychosocial    No documentation.                  Abuse/Neglect    No documentation.                  Legal    No documentation.                  Substance Abuse    No documentation.                  Patient Forms    No documentation.                     Tonny Martins RN

## 2025-01-25 NOTE — CONSULTS
Name: Dafne Mary ADMIT: 2025   : 1940  PCP: Jossy Sauceda MD    MRN: [id number] LOS: 0 days   AGE/SEX: 84 y.o. female  ROOM: 106/           Inpatient Hospitalist Consult  Consult performed by: Rock Deutsch MD  Consult ordered by: Emily Xiao APRN      Date of Admit: 2025  Date of Consult: 25    Subjective   History of Present Illness  This is an 84-year-old female with history of CAD status post recent NSTEMI and admission to our service along with hypothyroidism, COPD, A-fib, chronic systolic CHF, HTN, vocal cord dysfunction and other issues who presented to the hospital yesterday complaining of generalized weakness and shortness of breath.  We had just discharged her the day before.  She was actually hypoxic to around 80% on room air according to the ED notes.  She was placed in the observation unit but now we are being asked to admit fully as she remains hypoxic today.  She is doing better, currently only requiring 1 L to keep sats at 98%.  She says she is too weak to go back home by herself currently and likely will need skilled rehab placement.  She is still coughing some but not really bringing anything up.She is short of breath with exertion.      Past Medical History:   Diagnosis Date    Atrial fibrillation with RVR 2023    CAD (coronary artery disease) 7/10/2023    Chronic diastolic congestive heart failure 2022    Depression     Emphysema lung     GERD (gastroesophageal reflux disease)     Heart failure     Hyperlipidemia     Hypertension     Hypothyroidism     Mitral valve regurgitation 7/10/2023    NSTEMI (non-ST elevated myocardial infarction) 10/25/2023     Past Surgical History:   Procedure Laterality Date    CARDIAC CATHETERIZATION N/A 2023    Procedure: Right and Left Heart Cath;  Surgeon: Ra Cole MD;  Location: [facility] CATH INVASIVE LOCATION;  Service: Cardiology;  Laterality: N/A;    CARDIAC CATHETERIZATION N/A 2023     Procedure: Percutaneous Coronary Intervention;  Surgeon: Ra Cole MD;  Location:  [facility] CATH INVASIVE LOCATION;  Service: Cardiology;  Laterality: N/A;    CARDIAC CATHETERIZATION N/A 7/7/2023    Procedure: Stent ANDRZEJ coronary;  Surgeon: Ra Cole MD;  Location:  [facility] CATH INVASIVE LOCATION;  Service: Cardiology;  Laterality: N/A;    CARDIAC CATHETERIZATION N/A 7/7/2023    Procedure: Coronary angiography;  Surgeon: Ra Cole MD;  Location:  [facility] CATH INVASIVE LOCATION;  Service: Cardiology;  Laterality: N/A;    CARDIAC CATHETERIZATION N/A 7/7/2023    Procedure: Left ventriculography;  Surgeon: Ra Cole MD;  Location:  [facility] CATH INVASIVE LOCATION;  Service: Cardiology;  Laterality: N/A;    CARDIAC CATHETERIZATION N/A 1/20/2025    Procedure: Left Heart Cath;  Surgeon: Nae Norman MD;  Location:  [facility] CATH INVASIVE LOCATION;  Service: Cardiovascular;  Laterality: N/A;    CARDIAC CATHETERIZATION N/A 1/20/2025    Procedure: Coronary angiography;  Surgeon: Nae Norman MD;  Location:  [facility] CATH INVASIVE LOCATION;  Service: Cardiovascular;  Laterality: N/A;    EYE SURGERY Left     INTUBATION  5/21/2023         PARATHYROIDECTOMY      Patient reports thyroidectomy partial not a para thyroidectomy.    THYROIDECTOMY, PARTIAL      1984     Family History   Problem Relation Age of Onset    Alzheimer's disease Mother     Lung disease Father     Alcohol abuse Father     Heart disease Brother     Hypertension Brother     Lung disease Brother     Alcohol abuse Brother     Cancer Brother         LUNG  WAS A SMOKER    Thyroid disease Maternal Grandmother      Social History     Tobacco Use    Smoking status: Never     Passive exposure: Never    Smokeless tobacco: Never   Vaping Use    Vaping status: Never Used   Substance Use Topics    Alcohol use: Yes     Comment: rare    Drug use: No     Medications Prior to Admission   Medication Sig Dispense Refill Last  Dose/Taking    acetaminophen (TYLENOL) 325 MG tablet Take 2 tablets by mouth Every 6 (Six) Hours As Needed for Mild Pain.       amLODIPine (NORVASC) 5 MG tablet Take 1 tablet by mouth Daily. 90 tablet 1     apixaban (ELIQUIS) 2.5 MG tablet tablet Take 1 tablet by mouth Every 12 (Twelve) Hours. Indications: Atrial Fibrillation 180 tablet 1     Winter Thyroid 15 MG tablet Take 1 tablet by mouth once daily 90 tablet 0     Winter Thyroid 30 MG tablet Take 1 tablet by mouth once daily 90 tablet 0     Artificial Tear Ointment (artificial tears) ophthalmic ointment Administer 1 application to both eyes Every 1 (One) Hour As Needed (dry eye).       atorvastatin (LIPITOR) 40 MG tablet Take 1 tablet by mouth Daily. 90 tablet 1     Breztri Aerosphere 160-9-4.8 MCG/ACT aerosol inhaler 2 puffs 2 (Two) Times a Day.       carvedilol (COREG) 25 MG tablet Take 1 tablet by mouth 2 (Two) Times a Day With Meals. 60 tablet 0     Cimetidine (TAGAMET PO) Take  by mouth.       clopidogrel (PLAVIX) 75 MG tablet Take 1 tablet by mouth Daily. 90 tablet 3     empagliflozin (Jardiance) 10 MG tablet tablet Take 1 tablet by mouth Daily. 90 tablet 1     furosemide (LASIX) 20 MG tablet Take 1 tablet by mouth Daily. 30 tablet 0     Glycerin-Polysorbate 80 (REFRESH DRY EYE THERAPY OP) Apply 1 drop to eye(s) as directed by provider Daily. For dry eyes       guaiFENesin (MUCINEX) 600 MG 12 hr tablet Take 1 tablet by mouth 2 (Two) Times a Day As Needed for Cough or Congestion.       ipratropium-albuterol (DUO-NEB) 0.5-2.5 mg/3 ml nebulizer Take 3 mL by nebulization Every 4 (Four) Hours As Needed for Wheezing.       LORazepam (ATIVAN) 0.5 MG tablet Take 1 tablet by mouth Every 6 (Six) Hours As Needed for Anxiety. 6 tablet 0     losartan (COZAAR) 50 MG tablet Take 1 tablet by mouth 2 (Two) Times a Day. 90 tablet 1     PARoxetine (PAXIL) 10 MG tablet Take 2 tablets by mouth Daily. 180 tablet 0     Ventolin  (90 Base) MCG/ACT inhaler Inhale 2 puffs  Every 4 (Four) Hours As Needed.       vitamin B-12 (CYANOCOBALAMIN) 500 MCG tablet Take 1 tablet by mouth Daily.       vitamin C (ASCORBIC ACID) 250 MG tablet Take 2 tablets by mouth Daily.       vitamin D3 125 MCG (5000 UT) capsule capsule Take 1 capsule by mouth Daily.        Allergies:  Lansoprazole, Buspirone, Diphenhydramine hcl (sleep), and Lisinopril    Review of Systems   Constitutional:  Negative for chills, fatigue and fever.   HENT:  Negative for congestion and trouble swallowing.    Eyes:  Negative for visual disturbance.   Respiratory:  Positive for cough and shortness of breath. Negative for chest tightness.    Cardiovascular:  Negative for chest pain, palpitations and leg swelling.   Gastrointestinal:  Negative for abdominal distention, abdominal pain, constipation, diarrhea, nausea and vomiting.   Genitourinary:  Negative for difficulty urinating, dysuria and frequency.   Musculoskeletal:  Negative for arthralgias.   Skin:  Negative for rash.   Neurological:  Positive for weakness. Negative for dizziness and headaches.   Psychiatric/Behavioral:  Negative for confusion.        Objective      Vital Signs  Temp:  [97.7 °F (36.5 °C)-98.2 °F (36.8 °C)] 98.1 °F (36.7 °C)  Heart Rate:  [] 88  Resp:  [18-20] 18  BP: (102-159)/(51-84) 117/65  Body mass index is 21.77 kg/m².    Physical Exam  Vitals reviewed.   Constitutional:       General: She is not in acute distress.     Appearance: She is well-developed.      Comments: Frail-appearing   HENT:      Head: Normocephalic and atraumatic.      Mouth/Throat:      Pharynx: No oropharyngeal exudate.   Eyes:      General: No scleral icterus.     Pupils: Pupils are equal, round, and reactive to light.   Neck:      Vascular: No JVD.   Cardiovascular:      Rate and Rhythm: Normal rate and regular rhythm.      Heart sounds: Normal heart sounds. No murmur heard.  Pulmonary:      Effort: Pulmonary effort is normal. No respiratory distress.      Comments: Reduced  breath sounds, rhonchi at the bases but no wheezes  Abdominal:      General: There is no distension.      Palpations: Abdomen is soft.      Tenderness: There is no abdominal tenderness.   Musculoskeletal:      Cervical back: Neck supple.   Skin:     General: Skin is warm and dry.      Coloration: Skin is not pale.   Neurological:      Mental Status: She is alert and oriented to person, place, and time.         Results Review:    I reviewed the patient's new clinical results.  Also reviewed her prior discharge summary.      Assessment & Plan       COPD exacerbation    Anxiety    Benign hypertension    Vocal cord paralysis    PAF (paroxysmal atrial fibrillation)    Chronic HFrEF (heart failure with reduced ejection fraction)    Stage 3a chronic kidney disease    Hypoxia    Generalized weakness    COPD with hypoxia: Pulmonology following, feels a lot of her symptoms are secondary to vocal cord dysfunction and anxiety more so than exacerbation of COPD or asthma.  Recommendation for outpatient ENT eval.  - Wean O2 as tolerated  - Continue prednisone as ordered  - Debbie    CAD status post recent type II MI with cardiac cath showing no new lesions and intact stents.  Continue clopidogrel, atorvastatin and beta-blocker    PAF stable: Continue carvedilol and Eliquis    Generalized weakness secondary to above and recent hospitalization.  Await PT eval and patient likely will need skilled nursing placement.      Thank you very much for asking A to be involved in this patient's care.  We will assume care on transfer to the floor      Rock Deutsch MD  [facility]  01/25/25  13:35 EST

## 2025-01-25 NOTE — NURSING NOTE
Patient wanted to try to walk a little farther. Her O2 went to 85 room air with walking roughly 30 ft. It took her about 5 minutes of sitting to recover.

## 2025-01-25 NOTE — CASE MANAGEMENT/SOCIAL WORK
Discharge Planning Assessment  [facility]     Patient Name: Dafne Mary  MRN: [id number]  Today's Date: 1/25/2025    Admit Date: 1/24/2025    Plan: Plan for home with continued home health through [facility] vs sub-acute rehab, pending PT evaluation and progress. Family can transport. CCP to follow.   Discharge Needs Assessment       Row Name 01/25/25 1107       Living Environment    People in Home alone    Current Living Arrangements home    Potentially Unsafe Housing Conditions none    Primary Care Provided by self;child(tiffanie)    Provides Primary Care For no one, unable/limited ability to care for self    Family Caregiver if Needed child(tiffanie), adult    Quality of Family Relationships helpful;involved;supportive    Able to Return to Prior Arrangements yes    Living Arrangement Comments Patient resides alone in private residence with 4 COLT.       Resource/Environmental Concerns    Resource/Environmental Concerns none    Transportation Concerns none       Transition Planning    Patient/Family Anticipates Transition to other (see comments)  Home with HH vs [facility]    Patient/Family Anticipated Services at Transition other (see comments)  Home with HH vs [facility]    Transportation Anticipated family or friend will provide       Discharge Needs Assessment    Equipment Currently Used at Home walker, rolling    Concerns to be Addressed discharge planning  Home with HH vs [facility]    Anticipated Changes Related to Illness none    Equipment Needed After Discharge none    Outpatient/Agency/Support Group Needs other (see comments)  Home with HH vs [facility]    Discharge Facility/Level of Care Needs other (see comments)  Home with HH vs [facility]    Patient's Choice of Community Agency(s) Home with HH vs [facility]                   Discharge Plan       Row Name 01/25/25 1111       Plan    Plan Plan for home with continued home health through [facility] vs sub-acute rehab, pending PT evaluation and progress. Family can transport. CCP to follow.     Patient/Family in Agreement with Plan yes    Plan Comments Plan for patient to return home with [facility] (current with [facility]) vs [facility]. PT evaluation pending. Patient lives at home alone at baseline and is typically MOD I with use of rolling walker. She reports a fall a few months ago. Her home has 4 steps to enter. Family is involved/supportive. Patient reports she still drives when she is not dizzy. She has been hypoxic intermittently here at [facility] and may require home O2 upon discharge. She uses [facility] in [place], [place] and denies issues obtaining/affording medications. Further DC planning pending PT evaluation and recommendations.                  Continued Care and Services - Admitted Since 1/24/2025       Home Medical Care       Service Provider Request Status Services Address Phone Fax Patient Preferred    [facility] Accepted -- [address], [address], [facility] [address] [phone]        Internal Comment last updated by Tonny Martins RN 1/24/2025 1337    Current with [facility].                             Selected Continued Care - Prior Encounters Includes continued care and service providers with selected services from prior encounters from 10/26/2024 to 1/25/2025      Discharged on 1/23/2025 Admission date: 1/17/2025 - Discharge disposition: Home-Health Care [facility]      Home Medical Care       Service Provider Services Address Phone Fax Patient Preferred    [facility] [address], [address], [facility] [address] [phone] --                             Demographic Summary       Row Name 01/25/25 1105       General Information    Admission Type observation    Arrived From home    Required Notices Provided Observation Status Notice  Verified RUIZ on chart.    Referral Source admission list;emergency department    Reason for Consult discharge planning    Preferred Language  English    General Information Comments Facesheet verified. Role of CCP explained. Appropriate PPE worn at all times.                   Functional Status       Row Name 01/25/25 1106       Functional Status, IADL    Medications assistive equipment and person    Meal Preparation assistive equipment and person    Housekeeping assistive equipment and person    Laundry assistive equipment and person    Shopping assistive equipment and person    IADL Comments MOD I with use of rolling walker at home.                   Psychosocial       Row Name 01/25/25 1106       Emotion Mood WDL    Emotion/Mood/Affect WDL X;emotion mood    Emotion/Mood anxious       Speech WDL    Speech WDL WDL       Perceptual State WDL    Perceptual State WDL WDL       Thought Process WDL    Thought Process WDL WDL       Intellectual Performance WDL    Intellectual Performance WDL level of consciousness;WDL    Level of Consciousness Alert       Coping/Stress    Patient Personal Strengths expressive of emotions;expressive of needs;strong support system    Sources of Support adult child(tiffanie)    Reaction to Health Status anxious       Developmental Stage (Eriksson's Stages of Development)    Developmental Stage Stage 8 (65 years-death/Late Adulthood) Integrity vs. Despair                   Abuse/Neglect    No documentation.                  Legal    No documentation.                  Substance Abuse    No documentation.                  Patient Forms    No documentation.                     Tonny Martins RN

## 2025-01-25 NOTE — PROGRESS NOTES
"GEORGE SARKAR Attestation Note    I supervised care provided by the midlevel provider.    The CEM and I have discussed this patient's history, physical exam, and treatment plan. I have reviewed the documentation and personally had a face to face interaction with the patient  I affirm the documentation and agree with the treatment and plan. I provided a substantive portion of the care of this patient.  I personally performed the physical exam, in its entirety.  My personal findings are documented in below:    History:  Patient with history of COPD, CAD and CHF is admitted to observation unit for further management of persistent dyspnea.  This morning, she says she is breathing a little more comfortably.  She denies chest pain.  Denies coughing.  She tells me that she would like to try to \"walk around without nasal oxygen\" this morning to see how she feels with normal activities.    Physical Exam:  General: No acute distress.  HENT: NCAT  Eyes: no scleral icterus.  Neck: Painless range of motion  CV: Pink warm and well-perfused throughout  Respiratory: No distress or increased work of breathing  Abdomen: soft, no focal tenderness or rigidity  Musculoskeletal: no deformity.  Neuro: Alert, speech fluent and easily intelligible  Skin: warm, dry.    Assessment and Plan:  COPD exacerbation/dyspnea: Appears to be improving clinically this morning.  Pulmonology consulted.  DuoNebs.  Steroids.    Elevated troponin: No chest pain.  Most recent troponin is 122.  Most likely resolving non-STEMI from the prior week.    Hypertension: Continue home medications.  Monitor vital signs.    "

## 2025-01-25 NOTE — PROGRESS NOTES
ED OBSERVATION PROGRESS/DISCHARGE SUMMARY    Date of Admission: 1/24/2025   LOS: 0 days   PCP: Jossy Sauceda MD    Final Diagnosis hypoxia      Subjective     Hospital Outcome:   aDfne Mary is a 84 y.o. female who was brought in by EMS for shortness of breath.  She was noted to be hypoxic to 80% on room air.  She was given steroids, magnesium, DuoNebs and her oxygen was titrated down to 2 L.  She is currently oxygenating well on 2L NC and respiratory rate/ work of breathing has significantly improved. She was admitted to the hospital on 1/17/25 with shortness of breath and found to have an elevated high-sensitivity troponin, consistent with a non-STEMI. She was discharged from the hospital 2 days ago after having a cardiac cath that was unremarkable.  Her HS Troponin remains high, but is noted to be trending downward. Pulmonology saw patient in consultation and an ABG was performed that showed acute hypoxic and hypercapnic respiratory failure, secondary to possible asthma exacerbation on top of underlying vocal cord disease. Patient was treated with Symbicort inhaler, nebulizer treatments, and oral prednisone.     Patient continued to have severe exertional dyspnea with minimal exertion.  A CT Scan of Chest was negative for PE.    1/25:  Her repeat ABG appears improved from yesterday.  Her high-sensitivity troponin is trending down nicely, she has no recurrence of chest discomfort.  An ambulatory oxygen saturation test was performed twice today, initially patient became pretty dyspneic with just walking 20 to 25 feet and it was ended early.  Repeat shows the patient dropped her oxygen saturation on room air to 85%.  CCP team is working with patient and family as patient is concerned about potentially needing rehab.  Awaiting PT eval currently.     I have discussed case with Dr. Moe on-call for [facility] who has graciously excepted to admit to a telemetry bed    ROS:  General: no fevers, chills  Respiratory: no  cough, dyspnea  Cardiovascular: no chest pain, palpitations  Abdomen: No abdominal pain, nausea, vomiting, or diarrhea  Neurologic: No focal weakness    Objective   Physical Exam:  I have reviewed the vital signs.  Temp:  [97.7 °F (36.5 °C)-99 °F (37.2 °C)] 98.2 °F (36.8 °C)  Heart Rate:  [] 89  Resp:  [18-20] 18  BP: (102-170)/(51-90) 150/71  General Appearance:    Alert, cooperative, elderly, chronically ill-appearing  Head:    Normocephalic, atraumatic  Eyes:    Sclerae anicteric  Neck:   Supple, no mass  Lungs: Clear to auscultation bilaterally, respirations unlabored while at rest, no adventitious breath sounds appreciated  Heart: Regular rate and rhythm, S1 and S2 normal, no murmur, rub or gallop  Abdomen:  Soft, nontender, bowel sounds active, nondistended  Extremities: No clubbing, cyanosis, or edema to lower extremities  Pulses:  2+ and symmetric in distal lower extremities  Skin: No rashes   Neurologic: Oriented x3, Normal strength to extremities    Results Review:    I have reviewed the labs, radiology results and diagnostic studies.    Results from last 7 days   Lab Units 01/24/25  1249   WBC 10*3/mm3 10.97*   HEMOGLOBIN g/dL 13.6   HEMATOCRIT % 40.9   PLATELETS 10*3/mm3 326     Results from last 7 days   Lab Units 01/24/25  1249 01/23/25  0539 01/22/25  0418   SODIUM mmol/L 140 140 136   POTASSIUM mmol/L 3.8 3.4* 3.9   CHLORIDE mmol/L 98 103 104   CO2 mmol/L 30.0* 26.9 20.0*   BUN mg/dL 10 7* 11   CREATININE mg/dL 0.89 0.81 0.86   CALCIUM mg/dL 9.3 8.7 8.5*   BILIRUBIN mg/dL 0.5  --   --    ALK PHOS U/L 127*  --   --    ALT (SGPT) U/L 27  --   --    AST (SGOT) U/L 36*  --   --    GLUCOSE mg/dL 153* 110* 105*     Imaging Results (Last 24 Hours)       Procedure Component Value Units Date/Time    CT Angiogram Chest [[id number]] Collected: 01/25/25 0018     Updated: 01/25/25 0026    Narrative:      CTA CHEST WITH IV CONTRAST     HISTORY: SOA with minimal exertion; Hypoxia; J44.1-Chronic  obstructive  pulmonary disease with (acute) exacerbation     COMPARISON: Chest CT [id number]     TECHNIQUE: CT angiography was performed of the chest with axial images  as well as coronal and sagittal reformatted MIP images provided  following intravenous administration of contrast. 3-D surface rendered  reformats were obtained of the pulmonary arteries and aorta.  Radiation  dose reduction techniques were utilized, including automated exposure  control, and exposure modulation based on body size.        FINDINGS:     There is no pulmonary infiltrate, pleural effusion, pneumothorax or  suspicious nodule.     The thoracic aorta is normal in caliber. There are coronary  atherosclerotic vascular calcifications.  There is no suspicious  mediastinal adenopathy or other mass.     Images of the upper abdomen show no acute abnormality.  There is no  acute bony abnormality.     Bolus timing is good and there is no evidence of pulmonary embolism.       Impression:         1.  Pulmonary arteries are well-opacified, and there is no evidence of  pulmonary embolism.     1.  No acute pulmonary parenchymal abnormality is seen.           This report was finalized on 1/25/2025 12:23 AM by Dr. Sen Martinez M.D on Workstation: [id number]       XR Chest 1 View [[id number]] Collected: 01/24/25 1326     Updated: 01/24/25 1332    Narrative:      XR CHEST 1 VW-     HISTORY: Female who is 84 years-old, short of breath     TECHNIQUE: Frontal view of the chest     COMPARISON: 1/22/2025     FINDINGS: The heart is enlarged. Aorta is tortuous, calcified. Pulmonary  vasculature is unremarkable. No focal pulmonary consolidation, pleural  effusion, or pneumothorax. No acute osseous process.       Impression:      No focal pulmonary consolidation. Cardiomegaly. Tortuous  aorta. Follow-up as clinically indicated.           This report was finalized on 1/24/2025 1:29 PM by Dr. Ned Guzman M.D on Workstation: [id number]               I have  reviewed the medications.     Discharge Medications        ASK your doctor about these medications        Instructions Start Date   acetaminophen 325 MG tablet  Commonly known as: TYLENOL   650 mg, Oral, Every 6 Hours PRN      amLODIPine 5 MG tablet  Commonly known as: NORVASC   5 mg, Oral, Daily      apixaban 2.5 MG tablet tablet  Commonly known as: ELIQUIS   2.5 mg, Oral, Every 12 Hours Scheduled      [place] Thyroid 15 MG tablet  Generic drug: thyroid   Take 1 tablet by mouth once daily      [place] Thyroid 30 MG tablet  Generic drug: Thyroid   Take 1 tablet by mouth once daily      artificial tears ophthalmic ointment   1 application , Both Eyes, Every 1 Hour PRN      atorvastatin 40 MG tablet  Commonly known as: LIPITOR   40 mg, Oral, Daily      Breztri Aerosphere 160-9-4.8 MCG/ACT aerosol inhaler  Generic drug: Budeson-Glycopyrrol-Formoterol   2 puffs, 2 Times Daily      carvedilol 25 MG tablet  Commonly known as: COREG   25 mg, Oral, 2 Times Daily With Meals      clopidogrel 75 MG tablet  Commonly known as: PLAVIX   75 mg, Oral, Daily      empagliflozin 10 MG tablet tablet  Commonly known as: Jardiance   10 mg, Oral, Daily      furosemide 20 MG tablet  Commonly known as: LASIX   20 mg, Oral, Daily      guaiFENesin 600 MG 12 hr tablet  Commonly known as: MUCINEX   600 mg, Oral, 2 Times Daily PRN      ipratropium-albuterol 0.5-2.5 mg/3 ml nebulizer  Commonly known as: DUO-NEB   3 mL, Every 4 Hours PRN      LORazepam 0.5 MG tablet  Commonly known as: ATIVAN   0.5 mg, Oral, Every 6 Hours PRN      losartan 50 MG tablet  Commonly known as: COZAAR   50 mg, Oral, 2 Times Daily      PARoxetine 10 MG tablet  Commonly known as: PAXIL   20 mg, Oral, Daily      REFRESH DRY EYE THERAPY OP   1 drop, Daily      TAGAMET PO   Take  by mouth.      Ventolin  (90 Base) MCG/ACT inhaler  Generic drug: albuterol sulfate HFA   2 puffs, Inhalation, Every 4 Hours PRN      vitamin B-12 500 MCG tablet  Commonly known as:  CYANOCOBALAMIN   500 mcg, Daily      vitamin C 250 MG tablet  Commonly known as: ASCORBIC ACID   500 mg, Daily      vitamin D3 125 MCG (5000 UT) capsule capsule   5,000 Units, Daily              ---------------------------------------------------------------------------------------------  Assessment & Plan   Assessment/Problem List    COPD exacerbation      Plan:    COPD exacerbation:  Pulmonology consult, recommends prednisone 40 mg daily, outpatient ENT follow-up, Symbicort twice daily, Yupelri once daily, albuterol nebs 4 times daily  Supplemental oxygen via nasal cannula to maintain oxygen saturation greater than 90%  Continuous pulse oximetry     Elevated Troponin  Recent NSTEMI  High-sensitivity troponin trending downward  Patient without any chest discomfort  EKG nonischemic     Hypertension:  Continue home meds     Hyperlipidemia:  Continue home meds    Disposition: Telemetry inpatient, [facility] Dr. Moe      This note will serve as a transfer and progress note    Laxmi Antunez, APRN 01/25/25 02:25 EST    I have worn appropriate PPE during this patient encounter, sanitized my hands both with entering and exiting patient's room.      55 minutes has been spent by [facility] providers in the care of this patient while under observation status

## 2025-01-25 NOTE — PLAN OF CARE
Goal Outcome Evaluation:  Plan of Care Reviewed With: patient        Progress: no change     Patient is now inpatient. She periodically requires O2 support, at this time she is utilizing 2L. VSS.      Patient is assist x1 using bedside potty. Alert and oriented x4.

## 2025-01-25 NOTE — PLAN OF CARE
Goal Outcome Evaluation:              Outcome Evaluation: pt. a/o , c/o mild ha, resolved with tylenol; O2 NC at 2 liters, some SOA when up to BSD commode; CT resulted with no PE

## 2025-01-25 NOTE — THERAPY EVALUATION
Patient Name: Dafne Mary  : 1940    MRN: [id number]                              Today's Date: 2025       Admit Date: 2025    Visit Dx:     ICD-10-CM ICD-9-CM   1. COPD with acute exacerbation  J44.1 491.21     Patient Active Problem List   Diagnosis    Anxiety    Arteriosclerosis of both carotid arteries    Chronic interstitial cystitis    Cough    Pulmonary emphysema    Gastroesophageal reflux disease    Fibromyalgia    Headache    Hematuria    Hoarseness    Hyperlipidemia    Benign hypertension    Postoperative hypothyroidism    Muscle spasms of both lower extremities    Palpitations    Shortness of breath    Postmenopausal osteoporosis    Chronic fatigue    Hair loss disorder    Dysuria    Dry cough    Spondylolysis, lumbar region    Vocal cord paralysis    Abnormal finding on thyroid function test    Positional lightheadedness    COPD with acute exacerbation    Hypothyroid    COPD (chronic obstructive pulmonary disease)    COPD exacerbation    Morbid obesity with BMI of 70 and over, adult    RSV bronchiolitis    Vitamin D deficiency    B12 deficiency    Iron deficiency    Decreased hemoglobin    Generalized anxiety disorder    Chronic bilateral low back pain with left-sided sciatica    Facet arthropathy, lumbar    Spinal stenosis of lumbar region    Spondylolisthesis of lumbar region    Scoliosis of lumbar spine    History of non-ST elevation myocardial infarction (NSTEMI)    Chronic respiratory failure    Acute respiratory failure    PAF (paroxysmal atrial fibrillation)    Acute respiratory failure with hypercapnia    Acute hypoxemic respiratory failure    Chronic HFrEF (heart failure with reduced ejection fraction)    Community acquired bacterial pneumonia    CAD (coronary artery disease)    Mitral valve regurgitation    Acute hypokalemia    Status post angioplasty with stent    Noncompliance    NSTEMI (non-ST elevated myocardial infarction)    Flash pulmonary edema    Elevated troponin     Stage 3a chronic kidney disease    Type 2 myocardial infarction    Acute on chronic heart failure with preserved ejection fraction (HFpEF)    Hypoxia    Generalized weakness     Past Medical History:   Diagnosis Date    Atrial fibrillation with RVR 6/4/2023    CAD (coronary artery disease) 7/10/2023    Chronic diastolic congestive heart failure 11/17/2022    Depression     Emphysema lung     GERD (gastroesophageal reflux disease)     Heart failure     Hyperlipidemia     Hypertension     Hypothyroidism     Mitral valve regurgitation 7/10/2023    NSTEMI (non-ST elevated myocardial infarction) 10/25/2023     Past Surgical History:   Procedure Laterality Date    CARDIAC CATHETERIZATION N/A 7/7/2023    Procedure: Right and Left Heart Cath;  Surgeon: Ra Cole MD;  Location: [facility] CATH INVASIVE LOCATION;  Service: Cardiology;  Laterality: N/A;    CARDIAC CATHETERIZATION N/A 7/7/2023    Procedure: Percutaneous Coronary Intervention;  Surgeon: Ra Cole MD;  Location:  [facility] CATH INVASIVE LOCATION;  Service: Cardiology;  Laterality: N/A;    CARDIAC CATHETERIZATION N/A 7/7/2023    Procedure: Stent ANDRZEJ coronary;  Surgeon: Ra Cole MD;  Location: [facility] CATH INVASIVE LOCATION;  Service: Cardiology;  Laterality: N/A;    CARDIAC CATHETERIZATION N/A 7/7/2023    Procedure: Coronary angiography;  Surgeon: Ra Cole MD;  Location: [facility] CATH INVASIVE LOCATION;  Service: Cardiology;  Laterality: N/A;    CARDIAC CATHETERIZATION N/A 7/7/2023    Procedure: Left ventriculography;  Surgeon: Ra Cole MD;  Location: [facility] CATH INVASIVE LOCATION;  Service: Cardiology;  Laterality: N/A;    CARDIAC CATHETERIZATION N/A 1/20/2025    Procedure: Left Heart Cath;  Surgeon: Nae Norman MD;  Location: [facility] CATH INVASIVE LOCATION;  Service: Cardiovascular;  Laterality: N/A;    CARDIAC CATHETERIZATION N/A 1/20/2025    Procedure: Coronary angiography;  Surgeon: Emerson  MD Nae;  Location: [facility] INVASIVE LOC[facility];  Service: Cardiovascular;  Laterality: N/A;    EYE SURGERY Left     INTUBATION  5/21/2023         PARATHYROIDECTOMY      Patient reports thyroidectomy partial not a para thyroidectomy.    THYROIDECTOMY, PARTIAL      1984      General Information       Row Name 01/25/25 1613          Physical Therapy Time and Intention    Document Type evaluation  -     Mode of Treatment individual therapy;physical therapy  -       Row Name 01/25/25 1613          General Information    Patient Profile Reviewed yes  -     Prior Level of Function independent:  lives alone, ambulates with no AD  -       Row Name 01/25/25 1613          Living Environment    People in Home alone  -       Row Name 01/25/25 1613          Cognition    Orientation Status (Cognition) oriented x 4  -       Row Name 01/25/25 1613          Safety Issues/Impairments Affecting Functional Mobility    Impairments Affecting Function (Mobility) endurance/activity tolerance;balance;strength  -               User Key  (r) = Recorded By, (t) = Taken By, (c) = Cosigned By      Initials Name Provider Type     Sandra Paniagua, PT Physical Therapist                   Mobility       Row Name 01/25/25 1614          Bed Mobility    Bed Mobility supine-sit;sit-supine  -     Supine-Sit [employer] (Bed Mobility) standby assist  -     Sit-Supine [employer] (Bed Mobility) standby assist  -     Assistive Device (Bed Mobility) head of bed elevated  -       Row Name 01/25/25 1614          Sit-Stand Transfer    Sit-Stand [employer] (Transfers) standby assist  -[facility] Name 01/25/25 1614          Gait/Stairs (Locomotion)    [employer] Level (Gait) minimum assist (75% patient effort)  -     Assistive Device (Gait) --  HHA  -     Distance in Feet (Gait) 30  -     Deviations/Abnormal Patterns (Gait) gait speed decreased;stride length decreased  -     Comment, (Gait/Stairs) Increased  unsteadiness as patient fatigues, unsteady on turns  -               User Key  (r) = Recorded By, (t) = Taken By, (c) = Cosigned By      Initials Name Provider Type    Sandra Cedeno, PT Physical Therapist                   Obj/Interventions       Row Name 01/25/25 1616          Range of Motion Comprehensive    Comment, General Range of Motion WFL  -       Row Name 01/25/25 1616          Strength Comprehensive (MMT)    Comment, General Manual Muscle Testing (MMT) Assessment Generalized weakness  -       Row Name 01/25/25 1616          Balance    Balance Assessment sitting static balance;sitting dynamic balance;standing static balance;standing dynamic balance  -     Static Sitting Balance independent  -     Dynamic Sitting Balance standby assist  -KH     Position, Sitting Balance sitting edge of bed  -     Static Standing Balance standby assist  -     Dynamic Standing Balance contact guard;minimal assist  -               User Key  (r) = Recorded By, (t) = Taken By, (c) = Cosigned By      Initials Name Provider Type    Sandra Cedeno, PT Physical Therapist                   Goals/Plan       [facility] Name 01/25/25 1629          Transfer Goal 1 (PT)    Activity/Assistive Device (Transfer Goal 1, PT) transfers, all  -KH     [place] Level/Cues Needed (Transfer Goal 1, PT) independent  -KH     Time Frame (Transfer Goal 1, PT) 1 week  -[facility] Name 01/25/25 1629          Gait Training Goal 1 (PT)    Activity/Assistive Device (Gait Training Goal 1, PT) gait (walking locomotion)  -KH     [place] Level (Gait Training Goal 1, PT) standby assist  -KH     Distance (Gait Training Goal 1, PT) 75 ft  -KH     Time Frame (Gait Training Goal 1, PT) 1 week  -[facility] Name 01/25/25 1629          Patient Education Goal (PT)    Activity (Patient Education Goal, PT) HEP  -KH     [place]/Cues/Accuracy (Memory Goal 2, PT) demonstrates adequately  -KH     Time Frame (Patient Education  Goal, PT) 1 week  -       Row Name 01/25/25 1629          Therapy Assessment/Plan (PT)    Planned Therapy Interventions (PT) balance training;bed mobility training;gait training;home exercise program;transfer training;strengthening;patient/family education  -               User Key  (r) = Recorded By, (t) = Taken By, (c) = Cosigned By      Initials Name Provider Type     Sandra Paniagua, PT Physical Therapist                   Clinical Impression       Row Name 01/25/25 1616          Pain    Pretreatment Pain Rating 0/10 - no pain  -     Posttreatment Pain Rating 0/10 - no pain  -       Row Name 01/25/25 1616          Plan of Care Review    Plan of Care Reviewed With patient  -     Outcome Evaluation Patient with history of COPD, CAD and CHF is admitted to observation unit for further management of persistent dyspnea.  At baseline, patient lives alone and ambulates independently with no assistive device.  Patient was in bed and agreeable to therapy when PT arrived.  She was on 1 L of O2.  Patient was able to transition to the edge of the bed stand unassisted.  Patient denied a need for assistive device but was reaching for hand-held assist after few feet.  Patient was able to ambulate approximately 30 feet with CGA and HHA.  Min assist needed at times when turning and more so as patient fatigued.  Patient presents with generalized weakness, decreased endurance, impaired balance and unsteady gait.  Patient would benefit from SNF placement to further address these impairments as well as improve independence and safety prior to returning home.  -       Row Name 01/25/25 1616          Therapy Assessment/Plan (PT)    Patient/Family Therapy Goals Statement (PT) Return to prior level of function, agreeable to rehab  -     Rehab Potential (PT) good  -     Criteria for Skilled Interventions Met (PT) yes  -     Therapy Frequency (PT) 5 times/wk  -       Row Name 01/25/25 1616          Vital Signs     Pre SpO2 (%) 92  Nasal cannula on her forehead when PT arrived  -KH     O2 Delivery Pre Treatment room air  -KH     Intra SpO2 (%) --  O2 sensor not reading well  -KH     O2 Delivery Intra Treatment supplemental O2  -KH     O2 Delivery Post Treatment supplemental O2  -KH       Row Name 01/25/25 1616          Positioning and Restraints    Pre-Treatment Position in bed  No bed alarm on when PT arrived  -KH     Post Treatment Position bed  -KH     In Bed fowlers;call light within reach;encouraged to call for assist  -               User Key  (r) = Recorded By, (t) = Taken By, (c) = Cosigned By      Initials Name Provider Type    Sandra Cedeno, PT Physical Therapist                   Outcome Measures       Row Name 01/25/25 1630          How much help from another person do you currently need...    Turning from your back to your side while in flat bed without using bedrails? 3  -KH     Moving from lying on back to sitting on the side of a flat bed without bedrails? 3  -KH     Moving to and from a bed to a chair (including a wheelchair)? 3  -KH     Standing up from a chair using your arms (e.g., wheelchair, bedside chair)? 3  -KH     Climbing 3-5 steps with a railing? 2  -KH     To walk in hospital room? 3  -KH     AM-PAC 6 Clicks Score (PT) 17  -KH     Highest Level of Mobility Goal 5 --> Static standing  -       Row Name 01/25/25 1630          Functional Assessment    Outcome Measure Options AM-PAC 6 Clicks Basic Mobility (PT)  -               User Key  (r) = Recorded By, (t) = Taken By, (c) = Cosigned By      Initials Name Provider Type    Sandra Cedeno, PT Physical Therapist                                 Physical Therapy Education       Title: PT OT SLP Therapies (In Progress)       Topic: Physical Therapy (Not Started)       Point: Mobility training (Not Started)       Learner Progress:  Not documented in this visit.              Point: Home exercise program (Not Started)        Learner Progress:  Not documented in this visit.              Point: Body mechanics (Not Started)       Learner Progress:  Not documented in this visit.              Point: Precautions (Not Started)       Learner Progress:  Not documented in this visit.                                  PT Recommendation and Plan  Planned Therapy Interventions (PT): balance training, bed mobility training, gait training, home exercise program, transfer training, strengthening, patient/family education  Outcome Evaluation: Patient with history of COPD, CAD and CHF is admitted to observation unit for further management of persistent dyspnea.  At baseline, patient lives alone and ambulates independently with no assistive device.  Patient was in bed and agreeable to therapy when PT arrived.  She was on 1 L of O2.  Patient was able to transition to the edge of the bed stand unassisted.  Patient denied a need for assistive device but was reaching for hand-held assist after few feet.  Patient was able to ambulate approximately 30 feet with CGA and HHA.  Min assist needed at times when turning and more so as patient fatigued.  Patient presents with generalized weakness, decreased endurance, impaired balance and unsteady gait.  Patient would benefit from SNF placement to further address these impairments as well as improve independence and safety prior to returning home.     Time Calculation:         PT Charges       Row Name 01/25/25 1631             Time Calculation    Start Time 1422  -      Stop Time 1445  -      Time Calculation (min) 23 min  -      PT Received On 01/25/25  -ALEX      PT - Next Appointment 01/27/25  -ALEX      PT Goal Re-Cert Due Date 02/01/25  -                User Key  (r) = Recorded By, (t) = Taken By, (c) = Cosigned By      Initials Name Provider Type    Sandra Cedeno, PT Physical Therapist                  Therapy Charges for Today       Code Description Service Date Service Provider Modifiers Qty     [id number]  PT EVAL MOD COMPLEXITY 3 1/25/2025 Sandra Paniagua, PT GP 1            PT G-Codes  Outcome Measure Options: AM-PAC 6 Clicks Basic Mobility (PT)  AM-PAC 6 Clicks Score (PT): 17  PT Discharge Summary  Anticipated Discharge Disposition (PT): skilled nursing facility    Sandra Paniagua, PT  1/25/2025

## 2025-01-26 LAB
ANION GAP SERPL CALCULATED.3IONS-SCNC: 11 MMOL/L (ref 5–15)
BACTERIA UR QL AUTO: ABNORMAL /HPF
BASOPHILS # BLD AUTO: 0.02 10*3/MM3 (ref 0–0.2)
BASOPHILS NFR BLD AUTO: 0.1 % (ref 0–1.5)
BILIRUB UR QL STRIP: NEGATIVE
BUN SERPL-MCNC: 25 MG/DL (ref 8–23)
BUN/CREAT SERPL: 23.8 (ref 7–25)
CALCIUM SPEC-SCNC: 8.6 MG/DL (ref 8.6–10.5)
CHLORIDE SERPL-SCNC: 93 MMOL/L (ref 98–107)
CLARITY UR: CLEAR
CO2 SERPL-SCNC: 27 MMOL/L (ref 22–29)
COLOR UR: YELLOW
CREAT SERPL-MCNC: 1.05 MG/DL (ref 0.57–1)
D-LACTATE SERPL-SCNC: 1.8 MMOL/L (ref 0.5–2)
DEPRECATED RDW RBC AUTO: 39.2 FL (ref 37–54)
EGFRCR SERPLBLD CKD-EPI 2021: 52.5 ML/MIN/1.73
EOSINOPHIL # BLD AUTO: 0 10*3/MM3 (ref 0–0.4)
EOSINOPHIL NFR BLD AUTO: 0 % (ref 0.3–6.2)
ERYTHROCYTE [DISTWIDTH] IN BLOOD BY AUTOMATED COUNT: 11.7 % (ref 12.3–15.4)
GLUCOSE SERPL-MCNC: 121 MG/DL (ref 65–99)
GLUCOSE UR STRIP-MCNC: NEGATIVE MG/DL
HCT VFR BLD AUTO: 34.1 % (ref 34–46.6)
HGB BLD-MCNC: 11.4 G/DL (ref 12–15.9)
HGB UR QL STRIP.AUTO: NEGATIVE
HYALINE CASTS UR QL AUTO: ABNORMAL /LPF
IMM GRANULOCYTES # BLD AUTO: 0.22 10*3/MM3 (ref 0–0.05)
IMM GRANULOCYTES NFR BLD AUTO: 1.3 % (ref 0–0.5)
KETONES UR QL STRIP: NEGATIVE
LEUKOCYTE ESTERASE UR QL STRIP.AUTO: ABNORMAL
LYMPHOCYTES # BLD AUTO: 0.66 10*3/MM3 (ref 0.7–3.1)
LYMPHOCYTES NFR BLD AUTO: 3.9 % (ref 19.6–45.3)
MCH RBC QN AUTO: 30.6 PG (ref 26.6–33)
MCHC RBC AUTO-ENTMCNC: 33.4 G/DL (ref 31.5–35.7)
MCV RBC AUTO: 91.4 FL (ref 79–97)
MONOCYTES # BLD AUTO: 1 10*3/MM3 (ref 0.1–0.9)
MONOCYTES NFR BLD AUTO: 6 % (ref 5–12)
NEUTROPHILS NFR BLD AUTO: 14.83 10*3/MM3 (ref 1.7–7)
NEUTROPHILS NFR BLD AUTO: 88.7 % (ref 42.7–76)
NITRITE UR QL STRIP: POSITIVE
NRBC BLD AUTO-RTO: 0 /100 WBC (ref 0–0.2)
PH UR STRIP.AUTO: 5.5 [PH] (ref 5–8)
PLATELET # BLD AUTO: 323 10*3/MM3 (ref 140–450)
PMV BLD AUTO: 9.1 FL (ref 6–12)
POTASSIUM SERPL-SCNC: 3.9 MMOL/L (ref 3.5–5.2)
PROT UR QL STRIP: NEGATIVE
RBC # BLD AUTO: 3.73 10*6/MM3 (ref 3.77–5.28)
RBC # UR STRIP: ABNORMAL /HPF
REF LAB TEST METHOD: ABNORMAL
SODIUM SERPL-SCNC: 131 MMOL/L (ref 136–145)
SP GR UR STRIP: 1.01 (ref 1–1.03)
SQUAMOUS #/AREA URNS HPF: ABNORMAL /HPF
UROBILINOGEN UR QL STRIP: ABNORMAL
WBC # UR STRIP: ABNORMAL /HPF
WBC NRBC COR # BLD AUTO: 16.73 10*3/MM3 (ref 3.4–10.8)

## 2025-01-26 PROCEDURE — 81001 URINALYSIS AUTO W/SCOPE: CPT | Performed by: INTERNAL MEDICINE

## 2025-01-26 PROCEDURE — 85025 COMPLETE CBC W/AUTO DIFF WBC: CPT | Performed by: NURSE PRACTITIONER

## 2025-01-26 PROCEDURE — 63710000001 PREDNISONE PER 1 MG: Performed by: NURSE PRACTITIONER

## 2025-01-26 PROCEDURE — 94799 UNLISTED PULMONARY SVC/PX: CPT

## 2025-01-26 PROCEDURE — 94664 DEMO&/EVAL PT USE INHALER: CPT

## 2025-01-26 PROCEDURE — 80048 BASIC METABOLIC PNL TOTAL CA: CPT | Performed by: NURSE PRACTITIONER

## 2025-01-26 PROCEDURE — 83605 ASSAY OF LACTIC ACID: CPT | Performed by: INTERNAL MEDICINE

## 2025-01-26 RX ORDER — PSEUDOEPHEDRINE HCL 30 MG/1
30 TABLET, FILM COATED ORAL EVERY 4 HOURS PRN
Status: DISCONTINUED | OUTPATIENT
Start: 2025-01-26 | End: 2025-02-01 | Stop reason: HOSPADM

## 2025-01-26 RX ORDER — OXYMETAZOLINE HYDROCHLORIDE 0.05 G/100ML
2 SPRAY NASAL 2 TIMES DAILY
Status: DISPENSED | OUTPATIENT
Start: 2025-01-26 | End: 2025-01-29

## 2025-01-26 RX ORDER — ECHINACEA PURPUREA EXTRACT 125 MG
2 TABLET ORAL AS NEEDED
Status: DISCONTINUED | OUTPATIENT
Start: 2025-01-26 | End: 2025-02-01 | Stop reason: HOSPADM

## 2025-01-26 RX ADMIN — PREDNISONE 40 MG: 20 TABLET ORAL at 08:00

## 2025-01-26 RX ADMIN — AMLODIPINE BESYLATE 5 MG: 5 TABLET ORAL at 08:00

## 2025-01-26 RX ADMIN — IPRATROPIUM BROMIDE AND ALBUTEROL SULFATE 3 ML: .5; 3 SOLUTION RESPIRATORY (INHALATION) at 13:48

## 2025-01-26 RX ADMIN — AMOXICILLIN AND CLAVULANATE POTASSIUM 1 TABLET: 875; 125 TABLET, FILM COATED ORAL at 14:15

## 2025-01-26 RX ADMIN — APIXABAN 2.5 MG: 2.5 TABLET, FILM COATED ORAL at 20:55

## 2025-01-26 RX ADMIN — IPRATROPIUM BROMIDE AND ALBUTEROL SULFATE 3 ML: .5; 3 SOLUTION RESPIRATORY (INHALATION) at 19:27

## 2025-01-26 RX ADMIN — LOSARTAN POTASSIUM 50 MG: 50 TABLET, FILM COATED ORAL at 07:55

## 2025-01-26 RX ADMIN — CARVEDILOL 25 MG: 25 TABLET, FILM COATED ORAL at 08:00

## 2025-01-26 RX ADMIN — AMOXICILLIN AND CLAVULANATE POTASSIUM 1 TABLET: 875; 125 TABLET, FILM COATED ORAL at 22:37

## 2025-01-26 RX ADMIN — IPRATROPIUM BROMIDE AND ALBUTEROL SULFATE 3 ML: .5; 3 SOLUTION RESPIRATORY (INHALATION) at 23:24

## 2025-01-26 RX ADMIN — CARVEDILOL 25 MG: 25 TABLET, FILM COATED ORAL at 18:31

## 2025-01-26 RX ADMIN — LEVOTHYROXINE, LIOTHYRONINE 15 MG: 19; 4.5 TABLET ORAL at 06:34

## 2025-01-26 RX ADMIN — LOSARTAN POTASSIUM 50 MG: 50 TABLET, FILM COATED ORAL at 20:55

## 2025-01-26 RX ADMIN — APIXABAN 2.5 MG: 2.5 TABLET, FILM COATED ORAL at 08:00

## 2025-01-26 RX ADMIN — ACETAMINOPHEN 650 MG: 325 TABLET, FILM COATED ORAL at 12:39

## 2025-01-26 RX ADMIN — OXYMETAZOLINE HYDROCHLORIDE 2 SPRAY: 0.05 SPRAY NASAL at 20:57

## 2025-01-26 RX ADMIN — BUDESONIDE AND FORMOTEROL FUMARATE DIHYDRATE 2 PUFF: 160; 4.5 AEROSOL RESPIRATORY (INHALATION) at 08:49

## 2025-01-26 RX ADMIN — ATORVASTATIN CALCIUM 40 MG: 20 TABLET, FILM COATED ORAL at 08:00

## 2025-01-26 RX ADMIN — Medication 10 ML: at 21:00

## 2025-01-26 RX ADMIN — LORAZEPAM 0.5 MG: 0.5 TABLET ORAL at 12:39

## 2025-01-26 RX ADMIN — LEVOTHYROXINE, LIOTHYRONINE 30 MG: 19; 4.5 TABLET ORAL at 06:35

## 2025-01-26 RX ADMIN — GUAIFENESIN 600 MG: 600 TABLET, MULTILAYER, EXTENDED RELEASE ORAL at 12:39

## 2025-01-26 RX ADMIN — CLOPIDOGREL BISULFATE 75 MG: 75 TABLET ORAL at 08:00

## 2025-01-26 RX ADMIN — FUROSEMIDE 20 MG: 20 TABLET ORAL at 07:59

## 2025-01-26 RX ADMIN — IPRATROPIUM BROMIDE AND ALBUTEROL SULFATE 3 ML: .5; 3 SOLUTION RESPIRATORY (INHALATION) at 00:30

## 2025-01-26 RX ADMIN — BUDESONIDE AND FORMOTEROL FUMARATE DIHYDRATE 2 PUFF: 160; 4.5 AEROSOL RESPIRATORY (INHALATION) at 19:27

## 2025-01-26 RX ADMIN — IPRATROPIUM BROMIDE AND ALBUTEROL SULFATE 3 ML: .5; 3 SOLUTION RESPIRATORY (INHALATION) at 08:48

## 2025-01-26 NOTE — PLAN OF CARE
Goal Outcome Evaluation:      Pt is an 83 yo female continuing observation for COPD exacerbation. A&O x4. 2L O2 via NC used. She complains of shortness of breath w/ exertion. NSR. Up to bedside commode w/ x1 assist. She had an episode of fecal incontinence during the night. Brief in place. Left hand IV removed due to pt reports of pain w/ flush. Right forearm access intact. PT consulted.

## 2025-01-26 NOTE — PROGRESS NOTES
Name: Dafne Mary ADMIT: 2025   : 1940  PCP: Jossy Sauceda MD    MRN: [id number] LOS: 1 days   AGE/SEX: 84 y.o. female  ROOM: 106/1     Subjective   Subjective   Patient is lying on the bed and is complaining of mild headache.  Denies nausea, vomiting, chest pain.  Shortness of breath is slightly better today and desaturates quickly with minimal activity.       Objective   Objective   Vital Signs  Temp:  [97.2 °F (36.2 °C)-97.8 °F (36.6 °C)] 97.8 °F (36.6 °C)  Heart Rate:  [85-94] 85  Resp:  [16-20] 18  BP: (118-150)/(63-93) 131/63  SpO2:  [94 %-100 %] 99 %  on  Flow (L/min) (Oxygen Therapy):  [1-2] 2;   Device (Oxygen Therapy): nasal cannula  Body mass index is 21.77 kg/m².  Physical Exam  HEENT: PERRLA, extraocular movements intact, Scleras no icterus  Neck: Supple, no JVD  Cardiovascular: Regular rate and rhythm with normal S1 and S2  Respiratory: Diminished breath sounds with bilateral wheezes  GI: Soft, nontender, bowel sounds present  Extremities: No edema, palpable pulses  Neurologic: Grossly nonfocal, no facial asymmetry    Results Review     I reviewed the patient's new clinical results.  Results from last 7 days   Lab Units 25  0825  0832 25  0539   WBC 10*3/mm3 16.73* 10.92* 10.97* 6.47   HEMOGLOBIN g/dL 11.4* 13.0 13.6 11.7*   PLATELETS 10*3/mm3 323 369 326 264     Results from last 7 days   Lab Units 25  0825  0832 25  1249 25  0539   SODIUM mmol/L 131* 135* 140 140   POTASSIUM mmol/L 3.9 3.7 3.8 3.4*   CHLORIDE mmol/L 93* 97* 98 103   CO2 mmol/L 27.0 23.1 30.0* 26.9   BUN mg/dL 25* 14 10 7*   CREATININE mg/dL 1.05* 0.92 0.89 0.81   GLUCOSE mg/dL 121* 141* 153* 110*   EGFR mL/min/1.73 52.5* 61.5 64.0 71.7     Results from last 7 days   Lab Units 25  1249   ALBUMIN g/dL 4.1   BILIRUBIN mg/dL 0.5   ALK PHOS U/L 127*   AST (SGOT) U/L 36*   ALT (SGPT) U/L 27     Results from last 7 days   Lab Units 25  0814  "01/25/25  0832 01/24/25  1249 01/23/25  0539   CALCIUM mg/dL 8.6 9.1 9.3 8.7   ALBUMIN g/dL  --   --  4.1  --      Results from last 7 days   Lab Units 01/26/25  1035   LACTATE mmol/L 1.8     No results found for: \"HGBA1C\", \"POCGLU\"    CT Angiogram Chest    Result Date: 1/25/2025   1.  Pulmonary arteries are well-opacified, and there is no evidence of pulmonary embolism.  1.  No acute pulmonary parenchymal abnormality is seen.    This report was finalized on 1/25/2025 12:23 AM by Dr. Sen Martinez M.D on Workstation: [id number]       I have personally reviewed all medications:  Scheduled Medications  amLODIPine, 5 mg, Oral, Daily  amoxicillin-clavulanate, 1 tablet, Oral, Q12H  apixaban, 2.5 mg, Oral, Q12H  atorvastatin, 40 mg, Oral, Daily  budesonide-formoterol, 2 puff, Inhalation, BID - RT   And  revefenacin, 175 mcg, Nebulization, Daily - RT  carvedilol, 25 mg, Oral, BID With Meals  clopidogrel, 75 mg, Oral, Daily  furosemide, 20 mg, Oral, Daily  ipratropium-albuterol, 3 mL, Nebulization, Q6H - RT  losartan, 50 mg, Oral, BID  oxymetazoline, 2 spray, Each Nare, BID  PARoxetine, 20 mg, Oral, Daily  predniSONE, 40 mg, Oral, Daily  thyroid, 15 mg, Oral, Q AM  Thyroid, 30 mg, Oral, Q AM    Infusions   Diet  Diet: Regular/House; Texture: Mechanical Ground (NDD 2); Fluid Consistency: Thin (IDDSI 0)    I have personally reviewed:  [x]  Laboratory   [x]  Microbiology   [x]  Radiology   [x]  EKG/Telemetry  [x]  Cardiology/Vascular   []  Pathology    []  Records       Assessment/Plan     Active Hospital Problems    Diagnosis  POA   • **COPD exacerbation [J44.1]  Yes   • Hypoxia [R09.02]  Yes   • Generalized weakness [R53.1]  Unknown   • Stage 3a chronic kidney disease [N18.31]  Yes   • Chronic HFrEF (heart failure with reduced ejection fraction) [I50.22]  Yes   • PAF (paroxysmal atrial fibrillation) [I48.0]  Yes   • Vocal cord paralysis [J38.00]  Yes   • Anxiety [F41.9]  Yes   • Benign hypertension [I10]  Yes    "   Resolved Hospital Problems   No resolved problems to display.       84 y.o. female admitted with COPD exacerbation.    1 Acute hypoxic respiratory failure secondary to combination of COPD exacerbation and vocal cord dysfunction, continue with Symbicort, Yupelri nebs, prednisone and Augmentin.  Pulmonary is following along as well.  Needs ENT evaluation as an outpatient basis.  Needs 6 minutes walk prior to discharge.  Rise in WBC was felt to be secondary to steroids.  2.  History of atrial fibrillation, on Eliquis and in normal sinus rhythm.  3.  Hypertension, continue with amlodipine, losartan and blood pressure in the acceptable range.  4.  CKD stage IIIa, creatinine is close to baseline.  5.  Chronic systolic dysfunction, currently patient is compensated and is on Coreg and Cozaar.  Continue with Lasix and not in any volume overload.  6.  Hypothyroidism, on Synthroid.  7.  Hyperlipidemia, on statins.  8.  Disposition, hopefully home in AM.    Expected discharge date/ time has not been documented.      Fritz Lazaro MD  [facility]  01/26/25  15:02 EST

## 2025-01-26 NOTE — OUTREACH NOTE
AMI Week 1 Survey      Flowsheet Row Responses   [facility] facility patient discharged from? [place]   Does the patient have one of the following disease processes/diagnoses(primary or secondary)? Acute MI (STEMI,NSTEMI)   Week 1 attempt successful? No   Unsuccessful attempts Attempt 1   Revoke Readmitted            BHUPINDER FORD - Registered Nurse

## 2025-01-27 LAB
ANION GAP SERPL CALCULATED.3IONS-SCNC: 8.2 MMOL/L (ref 5–15)
BASOPHILS # BLD AUTO: 0.02 10*3/MM3 (ref 0–0.2)
BASOPHILS NFR BLD AUTO: 0.1 % (ref 0–1.5)
BUN SERPL-MCNC: 26 MG/DL (ref 8–23)
BUN/CREAT SERPL: 26 (ref 7–25)
CALCIUM SPEC-SCNC: 8.8 MG/DL (ref 8.6–10.5)
CHLORIDE SERPL-SCNC: 95 MMOL/L (ref 98–107)
CO2 SERPL-SCNC: 27.8 MMOL/L (ref 22–29)
CREAT SERPL-MCNC: 1 MG/DL (ref 0.57–1)
DEPRECATED RDW RBC AUTO: 40.2 FL (ref 37–54)
EGFRCR SERPLBLD CKD-EPI 2021: 55.7 ML/MIN/1.73
EOSINOPHIL # BLD AUTO: 0 10*3/MM3 (ref 0–0.4)
EOSINOPHIL NFR BLD AUTO: 0 % (ref 0.3–6.2)
ERYTHROCYTE [DISTWIDTH] IN BLOOD BY AUTOMATED COUNT: 12.1 % (ref 12.3–15.4)
GLUCOSE SERPL-MCNC: 106 MG/DL (ref 65–99)
HCT VFR BLD AUTO: 36.4 % (ref 34–46.6)
HGB BLD-MCNC: 12 G/DL (ref 12–15.9)
IMM GRANULOCYTES # BLD AUTO: 0.18 10*3/MM3 (ref 0–0.05)
IMM GRANULOCYTES NFR BLD AUTO: 1.3 % (ref 0–0.5)
LYMPHOCYTES # BLD AUTO: 0.74 10*3/MM3 (ref 0.7–3.1)
LYMPHOCYTES NFR BLD AUTO: 5.3 % (ref 19.6–45.3)
MCH RBC QN AUTO: 30.2 PG (ref 26.6–33)
MCHC RBC AUTO-ENTMCNC: 33 G/DL (ref 31.5–35.7)
MCV RBC AUTO: 91.5 FL (ref 79–97)
MONOCYTES # BLD AUTO: 1.04 10*3/MM3 (ref 0.1–0.9)
MONOCYTES NFR BLD AUTO: 7.4 % (ref 5–12)
NEUTROPHILS NFR BLD AUTO: 12.08 10*3/MM3 (ref 1.7–7)
NEUTROPHILS NFR BLD AUTO: 85.9 % (ref 42.7–76)
NRBC BLD AUTO-RTO: 0 /100 WBC (ref 0–0.2)
PLATELET # BLD AUTO: 356 10*3/MM3 (ref 140–450)
PMV BLD AUTO: 9 FL (ref 6–12)
POTASSIUM SERPL-SCNC: 4 MMOL/L (ref 3.5–5.2)
QT INTERVAL: 439 MS
QTC INTERVAL: 522 MS
RBC # BLD AUTO: 3.98 10*6/MM3 (ref 3.77–5.28)
SODIUM SERPL-SCNC: 131 MMOL/L (ref 136–145)
WBC NRBC COR # BLD AUTO: 14.06 10*3/MM3 (ref 3.4–10.8)

## 2025-01-27 PROCEDURE — 63710000001 REVEFENACIN 175 MCG/3ML SOLUTION: Performed by: NURSE PRACTITIONER

## 2025-01-27 PROCEDURE — 63710000001 PREDNISONE PER 1 MG: Performed by: INTERNAL MEDICINE

## 2025-01-27 PROCEDURE — 80048 BASIC METABOLIC PNL TOTAL CA: CPT | Performed by: INTERNAL MEDICINE

## 2025-01-27 PROCEDURE — 93005 ELECTROCARDIOGRAM TRACING: CPT | Performed by: STUDENT IN AN ORGANIZED HEALTH CARE EDUCATION/TRAINING PROGRAM

## 2025-01-27 PROCEDURE — 85025 COMPLETE CBC W/AUTO DIFF WBC: CPT | Performed by: INTERNAL MEDICINE

## 2025-01-27 PROCEDURE — 94664 DEMO&/EVAL PT USE INHALER: CPT

## 2025-01-27 PROCEDURE — 94799 UNLISTED PULMONARY SVC/PX: CPT

## 2025-01-27 PROCEDURE — 92610 EVALUATE SWALLOWING FUNCTION: CPT

## 2025-01-27 PROCEDURE — 94761 N-INVAS EAR/PLS OXIMETRY MLT: CPT

## 2025-01-27 PROCEDURE — 93010 ELECTROCARDIOGRAM REPORT: CPT | Performed by: INTERNAL MEDICINE

## 2025-01-27 RX ADMIN — ACETAMINOPHEN 650 MG: 325 TABLET, FILM COATED ORAL at 00:03

## 2025-01-27 RX ADMIN — APIXABAN 2.5 MG: 2.5 TABLET, FILM COATED ORAL at 08:30

## 2025-01-27 RX ADMIN — APIXABAN 2.5 MG: 2.5 TABLET, FILM COATED ORAL at 21:24

## 2025-01-27 RX ADMIN — BUDESONIDE AND FORMOTEROL FUMARATE DIHYDRATE 2 PUFF: 160; 4.5 AEROSOL RESPIRATORY (INHALATION) at 07:56

## 2025-01-27 RX ADMIN — CARVEDILOL 25 MG: 25 TABLET, FILM COATED ORAL at 18:16

## 2025-01-27 RX ADMIN — IPRATROPIUM BROMIDE AND ALBUTEROL SULFATE 3 ML: .5; 3 SOLUTION RESPIRATORY (INHALATION) at 07:50

## 2025-01-27 RX ADMIN — LOSARTAN POTASSIUM 50 MG: 50 TABLET, FILM COATED ORAL at 21:24

## 2025-01-27 RX ADMIN — CLOPIDOGREL BISULFATE 75 MG: 75 TABLET ORAL at 08:31

## 2025-01-27 RX ADMIN — PREDNISONE 40 MG: 20 TABLET ORAL at 08:31

## 2025-01-27 RX ADMIN — REVEFENACIN 175 MCG: 175 SOLUTION RESPIRATORY (INHALATION) at 07:53

## 2025-01-27 RX ADMIN — GUAIFENESIN 600 MG: 600 TABLET, MULTILAYER, EXTENDED RELEASE ORAL at 21:24

## 2025-01-27 RX ADMIN — IPRATROPIUM BROMIDE AND ALBUTEROL SULFATE 3 ML: .5; 3 SOLUTION RESPIRATORY (INHALATION) at 21:34

## 2025-01-27 RX ADMIN — FUROSEMIDE 20 MG: 20 TABLET ORAL at 08:31

## 2025-01-27 RX ADMIN — ACETAMINOPHEN 650 MG: 325 TABLET, FILM COATED ORAL at 08:30

## 2025-01-27 RX ADMIN — PAROXETINE HYDROCHLORIDE 20 MG: 20 TABLET, FILM COATED ORAL at 08:30

## 2025-01-27 RX ADMIN — LEVOTHYROXINE, LIOTHYRONINE 15 MG: 19; 4.5 TABLET ORAL at 06:28

## 2025-01-27 RX ADMIN — LEVOTHYROXINE, LIOTHYRONINE 30 MG: 19; 4.5 TABLET ORAL at 06:29

## 2025-01-27 RX ADMIN — CARVEDILOL 25 MG: 25 TABLET, FILM COATED ORAL at 08:30

## 2025-01-27 RX ADMIN — OXYMETAZOLINE HYDROCHLORIDE 2 SPRAY: 0.05 SPRAY NASAL at 09:00

## 2025-01-27 RX ADMIN — LOSARTAN POTASSIUM 50 MG: 50 TABLET, FILM COATED ORAL at 08:31

## 2025-01-27 RX ADMIN — BUDESONIDE AND FORMOTEROL FUMARATE DIHYDRATE 2 PUFF: 160; 4.5 AEROSOL RESPIRATORY (INHALATION) at 21:37

## 2025-01-27 RX ADMIN — ATORVASTATIN CALCIUM 40 MG: 20 TABLET, FILM COATED ORAL at 08:30

## 2025-01-27 RX ADMIN — AMOXICILLIN AND CLAVULANATE POTASSIUM 1 TABLET: 875; 125 TABLET, FILM COATED ORAL at 08:35

## 2025-01-27 RX ADMIN — LORAZEPAM 0.5 MG: 0.5 TABLET ORAL at 11:04

## 2025-01-27 RX ADMIN — AMLODIPINE BESYLATE 5 MG: 5 TABLET ORAL at 08:31

## 2025-01-27 RX ADMIN — AMOXICILLIN AND CLAVULANATE POTASSIUM 1 TABLET: 875; 125 TABLET, FILM COATED ORAL at 21:24

## 2025-01-27 RX ADMIN — IPRATROPIUM BROMIDE AND ALBUTEROL SULFATE 3 ML: .5; 3 SOLUTION RESPIRATORY (INHALATION) at 14:40

## 2025-01-27 NOTE — THERAPY EVALUATION
Acute Care - Speech Language Pathology   Swallow Initial Evaluation [facility]     Patient Name: Dafne Mary  : 1940  MRN: [id number]  Today's Date: 2025               Admit Date: 2025    Visit Dx:     ICD-10-CM ICD-9-CM   1. COPD with acute exacerbation  J44.1 491.21     Patient Active Problem List   Diagnosis    Anxiety    Arteriosclerosis of both carotid arteries    Chronic interstitial cystitis    Cough    Pulmonary emphysema    Gastroesophageal reflux disease    Fibromyalgia    Headache    Hematuria    Hoarseness    Hyperlipidemia    Benign hypertension    Postoperative hypothyroidism    Muscle spasms of both lower extremities    Palpitations    Shortness of breath    Postmenopausal osteoporosis    Chronic fatigue    Hair loss disorder    Dysuria    Dry cough    Spondylolysis, lumbar region    Vocal cord paralysis    Abnormal finding on thyroid function test    Positional lightheadedness    COPD with acute exacerbation    Hypothyroid    COPD (chronic obstructive pulmonary disease)    COPD exacerbation    Morbid obesity with BMI of 70 and over, adult    RSV bronchiolitis    Vitamin D deficiency    B12 deficiency    Iron deficiency    Decreased hemoglobin    Generalized anxiety disorder    Chronic bilateral low back pain with left-sided sciatica    Facet arthropathy, lumbar    Spinal stenosis of lumbar region    Spondylolisthesis of lumbar region    Scoliosis of lumbar spine    History of non-ST elevation myocardial infarction (NSTEMI)    Chronic respiratory failure    Acute respiratory failure    PAF (paroxysmal atrial fibrillation)    Acute respiratory failure with hypercapnia    Acute hypoxemic respiratory failure    Chronic HFrEF (heart failure with reduced ejection fraction)    Community acquired bacterial pneumonia    CAD (coronary artery disease)    Mitral valve regurgitation    Acute hypokalemia    Status post angioplasty with stent    Noncompliance    NSTEMI (non-ST elevated  myocardial infarction)    Flash pulmonary edema    Elevated troponin    Stage 3a chronic kidney disease    Type 2 myocardial infarction    Acute on chronic heart failure with preserved ejection fraction (HFpEF)    Hypoxia    Generalized weakness     Past Medical History:   Diagnosis Date    Atrial fibrillation with RVR 6/4/2023    CAD (coronary artery disease) 7/10/2023    Chronic diastolic congestive heart failure 11/17/2022    Depression     Emphysema lung     GERD (gastroesophageal reflux disease)     Heart failure     Hyperlipidemia     Hypertension     Hypothyroidism     Mitral valve regurgitation 7/10/2023    NSTEMI (non-ST elevated myocardial infarction) 10/25/2023     Past Surgical History:   Procedure Laterality Date    CARDIAC CATHETERIZATION N/A 7/7/2023    Procedure: Right and Left Heart Cath;  Surgeon: Ra Cole MD;  Location[facility];  Service: Cardiology;  Laterality: N/A;    CARDIAC CATHETERIZATION N/A 7/7/2023    Procedure: Percutaneous Coronary Intervention;  Surgeon: Ra Cole MD;  [facility];  Service: Cardiology;  Laterality: N/A;    CARDIAC CATHETERIZATION N/A 7/7/2023    Procedure: Stent ANDRZEJ coronary;  Surgeon: Ra Cole MD;  [facility];  Service: Cardiology;  Laterality: N/A;    CARDIAC CATHETERIZATION N/A 7/7/2023    Procedure: Coronary angiography;  Surgeon: Ra Cole MD;  [facility];  Service: Cardiology;  Laterality: N/A;    CARDIAC CATHETERIZATION N/A 7/7/2023    Procedure: Left ventriculography;  Surgeon: Ra Cole MD;  [facility];  Service: Cardiology;  Laterality: N/A;    CARDIAC CATHETERIZATION N/A 1/20/2025    Procedure: Left Heart Cath;  Surgeon: Nae Norman MD;  [facility];  Service: Cardiovascular;  Laterality: N/A;    CARDIAC CATHETERIZATION N/A  1/20/2025    Procedure: Coronary angiography;  Surgeon: Nae Norman MD;  Location: [facility] INVASIVE LOCATION;  Service: Cardiovascular;  Laterality: N/A;    EYE SURGERY Left     INTUBATION  5/21/2023         PARATHYROIDECTOMY      Patient reports thyroidectomy partial not a para thyroidectomy.    THYROIDECTOMY, PARTIAL      1984       SLP Recommendation and Plan  SLP Swallowing Diagnosis: R/O pharyngeal dysphagia, oral dysphagia (01/27/25 1100)  SLP Diet Recommendation: soft to chew textures, chopped, thin liquids (01/27/25 1100)  Recommended Precautions and Strategies: upright posture during/after eating, small bites of food and sips of liquid, general aspiration precautions (01/27/25 1100)  SLP Rec. for Method of Medication Administration: meds whole, as tolerated (01/27/25 1100)     Monitor for Signs of Aspiration: yes, notify SLP if any concerns (01/27/25 1100)  Recommended Diagnostics: reassess via clinical swallow evaluation (01/27/25 1100)           Therapy Frequency (Swallow): PRN (01/27/25 1100)  Predicted Duration Therapy Intervention (Days): until discharge (01/27/25 1100)  Oral Care Recommendations: Oral Care BID/PRN (01/27/25 1100)  Demonstrates Need for Referral to Another Service: otolaryngology, speech therapy, other (see comments) (as outpatient) (01/27/25 1100)                                     Outcome Evaluation: Clinical swallow evaluation complete. See full report for further details. Recommend soft/chopped solid diet with thin liquids. Meds as tolerated. Sitting upright, slow rate, small bites/sips. Encouraged pt to follow with ENT and/or SLP as outpatient for further assessment and treatment of voice.      SWALLOW EVALUATION (Last 72 Hours)       SLP Adult Swallow Evaluation       Row Name 01/27/25 1100                   Rehab Evaluation    Document Type evaluation  -CR        Subjective Information no complaints  -CR        Patient Observations alert;cooperative  -CR        Patient  "Effort good  -CR        Symptoms Noted During/After Treatment none  -CR           General Information    Patient Profile Reviewed yes  -CR        Pertinent History Of Current Problem 83 y/o female admitted with COPD exacerbation with hypoxia. Hx includes vocal cord paralysis following throidectomy ~12 years ago, per pt. New orders received per pt request d/t swallow difficulties at times.  -CR        Current Method of Nutrition mechanical ground textures;thin liquids  -CR        Precautions/Limitations, Vision WFL;for purposes of eval  -CR        Precautions/Limitations, Hearing WFL;for purposes of eval  -CR        Prior Level of Function-Swallowing no diet consistency restrictions  -CR        Plans/Goals Discussed with patient;agreed upon  -CR        Barriers to Rehab none identified  -CR           Pain    Pretreatment Pain Rating 4/10  -CR        Posttreatment Pain Rating 4/10  -CR        Pain Location head  -CR           Oral Motor Structure and Function    Dentition Assessment natural, present and adequate  -CR        Secretion Management WNL/WFL  -CR        Mucosal Quality moist, healthy  -CR           Oral Musculature and Cranial Nerve Assessment    Oral Motor General Assessment vocal impairment  -CR        Vocal Impairment, Detail. Cranial Nerve X (Vagus) other (see comments)  hoarse, harsh. Hx vocal cord paralysis  -CR           Clinical Swallow Eval    Clinical Swallow Evaluation Summary Clinical swallow evaluation complete. Pt voiced primary complaints of difficulties breathing when walking, points to left lower chest when describing pain; encouraged pt to discuss with attending/pulmonology. SLP discussed h/o vocal fold paralysis and current difficulties swallowing which pt states \"I don't have much trouble swallowing. Occasionally I will cough during meals.\" Agreeable to clinical swallow evaluation.  Hoarse and harsh vocal quality. No overt s/s of aspiration with ice chips, thins via spoon/cup/straw, " puree, soft/chopped solids, mixed consistency, however, voice difficult to  at times. Subtle throat clear with liquid wash following regular solid trial. Prolonged mastication with mild-moderate oral residue following initial swallows. Recommend soft/chopped solid diet with thin liquids. Meds as tolerated. Sitting upright, slow rate, small bites/sips. Encouraged pt to follow with ENT and/or SLP as outpatient for further assessment and treatment of voice.  -CR           SLP Evaluation Clinical Impression    SLP Swallowing Diagnosis R/O pharyngeal dysphagia;oral dysphagia  -CR           Recommendations    Therapy Frequency (Swallow) PRN  -CR        Predicted Duration Therapy Intervention (Days) until discharge  -CR        SLP Diet Recommendation soft to chew textures;chopped;thin liquids  -CR        Recommended Diagnostics reassess via clinical swallow evaluation  -CR        Recommended Precautions and Strategies upright posture during/after eating;small bites of food and sips of liquid;general aspiration precautions  -CR        Oral Care Recommendations Oral Care BID/PRN  -CR        SLP Rec. for Method of Medication Administration meds whole;as tolerated  -CR        Monitor for Signs of Aspiration yes;notify SLP if any concerns  -CR        Demonstrates Need for Referral to Another Service otolaryngology;speech therapy;other (see comments)  as outpatient  -CR                  User Key  (r) = Recorded By, (t) = Taken By, (c) = Cosigned By      Initials Name Effective Dates    CR Rachelle Perla SLP 12/03/24 -                     EDUCATION  The patient has been educated in the following areas:   Dysphagia (Swallowing Impairment).                Time Calculation:    Time Calculation- SLP       Row Name 01/27/25 1116             Time Calculation- SLP    SLP Start Time 0800  -CR      SLP Received On 01/27/25  -CR         Untimed Charges    [id number] Eval Oral Pharyng Swallow Minutes 60  -CR         Total Minutes     Untimed Charges Total Minutes 60  -CR       Total Minutes 60  -CR                User Key  (r) = Recorded By, (t) = Taken By, (c) = Cosigned By      Initials Name Provider Type    Rachelle Peña SLP Speech and Language Pathologist                    Therapy Charges for Today       Code Description Service Date Service Provider Modifiers Qty    [id number]  ST EVAL ORAL PHARYNG SWALLOW 4 1/27/2025 Rachelle Perla SLP GN 1                 FIONA Lopez  1/27/2025

## 2025-01-27 NOTE — PLAN OF CARE
Goal Outcome Evaluation:  Plan of Care Reviewed With: patient           Outcome Evaluation: pt. a/o , c/o mild ha, resolved with tylenol; O2 NC at 2 liters, some SOA when up to BSD commode; CT resulted with no PE                             Problem: Adult Inpatient Plan of Care  Goal: Plan of Care Review  Outcome: Progressing  Goal: Patient-Specific Goal (Individualized)  Outcome: Progressing  Goal: Absence of Hospital-Acquired Illness or Injury  Outcome: Progressing  Intervention: Identify and Manage Fall Risk  Intervention: Prevent Skin Injury  Intervention: Prevent Infection  Goal: Optimal Comfort and Wellbeing  Outcome: Progressing  Intervention: Provide Person-Centered Care  Goal: Readiness for Transition of Care  Outcome: Progressing  Goal: Absence of Hospital-Acquired Illness or Injury  Outcome: Progressing  Intervention: Identify and Manage Fall Risk  Goal: Optimal Comfort and Wellbeing  Outcome: Progressing

## 2025-01-27 NOTE — SIGNIFICANT NOTE
01/27/25 1504   OTHER   Discipline physical therapist   Rehab Time/Intention   Session Not Performed other (see comments)  (Patient reports she going to rehab today when patient checked on in early PM. Per chart patient now not leaving until 1/28. PT will f/u.)   Recommendation   PT - Next Appointment 01/28/25

## 2025-01-27 NOTE — PROGRESS NOTES
[facility] INPATIENT PROGRESS NOTE         [facility] OBSERVATION    2025      PATIENT IDENTIFICATION:  Name: Dafne Mary ADMIT: 2025   : 1940  PCP: Jossy Sauceda MD    MRN: [id number] LOS: 2 days   AGE/SEX: 84 y.o. female  ROOM: [facility]                     LOS 2    Reason for visit: Acute hypoxemia, vocal cord dysfunction and asked      SUBJECTIVE:      Resting comfortably.  On room air.  No chest pain or productive cough.  No objection to discharge.  Plan for follow-up with ENT and speech therapy as outpatient.    Objective   OBJECTIVE:    Vital Sign Min/Max for last 24 hours  Temp  Min: 97.3 °F (36.3 °C)  Max: 97.8 °F (36.6 °C)   BP  Min: 125/72  Max: 137/73   Pulse  Min: 83  Max: 91   Resp  Min: 18  Max: 20   SpO2  Min: 93 %  Max: 99 %   No data recorded   No data recorded    Vitals:    25 0719 25 0750 25 0753 25 0756   BP: 132/91      BP Location: Left arm      Patient Position: Lying      Pulse: 85 91 86 87   Resp: 18 20     Temp: 97.5 °F (36.4 °C)      TempSrc: Oral      SpO2: 98%      Weight:       Height:                25  1624   Weight: 54 kg (119 lb)       Body mass index is 21.77 kg/m².                          Body mass index is 21.77 kg/m².  No intake or output data in the 24 hours ending 25 1041        Exam:  GEN:  No distress, appears stated age  EYES:   PERRL, anicteric sclerae  ENT:    External ears/nose normal, OP clear  NECK:  No adenopathy, midline trachea  LUNGS: Normal chest on inspection, palpation and mild wheeze on auscultation  CV:  Normal S1S2, without murmur  ABD:  Nontender, nondistended, no hepatosplenomegaly, +BS  EXT:  No edema.  No cyanosis or clubbing.  No mottling and normal cap refill.    Assessment     Scheduled meds:  amLODIPine, 5 mg, Oral, Daily  amoxicillin-clavulanate, 1 tablet, Oral, Q12H  apixaban, 2.5 mg, Oral, Q12H  atorvastatin, 40 mg, Oral, Daily  budesonide-formoterol, 2 puff, Inhalation, BID - RT    "And  revefenacin, 175 mcg, Nebulization, Daily - RT  carvedilol, 25 mg, Oral, BID With Meals  clopidogrel, 75 mg, Oral, Daily  furosemide, 20 mg, Oral, Daily  ipratropium-albuterol, 3 mL, Nebulization, Q6H - RT  losartan, 50 mg, Oral, BID  oxymetazoline, 2 spray, Each Nare, BID  PARoxetine, 20 mg, Oral, Daily  predniSONE, 40 mg, Oral, Daily  thyroid, 15 mg, Oral, Q AM  Thyroid, 30 mg, Oral, Q AM      IV meds:                         Data Review:  Results from last 7 days   Lab Units 01/27/25  0706 01/26/25  0827 01/25/25  0832 01/24/25  1249 01/23/25  0539   SODIUM mmol/L 131* 131* 135* 140 140   POTASSIUM mmol/L 4.0 3.9 3.7 3.8 3.4*   CHLORIDE mmol/L 95* 93* 97* 98 103   CO2 mmol/L 27.8 27.0 23.1 30.0* 26.9   BUN mg/dL 26* 25* 14 10 7*   CREATININE mg/dL 1.00 1.05* 0.92 0.89 0.81   GLUCOSE mg/dL 106* 121* 141* 153* 110*   CALCIUM mg/dL 8.8 8.6 9.1 9.3 8.7         Estimated Creatinine Clearance: 35.7 mL/min (by C-G formula based on SCr of 1 mg/dL).  Results from last 7 days   Lab Units 01/27/25  0706 01/26/25  0827 01/25/25  0832 01/24/25  1249 01/23/25  0539   WBC 10*3/mm3 14.06* 16.73* 10.92* 10.97* 6.47   HEMOGLOBIN g/dL 12.0 11.4* 13.0 13.6 11.7*   PLATELETS 10*3/mm3 356 323 369 326 264           Results from last 7 days   Lab Units 01/24/25  1249   ALT (SGPT) U/L 27   AST (SGOT) U/L 36*     Results from last 7 days   Lab Units 01/25/25  0334 01/24/25  1250   PH, ARTERIAL pH units 7.392 7.354   PO2 ART mm Hg 61.5* 174.8*   PCO2, ARTERIAL mm Hg 43.7 57.6*   HCO3 ART mmol/L 26.6 32.1*     Results from last 7 days   Lab Units 01/26/25  1035   LACTATE mmol/L 1.8         No results found for: \"HGBA1C\", \"POCGLU\"      Imaging reviewed  Chest x-ray and CT chest 1/24 reviewed: No acute    Microbiology reviewed  RVP negative          Active Hospital Problems    Diagnosis  POA    **COPD exacerbation [J44.1]  Yes    Hypoxia [R09.02]  Yes    Generalized weakness [R53.1]  Unknown    Stage 3a chronic kidney disease [N18.31]  " Yes    Chronic HFrEF (heart failure with reduced ejection fraction) [I50.22]  Yes    PAF (paroxysmal atrial fibrillation) [I48.0]  Yes    Vocal cord paralysis [J38.00]  Yes    Anxiety [F41.9]  Yes    Benign hypertension [I10]  Yes      Resolved Hospital Problems   No resolved problems to display.         ASSESSMENT:  Acute hypoxemic and hypercapnic respiratory failure  Asthma  Vocal cord dysfunction  Acute Sinusitis       PLAN:  Add antibiotic, nasal saline and decongestant for sinusitis issues.   Plan for evaluation by ENT and speech therapy for vocal cord dysfunction and evaluation for anatomical abnormalities of vocal cords.  Will have her see Candace Whitehead as out pt, speech therapist specializing in VCD.  She also has a significant contributing factor with anxiety and she has a referral to outpatient psychiatry clinic to help in that regard.  She is asking about having oxygen available.  If she desaturates below 89% with ambulation we would be able to order oxygen at discharge.  Working on rehab placement. No objection to discharge.       Javier Nolan MD  Pulmonary and Critical Care Medicine  [facility]  1/27/2025    10:41 EST

## 2025-01-27 NOTE — CASE MANAGEMENT/SOCIAL WORK
Discharge Planning Assessment  [facility]     Patient Name: Dafne Mary  MRN: [id number]  Today's Date: 1/27/2025    Admit Date: 1/24/2025    Plan: Patient and daughter given Patient Choice List, which they will review. They request that a referral be sent to [facility] now, as it is close to daughter's home. Referral sent on patient's behalf. BRANDON Gallardo RN   Discharge Needs Assessment    No documentation.                  Discharge Plan       Row Name 01/27/25 1153       Plan    Plan Comments Received call from [facility] w/[facility] advising they have a bed at facility for pt; Entered room, introduced self and explained role- updated pt and daughter Felicia at bedside of bed availabiity at [facility]: pt is unsure at this time and has requested daughter go over to facility to check it out; call placed to Gemma to update. CCP to revisit- Updated primary RN                  Continued Care and Services - Admitted Since 1/24/2025       Destination       Service Provider Request Status Services Address Phone Fax Patient Preferred    [facility] Accept[facility] -- [address] [facility] [address] [phone] --              Home Medical Care       Service Provider Request Status Services Address Phone Fax Patient Preferred    [facility] Accepted -- [address] [place] [id number] [phone]        Internal Comment last updated by Tonny Martins, RN 1/24/2025 1337    Current with [facility].                             Selected Continued Care - Prior Encounters Includes continued care and service providers with selected services from prior encounters from 10/26/2024 to 1/27/2025      Discharged on 1/23/2025 Admission date: 1/17/2025 - Discharge disposition: Home-Health Care [facility]      Home Medical Care       Service Provider Services Address Phone Fax Patient Preferred    [facility]  [address], [facility] [address] [phone] --                             Demographic Summary    No documentation.                  Functional Status    No documentation.                  Psychosocial    No documentation.                  Abuse/Neglect    No documentation.                  Legal    No documentation.                  Substance Abuse    No documentation.                  Patient Forms    No documentation.                     Sari Raymundo RN

## 2025-01-27 NOTE — CASE MANAGEMENT/SOCIAL WORK
Post-Acute Authorization Submission      Post Acute Pre-Cert Documentation  Request Submitted by Facility - Type:: Hospital  Post-Acute Authorization Type Submitted:: SNF  Date Post Acute Pre-Cert Inititated per Facility: 01/27/25  Accepting Facility: [facility] Discharge Date Requested: 01/28/25  All Clinicals Submitted?: Yes  Had Accepting Facility at Time of Submission: Yes  Response Communicated to:: , Accepting Facility Liaison  Authorization Number:: [id number]              JURGEN Camargo

## 2025-01-27 NOTE — PLAN OF CARE
Goal Outcome Evaluation:  Plan of Care Reviewed With: patient           Pt on 2 L NC O2; no c/o SOA; mild H/A resolved with Tylenol; continue to monitor WBC, pt refused fall prevention program after education; provider aware; Outpatient f/u with ENT and Psych for anxiety

## 2025-01-27 NOTE — CASE MANAGEMENT/SOCIAL WORK
Discharge Planning Assessment  [facility]     Patient Name: Dafne Mary  MRN: [id number]  Today's Date: 1/27/2025    Admit Date: 1/24/2025    Plan: Patient and daughter given Patient Choice List, which they will review. They request that a referral be sent to [facility] now, as it is close to daughter's home. Referral sent on patient's behalf. BRANDON Gallardo RN   Discharge Needs Assessment    No documentation.                  Discharge Plan       Row Name 01/27/25 1711       Plan    Plan Comments Precert approved per Ana kenny/Helen- notified Heide and MD w/LINDA- awaiting d/c orders                  Continued Care and Services - Admitted Since 1/24/2025       Destination       Service Provider Request Status Services Address Phone Fax Patient Preferred    [facility] Accept[facility] -- [address] [facility] [address] [phone] --              Home Medical Care       Service Provider Request Status Services Address Phone Fax Patient Preferred    [facility] Accepted -- [address], [address], [facility] [address] [phone]        Internal Comment last updated by Tonny Martins, RN 1/24/2025 1337    Current with [facility].                             Selected Continued Care - Prior Encounters Includes continued care and service providers with selected services from prior encounters from 10/26/2024 to 1/27/2025      Discharged on 1/23/2025 Admission date: 1/17/2025 - Discharge disposition: [place]Health [place] [facility]      Home Medical Care       Service Provider Services Address Phone Fax Patient Preferred    [facility] [address], [address] [address] [phone] --                             Demographic Summary    No documentation.                  Functional Status    No documentation.                  Psychosocial    No documentation.                   Abuse/Neglect    No documentation.                  Legal    No documentation.                  Substance Abuse    No documentation.                  Patient Forms    No documentation.                     Sari Raymunod RN

## 2025-01-27 NOTE — CASE MANAGEMENT/SOCIAL WORK
Discharge Planning Assessment  [facility]     Patient Name: Dafne Mary  MRN: [id number]  Today's Date: 1/27/2025    Admit Date: 1/24/2025    Plan: Patient and daughter given Patient Choice List, which they will review. They request that a referral be sent to [facility] now, as it is close to daughter's home. Referral sent on patient's behalf. BRANDON Gallardo RN   Discharge Needs Assessment    No documentation.                  Discharge Plan       Row Name 01/27/25 1315       Plan    Plan Comments Followed up with patient who discussed further w/daughter plans to transfer to [facility]- Pt is agreeble- discussed w/[facility]ide w/[facility] noting pt will likely be ready tomorrow 1/28/25- request for precert sent. CCP following      Row Name 01/27/25 2710       Plan    Plan Comments Received call from [facility] advising they have a bed at facility for pt; Entered room, introduced self and explained role- updated pt and daughter Felicia at bedside of bed availabiity at [facility]: pt is unsure at this time and has requested daughter go over to facility to check it out; call placed to Gemma to update. CCP to revisit- Updated primary RN                  Continued Care and Services - Admitted Since 1/24/2025       Destination       Service Provider Request Status Services Address Phone Fax Patient Preferred    [facility] Accepted -- [address] [facility] [address] [phone] --              Home Medical Care       Service Provider Request Status Services Address Phone Fax Patient Preferred    [facility] Accepted -- [address], [address] [place] [address] [phone]        Internal Comment last updated by Tonny Martins, RN 1/24/2025 1337    Current with [facility].                             Selected Continued Care - Prior Encounters Includes continued care and service providers with selected services from prior encounters  from 10/26/2024 to 1/27/2025      Discharged on 1/23/2025 Admission date: 1/17/2025 - Discharge disposition: Home-Health Care c      Home Medical Care       Service Provider Services Address Phone Fax Patient Preferred    [facility] [address] , [address] [id number] [phone] --                             Demographic Summary    No documentation.                  Functional Status    No documentation.                  Psychosocial    No documentation.                  Abuse/Neglect    No documentation.                  Legal    No documentation.                  Substance Abuse    No documentation.                  Patient Forms    No documentation.                     Sari Raymundo RN

## 2025-01-27 NOTE — PLAN OF CARE
Goal Outcome Evaluation:  Plan of Care Reviewed With: patient        Progress: no change     Patient has reported more difficulty swallowing and started taking meds halved in applesauce, diet changed to mechanical soft, speech therapy consult added. Intermittently using O2 via nasal cannula.     Stand by assist  to bedside matthew.  Aox4. VSS    RT on board for treatment.

## 2025-01-27 NOTE — PLAN OF CARE
Goal Outcome Evaluation:              Outcome Evaluation: patient in room during this shift alert and oriented and able to verbalize needs, patient is waiting on refferal approval to [facility],continues on 2L of oxygen r/t COPD exacervation, pulmonoloy following , lasix on hold .patient has no other questions at this time.

## 2025-01-27 NOTE — PROGRESS NOTES
Name: Dafne Mary ADMIT: 2025   : 1940  PCP: Jossy Sauceda MD    MRN: [id number] LOS: 2 days   AGE/SEX: 84 y.o. female  ROOM: 106/1     Subjective   Subjective   No acute events overnight.  Patient states she is a bit nauseous today but denies any chest pain or shortness of breath.  Still eating and drinking okay.  Feels like she needs to have a bowel movement.    Objective   Objective     Vital Signs  Temp:  [97.4 °F (36.3 °C)-97.5 °F (36.4 °C)] 97.5 °F (36.4 °C)  Heart Rate:  [83-91] 85  Resp:  [18-20] 20  BP: (125-136)/(72-94) 136/94  SpO2:  [93 %-98 %] 98 %  on  Flow (L/min) (Oxygen Therapy):  [2] 2;   Device (Oxygen Therapy): nasal cannula  Body mass index is 21.77 kg/m².    Physical Exam  General: Alert, no acute distress.  Sitting up in bed.  Answers questions appropriately.  ENT: No conjunctival injection or scleral icterus. Moist mucous membranes.   Neuro: Eyes open and moving in all directions, generalized weakness, face symmetric, no focal deficits.   Lungs: Fairly good air movement, occasional end expiratory wheeze.  No distress.  Heart: RRR, no murmurs. No edema.  Abdomen: Soft, non-tender, non-distended. Normal bowel sounds.   Ext: Warm and well-perfused. No edema.   Skin: No rashes or lesions. IV site without swelling or erythema.     Results Review     I reviewed the patient's new clinical results:  Results from last 7 days   Lab Units 25  0706 25  0827 25  0832 25  1249   WBC 10*3/mm3 14.06* 16.73* 10.92* 10.97*   HEMOGLOBIN g/dL 12.0 11.4* 13.0 13.6   PLATELETS 10*3/mm3 356 323 369 326     Results from last 7 days   Lab Units 25  0706 25  0827 25  0832 25  1249   SODIUM mmol/L 131* 131* 135* 140   POTASSIUM mmol/L 4.0 3.9 3.7 3.8   CHLORIDE mmol/L 95* 93* 97* 98   CO2 mmol/L 27.8 27.0 23.1 30.0*   BUN mg/dL 26* 25* 14 10   CREATININE mg/dL 1.00 1.05* 0.92 0.89   GLUCOSE mg/dL 106* 121* 141* 153*   EGFR mL/min/1.73 55.7* 52.5* 61.5  "64.0     Results from last 7 days   Lab Units 01/24/25  1249   ALBUMIN g/dL 4.1   BILIRUBIN mg/dL 0.5   ALK PHOS U/L 127*   AST (SGOT) U/L 36*   ALT (SGPT) U/L 27     Results from last 7 days   Lab Units 01/27/25  0706 01/26/25  0827 01/25/25  0832 01/24/25  1249   CALCIUM mg/dL 8.8 8.6 9.1 9.3   ALBUMIN g/dL  --   --   --  4.1     Results from last 7 days   Lab Units 01/26/25  1035   LACTATE mmol/L 1.8     No results found for: \"HGBA1C\", \"POCGLU\"    No radiology results for the last day    I have personally reviewed all medications:  Scheduled Medications  amLODIPine, 5 mg, Oral, Daily  amoxicillin-clavulanate, 1 tablet, Oral, Q12H  apixaban, 2.5 mg, Oral, Q12H  atorvastatin, 40 mg, Oral, Daily  budesonide-formoterol, 2 puff, Inhalation, BID - RT   And  revefenacin, 175 mcg, Nebulization, Daily - RT  carvedilol, 25 mg, Oral, BID With Meals  clopidogrel, 75 mg, Oral, Daily  [Held by provider] furosemide, 20 mg, Oral, Daily  ipratropium-albuterol, 3 mL, Nebulization, Q6H - RT  losartan, 50 mg, Oral, BID  oxymetazoline, 2 spray, Each Nare, BID  PARoxetine, 20 mg, Oral, Daily  predniSONE, 40 mg, Oral, Daily  thyroid, 15 mg, Oral, Q AM  Thyroid, 30 mg, Oral, Q AM    Infusions   Diet  Diet: Regular/House; Texture: Mechanical Ground (NDD 2); Fluid Consistency: Thin (IDDSI 0)    No intake or output data in the 24 hours ending 01/27/25 8418    Assessment/Plan     Active Hospital Problems    Diagnosis  POA    **COPD exacerbation [J44.1]  Yes    Hypoxia [R09.02]  Yes    Generalized weakness [R53.1]  Unknown    Stage 3a chronic kidney disease [N18.31]  Yes    Chronic HFrEF (heart failure with reduced ejection fraction) [I50.22]  Yes    PAF (paroxysmal atrial fibrillation) [I48.0]  Yes    Vocal cord paralysis [J38.00]  Yes    Anxiety [F41.9]  Yes    Benign hypertension [I10]  Yes      Resolved Hospital Problems   No resolved problems to display.     84 y.o. female with COPD exacerbation.    Acute hypoxic respiratory failure " secondary to COPD exacerbation  Vocal cord dysfunction  Leukocytosis  -Pulmonology following  -Continue inhalers and DuoNebs  -Augmentin x 7 days for sinusitis per pulmonology  -Oral prednisone  -ENT referral at discharge  -Currently on 2 L nasal cannula, wean as able  -Leukocytosis improved to 14K today, likely secondary to steroids  -Repeat CBC with morning labs    Coronary artery disease  Hypertension  Atrial fibrillation  Chronic diastolic heart failure  Elevated troponin  -Continue amlodipine and losartan  -RC: Coreg  -AC: reduced dose Eliquis  -Holding oral Lasix today given hyponatremia  -Continue Plavix and statin  -Recent cardiac catheterization, troponin downtrending.  EKG with no acute ischemic changes.  Repeat EKG and troponin as clinically indicated.    Hyponatremia  -Sodium at 131 today, stable from yesterday  -Baseline sodium is normal  -Repeat BMP with morning labs  -Hold oral Lasix  -If sodium continues to decrease, will need to perform additional workup    CKD stage IIIa  -Creatinine consistent with baseline  -Avoid nephrotoxic agents including NSAIDs and contrast dyes  -Repeat BMP with morning labs    Asymptomatic bacteriuria  -UA with trace leukocyte, positive nitrite, 3-5 WBC, 4+ bacteria  -Patient denies any dysuria, urinary frequency, suprapubic pain  -On Augmentin as above which would likely cover most urinary pathogen  -Monitor clinically, repeat UA should symptoms develop    Of note, QTc prolonged on admission EKG.  Patient complaining of nausea today.  Will repeat EKG to check QTc interval prior to ordering antiemetic.  Discussed with nursing.    Eliquis (home med) for DVT prophylaxis.  Full code.  Discussed with patient and nursing staff.  Anticipate discharge to SNU facility in 1-2 days.      Beckie Treviño MD  [facility]  01/27/25  15:48 EST

## 2025-01-27 NOTE — NURSING NOTE
Patient bed alarm sounded for movement OOB w/o assistance. Patient reports she is not suppose to have alarms on and she does not need assist with getting to [facility]. Education provided on fall prevention along with contributing factors. Patient offered choices to facilitate [place] while receiving assist and she refuses. CN and Provider notified.

## 2025-01-27 NOTE — PLAN OF CARE
Goal Outcome Evaluation:              Outcome Evaluation: Clinical swallow evaluation complete. See full report for further details. Recommend soft/chopped solid diet with thin liquids. Meds as tolerated. Sitting upright, slow rate, small bites/sips. Encouraged pt to follow with ENT and/or SLP as outpatient for further assessment and treatment of voice.               SLP Swallowing Diagnosis: R/O pharyngeal dysphagia, oral dysphagia (01/27/25 1100)

## 2025-01-28 ENCOUNTER — APPOINTMENT (OUTPATIENT)
Dept: GENERAL RADIOLOGY | Facility: HOSPITAL | Age: 85
End: 2025-01-28
Payer: MEDICARE

## 2025-01-28 LAB
ALBUMIN SERPL-MCNC: 3.5 G/DL (ref 3.5–5.2)
ALBUMIN/GLOB SERPL: 1.7 G/DL
ALP SERPL-CCNC: 105 U/L (ref 39–117)
ALT SERPL W P-5'-P-CCNC: 22 U/L (ref 1–33)
ANION GAP SERPL CALCULATED.3IONS-SCNC: 9 MMOL/L (ref 5–15)
AST SERPL-CCNC: 26 U/L (ref 1–32)
BASOPHILS # BLD AUTO: 0.02 10*3/MM3 (ref 0–0.2)
BASOPHILS NFR BLD AUTO: 0.2 % (ref 0–1.5)
BILIRUB SERPL-MCNC: 0.3 MG/DL (ref 0–1.2)
BUN SERPL-MCNC: 25 MG/DL (ref 8–23)
BUN/CREAT SERPL: 32.1 (ref 7–25)
CALCIUM SPEC-SCNC: 8.6 MG/DL (ref 8.6–10.5)
CHLORIDE SERPL-SCNC: 99 MMOL/L (ref 98–107)
CO2 SERPL-SCNC: 28 MMOL/L (ref 22–29)
CREAT SERPL-MCNC: 0.78 MG/DL (ref 0.57–1)
DEPRECATED RDW RBC AUTO: 40.1 FL (ref 37–54)
EGFRCR SERPLBLD CKD-EPI 2021: 75 ML/MIN/1.73
EOSINOPHIL # BLD AUTO: 0 10*3/MM3 (ref 0–0.4)
EOSINOPHIL NFR BLD AUTO: 0 % (ref 0.3–6.2)
ERYTHROCYTE [DISTWIDTH] IN BLOOD BY AUTOMATED COUNT: 12.1 % (ref 12.3–15.4)
GEN 5 1HR TROPONIN T REFLEX: 57 NG/L
GLOBULIN UR ELPH-MCNC: 2.1 GM/DL
GLUCOSE SERPL-MCNC: 111 MG/DL (ref 65–99)
HCT VFR BLD AUTO: 36.5 % (ref 34–46.6)
HGB BLD-MCNC: 12 G/DL (ref 12–15.9)
IMM GRANULOCYTES # BLD AUTO: 0.2 10*3/MM3 (ref 0–0.05)
IMM GRANULOCYTES NFR BLD AUTO: 1.5 % (ref 0–0.5)
LYMPHOCYTES # BLD AUTO: 0.67 10*3/MM3 (ref 0.7–3.1)
LYMPHOCYTES NFR BLD AUTO: 5.1 % (ref 19.6–45.3)
MCH RBC QN AUTO: 30.1 PG (ref 26.6–33)
MCHC RBC AUTO-ENTMCNC: 32.9 G/DL (ref 31.5–35.7)
MCV RBC AUTO: 91.5 FL (ref 79–97)
MONOCYTES # BLD AUTO: 0.83 10*3/MM3 (ref 0.1–0.9)
MONOCYTES NFR BLD AUTO: 6.4 % (ref 5–12)
NEUTROPHILS NFR BLD AUTO: 11.3 10*3/MM3 (ref 1.7–7)
NEUTROPHILS NFR BLD AUTO: 86.8 % (ref 42.7–76)
NRBC BLD AUTO-RTO: 0 /100 WBC (ref 0–0.2)
PLATELET # BLD AUTO: 377 10*3/MM3 (ref 140–450)
PMV BLD AUTO: 9 FL (ref 6–12)
POTASSIUM SERPL-SCNC: 4.1 MMOL/L (ref 3.5–5.2)
PROT SERPL-MCNC: 5.6 G/DL (ref 6–8.5)
QT INTERVAL: 351 MS
QT INTERVAL: 429 MS
QT INTERVAL: 430 MS
QTC INTERVAL: 488 MS
QTC INTERVAL: 513 MS
QTC INTERVAL: 521 MS
RBC # BLD AUTO: 3.99 10*6/MM3 (ref 3.77–5.28)
SODIUM SERPL-SCNC: 136 MMOL/L (ref 136–145)
TROPONIN T % DELTA: -5
TROPONIN T NUMERIC DELTA: -3 NG/L
TROPONIN T SERPL HS-MCNC: 60 NG/L
WBC NRBC COR # BLD AUTO: 13.02 10*3/MM3 (ref 3.4–10.8)

## 2025-01-28 PROCEDURE — 25010000002 FUROSEMIDE PER 20 MG: Performed by: INTERNAL MEDICINE

## 2025-01-28 PROCEDURE — 63710000001 PREDNISONE PER 1 MG: Performed by: INTERNAL MEDICINE

## 2025-01-28 PROCEDURE — 94761 N-INVAS EAR/PLS OXIMETRY MLT: CPT

## 2025-01-28 PROCEDURE — 93010 ELECTROCARDIOGRAM REPORT: CPT | Performed by: INTERNAL MEDICINE

## 2025-01-28 PROCEDURE — 25010000002 DIGOXIN PER 500 MCG: Performed by: INTERNAL MEDICINE

## 2025-01-28 PROCEDURE — 94799 UNLISTED PULMONARY SVC/PX: CPT

## 2025-01-28 PROCEDURE — 63710000001 REVEFENACIN 175 MCG/3ML SOLUTION: Performed by: NURSE PRACTITIONER

## 2025-01-28 PROCEDURE — 85025 COMPLETE CBC W/AUTO DIFF WBC: CPT | Performed by: INTERNAL MEDICINE

## 2025-01-28 PROCEDURE — 84484 ASSAY OF TROPONIN QUANT: CPT | Performed by: STUDENT IN AN ORGANIZED HEALTH CARE EDUCATION/TRAINING PROGRAM

## 2025-01-28 PROCEDURE — 71045 X-RAY EXAM CHEST 1 VIEW: CPT

## 2025-01-28 PROCEDURE — 80053 COMPREHEN METABOLIC PANEL: CPT | Performed by: STUDENT IN AN ORGANIZED HEALTH CARE EDUCATION/TRAINING PROGRAM

## 2025-01-28 PROCEDURE — 94664 DEMO&/EVAL PT USE INHALER: CPT

## 2025-01-28 PROCEDURE — 99232 SBSQ HOSP IP/OBS MODERATE 35: CPT | Performed by: INTERNAL MEDICINE

## 2025-01-28 PROCEDURE — 93005 ELECTROCARDIOGRAM TRACING: CPT | Performed by: STUDENT IN AN ORGANIZED HEALTH CARE EDUCATION/TRAINING PROGRAM

## 2025-01-28 RX ORDER — DIGOXIN 0.25 MG/ML
125 INJECTION INTRAMUSCULAR; INTRAVENOUS ONCE AS NEEDED
Status: DISCONTINUED | OUTPATIENT
Start: 2025-01-28 | End: 2025-02-01 | Stop reason: HOSPADM

## 2025-01-28 RX ORDER — FUROSEMIDE 10 MG/ML
40 INJECTION INTRAMUSCULAR; INTRAVENOUS ONCE
Status: COMPLETED | OUTPATIENT
Start: 2025-01-28 | End: 2025-01-28

## 2025-01-28 RX ORDER — DIGOXIN 0.25 MG/ML
500 INJECTION INTRAMUSCULAR; INTRAVENOUS ONCE
Status: COMPLETED | OUTPATIENT
Start: 2025-01-28 | End: 2025-01-28

## 2025-01-28 RX ADMIN — DIGOXIN 500 MCG: 0.25 INJECTION INTRAMUSCULAR; INTRAVENOUS at 12:23

## 2025-01-28 RX ADMIN — IPRATROPIUM BROMIDE AND ALBUTEROL SULFATE 3 ML: .5; 3 SOLUTION RESPIRATORY (INHALATION) at 01:48

## 2025-01-28 RX ADMIN — APIXABAN 2.5 MG: 2.5 TABLET, FILM COATED ORAL at 20:30

## 2025-01-28 RX ADMIN — APIXABAN 2.5 MG: 2.5 TABLET, FILM COATED ORAL at 09:02

## 2025-01-28 RX ADMIN — FUROSEMIDE 40 MG: 10 INJECTION, SOLUTION INTRAMUSCULAR; INTRAVENOUS at 08:36

## 2025-01-28 RX ADMIN — PAROXETINE HYDROCHLORIDE 20 MG: 20 TABLET, FILM COATED ORAL at 09:02

## 2025-01-28 RX ADMIN — AMOXICILLIN AND CLAVULANATE POTASSIUM 1 TABLET: 875; 125 TABLET, FILM COATED ORAL at 21:48

## 2025-01-28 RX ADMIN — METOPROLOL TARTRATE 5 MG: 1 INJECTION, SOLUTION INTRAVENOUS at 08:25

## 2025-01-28 RX ADMIN — CARVEDILOL 25 MG: 25 TABLET, FILM COATED ORAL at 09:02

## 2025-01-28 RX ADMIN — LOSARTAN POTASSIUM 50 MG: 50 TABLET, FILM COATED ORAL at 20:30

## 2025-01-28 RX ADMIN — CARVEDILOL 25 MG: 25 TABLET, FILM COATED ORAL at 17:03

## 2025-01-28 RX ADMIN — BUDESONIDE AND FORMOTEROL FUMARATE DIHYDRATE 2 PUFF: 160; 4.5 AEROSOL RESPIRATORY (INHALATION) at 08:05

## 2025-01-28 RX ADMIN — CLOPIDOGREL BISULFATE 75 MG: 75 TABLET ORAL at 09:02

## 2025-01-28 RX ADMIN — BUDESONIDE AND FORMOTEROL FUMARATE DIHYDRATE 2 PUFF: 160; 4.5 AEROSOL RESPIRATORY (INHALATION) at 19:43

## 2025-01-28 RX ADMIN — REVEFENACIN 175 MCG: 175 SOLUTION RESPIRATORY (INHALATION) at 08:00

## 2025-01-28 RX ADMIN — AMOXICILLIN AND CLAVULANATE POTASSIUM 1 TABLET: 875; 125 TABLET, FILM COATED ORAL at 09:02

## 2025-01-28 RX ADMIN — PREDNISONE 40 MG: 20 TABLET ORAL at 09:02

## 2025-01-28 RX ADMIN — LOSARTAN POTASSIUM 50 MG: 50 TABLET, FILM COATED ORAL at 09:02

## 2025-01-28 RX ADMIN — LORAZEPAM 0.5 MG: 0.5 TABLET ORAL at 20:30

## 2025-01-28 RX ADMIN — ATORVASTATIN CALCIUM 40 MG: 20 TABLET, FILM COATED ORAL at 09:02

## 2025-01-28 RX ADMIN — LEVOTHYROXINE, LIOTHYRONINE 30 MG: 19; 4.5 TABLET ORAL at 09:04

## 2025-01-28 RX ADMIN — IPRATROPIUM BROMIDE AND ALBUTEROL SULFATE 3 ML: .5; 3 SOLUTION RESPIRATORY (INHALATION) at 07:55

## 2025-01-28 RX ADMIN — AMLODIPINE BESYLATE 5 MG: 5 TABLET ORAL at 09:02

## 2025-01-28 NOTE — PROGRESS NOTES
[facility] INPATIENT PROGRESS NOTE         [address]    2025      PATIENT IDENTIFICATION:  Name: Dafne Mary ADMIT: 2025   : 1940  PCP: Jossy Sauceda MD    MRN: [id number] LOS: 3 days   AGE/SEX: 84 y.o. female  ROOM: [facility]                     LOS 3    Reason for visit: Acute hypoxemia, vocal cord dysfunction and asked      SUBJECTIVE:      Sleeping comfortably.     Objective   OBJECTIVE:    Vital Sign Min/Max for last 24 hours  Temp  Min: 97.4 °F (36.3 °C)  Max: 99 °F (37.2 °C)   BP  Min: 107/71  Max: 156/86   Pulse  Min: 83  Max: 127   Resp  Min: 16  Max: 20   SpO2  Min: 93 %  Max: 100 %   No data recorded   No data recorded    Vitals:    25 0805 25 0841 25 0913 25 0916   BP:  137/86  113/73   BP Location:    Right arm   Patient Position:    Lying   Pulse: 83  (!) 127 108   Resp: 18      Temp:       TempSrc:       SpO2:       Weight:       Height:                25  1624   Weight: 54 kg (119 lb)       Body mass index is 21.77 kg/m².                          Body mass index is 21.77 kg/m².  No intake or output data in the 24 hours ending 25 0923        Exam:  GEN:  No distress, appears stated age  EYES:   PERRL, anicteric sclerae  ENT:    External ears/nose normal, OP clear  NECK:  No adenopathy, midline trachea  LUNGS: Normal chest on inspection, palpation and auscultation  CV:  Normal S1S2, without murmur  ABD:  Nontender, nondistended, no hepatosplenomegaly, +BS  EXT:  No edema.  No cyanosis or clubbing.  No mottling and normal cap refill.    Assessment     Scheduled meds:  amLODIPine, 5 mg, Oral, Daily  amoxicillin-clavulanate, 1 tablet, Oral, Q12H  apixaban, 2.5 mg, Oral, Q12H  atorvastatin, 40 mg, Oral, Daily  budesonide-formoterol, 2 puff, Inhalation, BID - RT   And  revefenacin, 175 mcg, Nebulization, Daily - RT  carvedilol, 25 mg, Oral, BID With Meals  clopidogrel, 75 mg, Oral, Daily  [Held by provider] furosemide, 20 mg, Oral,  "Daily  ipratropium-albuterol, 3 mL, Nebulization, Q6H - RT  losartan, 50 mg, Oral, BID  oxymetazoline, 2 spray, Each Nare, BID  PARoxetine, 20 mg, Oral, Daily  predniSONE, 40 mg, Oral, Daily  thyroid, 15 mg, Oral, Q AM  Thyroid, 30 mg, Oral, Q AM      IV meds:                         Data Review:  Results from last 7 days   Lab Units 01/28/25  0402 01/27/25  0706 01/26/25  0827 01/25/25  0832 01/24/25  1249   SODIUM mmol/L 136 131* 131* 135* 140   POTASSIUM mmol/L 4.1 4.0 3.9 3.7 3.8   CHLORIDE mmol/L 99 95* 93* 97* 98   CO2 mmol/L 28.0 27.8 27.0 23.1 30.0*   BUN mg/dL 25* 26* 25* 14 10   CREATININE mg/dL 0.78 1.00 1.05* 0.92 0.89   GLUCOSE mg/dL 111* 106* 121* 141* 153*   CALCIUM mg/dL 8.6 8.8 8.6 9.1 9.3         Estimated Creatinine Clearance: 45.8 mL/min (by C-G formula based on SCr of 0.78 mg/dL).  Results from last 7 days   Lab Units 01/28/25  0402 01/27/25  0706 01/26/25  0827 01/25/25  0832 01/24/25  1249   WBC 10*3/mm3 13.02* 14.06* 16.73* 10.92* 10.97*   HEMOGLOBIN g/dL 12.0 12.0 11.4* 13.0 13.6   PLATELETS 10*3/mm3 377 356 323 369 326           Results from last 7 days   Lab Units 01/28/25  0402 01/24/25  1249   ALT (SGPT) U/L 22 27   AST (SGOT) U/L 26 36*     Results from last 7 days   Lab Units 01/25/25  0334 01/24/25  1250   PH, ARTERIAL pH units 7.392 7.354   PO2 ART mm Hg 61.5* 174.8*   PCO2, ARTERIAL mm Hg 43.7 57.6*   HCO3 ART mmol/L 26.6 32.1*     Results from last 7 days   Lab Units 01/26/25  1035   LACTATE mmol/L 1.8         No results found for: \"HGBA1C\", \"POCGLU\"      Imaging reviewed  Chest x-ray and CT chest 1/24 reviewed: No acute    Chest x-ray 1/28 reviewed: Mildly enlarged heart.  Minimal basilar atelectasis.  No acute.        Microbiology reviewed  RVP negative          Active Hospital Problems    Diagnosis  POA    **COPD exacerbation [J44.1]  Yes    Hypoxia [R09.02]  Yes    Generalized weakness [R53.1]  Unknown    Stage 3a chronic kidney disease [N18.31]  Yes    Chronic HFrEF (heart " failure with reduced ejection fraction) [I50.22]  Yes    PAF (paroxysmal atrial fibrillation) [I48.0]  Yes    Vocal cord paralysis [J38.00]  Yes    Anxiety [F41.9]  Yes    Benign hypertension [I10]  Yes      Resolved Hospital Problems   No resolved problems to display.         ASSESSMENT:  Acute hypoxemic and hypercapnic respiratory failure  Asthma  Vocal cord dysfunction  Acute Sinusitis       PLAN:  Added antibiotic, nasal saline and decongestant for sinusitis issues.   Plan for evaluation by ENT and speech therapy for vocal cord dysfunction and evaluation for anatomical abnormalities of vocal cords.  Will have her see Candace Whitehead as out pt, speech therapist specializing in VCD.  She also has a significant contributing factor with anxiety and she has a referral to outpatient psychiatry clinic to help in that regard.  She is asking about having oxygen available.  If she desaturates below 89% with ambulation we would be able to order oxygen at discharge.  Working on rehab placement. No objection to discharge.       Javier Nolan MD  Pulmonary and Critical Care Medicine  [facility]  1/28/2025    09:23 EST

## 2025-01-28 NOTE — PLAN OF CARE
Goal Outcome Evaluation:            Rested between care, up independently in the room, refusing all alarms. Rhythm change from NSR to show ST elevation. EKG ordered and completed, trop complete, [facility] notified, new cardiology consult placed.  Asymptomatic, VSS, 94% rm air

## 2025-01-28 NOTE — SIGNIFICANT NOTE
01/28/25 1424   OTHER   Discipline physical therapy assistant   Rehab Time/Intention   Session Not Performed other (see comments)  (Checked on pt this PM. Pt not appropriate for PT today d/t pt's HR fluctuating in the low 100s and jumping to 150s at rest. Will follow up with pt tomorrow.)   Recommendation   PT - Next Appointment 01/29/25

## 2025-01-28 NOTE — CASE MANAGEMENT/SOCIAL WORK
Post-Acute Authorization Submission      Post Acute Pre-Cert Documentation  Request Submitted by Facility - Type:: Hospital  Post-Acute Authorization Type Submitted:: SNF  Date Post Acute Pre-Cert Inititated per Facility: 01/27/25  Date Post Acute Pre-Cert Completed: 01/28/25  Accepting Facility: [facility] Discharge Date Requested: 01/28/25  All Clinicals Submitted?: Yes  Had Accepting Facility at Time of Submission: Yes  Response Received from Insurance?: Approval  Response Communicated to:: , Accepting Facility Liaison, Accepting Facility Auth Department  Authorization Number:: [id number]  Post Acute Pre-Cert Initiated Comment: VALID TO ADMIT UPTO 2/1/25.              Mario Alberto Pemberton, PCT

## 2025-01-28 NOTE — PROGRESS NOTES
[facility] Consult    Patient Name: Dafne Mary  Age/Sex: 84 y.o. female  : 1940  MRN: [id number]    Date of Admission: 2025  Date of Encounter Visit: 25  Encounter Provider: Nae Norman MD  Referring Provider: Semaj Kwon MD  Place of Service: [facility] CARDIOLOGY  Patient Care Team:  Jossy Sauceda MD as PCP - General (Urgent Care)  Javier Nolan MD as Consulting Physician (Pulmonary Disease)  Nae Norman MD as Consulting Physician (Cardiology)    Subjective:     Consulted for: Abnormal EKG    Chief Complaint: shortness of breath    History of Present Illness:  Dafne Mary is a 84 y.o. female with coronary artery disease with prior stents to LAD and OM, hypertension, hyperlipidemia, paroxysmal atrial fibrillation, chronic heart failure with preserved ejection fraction, CKD, asthma, vocal cord dysfunction, who was readmitted on  for worsening shortness of breath.     The patient was just discharged on 2025 after an admission for acute hypoxic respiratory failure and NSTEMI.  During that admission her cardiac workup included an echocardiogram which showed preserved left ventricular function wall motion with an EF of 51%, mild mitral valve regurgitation.  She underwent coronary angiograms on 2025 which showed patent mid LAD stent with no in-stent restenosis, stable 60 to 70% stenosis of a small caliber diagonal branch, patent left circumflex to obtuse marginal branch stent with no stenosis, and stable nonobstructive disease of the right coronary artery.  Her LVEDP was noted to be mildly elevated at 15 to 19 mmHg.  Of note she was having intermittent left bundle branch block during the course of the procedure.  She was started on low-dose furosemide to help with any potential volume overload that may be contributing to her symptoms of dyspnea.    She presented back to the hospital on 2025 with  "worsening shortness of breath and weakness.  He was noted to be hypoxic on arrival with saturations of 80% on room air.  She is continue to struggle with hypoxia since her admission and was admitted to the hospital from the observation unit.  Her enzymes are trending down from her previous admission with non-STEMI.  Her EKG showed no significant changes.  She developed issues with nausea and vomiting and before she received an antiemetic primary care ordered a repeat EKG for concerns for QT prolongation on admission EKG.  This morning she had 2 repeat EKGs which showed a left bundle branch block which appear to be new compared to prior tracings.  We were consulted for further evaluation.    When I came in to see the patient she just received her treatments with respiratory therapy.  She was initially breathing well but while I was still in the room she began experiencing worsening shortness of breath.  Initially her heart rates were mildly elevated in the 100 send appear to be regular both on exam and on telemetry.  She then appeared to flip into atrial fibrillation with rapid ventricular rates and her shortness of breath continued despite normal oxygenation both on room air and with 3 L nasal cannula.  She was given 5 mg of IV metoprolol with improvement in heart rates to the 120s to 130s.  She was also given a dose of IV furosemide.  Chest x-ray was performed showing unremarkable pulmonary vasculature with possible minimal pleural effusion on the right.  Breathing eventually improved.    According to the RN we were consulted because of his concerns for \"ST elevations\" on her EKG.      Past Medical History:  Past Medical History:   Diagnosis Date    Atrial fibrillation with RVR 6/4/2023    CAD (coronary artery disease) 7/10/2023    Chronic diastolic congestive heart failure 11/17/2022    Depression     Emphysema lung     GERD (gastroesophageal reflux disease)     Heart failure     Hyperlipidemia     Hypertension  "    Hypothyroidism     Mitral valve regurgitation 7/10/2023    NSTEMI (non-ST elevated myocardial infarction) 10/25/2023       Past Surgical History:   Procedure Laterality Date    CARDIAC CATHETERIZATION N/A 7/7/2023    Procedure: Right and Left Heart Cath;  Surgeon: Ra Cole MD;  Location:  [facility];  Service: Cardiology;  Laterality: N/A;    CARDIAC CATHETERIZATION N/A 7/7/2023    Procedure: Percutaneous Coronary Intervention;  Surgeon: Ra Cole MD;  Location:  [facility];  Service: Cardiology;  Laterality: N/A;    CARDIAC CATHETERIZATION N/A 7/7/2023    Procedure: Stent ANDRZEJ coronary;  Surgeon: Ra Cole MD;  Location:  [facility];  Service: Cardiology;  Laterality: N/A;    CARDIAC CATHETERIZATION N/A 7/7/2023    Procedure: Coronary angiography;  Surgeon: Ra Cole MD;  Location:  [facility];  Service: Cardiology;  Laterality: N/A;    CARDIAC CATHETERIZATION N/A 7/7/2023    Procedure: Left ventriculography;  Surgeon: Ra Cole MD;  Location:  [facility];  Service: Cardiology;  Laterality: N/A;    CARDIAC CATHETERIZATION N/A 1/20/2025    Procedure: Left Heart Cath;  Surgeon: Nae Norman MD;  Location: [facility] C[facility];  Service: Cardiovascular;  Laterality: N/A;    CARDIAC CATHETERIZATION N/A 1/20/2025    Procedure: Coronary angiography;  Surgeon: Nae Norman MD;  Location: [facility];  Service: Cardiovascular;  Laterality: N/A;    EYE SURGERY Left     INTUBATION  5/21/2023         PARATHYROIDECTOMY      Patient reports thyroidectomy partial not a para thyroidectomy.    THYROIDECTOMY, PARTIAL      1984       Home Medications:   Medications Prior to Admission   Medication Sig Dispense Refill Last Dose/Taking    acetaminophen (TYLENOL) 325 MG tablet Take 2 tablets by mouth Every 6 (Six) Hours As Needed for Mild Pain.        amLODIPine (NORVASC) 5 MG tablet Take 1 tablet by mouth Daily. 90 tablet 1     apixaban (ELIQUIS) 2.5 MG tablet tablet Take 1 tablet by mouth Every 12 (Twelve) Hours. Indications: Atrial Fibrillation 180 tablet 1     [place] Thyroid 15 MG tablet Take 1 tablet by mouth once daily 90 tablet 0     [place] Thyroid 30 MG tablet Take 1 tablet by mouth once daily 90 tablet 0     Artificial Tear Ointment (artificial tears) ophthalmic ointment Administer 1 application to both eyes Every 1 (One) Hour As Needed (dry eye).       atorvastatin (LIPITOR) 40 MG tablet Take 1 tablet by mouth Daily. 90 tablet 1     Breztri Aerosphere 160-9-4.8 MCG/ACT aerosol inhaler 2 puffs 2 (Two) Times a Day.       carvedilol (COREG) 25 MG tablet Take 1 tablet by mouth 2 (Two) Times a Day With Meals. 60 tablet 0     Cimetidine (TAGAMET PO) Take  by mouth.       clopidogrel (PLAVIX) 75 MG tablet Take 1 tablet by mouth Daily. 90 tablet 3     empagliflozin (Jardiance) 10 MG tablet tablet Take 1 tablet by mouth Daily. 90 tablet 1     furosemide (LASIX) 20 MG tablet Take 1 tablet by mouth Daily. 30 tablet 0     Glycerin-Polysorbate 80 (REFRESH DRY EYE THERAPY OP) Apply 1 drop to eye(s) as directed by provider Daily. For dry eyes       guaiFENesin (MUCINEX) 600 MG 12 hr tablet Take 1 tablet by mouth 2 (Two) Times a Day As Needed for Cough or Congestion.       ipratropium-albuterol (DUO-NEB) 0.5-2.5 mg/3 ml nebulizer Take 3 mL by nebulization Every 4 (Four) Hours As Needed for Wheezing.       LORazepam (ATIVAN) 0.5 MG tablet Take 1 tablet by mouth Every 6 (Six) Hours As Needed for Anxiety. 6 tablet 0     losartan (COZAAR) 50 MG tablet Take 1 tablet by mouth 2 (Two) Times a Day. 90 tablet 1     PARoxetine (PAXIL) 10 MG tablet Take 2 tablets by mouth Daily. 180 tablet 0     Ventolin  (90 Base) MCG/ACT inhaler Inhale 2 puffs Every 4 (Four) Hours As Needed.       vitamin B-12 (CYANOCOBALAMIN) 500 MCG tablet Take 1 tablet by mouth Daily.       vitamin  C (ASCORBIC ACID) 250 MG tablet Take 2 tablets by mouth Daily.       vitamin D3 125 MCG (5000 UT) capsule capsule Take 1 capsule by mouth Daily.          Allergies:  Allergies   Allergen Reactions    Lansoprazole Nausea Only     NAUSEA  NAUSEA  NAUSEA    Buspirone Other (See Comments)     agitation    Diphenhydramine Hcl (Sleep) Irritability    Lisinopril Cough       Past Social History:  Social History     Socioeconomic History    Marital status:    Tobacco Use    Smoking status: Never     Passive exposure: Never    Smokeless tobacco: Never   Vaping Use    Vaping status: Never Used   Substance and Sexual Activity    Alcohol use: Yes     Comment: rare    Drug use: No    Sexual activity: Defer       Past Family History:  Family History   Problem Relation Age of Onset    Alzheimer's disease Mother     Lung disease Father     Alcohol abuse Father     Heart disease Brother     Hypertension Brother     Lung disease Brother     Alcohol abuse Brother     Cancer Brother         LUNG  WAS A SMOKER    Thyroid disease Maternal Grandmother        Review of Systems:   All systems reviewed. Pertinent positives identified in HPI. All other systems are negative.    Objective:   Temp:  [97.4 °F (36.3 °C)-99 °F (37.2 °C)] 97.9 °F (36.6 °C)  Heart Rate:  [83-92] 86  Resp:  [16-20] 16  BP: (107-156)/(70-94) 149/70   No intake or output data in the 24 hours ending 01/28/25 0747  Body mass index is 21.77 kg/m².      01/24/25  1624   Weight: 54 kg (119 lb)     Weight change:     Physical Exam:   General Appearance:    Alert, cooperative, in no acute distress   Head:    Normocephalic, without obvious abnormality, atraumatic   Eyes:            Lids and lashes normal, conjunctivae and sclerae normal, no   icterus, no pallor, corneas clear, PERRLA   Ears:    Ears appear intact with no abnormalities noted   Neck:   No adenopathy, supple, trachea midline, no thyromegaly, no   carotid bruit, no JVD   Lungs:     Clear to  "auscultation,respirations regular, even and unlabored    Heart:    Regular rhythm and normal rate, normal S1 and S2, no murmur, no gallop, no rub, no click   Chest Wall:    No abnormalities observed   Abdomen:     Normal bowel sounds, no masses, no organomegaly, soft        non-tender, non-distended, no guarding, no rebound  tenderness   Extremities:   Moves all extremities well, no edema, no cyanosis, no redness   Pulses:   Pulses palpable and equal bilaterally. Normal radial, carotid, femoral, dorsalis pedis and posterior tibial pulses bilaterally. Normal abdominal aorta   Skin:  Psychiatric:   No bleeding, bruising or rash    Alert and oriented x 3, normal mood and affect       Lab Review:   Results from last 7 days   Lab Units 01/28/25  0402 01/27/25  0706 01/26/25  0827 01/25/25  0832 01/24/25  1249 01/23/25  0539 01/22/25  0418   SODIUM mmol/L 136 131* 131* 135* 140 140 136   POTASSIUM mmol/L 4.1 4.0 3.9 3.7 3.8 3.4* 3.9   CHLORIDE mmol/L 99 95* 93* 97* 98 103 104   CO2 mmol/L 28.0 27.8 27.0 23.1 30.0* 26.9 20.0*   BUN mg/dL 25* 26* 25* 14 10 7* 11   CREATININE mg/dL 0.78 1.00 1.05* 0.92 0.89 0.81 0.86   GLUCOSE mg/dL 111* 106* 121* 141* 153* 110* 105*   CALCIUM mg/dL 8.6 8.8 8.6 9.1 9.3 8.7 8.5*   AST (SGOT) U/L 26  --   --   --  36*  --   --    ALT (SGPT) U/L 22  --   --   --  27  --   --      Results from last 7 days   Lab Units 01/28/25  0523 01/28/25  0402 01/24/25  1951 01/24/25  1416 01/24/25  1249   HSTROP T ng/L 57* 60* 122* 181* 152*     Results from last 7 days   Lab Units 01/28/25  0402 01/27/25  0706 01/26/25  0827 01/25/25  0832 01/24/25  1249 01/23/25  0539 01/22/25  0418   WBC 10*3/mm3 13.02* 14.06* 16.73* 10.92* 10.97* 6.47 5.98   HEMOGLOBIN g/dL 12.0 12.0 11.4* 13.0 13.6 11.7* 12.7   HEMATOCRIT % 36.5 36.4 34.1 39.2 40.9 35.3 39.3   PLATELETS 10*3/mm3 377 356 323 369 326 264 771                   Invalid input(s): \"LDLCALC\"  Results from last 7 days   Lab Units 01/24/25  1249   PROBNP pg/mL " 1,521.0           Echo EF Estimated  Lab Results   Component Value Date    ECHOEFEST 53 07/25/2022       EKG:     Imaging:  Imaging Results (Most Recent)       Procedure Component Value Units Date/Time    CT Angiogram Chest [[id number]] Collected: 01/25/25 0018     Updated: 01/25/25 0026    Narrative:      CTA CHEST WITH IV CONTRAST     HISTORY: SOA with minimal exertion; Hypoxia; J44.1-Chronic obstructive  pulmonary disease with (acute) exacerbation     COMPARISON: Chest CT 10895     TECHNIQUE: CT angiography was performed of the chest with axial images  as well as coronal and sagittal reformatted MIP images provided  following intravenous administration of contrast. 3-D surface rendered  reformats were obtained of the pulmonary arteries and aorta.  Radiation  dose reduction techniques were utilized, including automated exposure  control, and exposure modulation based on body size.        FINDINGS:     There is no pulmonary infiltrate, pleural effusion, pneumothorax or  suspicious nodule.     The thoracic aorta is normal in caliber. There are coronary  atherosclerotic vascular calcifications.  There is no suspicious  mediastinal adenopathy or other mass.     Images of the upper abdomen show no acute abnormality.  There is no  acute bony abnormality.     Bolus timing is good and there is no evidence of pulmonary embolism.       Impression:         1.  Pulmonary arteries are well-opacified, and there is no evidence of  pulmonary embolism.     1.  No acute pulmonary parenchymal abnormality is seen.           This report was finalized on 1/25/2025 12:23 AM by Dr. Sen Martinez M.D on Workstation: [id number]       XR Chest 1 View [[id number]] Collected: 01/24/25 1326     Updated: 01/24/25 1332    Narrative:      XR CHEST 1 VW-     HISTORY: Female who is 84 years-old, short of breath     TECHNIQUE: Frontal view of the chest     COMPARISON: 1/22/2025     FINDINGS: The heart is enlarged. Aorta is tortuous, calcified.  Pulmonary  vasculature is unremarkable. No focal pulmonary consolidation, pleural  effusion, or pneumothorax. No acute osseous process.       Impression:      No focal pulmonary consolidation. Cardiomegaly. Tortuous  aorta. Follow-up as clinically indicated.           This report was finalized on 1/24/2025 1:29 PM by Dr. Ned Guzman M.D on Workstation: [id number]               I personally viewed and interpreted the patient's EKG    Assessment/Plan:     Intermittent left bundle branch block.  Reviewed her current EKGs and previous tracings.  It appears that she has a left bundle branch block on current tracings.  I noted this intermittently during last admission.  This is not a new finding.  COPD with acute exacerbation  Acute hypoxic respiratory failure  Recent NSTEMI.  With no acute disease by cath on 1/20. Troponins trending down.    Coronary artery disease.  Recent cath on 1/20 with patent stents.   Paroxysmal atrial fibrillation.  In sinus rhythm flipped into atrial fibrillation this morning.  He received a dose of IV metoprolol with some improvement in heart rates.  Chronic heart failure with preserved ejection fraction.  Furosemide was placed on hold for hyponatremia on admission.   Hypertension  Hyperlipidemia  CKD stage 3a  Hypothyroidism  Hyponatremia.  Improved.      -Continue carvedilol and apixaban.  Will see how her heart rates do with oral carvedilol.  - She received a dose of IV furosemide this morning.  Will determine if she needs any further diuresis depending on her clinical response.  - She has had a thorough cardiac workup during his last admission including an echocardiogram and cardiac catheterization which were both unremarkable.  Her left bundle branch block was intermittently noted during her last admission and is not anything new.  No need for further cardiac workup for this.    Thank you for allowing me to participate in the care of Dafne Mary. Feel free to contact me directly  with any further questions or concerns.    Nae Norman MD  [facility]  01/28/25  07:47 EST

## 2025-01-28 NOTE — PLAN OF CARE
Goal Outcome Evaluation:  Plan of Care Reviewed With: patient        Progress: no change  Outcome Evaluation: pt aox4, on room air-- baseline, Afib on monitor-- cardio aware & meds given per mar, 12 lead in chart, cxr done, stand by assist, daughter at bedside

## 2025-01-28 NOTE — PROGRESS NOTES
Name: Dafne Mary ADMIT: 2025   : 1940  PCP: Jossy Sauceda MD    MRN: [id number] LOS: 3 days   AGE/SEX: 84 y.o. female  ROOM: [facility]     Subjective   Subjective   No acute events overnight.  Patient feeling okay today.  Feels a bit better but still with intermittent shortness of breath.  Heart rate continues to be labile.  Seem to worsen after albuterol treatment.  Cardiology was consulted overnight for potential ST elevation but troponin was actually improved from prior.  They have seen patient today.    Objective   Objective     Vital Signs  Temp:  [97.3 °F (36.3 °C)-99 °F (37.2 °C)] 97.3 °F (36.3 °C)  Heart Rate:  [] 144  Resp:  [16-18] 16  BP: (107-156)/(67-86) 119/67  SpO2:  [89 %-100 %] 94 %  on  Flow (L/min) (Oxygen Therapy):  [0-3] 3;   Device (Oxygen Therapy): room air  Body mass index is 21.77 kg/m².    Physical Exam  General: Alert, no acute distress.  Sitting up in bed.  Answers questions appropriately.  ENT: No conjunctival injection or scleral icterus. Moist mucous membranes.   Neuro: Eyes open and moving in all directions, generalized weakness, face symmetric, no focal deficits.   Lungs: Fairly good air movement, occasional end expiratory wheeze.  Improved from yesterday.  No distress.  Heart: RRR, no murmurs. No edema.  Abdomen: Soft, non-tender, non-distended. Normal bowel sounds.   Ext: Warm and well-perfused. No edema.   Skin: No rashes or lesions. IV site without swelling or erythema.     Results Review     I reviewed the patient's new clinical results:  Results from last 7 days   Lab Units 25  0402 25  0706 25  0827 25  0832   WBC 10*3/mm3 13.02* 14.06* 16.73* 10.92*   HEMOGLOBIN g/dL 12.0 12.0 11.4* 13.0   PLATELETS 10*3/mm3 377 356 323 369     Results from last 7 days   Lab Units 25  0402 25  0706 25  0827 25  0832   SODIUM mmol/L 136 131* 131* 135*   POTASSIUM mmol/L 4.1 4.0 3.9 3.7   CHLORIDE mmol/L 99 95* 93* 97*  "  CO2 mmol/L 28.0 27.8 27.0 23.1   BUN mg/dL 25* 26* 25* 14   CREATININE mg/dL 0.78 1.00 1.05* 0.92   GLUCOSE mg/dL 111* 106* 121* 141*   EGFR mL/min/1.73 75.0 55.7* 52.5* 61.5     Results from last 7 days   Lab Units 01/28/25  0402 01/24/25  1249   ALBUMIN g/dL 3.5 4.1   BILIRUBIN mg/dL 0.3 0.5   ALK PHOS U/L 105 127*   AST (SGOT) U/L 26 36*   ALT (SGPT) U/L 22 27     Results from last 7 days   Lab Units 01/28/25  0402 01/27/25  0706 01/26/25  0827 01/25/25  0832 01/24/25  1249   CALCIUM mg/dL 8.6 8.8 8.6 9.1 9.3   ALBUMIN g/dL 3.5  --   --   --  4.1     Results from last 7 days   Lab Units 01/26/25  1035   LACTATE mmol/L 1.8     No results found for: \"HGBA1C\", \"POCGLU\"    XR Chest 1 View    Result Date: 1/28/2025  As described.   This report was finalized on 1/28/2025 9:03 AM by Dr. Ned Guzman M.D on Workstation: [id number]       I have personally reviewed all medications:  Scheduled Medications  amLODIPine, 5 mg, Oral, Daily  amoxicillin-clavulanate, 1 tablet, Oral, Q12H  apixaban, 2.5 mg, Oral, Q12H  atorvastatin, 40 mg, Oral, Daily  budesonide-formoterol, 2 puff, Inhalation, BID - RT   And  revefenacin, 175 mcg, Nebulization, Daily - RT  carvedilol, 25 mg, Oral, BID With Meals  clopidogrel, 75 mg, Oral, Daily  [Held by provider] furosemide, 20 mg, Oral, Daily  ipratropium-albuterol, 3 mL, Nebulization, Q6H - RT  losartan, 50 mg, Oral, BID  oxymetazoline, 2 spray, Each Nare, BID  PARoxetine, 20 mg, Oral, Daily  predniSONE, 40 mg, Oral, Daily  thyroid, 15 mg, Oral, Q AM  Thyroid, 30 mg, Oral, Q AM    Infusions   Diet  Diet: Regular/House; Texture: Mechanical Ground (NDD 2); Fluid Consistency: Thin (IDDSI 0)      Intake/Output Summary (Last 24 hours) at 1/28/2025 1626  Last data filed at 1/28/2025 1300  Gross per 24 hour   Intake 600 ml   Output --   Net 600 ml       Assessment/Plan     Active Hospital Problems    Diagnosis  POA    **COPD exacerbation [J44.1]  Yes    Hypoxia [R09.02]  Yes    Generalized " weakness [R53.1]  Unknown    Stage 3a chronic kidney disease [N18.31]  Yes    Chronic HFrEF (heart failure with reduced ejection fraction) [I50.22]  Yes    PAF (paroxysmal atrial fibrillation) [I48.0]  Yes    Vocal cord paralysis [J38.00]  Yes    Anxiety [F41.9]  Yes    Benign hypertension [I10]  Yes      Resolved Hospital Problems   No resolved problems to display.     84 y.o. female with COPD exacerbation.    Acute hypoxic respiratory failure secondary to COPD exacerbation  Vocal cord dysfunction  Leukocytosis  -Pulmonology following  -Continue inhalers and DuoNebs  -Augmentin x 7 days for sinusitis per pulmonology  -Oral prednisone  -ENT referral at discharge  -Currently on room air  -Leukocytosis improved to 13K today, likely secondary to steroids  -Repeat CBC with morning labs    Coronary artery disease  Hypertension  Atrial fibrillation with RVR  Chronic diastolic heart failure  Elevated troponin  -Continue amlodipine and losartan  -RC: Coreg  -AC: reduced dose Eliquis  -Holding oral Lasix today given hyponatremia  -Continue Plavix and statin  -Cardiology now consulted  -Recent cardiac catheterization, troponin downtrending and rechecked today.  EKG with no acute ischemic changes.  Repeat EKG and troponin as clinically indicated.    Hyponatremia  -Sodium has normalized today  -Repeat BMP with morning labs  -Hold oral Lasix  -If sodium continues to decrease, will need to perform additional workup    CKD stage IIIa  -Creatinine consistent with baseline  -Avoid nephrotoxic agents including NSAIDs and contrast dyes  -Repeat BMP with morning labs    Asymptomatic bacteriuria  -UA with trace leukocyte, positive nitrite, 3-5 WBC, 4+ bacteria  -Patient denies any dysuria, urinary frequency, suprapubic pain  -On Augmentin as above which would likely cover most urinary pathogen  -Monitor clinically, repeat UA should symptoms develop    Eliquis (home med) for DVT prophylaxis.  Full code.  Discussed with patient and  nursing staff.  Anticipate discharge to SNU facility hopefully tomorrow if cardiology clears      Beckie Treviño MD  [facility]  01/28/25  16:26 EST

## 2025-01-29 LAB
ALBUMIN SERPL-MCNC: 3.2 G/DL (ref 3.5–5.2)
ALBUMIN/GLOB SERPL: 1.5 G/DL
ALP SERPL-CCNC: 105 U/L (ref 39–117)
ALT SERPL W P-5'-P-CCNC: 22 U/L (ref 1–33)
ANION GAP SERPL CALCULATED.3IONS-SCNC: 9 MMOL/L (ref 5–15)
AST SERPL-CCNC: 29 U/L (ref 1–32)
BASOPHILS # BLD AUTO: 0.02 10*3/MM3 (ref 0–0.2)
BASOPHILS NFR BLD AUTO: 0.1 % (ref 0–1.5)
BILIRUB SERPL-MCNC: 0.4 MG/DL (ref 0–1.2)
BUN SERPL-MCNC: 28 MG/DL (ref 8–23)
BUN/CREAT SERPL: 31.8 (ref 7–25)
CALCIUM SPEC-SCNC: 8.4 MG/DL (ref 8.6–10.5)
CHLORIDE SERPL-SCNC: 98 MMOL/L (ref 98–107)
CO2 SERPL-SCNC: 27 MMOL/L (ref 22–29)
CREAT SERPL-MCNC: 0.88 MG/DL (ref 0.57–1)
DEPRECATED RDW RBC AUTO: 39.1 FL (ref 37–54)
EGFRCR SERPLBLD CKD-EPI 2021: 64.9 ML/MIN/1.73
EOSINOPHIL # BLD AUTO: 0.01 10*3/MM3 (ref 0–0.4)
EOSINOPHIL NFR BLD AUTO: 0.1 % (ref 0.3–6.2)
ERYTHROCYTE [DISTWIDTH] IN BLOOD BY AUTOMATED COUNT: 11.9 % (ref 12.3–15.4)
GLOBULIN UR ELPH-MCNC: 2.1 GM/DL
GLUCOSE SERPL-MCNC: 115 MG/DL (ref 65–99)
HCT VFR BLD AUTO: 39.9 % (ref 34–46.6)
HGB BLD-MCNC: 13.5 G/DL (ref 12–15.9)
IMM GRANULOCYTES # BLD AUTO: 0.29 10*3/MM3 (ref 0–0.05)
IMM GRANULOCYTES NFR BLD AUTO: 1.8 % (ref 0–0.5)
LYMPHOCYTES # BLD AUTO: 0.93 10*3/MM3 (ref 0.7–3.1)
LYMPHOCYTES NFR BLD AUTO: 5.9 % (ref 19.6–45.3)
MCH RBC QN AUTO: 30.4 PG (ref 26.6–33)
MCHC RBC AUTO-ENTMCNC: 33.8 G/DL (ref 31.5–35.7)
MCV RBC AUTO: 89.9 FL (ref 79–97)
MONOCYTES # BLD AUTO: 1.25 10*3/MM3 (ref 0.1–0.9)
MONOCYTES NFR BLD AUTO: 7.9 % (ref 5–12)
NEUTROPHILS NFR BLD AUTO: 13.36 10*3/MM3 (ref 1.7–7)
NEUTROPHILS NFR BLD AUTO: 84.2 % (ref 42.7–76)
NRBC BLD AUTO-RTO: 0 /100 WBC (ref 0–0.2)
PLATELET # BLD AUTO: 396 10*3/MM3 (ref 140–450)
PMV BLD AUTO: 9.1 FL (ref 6–12)
POTASSIUM SERPL-SCNC: 4.5 MMOL/L (ref 3.5–5.2)
PROT SERPL-MCNC: 5.3 G/DL (ref 6–8.5)
RBC # BLD AUTO: 4.44 10*6/MM3 (ref 3.77–5.28)
SODIUM SERPL-SCNC: 134 MMOL/L (ref 136–145)
WBC NRBC COR # BLD AUTO: 15.86 10*3/MM3 (ref 3.4–10.8)

## 2025-01-29 PROCEDURE — 94799 UNLISTED PULMONARY SVC/PX: CPT

## 2025-01-29 PROCEDURE — 85025 COMPLETE CBC W/AUTO DIFF WBC: CPT | Performed by: INTERNAL MEDICINE

## 2025-01-29 PROCEDURE — 94761 N-INVAS EAR/PLS OXIMETRY MLT: CPT

## 2025-01-29 PROCEDURE — 80053 COMPREHEN METABOLIC PANEL: CPT | Performed by: STUDENT IN AN ORGANIZED HEALTH CARE EDUCATION/TRAINING PROGRAM

## 2025-01-29 PROCEDURE — 94664 DEMO&/EVAL PT USE INHALER: CPT

## 2025-01-29 PROCEDURE — 63710000001 PREDNISONE PER 1 MG: Performed by: INTERNAL MEDICINE

## 2025-01-29 PROCEDURE — 99232 SBSQ HOSP IP/OBS MODERATE 35: CPT | Performed by: NURSE PRACTITIONER

## 2025-01-29 RX ORDER — IPRATROPIUM BROMIDE AND ALBUTEROL SULFATE 2.5; .5 MG/3ML; MG/3ML
3 SOLUTION RESPIRATORY (INHALATION) EVERY 4 HOURS PRN
Status: DISCONTINUED | OUTPATIENT
Start: 2025-01-29 | End: 2025-02-01 | Stop reason: HOSPADM

## 2025-01-29 RX ADMIN — CLOPIDOGREL BISULFATE 75 MG: 75 TABLET ORAL at 09:25

## 2025-01-29 RX ADMIN — CARVEDILOL 25 MG: 25 TABLET, FILM COATED ORAL at 09:25

## 2025-01-29 RX ADMIN — AMOXICILLIN AND CLAVULANATE POTASSIUM 1 TABLET: 875; 125 TABLET, FILM COATED ORAL at 09:25

## 2025-01-29 RX ADMIN — LOSARTAN POTASSIUM 50 MG: 50 TABLET, FILM COATED ORAL at 21:17

## 2025-01-29 RX ADMIN — APIXABAN 2.5 MG: 2.5 TABLET, FILM COATED ORAL at 21:16

## 2025-01-29 RX ADMIN — METOPROLOL TARTRATE 5 MG: 5 INJECTION INTRAVENOUS at 14:11

## 2025-01-29 RX ADMIN — PREDNISONE 40 MG: 20 TABLET ORAL at 09:25

## 2025-01-29 RX ADMIN — AMLODIPINE BESYLATE 5 MG: 5 TABLET ORAL at 09:25

## 2025-01-29 RX ADMIN — LORAZEPAM 0.5 MG: 0.5 TABLET ORAL at 11:36

## 2025-01-29 RX ADMIN — LEVOTHYROXINE, LIOTHYRONINE 30 MG: 19; 4.5 TABLET ORAL at 05:34

## 2025-01-29 RX ADMIN — ATORVASTATIN CALCIUM 40 MG: 20 TABLET, FILM COATED ORAL at 09:25

## 2025-01-29 RX ADMIN — ACETAMINOPHEN 650 MG: 325 TABLET, FILM COATED ORAL at 19:37

## 2025-01-29 RX ADMIN — LOSARTAN POTASSIUM 50 MG: 50 TABLET, FILM COATED ORAL at 09:25

## 2025-01-29 RX ADMIN — LORAZEPAM 0.5 MG: 0.5 TABLET ORAL at 21:16

## 2025-01-29 RX ADMIN — PAROXETINE HYDROCHLORIDE 20 MG: 20 TABLET, FILM COATED ORAL at 09:25

## 2025-01-29 RX ADMIN — APIXABAN 2.5 MG: 2.5 TABLET, FILM COATED ORAL at 09:25

## 2025-01-29 RX ADMIN — ACETAMINOPHEN 650 MG: 325 TABLET, FILM COATED ORAL at 11:36

## 2025-01-29 RX ADMIN — AMOXICILLIN AND CLAVULANATE POTASSIUM 1 TABLET: 875; 125 TABLET, FILM COATED ORAL at 21:16

## 2025-01-29 RX ADMIN — CARVEDILOL 25 MG: 25 TABLET, FILM COATED ORAL at 17:54

## 2025-01-29 RX ADMIN — BUDESONIDE AND FORMOTEROL FUMARATE DIHYDRATE 2 PUFF: 160; 4.5 AEROSOL RESPIRATORY (INHALATION) at 20:39

## 2025-01-29 RX ADMIN — BUDESONIDE AND FORMOTEROL FUMARATE DIHYDRATE 2 PUFF: 160; 4.5 AEROSOL RESPIRATORY (INHALATION) at 08:10

## 2025-01-29 RX ADMIN — LEVOTHYROXINE, LIOTHYRONINE 15 MG: 19; 4.5 TABLET ORAL at 05:32

## 2025-01-29 NOTE — PROGRESS NOTES
[facility] INPATIENT PROGRESS NOTE         [address]    2025      PATIENT IDENTIFICATION:  Name: Dafne Mary ADMIT: 2025   : 1940  PCP: Jossy Sauceda MD    MRN: [id number] LOS: 4 days   AGE/SEX: 84 y.o. female  ROOM: [facility]                     LOS 4    Reason for visit: Acute hypoxemia, vocal cord dysfunction and asked      SUBJECTIVE:      Resting comfortably.  Says that her breathing has improved.  She is on room air.  Working on rehab placement.    Objective   OBJECTIVE:    Vital Sign Min/Max for last 24 hours  Temp  Min: 97.3 °F (36.3 °C)  Max: 98.2 °F (36.8 °C)   BP  Min: 106/58  Max: 131/74   Pulse  Min: 90  Max: 144   Resp  Min: 16  Max: 18   SpO2  Min: 93 %  Max: 97 %   No data recorded   No data recorded    Vitals:    25 0156 25 0330 25 0745 25 0810   BP:   131/74    BP Location:   Left arm    Patient Position:   Lying    Pulse: 109 90 101 94   Resp:    16   Temp:   98.1 °F (36.7 °C)    TempSrc:   Oral    SpO2: 94% 93% 97% 95%   Weight:       Height:                25  1624   Weight: 54 kg (119 lb)       Body mass index is 21.77 kg/m².                          Body mass index is 21.77 kg/m².    Intake/Output Summary (Last 24 hours) at 2025 0910  Last data filed at 2025 1855  Gross per 24 hour   Intake 720 ml   Output 600 ml   Net 120 ml           Exam:  GEN:  No distress, appears stated age  EYES:   PERRL, anicteric sclerae  ENT:    External ears/nose normal, OP clear  NECK:  No adenopathy, midline trachea  LUNGS: Normal chest on inspection, palpation and auscultation  CV:  Normal S1S2, without murmur  ABD:  Nontender, nondistended, no hepatosplenomegaly, +BS  EXT:  No edema.  No cyanosis or clubbing.  No mottling and normal cap refill.    Assessment     Scheduled meds:  amLODIPine, 5 mg, Oral, Daily  amoxicillin-clavulanate, 1 tablet, Oral, Q12H  apixaban, 2.5 mg, Oral, Q12H  atorvastatin, 40 mg, Oral,  "Daily  budesonide-formoterol, 2 puff, Inhalation, BID - RT   And  revefenacin, 175 mcg, Nebulization, Daily - RT  carvedilol, 25 mg, Oral, BID With Meals  clopidogrel, 75 mg, Oral, Daily  [Held by provider] furosemide, 20 mg, Oral, Daily  ipratropium-albuterol, 3 mL, Nebulization, Q6H - RT  losartan, 50 mg, Oral, BID  PARoxetine, 20 mg, Oral, Daily  predniSONE, 40 mg, Oral, Daily  thyroid, 15 mg, Oral, Q AM  Thyroid, 30 mg, Oral, Q AM      IV meds:                         Data Review:  Results from last 7 days   Lab Units 01/29/25  0504 01/28/25  0402 01/27/25  0706 01/26/25  0827 01/25/25  0832   SODIUM mmol/L 134* 136 131* 131* 135*   POTASSIUM mmol/L 4.5 4.1 4.0 3.9 3.7   CHLORIDE mmol/L 98 99 95* 93* 97*   CO2 mmol/L 27.0 28.0 27.8 27.0 23.1   BUN mg/dL 28* 25* 26* 25* 14   CREATININE mg/dL 0.88 0.78 1.00 1.05* 0.92   GLUCOSE mg/dL 115* 111* 106* 121* 141*   CALCIUM mg/dL 8.4* 8.6 8.8 8.6 9.1         Estimated Creatinine Clearance: 40.6 mL/min (by C-G formula based on SCr of 0.88 mg/dL).  Results from last 7 days   Lab Units 01/29/25  0504 01/28/25  0402 01/27/25  0706 01/26/25  0827 01/25/25  0832   WBC 10*3/mm3 15.86* 13.02* 14.06* 16.73* 10.92*   HEMOGLOBIN g/dL 13.5 12.0 12.0 11.4* 13.0   PLATELETS 10*3/mm3 396 377 356 323 369           Results from last 7 days   Lab Units 01/29/25  0504 01/28/25  0402 01/24/25  1249   ALT (SGPT) U/L 22 22 27   AST (SGOT) U/L 29 26 36*     Results from last 7 days   Lab Units 01/25/25  0334 01/24/25  1250   PH, ARTERIAL pH units 7.392 7.354   PO2 ART mm Hg 61.5* 174.8*   PCO2, ARTERIAL mm Hg 43.7 57.6*   HCO3 ART mmol/L 26.6 32.1*     Results from last 7 days   Lab Units 01/26/25  1035   LACTATE mmol/L 1.8         No results found for: \"HGBA1C\", \"POCGLU\"      Imaging reviewed  Chest x-ray and CT chest 1/24 reviewed: No acute    Chest x-ray 1/28 reviewed: Mildly enlarged heart.  Minimal basilar atelectasis.  No acute.        Microbiology reviewed  RVP negative          Active " Hospital Problems    Diagnosis  POA    **COPD exacerbation [J44.1]  Yes    Hypoxia [R09.02]  Yes    Generalized weakness [R53.1]  Unknown    Stage 3a chronic kidney disease [N18.31]  Yes    Chronic HFrEF (heart failure with reduced ejection fraction) [I50.22]  Yes    PAF (paroxysmal atrial fibrillation) [I48.0]  Yes    Vocal cord paralysis [J38.00]  Yes    Anxiety [F41.9]  Yes    Benign hypertension [I10]  Yes      Resolved Hospital Problems   No resolved problems to display.         ASSESSMENT:  Acute hypoxemic and hypercapnic respiratory failure  Asthma  Vocal cord dysfunction  Acute Sinusitis       PLAN:  Encourage pulmonary toilet.   Change nebs to as needed.  Continue Symbicort.  Plan for evaluation by ENT and speech therapy for vocal cord dysfunction and evaluation for anatomical abnormalities of vocal cords.  Will have her see Candace Whitehead as out pt, speech therapist specializing in VCD.  She also has a significant contributing factor with anxiety and she has a referral to outpatient psychiatry clinic to help in that regard.    Working on rehab placement. No objection to discharge.       Javier Nolan MD  Pulmonary and Critical Care Medicine  [facility], [facility]  1/29/2025    09:10 EST

## 2025-01-29 NOTE — PROGRESS NOTES
Name: Dafne Mary ADMIT: 2025   : 1940  PCP: Jossy Sauceda MD    MRN: [id number] LOS: 4 days   AGE/SEX: 84 y.o. female  [facility]OM: [facility]     Subjective   Subjective   No acute events overnight.  This afternoon, patient is feeling dizzy.  Heart rate has been pretty elevated and nursing just gave a dose of IV metoprolol.  She denies any chest pain or shortness of breath.  No nausea.    Objective   Objective     Vital Signs  Temp:  [97.9 °F (36.6 °C)-98.2 °F (36.8 °C)] 98.1 °F (36.7 °C)  Heart Rate:  [] 94  Resp:  [16-18] 16  BP: (106-131)/(58-74) 111/60  SpO2:  [93 %-97 %] 95 %  on   ;   Device (Oxygen Therapy): room air  Body mass index is 21.77 kg/m².    Physical Exam  General: Alert, no acute distress.  Lying in bed.  Answers questions appropriately.  ENT: No conjunctival injection or scleral icterus. Moist mucous membranes.   Neuro: Eyes open and moving in all directions, generalized weakness, face symmetric, no focal deficits.   Lungs: Fairly good air movement, occasional end expiratory wheeze.  No distress.  Heart: Tachycardic, irregularly irregular rhythm.  No murmur.  Abdomen: Soft, non-tender, non-distended. Normal bowel sounds.   Ext: Trace edema bilateral lower extremities.  Skin: No rashes or lesions. IV site without swelling or erythema.     Results Review     I reviewed the patient's new clinical results:  Results from last 7 days   Lab Units 25  0504 25  0402 25  0706 25  0827   WBC 10*3/mm3 15.86* 13.02* 14.06* 16.73*   HEMOGLOBIN g/dL 13.5 12.0 12.0 11.4*   PLATELETS 10*3/mm3 396 377 356 323     Results from last 7 days   Lab Units 25  0504 25  0402 25  0706 25  0827   SODIUM mmol/L 134* 136 131* 131*   POTASSIUM mmol/L 4.5 4.1 4.0 3.9   CHLORIDE mmol/L 98 99 95* 93*   CO2 mmol/L 27.0 28.0 27.8 27.0   BUN mg/dL 28* 25* 26* 25*   CREATININE mg/dL 0.88 0.78 1.00 1.05*   GLUCOSE mg/dL 115* 111* 106* 121*   EGFR mL/min/1.73 64.9 75.0  "55.7* 52.5*     Results from last 7 days   Lab Units 01/29/25  0504 01/28/25  0402 01/24/25  1249   ALBUMIN g/dL 3.2* 3.5 4.1   BILIRUBIN mg/dL 0.4 0.3 0.5   ALK PHOS U/L 105 105 127*   AST (SGOT) U/L 29 26 36*   ALT (SGPT) U/L 22 22 27     Results from last 7 days   Lab Units 01/29/25  0504 01/28/25  0402 01/27/25  0706 01/26/25  0827 01/25/25  0832 01/24/25  1249   CALCIUM mg/dL 8.4* 8.6 8.8 8.6   < > 9.3   ALBUMIN g/dL 3.2* 3.5  --   --   --  4.1    < > = values in this interval not displayed.     Results from last 7 days   Lab Units 01/26/25  1035   LACTATE mmol/L 1.8     No results found for: \"HGBA1C\", \"POCGLU\"    XR Chest 1 View    Result Date: 1/28/2025  As described.   This report was finalized on 1/28/2025 9:03 AM by Dr. Ned Guzman M.D on Workstation: [id number]       I have personally reviewed all medications:  Scheduled Medications  amLODIPine, 5 mg, Oral, Daily  amoxicillin-clavulanate, 1 tablet, Oral, Q12H  apixaban, 2.5 mg, Oral, Q12H  atorvastatin, 40 mg, Oral, Daily  budesonide-formoterol, 2 puff, Inhalation, BID - RT   And  revefenacin, 175 mcg, Nebulization, Daily - RT  carvedilol, 25 mg, Oral, BID With Meals  clopidogrel, 75 mg, Oral, Daily  [Held by provider] furosemide, 20 mg, Oral, Daily  losartan, 50 mg, Oral, BID  PARoxetine, 20 mg, Oral, Daily  predniSONE, 40 mg, Oral, Daily  thyroid, 15 mg, Oral, Q AM  Thyroid, 30 mg, Oral, Q AM    Infusions   Diet  Diet: Regular/House; Texture: Mechanical Ground (NDD 2); Fluid Consistency: Thin (IDDSI 0)      Intake/Output Summary (Last 24 hours) at 1/29/2025 6983  Last data filed at 1/29/2025 1254  Gross per 24 hour   Intake 720 ml   Output 600 ml   Net 120 ml       Assessment/Plan     Active Hospital Problems    Diagnosis  POA    **COPD exacerbation [J44.1]  Yes    Hypoxia [R09.02]  Yes    Generalized weakness [R53.1]  Unknown    Stage 3a chronic kidney disease [N18.31]  Yes    Chronic HFrEF (heart failure with reduced ejection fraction) [I50.22]  " Yes    PAF (paroxysmal atrial fibrillation) [I48.0]  Yes    Vocal cord paralysis [J38.00]  Yes    Anxiety [F41.9]  Yes    Benign hypertension [I10]  Yes      Resolved Hospital Problems   No resolved problems to display.     84 y.o. female with COPD exacerbation.    Acute hypoxic respiratory failure secondary to COPD exacerbation  Vocal cord dysfunction  Leukocytosis  -Pulmonology following  -Continue inhalers and DuoNebs  -Augmentin x 7 days for sinusitis per pulmonology  -Oral prednisone  -ENT referral at discharge  -Currently on room air  -Leukocytosis continuing to fluctuate, 16K today but no new infectious symptoms and afebrile  -Repeat CBC with morning labs    Coronary artery disease  Hypertension  Atrial fibrillation with RVR  Chronic diastolic heart failure  Elevated troponin  -Continue amlodipine and losartan  -RC: Coreg  -AC: reduced dose Eliquis  -Holding oral Lasix today given hyponatremia  -Continue Plavix and statin  -Cardiology now consulted  -Recent cardiac catheterization, troponin downtrending and rechecked yesterday.  EKG with no acute ischemic changes.  Repeat EKG and troponin as clinically indicated.  -With complaints of dizziness today, check orthostatics  -Cardiology managing RVR.  Given IV metoprolol this afternoon but does not appear to have helped much.  May need IV digoxin but will defer to their service.    Hyponatremia  -Sodium 134 today  -Repeat BMP with morning labs  -Hold oral Lasix  -If sodium continues to decrease, will need to perform additional workup    CKD stage IIIa  -Creatinine consistent with baseline  -Avoid nephrotoxic agents including NSAIDs and contrast dyes  -Repeat BMP with morning labs    Asymptomatic bacteriuria  -UA with trace leukocyte, positive nitrite, 3-5 WBC, 4+ bacteria  -Patient denies any dysuria, urinary frequency, suprapubic pain  -On Augmentin as above which would likely cover most urinary pathogens  -Monitor clinically, repeat UA should symptoms  develop    Eliquis (home med) for DVT prophylaxis.  Full code.  Discussed with patient and nursing staff.  Anticipate discharge to SNU facility, timing to be determined  Updated daughter at bedside      Beckie Treviño MD  [facility]  01/29/25  16:13 EST

## 2025-01-29 NOTE — PROGRESS NOTES
Continued Stay Note  [facility]     Patient Name: Dafne Mary  MRN: [id number]  Today's Date: 1/29/2025    Admit Date: 1/24/2025    Plan: [facility], has pre-cert   Discharge Plan       Row Name 01/29/25 1553       Plan    Plan [facility], has pre-cert    Patient/Family in Agreement with Plan yes    Plan Comments Patient sleeping and did not arouse.  Spoke with daughter Felicia by telephone.  She plans to transport patient.  Waiting cardiology to confirm is OK for DC. Per Ana/[facility] skilled bed available today, and per Ana, bed is available 1/30.  Packet in cubbie.  Torey CARLOS                    Expected Discharge Date and Time       Expected Discharge Date Expected Discharge Time    Jan 29, 2025               Becky S. [place], RN

## 2025-01-29 NOTE — PLAN OF CARE
Goal Outcome Evaluation:            Pt in afib on monitor. Heart rate has decreased significantly from the start of the shift. HR was initially ranging from 116-170,  current HR is , RM air, asymptomatic. VSS. In bed with call light in reach at this time

## 2025-01-29 NOTE — PROGRESS NOTES
Hospital Follow Up    LOS:  LOS: 4 days   Patient Name: Dafne Mary  Age/Sex: 84 y.o. female  : 1940  MRN: [id number]    Day of Service: 25   Length of Stay: 4  Encounter Provider: ANTONELLA Shannon  Place of Service: [facility] CARDIOLOGY  Patient Care Team:  Jossy Sauceda MD as PCP - General (Urgent Care)  Javier Nolan MD as Consulting Physician (Pulmonary Disease)  Nae Norman MD as Consulting Physician (Cardiology)    Subjective:     Chief Complaint: shortness of breath    Interval History: Feels breathing has improved. Heart rate is better as well.    Objective:     Objective:  Temp:  [97.3 °F (36.3 °C)-98.2 °F (36.8 °C)] 98.1 °F (36.7 °C)  Heart Rate:  [] 94  Resp:  [16-18] 16  BP: (106-137)/(58-86) 131/74     Intake/Output Summary (Last 24 hours) at 2025 0830  Last data filed at 2025 1855  Gross per 24 hour   Intake 1080 ml   Output 600 ml   Net 480 ml     Body mass index is 21.77 kg/m².      25  1624   Weight: 54 kg (119 lb)     Weight change:     Physical Exam:   General Appearance:    Awake alert and oriented in no acute distress.   Color:  Skin:  Neuro:  HEENT:    Lungs:     Pink  Warm and dry  No focal, motor or sensory deficits  Neck supple, pupils equal, round and reactive. No JVD, No Bruit  Clear to auscultation,respirations regular, even and                  unlabored    Heart:    Irr/Irr, S1 and S2, no murmur, no gallop, no rub. No edema, DP/PT pulses are 2+   Chest Wall:    No abnormalities observed   Abdomen:     Normal bowel sounds, no masses, no organomegaly, soft        non-tender, non-distended, no guarding, no ascites noted   Extremities:   Moves all extremities well, no edema, no cyanosis, no redness       Lab Review:   Results from last 7 days   Lab Units 25  0504 25  0402   SODIUM mmol/L 134* 136   POTASSIUM mmol/L 4.5 4.1   CHLORIDE mmol/L 98 99   CO2 mmol/L 27.0 28.0   BUN mg/dL 28* 25*  "  CREATININE mg/dL 0.88 0.78   GLUCOSE mg/dL 115* 111*   CALCIUM mg/dL 8.4* 8.6   AST (SGOT) U/L 29 26   ALT (SGPT) U/L 22 22     Results from last 7 days   Lab Units 01/28/25  0523 01/28/25  0402 01/24/25  1951 01/24/25  1416 01/24/25  1249   HSTROP T ng/L 57* 60* 122* 181* 152*     Results from last 7 days   Lab Units 01/29/25  0504 01/28/25  0402   WBC 10*3/mm3 15.86* 13.02*   HEMOGLOBIN g/dL 13.5 12.0   HEMATOCRIT % 39.9 36.5   PLATELETS 10*3/mm3 396 377                   Invalid input(s): \"LDLCALC\"  Results from last 7 days   Lab Units 01/24/25  1249   PROBNP pg/mL 1,521.0         I reviewed the patient's new clinical results.  I personally viewed and interpreted the patient's EKG  Current Medications:   Scheduled Meds:amLODIPine, 5 mg, Oral, Daily  amoxicillin-clavulanate, 1 tablet, Oral, Q12H  apixaban, 2.5 mg, Oral, Q12H  atorvastatin, 40 mg, Oral, Daily  budesonide-formoterol, 2 puff, Inhalation, BID - RT   And  revefenacin, 175 mcg, Nebulization, Daily - RT  carvedilol, 25 mg, Oral, BID With Meals  clopidogrel, 75 mg, Oral, Daily  [Held by provider] furosemide, 20 mg, Oral, Daily  ipratropium-albuterol, 3 mL, Nebulization, Q6H - RT  losartan, 50 mg, Oral, BID  oxymetazoline, 2 spray, Each Nare, BID  PARoxetine, 20 mg, Oral, Daily  predniSONE, 40 mg, Oral, Daily  thyroid, 15 mg, Oral, Q AM  Thyroid, 30 mg, Oral, Q AM      Continuous Infusions:     Allergies:  Allergies   Allergen Reactions    Lansoprazole Nausea Only     NAUSEA  NAUSEA  NAUSEA    Buspirone Other (See Comments)     agitation    Diphenhydramine Hcl (Sleep) Irritability    Lisinopril Cough       Assessment/Plan:    Intermittent left bundle branch block.  Reviewed her current EKGs and previous tracings.  It appears that she has a left bundle branch block on current tracings.  This is not a new finding.  COPD with acute exacerbation  Acute hypoxic respiratory failure  Recent NSTEMI.  With no acute disease by cath on 1/20. Troponins trending " down.    Coronary artery disease.  Recent cath on 1/20 with patent stents.   Paroxysmal atrial fibrillation.  In afib this morning with controlled rates.  Chronic heart failure with preserved ejection fraction.  Furosemide was placed on hold for hyponatremia on admission.   Hypertension  Hyperlipidemia  CKD stage 3a  Hypothyroidism  Hyponatremia.  Improved.        -remains in afib with rates controlled for the most part on carvedilol. Anticoagulated with apixaban  -breathing has improved. Off O2 this morning during my exam. I do not see a need for further IV diuresis at this point.   -continue with current cardiac management. Ok for discharge from our standpoint once placement is arranged.    ANTONELLA Shannon  01/29/25  08:30 EST  Electronically signed by ANTONELLA Shannon, 01/29/25, 8:30 AM EST.

## 2025-01-29 NOTE — SIGNIFICANT NOTE
01/29/25 1532   OTHER   Discipline physical therapy assistant   Rehab Time/Intention   Session Not Performed patient/family declined, not feeling well  (checked on pt twice today. Pt declined both times d/t feeling fatigued and feeling dizzy at rest. Will follow up with pt tomorrow.)   Recommendation   PT - Next Appointment 01/30/25

## 2025-01-30 LAB
ALBUMIN SERPL-MCNC: 2.9 G/DL (ref 3.5–5.2)
ALBUMIN/GLOB SERPL: 1.1 G/DL
ALP SERPL-CCNC: 101 U/L (ref 39–117)
ALT SERPL W P-5'-P-CCNC: 23 U/L (ref 1–33)
ANION GAP SERPL CALCULATED.3IONS-SCNC: 7.7 MMOL/L (ref 5–15)
AST SERPL-CCNC: 26 U/L (ref 1–32)
BASOPHILS # BLD AUTO: 0.02 10*3/MM3 (ref 0–0.2)
BASOPHILS NFR BLD AUTO: 0.1 % (ref 0–1.5)
BILIRUB SERPL-MCNC: 0.5 MG/DL (ref 0–1.2)
BUN SERPL-MCNC: 27 MG/DL (ref 8–23)
BUN/CREAT SERPL: 29.7 (ref 7–25)
CALCIUM SPEC-SCNC: 8.4 MG/DL (ref 8.6–10.5)
CHLORIDE SERPL-SCNC: 98 MMOL/L (ref 98–107)
CO2 SERPL-SCNC: 27.3 MMOL/L (ref 22–29)
CREAT SERPL-MCNC: 0.91 MG/DL (ref 0.57–1)
DEPRECATED RDW RBC AUTO: 39 FL (ref 37–54)
EGFRCR SERPLBLD CKD-EPI 2021: 62.3 ML/MIN/1.73
EOSINOPHIL # BLD AUTO: 0.01 10*3/MM3 (ref 0–0.4)
EOSINOPHIL NFR BLD AUTO: 0.1 % (ref 0.3–6.2)
ERYTHROCYTE [DISTWIDTH] IN BLOOD BY AUTOMATED COUNT: 12 % (ref 12.3–15.4)
GLOBULIN UR ELPH-MCNC: 2.6 GM/DL
GLUCOSE SERPL-MCNC: 123 MG/DL (ref 65–99)
HCT VFR BLD AUTO: 42.3 % (ref 34–46.6)
HGB BLD-MCNC: 14 G/DL (ref 12–15.9)
IMM GRANULOCYTES # BLD AUTO: 0.31 10*3/MM3 (ref 0–0.05)
IMM GRANULOCYTES NFR BLD AUTO: 2.3 % (ref 0–0.5)
LYMPHOCYTES # BLD AUTO: 0.79 10*3/MM3 (ref 0.7–3.1)
LYMPHOCYTES NFR BLD AUTO: 5.8 % (ref 19.6–45.3)
MCH RBC QN AUTO: 29.9 PG (ref 26.6–33)
MCHC RBC AUTO-ENTMCNC: 33.1 G/DL (ref 31.5–35.7)
MCV RBC AUTO: 90.2 FL (ref 79–97)
MONOCYTES # BLD AUTO: 0.87 10*3/MM3 (ref 0.1–0.9)
MONOCYTES NFR BLD AUTO: 6.4 % (ref 5–12)
NEUTROPHILS NFR BLD AUTO: 11.62 10*3/MM3 (ref 1.7–7)
NEUTROPHILS NFR BLD AUTO: 85.3 % (ref 42.7–76)
NRBC BLD AUTO-RTO: 0 /100 WBC (ref 0–0.2)
PLATELET # BLD AUTO: 410 10*3/MM3 (ref 140–450)
PMV BLD AUTO: 9.1 FL (ref 6–12)
POTASSIUM SERPL-SCNC: 4.2 MMOL/L (ref 3.5–5.2)
PROT SERPL-MCNC: 5.5 G/DL (ref 6–8.5)
RBC # BLD AUTO: 4.69 10*6/MM3 (ref 3.77–5.28)
SODIUM SERPL-SCNC: 133 MMOL/L (ref 136–145)
WBC NRBC COR # BLD AUTO: 13.62 10*3/MM3 (ref 3.4–10.8)

## 2025-01-30 PROCEDURE — 63710000001 REVEFENACIN 175 MCG/3ML SOLUTION: Performed by: NURSE PRACTITIONER

## 2025-01-30 PROCEDURE — 94799 UNLISTED PULMONARY SVC/PX: CPT

## 2025-01-30 PROCEDURE — 94664 DEMO&/EVAL PT USE INHALER: CPT

## 2025-01-30 PROCEDURE — 63710000001 PREDNISONE PER 1 MG: Performed by: INTERNAL MEDICINE

## 2025-01-30 PROCEDURE — 99232 SBSQ HOSP IP/OBS MODERATE 35: CPT | Performed by: NURSE PRACTITIONER

## 2025-01-30 PROCEDURE — 85025 COMPLETE CBC W/AUTO DIFF WBC: CPT | Performed by: INTERNAL MEDICINE

## 2025-01-30 PROCEDURE — 80053 COMPREHEN METABOLIC PANEL: CPT | Performed by: STUDENT IN AN ORGANIZED HEALTH CARE EDUCATION/TRAINING PROGRAM

## 2025-01-30 PROCEDURE — 94761 N-INVAS EAR/PLS OXIMETRY MLT: CPT

## 2025-01-30 RX ORDER — LORAZEPAM 0.5 MG/1
0.5 TABLET ORAL EVERY 6 HOURS PRN
Qty: 12 TABLET | Refills: 0 | Status: SHIPPED | OUTPATIENT
Start: 2025-01-30

## 2025-01-30 RX ORDER — ATENOLOL 25 MG/1
50 TABLET ORAL EVERY 12 HOURS SCHEDULED
Status: DISCONTINUED | OUTPATIENT
Start: 2025-01-30 | End: 2025-01-31

## 2025-01-30 RX ORDER — ATENOLOL 50 MG/1
50 TABLET ORAL EVERY 12 HOURS SCHEDULED
Start: 2025-01-30 | End: 2025-02-01 | Stop reason: HOSPADM

## 2025-01-30 RX ORDER — PREDNISONE 20 MG/1
40 TABLET ORAL DAILY
Qty: 2 TABLET | Refills: 0 | Status: SHIPPED | OUTPATIENT
Start: 2025-01-31 | End: 2025-02-01 | Stop reason: HOSPADM

## 2025-01-30 RX ADMIN — LOSARTAN POTASSIUM 50 MG: 50 TABLET, FILM COATED ORAL at 20:38

## 2025-01-30 RX ADMIN — ATENOLOL 50 MG: 25 TABLET ORAL at 10:59

## 2025-01-30 RX ADMIN — LEVOTHYROXINE, LIOTHYRONINE 15 MG: 19; 4.5 TABLET ORAL at 05:33

## 2025-01-30 RX ADMIN — CLOPIDOGREL BISULFATE 75 MG: 75 TABLET ORAL at 08:35

## 2025-01-30 RX ADMIN — ATENOLOL 50 MG: 25 TABLET ORAL at 20:38

## 2025-01-30 RX ADMIN — ATORVASTATIN CALCIUM 40 MG: 20 TABLET, FILM COATED ORAL at 08:35

## 2025-01-30 RX ADMIN — PAROXETINE HYDROCHLORIDE 20 MG: 20 TABLET, FILM COATED ORAL at 08:35

## 2025-01-30 RX ADMIN — LEVOTHYROXINE, LIOTHYRONINE 30 MG: 19; 4.5 TABLET ORAL at 05:32

## 2025-01-30 RX ADMIN — PREDNISONE 40 MG: 20 TABLET ORAL at 08:35

## 2025-01-30 RX ADMIN — AMOXICILLIN AND CLAVULANATE POTASSIUM 1 TABLET: 875; 125 TABLET, FILM COATED ORAL at 20:38

## 2025-01-30 RX ADMIN — BUDESONIDE AND FORMOTEROL FUMARATE DIHYDRATE 2 PUFF: 160; 4.5 AEROSOL RESPIRATORY (INHALATION) at 07:55

## 2025-01-30 RX ADMIN — APIXABAN 2.5 MG: 2.5 TABLET, FILM COATED ORAL at 08:35

## 2025-01-30 RX ADMIN — APIXABAN 2.5 MG: 2.5 TABLET, FILM COATED ORAL at 20:38

## 2025-01-30 RX ADMIN — AMOXICILLIN AND CLAVULANATE POTASSIUM 1 TABLET: 875; 125 TABLET, FILM COATED ORAL at 08:35

## 2025-01-30 RX ADMIN — BUDESONIDE AND FORMOTEROL FUMARATE DIHYDRATE 2 PUFF: 160; 4.5 AEROSOL RESPIRATORY (INHALATION) at 19:34

## 2025-01-30 RX ADMIN — LORAZEPAM 0.5 MG: 0.5 TABLET ORAL at 15:04

## 2025-01-30 RX ADMIN — METOPROLOL TARTRATE 5 MG: 5 INJECTION INTRAVENOUS at 14:01

## 2025-01-30 RX ADMIN — REVEFENACIN 175 MCG: 175 SOLUTION RESPIRATORY (INHALATION) at 07:55

## 2025-01-30 RX ADMIN — AMLODIPINE BESYLATE 5 MG: 5 TABLET ORAL at 08:35

## 2025-01-30 RX ADMIN — LORAZEPAM 0.5 MG: 0.5 TABLET ORAL at 08:35

## 2025-01-30 RX ADMIN — LOSARTAN POTASSIUM 50 MG: 50 TABLET, FILM COATED ORAL at 08:35

## 2025-01-30 NOTE — PROGRESS NOTES
"Enter Query Response Below      Query Response:   Type 2 MI due to: respiratory issues           If applicable, please update the problem list.       Patient: Dafne Mary        : 1940  Account: [id number]           Admit Date: 2025        How to Respond to this query:       a. Click New Note     b. Answer query within the yellow box.                c. Update the Problem List, if applicable.      If you have any questions about this query contact me at: [email or web address]     ,     Risk Factors: 84-year-old female with a history of \"coronary artery disease, PAF, hypertension, hyperlipidemia, hypothyroidism, and GERD\" per cardiology consult.   Clinical indicators: Presented on  with shortness of breath and elevated troponin. Per H&P, \"Her blood pressure upon EMS arrival was 200 systolic and she was blood pressure in the 70s.\" HS troponin 152 ( @ 1249), 181 ( @ 1416), 122 ( @ 1951), 60 (), 57 ( @ 0523). Cardiology consult states, \"Non-ST elevation myocardial infarction.  High-sensitivity troponin 80 -> 146 -> 218.  EKG with ST depression.  Repeat EKG shows she still has ST depressions but they are somewhat improved.  Echocardiogram this morning does not show focal wall motion abnormality.  Ejection fraction is unchanged from prior.\" Pulmonary progress notes () lists, \"Flash pulmonary edema, improved/resolved, Hypertensive emergency, Acute on chronic HFpEF, NSTEMI (non-ST elevated myocardial infarction) and Type 2 myocardial infarction\" as active hospital problems. Cardiology note () reads, \"NSTEMI.  Symptoms of worsening dyspnea.  No chest pain.  EKG with inferolateral repolarization abnormalities and intermittent left bundle branch block.  Cath on  with no acute disease and patent stents.  Suspect type 2 NSTEMI.\" Discharge summary notes, \"Cardiac Cath on 25 with Dr Norman found stable coronary artery disease, patent mid LAD stent with no stent " "restenosis, stable 60 to 70% stenosis of the mid small caliber diagonal branch\" and lists, \"NSTEMI\" and \"Type 2 myocardial infarction\" as discharge diagnoses.   Treatment: Cardiology consult, Oxygen by nasal cannula, heparin, Eliquis, Lipitor, Coreg, Lasix, Nitro paste    Please clarify the type of NSTEMI the patient was treated/monitored for:    Type 1 MI due to ______ (please specify- plaque erosion, rupture, fissure or thrombus)  Type 2 MI due to: __________________  Other- specify______    By submitting this query, we are merely seeking further clarification of documentation to accurately reflect all conditions that you are monitoring, evaluating, treating or that extend the hospitalization or utilize additional resources of care. Please utilize your independent clinical judgment when addressing the question(s) above.     This query and your response, once completed, will be entered into the legal medical record.    Sincerely,  Yadi Suarez RN  Clinical Documentation Integrity Program     "

## 2025-01-30 NOTE — NURSING NOTE
Iv team called to evaluate LFA iv, flushed with 20cc of NS with no c/o pain or discomfort. Piv ok to use

## 2025-01-30 NOTE — PROGRESS NOTES
[facility] INPATIENT PROGRESS NOTE         [address]    2025      PATIENT IDENTIFICATION:  Name: Dafne Mary ADMIT: 2025   : 1940  PCP: Jossy Sauceda MD    MRN: [id number] LOS: 5 days   AGE/SEX: 84 y.o. female  ROOM: [facility]                     LOS 5    Reason for visit: Acute hypoxemia, vocal cord dysfunction and asked      SUBJECTIVE:      Sleeping comfortably at time of visit.  No new pulmonary issues overnight.  Working on rehab placement.    Objective   OBJECTIVE:    Vital Sign Min/Max for last 24 hours  Temp  Min: 97.7 °F (36.5 °C)  Max: 98.2 °F (36.8 °C)   BP  Min: 102/58  Max: 140/58   Pulse  Min: 88  Max: 139   Resp  Min: 16  Max: 16   SpO2  Min: 94 %  Max: 98 %   No data recorded   No data recorded    Vitals:    25 0340 25 0350 25 0724 25 0755   BP:   140/58    BP Location:   Left arm    Patient Position:   Lying    Pulse: 90 96 (!) 124 (!) 139   Resp:   16 16   Temp:   98.2 °F (36.8 °C)    TempSrc:   Oral    SpO2: 95% 94% 96% 98%   Weight:       Height:                25  1624   Weight: 54 kg (119 lb)       Body mass index is 21.77 kg/m².                          Body mass index is 21.77 kg/m².    Intake/Output Summary (Last 24 hours) at 2025 1059  Last data filed at 2025 0844  Gross per 24 hour   Intake 480 ml   Output 1100 ml   Net -620 ml           Exam:  GEN:  No distress, appears stated age  EYES:   PERRL, anicteric sclerae  ENT:    External ears/nose normal, OP clear  NECK:  No adenopathy, midline trachea  LUNGS: Normal chest on inspection, palpation and auscultation  CV:  Normal S1S2, without murmur  ABD:  Nontender, nondistended, no hepatosplenomegaly, +BS  EXT:  No edema.  No cyanosis or clubbing.  No mottling and normal cap refill.    Assessment     Scheduled meds:  amLODIPine, 5 mg, Oral, Daily  amoxicillin-clavulanate, 1 tablet, Oral, Q12H  apixaban, 2.5 mg, Oral, Q12H  atenolol, 50 mg, Oral, Q12H  atorvastatin,  "40 mg, Oral, Daily  budesonide-formoterol, 2 puff, Inhalation, BID - RT   And  revefenacin, 175 mcg, Nebulization, Daily - RT  clopidogrel, 75 mg, Oral, Daily  [Held by provider] furosemide, 20 mg, Oral, Daily  losartan, 50 mg, Oral, BID  PARoxetine, 20 mg, Oral, Daily  predniSONE, 40 mg, Oral, Daily  thyroid, 15 mg, Oral, Q AM  Thyroid, 30 mg, Oral, Q AM      IV meds:                         Data Review:  Results from last 7 days   Lab Units 01/30/25  0622 01/29/25  0504 01/28/25  0402 01/27/25  0706 01/26/25  0827   SODIUM mmol/L 133* 134* 136 131* 131*   POTASSIUM mmol/L 4.2 4.5 4.1 4.0 3.9   CHLORIDE mmol/L 98 98 99 95* 93*   CO2 mmol/L 27.3 27.0 28.0 27.8 27.0   BUN mg/dL 27* 28* 25* 26* 25*   CREATININE mg/dL 0.91 0.88 0.78 1.00 1.05*   GLUCOSE mg/dL 123* 115* 111* 106* 121*   CALCIUM mg/dL 8.4* 8.4* 8.6 8.8 8.6         Estimated Creatinine Clearance: 39.2 mL/min (by C-G formula based on SCr of 0.91 mg/dL).  Results from last 7 days   Lab Units 01/30/25  0622 01/29/25  0504 01/28/25  0402 01/27/25  0706 01/26/25  0827   WBC 10*3/mm3 13.62* 15.86* 13.02* 14.06* 16.73*   HEMOGLOBIN g/dL 14.0 13.5 12.0 12.0 11.4*   PLATELETS 10*3/mm3 410 396 377 356 323           Results from last 7 days   Lab Units 01/30/25  0622 01/29/25  0504 01/28/25  0402 01/24/25  1249   ALT (SGPT) U/L 23 22 22 27   AST (SGOT) U/L 26 29 26 36*     Results from last 7 days   Lab Units 01/25/25  0334 01/24/25  1250   PH, ARTERIAL pH units 7.392 7.354   PO2 ART mm Hg 61.5* 174.8*   PCO2, ARTERIAL mm Hg 43.7 57.6*   HCO3 ART mmol/L 26.6 32.1*     Results from last 7 days   Lab Units 01/26/25  1035   LACTATE mmol/L 1.8         No results found for: \"HGBA1C\", \"POCGLU\"      Imaging reviewed  Chest x-ray and CT chest 1/24 reviewed: No acute    Chest x-ray 1/28 reviewed: Mildly enlarged heart.  Minimal basilar atelectasis.  No acute.        Microbiology reviewed  RVP negative          Active Hospital Problems    Diagnosis  POA    **COPD exacerbation " [J44.1]  Yes    Hypoxia [R09.02]  Yes    Generalized weakness [R53.1]  Unknown    Stage 3a chronic kidney disease [N18.31]  Yes    Chronic HFrEF (heart failure with reduced ejection fraction) [I50.22]  Yes    PAF (paroxysmal atrial fibrillation) [I48.0]  Yes    Vocal cord paralysis [J38.00]  Yes    Anxiety [F41.9]  Yes    Benign hypertension [I10]  Yes      Resolved Hospital Problems   No resolved problems to display.         ASSESSMENT:  Acute hypoxemic and hypercapnic respiratory failure  Asthma  Vocal cord dysfunction  Acute Sinusitis       PLAN:  Encourage pulmonary toilet.   Change nebs to as needed.  Continue Symbicort.  Plan for evaluation by ENT and speech therapy for vocal cord dysfunction and evaluation for anatomical abnormalities of vocal cords.  Will have her see Candace Whitehead as out pt, speech therapist specializing in VCD.  She also has a significant contributing factor with anxiety and she has a referral to outpatient psychiatry clinic to help in that regard.    Working on rehab placement. No objection to discharge.       Javier Nolan MD  Pulmonary and Critical Care Medicine  [facility], [facility]  1/30/2025    10:59 EST

## 2025-01-30 NOTE — DISCHARGE SUMMARY
Patient Name: Dafne Mary  : 1940  MRN: [id number]    Date of Admission: 2025  Date of Discharge:  2025  Primary Care Physician: Jossy Sauceda MD      Chief Complaint:   Shortness of Breath      Discharge Diagnoses     Active Hospital Problems    Diagnosis  POA    **COPD exacerbation [J44.1]  Yes    Hypoxia [R09.02]  Yes    Generalized weakness [R53.1]  Unknown    Stage 3a chronic kidney disease [N18.31]  Yes    Chronic HFrEF (heart failure with reduced ejection fraction) [I50.22]  Yes    PAF (paroxysmal atrial fibrillation) [I48.0]  Yes    Vocal cord paralysis [J38.00]  Yes    Anxiety [F41.9]  Yes    Benign hypertension [I10]  Yes      Resolved Hospital Problems   No resolved problems to display.        Hospital Course     Ms. Mary is a 84 y.o. female with a history of COPD, CAD, hypertension, atrial fibrillation, chronic diastolic heart failure, and CKD stage III who presented to [facility] initially complaining of shortness of breath.  Please see the admitting history and physical for further details.  She was admitted to the hospital for further evaluation and treatment.      Patient had evidence of COPD exacerbation on admission.  There was no evidence of bacterial pneumonia, however she did have evidence of sinusitis.  Because of this, the patient was started on empiric Augmentin.  She was started on steroid and pulmonary toilet.  She slowly improved and oxygen was weaned.  WBC remains slightly elevated at time of discharge but was downtrending.  Suspect steroids were contributing to this.  She is still intermittently requiring oxygen and will be discharged to SNF with this.  Walking oximetry test can be performed after discharge.  Pulmonology also felt there was likely component of vocal cord dysfunction.  An ENT referral has been placed and the patient can follow-up as an outpatient.  Hospital course was complicated by atrial fibrillation with RVR.  Cardiology was  consulted.  The patient's home Eliquis was continued.  She was initially on Coreg but heart rate continued to be high.  Because of this, cardiology transitioned her to atenolol.  This seemed to achieve better heart rate control.  On day of discharge, the patient denied chest pain, shortness of breath, or palpitations.  Hospital course was also complicated by mild hyponatremia.  Lasix was briefly held but this can be resumed at discharge.  Her sodium was slightly decreased at 133 but this was stable from the previous day.  I would recommend she have a repeat BMP within 3 days of discharge while at SNF.  She was noted to have asymptomatic bacteriuria on admission.  Given only 3-5 WBC, urine was not sent for culture.  The patient was on Augmentin as above and was monitored clinically.  Overall, the patient good clinical improvement.  PT/OT recommended SNF discharge.  This was arranged by CCP.  She is being discharged to SNF in satisfactory condition.    Day of Discharge     Subjective:  No acute events overnight.  Patient states she is feeling much better this morning.  No chest pain.  Shortness of breath is improved.  She is no longer feeling dizzy.  Agreeable for discharge to rehab today.    Physical Exam:  Temp:  [97.7 °F (36.5 °C)-98.2 °F (36.8 °C)] 98.2 °F (36.8 °C)  Heart Rate:  [] 129  Resp:  [16] 16  BP: (102-140)/(58-93) 140/58  Body mass index is 21.77 kg/m².  Physical Exam  General: Alert, no acute distress.  Lying in bed.  Answers questions appropriately.  ENT: No conjunctival injection or scleral icterus. Moist mucous membranes.   Neuro: Eyes open and moving in all directions, generalized weakness, face symmetric, no focal deficits.   Lungs: Fairly good air movement, no wheezing.  No distress.  Heart: Regular rate today, irregularly irregular rhythm.  No murmur.  Abdomen: Soft, non-tender, non-distended. Normal bowel sounds.   Ext: Trace edema bilateral lower extremities.  Skin: No rashes or lesions.  IV site without swelling or erythema.     Consultants     Consult Orders (all) (From admission, onward)       Start     Ordered    01/28/25 0702  Inpatient Cardiology Consult  IN AM        Specialty:  Cardiology  Provider:  Gio Maria MD    01/28/25 0446    01/25/25 1121  Inpatient Hospitalist Consult  Once        Specialty:  Hospitalist  Provider:  Deric Dean DO    01/25/25 1120    01/25/25 0346  Inpatient Case Management  Consult  Once        Provider:  (Not yet assigned)    01/25/25 0345    01/24/25 1624  Inpatient Case Management  Consult  Once,   Status:  Canceled        Provider:  (Not yet assigned)    01/24/25 1624    01/24/25 1447  Inpatient Pulmonology Consult  Once        Specialty:  Pulmonary Disease  Provider:  Javier Nolan MD    01/24/25 1447                  Procedures     * Surgery not found *    Imaging Results (All)       Procedure Component Value Units Date/Time    XR Chest 1 View [[id number]] Collected: 01/28/25 0849     Updated: 01/28/25 0906    Narrative:      XR CHEST 1 VW-     HISTORY: Female who is 84 years-old, dyspnea     TECHNIQUE: Frontal view of the chest     COMPARISON: 1/24/2025     FINDINGS: The heart is mildly enlarged. Pulmonary vasculature is  unremarkable. Aorta is tortuous, calcified. Minimal likely atelectasis  or scarring at the costophrenic angles. Minimal blunting of the right  lateral costophrenic angle may reflect minimal pleural effusion. No  pneumothorax. Narrowing of the right acromiohumeral interval is noted,  compatible with rotator cuff tear. No acute osseous process.       Impression:      As described.        This report was finalized on 1/28/2025 9:03 AM by Dr. Ned Guzman M.D on Workstation: [id number]       CT Angiogram Chest [[id number]] Collected: 01/25/25 0018     Updated: 01/25/25 0026    Narrative:      CTA CHEST WITH IV CONTRAST     HISTORY: SOA with minimal exertion; Hypoxia; J44.1-Chronic  obstructive  pulmonary disease with (acute) exacerbation     COMPARISON: Chest CT 57561     TECHNIQUE: CT angiography was performed of the chest with axial images  as well as coronal and sagittal reformatted MIP images provided  following intravenous administration of contrast. 3-D surface rendered  reformats were obtained of the pulmonary arteries and aorta.  Radiation  dose reduction techniques were utilized, including automated exposure  control, and exposure modulation based on body size.        FINDINGS:     There is no pulmonary infiltrate, pleural effusion, pneumothorax or  suspicious nodule.     The thoracic aorta is normal in caliber. There are coronary  atherosclerotic vascular calcifications.  There is no suspicious  mediastinal adenopathy or other mass.     Images of the upper abdomen show no acute abnormality.  There is no  acute bony abnormality.     Bolus timing is good and there is no evidence of pulmonary embolism.       Impression:         1.  Pulmonary arteries are well-opacified, and there is no evidence of  pulmonary embolism.     1.  No acute pulmonary parenchymal abnormality is seen.           This report was finalized on 1/25/2025 12:23 AM by Dr. Sen Martinez M.D on Workstation: [id number]       XR Chest 1 View [[id number]] Collected: 01/24/25 1326     Updated: 01/24/25 1332    Narrative:      XR CHEST 1 VW-     HISTORY: Female who is 84 years-old, short of breath     TECHNIQUE: Frontal view of the chest     COMPARISON: 1/22/2025     FINDINGS: The heart is enlarged. Aorta is tortuous, calcified. Pulmonary  vasculature is unremarkable. No focal pulmonary consolidation, pleural  effusion, or pneumothorax. No acute osseous process.       Impression:      No focal pulmonary consolidation. Cardiomegaly. Tortuous  aorta. Follow-up as clinically indicated.           This report was finalized on 1/24/2025 1:29 PM by Dr. Ned Guzman M.D on Workstation: [id number]             Results for  orders placed during the hospital encounter of 11/22/22    Duplex Carotid Ultrasound CAR    Interpretation Summary    Proximal right internal carotid artery plaque without significant stenosis.    Proximal left internal carotid artery plaque without significant stenosis.    Results for orders placed during the hospital encounter of 01/17/25    Adult Transthoracic Echo Complete W/ Cont if Necessary Per Protocol    Interpretation Summary    Left ventricular systolic function is normal. Calculated left ventricular EF = 51.2%    Septal wall motion is abnormal, consistent with a bundle branch block.    Left ventricular diastolic function is consistent with age.    No aortic valve regurgitation or stenosis is present.  There is moderate calcification of the aortic valve.    Mild mitral annular calcification is present. There is moderate, bileaflet mitral valve thickening present. Mild mitral valve regurgitation is present. No significant mitral valve stenosis is present.    Pertinent Labs     Results from last 7 days   Lab Units 01/30/25  0622 01/29/25  0504 01/28/25  0402 01/27/25  0706   WBC 10*3/mm3 13.62* 15.86* 13.02* 14.06*   HEMOGLOBIN g/dL 14.0 13.5 12.0 12.0   PLATELETS 10*3/mm3 410 396 377 356     Results from last 7 days   Lab Units 01/30/25  0622 01/29/25  0504 01/28/25  0402 01/27/25  0706   SODIUM mmol/L 133* 134* 136 131*   POTASSIUM mmol/L 4.2 4.5 4.1 4.0   CHLORIDE mmol/L 98 98 99 95*   CO2 mmol/L 27.3 27.0 28.0 27.8   BUN mg/dL 27* 28* 25* 26*   CREATININE mg/dL 0.91 0.88 0.78 1.00   GLUCOSE mg/dL 123* 115* 111* 106*   EGFR mL/min/1.73 62.3 64.9 75.0 55.7*     Results from last 7 days   Lab Units 01/30/25  0622 01/29/25  0504 01/28/25  0402 01/24/25  1249   ALBUMIN g/dL 2.9* 3.2* 3.5 4.1   BILIRUBIN mg/dL 0.5 0.4 0.3 0.5   ALK PHOS U/L 101 105 105 127*   AST (SGOT) U/L 26 29 26 36*   ALT (SGPT) U/L 23 22 22 27     Results from last 7 days   Lab Units 01/30/25  0622 01/29/25  0504 01/28/25  0402  "01/27/25  0706 01/25/25  0832 01/24/25  1249   CALCIUM mg/dL 8.4* 8.4* 8.6 8.8   < > 9.3   ALBUMIN g/dL 2.9* 3.2* 3.5  --   --  4.1    < > = values in this interval not displayed.       Results from last 7 days   Lab Units 01/28/25  0523 01/28/25  0402 01/24/25  1951 01/24/25  1416 01/24/25  1249   HSTROP T ng/L 57* 60* 122* 181* 152*   PROBNP pg/mL  --   --   --   --  1,521.0           Invalid input(s): \"LDLCALC\"      Results from last 7 days   Lab Units 01/24/25  1307   COVID19  Not Detected       Test Results Pending at Discharge     Pending Results       None              Discharge Details        Discharge Medications        New Medications        Instructions Start Date   amoxicillin-clavulanate 875-125 MG per tablet  Commonly known as: AUGMENTIN   1 tablet, Oral, Every 12 Hours Scheduled      atenolol 50 MG tablet  Commonly known as: TENORMIN   50 mg, Oral, Every 12 Hours Scheduled      predniSONE 20 MG tablet  Commonly known as: DELTASONE   40 mg, Oral, Daily   Start Date: January 31, 2025            Continue These Medications        Instructions Start Date   acetaminophen 325 MG tablet  Commonly known as: TYLENOL   650 mg, Oral, Every 6 Hours PRN      amLODIPine 5 MG tablet  Commonly known as: NORVASC   5 mg, Oral, Daily      apixaban 2.5 MG tablet tablet  Commonly known as: ELIQUIS   2.5 mg, Oral, Every 12 Hours Scheduled      Hemlock Thyroid 15 MG tablet  Generic drug: thyroid   Take 1 tablet by mouth once daily      Hemlock Thyroid 30 MG tablet  Generic drug: Thyroid   Take 1 tablet by mouth once daily      artificial tears ophthalmic ointment   1 application , Both Eyes, Every 1 Hour PRN      atorvastatin 40 MG tablet  Commonly known as: LIPITOR   40 mg, Oral, Daily      Breztri Aerosphere 160-9-4.8 MCG/ACT aerosol inhaler  Generic drug: Budeson-Glycopyrrol-Formoterol   2 puffs, 2 Times Daily      clopidogrel 75 MG tablet  Commonly known as: PLAVIX   75 mg, Oral, Daily      empagliflozin 10 MG tablet " tablet  Commonly known as: Jardiance   10 mg, Oral, Daily      furosemide 20 MG tablet  Commonly known as: LASIX   20 mg, Oral, Daily      guaiFENesin 600 MG 12 hr tablet  Commonly known as: MUCINEX   600 mg, Oral, 2 Times Daily PRN      ipratropium-albuterol 0.5-2.5 mg/3 ml nebulizer  Commonly known as: DUO-NEB   3 mL, Every 4 Hours PRN      LORazepam 0.5 MG tablet  Commonly known as: ATIVAN   0.5 mg, Oral, Every 6 Hours PRN      losartan 50 MG tablet  Commonly known as: COZAAR   50 mg, Oral, 2 Times Daily      PARoxetine 10 MG tablet  Commonly known as: PAXIL   20 mg, Oral, Daily      REFRESH DRY EYE THERAPY OP   1 drop, Daily      TAGAMET PO   Take  by mouth.      Ventolin  (90 Base) MCG/ACT inhaler  Generic drug: albuterol sulfate HFA   2 puffs, Inhalation, Every 4 Hours PRN      vitamin B-12 500 MCG tablet  Commonly known as: CYANOCOBALAMIN   500 mcg, Daily      vitamin C 250 MG tablet  Commonly known as: ASCORBIC ACID   500 mg, Daily      vitamin D3 125 MCG (5000 UT) capsule capsule   5,000 Units, Daily             Stop These Medications      carvedilol 25 MG tablet  Commonly known as: COREG              Allergies   Allergen Reactions    Lansoprazole Nausea Only     NAUSEA  NAUSEA  NAUSEA    Buspirone Other (See Comments)     agitation    Diphenhydramine Hcl (Sleep) Irritability    Lisinopril Cough       Discharge Disposition:  Home or Self Care      Discharge Diet:  Diet Order   Procedures    Diet: Regular/House; Texture: Mechanical Ground (NDD 2); Fluid Consistency: Thin (IDDSI 0)       Discharge Activity: Activity as tolerated      CODE STATUS:    Code Status and Medical Interventions: CPR (Attempt to Resuscitate); Full Support   Ordered at: 01/24/25 6837     Level Of Support Discussed With:    Patient     Code Status (Patient has no pulse and is not breathing):    CPR (Attempt to Resuscitate)     Medical Interventions (Patient has pulse or is breathing):    Full Support       Future Appointments    Date Time Provider Department Center   1/31/2025 10:45 AM Nae Norman MD [id number] None      Contact information for follow-up providers       Jossy Sauceda MD .    Specialties: Urgent Care, Emergency Medicine  Contact information:  [address] [place] [id number] [phone]                       Contact information for after-discharge care       Destination       [facility] .    Service: Skilled Nursing  Contact information:  [address] [place] [id number]  [phone]                                   Time Spent on Discharge:  Greater than 30 minutes      Beckie Treviño MD  [facility]  01/30/25  12:49 EST    Addendum: Notified by nursing after writing this discharge summary that patient's heart rate had increased.  Cardiology notified.  She is going to get a one-time dose of IV Lopressor.  Will monitor her for 1 additional night.

## 2025-01-30 NOTE — PROGRESS NOTES
Hospital Follow Up    LOS:  LOS: 5 days   Patient Name: Dafne Mary  Age/Sex: 84 y.o. female  : 1940  MRN: [id number]    Day of Service: 25   Length of Stay: 5  Encounter Provider: ANTONELLA Shannon  Place of Service: [facility] CARDIOLOGY  Patient Care Team:  Jossy Sauceda MD as PCP - General (Urgent Care)  Javier Nolan MD as Consulting Physician (Pulmonary Disease)  Nae Norman MD as Consulting Physician (Cardiology)    Subjective:     Chief Complaint: shortness of breath    Interval History: Reports SOA of breath this morning. Just received breathing treatment. HR is elevated. Rates pretty stable overnight.     Objective:     Objective:  Temp:  [97.7 °F (36.5 °C)-98.2 °F (36.8 °C)] 98.2 °F (36.8 °C)  Heart Rate:  [] 139  Resp:  [16] 16  BP: (102-140)/(58-93) 140/58     Intake/Output Summary (Last 24 hours) at 2025 0902  Last data filed at 2025 0844  Gross per 24 hour   Intake 480 ml   Output 1100 ml   Net -620 ml     Body mass index is 21.77 kg/m².      25  1624   Weight: 54 kg (119 lb)     Weight change:     Physical Exam:   General Appearance:    Awake alert and oriented in no acute distress.   Color:  Skin:  Neuro:  HEENT:    Lungs:     Pink  Warm and dry  No focal, motor or sensory deficits  Neck supple, pupils equal, round and reactive. No JVD, No Bruit  Clear to auscultation,respirations regular, even and                  unlabored    Heart:    Irr/Irr, S1 and S2, no murmur, no gallop, no rub. No edema, DP/PT pulses are 2+   Chest Wall:    No abnormalities observed   Abdomen:     Normal bowel sounds, no masses, no organomegaly, soft        non-tender, non-distended, no guarding, no ascites noted   Extremities:   Moves all extremities well, no edema, no cyanosis, no redness       Lab Review:   Results from last 7 days   Lab Units 25  0622 25  0504   SODIUM mmol/L 133* 134*   POTASSIUM mmol/L 4.2 4.5   CHLORIDE  "mmol/L 98 98   CO2 mmol/L 27.3 27.0   BUN mg/dL 27* 28*   CREATININE mg/dL 0.91 0.88   GLUCOSE mg/dL 123* 115*   CALCIUM mg/dL 8.4* 8.4*   AST (SGOT) U/L 26 29   ALT (SGPT) U/L 23 22     Results from last 7 days   Lab Units 01/28/25  0523 01/28/25  0402 01/24/25  1951 01/24/25  1416 01/24/25  1249   HSTROP T ng/L 57* 60* 122* 181* 152*     Results from last 7 days   Lab Units 01/30/25  0622 01/29/25  0504   WBC 10*3/mm3 13.62* 15.86*   HEMOGLOBIN g/dL 14.0 13.5   HEMATOCRIT % 42.3 39.9   PLATELETS 10*3/mm3 410 396                   Invalid input(s): \"LDLCALC\"  Results from last 7 days   Lab Units 01/24/25  1249   PROBNP pg/mL 1,521.0         I reviewed the patient's new clinical results.  I personally viewed and interpreted the patient's EKG  Current Medications:   Scheduled Meds:amLODIPine, 5 mg, Oral, Daily  amoxicillin-clavulanate, 1 tablet, Oral, Q12H  apixaban, 2.5 mg, Oral, Q12H  atenolol, 50 mg, Oral, Q12H  atorvastatin, 40 mg, Oral, Daily  budesonide-formoterol, 2 puff, Inhalation, BID - RT   And  revefenacin, 175 mcg, Nebulization, Daily - RT  clopidogrel, 75 mg, Oral, Daily  [Held by provider] furosemide, 20 mg, Oral, Daily  losartan, 50 mg, Oral, BID  PARoxetine, 20 mg, Oral, Daily  predniSONE, 40 mg, Oral, Daily  thyroid, 15 mg, Oral, Q AM  Thyroid, 30 mg, Oral, Q AM      Continuous Infusions:     Allergies:  Allergies   Allergen Reactions    Lansoprazole Nausea Only     NAUSEA  NAUSEA  NAUSEA    Buspirone Other (See Comments)     agitation    Diphenhydramine Hcl (Sleep) Irritability    Lisinopril Cough       Assessment/Plan:    Intermittent left bundle branch block.  Reviewed her current EKGs and previous tracings.  It appears that she has a left bundle branch block on current tracings.  This is not a new finding.  COPD with acute exacerbation  Acute hypoxic respiratory failure  Recent NSTEMI.  With no acute disease by cath on 1/20. Troponins trending down.    Coronary artery disease.  Recent cath on " 1/20 with patent stents.   Paroxysmal atrial fibrillation.  In afib this morning with controlled rates.  Chronic heart failure with preserved ejection fraction.  Furosemide was placed on hold for hyponatremia on admission.   Hypertension  Hyperlipidemia  CKD stage 3a  Hypothyroidism  Hyponatremia.  Improved.      -SOA this morning. Rates elevated. She does not feel her heart racing so I'm not sure if the elevated rates are driving the SOA or the SOA is causing her rates to be elevated. Regardless, she had better rate control on atenolol in the past. This was switched to Coreg for elevated BP. Will switch back to atenolol. Continue apixaban  -ok to discharge home from a cardiac standpoint. Will need to follow up with her primary cardiologist, Dr Alonzo.    ANTONELLA Shannon  01/30/25  09:02 EST  Electronically signed by ANTONELLA Shannon, 01/29/25, 8:30 AM EST.

## 2025-01-31 LAB
ALBUMIN SERPL-MCNC: 3.3 G/DL (ref 3.5–5.2)
ALBUMIN/GLOB SERPL: 1.7 G/DL
ALP SERPL-CCNC: 124 U/L (ref 39–117)
ALT SERPL W P-5'-P-CCNC: 30 U/L (ref 1–33)
ANION GAP SERPL CALCULATED.3IONS-SCNC: 8 MMOL/L (ref 5–15)
AST SERPL-CCNC: 29 U/L (ref 1–32)
BASOPHILS # BLD AUTO: 0.03 10*3/MM3 (ref 0–0.2)
BASOPHILS NFR BLD AUTO: 0.2 % (ref 0–1.5)
BILIRUB SERPL-MCNC: 0.4 MG/DL (ref 0–1.2)
BUN SERPL-MCNC: 25 MG/DL (ref 8–23)
BUN/CREAT SERPL: 30.5 (ref 7–25)
C DIFF TOX GENS STL QL NAA+PROBE: NEGATIVE
CALCIUM SPEC-SCNC: 8.3 MG/DL (ref 8.6–10.5)
CHLORIDE SERPL-SCNC: 97 MMOL/L (ref 98–107)
CO2 SERPL-SCNC: 28 MMOL/L (ref 22–29)
CREAT SERPL-MCNC: 0.82 MG/DL (ref 0.57–1)
DEPRECATED RDW RBC AUTO: 38.2 FL (ref 37–54)
EGFRCR SERPLBLD CKD-EPI 2021: 70.6 ML/MIN/1.73
EOSINOPHIL # BLD AUTO: 0.01 10*3/MM3 (ref 0–0.4)
EOSINOPHIL NFR BLD AUTO: 0.1 % (ref 0.3–6.2)
ERYTHROCYTE [DISTWIDTH] IN BLOOD BY AUTOMATED COUNT: 11.8 % (ref 12.3–15.4)
GLOBULIN UR ELPH-MCNC: 1.9 GM/DL
GLUCOSE SERPL-MCNC: 117 MG/DL (ref 65–99)
HCT VFR BLD AUTO: 38.7 % (ref 34–46.6)
HGB BLD-MCNC: 13.2 G/DL (ref 12–15.9)
IMM GRANULOCYTES # BLD AUTO: 0.46 10*3/MM3 (ref 0–0.05)
IMM GRANULOCYTES NFR BLD AUTO: 2.8 % (ref 0–0.5)
LYMPHOCYTES # BLD AUTO: 0.97 10*3/MM3 (ref 0.7–3.1)
LYMPHOCYTES NFR BLD AUTO: 5.8 % (ref 19.6–45.3)
MCH RBC QN AUTO: 30 PG (ref 26.6–33)
MCHC RBC AUTO-ENTMCNC: 34.1 G/DL (ref 31.5–35.7)
MCV RBC AUTO: 88 FL (ref 79–97)
MONOCYTES # BLD AUTO: 1.28 10*3/MM3 (ref 0.1–0.9)
MONOCYTES NFR BLD AUTO: 7.7 % (ref 5–12)
NEUTROPHILS NFR BLD AUTO: 13.96 10*3/MM3 (ref 1.7–7)
NEUTROPHILS NFR BLD AUTO: 83.4 % (ref 42.7–76)
NRBC BLD AUTO-RTO: 0 /100 WBC (ref 0–0.2)
PLATELET # BLD AUTO: 449 10*3/MM3 (ref 140–450)
PMV BLD AUTO: 9.1 FL (ref 6–12)
POTASSIUM SERPL-SCNC: 4 MMOL/L (ref 3.5–5.2)
PROCALCITONIN SERPL-MCNC: 0.06 NG/ML (ref 0–0.25)
PROT SERPL-MCNC: 5.2 G/DL (ref 6–8.5)
RBC # BLD AUTO: 4.4 10*6/MM3 (ref 3.77–5.28)
SODIUM SERPL-SCNC: 133 MMOL/L (ref 136–145)
WBC NRBC COR # BLD AUTO: 16.71 10*3/MM3 (ref 3.4–10.8)

## 2025-01-31 PROCEDURE — 94799 UNLISTED PULMONARY SVC/PX: CPT

## 2025-01-31 PROCEDURE — 94664 DEMO&/EVAL PT USE INHALER: CPT

## 2025-01-31 PROCEDURE — 99232 SBSQ HOSP IP/OBS MODERATE 35: CPT | Performed by: INTERNAL MEDICINE

## 2025-01-31 PROCEDURE — 63710000001 PREDNISONE PER 1 MG: Performed by: INTERNAL MEDICINE

## 2025-01-31 PROCEDURE — 80053 COMPREHEN METABOLIC PANEL: CPT | Performed by: STUDENT IN AN ORGANIZED HEALTH CARE EDUCATION/TRAINING PROGRAM

## 2025-01-31 PROCEDURE — 85025 COMPLETE CBC W/AUTO DIFF WBC: CPT | Performed by: INTERNAL MEDICINE

## 2025-01-31 PROCEDURE — 87493 C DIFF AMPLIFIED PROBE: CPT | Performed by: NURSE PRACTITIONER

## 2025-01-31 PROCEDURE — 84145 PROCALCITONIN (PCT): CPT | Performed by: STUDENT IN AN ORGANIZED HEALTH CARE EDUCATION/TRAINING PROGRAM

## 2025-01-31 PROCEDURE — 94761 N-INVAS EAR/PLS OXIMETRY MLT: CPT

## 2025-01-31 RX ORDER — ATENOLOL 25 MG/1
100 TABLET ORAL EVERY 12 HOURS SCHEDULED
Status: DISCONTINUED | OUTPATIENT
Start: 2025-01-31 | End: 2025-02-01 | Stop reason: HOSPADM

## 2025-01-31 RX ADMIN — BUDESONIDE AND FORMOTEROL FUMARATE DIHYDRATE 2 PUFF: 160; 4.5 AEROSOL RESPIRATORY (INHALATION) at 08:11

## 2025-01-31 RX ADMIN — ACETAMINOPHEN 650 MG: 325 TABLET, FILM COATED ORAL at 21:48

## 2025-01-31 RX ADMIN — LORAZEPAM 0.5 MG: 0.5 TABLET ORAL at 21:41

## 2025-01-31 RX ADMIN — LEVOTHYROXINE, LIOTHYRONINE 30 MG: 19; 4.5 TABLET ORAL at 05:42

## 2025-01-31 RX ADMIN — ATENOLOL 100 MG: 25 TABLET ORAL at 21:41

## 2025-01-31 RX ADMIN — ATENOLOL 100 MG: 25 TABLET ORAL at 08:28

## 2025-01-31 RX ADMIN — AMLODIPINE BESYLATE 5 MG: 5 TABLET ORAL at 08:28

## 2025-01-31 RX ADMIN — LEVOTHYROXINE, LIOTHYRONINE 15 MG: 19; 4.5 TABLET ORAL at 05:41

## 2025-01-31 RX ADMIN — APIXABAN 2.5 MG: 2.5 TABLET, FILM COATED ORAL at 20:20

## 2025-01-31 RX ADMIN — PREDNISONE 40 MG: 20 TABLET ORAL at 08:29

## 2025-01-31 RX ADMIN — PAROXETINE HYDROCHLORIDE 20 MG: 20 TABLET, FILM COATED ORAL at 08:29

## 2025-01-31 RX ADMIN — CLOPIDOGREL BISULFATE 75 MG: 75 TABLET ORAL at 08:29

## 2025-01-31 RX ADMIN — ATORVASTATIN CALCIUM 40 MG: 20 TABLET, FILM COATED ORAL at 08:28

## 2025-01-31 RX ADMIN — BUDESONIDE AND FORMOTEROL FUMARATE DIHYDRATE 2 PUFF: 160; 4.5 AEROSOL RESPIRATORY (INHALATION) at 20:16

## 2025-01-31 RX ADMIN — LOSARTAN POTASSIUM 50 MG: 50 TABLET, FILM COATED ORAL at 08:28

## 2025-01-31 RX ADMIN — APIXABAN 2.5 MG: 2.5 TABLET, FILM COATED ORAL at 08:29

## 2025-01-31 NOTE — PROGRESS NOTES
Continued Stay Note  [facility]     Patient Name: Dafne Mary  MRN: [id number]  Today's Date: 2025    Admit Date: 2025    [facility]: [facility] - has pre-cert   Discharge Plan       Row Name 25 1545       Plan    Plan [facility] - has pre-cert    Plan Comments Per MD - anticipate DC tomorrow.  Per Mario Alberto/kade-franca is good tomorrow but will  if does not go then.  Per Ana/Renny has skilled bed available.  Packet in cubbie.  Torey CARLOS                      Expected Discharge Date and Time       Expected Discharge Date Expected Discharge Time    2025               Becky S. [place], RN

## 2025-01-31 NOTE — PROGRESS NOTES
[facility] INPATIENT PROGRESS NOTE         [address]    2025      PATIENT IDENTIFICATION:  Name: Dafne Mary ADMIT: 2025   : 1940  PCP: Jossy Sauceda MD    MRN: [id number] LOS: 6 days   AGE/SEX: 84 y.o. female  ROOM: [facility]                     LOS 6    Reason for visit: Acute hypoxemia, vocal cord dysfunction and asked      SUBJECTIVE:      Resting comfortably.  No new issues overnight from a pulmonary standpoint.  Rehab placement.  No objection to discharge from our standpoint.    Objective   OBJECTIVE:    Vital Sign Min/Max for last 24 hours  Temp  Min: 97.6 °F (36.4 °C)  Max: 98.2 °F (36.8 °C)   BP  Min: 125/67  Max: 142/64   Pulse  Min: 75  Max: 129   Resp  Min: 16  Max: 18   SpO2  Min: 94 %  Max: 96 %   No data recorded   No data recorded    Vitals:    25 1937 25 2300 25 0750 25 0811   BP:  136/73 132/98    BP Location:  Right arm Right arm    Patient Position:  Lying Lying    Pulse: (!) 128 92 109 120   Resp: 16 16 18 18   Temp:  97.9 °F (36.6 °C) 97.6 °F (36.4 °C)    TempSrc:  Oral Oral    SpO2:  94% 96%    Weight:       Height:                25  1624   Weight: 54 kg (119 lb)       Body mass index is 21.77 kg/m².                          Body mass index is 21.77 kg/m².    Intake/Output Summary (Last 24 hours) at 2025 0920  Last data filed at 2025 0500  Gross per 24 hour   Intake 240 ml   Output 600 ml   Net -360 ml           Exam:  GEN:  No distress, appears stated age  EYES:   PERRL, anicteric sclerae  ENT:    External ears/nose normal, OP clear  NECK:  No adenopathy, midline trachea  LUNGS: Normal chest on inspection, palpation and auscultation  CV:  Normal S1S2, without murmur  ABD:  Nontender, nondistended, no hepatosplenomegaly, +BS  EXT:  No edema.  No cyanosis or clubbing.  No mottling and normal cap refill.    Assessment     Scheduled meds:  amLODIPine, 5 mg, Oral, Daily  amoxicillin-clavulanate, 1 tablet, Oral,  "Q12H  apixaban, 2.5 mg, Oral, Q12H  atenolol, 100 mg, Oral, Q12H  atorvastatin, 40 mg, Oral, Daily  budesonide-formoterol, 2 puff, Inhalation, BID - RT   And  revefenacin, 175 mcg, Nebulization, Daily - RT  clopidogrel, 75 mg, Oral, Daily  [Held by provider] furosemide, 20 mg, Oral, Daily  losartan, 50 mg, Oral, BID  PARoxetine, 20 mg, Oral, Daily  thyroid, 15 mg, Oral, Q AM  Thyroid, 30 mg, Oral, Q AM      IV meds:                         Data Review:  Results from last 7 days   Lab Units 01/31/25  0542 01/30/25  0622 01/29/25  0504 01/28/25  0402 01/27/25  0706   SODIUM mmol/L 133* 133* 134* 136 131*   POTASSIUM mmol/L 4.0 4.2 4.5 4.1 4.0   CHLORIDE mmol/L 97* 98 98 99 95*   CO2 mmol/L 28.0 27.3 27.0 28.0 27.8   BUN mg/dL 25* 27* 28* 25* 26*   CREATININE mg/dL 0.82 0.91 0.88 0.78 1.00   GLUCOSE mg/dL 117* 123* 115* 111* 106*   CALCIUM mg/dL 8.3* 8.4* 8.4* 8.6 8.8         Estimated Creatinine Clearance: 43.5 mL/min (by C-G formula based on SCr of 0.82 mg/dL).  Results from last 7 days   Lab Units 01/31/25  0542 01/30/25  0622 01/29/25  0504 01/28/25  0402 01/27/25  0706   WBC 10*3/mm3 16.71* 13.62* 15.86* 13.02* 14.06*   HEMOGLOBIN g/dL 13.2 14.0 13.5 12.0 12.0   PLATELETS 10*3/mm3 449 410 396 377 356           Results from last 7 days   Lab Units 01/31/25  0542 01/30/25  0622 01/29/25  0504 01/28/25  0402 01/24/25  1249   ALT (SGPT) U/L 30 23 22 22 27   AST (SGOT) U/L 29 26 29 26 36*     Results from last 7 days   Lab Units 01/25/25  0334 01/24/25  1250   PH, ARTERIAL pH units 7.392 7.354   PO2 ART mm Hg 61.5* 174.8*   PCO2, ARTERIAL mm Hg 43.7 57.6*   HCO3 ART mmol/L 26.6 32.1*     Results from last 7 days   Lab Units 01/31/25  0542 01/26/25  1035   PROCALCITONIN ng/mL 0.06  --    LACTATE mmol/L  --  1.8         No results found for: \"HGBA1C\", \"POCGLU\"      Imaging reviewed  Chest x-ray and CT chest 1/24 reviewed: No acute    Chest x-ray 1/28 reviewed: Mildly enlarged heart.  Minimal basilar atelectasis.  No " acute.        Microbiology reviewed  RVP negative          Active Hospital Problems    Diagnosis  POA    **COPD exacerbation [J44.1]  Yes    Hypoxia [R09.02]  Yes    Generalized weakness [R53.1]  Unknown    Stage 3a chronic kidney disease [N18.31]  Yes    Chronic HFrEF (heart failure with reduced ejection fraction) [I50.22]  Yes    PAF (paroxysmal atrial fibrillation) [I48.0]  Yes    Vocal cord paralysis [J38.00]  Yes    Anxiety [F41.9]  Yes    Benign hypertension [I10]  Yes      Resolved Hospital Problems   No resolved problems to display.         ASSESSMENT:  Acute hypoxemic and hypercapnic respiratory failure  Asthma  Vocal cord dysfunction  Acute Sinusitis       PLAN:  Encourage pulmonary toilet.   Change nebs to as needed.  Continue Symbicort.  Plan for evaluation by ENT and speech therapy for vocal cord dysfunction and evaluation for anatomical abnormalities of vocal cords.  Will have her see Candace Whitehead as out pt, speech therapist specializing in VCD.  She also has a significant contributing factor with anxiety and she has a referral to outpatient psychiatry clinic to help in that regard.    Working on rehab placement. No objection to discharge.       Javier Nolan MD  Pulmonary and Critical Care Medicine  [facility]  1/31/2025    09:20 EST

## 2025-01-31 NOTE — PROGRESS NOTES
Name: Dafne Mary ADMIT: 2025   : 1940  PCP: Jossy Sauceda MD    MRN: [id number] LOS: 6 days   AGE/SEX: 84 y.o. female  ROOM: [facility]     Subjective   Subjective   No acute events overnight.  Patient continues to have elevated heart rate.  She states she is feeling better today.  Not feeling as dizzy.  She denies chest pain or shortness of breath.  Had discharged her yesterday but unfortunately heart rate increased in the afternoon and has remained increased since that time.    Objective   Objective     Vital Signs  Temp:  [97.6 °F (36.4 °C)-98.2 °F (36.8 °C)] 97.6 °F (36.4 °C)  Heart Rate:  [] 110  Resp:  [16-18] 18  BP: (125-136)/(67-98) 132/98  SpO2:  [94 %-96 %] 96 %  on   ;   Device (Oxygen Therapy): room air  Body mass index is 21.77 kg/m².    Physical Exam  General: Alert, no acute distress.  Lying in bed.  Answers questions appropriately.  ENT: No conjunctival injection or scleral icterus. Moist mucous membranes.   Neuro: Eyes open and moving in all directions, generalized weakness, face symmetric, no focal deficits.   Lungs: Fairly good air movement, occasional end expiratory wheeze.  No distress.  Heart: Tachycardic, irregularly irregular rhythm.  No murmur.  Abdomen: Soft, non-tender, non-distended. Normal bowel sounds.   Ext: No edema  Skin: No rashes or lesions. IV site without swelling or erythema.     Results Review     I reviewed the patient's new clinical results:  Results from last 7 days   Lab Units 25  0542 25  0622 25  0504 25  0402   WBC 10*3/mm3 16.71* 13.62* 15.86* 13.02*   HEMOGLOBIN g/dL 13.2 14.0 13.5 12.0   PLATELETS 10*3/mm3 449 410 396 377     Results from last 7 days   Lab Units 25  0542 25  0622 25  0504 25  0402   SODIUM mmol/L 133* 133* 134* 136   POTASSIUM mmol/L 4.0 4.2 4.5 4.1   CHLORIDE mmol/L 97* 98 98 99   CO2 mmol/L 28.0 27.3 27.0 28.0   BUN mg/dL 25* 27* 28* 25*   CREATININE mg/dL 0.82 0.91 0.88 0.78  "  GLUCOSE mg/dL 117* 123* 115* 111*   EGFR mL/min/1.73 70.6 62.3 64.9 75.0     Results from last 7 days   Lab Units 01/31/25  0542 01/30/25  0622 01/29/25  0504 01/28/25  0402   ALBUMIN g/dL 3.3* 2.9* 3.2* 3.5   BILIRUBIN mg/dL 0.4 0.5 0.4 0.3   ALK PHOS U/L 124* 101 105 105   AST (SGOT) U/L 29 26 29 26   ALT (SGPT) U/L 30 23 22 22     Results from last 7 days   Lab Units 01/31/25  0542 01/30/25  0622 01/29/25  0504 01/28/25  0402   CALCIUM mg/dL 8.3* 8.4* 8.4* 8.6   ALBUMIN g/dL 3.3* 2.9* 3.2* 3.5     Results from last 7 days   Lab Units 01/31/25  0542 01/26/25  1035   PROCALCITONIN ng/mL 0.06  --    LACTATE mmol/L  --  1.8     No results found for: \"HGBA1C\", \"POCGLU\"    No radiology results for the last day    I have personally reviewed all medications:  Scheduled Medications  amLODIPine, 5 mg, Oral, Daily  amoxicillin-clavulanate, 1 tablet, Oral, Q12H  apixaban, 2.5 mg, Oral, Q12H  atenolol, 100 mg, Oral, Q12H  atorvastatin, 40 mg, Oral, Daily  budesonide-formoterol, 2 puff, Inhalation, BID - RT   And  revefenacin, 175 mcg, Nebulization, Daily - RT  clopidogrel, 75 mg, Oral, Daily  [Held by provider] furosemide, 20 mg, Oral, Daily  losartan, 50 mg, Oral, BID  PARoxetine, 20 mg, Oral, Daily  thyroid, 15 mg, Oral, Q AM  Thyroid, 30 mg, Oral, Q AM    Infusions   Diet  Diet: Regular/House; Texture: Mechanical Ground (NDD 2); Fluid Consistency: Thin (IDDSI 0)      Intake/Output Summary (Last 24 hours) at 1/31/2025 1520  Last data filed at 1/31/2025 0500  Gross per 24 hour   Intake 240 ml   Output 600 ml   Net -360 ml       Assessment/Plan     Active Hospital Problems    Diagnosis  POA    **COPD exacerbation [J44.1]  Yes    Hypoxia [R09.02]  Yes    Generalized weakness [R53.1]  Unknown    Stage 3a chronic kidney disease [N18.31]  Yes    Chronic HFrEF (heart failure with reduced ejection fraction) [I50.22]  Yes    PAF (paroxysmal atrial fibrillation) [I48.0]  Yes    Vocal cord paralysis [J38.00]  Yes    Anxiety [F41.9]  " Yes    Benign hypertension [I10]  Yes      Resolved Hospital Problems   No resolved problems to display.     84 y.o. female with COPD exacerbation.    Acute hypoxic respiratory failure secondary to COPD exacerbation  Vocal cord dysfunction  Leukocytosis  -Pulmonology following  -Continue inhalers and DuoNebs  -Augmentin x 7 days for sinusitis per pulmonology  -Oral prednisone  -ENT referral at discharge  -Currently on room air  -Leukocytosis continuing to fluctuate, 16K today but no new infectious symptoms and afebrile  -Repeat CBC with morning labs  -WBC did increase again today to 17K.  Has been fluctuating some and the patient is on oral steroids.  Procalcitonin remains negative.  With this, going to monitor on current antibiotic regimen for now.  She did have a few episodes of diarrhea but C. difficile is negative.  If WBC is further increase tomorrow or new symptoms develop, will likely need to initiate further workup at that time.    Coronary artery disease  Hypertension  Atrial fibrillation with RVR  Chronic diastolic heart failure  Elevated troponin  -Continue amlodipine and losartan  -RC: Atenolol  -AC: reduced dose Eliquis  -Holding oral Lasix today given hyponatremia  -Continue Plavix and statin  -Cardiology now consulted  -Recent cardiac catheterization, troponin downtrending and rechecked yesterday.  EKG with no acute ischemic changes.  Repeat EKG and troponin as clinically indicated.  -Heart rate continues to be difficult to control.  Cardiology has changed metoprolol to atenolol in hopes that this will help.  Will continue to monitor.    Hyponatremia  -Sodium 133 today  -Repeat BMP with morning labs  -Continue holding Lasix  -If sodium continues to decrease, will need to perform additional workup    CKD stage IIIa  -Creatinine consistent with baseline  -Avoid nephrotoxic agents including NSAIDs and contrast dyes  -Repeat BMP with morning labs    Asymptomatic bacteriuria  -UA with trace leukocyte,  positive nitrite, 3-5 WBC, 4+ bacteria  -Patient denies any dysuria, urinary frequency, suprapubic pain  -On Augmentin as above which would likely cover most urinary pathogens  -Monitor clinically, repeat UA should symptoms develop    Eliquis (home med) for DVT prophylaxis.  Full code.  Discussed with patient and nursing staff.  Anticipate discharge to SNU facility, timing to be determined  Updated daughter at bedside      Beckie Treviño MD  [facility]  01/31/25  15:20 EST

## 2025-01-31 NOTE — CASE MANAGEMENT/SOCIAL WORK
Post-Acute Authorization Submission      Post Acute Pre-Cert Documentation  Request Submitted by Facility - Type:: Hospital  Post-Acute Authorization Type Submitted:: SNF  Date Post Acute Pre-Cert Inititated per Facility: 01/27/25  Date Post Acute Pre-Cert Completed: 01/28/25  Accepting Facility: [facility] Discharge Date Requested: 01/28/25  All Clinicals Submitted?: Yes  Had Accepting Facility at Time of Submission: Yes  Response Received from Insurance?: Approval  Response Communicated to:: , Accepting Facility Liaison  Authorization Number:: APPROVED [id number]  Post Acute Pre-Cert Initiated Comment: VALID TO ADMIT UPTO 2/1/25. VERIFIED W/REP ALPHONSO AT [facility] THE PATIENT HAS UNTIL 11:59PM ON 2/1/25 TO ADMIT TO PAC FACILITY. PAC FACILITY NEEDS TO CALL [facility] TO UPDATE ADMISSION DATE AND [facility] PH# [phone] OPT.3 ONCE PATIENT IS ACTUALLY THERE.              Mario Alberto Pemberton, PCT

## 2025-01-31 NOTE — PROGRESS NOTES
"[facility] Cardiology [facility] Follow Up    Chief Complaint: Follow up afib    Interval History: She reports that she feels better today.  She is off of oxygen.  Heart rates still in the 90s to 100s.    Objective:     Objective:  Temp:  [97.9 °F (36.6 °C)-98.2 °F (36.8 °C)] 97.9 °F (36.6 °C)  Heart Rate:  [] 92  Resp:  [16] 16  BP: (125-142)/(64-73) 136/73     Intake/Output Summary (Last 24 hours) at 1/31/2025 0738  Last data filed at 1/31/2025 0500  Gross per 24 hour   Intake 480 ml   Output 600 ml   Net -120 ml     Body mass index is 21.77 kg/m².      01/24/25  1624   Weight: 54 kg (119 lb)     Weight change:       Physical Exam:   General : Alert, cooperative, in no acute distress.  Neuro: Alert,cooperative and oriented.  Lungs: CTAB. Normal respiratory effort and rate.  CV: Irregularly, irregular, normal S1 and S2, no murmurs, gallops or rubs.  ABD: Soft, nontender, nondistended. Positive bowel sounds.  Extr: No edema or cyanosis, moves all extremities.    Lab Review:   Results from last 7 days   Lab Units 01/31/25  0542 01/30/25  0622   SODIUM mmol/L 133* 133*   POTASSIUM mmol/L 4.0 4.2   CHLORIDE mmol/L 97* 98   CO2 mmol/L 28.0 27.3   BUN mg/dL 25* 27*   CREATININE mg/dL 0.82 0.91   GLUCOSE mg/dL 117* 123*   CALCIUM mg/dL 8.3* 8.4*   AST (SGOT) U/L 29 26   ALT (SGPT) U/L 30 23     Results from last 7 days   Lab Units 01/28/25  0523 01/28/25  0402 01/24/25  1951 01/24/25  1416 01/24/25  1249   HSTROP T ng/L 57* 60* 122* 181* 152*     Results from last 7 days   Lab Units 01/31/25  0542 01/30/25  0622   WBC 10*3/mm3 16.71* 13.62*   HEMOGLOBIN g/dL 13.2 14.0   HEMATOCRIT % 38.7 42.3   PLATELETS 10*3/mm3 449 410                   Invalid input(s): \"LDLCALC\"  Results from last 7 days   Lab Units 01/24/25  1249   PROBNP pg/mL 1,521.0         I reviewed the patient's new clinical results.  I personally viewed and interpreted the patient's EKG  Current Medications:   Scheduled Meds:amLODIPine, 5 mg, Oral, " Daily  amoxicillin-clavulanate, 1 tablet, Oral, Q12H  apixaban, 2.5 mg, Oral, Q12H  atenolol, 100 mg, Oral, Q12H  atorvastatin, 40 mg, Oral, Daily  budesonide-formoterol, 2 puff, Inhalation, BID - RT   And  revefenacin, 175 mcg, Nebulization, Daily - RT  clopidogrel, 75 mg, Oral, Daily  [Held by provider] furosemide, 20 mg, Oral, Daily  losartan, 50 mg, Oral, BID  PARoxetine, 20 mg, Oral, Daily  predniSONE, 40 mg, Oral, Daily  thyroid, 15 mg, Oral, Q AM  Thyroid, 30 mg, Oral, Q AM      Continuous Infusions:     Allergies:  Allergies   Allergen Reactions    Lansoprazole Nausea Only     NAUSEA  NAUSEA  NAUSEA    Buspirone Other (See Comments)     agitation    Diphenhydramine Hcl (Sleep) Irritability    Lisinopril Cough       Assessment/Plan:     Intermittent left bundle branch block.  Reviewed her current EKGs and previous tracings.  It appears that she has a left bundle branch block on current tracings.  This is not a new finding.  COPD with acute exacerbation  Acute hypoxic respiratory failure  Recent NSTEMI.  With no acute disease by cath on 1/20. Troponins trending down.    Coronary artery disease.  Recent cath on 1/20 with patent stents.   Paroxysmal atrial fibrillation.  In persistent atrial firillation since 1/24.  Rates mildly elevated despite max dose carvedilol.  Switched to atenolol yesterday with rates in the 90s-120s.  Some of this is driven by respiratory issues.   Chronic heart failure with preserved ejection fraction.  Furosemide was placed on hold for hyponatremia on admission.   Hypertension  Hyperlipidemia  CKD stage 3a  Hypothyroidism  Hyponatremia.  Improved.      -Will increase atenolol dosage further to 100 mg twice a day.  - I think would be okay to keep furosemide on hold and just have the patient have a at home to take as needed.  - Otherwise she appears to be stable from a cardiac standpoint for discharge.  -Will arrange for follow-up in our office with myself or Addie Lennox, APRN in 1 to 2  weeks.    Nae Norman MD  01/31/25  07:38 EST

## 2025-02-01 VITALS
OXYGEN SATURATION: 98 % | WEIGHT: 119 LBS | SYSTOLIC BLOOD PRESSURE: 132 MMHG | BODY MASS INDEX: 21.9 KG/M2 | DIASTOLIC BLOOD PRESSURE: 77 MMHG | TEMPERATURE: 97.9 F | HEIGHT: 62 IN | RESPIRATION RATE: 16 BRPM | HEART RATE: 88 BPM

## 2025-02-01 LAB
ALBUMIN SERPL-MCNC: 3 G/DL (ref 3.5–5.2)
ALBUMIN/GLOB SERPL: 1.5 G/DL
ALP SERPL-CCNC: 116 U/L (ref 39–117)
ALT SERPL W P-5'-P-CCNC: 30 U/L (ref 1–33)
ANION GAP SERPL CALCULATED.3IONS-SCNC: 11.9 MMOL/L (ref 5–15)
AST SERPL-CCNC: 23 U/L (ref 1–32)
BASOPHILS # BLD AUTO: 0.04 10*3/MM3 (ref 0–0.2)
BASOPHILS NFR BLD AUTO: 0.3 % (ref 0–1.5)
BILIRUB SERPL-MCNC: 0.4 MG/DL (ref 0–1.2)
BUN SERPL-MCNC: 22 MG/DL (ref 8–23)
BUN/CREAT SERPL: 30.6 (ref 7–25)
CALCIUM SPEC-SCNC: 7.9 MG/DL (ref 8.6–10.5)
CHLORIDE SERPL-SCNC: 100 MMOL/L (ref 98–107)
CO2 SERPL-SCNC: 23.1 MMOL/L (ref 22–29)
CREAT SERPL-MCNC: 0.72 MG/DL (ref 0.57–1)
DEPRECATED RDW RBC AUTO: 38.8 FL (ref 37–54)
EGFRCR SERPLBLD CKD-EPI 2021: 82.6 ML/MIN/1.73
EOSINOPHIL # BLD AUTO: 0.01 10*3/MM3 (ref 0–0.4)
EOSINOPHIL NFR BLD AUTO: 0.1 % (ref 0.3–6.2)
ERYTHROCYTE [DISTWIDTH] IN BLOOD BY AUTOMATED COUNT: 12 % (ref 12.3–15.4)
GLOBULIN UR ELPH-MCNC: 2 GM/DL
GLUCOSE SERPL-MCNC: 94 MG/DL (ref 65–99)
HCT VFR BLD AUTO: 39.2 % (ref 34–46.6)
HGB BLD-MCNC: 13.4 G/DL (ref 12–15.9)
IMM GRANULOCYTES # BLD AUTO: 0.67 10*3/MM3 (ref 0–0.05)
IMM GRANULOCYTES NFR BLD AUTO: 4.6 % (ref 0–0.5)
LYMPHOCYTES # BLD AUTO: 0.98 10*3/MM3 (ref 0.7–3.1)
LYMPHOCYTES NFR BLD AUTO: 6.7 % (ref 19.6–45.3)
MCH RBC QN AUTO: 30.3 PG (ref 26.6–33)
MCHC RBC AUTO-ENTMCNC: 34.2 G/DL (ref 31.5–35.7)
MCV RBC AUTO: 88.7 FL (ref 79–97)
MONOCYTES # BLD AUTO: 1.23 10*3/MM3 (ref 0.1–0.9)
MONOCYTES NFR BLD AUTO: 8.4 % (ref 5–12)
NEUTROPHILS NFR BLD AUTO: 11.75 10*3/MM3 (ref 1.7–7)
NEUTROPHILS NFR BLD AUTO: 79.9 % (ref 42.7–76)
NRBC BLD AUTO-RTO: 0 /100 WBC (ref 0–0.2)
PLATELET # BLD AUTO: 460 10*3/MM3 (ref 140–450)
PMV BLD AUTO: 9.1 FL (ref 6–12)
POTASSIUM SERPL-SCNC: 4.1 MMOL/L (ref 3.5–5.2)
PROT SERPL-MCNC: 5 G/DL (ref 6–8.5)
RBC # BLD AUTO: 4.42 10*6/MM3 (ref 3.77–5.28)
SODIUM SERPL-SCNC: 135 MMOL/L (ref 136–145)
WBC NRBC COR # BLD AUTO: 14.68 10*3/MM3 (ref 3.4–10.8)

## 2025-02-01 PROCEDURE — 63710000001 REVEFENACIN 175 MCG/3ML SOLUTION: Performed by: NURSE PRACTITIONER

## 2025-02-01 PROCEDURE — 94664 DEMO&/EVAL PT USE INHALER: CPT

## 2025-02-01 PROCEDURE — 85025 COMPLETE CBC W/AUTO DIFF WBC: CPT | Performed by: INTERNAL MEDICINE

## 2025-02-01 PROCEDURE — 80053 COMPREHEN METABOLIC PANEL: CPT | Performed by: STUDENT IN AN ORGANIZED HEALTH CARE EDUCATION/TRAINING PROGRAM

## 2025-02-01 PROCEDURE — 94799 UNLISTED PULMONARY SVC/PX: CPT

## 2025-02-01 PROCEDURE — 99232 SBSQ HOSP IP/OBS MODERATE 35: CPT | Performed by: NURSE PRACTITIONER

## 2025-02-01 RX ORDER — ATENOLOL 100 MG/1
100 TABLET ORAL EVERY 12 HOURS SCHEDULED
Start: 2025-02-01

## 2025-02-01 RX ORDER — FUROSEMIDE 20 MG/1
20 TABLET ORAL DAILY PRN
Qty: 30 TABLET | Refills: 0 | Status: SHIPPED | OUTPATIENT
Start: 2025-02-01

## 2025-02-01 RX ADMIN — LEVOTHYROXINE, LIOTHYRONINE 15 MG: 19; 4.5 TABLET ORAL at 05:55

## 2025-02-01 RX ADMIN — BUDESONIDE AND FORMOTEROL FUMARATE DIHYDRATE 2 PUFF: 160; 4.5 AEROSOL RESPIRATORY (INHALATION) at 07:05

## 2025-02-01 RX ADMIN — AMLODIPINE BESYLATE 5 MG: 5 TABLET ORAL at 08:52

## 2025-02-01 RX ADMIN — ATORVASTATIN CALCIUM 40 MG: 20 TABLET, FILM COATED ORAL at 08:52

## 2025-02-01 RX ADMIN — CLOPIDOGREL BISULFATE 75 MG: 75 TABLET ORAL at 08:52

## 2025-02-01 RX ADMIN — PAROXETINE HYDROCHLORIDE 20 MG: 20 TABLET, FILM COATED ORAL at 08:52

## 2025-02-01 RX ADMIN — LORAZEPAM 0.5 MG: 0.5 TABLET ORAL at 09:39

## 2025-02-01 RX ADMIN — LEVOTHYROXINE, LIOTHYRONINE 30 MG: 19; 4.5 TABLET ORAL at 05:54

## 2025-02-01 RX ADMIN — APIXABAN 2.5 MG: 2.5 TABLET, FILM COATED ORAL at 08:52

## 2025-02-01 NOTE — DISCHARGE SUMMARY
Patient Name: Dafne Mary  : 1940  MRN: [id number]    Date of Admission: 2025  Date of Discharge:  2025  Primary Care Physician: Jossy Sauceda MD      Chief Complaint:   Shortness of Breath      Discharge Diagnoses     Active Hospital Problems    Diagnosis  POA    **COPD exacerbation [J44.1]  Yes    Hypoxia [R09.02]  Yes    Generalized weakness [R53.1]  Unknown    Stage 3a chronic kidney disease [N18.31]  Yes    Chronic HFrEF (heart failure with reduced ejection fraction) [I50.22]  Yes    PAF (paroxysmal atrial fibrillation) [I48.0]  Yes    Vocal cord paralysis [J38.00]  Yes    Anxiety [F41.9]  Yes    Benign hypertension [I10]  Yes      Resolved Hospital Problems   No resolved problems to display.        Hospital Course     Ms. Mary is a 84 y.o. female with a history of COPD, CAD, hypertension, atrial fibrillation, chronic diastolic heart failure, and CKD stage III who presented to [facility] initially complaining of shortness of breath.  Please see the admitting history and physical for further details.  She was admitted to the hospital for further evaluation and treatment.      Patient had evidence of COPD exacerbation on admission.  There was no evidence of bacterial pneumonia, however she did have evidence of sinusitis.  Because of this, the patient was started on empiric Augmentin.  She was started on steroid and pulmonary toilet.  She slowly improved and oxygen was weaned.  WBC remains slightly elevated at time of discharge but was downtrending.  Suspect steroids were contributing to this.  She is still intermittently requiring oxygen and will be discharged to SNF with this.  Walking oximetry test can be performed after discharge.  Pulmonology also felt there was likely component of vocal cord dysfunction.  An ENT referral has been placed and the patient can follow-up as an outpatient.  Hospital course was complicated by atrial fibrillation with RVR.  Cardiology was  consulted.  The patient's home Eliquis was continued.  She was initially on Coreg but heart rate continued to be high.  Because of this, cardiology transitioned her to atenolol.  This seemed to achieve better heart rate control, especially once dose was increased.  On day of discharge, the patient denied chest pain, shortness of breath, or palpitations.  Hospital course was also complicated by mild hyponatremia.  Lasix was held briefly.  Cardiology ultimately recommended this be resumed on an as needed basis for leg swelling or shortness of breath.  Sodium was 135 the day of discharge which was improved from previous checks.  The patient did have an elevated WBC, but it was downtrending.  This was most likely secondary to the steroids patient had been receiving.  She completed these prior to discharge home.  I would recommend a repeat BMP and CBC with in 3 days of discharge at SNF to ensure stability of sodium and improvement in leukocytosis.  She was noted to have asymptomatic bacteriuria on admission.  Given only 3-5 WBC, urine was not sent for culture.  The patient was on Augmentin as above and was monitored clinically.  Overall, the patient good clinical improvement.  PT/OT recommended SNF discharge.  This was arranged by CCP.  She is being discharged to SNF in satisfactory condition.  She was initially discharged on 1/30 but HR became elevated again.  Atenolol was increased and heart rate was much better controlled.  Cardiology has cleared her for discharge.  They will call to arrange follow-up in their office.    Day of Discharge     Subjective:  No acute events overnight.  Patient states she is feeling pretty well today.  No dizziness.  No chest pain, shortness of breath, or palpitations.  She has been afebrile.  Very eager for discharge.    Physical Exam:  Temp:  [97.5 °F (36.4 °C)-98.1 °F (36.7 °C)] 97.9 °F (36.6 °C)  Heart Rate:  [] 88  Resp:  [16-18] 16  BP: (127-132)/(77-86) 132/77  Body mass index  is 21.77 kg/m².  Physical Exam  General: Alert, no acute distress.  Lying in bed.  Answers questions appropriately.  ENT: No conjunctival injection or scleral icterus. Moist mucous membranes.   Neuro: Eyes open and moving in all directions, generalized weakness, face symmetric, no focal deficits.   Lungs: Fairly good air movement, no wheezing.  No distress.  Heart: Regular rate today, irregularly irregular rhythm.  No murmur.  Abdomen: Soft, non-tender, non-distended. Normal bowel sounds.   Ext: Trace edema bilateral lower extremities.  Skin: No rashes or lesions. IV site without swelling or erythema.     Consultants     Consult Orders (all) (From admission, onward)       Start     Ordered    01/28/25 0702  Inpatient Cardiology Consult  IN AM        Specialty:  Cardiology  Provider:  Gio Maria MD    01/28/25 0446    01/25/25 1121  Inpatient Hospitalist Consult  Once        Specialty:  Hospitalist  Provider:  Deric Dean DO    01/25/25 1120    01/25/25 0346  Inpatient Case Management  Consult  Once        Provider:  (Not yet assigned)    01/25/25 0345    01/24/25 1624  Inpatient Case Management  Consult  Once,   Status:  Canceled        Provider:  (Not yet assigned)    01/24/25 1624    01/24/25 1447  Inpatient Pulmonology Consult  Once        Specialty:  Pulmonary Disease  Provider:  Javier Nolan MD    01/24/25 1447                  Procedures     * Surgery not found *    Imaging Results (All)       Procedure Component Value Units Date/Time    XR Chest 1 View [[id number]] Collected: 01/28/25 0849     Updated: 01/28/25 0906    Narrative:      XR CHEST 1 VW-     HISTORY: Female who is 84 years-old, dyspnea     TECHNIQUE: Frontal view of the chest     COMPARISON: 1/24/2025     FINDINGS: The heart is mildly enlarged. Pulmonary vasculature is  unremarkable. Aorta is tortuous, calcified. Minimal likely atelectasis  or scarring at the costophrenic angles. Minimal blunting of  the right  lateral costophrenic angle may reflect minimal pleural effusion. No  pneumothorax. Narrowing of the right acromiohumeral interval is noted,  compatible with rotator cuff tear. No acute osseous process.       Impression:      As described.        This report was finalized on 1/28/2025 9:03 AM by Dr. Ned Guzman M.D on Workstation: [id number]       CT Angiogram Chest [[id number]] Collected: 01/25/25 0018     Updated: 01/25/25 0026    Narrative:      CTA CHEST WITH IV CONTRAST     HISTORY: SOA with minimal exertion; Hypoxia; J44.1-Chronic obstructive  pulmonary disease with (acute) exacerbation     COMPARISON: Chest CT [id number]     TECHNIQUE: CT angiography was performed of the chest with axial images  as well as coronal and sagittal reformatted MIP images provided  following intravenous administration of contrast. 3-D surface rendered  reformats were obtained of the pulmonary arteries and aorta.  Radiation  dose reduction techniques were utilized, including automated exposure  control, and exposure modulation based on body size.        FINDINGS:     There is no pulmonary infiltrate, pleural effusion, pneumothorax or  suspicious nodule.     The thoracic aorta is normal in caliber. There are coronary  atherosclerotic vascular calcifications.  There is no suspicious  mediastinal adenopathy or other mass.     Images of the upper abdomen show no acute abnormality.  There is no  acute bony abnormality.     Bolus timing is good and there is no evidence of pulmonary embolism.       Impression:         1.  Pulmonary arteries are well-opacified, and there is no evidence of  pulmonary embolism.     1.  No acute pulmonary parenchymal abnormality is seen.           This report was finalized on 1/25/2025 12:23 AM by Dr. Sen Martinez M.D on Workstation: [id number]       XR Chest 1 View [[id number]] Collected: 01/24/25 1326     Updated: 01/24/25 1332    Narrative:      XR CHEST 1 VW-     HISTORY: Female who is 84  years-old, short of breath     TECHNIQUE: Frontal view of the chest     COMPARISON: 1/22/2025     FINDINGS: The heart is enlarged. Aorta is tortuous, calcified. Pulmonary  vasculature is unremarkable. No focal pulmonary consolidation, pleural  effusion, or pneumothorax. No acute osseous process.       Impression:      No focal pulmonary consolidation. Cardiomegaly. Tortuous  aorta. Follow-up as clinically indicated.           This report was finalized on 1/24/2025 1:29 PM by Dr. Ned Guzman M.D on Workstation: [id number]             Results for orders placed during the hospital encounter of 11/22/22    Duplex Carotid Ultrasound CAR    Interpretation Summary    Proximal right internal carotid artery plaque without significant stenosis.    Proximal left internal carotid artery plaque without significant stenosis.    Results for orders placed during the hospital encounter of 01/17/25    Adult Transthoracic Echo Complete W/ Cont if Necessary Per Protocol    Interpretation Summary    Left ventricular systolic function is normal. Calculated left ventricular EF = 51.2%    Septal wall motion is abnormal, consistent with a bundle branch block.    Left ventricular diastolic function is consistent with age.    No aortic valve regurgitation or stenosis is present.  There is moderate calcification of the aortic valve.    Mild mitral annular calcification is present. There is moderate, bileaflet mitral valve thickening present. Mild mitral valve regurgitation is present. No significant mitral valve stenosis is present.    Pertinent Labs     Results from last 7 days   Lab Units 02/01/25  0414 01/31/25  0542 01/30/25  0622 01/29/25  0504   WBC 10*3/mm3 14.68* 16.71* 13.62* 15.86*   HEMOGLOBIN g/dL 13.4 13.2 14.0 13.5   PLATELETS 10*3/mm3 460* 449 410 396     Results from last 7 days   Lab Units 02/01/25  0414 01/31/25  0542 01/30/25  0622 01/29/25  0504   SODIUM mmol/L 135* 133* 133* 134*   POTASSIUM mmol/L 4.1 4.0 4.2 4.5  "  CHLORIDE mmol/L 100 97* 98 98   CO2 mmol/L 23.1 28.0 27.3 27.0   BUN mg/dL 22 25* 27* 28*   CREATININE mg/dL 0.72 0.82 0.91 0.88   GLUCOSE mg/dL 94 117* 123* 115*   EGFR mL/min/1.73 82.6 70.6 62.3 64.9     Results from last 7 days   Lab Units 02/01/25 0414 01/31/25  0542 01/30/25  0622 01/29/25  0504   ALBUMIN g/dL 3.0* 3.3* 2.9* 3.2*   BILIRUBIN mg/dL 0.4 0.4 0.5 0.4   ALK PHOS U/L 116 124* 101 105   AST (SGOT) U/L 23 29 26 29   ALT (SGPT) U/L 30 30 23 22     Results from last 7 days   Lab Units 02/01/25 0414 01/31/25  0542 01/30/25  0622 01/29/25  0504   CALCIUM mg/dL 7.9* 8.3* 8.4* 8.4*   ALBUMIN g/dL 3.0* 3.3* 2.9* 3.2*       Results from last 7 days   Lab Units 01/28/25  0523 01/28/25  0402   HSTROP T ng/L 57* 60*           Invalid input(s): \"LDLCALC\"              Test Results Pending at Discharge     Pending Results       None              Discharge Details        Discharge Medications        New Medications        Instructions Start Date   amoxicillin-clavulanate 875-125 MG per tablet  Commonly known as: AUGMENTIN   1 tablet, Oral, Every 12 Hours Scheduled      atenolol 100 MG tablet  Commonly known as: TENORMIN   100 mg, Oral, Every 12 Hours Scheduled             Changes to Medications        Instructions Start Date   furosemide 20 MG tablet  Commonly known as: LASIX  What changed:   when to take this  reasons to take this   20 mg, Oral, Daily PRN             Continue These Medications        Instructions Start Date   acetaminophen 325 MG tablet  Commonly known as: TYLENOL   650 mg, Oral, Every 6 Hours PRN      amLODIPine 5 MG tablet  Commonly known as: NORVASC   5 mg, Oral, Daily      apixaban 2.5 MG tablet tablet  Commonly known as: ELIQUIS   2.5 mg, Oral, Every 12 Hours Scheduled      Eucha Thyroid 15 MG tablet  Generic drug: thyroid   Take 1 tablet by mouth once daily      Eucha Thyroid 30 MG tablet  Generic drug: Thyroid   Take 1 tablet by mouth once daily      artificial tears ophthalmic " ointment   1 application , Both Eyes, Every 1 Hour PRN      atorvastatin 40 MG tablet  Commonly known as: LIPITOR   40 mg, Oral, Daily      Breztri Aerosphere 160-9-4.8 MCG/ACT aerosol inhaler  Generic drug: Budeson-Glycopyrrol-Formoterol   2 puffs, 2 Times Daily      clopidogrel 75 MG tablet  Commonly known as: PLAVIX   75 mg, Oral, Daily      empagliflozin 10 MG tablet tablet  Commonly known as: Jardiance   10 mg, Oral, Daily      guaiFENesin 600 MG 12 hr tablet  Commonly known as: MUCINEX   600 mg, Oral, 2 Times Daily PRN      ipratropium-albuterol 0.5-2.5 mg/3 ml nebulizer  Commonly known as: DUO-NEB   3 mL, Every 4 Hours PRN      LORazepam 0.5 MG tablet  Commonly known as: ATIVAN   0.5 mg, Oral, Every 6 Hours PRN      PARoxetine 10 MG tablet  Commonly known as: PAXIL   20 mg, Oral, Daily      REFRESH DRY EYE THERAPY OP   1 drop, Daily      TAGAMET PO   Take  by mouth.      Ventolin  (90 Base) MCG/ACT inhaler  Generic drug: albuterol sulfate HFA   2 puffs, Inhalation, Every 4 Hours PRN      vitamin B-12 500 MCG tablet  Commonly known as: CYANOCOBALAMIN   500 mcg, Daily      vitamin C 250 MG tablet  Commonly known as: ASCORBIC ACID   500 mg, Daily      vitamin D3 125 MCG (5000 UT) capsule capsule   5,000 Units, Daily             Stop These Medications      carvedilol 25 MG tablet  Commonly known as: COREG     losartan 50 MG tablet  Commonly known as: COZAAR              Allergies   Allergen Reactions    Lansoprazole Nausea Only     NAUSEA  NAUSEA  NAUSEA    Buspirone Other (See Comments)     agitation    Diphenhydramine Hcl (Sleep) Irritability    Lisinopril Cough       Discharge Disposition:  Skilled Nursing Facility (DC - External)      Discharge Diet:  Diet Order   Procedures    Diet: Regular/House; Texture: Mechanical Ground (NDD 2); Fluid Consistency: Thin (IDDSI 0)       Discharge Activity: Activity as tolerated      CODE STATUS:    Code Status and Medical Interventions: CPR (Attempt to Resuscitate);  Full Support   Ordered at: 01/24/25 1550     Level Of Support Discussed With:    Patient     Code Status (Patient has no pulse and is not breathing):    CPR (Attempt to Resuscitate)     Medical Interventions (Patient has pulse or is breathing):    Full Support       No future appointments.     Contact information for follow-up providers       Jossy Sauceda MD .    Specialties: Urgent Care, Emergency Medicine  Contact information:  [address] [place] [id number]  [phone]                       Contact information for after-discharge care       Destination       [facility] .    Service: Skilled Nursing  Contact information:  [address] [place] [id number]  [phone]                                   Time Spent on Discharge:  Greater than 30 minutes      Beckie Treviño MD  [facility]  02/01/25  12:49 EST    Addendum: Notified by nursing after writing this discharge summary that patient's heart rate had increased.  Cardiology notified.  She is going to get a one-time dose of IV Lopressor.  Will monitor her for 1 additional night.

## 2025-02-01 NOTE — PROGRESS NOTES
[facility]  Cardiology Progress Note    Patient Identification:  Name: Dafne Mary  Age: 84 y.o.  Sex: female  :  1940  MRN: [id number]                 Follow Up / Chief Complaint: Follow-up for atrial fib    Interval History: Heart rate 90s to low 100s.  A-fib on the monitor.  She denies any chest pain or palpitations.  She states she is feeling better.       Objective:    Past Medical History:  Past Medical History:   Diagnosis Date    Atrial fibrillation with RVR 2023    CAD (coronary artery disease) 7/10/2023    Chronic diastolic congestive heart failure 2022    Depression     Emphysema lung     GERD (gastroesophageal reflux disease)     Heart failure     Hyperlipidemia     Hypertension     Hypothyroidism     Mitral valve regurgitation 7/10/2023    NSTEMI (non-ST elevated myocardial infarction) 10/25/2023     Past Surgical History:  Past Surgical History:   Procedure Laterality Date    CARDIAC CATHETERIZATION N/A 2023    Procedure: Right and Left Heart Cath;  Surgeon: Ra Cole MD;  Location: [facility] CATH INVASIVE LOCATION;  Service: Cardiology;  Laterality: N/A;    CARDIAC CATHETERIZATION N/A 2023    Procedure: Percutaneous Coronary Intervention;  Surgeon: Ra Cole MD;  Location: [facility] CATH INVASIVE LOCATION;  Service: Cardiology;  Laterality: N/A;    CARDIAC CATHETERIZATION N/A 2023    Procedure: Stent ANDRZEJ coronary;  Surgeon: Ra oCle MD;  Location: [facility] CATH INVASIVE LOCATION;  Service: Cardiology;  Laterality: N/A;    CARDIAC CATHETERIZATION N/A 2023    Procedure: Coronary angiography;  Surgeon: Ra Cole MD;  Location: [facility] CATH INVASIVE LOCATION;  Service: Cardiology;  Laterality: N/A;    CARDIAC CATHETERIZATION N/A 2023    Procedure: Left ventriculography;  Surgeon: Ra Cole MD;  Location: [facility] CATH INVASIVE LOCATION;  Service: Cardiology;  Laterality: N/A;    CARDIAC  CATHETERIZATION N/A 1/20/2025    Procedure: Left Heart Cath;  Surgeon: Nae Norman MD;  Location:  [facility] CATH INVASIVE LOCATION;  Service: Cardiovascular;  Laterality: N/A;    CARDIAC CATHETERIZATION N/A 1/20/2025    Procedure: Coronary angiography;  Surgeon: Nae Norman MD;  Location:  [facility] CATH INVASIVE LOCATION;  Service: Cardiovascular;  Laterality: N/A;    EYE SURGERY Left     INTUBATION  5/21/2023         PARATHYROIDECTOMY      Patient reports thyroidectomy partial not a para thyroidectomy.    THYROIDECTOMY, PARTIAL      1984        Social History:   Social History     Tobacco Use    Smoking status: Never     Passive exposure: Never    Smokeless tobacco: Never   Substance Use Topics    Alcohol use: Yes     Comment: rare      Family History:  Family History   Problem Relation Age of Onset    Alzheimer's disease Mother     Lung disease Father     Alcohol abuse Father     Heart disease Brother     Hypertension Brother     Lung disease Brother     Alcohol abuse Brother     Cancer Brother         LUNG  WAS A SMOKER    Thyroid disease Maternal Grandmother           Allergies:  Allergies   Allergen Reactions    Lansoprazole Nausea Only     NAUSEA  NAUSEA  NAUSEA    Buspirone Other (See Comments)     agitation    Diphenhydramine Hcl (Sleep) Irritability    Lisinopril Cough     Scheduled Meds:  amLODIPine, 5 mg, Daily  amoxicillin-clavulanate, 1 tablet, Q12H  apixaban, 2.5 mg, Q12H  atenolol, 100 mg, Q12H  atorvastatin, 40 mg, Daily  budesonide-formoterol, 2 puff, BID - RT   And  revefenacin, 175 mcg, Daily - RT  clopidogrel, 75 mg, Daily  furosemide, 20 mg, Daily  [Held by provider] losartan, 50 mg, BID  PARoxetine, 20 mg, Daily  thyroid, 15 mg, Q AM  Thyroid, 30 mg, Q AM            INTAKE AND OUTPUT:    Intake/Output Summary (Last 24 hours) at 2/1/2025 1045  Last data filed at 1/31/2025 1700  Gross per 24 hour   Intake 240 ml   Output --   Net 240 ml       Review of Systems:   GI: No nausea or  vomiting  Cardiac: No chest pain  Pulmonary: No shortness of breath, on room air    Constitutional:  Temp:  [97.5 °F (36.4 °C)-98.1 °F (36.7 °C)] 97.9 °F (36.6 °C)  Heart Rate:  [] 88  Resp:  [16-18] 16  BP: (127-132)/(77-86) 132/77      Physical Exam:  General:  Alert, cooperative, appears in no acute distress  Respiratory: Clear to auscultation.  Normal respiratory effort and rate.  Cardiovascular: S1S2 irregularly irregular rate and rhythm. No murmur, rub or gallop.   Gastrointestinal: soft, non tender. Bowel sounds present.   Extremities: SHANNON x4. No pretibial pitting edema. Adequate musculoskeletal strength.   Neuro: AAO x3 CN II-XII grossly intact        Cardiographics      ECG:   Atrial fibrillation        Lab Review   Results from last 7 days   Lab Units 01/28/25  0523 01/28/25  0402   HSTROP T ng/L 57* 60*         Results from last 7 days   Lab Units 02/01/25  0414   SODIUM mmol/L 135*   POTASSIUM mmol/L 4.1   BUN mg/dL 22   CREATININE mg/dL 0.72   CALCIUM mg/dL 7.9*     @[id number]@  Results from last 7 days   Lab Units 02/01/25  0414 01/31/25  0542 01/30/25  0622   WBC 10*3/mm3 14.68* 16.71* 13.62*   HEMOGLOBIN g/dL 13.4 13.2 14.0   HEMATOCRIT % 39.2 38.7 42.3   PLATELETS 10*3/mm3 460* 449 410         CHADS-VASc Risk Assessment               5 Total Score    1 CHF    1 Hypertension    2 Age >/= 75    1 Sex: Female    Criteria that do not apply:    DM    PRIOR STROKE/TIA/THROMBO    Vascular Disease    Age 65-74              Assessment:  1.  Intermittent left bundle branch block.  Not new.  2.  COPD with acute exacerbation  3.  Acute hypoxic respiratory failure  4.  Recent non-STEMI.  She denies any chest pain.  Cath on 1/20/2025 without acute disease.  Troponins have trended down.  5.  CAD deferred to #4.  6.  PAF.  Persistent since 1/24/2025.  Heart rate is fairly controlled.  90s to low 100s.  Now on atenolol.  7.  HFpEF.  Lasix on hold due to hyponatremia.  At home could take as needed for volume  "overload.    8. Hypertension: Stable  9. Hyperlipidemia  10.  Hypothyroidism  11.  Hyponatremia.    Plan:  Blood pressure stable.   Heart rate better controlled with increase of atenolol.  Stable from a cardiac standpoint.  Okay to be discharged.  She will follow-up in the outpatient setting with Addie Lennox, APRN or Dr. Nae Norman.  Her office will call her to make arrangements.    Will sign off at this time.  Please call with any concerns.    )2/1/2025  ANTONELLA Milton      EMR Clint/Transcription:   \"Dictated utilizing Dragon dictation\".     "

## 2025-02-01 NOTE — PLAN OF CARE
Goal Outcome Evaluation:               Patient ready for d/c, VSS, RA, No other issues noted at this time.

## 2025-02-01 NOTE — PLAN OF CARE
Problem: Adult Inpatient Plan of Care  Goal: Absence of Hospital-Acquired Illness or Injury  Intervention: Identify and Manage Fall Risk  Description: Perform standard risk assessment on admission using a validated tool or comprehensive approach appropriate to the patient; reassess fall risk frequently, with change in status or transfer to another level of care.  Communicate risk to interprofessional healthcare team; ensure fall risk visible cue.  Determine need for increased observation, equipment and environmental modification, as well as use of supportive, nonskid footwear.  Adjust safety measures to individual needs and identified risk factors.  Reinforce the importance of active participation with fall risk prevention, safety, and physical activity with the patient and family.  Perform regular intentional rounding to assess need for position change, pain assessment and personal needs, including assistance with toileting.  Recent Flowsheet Documentation  Taken 2/1/2025 0630 by Blade Salomon RN  Safety Promotion/Fall Prevention:   activity supervised   assistive device/personal items within reach   clutter free environment maintained   safety round/check completed  Taken 2/1/2025 0400 by Blade Salomon RN  Safety Promotion/Fall Prevention:   activity supervised   assistive device/personal items within reach   clutter free environment maintained   safety round/check completed  Taken 2/1/2025 0216 by Blade Salomon RN  Safety Promotion/Fall Prevention:   activity supervised   assistive device/personal items within reach   clutter free environment maintained   safety round/check completed  Taken 2/1/2025 0040 by Blade Salomon RN  Safety Promotion/Fall Prevention:   activity supervised   assistive device/personal items within reach   clutter free environment maintained   safety round/check completed  Taken 1/31/2025 2230 by Blade Salomon RN  Safety Promotion/Fall Prevention:   activity supervised    assistive device/personal items within reach   clutter free environment maintained   safety round/check completed  Taken 1/31/2025 2015 by Blade Salomon RN  Safety Promotion/Fall Prevention:   activity supervised   assistive device/personal items within reach   clutter free environment maintained   safety round/check completed   Goal Outcome Evaluation:

## 2025-02-03 ENCOUNTER — TELEPHONE (OUTPATIENT)
Dept: CARDIOLOGY | Facility: CLINIC | Age: 85
End: 2025-02-03
Payer: MEDICARE

## 2025-02-03 NOTE — TELEPHONE ENCOUNTER
Called patient to schedule her hospital follow up appointment for the soonest available that worked for her schedule. Patient is struggling with nausea, sweating and general weakness. She would like to know what she can do about that. I advised to call her PCP and she said that she would, but she would also like to hear from cardiology.

## 2025-02-03 NOTE — PROGRESS NOTES
Case Management Discharge Note      Final Note: DC'd to skilled bed at [facility] 2/1         Selected Continued Care - Discharged on 2/1/2025 Admission date: 1/24/2025 - Discharge disposition: Skilled Nursing Facility (DC - External)      Destination Coordination complete.      Service Provider Services Address Phone Fax Patient Preferred    [facility] [address] [address] [phone] --                        Selected Continued Care - Prior Encounters Includes continued care and service providers with selected services from prior encounters from 10/26/2024 to 2/1/2025      Discharged on 1/23/2025 Admission date: 1/17/2025 - Discharge disposition: Home-Health Care Svc      Home Medical Care       Service Provider Services Address Phone Fax Patient Preferred    [facility] [address] [address] [phone] --                          Transportation Services  Private: Car    Final Discharge Disposition Code: 03 - skilled nursing facility (SNF)

## 2025-02-06 ENCOUNTER — HOSPITAL ENCOUNTER (INPATIENT)
Facility: HOSPITAL | Age: 85
LOS: 7 days | Discharge: NURSING, REHAB OR LONG-TERM CARE | End: 2025-02-13
Attending: EMERGENCY MEDICINE | Admitting: INTERNAL MEDICINE
Payer: MEDICARE

## 2025-02-06 ENCOUNTER — APPOINTMENT (OUTPATIENT)
Dept: GENERAL RADIOLOGY | Facility: HOSPITAL | Age: 85
End: 2025-02-06
Payer: MEDICARE

## 2025-02-06 DIAGNOSIS — I48.91 ATRIAL FIBRILLATION WITH RVR: Primary | ICD-10-CM

## 2025-02-06 DIAGNOSIS — R79.89 ELEVATED BRAIN NATRIURETIC PEPTIDE (BNP) LEVEL: ICD-10-CM

## 2025-02-06 DIAGNOSIS — R79.89 ELEVATED TROPONIN: ICD-10-CM

## 2025-02-06 DIAGNOSIS — J44.1 COPD WITH ACUTE EXACERBATION: ICD-10-CM

## 2025-02-06 DIAGNOSIS — R53.1 GENERALIZED WEAKNESS: ICD-10-CM

## 2025-02-06 DIAGNOSIS — I48.91 ATRIAL FIBRILLATION WITH RAPID VENTRICULAR RESPONSE: ICD-10-CM

## 2025-02-06 DIAGNOSIS — E87.5 ACUTE HYPERKALEMIA: ICD-10-CM

## 2025-02-06 LAB
ALBUMIN SERPL-MCNC: 3.7 G/DL (ref 3.5–5.2)
ALBUMIN/GLOB SERPL: 1.5 G/DL
ALP SERPL-CCNC: 153 U/L (ref 39–117)
ALT SERPL W P-5'-P-CCNC: 26 U/L (ref 1–33)
ANION GAP SERPL CALCULATED.3IONS-SCNC: 11 MMOL/L (ref 5–15)
AST SERPL-CCNC: 39 U/L (ref 1–32)
B PARAPERT DNA SPEC QL NAA+PROBE: NOT DETECTED
B PERT DNA SPEC QL NAA+PROBE: NOT DETECTED
BASOPHILS # BLD AUTO: 0.06 10*3/MM3 (ref 0–0.2)
BASOPHILS NFR BLD AUTO: 0.3 % (ref 0–1.5)
BILIRUB SERPL-MCNC: 0.5 MG/DL (ref 0–1.2)
BUN SERPL-MCNC: 36 MG/DL (ref 8–23)
BUN/CREAT SERPL: 36 (ref 7–25)
C PNEUM DNA NPH QL NAA+NON-PROBE: NOT DETECTED
CALCIUM SPEC-SCNC: 9.1 MG/DL (ref 8.6–10.5)
CHLORIDE SERPL-SCNC: 98 MMOL/L (ref 98–107)
CO2 SERPL-SCNC: 29 MMOL/L (ref 22–29)
CREAT SERPL-MCNC: 1 MG/DL (ref 0.57–1)
DEPRECATED RDW RBC AUTO: 39.5 FL (ref 37–54)
EGFRCR SERPLBLD CKD-EPI 2021: 55.7 ML/MIN/1.73
EOSINOPHIL # BLD AUTO: 0.11 10*3/MM3 (ref 0–0.4)
EOSINOPHIL NFR BLD AUTO: 0.6 % (ref 0.3–6.2)
ERYTHROCYTE [DISTWIDTH] IN BLOOD BY AUTOMATED COUNT: 11.8 % (ref 12.3–15.4)
FLUAV SUBTYP SPEC NAA+PROBE: NOT DETECTED
FLUBV RNA ISLT QL NAA+PROBE: NOT DETECTED
GEN 5 1HR TROPONIN T REFLEX: 38 NG/L
GLOBULIN UR ELPH-MCNC: 2.5 GM/DL
GLUCOSE SERPL-MCNC: 110 MG/DL (ref 65–99)
HADV DNA SPEC NAA+PROBE: NOT DETECTED
HCOV 229E RNA SPEC QL NAA+PROBE: NOT DETECTED
HCOV HKU1 RNA SPEC QL NAA+PROBE: NOT DETECTED
HCOV NL63 RNA SPEC QL NAA+PROBE: NOT DETECTED
HCOV OC43 RNA SPEC QL NAA+PROBE: NOT DETECTED
HCT VFR BLD AUTO: 40.2 % (ref 34–46.6)
HGB BLD-MCNC: 13.4 G/DL (ref 12–15.9)
HMPV RNA NPH QL NAA+NON-PROBE: NOT DETECTED
HOLD SPECIMEN: NORMAL
HOLD SPECIMEN: NORMAL
HPIV1 RNA ISLT QL NAA+PROBE: NOT DETECTED
HPIV2 RNA SPEC QL NAA+PROBE: NOT DETECTED
HPIV3 RNA NPH QL NAA+PROBE: NOT DETECTED
HPIV4 P GENE NPH QL NAA+PROBE: NOT DETECTED
IMM GRANULOCYTES # BLD AUTO: 0.23 10*3/MM3 (ref 0–0.05)
IMM GRANULOCYTES NFR BLD AUTO: 1.2 % (ref 0–0.5)
LYMPHOCYTES # BLD AUTO: 0.63 10*3/MM3 (ref 0.7–3.1)
LYMPHOCYTES NFR BLD AUTO: 3.3 % (ref 19.6–45.3)
M PNEUMO IGG SER IA-ACNC: NOT DETECTED
MCH RBC QN AUTO: 30.7 PG (ref 26.6–33)
MCHC RBC AUTO-ENTMCNC: 33.3 G/DL (ref 31.5–35.7)
MCV RBC AUTO: 92 FL (ref 79–97)
MONOCYTES # BLD AUTO: 1.18 10*3/MM3 (ref 0.1–0.9)
MONOCYTES NFR BLD AUTO: 6.2 % (ref 5–12)
NEUTROPHILS NFR BLD AUTO: 16.96 10*3/MM3 (ref 1.7–7)
NEUTROPHILS NFR BLD AUTO: 88.4 % (ref 42.7–76)
NRBC BLD AUTO-RTO: 0 /100 WBC (ref 0–0.2)
NT-PROBNP SERPL-MCNC: 9933 PG/ML (ref 0–1800)
PLATELET # BLD AUTO: 358 10*3/MM3 (ref 140–450)
PMV BLD AUTO: 10 FL (ref 6–12)
POTASSIUM SERPL-SCNC: 5.6 MMOL/L (ref 3.5–5.2)
PROT SERPL-MCNC: 6.2 G/DL (ref 6–8.5)
QT INTERVAL: 346 MS
QTC INTERVAL: 504 MS
RBC # BLD AUTO: 4.37 10*6/MM3 (ref 3.77–5.28)
RHINOVIRUS RNA SPEC NAA+PROBE: NOT DETECTED
RSV RNA NPH QL NAA+NON-PROBE: NOT DETECTED
SARS-COV-2 RNA NPH QL NAA+NON-PROBE: NOT DETECTED
SODIUM SERPL-SCNC: 138 MMOL/L (ref 136–145)
TROPONIN T % DELTA: -19
TROPONIN T NUMERIC DELTA: -9 NG/L
TROPONIN T SERPL HS-MCNC: 47 NG/L
WBC NRBC COR # BLD AUTO: 19.17 10*3/MM3 (ref 3.4–10.8)
WHOLE BLOOD HOLD COAG: NORMAL
WHOLE BLOOD HOLD SPECIMEN: NORMAL

## 2025-02-06 PROCEDURE — 99291 CRITICAL CARE FIRST HOUR: CPT

## 2025-02-06 PROCEDURE — 93010 ELECTROCARDIOGRAM REPORT: CPT | Performed by: INTERNAL MEDICINE

## 2025-02-06 PROCEDURE — 25010000002 FUROSEMIDE PER 20 MG: Performed by: INTERNAL MEDICINE

## 2025-02-06 PROCEDURE — 71045 X-RAY EXAM CHEST 1 VIEW: CPT

## 2025-02-06 PROCEDURE — 93005 ELECTROCARDIOGRAM TRACING: CPT | Performed by: EMERGENCY MEDICINE

## 2025-02-06 PROCEDURE — 0202U NFCT DS 22 TRGT SARS-COV-2: CPT | Performed by: EMERGENCY MEDICINE

## 2025-02-06 PROCEDURE — 25010000002 DIGOXIN PER 500 MCG: Performed by: EMERGENCY MEDICINE

## 2025-02-06 PROCEDURE — 80053 COMPREHEN METABOLIC PANEL: CPT | Performed by: EMERGENCY MEDICINE

## 2025-02-06 PROCEDURE — 36415 COLL VENOUS BLD VENIPUNCTURE: CPT | Performed by: EMERGENCY MEDICINE

## 2025-02-06 PROCEDURE — 25810000003 SODIUM CHLORIDE 0.9 % SOLUTION: Performed by: EMERGENCY MEDICINE

## 2025-02-06 PROCEDURE — 85025 COMPLETE CBC W/AUTO DIFF WBC: CPT | Performed by: EMERGENCY MEDICINE

## 2025-02-06 PROCEDURE — 83880 ASSAY OF NATRIURETIC PEPTIDE: CPT | Performed by: EMERGENCY MEDICINE

## 2025-02-06 PROCEDURE — 84484 ASSAY OF TROPONIN QUANT: CPT | Performed by: EMERGENCY MEDICINE

## 2025-02-06 RX ORDER — CLOPIDOGREL BISULFATE 75 MG/1
75 TABLET ORAL DAILY
Status: DISCONTINUED | OUTPATIENT
Start: 2025-02-06 | End: 2025-02-13 | Stop reason: HOSPADM

## 2025-02-06 RX ORDER — GUAIFENESIN 600 MG/1
600 TABLET, EXTENDED RELEASE ORAL 2 TIMES DAILY PRN
Status: DISCONTINUED | OUTPATIENT
Start: 2025-02-06 | End: 2025-02-13 | Stop reason: HOSPADM

## 2025-02-06 RX ORDER — SODIUM CHLORIDE 0.9 % (FLUSH) 0.9 %
10 SYRINGE (ML) INJECTION AS NEEDED
Status: DISCONTINUED | OUTPATIENT
Start: 2025-02-06 | End: 2025-02-13 | Stop reason: HOSPADM

## 2025-02-06 RX ORDER — ONDANSETRON 2 MG/ML
4 INJECTION INTRAMUSCULAR; INTRAVENOUS EVERY 6 HOURS PRN
Status: DISCONTINUED | OUTPATIENT
Start: 2025-02-06 | End: 2025-02-13 | Stop reason: HOSPADM

## 2025-02-06 RX ORDER — IPRATROPIUM BROMIDE AND ALBUTEROL SULFATE 2.5; .5 MG/3ML; MG/3ML
3 SOLUTION RESPIRATORY (INHALATION) EVERY 4 HOURS PRN
Status: DISCONTINUED | OUTPATIENT
Start: 2025-02-06 | End: 2025-02-10

## 2025-02-06 RX ORDER — NITROGLYCERIN 0.4 MG/1
0.4 TABLET SUBLINGUAL
Status: DISCONTINUED | OUTPATIENT
Start: 2025-02-06 | End: 2025-02-13 | Stop reason: HOSPADM

## 2025-02-06 RX ORDER — LORAZEPAM 0.5 MG/1
0.5 TABLET ORAL EVERY 6 HOURS PRN
Status: DISCONTINUED | OUTPATIENT
Start: 2025-02-06 | End: 2025-02-13 | Stop reason: HOSPADM

## 2025-02-06 RX ORDER — FUROSEMIDE 10 MG/ML
20 INJECTION INTRAMUSCULAR; INTRAVENOUS EVERY 6 HOURS
Status: DISCONTINUED | OUTPATIENT
Start: 2025-02-06 | End: 2025-02-08

## 2025-02-06 RX ORDER — ARFORMOTEROL TARTRATE 15 UG/2ML
15 SOLUTION RESPIRATORY (INHALATION)
Status: DISCONTINUED | OUTPATIENT
Start: 2025-02-06 | End: 2025-02-13 | Stop reason: HOSPADM

## 2025-02-06 RX ORDER — ACETAMINOPHEN 160 MG/5ML
650 SOLUTION ORAL EVERY 4 HOURS PRN
Status: DISCONTINUED | OUTPATIENT
Start: 2025-02-06 | End: 2025-02-12

## 2025-02-06 RX ORDER — SODIUM CHLORIDE 0.9 % (FLUSH) 0.9 %
10 SYRINGE (ML) INJECTION EVERY 12 HOURS SCHEDULED
Status: DISCONTINUED | OUTPATIENT
Start: 2025-02-06 | End: 2025-02-13 | Stop reason: HOSPADM

## 2025-02-06 RX ORDER — DIGOXIN 0.25 MG/ML
250 INJECTION INTRAMUSCULAR; INTRAVENOUS ONCE
Status: COMPLETED | OUTPATIENT
Start: 2025-02-06 | End: 2025-02-06

## 2025-02-06 RX ORDER — AMLODIPINE BESYLATE 5 MG/1
5 TABLET ORAL DAILY
Status: DISCONTINUED | OUTPATIENT
Start: 2025-02-06 | End: 2025-02-07

## 2025-02-06 RX ORDER — PAROXETINE 20 MG/1
20 TABLET, FILM COATED ORAL DAILY
Status: DISCONTINUED | OUTPATIENT
Start: 2025-02-06 | End: 2025-02-13 | Stop reason: HOSPADM

## 2025-02-06 RX ORDER — ACETAMINOPHEN 325 MG/1
650 TABLET ORAL EVERY 4 HOURS PRN
Status: DISCONTINUED | OUTPATIENT
Start: 2025-02-06 | End: 2025-02-12

## 2025-02-06 RX ORDER — SODIUM CHLORIDE 9 MG/ML
40 INJECTION, SOLUTION INTRAVENOUS AS NEEDED
Status: DISCONTINUED | OUTPATIENT
Start: 2025-02-06 | End: 2025-02-13 | Stop reason: HOSPADM

## 2025-02-06 RX ORDER — ATENOLOL 25 MG/1
100 TABLET ORAL EVERY 12 HOURS SCHEDULED
Status: DISCONTINUED | OUTPATIENT
Start: 2025-02-06 | End: 2025-02-07

## 2025-02-06 RX ORDER — ACETAMINOPHEN 325 MG/1
650 TABLET ORAL EVERY 6 HOURS PRN
Status: DISCONTINUED | OUTPATIENT
Start: 2025-02-06 | End: 2025-02-06 | Stop reason: SDUPTHER

## 2025-02-06 RX ORDER — THYROID 30 MG/1
15 TABLET ORAL DAILY
Status: DISCONTINUED | OUTPATIENT
Start: 2025-02-06 | End: 2025-02-13 | Stop reason: HOSPADM

## 2025-02-06 RX ORDER — CHOLECALCIFEROL (VITAMIN D3) 25 MCG
5000 TABLET ORAL DAILY
Status: DISCONTINUED | OUTPATIENT
Start: 2025-02-06 | End: 2025-02-13 | Stop reason: HOSPADM

## 2025-02-06 RX ORDER — ASCORBIC ACID 500 MG
500 TABLET ORAL DAILY
Status: DISCONTINUED | OUTPATIENT
Start: 2025-02-06 | End: 2025-02-13 | Stop reason: HOSPADM

## 2025-02-06 RX ORDER — BUDESONIDE 0.5 MG/2ML
0.5 INHALANT ORAL
Status: DISCONTINUED | OUTPATIENT
Start: 2025-02-06 | End: 2025-02-13 | Stop reason: HOSPADM

## 2025-02-06 RX ORDER — ATORVASTATIN CALCIUM 20 MG/1
40 TABLET, FILM COATED ORAL DAILY
Status: DISCONTINUED | OUTPATIENT
Start: 2025-02-06 | End: 2025-02-13 | Stop reason: HOSPADM

## 2025-02-06 RX ORDER — ALBUTEROL SULFATE 0.83 MG/ML
2.5 SOLUTION RESPIRATORY (INHALATION) EVERY 6 HOURS PRN
Status: DISCONTINUED | OUTPATIENT
Start: 2025-02-06 | End: 2025-02-13 | Stop reason: HOSPADM

## 2025-02-06 RX ORDER — ACETAMINOPHEN 650 MG/1
650 SUPPOSITORY RECTAL EVERY 4 HOURS PRN
Status: DISCONTINUED | OUTPATIENT
Start: 2025-02-06 | End: 2025-02-12

## 2025-02-06 RX ORDER — THYROID 30 MG/1
30 TABLET ORAL DAILY
Status: DISCONTINUED | OUTPATIENT
Start: 2025-02-06 | End: 2025-02-13 | Stop reason: HOSPADM

## 2025-02-06 RX ORDER — MULTIVITAMIN WITH IRON
500 TABLET ORAL DAILY
Status: DISCONTINUED | OUTPATIENT
Start: 2025-02-06 | End: 2025-02-13 | Stop reason: HOSPADM

## 2025-02-06 RX ADMIN — CLOPIDOGREL 75 MG: 75 TABLET, FILM COATED ORAL at 19:00

## 2025-02-06 RX ADMIN — SODIUM CHLORIDE 500 ML: 9 INJECTION, SOLUTION INTRAVENOUS at 13:39

## 2025-02-06 RX ADMIN — AMLODIPINE BESYLATE 5 MG: 5 TABLET ORAL at 18:59

## 2025-02-06 RX ADMIN — LEVOTHYROXINE, LIOTHYRONINE 30 MG: 19; 4.5 TABLET ORAL at 21:07

## 2025-02-06 RX ADMIN — Medication 10 ML: at 21:07

## 2025-02-06 RX ADMIN — Medication 500 MCG: at 18:59

## 2025-02-06 RX ADMIN — APIXABAN 2.5 MG: 2.5 TABLET, FILM COATED ORAL at 21:06

## 2025-02-06 RX ADMIN — LEVOTHYROXINE, LIOTHYRONINE 15 MG: 19; 4.5 TABLET ORAL at 21:06

## 2025-02-06 RX ADMIN — ATENOLOL 100 MG: 25 TABLET ORAL at 19:00

## 2025-02-06 RX ADMIN — OXYCODONE HYDROCHLORIDE AND ACETAMINOPHEN 500 MG: 500 TABLET ORAL at 19:00

## 2025-02-06 RX ADMIN — SODIUM ZIRCONIUM CYCLOSILICATE 5 G: 5 POWDER, FOR SUSPENSION ORAL at 21:05

## 2025-02-06 RX ADMIN — PAROXETINE HYDROCHLORIDE HEMIHYDRATE 20 MG: 20 TABLET, FILM COATED ORAL at 21:06

## 2025-02-06 RX ADMIN — FUROSEMIDE 20 MG: 10 INJECTION, SOLUTION INTRAMUSCULAR; INTRAVENOUS at 19:00

## 2025-02-06 RX ADMIN — DIGOXIN 250 MCG: 0.25 INJECTION INTRAMUSCULAR; INTRAVENOUS at 13:39

## 2025-02-06 RX ADMIN — Medication 5000 UNITS: at 19:00

## 2025-02-06 RX ADMIN — ATORVASTATIN CALCIUM 40 MG: 20 TABLET, FILM COATED ORAL at 19:00

## 2025-02-06 NOTE — ED NOTES
Nursing report ED to floor  Dafne Mary  84 y.o.  female    HPI :  HPI  Stated Reason for Visit: SOA    Chief Complaint  Chief Complaint   Patient presents with    Shortness of Breath       Admitting doctor:   Benitez Donato MD    Admitting diagnosis:   The primary encounter diagnosis was Atrial fibrillation with rapid ventricular response. Diagnoses of Elevated troponin, Elevated brain natriuretic peptide (BNP) level, COPD with acute exacerbation, Generalized weakness, and Acute hyperkalemia were also pertinent to this visit.    Code status:   Current Code Status       Date Active Code Status Order ID Comments User Context       Prior            Allergies:   Lansoprazole, Albuterol, Buspirone, Diphenhydramine hcl (sleep), and Lisinopril    Isolation:   No active isolations    Intake and Output    Intake/Output Summary (Last 24 hours) at 2/6/2025 1626  Last data filed at 2/6/2025 1557  Gross per 24 hour   Intake 500 ml   Output --   Net 500 ml       Weight:       02/06/25  1344   Weight: 59.1 kg (130 lb 4.7 oz)       Most recent vitals:   Vitals:    02/06/25 1401 02/06/25 1431 02/06/25 1529 02/06/25 1601   BP: 90/66 96/64 91/79 117/83   Patient Position:       Pulse: (!) 127 115 (!) 129 111   Resp:  26 24    Temp:       TempSrc:       SpO2: 97% 98% 95% 96%   Weight:       Height:           Active LDAs/IV Access:   Lines, Drains & Airways       Active LDAs       Name Placement date Placement time Site Days    Peripheral IV 02/06/25 1132 Left Antecubital 02/06/25  1132  Antecubital  less than 1                    Labs (abnormal labs have a star):   Labs Reviewed   COMPREHENSIVE METABOLIC PANEL - Abnormal; Notable for the following components:       Result Value    Glucose 110 (*)     BUN 36 (*)     Potassium 5.6 (*)     AST (SGOT) 39 (*)     Alkaline Phosphatase 153 (*)     BUN/Creatinine Ratio 36.0 (*)     eGFR 55.7 (*)     All other components within normal limits    Narrative:     GFR Categories in Chronic Kidney  Disease (CKD)      GFR Category          GFR (mL/min/1.73)    Interpretation  G1                     90 or greater         Normal or high (1)  G2                      60-89                Mild decrease (1)  G3a                   45-59                Mild to moderate decrease  G3b                   30-44                Moderate to severe decrease  G4                    15-29                Severe decrease  G5                    14 or less           Kidney failure          (1)In the absence of evidence of kidney disease, neither GFR category G1 or G2 fulfill the criteria for CKD.    eGFR calculation 2021 CKD-EPI creatinine equation, which does not include race as a factor   BNP (IN-HOUSE) - Abnormal; Notable for the following components:    proBNP 9,933.0 (*)     All other components within normal limits    Narrative:     This assay is used as an aid in the diagnosis of individuals suspected of having heart failure. It can be used as an aid in the diagnosis of acute decompensated heart failure (ADHF) in patients presenting with signs and symptoms of ADHF to the emergency department (ED). In addition, NT-proBNP of <300 pg/mL indicates ADHF is not likely.    Age Range Result Interpretation  NT-proBNP Concentration (pg/mL:      <50             Positive            >450                   Gray                 300-450                    Negative             <300    50-75           Positive            >900                  Gray                300-900                  Negative            <300      >75             Positive            >1800                  Gray                300-1800                  Negative            <300   TROPONIN - Abnormal; Notable for the following components:    HS Troponin T 47 (*)     All other components within normal limits    Narrative:     High Sensitive Troponin T Reference Range:  <14.0 ng/L- Negative Female for AMI  <22.0 ng/L- Negative Male for AMI  >=14 - Abnormal Female indicating possible  myocardial injury.  >=22 - Abnormal Male indicating possible myocardial injury.   Clinicians would have to utilize clinical acumen, EKG, Troponin, and serial changes to determine if it is an Acute Myocardial Infarction or myocardial injury due to an underlying chronic condition.        CBC WITH AUTO DIFFERENTIAL - Abnormal; Notable for the following components:    WBC 19.17 (*)     RDW 11.8 (*)     Neutrophil % 88.4 (*)     Lymphocyte % 3.3 (*)     Immature Grans % 1.2 (*)     Neutrophils, Absolute 16.96 (*)     Lymphocytes, Absolute 0.63 (*)     Monocytes, Absolute 1.18 (*)     Immature Grans, Absolute 0.23 (*)     All other components within normal limits   HIGH SENSITIVITIY TROPONIN T 1HR - Abnormal; Notable for the following components:    HS Troponin T 38 (*)     All other components within normal limits    Narrative:     High Sensitive Troponin T Reference Range:  <14.0 ng/L- Negative Female for AMI  <22.0 ng/L- Negative Male for AMI  >=14 - Abnormal Female indicating possible myocardial injury.  >=22 - Abnormal Male indicating possible myocardial injury.   Clinicians would have to utilize clinical acumen, EKG, Troponin, and serial changes to determine if it is an Acute Myocardial Infarction or myocardial injury due to an underlying chronic condition.        RESPIRATORY PANEL PCR W/ COVID-19 (SARS-COV-2), NP SWAB IN UTM/VTP, 2 HR TAT - Normal    Narrative:     In the setting of a positive respiratory panel with a viral infection PLUS a negative procalcitonin without other underlying concern for bacterial infection, consider observing off antibiotics or discontinuation of antibiotics and continue supportive care. If the respiratory panel is positive for atypical bacterial infection (Bordetella pertussis, Chlamydophila pneumoniae, or Mycoplasma pneumoniae), consider antibiotic de-escalation to target atypical bacterial infection.   RAINBOW DRAW    Narrative:     The following orders were created for panel order  Mobile Draw.  Procedure                               Abnormality         Status                     ---------                               -----------         ------                     Green Top (Gel)[[id number]]                                  Final result               Lavender Top[[id number]]                                     Final result               Gold Top - SST[[id number]]                                   Final result               Light Blue Top[[id number]]                                   Final result                 Please view results for these tests on the individual orders.   CBC AND DIFFERENTIAL    Narrative:     The following orders were created for panel order CBC & Differential.  Procedure                               Abnormality         Status                     ---------                               -----------         ------                     CBC Auto Differential[[id number]]        Abnormal            Final result                 Please view results for these tests on the individual orders.   GREEN TOP   LAVENDER TOP   GOLD TOP - SST   LIGHT BLUE TOP       EKG:   ECG 12 Lead ED Triage Standing Order; SOA   Preliminary Result   HEART RQON=803  bpm   RR Mlkdochn=768  ms   NJ Interval=  ms   P Horizontal Axis=  deg   P Front Axis=  deg   QRSD Hzvtddiw=478  ms   QT Tacpjnwt=539  ms   ZBiW=981  ms   QRS Axis=-108  deg   T Wave Axis=58  deg   - ABNORMAL ECG -   Atrial fibrillation   RBBB and LAFB   Probable anteroseptal infarct, old   Prolonged QT interval   Date and Time of Study:2025-02-06 12:00:53      Telemetry Scan   Final Result          Meds given in ED:   Medications   sodium chloride 0.9 % flush 10 mL (has no administration in time range)   sodium chloride 0.9 % bolus 500 mL (0 mL Intravenous Stopped 2/6/25 1395)   digoxin (LANOXIN) injection 250 mcg (250 mcg Intravenous Given 2/6/25 2107)       Imaging results:  XR Chest 1 View    Result Date: 2/6/2025  1. Mild cardiomegaly   This  report was finalized on 2/6/2025 12:36 PM by Dr. Chapin Abrams M.D on Workstation: [id number]       Ambulatory status:   - previously walks with walker/rollator; currently one person assist.     Social issues:   Social History     Socioeconomic History    Marital status:    Tobacco Use    Smoking status: Never     Passive exposure: Never    Smokeless tobacco: Never   Vaping Use    Vaping status: Never Used   Substance and Sexual Activity    Alcohol use: Yes     Comment: rare    Drug use: No    Sexual activity: Defer       Peripheral Neurovascular  Peripheral Neurovascular (Adult)  Peripheral Neurovascular WDL: .WDL except  Additional Documentation: Edema (Group)  Edema  Edema: ankle, left, ankle, right  Ankle, Left Edema: 2+ (Mild)  Ankle, Right Edema: 2+ (Mild)    Neuro Cognitive  Neuro Cognitive (Adult)  Cognitive/Neuro/Behavioral WDL: WDL    Learning  Learning Assessment  Learning Readiness and Ability: no barriers identified    Respiratory  Respiratory  Additional Documentation: Breath Sounds (Group)  Respiratory WDL  Respiratory WDL: .WDL except, cough  Cough Frequency: frequent  Cough Type: nonproductive  Breath Sounds  All Lung Fields Breath Sounds: All Fields  All Lung Fields Breath Sounds: Posterior:, wheezes, high-pitched    Abdominal Pain  Safety Interventions  Safety Precautions/Falls Reduction: assistive device/personal items within reach, fall reduction program maintained, nonskid shoes/slippers when out of bed, family at bedside    Pain Assessments  Pain (Adult)  (0-10) Pain Rating: Rest: 4  (0-10) Pain Rating: Activity: 4    NIH Stroke Scale       Karin Vidales RN  02/06/25 16:26 EST

## 2025-02-06 NOTE — ED PROVIDER NOTES
EMERGENCY DEPARTMENT ENCOUNTER    Room Number:  41/41  PCP: Jossy Sauceda MD  Historian: Patient      HPI:  Chief Complaint: Shortness of breath  A complete HPI/ROS/PMH/PSH/SH/FH are unobtainable due to: None  Context: Dafne Mary is a 84 y.o. female who presents to the ED c/o fairly sudden onset of shortness of breath with associated significant generalized weakness that has been present worsening now over the last several days.  She was very recently admitted to the hospital for treatment of a COPD exacerbation and discharged home on home O2.  She states that despite the home O2, she has felt as though she is significantly worsened since discharge.  She does complain of a nonproductive cough associated with the shortness of breath.  She denies chest pain, fever/chills, back pain, or known sick contacts.  She states that the symptoms are now at least moderate if not severe in intensity.            PAST MEDICAL HISTORY  Active Ambulatory Problems     Diagnosis Date Noted    Anxiety 07/26/2016    Arteriosclerosis of both carotid arteries 07/26/2016    Chronic interstitial cystitis 07/26/2016    Cough 07/26/2016    Pulmonary emphysema 07/26/2016    Gastroesophageal reflux disease 07/26/2016    Fibromyalgia 07/26/2016    Headache 07/26/2016    Hematuria 07/26/2016    Hoarseness 07/26/2016    Hyperlipidemia 06/08/2012    Benign hypertension 07/26/2016    Postoperative hypothyroidism 06/08/2012    Muscle spasms of both lower extremities 07/26/2016    Palpitations 07/26/2016    Shortness of breath 07/26/2016    Postmenopausal osteoporosis 10/17/2016    Chronic fatigue 10/17/2016    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/17/2016    Dysuria 12/15/2017    Dry cough 12/15/2017    Spondylolysis, lumbar region 06/08/2012    Vocal cord paralysis 08/24/2021    Abnormal finding on thyroid function test 08/24/2021    Positional lightheadedness 11/17/2022    COPD with acute exacerbation 12/08/2022    Hypothyroid 01/06/2023    COPD (chronic  obstructive pulmonary disease) 01/06/2023    COPD exacerbation 01/06/2023    Morbid obesity with BMI of 70 and over, adult 01/06/2023    RSV bronchiolitis 01/07/2023    Vitamin D deficiency 04/09/2023    B12 deficiency 04/09/2023    Iron deficiency 04/09/2023    Decreased hemoglobin 04/09/2023    Generalized anxiety disorder 04/09/2023    Chronic bilateral low back pain with left-sided sciatica 04/09/2023    Facet arthropathy, lumbar 04/09/2023    Spinal stenosis of lumbar region 04/09/2023    Spondylolisthesis of lumbar region 04/09/2023    Scoliosis of lumbar spine 04/09/2023    History of non-ST elevation myocardial infarction (NSTEMI) 04/09/2023    Chronic respiratory failure 05/21/2023    Acute respiratory failure 05/21/2023    PAF (paroxysmal atrial fibrillation) 06/12/2023    Acute respiratory failure with hypercapnia 06/17/2023    Acute hypoxemic respiratory failure 07/04/2023    Chronic HFrEF (heart failure with reduced ejection fraction) 07/10/2023    Community acquired bacterial pneumonia 07/10/2023    CAD (coronary artery disease) 07/10/2023    Mitral valve regurgitation 07/10/2023    Acute hypokalemia 07/10/2023    Status post angioplasty with stent 07/18/2023    Noncompliance 10/26/2023    NSTEMI (non-ST elevated myocardial infarction) 10/25/2023    Flash pulmonary edema 01/17/2025    Elevated troponin 01/18/2025    Stage 3a chronic kidney disease 01/18/2025    Type 2 myocardial infarction 01/20/2025    Acute on chronic heart failure with preserved ejection fraction (HFpEF) 01/20/2025    Hypoxia 01/25/2025    Generalized weakness 01/25/2025     Resolved Ambulatory Problems     Diagnosis Date Noted    Leukocytes in urine 12/15/2017    Cystitis with hematuria 12/15/2017    Acute cystitis without hematuria 12/15/2017    Acute respiratory failure with hypoxia and hypercapnia 05/03/2022    Chronic diastolic congestive heart failure 11/17/2022    Chronic congestive heart failure 04/09/2023    Atrial  fibrillation with RVR 06/04/2023    Acute on chronic HFrEF (heart failure with reduced ejection fraction) 06/12/2023    Acute systolic heart failure 07/10/2023    Acute on chronic systolic heart failure 07/10/2023    Chronic systolic heart failure 07/10/2023    Uncontrolled hypertension 07/10/2023     Past Medical History:   Diagnosis Date    Depression     Emphysema lung     GERD (gastroesophageal reflux disease)     Heart failure     Hypertension     Hypothyroidism          PAST SURGICAL HISTORY  Past Surgical History:   Procedure Laterality Date    CARDIAC CATHETERIZATION N/A 7/7/2023    Procedure: Right and Left Heart Cath;  Surgeon: Ra Cole MD;  Location: [facility] CATH INVASIVE LOCATION;  Service: Cardiology;  Laterality: N/A;    CARDIAC CATHETERIZATION N/A 7/7/2023    Procedure: Percutaneous Coronary Intervention;  Surgeon: Ra Cole MD;  [facility];  Service: Cardiology;  Laterality: N/A;    CARDIAC CATHETERIZATION N/A 7/7/2023    Procedure: Stent ANDRZEJ coronary;  Surgeon: Ra Cole MD;  Location: [facility] CATH INVASIVE LOCATION;  Service: Cardiology;  Laterality: N/A;    CARDIAC CATHETERIZATION N/A 7/7/2023    Procedure: Coronary angiography;  Surgeon: Ra Cole MD;  [facility];  Service: Cardiology;  Laterality: N/A;    CARDIAC CATHETERIZATION N/A 7/7/2023    Procedure: Left ventriculography;  Surgeon: Ra Cole MD;  Location: [facility] CATH INVASIVE LOCATION;  Service: Cardiology;  Laterality: N/A;    CARDIAC CATHETERIZATION N/A 1/20/2025    Procedure: Left Heart Cath;  Surgeon: Nae Norman MD;  Location: [facility] CATH INVASIVE LOCATION;  Service: Cardiovascular;  Laterality: N/A;    CARDIAC CATHETERIZATION N/A 1/20/2025    Procedure: Coronary angiography;  Surgeon: Nae Norman MD;  Location: [facility] CATH INVASIVE LOCATION;  Service: Cardiovascular;  Laterality: N/A;    EYE SURGERY  Left     INTUBATION  5/21/2023         PARATHYROIDECTOMY      Patient reports thyroidectomy partial not a para thyroidectomy.    THYROIDECTOMY, PARTIAL      1984         FAMILY HISTORY  Family History   Problem Relation Age of Onset    Alzheimer's disease Mother     Lung disease Father     Alcohol abuse Father     Heart disease Brother     Hypertension Brother     Lung disease Brother     Alcohol abuse Brother     Cancer Brother         LUNG  WAS A SMOKER    Thyroid disease Maternal Grandmother          SOCIAL HISTORY  Social History     Socioeconomic History    Marital status:    Tobacco Use    Smoking status: Never     Passive exposure: Never    Smokeless tobacco: Never   Vaping Use    Vaping status: Never Used   Substance and Sexual Activity    Alcohol use: Yes     Comment: rare    Drug use: No    Sexual activity: Defer         ALLERGIES  Lansoprazole, Albuterol, Buspirone, Diphenhydramine hcl (sleep), and Lisinopril        REVIEW OF SYSTEMS  Review of Systems   Constitutional:  Negative for fever.   HENT:  Negative for sore throat.    Eyes: Negative.    Respiratory:  Positive for cough and shortness of breath.    Cardiovascular:  Negative for chest pain.   Gastrointestinal:  Negative for abdominal pain, diarrhea and vomiting.   Genitourinary:  Negative for dysuria.   Musculoskeletal:  Negative for neck pain.   Skin:  Negative for rash.   Allergic/Immunologic: Negative.    Neurological:  Positive for weakness. Negative for numbness and headaches.   Hematological: Negative.    Psychiatric/Behavioral: Negative.     All other systems reviewed and are negative.         PHYSICAL EXAM  ED Triage Vitals   Temp Heart Rate Resp BP SpO2   02/06/25 1136 02/06/25 1134 02/06/25 1134 02/06/25 1134 02/06/25 1134   97.4 °F (36.3 °C) (!) 126 24 146/90 99 %      Temp src Heart Rate Source Patient Position BP Location FiO2 (%)   02/06/25 1136 02/06/25 1134 02/06/25 1134 -- --   Tympanic Monitor Lying         Physical  Exam  Constitutional:       General: She is not in acute distress.     Appearance: Normal appearance. She is not ill-appearing or toxic-appearing.   HENT:      Head: Normocephalic and atraumatic.   Eyes:      Extraocular Movements: Extraocular movements intact.      Pupils: Pupils are equal, round, and reactive to light.   Cardiovascular:      Rate and Rhythm: Tachycardia present. Rhythm irregularly irregular.      Heart sounds: No murmur heard.     No friction rub. No gallop.   Pulmonary:      Effort: Tachypnea and respiratory distress present.      Breath sounds: Wheezing and rhonchi present.   Abdominal:      General: Abdomen is flat. There is no distension.      Palpations: Abdomen is soft.      Tenderness: There is no abdominal tenderness.   Musculoskeletal:         General: No swelling or tenderness. Normal range of motion.      Cervical back: Normal range of motion and neck supple.   Skin:     General: Skin is warm and dry.   Neurological:      General: No focal deficit present.      Mental Status: She is alert and oriented to person, place, and time.      Sensory: No sensory deficit.      Motor: No weakness.   Psychiatric:         Mood and Affect: Mood normal.         Behavior: Behavior normal.         Vital signs and nursing notes reviewed.          LAB RESULTS  Recent Results (from the past 24 hours)   Comprehensive Metabolic Panel    Collection Time: 02/06/25 11:52 AM    Specimen: Blood   Result Value Ref Range    Glucose 110 (H) 65 - 99 mg/dL    BUN 36 (H) 8 - 23 mg/dL    Creatinine 1.00 0.57 - 1.00 mg/dL    Sodium 138 136 - 145 mmol/L    Potassium 5.6 (H) 3.5 - 5.2 mmol/L    Chloride 98 98 - 107 mmol/L    CO2 29.0 22.0 - 29.0 mmol/L    Calcium 9.1 8.6 - 10.5 mg/dL    Total Protein 6.2 6.0 - 8.5 g/dL    Albumin 3.7 3.5 - 5.2 g/dL    ALT (SGPT) 26 1 - 33 U/L    AST (SGOT) 39 (H) 1 - 32 U/L    Alkaline Phosphatase 153 (H) 39 - 117 U/L    Total Bilirubin 0.5 0.0 - 1.2 mg/dL    Globulin 2.5 gm/dL    A/G  Ratio 1.5 g/dL    BUN/Creatinine Ratio 36.0 (H) 7.0 - 25.0    Anion Gap 11.0 5.0 - 15.0 mmol/L    eGFR 55.7 (L) >60.0 mL/min/1.73   BNP    Collection Time: 02/06/25 11:52 AM    Specimen: Blood   Result Value Ref Range    proBNP 9,933.0 (H) 0.0 - 1,800.0 pg/mL   High Sensitivity Troponin T    Collection Time: 02/06/25 11:52 AM    Specimen: Blood   Result Value Ref Range    HS Troponin T 47 (H) <14 ng/L   Green Top (Gel)    Collection Time: 02/06/25 11:52 AM   Result Value Ref Range    Extra Tube Hold for add-ons.    Lavender Top    Collection Time: 02/06/25 11:52 AM   Result Value Ref Range    Extra Tube hold for add-on    Gold Top - SST    Collection Time: 02/06/25 11:52 AM   Result Value Ref Range    Extra Tube Hold for add-ons.    Light Blue Top    Collection Time: 02/06/25 11:52 AM   Result Value Ref Range    Extra Tube Hold for add-ons.    CBC Auto Differential    Collection Time: 02/06/25 11:52 AM    Specimen: Blood   Result Value Ref Range    WBC 19.17 (H) 3.40 - 10.80 10*3/mm3    RBC 4.37 3.77 - 5.28 10*6/mm3    Hemoglobin 13.4 12.0 - 15.9 g/dL    Hematocrit 40.2 34.0 - 46.6 %    MCV 92.0 79.0 - 97.0 fL    MCH 30.7 26.6 - 33.0 pg    MCHC 33.3 31.5 - 35.7 g/dL    RDW 11.8 (L) 12.3 - 15.4 %    RDW-SD 39.5 37.0 - 54.0 fl    MPV 10.0 6.0 - 12.0 fL    Platelets 358 140 - 450 10*3/mm3    Neutrophil % 88.4 (H) 42.7 - 76.0 %    Lymphocyte % 3.3 (L) 19.6 - 45.3 %    Monocyte % 6.2 5.0 - 12.0 %    Eosinophil % 0.6 0.3 - 6.2 %    Basophil % 0.3 0.0 - 1.5 %    Immature Grans % 1.2 (H) 0.0 - 0.5 %    Neutrophils, Absolute 16.96 (H) 1.70 - 7.00 10*3/mm3    Lymphocytes, Absolute 0.63 (L) 0.70 - 3.10 10*3/mm3    Monocytes, Absolute 1.18 (H) 0.10 - 0.90 10*3/mm3    Eosinophils, Absolute 0.11 0.00 - 0.40 10*3/mm3    Basophils, Absolute 0.06 0.00 - 0.20 10*3/mm3    Immature Grans, Absolute 0.23 (H) 0.00 - 0.05 10*3/mm3    nRBC 0.0 0.0 - 0.2 /100 WBC   Respiratory Panel PCR w/COVID-19(SARS-CoV-2) [facility]/GILMA/JORGE/PAD/COR/HAWA  In-House, NP Swab in UTM/VTM, 2 HR TAT - Swab, Nasopharynx    Collection Time: 02/06/25 11:52 AM    Specimen: Nasopharynx; Swab   Result Value Ref Range    ADENOVIRUS, PCR Not Detected Not Detected    Coronavirus 229E Not Detected Not Detected    Coronavirus HKU1 Not Detected Not Detected    Coronavirus NL63 Not Detected Not Detected    Coronavirus OC43 Not Detected Not Detected    COVID19 Not Detected Not Detected - Ref. Range    Human Metapneumovirus Not Detected Not Detected    Human Rhinovirus/Enterovirus Not Detected Not Detected    Influenza A PCR Not Detected Not Detected    Influenza B PCR Not Detected Not Detected    Parainfluenza Virus 1 Not Detected Not Detected    Parainfluenza Virus 2 Not Detected Not Detected    Parainfluenza Virus 3 Not Detected Not Detected    Parainfluenza Virus 4 Not Detected Not Detected    RSV, PCR Not Detected Not Detected    Bordetella pertussis pcr Not Detected Not Detected    Bordetella parapertussis PCR Not Detected Not Detected    Chlamydophila pneumoniae PCR Not Detected Not Detected    Mycoplasma pneumo by PCR Not Detected Not Detected   ECG 12 Lead ED Triage Standing Order; SOA    Collection Time: 02/06/25 12:00 PM   Result Value Ref Range    QT Interval 346 ms    QTC Interval 504 ms   High Sensitivity Troponin T 1Hr    Collection Time: 02/06/25  3:08 PM    Specimen: Arm, Right; Blood   Result Value Ref Range    HS Troponin T 38 (H) <14 ng/L    Troponin T Numeric Delta -9 ng/L    Troponin T % Delta -19 Abnormal if >/= 20%       Ordered the above labs and reviewed the results.        RADIOLOGY  XR Chest 1 View    Result Date: 2/6/2025  XR CHEST 1 VW-2/6/2025  HISTORY: Shortness of breath.  Heart size is mildly enlarged. Lungs appear free of acute infiltrates. There is some aortic calcification.      1. Mild cardiomegaly   This report was finalized on 2/6/2025 12:36 PM by Dr. Chapin Abrams M.D on Workstation: [id number]       Ordered the above noted radiological  studies. Reviewed by me in PACS.            PROCEDURES  Critical Care    Performed by: Sachin Arredondo MD  Authorized by: Sachin Arredondo MD    Critical care provider statement:     Critical care time (minutes):  33    Critical care time was exclusive of:  Separately billable procedures and treating other patients    Critical care was necessary to treat or prevent imminent or life-threatening deterioration of the following conditions:  Cardiac failure and circulatory failure    Critical care was time spent personally by me on the following activities:  Blood draw for specimens, development of treatment plan with patient or surrogate, discussions with consultants, evaluation of patient's response to treatment, examination of patient, obtaining history from patient or surrogate, ordering and performing treatments and interventions, ordering and review of laboratory studies, ordering and review of radiographic studies, pulse oximetry, re-evaluation of patient's condition and review of old charts    Care discussed with: admitting provider        EKG independently interpreted by myself as follows:    EKG          EKG time: 1200  Rhythm/Rate: atrial fibrillation, 127  P waves and WA: absent  QRS, axis: RBBB, LAFB, LAD   ST and T waves: nml     Interpreted Contemporaneously by me, independently viewed  unchanged compared to prior 1/28/25            MEDICATIONS GIVEN IN ER  Medications   sodium chloride 0.9 % flush 10 mL (has no administration in time range)   sodium chloride 0.9 % bolus 500 mL (0 mL Intravenous Stopped 2/6/25 1557)   digoxin (LANOXIN) injection 250 mcg (250 mcg Intravenous Given 2/6/25 1339)                   MEDICAL DECISION MAKING, PROGRESS, and CONSULTS    All labs have been independently reviewed by me.  All radiology studies have been reviewed by me and I have also reviewed the radiology report.   EKG's independently viewed and interpreted by me.  Discussion below represents my analysis of  pertinent findings related to patient's condition, differential diagnosis, treatment plan and final disposition.      Additional sources:  - Discussed/ obtained information from independent historians: History obtained from the EMS report as well as the patient herself at bedside.    - External (non-ED) record review: Upon medical records review, the patient was recently admitted to the hospital from 1/24/2025 through 2/1/2025 for shortness of breath with hypoxemia diagnosed with a COPD exacerbation.    - Chronic or social conditions impacting care: Oxygen dependent COPD, congestive heart failure    - Shared decision making: Patient decision based on shared conversations have between myself, the patient at bedside, as well as Dr. Donato with A.      Orders placed during this visit:  Orders Placed This Encounter   Procedures    Critical Care    Respiratory Panel PCR w/COVID-19(SARS-CoV-2) [facility]/GILMA/JORGE/PAD/COR/HAWA In-House, NP Swab in UTM/VTM, 2 HR TAT - Swab, Nasopharynx    XR Chest 1 View    [place] Draw    Comprehensive Metabolic Panel    BNP    High Sensitivity Troponin T    CBC Auto Differential    High Sensitivity Troponin T 1Hr    NPO Diet NPO Type: Strict NPO    Undress & Gown    Continuous Pulse Oximetry    Vital Signs    LHA (on-call MD unless specified) Details    Oxygen Therapy- Nasal Cannula; Titrate 1-6 LPM Per SpO2; 90 - 95%    ECG 12 Lead ED Triage Standing Order; SOA    Telemetry Scan    Insert Peripheral IV    Inpatient Admission    CBC & Differential    Green Top (Gel)    Lavender Top    Gold Top - SST    Light Blue Top           Differential diagnosis includes but is not limited to:    Acute coronary syndrome, A-fib with RVR, pneumonia, pneumothorax, pulmonary embolism, COPD with acute exacerbation, or CHF with acute exacerbation      Independent interpretation of labs, radiology studies, and discussions with consultants:    Chest x-ray independently interpreted by myself with my interpretation  showing mild cardiomegaly without evidence of edema or infiltrate.      ED Course as of 02/06/25 1620   Thu Feb 06, 2025   1157 The patient is in in atrial fibrillation with a rapid ventricular response.  Her heart rate is anywhere from the 120s to 150s.  At this moment her blood pressure is borderline low at 104 systolic.  We will obtain an x-ray to assess for volume overload prior to treating with IV fluids.  Treatment options certainly could include IV fluids, beta-blockers, diltiazem, as well as potentially digoxin.  We will begin the patient's workup to determine best course of treatment. [BM]   1326 The patient's blood pressure at this point is 97/77 and she continues to be in A-fib with RVR.  She does not look fluid overloaded on chest x-ray so I have ordered a normal saline bolus.  Will also treat with IV digoxin and reassess closely. [BM]   1446 proBNP(!): 9,933.0 [BM]   1446 WBC(!): 19.17 [BM]   1446 HS Troponin T(!): 47 [BM]   1527 Reevaluation, the patient's heart rate continues to balance anywhere from 110-130.  She remains in A-fib with RVR.  Oxygen saturations however remain 98% on 3 L by nasal cannula.  I did inform her that her x-ray showed no volume overload or evidence of pneumonia.  She does have a leukocytosis however.  We will admit her to the hospital for further management and treatment of her condition.  She agrees with the plan and all questions answered. [BM]   1618 The patient's presentation, workup, as well as diagnosis and treatment plan was discussed at length with Dr. Donato of [facility].  He agrees to admit the patient to the hospital today for further management and treatment. [BM]      ED Course User Index  [BM] Sachin Arredondo MD           DIAGNOSIS  Final diagnoses:   Atrial fibrillation with rapid ventricular response   Elevated troponin   Elevated brain natriuretic peptide (BNP) level   COPD with acute exacerbation   Generalized weakness   Acute hyperkalemia          DISPOSITION  ADMISSION    Discussed treatment plan and reason for admission with pt/family and admitting physician.  Pt/family voiced understanding of the plan for admission for further testing/treatment as needed.               Latest Documented Vital Signs:  As of 16:20 EST  BP- 91/79 HR- (!) 129 Temp- 97.4 °F (36.3 °C) (Tympanic) O2 sat- 95%              --    Please note that portions of this were completed with a voice recognition program.       Note Disclaimer: At [facility], we believe that sharing information builds trust and better relationships. You are receiving this note because you are receiving care at [facility] or recently visited. It is possible you will see health information before a provider has talked with you about it. This kind of information can be easy to misunderstand. To help you fully understand what it means for your health, we urge you to discuss this note with your provider.             Sachin Arredondo MD  02/06/25 1937

## 2025-02-06 NOTE — H&P
Internal medicine history and physical  INTERNAL MEDICINE   [facility]       Patient Identification:  Name: Dafne Mary  Age: 84 y.o.  Sex: female  :  1940  MRN: [id number]                   Primary Care Physician: Jossy Sauceda MD                               Date of admission:2025    Chief Complaint: Sent from nursing home/rehab for increasing weakness and worsening shortness of breath for the last 3 to 4 days.    History of Present Illness:   Patient is a 84-year-old female who has complicated past medical history remarkable of which is COPD, coronary artery disease, hypertension, atrial fibrillation chronic diastolic congestive heart failure was recently hospitalized for 8 days from 2025 through 2025 when she presented with shortness of breath and sinus drainage and headache.  Patient was treated with nebulizer treatment oxygen supplementation and antibiotic therapy with improvement in her symptoms.  She was noted to have atrial fibrillation and rapid ventricular response during her visit requiring cardiology consultation and was continued on anticoagulation therapy and Coreg was transitioned to atenolol to control the heart rate.  She was eventually discharged to rehabilitation facility and was doing okay until 3 4 days ago when her symptoms started to get worse and essentially stating that she never got any improvement since she has been at the nursing home.  Her cough is still there and is on exertion is worse.  Evaluation in the emergency room revealed heart rate of 126 bpm and EKG shows atrial fibrillation with rapid ventricular response.  Patient was noted to have generalized wheezing.  Chest x-ray showed cardiomegaly with no infiltrate.  Patient was noted to have BNP of 9933 troponin of 47 and white blood cell count of 19,000.  She received a dose of digoxin with some improvement in heart rate associated with improvement in sense of wellbeing.  Patient was  continued on nebulizer treatment and oxygen supplementation.  She was also started on IV Lasix.  Patient is being admitted for further care.  Patient denies any fever and chills.  Upon discharge on 2/1/2025 she was noted to have white blood cell count of 14,000.  Respiratory viral panel was noted to be negative.      Past Medical History:  Past Medical History:   Diagnosis Date    Atrial fibrillation with RVR 6/4/2023    CAD (coronary artery disease) 7/10/2023    Chronic diastolic congestive heart failure 11/17/2022    Depression     Emphysema lung     GERD (gastroesophageal reflux disease)     Heart failure     Hyperlipidemia     Hypertension     Hypothyroidism     Mitral valve regurgitation 7/10/2023    NSTEMI (non-ST elevated myocardial infarction) 10/25/2023     Past Surgical History:  Past Surgical History:   Procedure Laterality Date    CARDIAC CATHETERIZATION N/A 7/7/2023    Procedure: Right and Left Heart Cath;  Surgeon: Ra Cole MD;  Location: [facility] CATH INVASIVE LOCATION;  Service: Cardiology;  Laterality: N/A;    CARDIAC CATHETERIZATION N/A 7/7/2023    Procedure: Percutaneous Coronary Intervention;  Surgeon: Ra Cole MD;  Location: [facility] CATH INVASIVE LOCATION;  Service: Cardiology;  Laterality: N/A;    CARDIAC CATHETERIZATION N/A 7/7/2023    Procedure: Stent ANDRZEJ coronary;  Surgeon: Ra Cole MD;  Location: [facility] CATH INVASIVE LOC[facility];  Service: Cardiology;  Laterality: N/A;    CARDIAC CATHETERIZATION N/A 7/7/2023    Procedure: Coronary angiography;  Surgeon: Ra Cole MD;  Location: [facility] CATH INVASIVE LOC[facility];  Service: Cardiology;  Laterality: N/A;    CARDIAC CATHETERIZATION N/A 7/7/2023    Procedure: Left ventriculography;  Surgeon: Ra Cole MD;  Location: [facility] CATH INVASIVE LOC[facility];  Service: Cardiology;  Laterality: N/A;    CARDIAC CATHETERIZATION N/A 1/20/2025    Procedure: Left Heart Cath;  Surgeon: Nae Norman MD;   Location:  [facility] CATH INVASIVE LOCATION;  Service: Cardiovascular;  Laterality: N/A;    CARDIAC CATHETERIZATION N/A 1/20/2025    Procedure: Coronary angiography;  Surgeon: Nae Norman MD;  Location:  [facility] CATH INVASIVE LOCATION;  Service: Cardiovascular;  Laterality: N/A;    EYE SURGERY Left     INTUBATION  5/21/2023         PARATHYROIDECTOMY      Patient reports thyroidectomy partial not a para thyroidectomy.    THYROIDECTOMY, PARTIAL      1984      Home Meds:  Medications Prior to Admission   Medication Sig Dispense Refill Last Dose/Taking    acetaminophen (TYLENOL) 325 MG tablet Take 2 tablets by mouth Every 6 (Six) Hours As Needed for Mild Pain.       amLODIPine (NORVASC) 5 MG tablet Take 1 tablet by mouth Daily. 90 tablet 1     apixaban (ELIQUIS) 2.5 MG tablet tablet Take 1 tablet by mouth Every 12 (Twelve) Hours. Indications: Atrial Fibrillation 180 tablet 1     [place] Thyroid 15 MG tablet Take 1 tablet by mouth once daily 90 tablet 0     [place] Thyroid 30 MG tablet Take 1 tablet by mouth once daily 90 tablet 0     Artificial Tear Ointment (artificial tears) ophthalmic ointment Administer 1 application to both eyes Every 1 (One) Hour As Needed (dry eye).       atenolol (TENORMIN) 100 MG tablet Take 1 tablet by mouth Every 12 (Twelve) Hours.       atorvastatin (LIPITOR) 40 MG tablet Take 1 tablet by mouth Daily. 90 tablet 1     Breztri Aerosphere 160-9-4.8 MCG/ACT aerosol inhaler 2 puffs 2 (Two) Times a Day.       Cimetidine (TAGAMET PO) Take  by mouth.       clopidogrel (PLAVIX) 75 MG tablet Take 1 tablet by mouth Daily. 90 tablet 3     empagliflozin (Jardiance) 10 MG tablet tablet Take 1 tablet by mouth Daily. 90 tablet 1     furosemide (LASIX) 20 MG tablet Take 1 tablet by mouth Daily As Needed (Leg swelling). 30 tablet 0     Glycerin-Polysorbate 80 (REFRESH DRY EYE THERAPY OP) Apply 1 drop to eye(s) as directed by provider Daily. For dry eyes       guaiFENesin (MUCINEX) 600 MG 12 hr tablet Take 1  "tablet by mouth 2 (Two) Times a Day As Needed for Cough or Congestion.       ipratropium-albuterol (DUO-NEB) 0.5-2.5 mg/3 ml nebulizer Take 3 mL by nebulization Every 4 (Four) Hours As Needed for Wheezing.       LORazepam (ATIVAN) 0.5 MG tablet Take 1 tablet by mouth Every 6 (Six) Hours As Needed for Anxiety. 12 tablet 0     PARoxetine (PAXIL) 10 MG tablet Take 2 tablets by mouth Daily. 180 tablet 0     Ventolin  (90 Base) MCG/ACT inhaler Inhale 2 puffs Every 4 (Four) Hours As Needed.       vitamin B-12 (CYANOCOBALAMIN) 500 MCG tablet Take 1 tablet by mouth Daily.       vitamin C (ASCORBIC ACID) 250 MG tablet Take 2 tablets by mouth Daily.       vitamin D3 125 MCG (5000 UT) capsule capsule Take 1 capsule by mouth Daily.        Current Meds:     Current Facility-Administered Medications:     sodium chloride 0.9 % flush 10 mL, 10 mL, Intravenous, PRN, Sachin Arredondo MD  Allergies:  Allergies   Allergen Reactions    Lansoprazole Nausea Only     NAUSEA  NAUSEA  NAUSEA    Albuterol Provider Review Needed     \"It causes me not to be able to breathe\"    Buspirone Other (See Comments)     agitation    Diphenhydramine Hcl (Sleep) Irritability    Lisinopril Cough     Social History:   Social History     Tobacco Use    Smoking status: Never     Passive exposure: Never    Smokeless tobacco: Never   Substance Use Topics    Alcohol use: Yes     Comment: rare      Family History:  Family History   Problem Relation Age of Onset    Alzheimer's disease Mother     Lung disease Father     Alcohol abuse Father     Heart disease Brother     Hypertension Brother     Lung disease Brother     Alcohol abuse Brother     Cancer Brother         LUNG  WAS A SMOKER    Thyroid disease Maternal Grandmother           Review of Systems  See history of present illness and past medical history.       Vitals:   /81   Pulse 104   Temp 97.4 °F (36.3 °C) (Tympanic)   Resp 22   Ht 157.5 cm (62\")   Wt 59.1 kg (130 lb 4.7 oz)   SpO2 " 97%   BMI 23.83 kg/m²   I/O:   Intake/Output Summary (Last 24 hours) at 2/6/2025 1812  Last data filed at 2/6/2025 1557  Gross per 24 hour   Intake 500 ml   Output --   Net 500 ml     Exam:  Patient is examined using the personal protective equipment as per guidelines from infection control for this particular patient as enacted.  Hand washing was performed before and after patient interaction.  General Appearance:  Awake cooperative nontoxic-appearing elderly female   Head:    Normocephalic, without obvious abnormality, atraumatic   Eyes:    PERRL, conjunctiva/corneas clear, EOM's intact, both eyes   Ears:    Normal external ear canals, both ears   Nose:   Nares normal, septum midline, mucosa normal, no drainage    or sinus tenderness   Throat:   Lips, tongue, gums normal; oral mucosa pink and moist   Neck: Supple and no adenopathy   Back:     Symmetric, no curvature, ROM normal, no CVA tenderness   Lungs:   Occasional rhonchi, bibasilar crackles   Chest Wall:    No tenderness or deformity    Heart:  S1-S2 irregularly irregular tachycardia.   Abdomen:   Soft nontender   Extremities: Bilateral lower extremity edema   Pulses:   Pulses palpable in all extremities; symmetric all extremities   Skin:   Skin color normal, Skin is warm and dry,  no rashes or palpable lesions   Neurologic: Grossly nonfocal examination gait not tested       Data Review:      I reviewed the patient's new clinical results.  Results from last 7 days   Lab Units 02/06/25  1152 02/01/25 0414 01/31/25  0542   WBC 10*3/mm3 19.17* 14.68* 16.71*   HEMOGLOBIN g/dL 13.4 13.4 13.2   PLATELETS 10*3/mm3 358 460* 449     Results from last 7 days   Lab Units 02/06/25  1152 02/01/25  0414 01/31/25  0542   SODIUM mmol/L 138 135* 133*   POTASSIUM mmol/L 5.6* 4.1 4.0   CHLORIDE mmol/L 98 100 97*   CO2 mmol/L 29.0 23.1 28.0   BUN mg/dL 36* 22 25*   CREATININE mg/dL 1.00 0.72 0.82   CALCIUM mg/dL 9.1 7.9* 8.3*   GLUCOSE mg/dL 110* 94 117*     XR Chest 1  View    Result Date: 2/6/2025  1. Mild cardiomegaly   This report was finalized on 2/6/2025 12:36 PM by Dr. Chapin Abrams M.D on Workstation: [id number]      XR Chest 1 View    Result Date: 1/28/2025  As described.   This report was finalized on 1/28/2025 9:03 AM by Dr. Ned Guzman M.D on Workstation: [id number]      CT Angiogram Chest    Result Date: 1/25/2025   1.  Pulmonary arteries are well-opacified, and there is no evidence of pulmonary embolism.  1.  No acute pulmonary parenchymal abnormality is seen.    This report was finalized on 1/25/2025 12:23 AM by Dr. Sen Martinez M.D on Workstation: [id number]      XR Chest 1 View    Result Date: 1/24/2025  No focal pulmonary consolidation. Cardiomegaly. Tortuous aorta. Follow-up as clinically indicated.    This report was finalized on 1/24/2025 1:29 PM by Dr. Ned Guzman M.D on Workstation: [id number]      XR Chest PA & Lateral    Result Date: 1/22/2025  1. Mild cardiomegaly. 2. Lungs appear clear.   This report was finalized on 1/22/2025 1:03 PM by Dr. Chapin Abrams M.D on Workstation: [id number]      XR Chest 1 View    Result Date: 1/17/2025  No acute findings.  This report was finalized on 1/17/2025 11:09 PM by Dr. Kelly Chirinos M.D on Workstation: [id number]     Microbiology Results (last 10 days)       Procedure Component Value - Date/Time    Respiratory Panel PCR w/COVID-19(SARS-CoV-2) [facility] In-House, NP Swab in UTM/VTM, 2 HR TAT - Swab, Nasopharynx [[id number]]  (Normal) Collected: 02/06/25 1152    Lab Status: Final result Specimen: Swab from Nasopharynx Updated: 02/06/25 1448     ADENOVIRUS, PCR Not Detected     Coronavirus 229E Not Detected     Coronavirus HKU1 Not Detected     Coronavirus NL63 Not Detected     Coronavirus OC43 Not Detected     COVID19 Not Detected     Human Metapneumovirus Not Detected     Human Rhinovirus/Enterovirus Not Detected     Influenza A PCR Not Detected     Influenza B PCR Not Detected      Parainfluenza Virus 1 Not Detected     Parainfluenza Virus 2 Not Detected     Parainfluenza Virus 3 Not Detected     Parainfluenza Virus 4 Not Detected     RSV, PCR Not Detected     Bordetella pertussis pcr Not Detected     Bordetella parapertussis PCR Not Detected     Chlamydophila pneumoniae PCR Not Detected     Mycoplasma pneumo by PCR Not Detected    Narrative:      In the setting of a positive respiratory panel with a viral infection PLUS a negative procalcitonin without other underlying concern for bacterial infection, consider observing off antibiotics or discontinuation of antibiotics and continue supportive care. If the respiratory panel is positive for atypical bacterial infection (Bordetella pertussis, Chlamydophila pneumoniae, or Mycoplasma pneumoniae), consider antibiotic de-escalation to target atypical bacterial infection.    Clostridioides difficile Toxin - Stool, Per Rectum [[id number]]  (Normal) Collected: 01/31/25 1117    Lab Status: Final result Specimen: Stool from Per Rectum Updated: 01/31/25 1211    Narrative:      The following orders were created for panel order Clostridioides difficile Toxin - Stool, Per Rectum.  Procedure                               Abnormality         Status                     ---------                               -----------         ------                     Clostridioides difficile...[[id number]]  Normal              Final result                 Please view results for these tests on the individual orders.    Clostridioides difficile Toxin, PCR - Stool, Per Rectum [[id number]]  (Normal) Collected: 01/31/25 1117    Lab Status: Final result Specimen: Stool from Per Rectum Updated: 01/31/25 1211     Toxigenic C. difficile by PCR Negative    Narrative:      The result indicates the absence of toxigenic C. difficile from stool specimen.           Brief Urine Lab Results  (Last result in the past 365 days)        Color   Clarity   Blood   Leuk Est   Nitrite   Protein    CREAT   Urine HCG        01/26/25 1041 Yellow   Clear   Negative   Trace   Positive   Negative                 ECG 12 Lead ED Triage Standing Order; SOA   Final Result   HEART FKXG=750  bpm   RR Gggedfcz=718  ms   ND Interval=  ms   P Horizontal Axis=  deg   P Front Axis=  deg   QRSD Bwmghumf=427  ms   QT Nwadqhxr=222  ms   ZSoK=556  ms   QRS Axis=-108  deg   T Wave Axis=58  deg   - ABNORMAL ECG -   Atrial fibrillation   RBBB and LAFB   Probable  anteroseptal infarct, old   Prolonged QT interval   No change from prior tracing   Electronically Signed By: Meseret Coronado ([facility]) 2025-02-06 16:46:19   Date and Time of Study:2025-02-06 12:00:53      Telemetry Scan   Final Result      Telemetry Scan   Final Result            Assessment:  Active Hospital Problems    Diagnosis  POA    **Atrial fibrillation with rapid ventricular response [I48.91]  Yes    Hypoxia [R09.02]  Yes    Acute on chronic heart failure with preserved ejection fraction (HFpEF) [I50.33]  Yes    Stage 3a chronic kidney disease [N18.31]  Yes    Elevated troponin [R79.89]  Yes    PAF (paroxysmal atrial fibrillation) [I48.0]  Yes    Generalized anxiety disorder [F41.1]  Yes    Spinal stenosis of lumbar region [M48.061]  Yes    COPD (chronic obstructive pulmonary disease) [J44.9]  Yes    Hypothyroid [E03.9]  Yes    Chronic fatigue [R53.82]  Yes    Benign hypertension [I10]  Yes       Medical decision making/care plan: See admitting orders  Atrial fibrillation with rapid ventricular response-continue with her home regimen including administration of atenolol.  Patient has not taken her home medication today.  Her heart rate is improved with a dose of digoxin.  Monitor her heart rate after oral atenolol and if she continues to be an issue consider IV Cardizem.  Cardiology consultation.  Hypoxia with dyspnea on exertion and at rest-likely acute on chronic heart failure with preserved ejection fraction due to atrial fibrillation with rapid ventricular  response.-Plan is to continue with beta-blocker, oxygen supplementation and change oral diuretic to IV diuretics as her BNP is elevated and monitor her oxygen requirement and respiratory status.  Keep an eye on electrolytes and renal function.  COPD with chronic hypoxia-continue nebulizer treatment provided with continuous pulse ox monitoring and oxygen supplementation as needed.  Hypothyroidism-continue thyroid replacement therapy.  Hypertension-continue antihypertensive regimen and avoid hypotensive episodes.  Leukocytosis likely due to recent use of steroids-workup is negative and patient is afebrile chest x-ray is clear plan is to monitor.  Stage III kidney disease with improvement in her creatinine and hyperkalemia-continue with IV diuretics monitor renal function and a dose of Lokelma.  Elevated troponin-likely delta is -9 plan is to monitor.    Benitez Donato MD   2/6/2025  18:12 EST    Parts of this note may be an electronic transcription/translation of spoken language to printed text using the Dragon dictation system.

## 2025-02-06 NOTE — Clinical Note
Hemostasis started on the right femoral vein. Perclose was used in achieving hemostasis. Closure device deployed in the vessel. Hemostasis achieved successfully. Closure device additional comment: 4x4 and tegaderm

## 2025-02-06 NOTE — PLAN OF CARE
Goal Outcome Evaluation:            Patient alert and oriented x4. She is on 2 L NC. Her admission is complete. Dr. Donato was called as her HR jumpped to 133. He restated her home meds and said if she doesn't come down after administration of her atenolol, to please reach back out for possible cards gtt.

## 2025-02-06 NOTE — ED NOTES
Pt arrives via EMS from [facility]. States that she was discharged from here Saturday for a UTI and COPD. States that she has not gotten any better since being discharged. Staff from the facility states that she has gotten worse. Pt was discharged on oxygen.

## 2025-02-07 LAB
ALBUMIN SERPL-MCNC: 3 G/DL (ref 3.5–5.2)
ALBUMIN/GLOB SERPL: 1.4 G/DL
ALP SERPL-CCNC: 114 U/L (ref 39–117)
ALT SERPL W P-5'-P-CCNC: 18 U/L (ref 1–33)
ANION GAP SERPL CALCULATED.3IONS-SCNC: 11 MMOL/L (ref 5–15)
AST SERPL-CCNC: 25 U/L (ref 1–32)
BASOPHILS # BLD AUTO: 0.01 10*3/MM3 (ref 0–0.2)
BASOPHILS NFR BLD AUTO: 0.2 % (ref 0–1.5)
BILIRUB SERPL-MCNC: 0.4 MG/DL (ref 0–1.2)
BUN SERPL-MCNC: 36 MG/DL (ref 8–23)
BUN/CREAT SERPL: 37.5 (ref 7–25)
CALCIUM SPEC-SCNC: 7.9 MG/DL (ref 8.6–10.5)
CHLORIDE SERPL-SCNC: 98 MMOL/L (ref 98–107)
CO2 SERPL-SCNC: 27 MMOL/L (ref 22–29)
CREAT SERPL-MCNC: 0.96 MG/DL (ref 0.57–1)
DEPRECATED RDW RBC AUTO: 37.8 FL (ref 37–54)
EGFRCR SERPLBLD CKD-EPI 2021: 58.5 ML/MIN/1.73
EOSINOPHIL # BLD AUTO: 0 10*3/MM3 (ref 0–0.4)
EOSINOPHIL NFR BLD AUTO: 0 % (ref 0.3–6.2)
ERYTHROCYTE [DISTWIDTH] IN BLOOD BY AUTOMATED COUNT: 11.6 % (ref 12.3–15.4)
GLOBULIN UR ELPH-MCNC: 2.2 GM/DL
GLUCOSE SERPL-MCNC: 116 MG/DL (ref 65–99)
HCT VFR BLD AUTO: 35.6 % (ref 34–46.6)
HGB BLD-MCNC: 11.9 G/DL (ref 12–15.9)
IMM GRANULOCYTES # BLD AUTO: 0.06 10*3/MM3 (ref 0–0.05)
IMM GRANULOCYTES NFR BLD AUTO: 1 % (ref 0–0.5)
LYMPHOCYTES # BLD AUTO: 0.37 10*3/MM3 (ref 0.7–3.1)
LYMPHOCYTES NFR BLD AUTO: 6.1 % (ref 19.6–45.3)
MCH RBC QN AUTO: 30.2 PG (ref 26.6–33)
MCHC RBC AUTO-ENTMCNC: 33.4 G/DL (ref 31.5–35.7)
MCV RBC AUTO: 90.4 FL (ref 79–97)
MONOCYTES # BLD AUTO: 0.31 10*3/MM3 (ref 0.1–0.9)
MONOCYTES NFR BLD AUTO: 5.1 % (ref 5–12)
NEUTROPHILS NFR BLD AUTO: 5.36 10*3/MM3 (ref 1.7–7)
NEUTROPHILS NFR BLD AUTO: 87.6 % (ref 42.7–76)
NRBC BLD AUTO-RTO: 0 /100 WBC (ref 0–0.2)
PLATELET # BLD AUTO: 270 10*3/MM3 (ref 140–450)
PMV BLD AUTO: 9.8 FL (ref 6–12)
POTASSIUM SERPL-SCNC: 3.8 MMOL/L (ref 3.5–5.2)
PROT SERPL-MCNC: 5.2 G/DL (ref 6–8.5)
RBC # BLD AUTO: 3.94 10*6/MM3 (ref 3.77–5.28)
SODIUM SERPL-SCNC: 136 MMOL/L (ref 136–145)
WBC NRBC COR # BLD AUTO: 6.11 10*3/MM3 (ref 3.4–10.8)

## 2025-02-07 PROCEDURE — 25010000002 DIGOXIN PER 500 MCG: Performed by: NURSE PRACTITIONER

## 2025-02-07 PROCEDURE — 85025 COMPLETE CBC W/AUTO DIFF WBC: CPT | Performed by: INTERNAL MEDICINE

## 2025-02-07 PROCEDURE — 94799 UNLISTED PULMONARY SVC/PX: CPT

## 2025-02-07 PROCEDURE — 25010000002 ONDANSETRON PER 1 MG: Performed by: INTERNAL MEDICINE

## 2025-02-07 PROCEDURE — 63710000001 REVEFENACIN 175 MCG/3ML SOLUTION: Performed by: INTERNAL MEDICINE

## 2025-02-07 PROCEDURE — 25010000002 FUROSEMIDE PER 20 MG: Performed by: INTERNAL MEDICINE

## 2025-02-07 PROCEDURE — 99222 1ST HOSP IP/OBS MODERATE 55: CPT | Performed by: NURSE PRACTITIONER

## 2025-02-07 PROCEDURE — 99222 1ST HOSP IP/OBS MODERATE 55: CPT

## 2025-02-07 PROCEDURE — 80053 COMPREHEN METABOLIC PANEL: CPT | Performed by: INTERNAL MEDICINE

## 2025-02-07 RX ORDER — HYDROCODONE BITARTRATE AND ACETAMINOPHEN 5; 325 MG/1; MG/1
1 TABLET ORAL ONCE AS NEEDED
Status: COMPLETED | OUTPATIENT
Start: 2025-02-07 | End: 2025-02-07

## 2025-02-07 RX ORDER — POLYETHYLENE GLYCOL 3350 17 G/17G
17 POWDER, FOR SOLUTION ORAL DAILY PRN
Status: DISCONTINUED | OUTPATIENT
Start: 2025-02-07 | End: 2025-02-13 | Stop reason: HOSPADM

## 2025-02-07 RX ORDER — DIGOXIN 0.25 MG/ML
250 INJECTION INTRAMUSCULAR; INTRAVENOUS EVERY 6 HOURS
Status: COMPLETED | OUTPATIENT
Start: 2025-02-07 | End: 2025-02-07

## 2025-02-07 RX ORDER — DIGOXIN 0.25 MG/ML
500 INJECTION INTRAMUSCULAR; INTRAVENOUS ONCE
Status: COMPLETED | OUTPATIENT
Start: 2025-02-07 | End: 2025-02-07

## 2025-02-07 RX ORDER — DOCUSATE SODIUM 100 MG/1
100 CAPSULE, LIQUID FILLED ORAL 2 TIMES DAILY PRN
Status: DISCONTINUED | OUTPATIENT
Start: 2025-02-07 | End: 2025-02-13 | Stop reason: HOSPADM

## 2025-02-07 RX ADMIN — LEVOTHYROXINE, LIOTHYRONINE 15 MG: 19; 4.5 TABLET ORAL at 08:02

## 2025-02-07 RX ADMIN — HYDROCODONE BITARTRATE AND ACETAMINOPHEN 1 TABLET: 5; 325 TABLET ORAL at 21:52

## 2025-02-07 RX ADMIN — ACETAMINOPHEN 650 MG: 325 TABLET, FILM COATED ORAL at 17:51

## 2025-02-07 RX ADMIN — REVEFENACIN 175 MCG: 175 SOLUTION RESPIRATORY (INHALATION) at 07:13

## 2025-02-07 RX ADMIN — Medication 10 ML: at 08:11

## 2025-02-07 RX ADMIN — PAROXETINE HYDROCHLORIDE HEMIHYDRATE 20 MG: 20 TABLET, FILM COATED ORAL at 08:02

## 2025-02-07 RX ADMIN — ARFORMOTEROL TARTRATE 15 MCG: 15 SOLUTION RESPIRATORY (INHALATION) at 07:13

## 2025-02-07 RX ADMIN — FUROSEMIDE 20 MG: 10 INJECTION, SOLUTION INTRAMUSCULAR; INTRAVENOUS at 00:54

## 2025-02-07 RX ADMIN — ACETAMINOPHEN 650 MG: 325 TABLET, FILM COATED ORAL at 13:20

## 2025-02-07 RX ADMIN — ATORVASTATIN CALCIUM 40 MG: 20 TABLET, FILM COATED ORAL at 08:02

## 2025-02-07 RX ADMIN — APIXABAN 2.5 MG: 2.5 TABLET, FILM COATED ORAL at 19:58

## 2025-02-07 RX ADMIN — DIGOXIN 250 MCG: 0.25 INJECTION INTRAMUSCULAR; INTRAVENOUS at 16:25

## 2025-02-07 RX ADMIN — FUROSEMIDE 20 MG: 10 INJECTION, SOLUTION INTRAMUSCULAR; INTRAVENOUS at 19:58

## 2025-02-07 RX ADMIN — CLOPIDOGREL 75 MG: 75 TABLET, FILM COATED ORAL at 08:02

## 2025-02-07 RX ADMIN — FUROSEMIDE 20 MG: 10 INJECTION, SOLUTION INTRAMUSCULAR; INTRAVENOUS at 13:14

## 2025-02-07 RX ADMIN — DOCUSATE SODIUM 100 MG: 100 CAPSULE, LIQUID FILLED ORAL at 17:51

## 2025-02-07 RX ADMIN — ONDANSETRON 4 MG: 2 INJECTION, SOLUTION INTRAMUSCULAR; INTRAVENOUS at 16:25

## 2025-02-07 RX ADMIN — Medication 5000 UNITS: at 08:01

## 2025-02-07 RX ADMIN — BUDESONIDE 0.5 MG: 0.5 INHALANT RESPIRATORY (INHALATION) at 07:13

## 2025-02-07 RX ADMIN — DIGOXIN 500 MCG: 0.25 INJECTION INTRAMUSCULAR; INTRAVENOUS at 09:54

## 2025-02-07 RX ADMIN — DIGOXIN 250 MCG: 0.25 INJECTION INTRAMUSCULAR; INTRAVENOUS at 19:57

## 2025-02-07 RX ADMIN — Medication 500 MCG: at 08:02

## 2025-02-07 RX ADMIN — LEVOTHYROXINE, LIOTHYRONINE 30 MG: 19; 4.5 TABLET ORAL at 09:54

## 2025-02-07 RX ADMIN — APIXABAN 2.5 MG: 2.5 TABLET, FILM COATED ORAL at 08:01

## 2025-02-07 RX ADMIN — OXYCODONE HYDROCHLORIDE AND ACETAMINOPHEN 500 MG: 500 TABLET ORAL at 08:02

## 2025-02-07 RX ADMIN — FUROSEMIDE 20 MG: 10 INJECTION, SOLUTION INTRAMUSCULAR; INTRAVENOUS at 06:25

## 2025-02-07 NOTE — PLAN OF CARE
Goal Outcome Evaluation:  Plan of Care Reviewed With: patient        Progress: no change  Outcome Evaluation: Pt remains in Afib, rate up to 130s, improved with IV digoxin. BP low this AM, BP meds held and MD notified. AOx4. PRN tylenol x1 for headache. PRN zofran x1 for nausea. Wound care to bottom. Q2 turns. IV lasix. Little appetite, encouraged to eat. PT to eval tomorrow. Bed alarm on.

## 2025-02-07 NOTE — CASE MANAGEMENT/SOCIAL WORK
Case Management Readmission Assessment Note    Case Management Readmission Assessment (all recorded)       Readmission Interview       Row Name 02/07/25 1453             Readmission Indications    Is the patient and/or family able to complete the readmission assessment questions? Yes      Is this hospitalization related to the prior hospital diagnosis? Yes        Row Name 02/07/25 1453             Recommendation for rehospitalization    Did you speak with your physician prior to coming to the hospital No        Row Name 02/07/25 1453             Follow-up Appointments    Do you have a PCP? Yes      Did you have an appointment with PCP after your hospitalization? No      Did you have an appointment with a Specialist? No  scheduled 2/13 with cardiology      Are you current with the Pulmonary Clinic? No      Are you current with the CHF Clinic? No        Row Name 02/07/25 1453             Medications    Did you have newly prescribed medications at discharge? Yes      Did you understand the reasons for your medications at discharge and how to take them? Yes      Did you understand the side effects of your medications? Yes      Are you taking all of you prescribed medications? Yes      What pharmacy was used to fill prescription(s)? [facility]      Were medications picked up? Yes        Row Name 02/07/25 1453             Discharge Instructions    Did you understand your discharge instructions? Yes      Did your family/caregiver hear your instructions? Yes      Were you told to eat a special diet? Yes      Did you adhere to the diet? Yes      Were you given a number of someone to call if you had questions or concerns? Yes        Row Name 02/07/25 1453             Index discharge location/services    Where did you go upon discharge? Skilled Nursing Facility      Do you have supportive family or friends in the home? Yes      What services were arranged at discharge? Other (comment)  [facility]      Was the provider seen at the facility? Yes      Which Skilled Nursing Facility were your admitted from? [facility]        Row Name 02/07/25 1453             Discharge Readiness    On a scale of 1-5 (5 being well prepared), how ready were you for discharge 4      Recommendation based on interview Education on diagnosis/self management;Goals of care discussion/advanced care planning        Row Name 02/07/25 1453             Palliative Care/Hospice    Are you current with Palliative Care? No      Are you current with Hospice Care? No        Row Name 02/07/25 1453 02/06/25 183          Advance Directives (For Healthcare)    Pre-existing AND/MOST/POLST Order No No     Advance Directive Status -- Patient does not have advance directive     Have you reviewed your Advance Directive and is it valid for this stay? Not applicable Not applicable     Literature Provided on Advance Directives No --

## 2025-02-07 NOTE — PROGRESS NOTES
Name: Dafne Mary ADMIT: 2025   : 1940  PCP: Jossy Sauceda MD    MRN: [id number] LOS: 1 days   AGE/SEX: 84 y.o. female  ROOM: [facility]/     Subjective   Subjective     Patient is lying in bed and does not appear to remain major distress.  Denies nausea, vomiting abdominal pain, chest pain.  Mildly tachycardic and blood pressures are soft.  Denies chest pain or dizziness.         Objective   Objective   Vital Signs  Temp:  [97.6 °F (36.4 °C)-98.3 °F (36.8 °C)] 97.8 °F (36.6 °C)  Heart Rate:  [] 109  Resp:  [18-22] 18  BP: ()/(55-94) 107/55  SpO2:  [91 %-100 %] 91 %  on  Flow (L/min) (Oxygen Therapy):  [2-3] 2;   Device (Oxygen Therapy): room air  Body mass index is 23.83 kg/m².  Physical Exam    HEENT:  PERRLA, extraocular movements intact, sclerae is no icterus   Neck: Supple, no JVD   Cardiovascular: Irregular with normal S1-S2   Respiratory: Diminished breath sounds with no wheezes  GI:  Soft, nontender, bowel sounds are present   Extremities:  No edema, palpable pedal pulses  Neurologic: Grossly nonfocal, no facial asymmetry    Results Review     I reviewed the patient's new clinical results.  Results from last 7 days   Lab Units 25  0527 25  1152 25  0414   WBC 10*3/mm3 6.11 19.17* 14.68*   HEMOGLOBIN g/dL 11.9* 13.4 13.4   PLATELETS 10*3/mm3 270 358 460*     Results from last 7 days   Lab Units 25  0527 25  1152 25  0414   SODIUM mmol/L 136 138 135*   POTASSIUM mmol/L 3.8 5.6* 4.1   CHLORIDE mmol/L 98 98 100   CO2 mmol/L 27.0 29.0 23.1   BUN mg/dL 36* 36* 22   CREATININE mg/dL 0.96 1.00 0.72   GLUCOSE mg/dL 116* 110* 94   EGFR mL/min/1.73 58.5* 55.7* 82.6     Results from last 7 days   Lab Units 25  0527 25  1152 25  0414   ALBUMIN g/dL 3.0* 3.7 3.0*   BILIRUBIN mg/dL 0.4 0.5 0.4   ALK PHOS U/L 114 153* 116   AST (SGOT) U/L 25 39* 23   ALT (SGPT) U/L 18 26 30     Results from last 7 days   Lab Units 25  1152  "02/01/25  0414   CALCIUM mg/dL 7.9* 9.1 7.9*   ALBUMIN g/dL 3.0* 3.7 3.0*       No results found for: \"HGBA1C\", \"POCGLU\"    XR Chest 1 View    Result Date: 2/6/2025  1. Mild cardiomegaly   This report was finalized on 2/6/2025 12:36 PM by Dr. Chapin Abrams M.D on Workstation: [id number]       I have personally reviewed all medications:  Scheduled Medications  apixaban, 2.5 mg, Oral, Q12H  arformoterol, 15 mcg, Nebulization, BID - RT   And  budesonide, 0.5 mg, Nebulization, BID - RT   And  revefenacin, 175 mcg, Nebulization, Daily - RT  vitamin C, 500 mg, Oral, Daily  atorvastatin, 40 mg, Oral, Daily  cholecalciferol, 5,000 Units, Oral, Daily  clopidogrel, 75 mg, Oral, Daily  digoxin, 250 mcg, Intravenous, Q6H  furosemide, 20 mg, Intravenous, Q6H  PARoxetine, 20 mg, Oral, Daily  sodium chloride, 10 mL, Intravenous, Q12H  thyroid, 15 mg, Oral, Daily  Thyroid, 30 mg, Oral, Daily  vitamin B-12, 500 mcg, Oral, Daily    Infusions   Diet  Diet: Cardiac; Healthy Heart (2-3 Na+); Fluid Consistency: Thin (IDDSI 0)  NPO Diet NPO Type: Strict NPO    I have personally reviewed:  [x]  Laboratory   [x]  Microbiology   [x]  Radiology   [x]  EKG/Telemetry  [x]  Cardiology/Vascular   []  Pathology    []  Records       Assessment/Plan     Active Hospital Problems    Diagnosis  POA    **Atrial fibrillation with rapid ventricular response [I48.91]  Yes    Hypoxia [R09.02]  Yes    Acute on chronic heart failure with preserved ejection fraction (HFpEF) [I50.33]  Yes    Stage 3a chronic kidney disease [N18.31]  Yes    Elevated troponin [R79.89]  Yes    PAF (paroxysmal atrial fibrillation) [I48.0]  Yes    Generalized anxiety disorder [F41.1]  Yes    Spinal stenosis of lumbar region [M48.061]  Yes    COPD (chronic obstructive pulmonary disease) [J44.9]  Yes    Hypothyroid [E03.9]  Yes    Chronic fatigue [R53.82]  Yes    Benign hypertension [I10]  Yes      Resolved Hospital Problems   No resolved problems to display.       84 y.o. female " admitted with Atrial fibrillation with rapid ventricular response.     1. Afib with RVR, continue with Plavix, Eliquis and digoxin.  Cardiology consultation is being obtained as well.  2. Chronic hypoxic respiratory failure secondary to COPD, continue with supplemental oxygen and is on   Brovana,Pulmicort nebs and deep Aniya.  3. Hypothyroidism, on Synthroid.  4. History of hypertension, currently blood pressure is soft therefore home dose atenolol has not been reinstated.  5. Hyperlipidemia, on statins.  6. Anxiety/depression, on Paxil.  6. DVT prophylaxis, on Eliquis.    7. Code status is full code.    Expected Discharge Date: 2/10/2025; Expected Discharge Time:       Fritz Lazaor MD  [facility]  02/07/25  16:44 EST

## 2025-02-07 NOTE — CASE MANAGEMENT/SOCIAL WORK
Discharge Planning Assessment  [facility]     Patient Name: Dafne Mary  MRN: [id number]  Today's Date: 2/7/2025    Admit Date: 2/6/2025    Plan: Home, family to transport   Discharge Needs Assessment       Row Name 02/07/25 1453       Living Environment    People in Home alone    Current Living Arrangements home    Potentially Unsafe Housing Conditions none    In the past 12 months has the electric, gas, oil, or water company threatened to shut off services in your home? No    Primary Care Provided by self    Provides Primary Care For no one    Family Caregiver if Needed child(tiffanie), adult    Family Caregiver Names Felicia Mary/daughter & Balaji Mary/son    Able to Return to Prior Arrangements yes       Resource/Environmental Concerns    Resource/Environmental Concerns none    Transportation Concerns none       Transportation Needs    In the past 12 months, has lack of transportation kept you from medical appointments or from getting medications? no    In the past 12 months, has lack of transportation kept you from meetings, work, or from getting things needed for daily living? No       Food Insecurity    Within the past 12 months, you worried that your food would run out before you got the money to buy more. Never true    Within the past 12 months, the food you bought just didn't last and you didn't have money to get more. Never true       Transition Planning    Patient/Family Anticipates Transition to home    Patient/Family Anticipated Services at Transition none    Transportation Anticipated family or friend will provide       Discharge Needs Assessment    Equipment Currently Used at Home walker, rolling    Concerns to be Addressed denies needs/concerns at this time    Anticipated Changes Related to Illness none    Equipment Needed After Discharge other (see comments)  TBD, currently denies needs    Provided Post Acute Provider List? N/A    N/A Provider List Comment Pt denied needs                    Discharge Plan       Row Name 02/07/25 1453       Plan    Plan Home, family to transport    Patient/Family in Agreement with Plan yes    Plan Comments CCP met with pt at bedside, introduced self, role, & DC planning discussed. Pt reports she was admitted from [facility] but does not plan to return. Pt reports she plans to DC home. Face sheet information & pharmacy verified. Pt lives in single-level home alone. Pt reports 4 COLT & no steps inside.  Prior to admit, pt reports she drives, IADLs, & reports use of the following DME: walker. Pt denies having a living will & declined need for information.  Pt declines Meds to Beds. Pt denies issues with affording or managing medications, affording food, or affording utilities. Pt has history of [facility] & [facility] for rehab. CCP updated [facility]/Helen that pt plan is to DC home. DC plan is return home with family to transport. Denies any needs/equipment. CCP will follow. DC barriers:  IV lasix, IV digoxin, & possible pacemaker Monday 2/10. TERRANCE Rodriguez/CCP                  Continued Care and Services - Admitted Since 2/6/2025    No active coordination exists for this encounter.       Selected Continued Care - Prior Encounters Includes continued care and service providers with selected services from prior encounters from 11/8/2024 to 2/7/2025      Discharged on 2/1/2025 Admission date: 1/24/2025 - Discharge disposition: Skilled Nursing Facility (DC - External)      Destination       Service Provider Services Address Phone Fax Patient Preferred    [facility] [facility] [address] [phone] --                      Discharged on 1/23/2025 Admission date: 1/17/2025 - Discharge disposition: Home-Health Care [facility]      Home Medical Care       Service Provider Services Address Phone Fax Patient Preferred    [facility] [address], [facility]  [address] [phone] --                          Expected Discharge Date and Time       Expected Discharge Date Expected Discharge Time    Feb 10, 2025            Demographic Summary       Row Name 02/07/25 1453       General Information    Admission Type inpatient    Arrived From emergency department;other (see comments)  Helen Lawson    Referral Source admission list    Reason for Consult discharge planning    Preferred Language English       Contact Information    Permission Granted to Share Info With ;family/designee                   Functional Status       Row Name 02/07/25 1453       Functional Status    Usual Activity Tolerance good    Current Activity Tolerance good       Assessment of Health Literacy    How often do you have someone help you read hospital materials? Never    How often do you have problems learning about your medical condition because of difficulty understanding written information? Never    How often do you have a problem understanding what is told to you about your medical condition? Never    How confident are you filling out medical forms by yourself? Extremely    Health Literacy Excellent       Functional Status, IADL    Medications independent    Meal Preparation independent    Housekeeping independent    Laundry independent    Shopping independent    If for any reason you need help with day-to-day activities such as bathing, preparing meals, shopping, managing finances, etc., do you get the help you need? I don't need any help       Mental Status    General Appearance WDL WDL       Mental Status Summary    Recent Changes in Mental Status/Cognitive Functioning no changes       Employment/    Employment Status retired           Helen Dias RN

## 2025-02-07 NOTE — NURSING NOTE
02/07/25 1456   Wound 02/06/25 1830 gluteal   Placement Date/Time: 02/06/25 1830   Location: gluteal  Present on Original Admission: Yes   Dressing Appearance open to air   Base clean;red   Periwound intact   Edges irregular   Drainage Amount none   Care, Wound barrier applied     WOCN Consult: Open area buttock/gluteal fold, Maybe combination moisture and friction. Barrier cream in place. Patient is able to reposition per self. Please apply accumax pump to mattress.

## 2025-02-07 NOTE — CONSULTS
Date of Hospital Visit: 25  Encounter Provider: ANTONELLA Shannon  Place of Service: [facility] CARDIOLOGY  Patient Name: Dafne Mary  :1940  [id number]  Referral Provider: Benitez Donato MD    Chief complaint:      History of Present Illness:  This is an 84-year-old female who follows with Dr. [place] with a past medical history of coronary artery disease status post PCI to the LAD and OM, hypertension, hyperlipidemia, paroxysmal atrial fibrillation, chronic diastolic CHF, chronic kidney disease, asthma, COPD and vocal cord dysfunction.    She was admitted on 2025 for acute hypoxic respiratory failure and non-STEMI.  During that admission her cardiac workup included an echocardiogram which showed preserved LV function and wall motion with an EF of 51%, mild mitral regurgitation.  She underwent coronary angiograms on 2025 which showed a patent mid LAD stent no stent restenosis, stable 60 to 70% stenosis of a small caliber diagonal branch, patent left circumflex to obtuse marginal branch with no stenosis and stable nonobstructive disease of the right coronary artery.  Her LVEDP was noted to be elevated at 15 to 19 mmHg.  She was started on low-dose furosemide. Her BP was notably elevated and her atenolol was switched to carvedilol.  She was discharged home on 2025.    She presented back to the hospital on 2025 with worsening shortness of breath and weakness.  She was noted to be hypoxic with O2 sats in the 80s.  During that admission she went into atrial fibrillation with rapid ventricular rates. She was placed on a diltiazem gtt. We eventually switched carvedilol back to atenolol and by the time of discharge, it was increased to 100 mg BID. She was discharged to [facility] on 25.    She returned to the ED yesterday afternoon with complaints of worsening SOA, cough and weakness. She tells me that she has not felt well since discharge but it  progressively became worse over the last few days. She has continued shortness of breath and cough. She says when she uses her rescue inhaler, her heart rates become quite elevated. She denies any chest pain or swelling. She does feel her heart racing. In the ED, her EKG showed atrial fibrillation with RVR and a rate of 126 bpm. BNP was 9933, troponin 47 and WBC 19,000. She received a dose of digoxin in the ED with improvement in her symptoms. She was also given IV furosemide. RPV was negative. Chest xray showed no infiltrate. This morning her rates are in the 120-130s and she is hypotensive.    Prior cardiac history:  Echocardiogram 1/18/25:    Left ventricular systolic function is normal. Calculated left ventricular EF = 51.2%    Septal wall motion is abnormal, consistent with a bundle branch block.    Left ventricular diastolic function is consistent with age.    No aortic valve regurgitation or stenosis is present.  There is moderate calcification of the aortic valve.    Mild mitral annular calcification is present. There is moderate, bileaflet mitral valve thickening present. Mild mitral valve regurgitation is present. No significant mitral valve stenosis is present.     Cardiac catheterization 1/20/25:  CONCLUSION:  1.  Stable coronary artery disease:  *Patent mid LAD stent with no in-stent restenosis  *Stable 60 to 70% stenosis of the mid small caliber diagonal branch  *Patent proximal left circumflex to proximal first obtuse marginal branch stent with no in-stent restenosis  *Stable 20 to 30% mid RCA stenosis and diffuse distal ectasia  2.  Mildly elevated LVEDP of 15 to 19 mmHg     RECOMMENDATIONS:  Continue medical management of hypertension and coronary artery disease.  Resume apixaban in a couple of days if no access site issues.     Past Medical History:   Diagnosis Date    Atrial fibrillation with RVR 6/4/2023    CAD (coronary artery disease) 7/10/2023    Chronic diastolic congestive heart failure  11/17/2022    Depression     Emphysema lung     GERD (gastroesophageal reflux disease)     Heart failure     Hyperlipidemia     Hypertension     Hypothyroidism     Mitral valve regurgitation 7/10/2023    NSTEMI (non-ST elevated myocardial infarction) 10/25/2023       Past Surgical History:   Procedure Laterality Date    CARDIAC CATHETERIZATION N/A 7/7/2023    Procedure: Right and Left Heart Cath;  Surgeon: Ra Cole MD;  Location:  [facility];  Service: Cardiology;  Laterality: N/A;    CARDIAC CATHETERIZATION N/A 7/7/2023    Procedure: Percutaneous Coronary Intervention;  Surgeon: Ra Cole MD;  Location:  [facility];  Service: Cardiology;  Laterality: N/A;    CARDIAC CATHETERIZATION N/A 7/7/2023    Procedure: Stent ANDRZEJ coronary;  Surgeon: Ra Cole MD;  Location:  [facility];  Service: Cardiology;  Laterality: N/A;    CARDIAC CATHETERIZATION N/A 7/7/2023    Procedure: Coronary angiography;  Surgeon: Ra Cole MD;  Location:  [facility];  Service: Cardiology;  Laterality: N/A;    CARDIAC CATHETERIZATION N/A 7/7/2023    Procedure: Left ventriculography;  Surgeon: Ra Cole MD;  Location:  [facility];  Service: Cardiology;  Laterality: N/A;    CARDIAC CATHETERIZATION N/A 1/20/2025    Procedure: Left Heart Cath;  Surgeon: Nae [place] MD;  Location:  [facility]VAS[facility];  Service: Cardiovascular;  Laterality: N/A;    CARDIAC CATHETERIZATION N/A 1/20/2025    Procedure: Coronary angiography;  Surgeon: Nae [place] MD;  Location: [facility];  Service: Cardiovascular;  Laterality: N/A;    EYE SURGERY Left     INTUBATION  5/21/2023         PARATHYROIDECTOMY      Patient reports thyroidectomy partial not a para thyroidectomy.    THYROIDECTOMY, PARTIAL      1984       Prior to Admission medications    Medication Sig Start Date End Date  Taking? Authorizing Provider   acetaminophen (TYLENOL) 325 MG tablet Take 2 tablets by mouth Every 6 (Six) Hours As Needed for Mild Pain. 6/12/23   Simon Moscoso MD   amLODIPine (NORVASC) 5 MG tablet Take 1 tablet by mouth Daily. 1/23/25   Candy Post APRN   apixaban (ELIQUIS) 2.5 MG tablet tablet Take 1 tablet by mouth Every 12 (Twelve) Hours. Indications: Atrial Fibrillation 1/9/24   Shelbie Aguilera APRN   [place] Thyroid 15 MG tablet Take 1 tablet by mouth once daily 8/11/23   Sintia Chatterjee PA   [place] Thyroid 30 MG tablet Take 1 tablet by mouth once daily 8/11/23   Sintia Chatterjee PA   Artificial Tear Ointment (artificial tears) ophthalmic ointment Administer 1 application to both eyes Every 1 (One) Hour As Needed (dry eye). 6/12/23   Simon Moscoso MD   atenolol (TENORMIN) 100 MG tablet Take 1 tablet by mouth Every 12 (Twelve) Hours. 2/1/25   Beckie Treviño MD   atorvastatin (LIPITOR) 40 MG tablet Take 1 tablet by mouth Daily. 1/23/25   Candy Post APRN   Breztri Aerosphere 160-9-4.8 MCG/ACT aerosol inhaler 2 puffs 2 (Two) Times a Day. 6/6/22   ProviderAnurag MD   Cimetidine (TAGAMET PO) Take  by mouth.    ProviderAnurag MD   clopidogrel (PLAVIX) 75 MG tablet Take 1 tablet by mouth Daily. 1/5/24   Nae [place] MD   empagliflozin (Jardiance) 10 MG tablet tablet Take 1 tablet by mouth Daily. 1/9/24   Shelbie Aguilera APRN   furosemide (LASIX) 20 MG tablet Take 1 tablet by mouth Daily As Needed (Leg swelling). 2/1/25   Beckie Treviño MD   Glycerin-Polysorbate 80 (REFRESH DRY EYE THERAPY OP) Apply 1 drop to eye(s) as directed by provider Daily. For dry eyes    Anurag Palacios MD   guaiFENesin (MUCINEX) 600 MG 12 hr tablet Take 1 tablet by mouth 2 (Two) Times a Day As Needed for Cough or Congestion. 6/12/23   Simon Moscoso MD   ipratropium-albuterol (DUO-NEB) 0.5-2.5 mg/3 ml nebulizer Take 3 mL by nebulization Every 4 (Four) Hours As  Needed for Wheezing.    Anurag Palacios MD   LORazepam (ATIVAN) 0.5 MG tablet Take 1 tablet by mouth Every 6 (Six) Hours As Needed for Anxiety. 1/30/25   Beckie Treviño MD   PARoxetine (PAXIL) 10 MG tablet Take 2 tablets by mouth Daily. 7/12/23   Sintia Chatterjee PA   Ventolin  (90 Base) MCG/ACT inhaler Inhale 2 puffs Every 4 (Four) Hours As Needed. 12/20/22   Anurag Palacios MD   vitamin B-12 (CYANOCOBALAMIN) 500 MCG tablet Take 1 tablet by mouth Daily.    Anurag Palacios MD   vitamin C (ASCORBIC ACID) 250 MG tablet Take 2 tablets by mouth Daily.    Anurag Palacios MD   vitamin D3 125 MCG (5000 UT) capsule capsule Take 1 capsule by mouth Daily.    Anurag Palacios MD        Allergies as of 02/06/2025 - Reviewed 02/06/2025   Allergen Reaction Noted    Lansoprazole Nausea Only 12/19/2012    Albuterol Provider Review Needed 02/06/2025    Buspirone Other (See Comments) 06/17/2023    Diphenhydramine hcl (sleep) Irritability 07/27/2016    Lisinopril Cough 05/01/2017       Social History     Socioeconomic History    Marital status:    Tobacco Use    Smoking status: Never     Passive exposure: Never    Smokeless tobacco: Never   Vaping Use    Vaping status: Never Used   Substance and Sexual Activity    Alcohol use: Yes     Comment: rare    Drug use: No    Sexual activity: Defer       Family History   Problem Relation Age of Onset    Alzheimer's disease Mother     Lung disease Father     Alcohol abuse Father     Heart disease Brother     Hypertension Brother     Lung disease Brother     Alcohol abuse Brother     Cancer Brother         LUNG  WAS A SMOKER    Thyroid disease Maternal Grandmother        REVIEW OF SYSTEMS:   ROS was performed and is negative except as outlined in HPI     REVIEW OF SYSTEMS:   CONSTITUTIONAL: No weight loss, fever, chills, back pain or fatigue.   HEENT: Eyes: No visual loss, blurred vision, double vision or yellow sclerae. Ears, Nose, Throat: No hearing  "loss, sneezing, congestion, runny nose or sore throat.   SKIN: No rash or itching.     RESPIRATORY: +shortness of breath, hemoptysis, cough order for or sputum.   GASTROINTESTINAL: No anorexia, nausea, vomiting or diarrhea. No abdominal pain, bright red blood per rectum or melena.  GENITOURINARY: No burning on urination, hematuria or increased frequency.  NEUROLOGICAL: No headache, dizziness, syncope, paralysis, ataxia, numbness or tingling in the extremities. No change in bowel or bladder control.   MUSCULOSKELETAL: No muscle, back pain, joint pain or stiffness.   HEMATOLOGIC: No anemia, bleeding or bruising.   LYMPHATICS: No enlarged nodes. No history of splenectomy.   PSYCHIATRIC: No history of depression, anxiety, hallucinations.   ENDOCRINOLOGIC: No reports of sweating, cold or heat intolerance. No polyuria or polydipsia.        Objective:   Temp:  [97.4 °F (36.3 °C)-98.3 °F (36.8 °C)] 97.6 °F (36.4 °C)  Heart Rate:  [100-131] 117  Resp:  [18-26] 18  BP: ()/(55-94) 113/61  Body mass index is 23.83 kg/m².  Flowsheet Rows      Flowsheet Row First Filed Value   Admission Height 157.5 cm (62\") Documented at 02/06/2025 1241   Admission Weight 59.1 kg (130 lb 4.7 oz) Documented at 02/06/2025 1344          Vitals:    02/07/25 0714   BP:    Pulse: 117   Resp: 18   Temp:    SpO2: 97%       Physical Exam:   General Appearance:    Awake alert and oriented in no acute distress.   Color:  Skin:  Neuro:  HEENT:    Lungs:     Pink  Warm and dry  No focal, motor or sensory deficits  Neck supple, pupils equal, round and reactive. No JVD, No Bruit  Bilateral rales and wheezing, respirations regular, even and   unlabored    Heart:    Irr/Irr, S1 and S2, no murmur, no gallop, no rub. No edema, DP/PT pulses are 2+   Chest Wall:    No abnormalities observed   Abdomen:     Normal bowel sounds, no masses, no organomegaly, soft        non-tender, non-distended, no guarding, no ascites noted   Extremities:   Moves all extremities " well, no edema, no cyanosis, no redness               I personally viewed and interpreted the patient's EKG/Telemetry data    Assessment/Plan:    Atrial fibrillation with RVR: on atenolol 100 mg BID at home. She is pretty hypotensive this morning. Received a dose of IV dig yesterday with improvement in rates initially and improvement in symptoms. Will hold atenolol for now due to hypotension and load with digoxin. She reports having quite a bit of difficulty with her afib after using her breathing treatments for COPD. The uncontrolled rates then lead to her going into heart failure. She has been in the hospital 3 times within the last month with these issues. I think at this point, we should ask EP to see the patient for any other recommendations regarding treatment of her afib. I did discuss with ANTONELLA Liu and they will see today.  Acute on chronic diastolic CHF: BNP 9933. On IV furosemide. Hopefully transition to PO tomorrow.  Acute on chronic hypoxic respiratory failure/COPD: on supplemental O2. Management per primary team  Hypertension: now with hypotension. Amlodipine stopped. Will hold atenolol per parameters for now.   Elevated troponin: non-MI elevation. No symptoms suggestive of ACS and EKG does not show any ischemic changes.     ANTONELLA Shannon  02/07/25  07:59 EST.  Electronically signed by ANTONELLA Shannon, 02/07/25, 7:59 AM EST.

## 2025-02-07 NOTE — CONSULTS
Electrophysiology Hospital Consult            Patient Name: Dafne Mary  Age/Sex: 84 y.o. female  : 1940  MRN: [id number]    Date of Admission: 2025  Date of Encounter Visit: 25  Encounter Provider: ANTONELLA Liu  Referring Provider: Benitez Donato MD  Place of Service: [facility]  Patient Care Team:  Jossy Sauceda MD as PCP - General (Urgent Care)  Javier Nolan MD as Consulting Physician (Pulmonary Disease)  Nae Norman MD as Consulting Physician (Cardiology)      Subjective:   EP Consultation for: atrial fibrillation     Chief Complaint: dyspnea     History of Present Illness:  Dafne Mary is a 84 y.o. female who follows with Dr. Norman. She has paroxsymal atrial fibrillation, diastolic Hf, CAD, and COPD.     He was admitted in early January for acute hypoxic respiratory failure and NSTEMI.    Cath showed patent mid LAD stent stable nonobstructive coronary artery disease.    She presented back to the ED a week later with worsening shortness of breath and weakness.  She was noted to be in atrial fibrillation with RVR and started on diltiazem drip.  She converted was sent home on atenolol.    Presented back to the ED yesterday with worsening shortness of air and weakness.  She was noted again to be in atrial fibrillation with RVR which was felt to be exacerbating her COPD and diastolic heart failure.    BNP was around 10,000.    They saw her this morning and given her recurrent admissions with atrial fibrillation, diastolic heart failure and COPD asked that EP evaluate.          Dr. Way and I saw her this morning.    sHe occasionally notes palpitations and her heart racing.    Her biggest complaint is shortness of breath.  She feels like the atrial fibrillation is making it worse.    According to the patient she will go home do a breathing treatment and then her symptoms seem to worsen which is what brings her into the  ER.    She is currently in atrial fibrillation with a heart rate in the 120s.      Past Medical History:  Past Medical History:   Diagnosis Date    Atrial fibrillation with RVR 6/4/2023    CAD (coronary artery disease) 7/10/2023    Chronic diastolic congestive heart failure 11/17/2022    Depression     Emphysema lung     GERD (gastroesophageal reflux disease)     Heart failure     Hyperlipidemia     Hypertension     Hypothyroidism     Mitral valve regurgitation 7/10/2023    NSTEMI (non-ST elevated myocardial infarction) 10/25/2023       Past Surgical History:   Procedure Laterality Date    CARDIAC CATHETERIZATION N/A 7/7/2023    Procedure: Right and Left Heart Cath;  Surgeon: Ra Cole MD;  Location: [facility] CATH INVASIVE LOCATION;  Service: Cardiology;  Laterality: N/A;    CARDIAC CATHETERIZATION N/A 7/7/2023    Procedure: Percutaneous Coronary Intervention;  Surgeon: Ra Cole MD;  Location[facility]VAS[facility] LOC[facility];  Service: Cardiology;  Laterality: N/A;    CARDIAC CATHETERIZATION N/A 7/7/2023    Procedure: Stent ANDRZEJ coronary;  Surgeon: Ra Cole MD;  [facility] LOC[facility];  Service: Cardiology;  Laterality: N/A;    CARDIAC CATHETERIZATION N/A 7/7/2023    Procedure: Coronary angiography;  Surgeon: Ra Cole MD;  [facility] C[facility] LOC[facility];  Service: Cardiology;  Laterality: N/A;    CARDIAC CATHETERIZATION N/A 7/7/2023    Procedure: Left ventriculography;  Surgeon: Ra Cole MD;  Location: [facility] CATH INVASIVE LOCATION;  Service: Cardiology;  Laterality: N/A;    CARDIAC CATHETERIZATION N/A 1/20/2025    Procedure: Left Heart Cath;  Surgeon: Nae Norman MD;  [facility] C[facility] LOC[facility];  Service: Cardiovascular;  Laterality: N/A;    CARDIAC CATHETERIZATION N/A 1/20/2025    Procedure: Coronary angiography;  Surgeon: Nae Norman MD;  Location: [facility] CATH INVASIVE LOCATION;  Service:  Cardiovascular;  Laterality: N/A;    EYE SURGERY Left     INTUBATION  5/21/2023         PARATHYROIDECTOMY      Patient reports thyroidectomy partial not a para thyroidectomy.    THYROIDECTOMY, PARTIAL      1984       Home Medications:   Medications Prior to Admission   Medication Sig Dispense Refill Last Dose/Taking    acetaminophen (TYLENOL) 325 MG tablet Take 2 tablets by mouth Every 6 (Six) Hours As Needed for Mild Pain.       amLODIPine (NORVASC) 5 MG tablet Take 1 tablet by mouth Daily. 90 tablet 1     apixaban (ELIQUIS) 2.5 MG tablet tablet Take 1 tablet by mouth Every 12 (Twelve) Hours. Indications: Atrial Fibrillation 180 tablet 1     Ronda Thyroid 15 MG tablet Take 1 tablet by mouth once daily 90 tablet 0     Ronda Thyroid 30 MG tablet Take 1 tablet by mouth once daily 90 tablet 0     Artificial Tear Ointment (artificial tears) ophthalmic ointment Administer 1 application to both eyes Every 1 (One) Hour As Needed (dry eye).       atenolol (TENORMIN) 100 MG tablet Take 1 tablet by mouth Every 12 (Twelve) Hours.       atorvastatin (LIPITOR) 40 MG tablet Take 1 tablet by mouth Daily. 90 tablet 1     Breztri Aerosphere 160-9-4.8 MCG/ACT aerosol inhaler 2 puffs 2 (Two) Times a Day.       Cimetidine (TAGAMET PO) Take  by mouth.       clopidogrel (PLAVIX) 75 MG tablet Take 1 tablet by mouth Daily. 90 tablet 3     empagliflozin (Jardiance) 10 MG tablet tablet Take 1 tablet by mouth Daily. 90 tablet 1     furosemide (LASIX) 20 MG tablet Take 1 tablet by mouth Daily As Needed (Leg swelling). 30 tablet 0     Glycerin-Polysorbate 80 (REFRESH DRY EYE THERAPY OP) Apply 1 drop to eye(s) as directed by provider Daily. For dry eyes       guaiFENesin (MUCINEX) 600 MG 12 hr tablet Take 1 tablet by mouth 2 (Two) Times a Day As Needed for Cough or Congestion.       ipratropium-albuterol (DUO-NEB) 0.5-2.5 mg/3 ml nebulizer Take 3 mL by nebulization Every 4 (Four) Hours As Needed for Wheezing.       LORazepam (ATIVAN) 0.5 MG  "tablet Take 1 tablet by mouth Every 6 (Six) Hours As Needed for Anxiety. 12 tablet 0     PARoxetine (PAXIL) 10 MG tablet Take 2 tablets by mouth Daily. 180 tablet 0     Ventolin  (90 Base) MCG/ACT inhaler Inhale 2 puffs Every 4 (Four) Hours As Needed.       vitamin B-12 (CYANOCOBALAMIN) 500 MCG tablet Take 1 tablet by mouth Daily.       vitamin C (ASCORBIC ACID) 250 MG tablet Take 2 tablets by mouth Daily.       vitamin D3 125 MCG (5000 UT) capsule capsule Take 1 capsule by mouth Daily.          Allergies:  Allergies   Allergen Reactions    Lansoprazole Nausea Only     NAUSEA  NAUSEA  NAUSEA    Albuterol Provider Review Needed     \"It causes me not to be able to breathe\"    Buspirone Other (See Comments)     agitation    Diphenhydramine Hcl (Sleep) Irritability    Lisinopril Cough       Past Social History:  Social History     Socioeconomic History    Marital status:    Tobacco Use    Smoking status: Never     Passive exposure: Never    Smokeless tobacco: Never   Vaping Use    Vaping status: Never Used   Substance and Sexual Activity    Alcohol use: Yes     Comment: rare    Drug use: No    Sexual activity: Defer       Past Family History:  Family History   Problem Relation Age of Onset    Alzheimer's disease Mother     Lung disease Father     Alcohol abuse Father     Heart disease Brother     Hypertension Brother     Lung disease Brother     Alcohol abuse Brother     Cancer Brother         LUNG  WAS A SMOKER    Thyroid disease Maternal Grandmother        Review of Systems: All systems reviewed. Pertinent positives identified in HPI. All other systems are negative.     14 point ROS was performed and is negative except as outlined in HPI.     Objective:     Objective:  Vital Signs (last 24 hours)         02/06 0700  02/07 0659 02/07 0700  02/07 1328   Most Recent      Temp (°F) 97.4 -  98.3      98     98 (36.7) 02/07 0954    Heart Rate 100 -  131    95 -  123     95 02/07 1154    Resp 18 -  26      18 "     18 02/07 0954    BP 90/66 -  146/90    82/55 -  95/77     95/77 02/07 0954    SpO2 (%) 94 -  100    95 -  99     95 02/07 1154    Flow (L/min) (Oxygen Therapy) 2 -  3      2     2 02/07 0954          Temp:  [97.6 °F (36.4 °C)-98.3 °F (36.8 °C)] 98 °F (36.7 °C)  Heart Rate:  [] 95  Resp:  [18-24] 18  BP: ()/(55-94) 95/77  Body mass index is 23.83 kg/m².        Physical Exam:     General Appearance: No acute distress, well developed and well nourished.   Eyes: Conjunctiva and lids: No erythema, swelling, or discharge. Sclera non-icteric.   HENT: Atraumatic, normocephalic. External eyes, ears, and nose normal.   Respiratory: No signs of respiratory distress. Respiration rhythm and depth normal.   Clear to auscultation. No rales, crackles, rhonchi, or wheezing auscultated.   Cardiovascular:  Heart Rate and Rhythm: irregularly irregular, Heart Sounds: Normal S1 and S2. No S3 or S4 noted.  Gastrointestinal:  Abdomen soft, non-distended, non-tender.  Musculoskeletal: Normal movement of extremities  Skin: Warm and dry.   Psychiatric: Patient alert and oriented to person, place, and time. Speech and behavior appropriate. Normal mood and affect.    Labs:   Lab Review:     Results from last 7 days   Lab Units 02/07/25  0527 02/06/25  1152 02/01/25  0414   SODIUM mmol/L 136 138 135*   POTASSIUM mmol/L 3.8 5.6* 4.1   CHLORIDE mmol/L 98 98 100   CO2 mmol/L 27.0 29.0 23.1   BUN mg/dL 36* 36* 22   CREATININE mg/dL 0.96 1.00 0.72   GLUCOSE mg/dL 116* 110* 94   CALCIUM mg/dL 7.9* 9.1 7.9*   AST (SGOT) U/L 25 39* 23   ALT (SGPT) U/L 18 26 30     Results from last 7 days   Lab Units 02/06/25  1508 02/06/25  1152   HSTROP T ng/L 38* 47*     Results from last 7 days   Lab Units 02/07/25  0527   WBC 10*3/mm3 6.11   HEMOGLOBIN g/dL 11.9*   HEMATOCRIT % 35.6   PLATELETS 10*3/mm3 270                     Results from last 7 days   Lab Units 02/06/25  1152   PROBNP pg/mL 9,933.0*             EKG:           I personally viewed  and interpreted the patient's EKG/Telemetry tracings.    Assessment:       Atrial fibrillation with rapid ventricular response    Benign hypertension    Chronic fatigue    Hypothyroid    COPD (chronic obstructive pulmonary disease)    Generalized anxiety disorder    Spinal stenosis of lumbar region    PAF (paroxysmal atrial fibrillation)    Elevated troponin    Stage 3a chronic kidney disease    Acute on chronic heart failure with preserved ejection fraction (HFpEF)    Hypoxia        Plan:   Dr. Way and I saw this patient.        She has COPD and has been having recurrent admissions with atrial fibrillation and exacerbating her COPD and diastolic heart failure.    Given her lung disease.  We are very limited on treatment options.  She is not a candidate for traditional A-fib ablation.  Do not want to use amiodarone in this case given her lung disease.        Therefore, we recommended considering pace and ablate strategy.    I discussed this in detail with the patient and her daughter Felicia.    Fact that it may improve some of her symptoms but unlikely to fix all of her health problems, it could decrease readmissions as it has been associated with this.          The patient is not sure she wants to proceed with pace and ablate strategy.  He would like to discuss further with her son before making a decision.    I will go ahead and make her n.p.o. at midnight Sunday because if she wishes to move forward we will likely proceed with the pacemaker on Monday.          If she decides that she does not want to proceed with the pacemaker.  I would rate control.  Her as best we can and we could consider discharge over the weekend but I think there is a high likelihood she would be readmitted and I discussed this with her.          Thank you for allowing me to participate in the care of Dafne Mary. Feel free to contact me directly with any further questions or concerns.    ANTONELLA Liu  [facility]  02/07/25  14:53 EST

## 2025-02-07 NOTE — PLAN OF CARE
Goal Outcome Evaluation:  Plan of Care Reviewed With: patient        Progress: improving  Outcome Evaluation: vss, no complaints of pain. pt remains in afib with better rate control. pt on 2L NC, purewick in place, pt turned self on bed. pt rested well during shift, labs in am. consult to cards called.

## 2025-02-08 LAB
ANION GAP SERPL CALCULATED.3IONS-SCNC: 10 MMOL/L (ref 5–15)
BASOPHILS # BLD AUTO: 0.03 10*3/MM3 (ref 0–0.2)
BASOPHILS NFR BLD AUTO: 0.1 % (ref 0–1.5)
BUN SERPL-MCNC: 43 MG/DL (ref 8–23)
BUN/CREAT SERPL: 38.7 (ref 7–25)
CALCIUM SPEC-SCNC: 8.4 MG/DL (ref 8.6–10.5)
CHLORIDE SERPL-SCNC: 97 MMOL/L (ref 98–107)
CO2 SERPL-SCNC: 30 MMOL/L (ref 22–29)
CREAT SERPL-MCNC: 1.11 MG/DL (ref 0.57–1)
DEPRECATED RDW RBC AUTO: 39.2 FL (ref 37–54)
EGFRCR SERPLBLD CKD-EPI 2021: 49.1 ML/MIN/1.73
EOSINOPHIL # BLD AUTO: 0.01 10*3/MM3 (ref 0–0.4)
EOSINOPHIL NFR BLD AUTO: 0 % (ref 0.3–6.2)
ERYTHROCYTE [DISTWIDTH] IN BLOOD BY AUTOMATED COUNT: 12.2 % (ref 12.3–15.4)
GLUCOSE SERPL-MCNC: 99 MG/DL (ref 65–99)
HCT VFR BLD AUTO: 37.7 % (ref 34–46.6)
HGB BLD-MCNC: 12.9 G/DL (ref 12–15.9)
IMM GRANULOCYTES # BLD AUTO: 0.24 10*3/MM3 (ref 0–0.05)
IMM GRANULOCYTES NFR BLD AUTO: 1.2 % (ref 0–0.5)
LYMPHOCYTES # BLD AUTO: 0.53 10*3/MM3 (ref 0.7–3.1)
LYMPHOCYTES NFR BLD AUTO: 2.6 % (ref 19.6–45.3)
MCH RBC QN AUTO: 30.2 PG (ref 26.6–33)
MCHC RBC AUTO-ENTMCNC: 34.2 G/DL (ref 31.5–35.7)
MCV RBC AUTO: 88.3 FL (ref 79–97)
MONOCYTES # BLD AUTO: 1.15 10*3/MM3 (ref 0.1–0.9)
MONOCYTES NFR BLD AUTO: 5.7 % (ref 5–12)
NEUTROPHILS NFR BLD AUTO: 18.22 10*3/MM3 (ref 1.7–7)
NEUTROPHILS NFR BLD AUTO: 90.4 % (ref 42.7–76)
NRBC BLD AUTO-RTO: 0 /100 WBC (ref 0–0.2)
PLATELET # BLD AUTO: 294 10*3/MM3 (ref 140–450)
PMV BLD AUTO: 9.7 FL (ref 6–12)
POTASSIUM SERPL-SCNC: 4 MMOL/L (ref 3.5–5.2)
RBC # BLD AUTO: 4.27 10*6/MM3 (ref 3.77–5.28)
SODIUM SERPL-SCNC: 137 MMOL/L (ref 136–145)
WBC NRBC COR # BLD AUTO: 20.18 10*3/MM3 (ref 3.4–10.8)

## 2025-02-08 PROCEDURE — 94664 DEMO&/EVAL PT USE INHALER: CPT

## 2025-02-08 PROCEDURE — 94799 UNLISTED PULMONARY SVC/PX: CPT

## 2025-02-08 PROCEDURE — 25010000002 ONDANSETRON PER 1 MG: Performed by: INTERNAL MEDICINE

## 2025-02-08 PROCEDURE — 25010000002 FUROSEMIDE PER 20 MG: Performed by: INTERNAL MEDICINE

## 2025-02-08 PROCEDURE — 94760 N-INVAS EAR/PLS OXIMETRY 1: CPT

## 2025-02-08 PROCEDURE — 85025 COMPLETE CBC W/AUTO DIFF WBC: CPT | Performed by: INTERNAL MEDICINE

## 2025-02-08 PROCEDURE — 80048 BASIC METABOLIC PNL TOTAL CA: CPT | Performed by: INTERNAL MEDICINE

## 2025-02-08 PROCEDURE — 63710000001 REVEFENACIN 175 MCG/3ML SOLUTION: Performed by: INTERNAL MEDICINE

## 2025-02-08 PROCEDURE — 99232 SBSQ HOSP IP/OBS MODERATE 35: CPT | Performed by: INTERNAL MEDICINE

## 2025-02-08 RX ORDER — FUROSEMIDE 20 MG/1
20 TABLET ORAL DAILY
Status: DISCONTINUED | OUTPATIENT
Start: 2025-02-09 | End: 2025-02-13 | Stop reason: HOSPADM

## 2025-02-08 RX ORDER — ATENOLOL 25 MG/1
25 TABLET ORAL
Status: DISCONTINUED | OUTPATIENT
Start: 2025-02-08 | End: 2025-02-10

## 2025-02-08 RX ADMIN — DOCUSATE SODIUM 100 MG: 100 CAPSULE, LIQUID FILLED ORAL at 09:30

## 2025-02-08 RX ADMIN — LEVOTHYROXINE, LIOTHYRONINE 15 MG: 19; 4.5 TABLET ORAL at 09:30

## 2025-02-08 RX ADMIN — Medication 5000 UNITS: at 09:30

## 2025-02-08 RX ADMIN — ATORVASTATIN CALCIUM 40 MG: 20 TABLET, FILM COATED ORAL at 09:30

## 2025-02-08 RX ADMIN — FUROSEMIDE 20 MG: 10 INJECTION, SOLUTION INTRAMUSCULAR; INTRAVENOUS at 05:53

## 2025-02-08 RX ADMIN — ONDANSETRON 4 MG: 2 INJECTION, SOLUTION INTRAMUSCULAR; INTRAVENOUS at 16:52

## 2025-02-08 RX ADMIN — APIXABAN 2.5 MG: 2.5 TABLET, FILM COATED ORAL at 09:30

## 2025-02-08 RX ADMIN — POLYETHYLENE GLYCOL 3350 17 G: 17 POWDER, FOR SOLUTION ORAL at 09:30

## 2025-02-08 RX ADMIN — OXYCODONE HYDROCHLORIDE AND ACETAMINOPHEN 500 MG: 500 TABLET ORAL at 09:30

## 2025-02-08 RX ADMIN — REVEFENACIN 175 MCG: 175 SOLUTION RESPIRATORY (INHALATION) at 09:45

## 2025-02-08 RX ADMIN — PAROXETINE HYDROCHLORIDE HEMIHYDRATE 20 MG: 20 TABLET, FILM COATED ORAL at 09:30

## 2025-02-08 RX ADMIN — FUROSEMIDE 20 MG: 10 INJECTION, SOLUTION INTRAMUSCULAR; INTRAVENOUS at 00:45

## 2025-02-08 RX ADMIN — CLOPIDOGREL 75 MG: 75 TABLET, FILM COATED ORAL at 09:30

## 2025-02-08 RX ADMIN — BUDESONIDE 0.5 MG: 0.5 INHALANT RESPIRATORY (INHALATION) at 09:44

## 2025-02-08 RX ADMIN — LEVOTHYROXINE, LIOTHYRONINE 30 MG: 19; 4.5 TABLET ORAL at 09:30

## 2025-02-08 RX ADMIN — Medication 500 MCG: at 09:30

## 2025-02-08 RX ADMIN — ARFORMOTEROL TARTRATE 15 MCG: 15 SOLUTION RESPIRATORY (INHALATION) at 09:39

## 2025-02-08 RX ADMIN — Medication 10 ML: at 09:31

## 2025-02-08 RX ADMIN — APIXABAN 2.5 MG: 2.5 TABLET, FILM COATED ORAL at 19:40

## 2025-02-08 RX ADMIN — ATENOLOL 25 MG: 25 TABLET ORAL at 14:01

## 2025-02-08 NOTE — PLAN OF CARE
Goal Outcome Evaluation:  Plan of Care Reviewed With: patient        Progress: no change  Outcome Evaluation: VSS on RA. Aox4. Remains in Afib, rate controlled. BP stable. Very poor appetite. Q2 turns, refuses at times. Refused PT. BM x1. Denies pain. PRN Zofran x1. Bed alarm on.

## 2025-02-08 NOTE — PROGRESS NOTES
[facility] Progress Note       Encounter Date:25  Patient:Dafne Mary  :1940  MRN:[id number]      Chief Complaint: Follow-up atrial fibrillation      Subjective:        Feeling a little bit better    Review of Systems:  Review of Systems   Constitutional: Positive for malaise/fatigue.   Cardiovascular:  Positive for palpitations.   Respiratory:  Positive for cough and shortness of breath.        Medications:  Scheduled Meds:  apixaban, 2.5 mg, Oral, Q12H  arformoterol, 15 mcg, Nebulization, BID - RT   And  budesonide, 0.5 mg, Nebulization, BID - RT   And  revefenacin, 175 mcg, Nebulization, Daily - RT  vitamin C, 500 mg, Oral, Daily  atorvastatin, 40 mg, Oral, Daily  cholecalciferol, 5,000 Units, Oral, Daily  clopidogrel, 75 mg, Oral, Daily  furosemide, 20 mg, Intravenous, Q6H  PARoxetine, 20 mg, Oral, Daily  sodium chloride, 10 mL, Intravenous, Q12H  thyroid, 15 mg, Oral, Daily  Thyroid, 30 mg, Oral, Daily  vitamin B-12, 500 mcg, Oral, Daily    Continuous Infusions:   PRN Meds:    acetaminophen **OR** acetaminophen **OR** acetaminophen    albuterol    artificial tears    Calcium Replacement - Follow Nurse / BPA Driven Protocol    docusate sodium    guaiFENesin    ipratropium-albuterol    LORazepam    Magnesium Standard Dose Replacement - Follow Nurse / BPA Driven Protocol    nitroglycerin    ondansetron    Phosphorus Replacement - Follow Nurse / BPA Driven Protocol    polyethylene glycol    Potassium Replacement - Follow Nurse / BPA Driven Protocol    sodium chloride    sodium chloride    sodium chloride         Objective:       Vitals:    25 0939 25 0944 25 0945 25 0946   BP:       BP Location:       Patient Position:       Pulse: 98 87 91 106   Resp: 16      Temp:       TempSrc:       SpO2: 91% 93% 92% 91%   Weight:       Height:               Physical Exam:  Constitutional: Well appearing, well developed, no acute distress   HENT: Oropharynx clear and  "membrane moist  Eyes: Normal conjunctiva, no sclera icterus.  Neck: Supple, no carotid bruit bilaterally.  Cardiovascular: Irregularly irregular rate and rhythm, No Murmur, No bilateral lower extremity edema.  Pulmonary: Normal respiratory effort, normal lung sounds, no wheezing.  Neurological: Alert and orient x 3.   Skin: Warm, dry, no ecchymosis, no rash.  Psych: Appropriate mood and affect. Normal judgment and insight.           Lab Review:   Results from last 7 days   Lab Units 02/08/25  0449 02/07/25  0527 02/06/25  1152   SODIUM mmol/L 137 136 138   POTASSIUM mmol/L 4.0 3.8 5.6*   CHLORIDE mmol/L 97* 98 98   CO2 mmol/L 30.0* 27.0 29.0   BUN mg/dL 43* 36* 36*   CREATININE mg/dL 1.11* 0.96 1.00   GLUCOSE mg/dL 99 116* 110*   CALCIUM mg/dL 8.4* 7.9* 9.1   AST (SGOT) U/L  --  25 39*   ALT (SGPT) U/L  --  18 26     Results from last 7 days   Lab Units 02/06/25  1508 02/06/25  1152   HSTROP T ng/L 38* 47*     Results from last 7 days   Lab Units 02/08/25  0449 02/07/25  0527 02/06/25  1152   WBC 10*3/mm3 20.18* 6.11 19.17*   HEMOGLOBIN g/dL 12.9 11.9* 13.4   HEMATOCRIT % 37.7 35.6 40.2   PLATELETS 10*3/mm3 294 270 358                   Invalid input(s): \"LDLCALC\"  Results from last 7 days   Lab Units 02/06/25  1152   PROBNP pg/mL 9,933.0*             Cardiac catheterization 1/20/2025 images reviewed by myself:  Stable coronary artery disease:  Patent mid LAD stent with no in-stent restenosis  Stable 60 to 70% stenosis of the mid small caliber diagonal branc  Patent proximal left circumflex to proximal first obtuse marginal branch stent with no in-stent restenosi  Stable 20 to 30% mid RCA stenosis and diffuse distal ectasia  Mildly elevated LVEDP of 15 to 19 mmHg    Echocardiogram 1/18/2025 images reviewed by myself:  Left ventricular systolic function is normal. Calculated left ventricular EF = 51.2%  Septal wall motion is abnormal, consistent with a bundle branch block.  Left ventricular diastolic function is " consistent with age.  No aortic valve regurgitation or stenosis is present.  There is moderate calcification of the aortic valve.  Mild mitral annular calcification is present. There is moderate, bileaflet mitral valve thickening present. Mild mitral valve regurgitation is present. No significant mitral valve stenosis is present.       Assessment:          Diagnosis Plan   1. Atrial fibrillation with rapid ventricular response        2. Elevated troponin        3. Elevated brain natriuretic peptide (BNP) level        4. COPD with acute exacerbation        5. Generalized weakness        6. Acute hyperkalemia               Plan:       Ms. Mary is an 84-year-old woman with past medical history notable for paroxysmal atrial fibrillation, coronary artery disease with prior percutaneous intervention, essential hypertension, mixed hyperlipidemia, chronic kidney disease, chronic diastolic congestive heart failure, COPD with vocal cord dysfunction, who presents to the emergency room after recent hospitalization for COPD which was complicated by atrial fibrillation with rapid ventricular response.  She has had a hard time with maintaining sinus rhythm on her current medical regimen send recurrent atrial fibrillation events with recurrent hospitalizations.  She was just discharged from the hospital about a week ago before representing back to the hospital with return of shortness of breath weakness and rapid atrial fibrillation.  Rates are better after being dosed with digoxin.  EP was asked to see her and they offered her pace and ablate as an option to help address her recalcitrant atrial fibrillation.  Currently her rates are better.  She seems to be doing okay on digoxin.  We have limited treatment options given some of the challenges and her underlying lung disease.  Tentative plans would be for possible pacemaker and ablation this admission possibly as early as Monday.  She would like to think about a little bit more  but seems open to the idea.  Right now her rates are reasonable she was on atenolol 100 mg twice daily when she was discharged it is quite a lot of beta-blocker and blood pressures have been a little soft.  Probably would be reasonable to start her atenolol back at a lower dose today just to make sure she does not have beta-blocker withdrawal and monitor her response while we await next steps      Paroxysmal atrial fibrillation:  Rate improved after digoxin load  Will restart low-dose atenolol  Possible pacemaker and ablation this admission  Continue anticoagulation    Acute on chronic diastolic congestive heart failure:  Elevated proBNP level  Was received IV diuresis but seems to be improving now that rates better controlled  Will transition over to oral diuretics today    Type II myocardial infarction:  Patient noted to have elevated troponin on arrival likely related to arrhythmia tachycardia  No ischemic workup needed at this time             Bogdan Denny MD  [facility]  02/08/25  11:45 EST

## 2025-02-08 NOTE — PROGRESS NOTES
"    Name: Dafne Mary ADMIT: 2025   : 1940  PCP: Jossy Sauceda MD    MRN: [id number] LOS: 2 days   AGE/SEX: 84 y.o. female  ROOM: [facility]     Subjective   Subjective   Patient is lying on the bed and heart rate appears to be better controlled today.  Denies nausea, vomiting abdominal pain, chest pain.Denies chest pain or dizziness.         Objective   Objective   Vital Signs  Temp:  [97.4 °F (36.3 °C)-97.9 °F (36.6 °C)] 97.8 °F (36.6 °C)  Heart Rate:  [] 88  Resp:  [16-18] 16  BP: (103-123)/(49-75) 103/66  SpO2:  [91 %-96 %] 96 %  on   ;   Device (Oxygen Therapy): room air  Body mass index is 23.83 kg/m².  Physical Exam    HEENT:  PERRLA, extraocular movements intact, sclerae is no icterus   Neck: Supple, no JVD   Cardiovascular: Irregular with normal S1-S2   Respiratory: Diminished breath sounds with no wheezes  GI:  Soft, nontender, bowel sounds are present   Extremities:  No edema, palpable pedal pulses  Neurologic: Grossly nonfocal, no facial asymmetry    Results Review     I reviewed the patient's new clinical results.  Results from last 7 days   Lab Units 25  1152   WBC 10*3/mm3 20.18* 6.11 19.17*   HEMOGLOBIN g/dL 12.9 11.9* 13.4   PLATELETS 10*3/mm3 294 270 358     Results from last 7 days   Lab Units 25  1152   SODIUM mmol/L 137 136 138   POTASSIUM mmol/L 4.0 3.8 5.6*   CHLORIDE mmol/L 97* 98 98   CO2 mmol/L 30.0* 27.0 29.0   BUN mg/dL 43* 36* 36*   CREATININE mg/dL 1.11* 0.96 1.00   GLUCOSE mg/dL 99 116* 110*   EGFR mL/min/1.73 49.1* 58.5* 55.7*     Results from last 7 days   Lab Units 25  1152   ALBUMIN g/dL 3.0* 3.7   BILIRUBIN mg/dL 0.4 0.5   ALK PHOS U/L 114 153*   AST (SGOT) U/L 25 39*   ALT (SGPT) U/L 18 26     Results from last 7 days   Lab Units 25  0449 25  0527 25  1152   CALCIUM mg/dL 8.4* 7.9* 9.1   ALBUMIN g/dL  --  3.0* 3.7       No results found for: \"HGBA1C\", " "\"POCGLU\"    No radiology results for the last day    I have personally reviewed all medications:  Scheduled Medications  apixaban, 2.5 mg, Oral, Q12H  arformoterol, 15 mcg, Nebulization, BID - RT   And  budesonide, 0.5 mg, Nebulization, BID - RT   And  revefenacin, 175 mcg, Nebulization, Daily - RT  vitamin C, 500 mg, Oral, Daily  atenolol, 25 mg, Oral, Q24H  atorvastatin, 40 mg, Oral, Daily  cholecalciferol, 5,000 Units, Oral, Daily  clopidogrel, 75 mg, Oral, Daily  [START ON 2/9/2025] furosemide, 20 mg, Oral, Daily  PARoxetine, 20 mg, Oral, Daily  sodium chloride, 10 mL, Intravenous, Q12H  thyroid, 15 mg, Oral, Daily  Thyroid, 30 mg, Oral, Daily  vitamin B-12, 500 mcg, Oral, Daily    Infusions   Diet  Diet: Cardiac; Healthy Heart (2-3 Na+); Fluid Consistency: Thin (IDDSI 0)  NPO Diet NPO Type: Strict NPO    I have personally reviewed:  [x]  Laboratory   [x]  Microbiology   [x]  Radiology   [x]  EKG/Telemetry  [x]  Cardiology/Vascular   []  Pathology    []  Records       Assessment/Plan     Active Hospital Problems    Diagnosis  POA    **Atrial fibrillation with rapid ventricular response [I48.91]  Yes    Hypoxia [R09.02]  Yes    Acute on chronic heart failure with preserved ejection fraction (HFpEF) [I50.33]  Yes    Stage 3a chronic kidney disease [N18.31]  Yes    Elevated troponin [R79.89]  Yes    PAF (paroxysmal atrial fibrillation) [I48.0]  Yes    Generalized anxiety disorder [F41.1]  Yes    Spinal stenosis of lumbar region [M48.061]  Yes    COPD (chronic obstructive pulmonary disease) [J44.9]  Yes    Hypothyroid [E03.9]  Yes    Chronic fatigue [R53.82]  Yes    Benign hypertension [I10]  Yes      Resolved Hospital Problems   No resolved problems to display.       84 y.o. female admitted with Atrial fibrillation with rapid ventricular response.     1. Afib with RVR, continue with Plavix, Eliquis, and digoxin.  Cardiology did evaluate and atenolol has been initiated.  Heart rate is now better controlled.    2. " Chronic hypoxic respiratory failure secondary to COPD, continue with supplemental oxygen and is on   Brovana,Pulmicort nebs and Yupelri.    3. Hypothyroidism, on Synthroid.    4. History of hypertension, currently blood pressure is soft therefore home dose atenolol has not been reinstated.    5. Hyperlipidemia, on statins.    6. Anxiety/depression, on Paxil.    6. DVT prophylaxis, on Eliquis.      7. Code status is full code.    Copied text on this note has been reviewed by me on 2/8/2025    Expected Discharge Date: 2/10/2025; Expected Discharge Time:       Fritz Lazaro MD  [facility]  02/08/25  17:02 EST

## 2025-02-08 NOTE — PLAN OF CARE
Goal Outcome Evaluation:           Progress: no change  Outcome Evaluation: Pt alert and oriented X4. Afib 80s-90s on monitor. Vitals stable. C/o of pain to previous IV site in left upper arm- area is no red, swollen, or hot. On call notified of pain; US of left arm and norco 5mg X1 ordered.

## 2025-02-09 ENCOUNTER — APPOINTMENT (OUTPATIENT)
Dept: CARDIOLOGY | Facility: HOSPITAL | Age: 85
End: 2025-02-09
Payer: MEDICARE

## 2025-02-09 LAB
ANION GAP SERPL CALCULATED.3IONS-SCNC: 11.1 MMOL/L (ref 5–15)
BASOPHILS # BLD AUTO: 0.02 10*3/MM3 (ref 0–0.2)
BASOPHILS NFR BLD AUTO: 0.2 % (ref 0–1.5)
BH CV UPPER VENOUS LEFT AXILLARY AUGMENT: NORMAL
BH CV UPPER VENOUS LEFT AXILLARY COMPRESS: NORMAL
BH CV UPPER VENOUS LEFT AXILLARY PHASIC: NORMAL
BH CV UPPER VENOUS LEFT AXILLARY SPONT: NORMAL
BH CV UPPER VENOUS LEFT BASILIC FOREARM COMPRESS: NORMAL
BH CV UPPER VENOUS LEFT BASILIC UPPER COMPRESS: NORMAL
BH CV UPPER VENOUS LEFT BRACHIAL COMPRESS: NORMAL
BH CV UPPER VENOUS LEFT CEPHALIC FOREARM COMPRESS: NORMAL
BH CV UPPER VENOUS LEFT CEPHALIC UPPER COMPRESS: NORMAL
BH CV UPPER VENOUS LEFT INTERNAL JUGULAR AUGMENT: NORMAL
BH CV UPPER VENOUS LEFT INTERNAL JUGULAR COMPRESS: NORMAL
BH CV UPPER VENOUS LEFT INTERNAL JUGULAR PHASIC: NORMAL
BH CV UPPER VENOUS LEFT INTERNAL JUGULAR SPONT: NORMAL
BH CV UPPER VENOUS LEFT RADIAL COMPRESS: NORMAL
BH CV UPPER VENOUS LEFT SUBCLAVIAN AUGMENT: NORMAL
BH CV UPPER VENOUS LEFT SUBCLAVIAN COMPRESS: NORMAL
BH CV UPPER VENOUS LEFT SUBCLAVIAN PHASIC: NORMAL
BH CV UPPER VENOUS LEFT SUBCLAVIAN SPONT: NORMAL
BH CV UPPER VENOUS LEFT ULNAR COMPRESS: NORMAL
BH CV UPPER VENOUS RIGHT INTERNAL JUGULAR AUGMENT: NORMAL
BH CV UPPER VENOUS RIGHT INTERNAL JUGULAR COMPRESS: NORMAL
BH CV UPPER VENOUS RIGHT INTERNAL JUGULAR PHASIC: NORMAL
BH CV UPPER VENOUS RIGHT INTERNAL JUGULAR SPONT: NORMAL
BH CV UPPER VENOUS RIGHT SUBCLAVIAN AUGMENT: NORMAL
BH CV UPPER VENOUS RIGHT SUBCLAVIAN COMPRESS: NORMAL
BH CV UPPER VENOUS RIGHT SUBCLAVIAN PHASIC: NORMAL
BH CV UPPER VENOUS RIGHT SUBCLAVIAN SPONT: NORMAL
BUN SERPL-MCNC: 37 MG/DL (ref 8–23)
BUN/CREAT SERPL: 33.6 (ref 7–25)
CALCIUM SPEC-SCNC: 8.9 MG/DL (ref 8.6–10.5)
CHLORIDE SERPL-SCNC: 95 MMOL/L (ref 98–107)
CO2 SERPL-SCNC: 29.9 MMOL/L (ref 22–29)
CREAT SERPL-MCNC: 1.1 MG/DL (ref 0.57–1)
DEPRECATED RDW RBC AUTO: 39.3 FL (ref 37–54)
EGFRCR SERPLBLD CKD-EPI 2021: 49.7 ML/MIN/1.73
EOSINOPHIL # BLD AUTO: 0.08 10*3/MM3 (ref 0–0.4)
EOSINOPHIL NFR BLD AUTO: 0.6 % (ref 0.3–6.2)
ERYTHROCYTE [DISTWIDTH] IN BLOOD BY AUTOMATED COUNT: 12.4 % (ref 12.3–15.4)
GLUCOSE SERPL-MCNC: 103 MG/DL (ref 65–99)
HCT VFR BLD AUTO: 41 % (ref 34–46.6)
HGB BLD-MCNC: 14 G/DL (ref 12–15.9)
IMM GRANULOCYTES # BLD AUTO: 0.12 10*3/MM3 (ref 0–0.05)
IMM GRANULOCYTES NFR BLD AUTO: 0.9 % (ref 0–0.5)
LYMPHOCYTES # BLD AUTO: 0.54 10*3/MM3 (ref 0.7–3.1)
LYMPHOCYTES NFR BLD AUTO: 4.2 % (ref 19.6–45.3)
MCH RBC QN AUTO: 30 PG (ref 26.6–33)
MCHC RBC AUTO-ENTMCNC: 34.1 G/DL (ref 31.5–35.7)
MCV RBC AUTO: 87.8 FL (ref 79–97)
MONOCYTES # BLD AUTO: 1.17 10*3/MM3 (ref 0.1–0.9)
MONOCYTES NFR BLD AUTO: 9 % (ref 5–12)
NEUTROPHILS NFR BLD AUTO: 11 10*3/MM3 (ref 1.7–7)
NEUTROPHILS NFR BLD AUTO: 85.1 % (ref 42.7–76)
NRBC BLD AUTO-RTO: 0 /100 WBC (ref 0–0.2)
PLATELET # BLD AUTO: 310 10*3/MM3 (ref 140–450)
PMV BLD AUTO: 9.5 FL (ref 6–12)
POTASSIUM SERPL-SCNC: 4.4 MMOL/L (ref 3.5–5.2)
RBC # BLD AUTO: 4.67 10*6/MM3 (ref 3.77–5.28)
SODIUM SERPL-SCNC: 136 MMOL/L (ref 136–145)
WBC NRBC COR # BLD AUTO: 12.93 10*3/MM3 (ref 3.4–10.8)

## 2025-02-09 PROCEDURE — 99232 SBSQ HOSP IP/OBS MODERATE 35: CPT

## 2025-02-09 PROCEDURE — 80048 BASIC METABOLIC PNL TOTAL CA: CPT | Performed by: INTERNAL MEDICINE

## 2025-02-09 PROCEDURE — 93971 EXTREMITY STUDY: CPT | Performed by: SURGERY

## 2025-02-09 PROCEDURE — 97162 PT EVAL MOD COMPLEX 30 MIN: CPT

## 2025-02-09 PROCEDURE — 93971 EXTREMITY STUDY: CPT

## 2025-02-09 PROCEDURE — 85025 COMPLETE CBC W/AUTO DIFF WBC: CPT | Performed by: INTERNAL MEDICINE

## 2025-02-09 PROCEDURE — 97530 THERAPEUTIC ACTIVITIES: CPT

## 2025-02-09 RX ADMIN — OXYCODONE HYDROCHLORIDE AND ACETAMINOPHEN 500 MG: 500 TABLET ORAL at 07:53

## 2025-02-09 RX ADMIN — APIXABAN 2.5 MG: 2.5 TABLET, FILM COATED ORAL at 07:52

## 2025-02-09 RX ADMIN — PAROXETINE HYDROCHLORIDE HEMIHYDRATE 20 MG: 20 TABLET, FILM COATED ORAL at 07:52

## 2025-02-09 RX ADMIN — FUROSEMIDE 20 MG: 20 TABLET ORAL at 07:54

## 2025-02-09 RX ADMIN — LORAZEPAM 0.5 MG: 0.5 TABLET ORAL at 08:08

## 2025-02-09 RX ADMIN — LORAZEPAM 0.5 MG: 0.5 TABLET ORAL at 20:18

## 2025-02-09 RX ADMIN — ATENOLOL 25 MG: 25 TABLET ORAL at 07:52

## 2025-02-09 RX ADMIN — CLOPIDOGREL 75 MG: 75 TABLET, FILM COATED ORAL at 07:55

## 2025-02-09 RX ADMIN — Medication 10 ML: at 08:00

## 2025-02-09 RX ADMIN — LEVOTHYROXINE, LIOTHYRONINE 30 MG: 19; 4.5 TABLET ORAL at 07:53

## 2025-02-09 RX ADMIN — ATORVASTATIN CALCIUM 40 MG: 20 TABLET, FILM COATED ORAL at 07:54

## 2025-02-09 RX ADMIN — APIXABAN 2.5 MG: 2.5 TABLET, FILM COATED ORAL at 20:18

## 2025-02-09 RX ADMIN — ACETAMINOPHEN 650 MG: 325 TABLET, FILM COATED ORAL at 07:45

## 2025-02-09 RX ADMIN — LORAZEPAM 0.5 MG: 0.5 TABLET ORAL at 02:02

## 2025-02-09 RX ADMIN — LEVOTHYROXINE, LIOTHYRONINE 15 MG: 19; 4.5 TABLET ORAL at 07:53

## 2025-02-09 RX ADMIN — Medication 5000 UNITS: at 07:52

## 2025-02-09 RX ADMIN — Medication 500 MCG: at 07:54

## 2025-02-09 NOTE — PLAN OF CARE
Goal Outcome Evaluation:  Plan of Care Reviewed With: patient        Progress: no change  Outcome Evaluation: VSS on 2L NC. AOx4, uncooperative and anxious at times. PRN Ativan x1. PRN Tylenol x1 for headache. Agreeable for PT today after providing education on importance of PT. Refuses turns at times. Still has little to no appetite. Plan NPO at midnight for ablation and pacemaker placement in AM. Bed alarm on.

## 2025-02-09 NOTE — PLAN OF CARE
Goal Outcome Evaluation:           Progress: no change  Outcome Evaluation: Pt alert and oriented X4. C/o anxiety- PRN ativan given. On NC 2L after ativan administration. Refusing Q2 turns, pt often shifts weight in bed.

## 2025-02-09 NOTE — PROGRESS NOTES
Name: Dafne Mary ADMIT: 2025   : 1940  PCP: Jossy Sauceda MD    MRN: [id number] LOS: 3 days   AGE/SEX: 84 y.o. female  ROOM: [facility]     Subjective   Subjective   2025  Patient seen and examined at bedside, she is without distress.  She is on 2 L nasal cannula.  Plan is for ablation and pacemaker tomorrow.       Objective   Objective   Vital Signs  Temp:  [97.4 °F (36.3 °C)-98.3 °F (36.8 °C)] 98.3 °F (36.8 °C)  Heart Rate:  [] 106  Resp:  [16-18] 18  BP: (103-140)/(57-87) 140/87  SpO2:  [91 %-98 %] 92 %  on  Flow (L/min) (Oxygen Therapy):  [2] 2;   Device (Oxygen Therapy): nasal cannula  Body mass index is 23.83 kg/m².  Physical Exam  Constitutional:       Appearance: She is ill-appearing.   HENT:      Head: Normocephalic and atraumatic.      Nose: Nose normal. No congestion.      Mouth/Throat:      Pharynx: Oropharynx is clear. No oropharyngeal exudate.   Eyes:      General: No scleral icterus.  Cardiovascular:      Rate and Rhythm: Normal rate. Rhythm irregular.      Heart sounds: No murmur heard.     No friction rub. No gallop.   Pulmonary:      Effort: No respiratory distress.      Breath sounds: No wheezing or rales.   Abdominal:      General: There is no distension.      Tenderness: There is no abdominal tenderness. There is no guarding.   Musculoskeletal:         General: No swelling or deformity.      Cervical back: Normal range of motion. No rigidity.      Right lower leg: No edema.      Left lower leg: No edema.   Skin:     Coloration: Skin is not jaundiced.      Findings: No bruising or lesion.   Neurological:      General: No focal deficit present.      Mental Status: She is alert and oriented to person, place, and time.      Motor: Weakness present.       Results Review     I reviewed the patient's new clinical results.  Results from last 7 days   Lab Units 25  0515 25  0449 25  0527 25  1152   WBC 10*3/mm3 12.93* 20.18* 6.11 19.17*   HEMOGLOBIN g/dL  "14.0 12.9 11.9* 13.4   PLATELETS 10*3/mm3 310 294 270 358     Results from last 7 days   Lab Units 02/09/25 0515 02/08/25 0449 02/07/25 0527 02/06/25  1152   SODIUM mmol/L 136 137 136 138   POTASSIUM mmol/L 4.4 4.0 3.8 5.6*   CHLORIDE mmol/L 95* 97* 98 98   CO2 mmol/L 29.9* 30.0* 27.0 29.0   BUN mg/dL 37* 43* 36* 36*   CREATININE mg/dL 1.10* 1.11* 0.96 1.00   GLUCOSE mg/dL 103* 99 116* 110*   EGFR mL/min/1.73 49.7* 49.1* 58.5* 55.7*     Results from last 7 days   Lab Units 02/07/25 0527 02/06/25  1152   ALBUMIN g/dL 3.0* 3.7   BILIRUBIN mg/dL 0.4 0.5   ALK PHOS U/L 114 153*   AST (SGOT) U/L 25 39*   ALT (SGPT) U/L 18 26     Results from last 7 days   Lab Units 02/09/25 0515 02/08/25 0449 02/07/25 0527 02/06/25  1152   CALCIUM mg/dL 8.9 8.4* 7.9* 9.1   ALBUMIN g/dL  --   --  3.0* 3.7       No results found for: \"HGBA1C\", \"POCGLU\"    No radiology results for the last day    I have personally reviewed all medications:  Scheduled Medications  apixaban, 2.5 mg, Oral, Q12H  arformoterol, 15 mcg, Nebulization, BID - RT   And  budesonide, 0.5 mg, Nebulization, BID - RT   And  revefenacin, 175 mcg, Nebulization, Daily - RT  vitamin C, 500 mg, Oral, Daily  atenolol, 25 mg, Oral, Q24H  atorvastatin, 40 mg, Oral, Daily  cholecalciferol, 5,000 Units, Oral, Daily  clopidogrel, 75 mg, Oral, Daily  furosemide, 20 mg, Oral, Daily  PARoxetine, 20 mg, Oral, Daily  sodium chloride, 10 mL, Intravenous, Q12H  thyroid, 15 mg, Oral, Daily  Thyroid, 30 mg, Oral, Daily  vitamin B-12, 500 mcg, Oral, Daily    Infusions   Diet  Diet: Cardiac; Healthy Heart (2-3 Na+); Fluid Consistency: Thin (IDDSI 0)  NPO Diet NPO Type: Strict NPO    I have personally reviewed:  [x]  Laboratory   [x]  Microbiology   [x]  Radiology   [x]  EKG/Telemetry  [x]  Cardiology/Vascular   []  Pathology    []  Records       Assessment/Plan     Active Hospital Problems    Diagnosis  POA    **Atrial fibrillation with rapid ventricular response [I48.91]  Yes    " Hypoxia [R09.02]  Yes    Acute on chronic heart failure with preserved ejection fraction (HFpEF) [I50.33]  Yes    Stage 3a chronic kidney disease [N18.31]  Yes    Elevated troponin [R79.89]  Yes    PAF (paroxysmal atrial fibrillation) [I48.0]  Yes    Generalized anxiety disorder [F41.1]  Yes    Spinal stenosis of lumbar region [M48.061]  Yes    COPD (chronic obstructive pulmonary disease) [J44.9]  Yes    Hypothyroid [E03.9]  Yes    Chronic fatigue [R53.82]  Yes    Benign hypertension [I10]  Yes      Resolved Hospital Problems   No resolved problems to display.       84 y.o. female admitted with Atrial fibrillation with rapid ventricular response.    #A-fib with RVR    -Eliquis    -Plavix    -Rate was improved after digoxin load    -Atenolol    -EP plans for ablation and pacemaker tomorrow    -No ischemic workup per cardiology    #HFpEF    -Patient euvolemic    -Continue Lasix 20 mg p.o. daily    #COPD    -Chronic    -Currently on 2 L nasal cannula, wean as tolerated    -Brovana twice daily    -Pulmicort twice daily    -Yupelri daily    #CKD    -Creatinine 1.1, appears near baseline    -Renally dose meds    #Anxiety  #Depression    -Paxil    #Hypothyroid    -Continue home supplementation    #Hypertension    -Continue home atenolol    #Hyperlipidemia    -Statin      Eliquis  for DVT prophylaxis.  Full code.  Discussed with patient and family.  Anticipate discharge home with HH vs SNU facility in 2-3 days.  Expected Discharge Date: 2/10/2025; Expected Discharge Time:       Hugo Barney DO  [facility]  02/09/25  10:35 EST

## 2025-02-09 NOTE — PROGRESS NOTES
LOS: 3 days   Patient Care Team:  Jossy Sauceda MD as PCP - General (Urgent Care)  Javier Nolan MD as Consulting Physician (Pulmonary Disease)  Nae Norman MD as Consulting Physician (Cardiology)    Chief Complaint: follow up atrial fibrillation, acute on chronic diastolic heart failure     Interval History: She was resting in bed on my exam. She had some anxiety overnight which improved with administration of ativan. She does feel anxious this morning. She denies chest pain or discomfort, palpitations, or shortness of breath.     Vital Signs:  Temp:  [97.4 °F (36.3 °C)-98.2 °F (36.8 °C)] 97.4 °F (36.3 °C)  Heart Rate:  [] 106  Resp:  [16-18] 18  BP: (103-140)/(57-87) 140/87    Intake/Output Summary (Last 24 hours) at 2/9/2025 0752  Last data filed at 2/9/2025 0313  Gross per 24 hour   Intake 630 ml   Output 1450 ml   Net -820 ml        Physical Exam  Vitals reviewed.   Constitutional:       General: She is not in acute distress.  HENT:      Head: Normocephalic.   Eyes:      Extraocular Movements: Extraocular movements intact.      Pupils: Pupils are equal, round, and reactive to light.   Cardiovascular:      Rate and Rhythm: Rhythm irregularly irregular.      Pulses: Normal pulses.      Heart sounds: Normal heart sounds, S1 normal and S2 normal. Heart sounds not distant. No murmur heard.     No friction rub. No gallop. No S3 or S4 sounds.   Pulmonary:      Effort: Pulmonary effort is normal.      Breath sounds: Normal breath sounds.   Abdominal:      General: Abdomen is flat. Bowel sounds are normal.      Palpations: Abdomen is soft.      Tenderness: There is no abdominal tenderness.   Skin:     General: Skin is warm and dry.   Neurological:      General: No focal deficit present.      Mental Status: She is alert and oriented to person, place, and time.   Psychiatric:         Mood and Affect: Mood normal.         Behavior: Behavior normal.         Results Review:    Results from last 7 days    Lab Units 02/09/25  0515   SODIUM mmol/L 136   POTASSIUM mmol/L 4.4   CHLORIDE mmol/L 95*   CO2 mmol/L 29.9*   BUN mg/dL 37*   CREATININE mg/dL 1.10*   GLUCOSE mg/dL 103*   CALCIUM mg/dL 8.9     Results from last 7 days   Lab Units 02/06/25  1508 02/06/25  1152   HSTROP T ng/L 38* 47*     Results from last 7 days   Lab Units 02/09/25  0515   WBC 10*3/mm3 12.93*   HEMOGLOBIN g/dL 14.0   HEMATOCRIT % 41.0   PLATELETS 10*3/mm3 310                       I reviewed the patient's new clinical results.        Assessment & Plan:  Paroxysmal atrial fibrillation   Issues with rate control have driven her to have multiple heart failure exacerbations and hospital readmissions.   Her rate has improved after digoxin load and her atenolol has been resumed at a lower dose  She has been evaluated by EP who have recommended an ablation and pacemaker. She will be NPO after midnight for tentative plans to proceed with this tomorrow.   Continue apixaban 2.5 mg twice daily   Acute on chronic diastolic heart failure  Was transitioned to oral diuretics on 2/8/25  Type II MI, elevated troponin   Likely related to arrhythmia/tachycardia, no ischemic workup needed at this time.   COPD with acute exacerbation     Diana Briggs, APRN  02/09/25  07:52 EST

## 2025-02-09 NOTE — THERAPY EVALUATION
Patient Name: Dafne Mary  : 1940    MRN: [id number]                              Today's Date: 2025       Admit Date: 2025    Visit Dx:     ICD-10-CM ICD-9-CM   1. Atrial fibrillation with rapid ventricular response  I48.91 427.31   2. Elevated troponin  R79.89 790.6   3. Elevated brain natriuretic peptide (BNP) level  R79.89 790.99   4. COPD with acute exacerbation  J44.1 491.21   5. Generalized weakness  R53.1 780.79   6. Acute hyperkalemia  E87.5 276.7     Patient Active Problem List   Diagnosis    Anxiety    Arteriosclerosis of both carotid arteries    Chronic interstitial cystitis    Cough    Pulmonary emphysema    Gastroesophageal reflux disease    Fibromyalgia    Headache    Hematuria    Hoarseness    Hyperlipidemia    Benign hypertension    Postoperative hypothyroidism    Muscle spasms of both lower extremities    Palpitations    Shortness of breath    Postmenopausal osteoporosis    Chronic fatigue    Hair loss disorder    Dysuria    Dry cough    Spondylolysis, lumbar region    Vocal cord paralysis    Abnormal finding on thyroid function test    Positional lightheadedness    COPD with acute exacerbation    Hypothyroid    COPD (chronic obstructive pulmonary disease)    COPD exacerbation    Morbid obesity with BMI of 70 and over, adult    RSV bronchiolitis    Vitamin D deficiency    B12 deficiency    Iron deficiency    Decreased hemoglobin    Generalized anxiety disorder    Chronic bilateral low back pain with left-sided sciatica    Facet arthropathy, lumbar    Spinal stenosis of lumbar region    Spondylolisthesis of lumbar region    Scoliosis of lumbar spine    History of non-ST elevation myocardial infarction (NSTEMI)    Chronic respiratory failure    Acute respiratory failure    PAF (paroxysmal atrial fibrillation)    Acute respiratory failure with hypercapnia    Acute hypoxemic respiratory failure    Chronic HFrEF (heart failure with reduced ejection fraction)    Community acquired  bacterial pneumonia    CAD (coronary artery disease)    Mitral valve regurgitation    Acute hypokalemia    Status post angioplasty with stent    Noncompliance    NSTEMI (non-ST elevated myocardial infarction)    Flash pulmonary edema    Elevated troponin    Stage 3a chronic kidney disease    Type 2 myocardial infarction    Acute on chronic heart failure with preserved ejection fraction (HFpEF)    Hypoxia    Generalized weakness    Atrial fibrillation with rapid ventricular response     Past Medical History:   Diagnosis Date    Atrial fibrillation with RVR 6/4/2023    CAD (coronary artery disease) 7/10/2023    Chronic diastolic congestive heart failure 11/17/2022    Depression     Emphysema lung     GERD (gastroesophageal reflux disease)     Heart failure     Hyperlipidemia     Hypertension     Hypothyroidism     Mitral valve regurgitation 7/10/2023    NSTEMI (non-ST elevated myocardial infarction) 10/25/2023     Past Surgical History:   Procedure Laterality Date    CARDIAC CATHETERIZATION N/A 7/7/2023    Procedure: Right and Left Heart Cath;  Surgeon: Ra Cole MD;  Location: [facility] CATH INVASIVE LOCATION;  Service: Cardiology;  Laterality: N/A;    CARDIAC CATHETERIZATION N/A 7/7/2023    Procedure: Percutaneous Coronary Intervention;  Surgeon: Ra Cole MD;  Location: [facility] CATH INVASIVE LOCATION;  Service: Cardiology;  Laterality: N/A;    CARDIAC CATHETERIZATION N/A 7/7/2023    Procedure: Stent ANDRZEJ coronary;  Surgeon: Ra Cole MD;  Location: [facility] CATH INVASIVE LOCATION;  Service: Cardiology;  Laterality: N/A;    CARDIAC CATHETERIZATION N/A 7/7/2023    Procedure: Coronary angiography;  Surgeon: Ra Cole MD;  Location: [facility] CATH INVASIVE LOCATION;  Service: Cardiology;  Laterality: N/A;    CARDIAC CATHETERIZATION N/A 7/7/2023    Procedure: Left ventriculography;  Surgeon: Ra Cole MD;  Location: [facility] CATH INVASIVE LOCATION;  Service:  Cardiology;  Laterality: N/A;    CARDIAC CATHETERIZATION N/A 1/20/2025    Procedure: Left Heart Cath;  Surgeon: Nae Norman MD;  Location:  [facility] CATH INVASIVE LOCATION;  Service: Cardiovascular;  Laterality: N/A;    CARDIAC CATHETERIZATION N/A 1/20/2025    Procedure: Coronary angiography;  Surgeon: Nae Norman MD;  Location:  [facility] CATH INVASIVE LOCATION;  Service: Cardiovascular;  Laterality: N/A;    EYE SURGERY Left     INTUBATION  5/21/2023         PARATHYROIDECTOMY      Patient reports thyroidectomy partial not a para thyroidectomy.    THYROIDECTOMY, PARTIAL      1984      General Information       Row Name 02/09/25 1520          Physical Therapy Time and Intention    Document Type evaluation  -DB     Mode of Treatment individual therapy;physical therapy  -DB       Row Name 02/09/25 1520          General Information    Patient Profile Reviewed yes  -DB     Prior Level of Function independent:;ADL's  admitted from SNF  -DB     Existing Precautions/Restrictions fall  -DB     Barriers to Rehab none identified  -DB       Row Name 02/09/25 1520          Living Environment    People in Home alone  daughters close by that can assist per pt  -DB       Row Name 02/09/25 1520          Cognition    Orientation Status (Cognition) oriented x 3  -DB       Row Name 02/09/25 1520          Safety Issues/Impairments Affecting Functional Mobility    Safety Issues Affecting Function (Mobility) insight into deficits/self-awareness  -DB     Impairments Affecting Function (Mobility) balance;endurance/activity tolerance;strength;shortness of breath  -DB               User Key  (r) = Recorded By, (t) = Taken By, (c) = Cosigned By      Initials Name Provider Type    DB Mara Rosario, PT Physical Therapist                   Mobility       Row Name 02/09/25 1522          Bed Mobility    Bed Mobility supine-sit;sit-supine  -DB     Supine-Sit [place] (Bed Mobility) minimum assist (75% patient effort);verbal cues  -DB      Sit-Supine [place] (Bed Mobility) moderate assist (50% patient effort);verbal cues  -DB     Assistive Device (Bed Mobility) bed rails;head of bed elevated  -DB       Row Name 02/09/25 1522          Sit-Stand Transfer    Sit-Stand [place] (Transfers) minimum assist (75% patient effort);verbal cues  -DB     Assistive Device (Sit-Stand Transfers) walker, front-wheeled  -DB       Row Name 02/09/25 1522          Gait/Stairs (Locomotion)    [place] Level (Gait) minimum assist (75% patient effort);verbal cues  -DB     Assistive Device (Gait) walker, front-wheeled  -DB     Distance in Feet (Gait) 15  -DB     Deviations/Abnormal Patterns (Gait) festinating/shuffling;gait speed decreased;foreign decreased;base of support, narrow  -DB     Bilateral Gait Deviations forward flexed posture;heel strike decreased  -DB               User Key  (r) = Recorded By, (t) = Taken By, (c) = Cosigned By      Initials Name Provider Type    DB Mara Rosario PT Physical Therapist                   Obj/Interventions       Row Name 02/09/25 1523          Range of Motion Comprehensive    General Range of Motion no range of motion deficits identified  -DB       Row Name 02/09/25 1523          Strength Comprehensive (MMT)    Comment, General Manual Muscle Testing (MMT) Assessment generalized weakness  -DB       Row Name 02/09/25 1523          Balance    Balance Assessment sitting static balance;sitting dynamic balance;standing static balance;standing dynamic balance  -DB     Static Sitting Balance verbal cues  -DB     Dynamic Sitting Balance verbal cues  -DB     Position, Sitting Balance unsupported;sitting edge of bed  -DB     Static Standing Balance minimal assist  -DB     Dynamic Standing Balance minimal assist  -DB     Position/Device Used, Standing Balance supported;walker, front-wheeled  -DB     Balance Interventions sitting;standing;sit to stand  -DB               User Key  (r) = Recorded By, (t) = Taken By, (c) = Cosigned  By      Initials Name Provider Type    Mara Nolasco, PT Physical Therapist                   Goals/Plan       Row Name 02/09/25 1528          Bed Mobility Goal 1 (PT)    Activity/Assistive Device (Bed Mobility Goal 1, PT) bed mobility activities, all  -DB     [place] Level/Cues Needed (Bed Mobility Goal 1, PT) supervision required  -DB     Time Frame (Bed Mobility Goal 1, PT) 1 week  -DB       Row Name 02/09/25 1528          Transfer Goal 1 (PT)    Activity/Assistive Device (Transfer Goal 1, PT) transfers, all  -DB     [place] Level/Cues Needed (Transfer Goal 1, PT) supervision required  -DB     Time Frame (Transfer Goal 1, PT) 1 week  -DB       Row Name 02/09/25 1528          Gait Training Goal 1 (PT)    Activity/Assistive Device (Gait Training Goal 1, PT) gait (walking locomotion)  -DB     [place] Level (Gait Training Goal 1, PT) supervision required  -DB     Time Frame (Gait Training Goal 1, PT) 1 week  -DB       Row Name 02/09/25 1528          Therapy Assessment/Plan (PT)    Planned Therapy Interventions (PT) balance training;bed mobility training;gait training;home exercise program;neuromuscular re-education;patient/family education;strengthening;ROM (range of motion);stair training;postural re-education;transfer training  -DB               User Key  (r) = Recorded By, (t) = Taken By, (c) = Cosigned By      Initials Name Provider Type    Mara Nolasco, PT Physical Therapist                   Clinical Impression       Row Name 02/09/25 1527          Plan of Care Review    Plan of Care Reviewed With patient  -DB     Outcome Evaluation Patient is an 84 y.o. female admitted to [facility] on 2/6/2025 for a fib with RVR, elevated BNP, and elevated troponoin. PMHx includes CAD, HTN, HLD, CHF. Patient was admitted from rehab, but is (I) at baseline and lives alone. Pt's goal would be to return home at D/C, wishes to not return to rehab. She states her daughters would be able to provide assist.  Today, patient performed bed mobility with Ct, required Ct for transfers, and ambulated 15' with RW and Ct. Pt fatigues quickly, strength, endurance, and balance deficits noted. Patient may benefit from skilled PT services acutely to address functional deficits as well as improve level of independence prior to discharge. SNF vs home with HHPT at D/C pending progress.  -DB       Row Name 02/09/25 1527          Therapy Assessment/Plan (PT)    Rehab Potential (PT) good  -DB     Criteria for Skilled Interventions Met (PT) yes  -DB     Therapy Frequency (PT) 6 times/wk  -DB       Row Name 02/09/25 1527          Vital Signs    Pre SpO2 (%) 100  -DB     O2 Delivery Pre Treatment supplemental O2  -DB     O2 Delivery Intra Treatment room air  -DB     Post SpO2 (%) 95  -DB     O2 Delivery Post Treatment supplemental O2  -DB     Pre Patient Position Supine  -DB     Intra Patient Position Standing  -DB     Post Patient Position Supine  -DB       Row Name 02/09/25 1527          Positioning and Restraints    Pre-Treatment Position in bed  -DB     Post Treatment Position bed  -DB     In Bed supine;call light within reach;encouraged to call for assist;with nsg  -DB               User Key  (r) = Recorded By, (t) = Taken By, (c) = Cosigned By      Initials Name Provider Type    Mara Nolasco, ABIEL Physical Therapist                   Outcome Measures       Row Name 02/09/25 1528 02/09/25 0745       How much help from another person do you currently need...    Turning from your back to your side while in flat bed without using bedrails? 3  -DB 4  -LH    Moving from lying on back to sitting on the side of a flat bed without bedrails? 3  -DB 3  -LH    Moving to and from a bed to a chair (including a wheelchair)? 3  -DB 3  -LH    Standing up from a chair using your arms (e.g., wheelchair, bedside chair)? 3  -DB 3  -LH    Climbing 3-5 steps with a railing? 2  -DB 2  -LH    To walk in hospital room? 3  -DB 3  -LH    AM-PAC 6  Clicks Score (PT) 17  -DB 18  -    Highest Level of Mobility Goal 5 --> Static standing  -DB 6 --> Walk 10 steps or more  -      Row Name 02/09/25 1528          Functional Assessment    Outcome Measure Options AM-PAC 6 Clicks Basic Mobility (PT)  -DB               User Key  (r) = Recorded By, (t) = Taken By, (c) = Cosigned By      Initials Name Provider Type    DB Mara Rosario, PT Physical Therapist     Dominique Marinelli, RN Registered Nurse                                 Physical Therapy Education       Title: PT OT SLP Therapies (Done)       Topic: Physical Therapy (Done)       Point: Mobility training (Done)       Learning Progress Summary            Patient Acceptance, E, VU by DB at 2/9/2025 1528                      Point: Home exercise program (Done)       Learning Progress Summary            Patient Acceptance, E, VU by DB at 2/9/2025 1528                      Point: Body mechanics (Done)       Learning Progress Summary            Patient Acceptance, E, VU by DB at 2/9/2025 1528                      Point: Precautions (Done)       Learning Progress Summary            Patient Acceptance, E, VU by DB at 2/9/2025 1528                                      User Key       Initials Effective Dates Name Provider Type Discipline     06/16/21 -  Mara Rosario, PT Physical Therapist PT                  PT Recommendation and Plan  Planned Therapy Interventions (PT): balance training, bed mobility training, gait training, home exercise program, neuromuscular re-education, patient/family education, strengthening, ROM (range of motion), stair training, postural re-education, transfer training  Outcome Evaluation: Patient is an 84 y.o. female admitted to [facility] on 2/6/2025 for a fib with RVR, elevated BNP, and elevated troponoin. PMHx includes CAD, HTN, HLD, CHF. Patient was admitted from rehab, but is (I) at baseline and lives alone. Pt's goal would be to return home at D/C, wishes to not return to rehab. She  states her daughters would be able to provide assist. Today, patient performed bed mobility with Ct, required Ct for transfers, and ambulated 15' with RW and Ct. Pt fatigues quickly, strength, endurance, and balance deficits noted. Patient may benefit from skilled PT services acutely to address functional deficits as well as improve level of independence prior to discharge. SNF vs home with HHPT at D/C pending progress.     Time Calculation:         PT Charges       Row Name 02/09/25 1532             Time Calculation    Start Time 1340  -DB      Stop Time 1358  -DB      Time Calculation (min) 18 min  -DB      PT Received On 02/09/25  -DB      PT - Next Appointment 02/10/25  -DB      PT Goal Re-Cert Due Date 02/16/25  -DB         Time Calculation- PT    Total Timed Code Minutes- PT 8 minute(s)  -DB                User Key  (r) = Recorded By, (t) = Taken By, (c) = Cosigned By      Initials Name Provider Type    DB Mara Rosario, PT Physical Therapist                  Therapy Charges for Today       Code Description Service Date Service Provider Modifiers Qty    [id number]  PT EVAL MOD COMPLEXITY 2 2/9/2025 Mara Rosario, PT GP 1    [id number]  PT THERAPEUTIC ACT EA 15 MIN 2/9/2025 Mara Rosario, PT GP 1            PT G-Codes  Outcome Measure Options: AM-PAC 6 Clicks Basic Mobility (PT)  AM-PAC 6 Clicks Score (PT): 17  PT Discharge Summary  Anticipated Discharge Disposition (PT): skilled nursing facility, home with 24/7 care, home with home health (pending progress)    Mara Rosario PT  2/9/2025

## 2025-02-09 NOTE — PLAN OF CARE
Goal Outcome Evaluation:  Plan of Care Reviewed With: patient      Patient is an 84 y.o. female admitted to [facility] on 2/6/2025 for a fib with RVR, elevated BNP, and elevated troponoin. PMHx includes CAD, HTN, HLD, CHF. Patient was admitted from rehab, but is (I) at baseline and lives alone. Pt's goal would be to return home at D/C, wishes to not return to rehab. She states her daughters would be able to provide assist. Today, patient performed bed mobility with Ct, required Ct for transfers, and ambulated 15' with RW and Ct. Pt fatigues quickly, strength, endurance, and balance deficits noted. Patient may benefit from skilled PT services acutely to address functional deficits as well as improve level of independence prior to discharge. SNF vs home with HHPT at D/C pending progress.       Anticipated Discharge Disposition (PT): skilled nursing facility, home with 24/7 care, home with home health (pending progress)

## 2025-02-10 ENCOUNTER — APPOINTMENT (OUTPATIENT)
Dept: GENERAL RADIOLOGY | Facility: HOSPITAL | Age: 85
End: 2025-02-10
Payer: MEDICARE

## 2025-02-10 LAB
ANION GAP SERPL CALCULATED.3IONS-SCNC: 10.8 MMOL/L (ref 5–15)
BASOPHILS # BLD AUTO: 0.03 10*3/MM3 (ref 0–0.2)
BASOPHILS NFR BLD AUTO: 0.3 % (ref 0–1.5)
BUN SERPL-MCNC: 31 MG/DL (ref 8–23)
BUN/CREAT SERPL: 34.1 (ref 7–25)
CALCIUM SPEC-SCNC: 8.4 MG/DL (ref 8.6–10.5)
CHLORIDE SERPL-SCNC: 95 MMOL/L (ref 98–107)
CO2 SERPL-SCNC: 32.2 MMOL/L (ref 22–29)
CREAT SERPL-MCNC: 0.91 MG/DL (ref 0.57–1)
DEPRECATED RDW RBC AUTO: 39.6 FL (ref 37–54)
EGFRCR SERPLBLD CKD-EPI 2021: 62.3 ML/MIN/1.73
EOSINOPHIL # BLD AUTO: 0.22 10*3/MM3 (ref 0–0.4)
EOSINOPHIL NFR BLD AUTO: 2.4 % (ref 0.3–6.2)
ERYTHROCYTE [DISTWIDTH] IN BLOOD BY AUTOMATED COUNT: 11.9 % (ref 12.3–15.4)
GLUCOSE SERPL-MCNC: 103 MG/DL (ref 65–99)
HCT VFR BLD AUTO: 37.9 % (ref 34–46.6)
HGB BLD-MCNC: 12.1 G/DL (ref 12–15.9)
IMM GRANULOCYTES # BLD AUTO: 0.12 10*3/MM3 (ref 0–0.05)
IMM GRANULOCYTES NFR BLD AUTO: 1.3 % (ref 0–0.5)
LYMPHOCYTES # BLD AUTO: 0.39 10*3/MM3 (ref 0.7–3.1)
LYMPHOCYTES NFR BLD AUTO: 4.2 % (ref 19.6–45.3)
MCH RBC QN AUTO: 29 PG (ref 26.6–33)
MCHC RBC AUTO-ENTMCNC: 31.9 G/DL (ref 31.5–35.7)
MCV RBC AUTO: 90.9 FL (ref 79–97)
MONOCYTES # BLD AUTO: 0.59 10*3/MM3 (ref 0.1–0.9)
MONOCYTES NFR BLD AUTO: 6.4 % (ref 5–12)
NEUTROPHILS NFR BLD AUTO: 7.89 10*3/MM3 (ref 1.7–7)
NEUTROPHILS NFR BLD AUTO: 85.4 % (ref 42.7–76)
NRBC BLD AUTO-RTO: 0 /100 WBC (ref 0–0.2)
PLATELET # BLD AUTO: 256 10*3/MM3 (ref 140–450)
PMV BLD AUTO: 9.8 FL (ref 6–12)
POTASSIUM SERPL-SCNC: 4.1 MMOL/L (ref 3.5–5.2)
RBC # BLD AUTO: 4.17 10*6/MM3 (ref 3.77–5.28)
SODIUM SERPL-SCNC: 138 MMOL/L (ref 136–145)
WBC NRBC COR # BLD AUTO: 9.24 10*3/MM3 (ref 3.4–10.8)

## 2025-02-10 PROCEDURE — C1887 CATHETER, GUIDING: HCPCS | Performed by: INTERNAL MEDICINE

## 2025-02-10 PROCEDURE — 80048 BASIC METABOLIC PNL TOTAL CA: CPT | Performed by: INTERNAL MEDICINE

## 2025-02-10 PROCEDURE — 02H63JZ INSERTION OF PACEMAKER LEAD INTO RIGHT ATRIUM, PERCUTANEOUS APPROACH: ICD-10-PCS | Performed by: INTERNAL MEDICINE

## 2025-02-10 PROCEDURE — 25010000002 CEFAZOLIN PER 500 MG: Performed by: INTERNAL MEDICINE

## 2025-02-10 PROCEDURE — C1894 INTRO/SHEATH, NON-LASER: HCPCS | Performed by: INTERNAL MEDICINE

## 2025-02-10 PROCEDURE — 33208 INSRT HEART PM ATRIAL & VENT: CPT | Performed by: INTERNAL MEDICINE

## 2025-02-10 PROCEDURE — 25010000002 MIDAZOLAM PER 1 MG: Performed by: INTERNAL MEDICINE

## 2025-02-10 PROCEDURE — 71045 X-RAY EXAM CHEST 1 VIEW: CPT

## 2025-02-10 PROCEDURE — 93010 ELECTROCARDIOGRAM REPORT: CPT | Performed by: INTERNAL MEDICINE

## 2025-02-10 PROCEDURE — C1785 PMKR, DUAL, RATE-RESP: HCPCS | Performed by: INTERNAL MEDICINE

## 2025-02-10 PROCEDURE — 0JH606Z INSERTION OF PACEMAKER, DUAL CHAMBER INTO CHEST SUBCUTANEOUS TISSUE AND FASCIA, OPEN APPROACH: ICD-10-PCS | Performed by: INTERNAL MEDICINE

## 2025-02-10 PROCEDURE — 25010000002 LIDOCAINE 1 % SOLUTION: Performed by: INTERNAL MEDICINE

## 2025-02-10 PROCEDURE — 85025 COMPLETE CBC W/AUTO DIFF WBC: CPT | Performed by: INTERNAL MEDICINE

## 2025-02-10 PROCEDURE — C1898 LEAD, PMKR, OTHER THAN TRANS: HCPCS | Performed by: INTERNAL MEDICINE

## 2025-02-10 PROCEDURE — 25010000002 FENTANYL CITRATE (PF) 50 MCG/ML SOLUTION: Performed by: INTERNAL MEDICINE

## 2025-02-10 PROCEDURE — 93005 ELECTROCARDIOGRAM TRACING: CPT | Performed by: INTERNAL MEDICINE

## 2025-02-10 PROCEDURE — 02HK3JZ INSERTION OF PACEMAKER LEAD INTO RIGHT VENTRICLE, PERCUTANEOUS APPROACH: ICD-10-PCS | Performed by: INTERNAL MEDICINE

## 2025-02-10 PROCEDURE — 99232 SBSQ HOSP IP/OBS MODERATE 35: CPT | Performed by: NURSE PRACTITIONER

## 2025-02-10 DEVICE — IMPLANTABLE DEVICE: Type: IMPLANTABLE DEVICE | Status: FUNCTIONAL

## 2025-02-10 DEVICE — LD PM CAPSUREFIX NOVUS5076 52CM: Type: IMPLANTABLE DEVICE | Status: FUNCTIONAL

## 2025-02-10 DEVICE — GEN PM AZURE S SURESCAN DR MRI: Type: IMPLANTABLE DEVICE | Status: FUNCTIONAL

## 2025-02-10 RX ORDER — ONDANSETRON 2 MG/ML
4 INJECTION INTRAMUSCULAR; INTRAVENOUS EVERY 6 HOURS PRN
Status: DISCONTINUED | OUTPATIENT
Start: 2025-02-10 | End: 2025-02-13 | Stop reason: HOSPADM

## 2025-02-10 RX ORDER — SODIUM CHLORIDE 0.9 % (FLUSH) 0.9 %
10 SYRINGE (ML) INJECTION AS NEEDED
Status: DISCONTINUED | OUTPATIENT
Start: 2025-02-10 | End: 2025-02-13 | Stop reason: HOSPADM

## 2025-02-10 RX ORDER — SODIUM CHLORIDE 0.9 % (FLUSH) 0.9 %
3 SYRINGE (ML) INJECTION EVERY 12 HOURS SCHEDULED
Status: DISCONTINUED | OUTPATIENT
Start: 2025-02-10 | End: 2025-02-13 | Stop reason: HOSPADM

## 2025-02-10 RX ORDER — LIDOCAINE HYDROCHLORIDE 10 MG/ML
INJECTION, SOLUTION INFILTRATION; PERINEURAL
Status: DISCONTINUED | OUTPATIENT
Start: 2025-02-10 | End: 2025-02-10 | Stop reason: HOSPADM

## 2025-02-10 RX ORDER — ATENOLOL 25 MG/1
50 TABLET ORAL
Status: DISCONTINUED | OUTPATIENT
Start: 2025-02-10 | End: 2025-02-11

## 2025-02-10 RX ORDER — MIDAZOLAM HYDROCHLORIDE 1 MG/ML
INJECTION, SOLUTION INTRAMUSCULAR; INTRAVENOUS
Status: DISCONTINUED | OUTPATIENT
Start: 2025-02-10 | End: 2025-02-10 | Stop reason: HOSPADM

## 2025-02-10 RX ORDER — FENTANYL CITRATE 50 UG/ML
INJECTION, SOLUTION INTRAMUSCULAR; INTRAVENOUS
Status: DISCONTINUED | OUTPATIENT
Start: 2025-02-10 | End: 2025-02-10 | Stop reason: HOSPADM

## 2025-02-10 RX ORDER — METHOHEXITAL IN WATER/PF 100MG/10ML
SYRINGE (ML) INTRAVENOUS
Status: DISCONTINUED | OUTPATIENT
Start: 2025-02-10 | End: 2025-02-10 | Stop reason: HOSPADM

## 2025-02-10 RX ORDER — SODIUM CHLORIDE 9 MG/ML
40 INJECTION, SOLUTION INTRAVENOUS AS NEEDED
Status: DISCONTINUED | OUTPATIENT
Start: 2025-02-10 | End: 2025-02-13 | Stop reason: HOSPADM

## 2025-02-10 RX ADMIN — Medication 3 ML: at 22:45

## 2025-02-10 RX ADMIN — ATENOLOL 50 MG: 25 TABLET ORAL at 21:27

## 2025-02-10 RX ADMIN — ACETAMINOPHEN 650 MG: 325 TABLET, FILM COATED ORAL at 00:14

## 2025-02-10 RX ADMIN — Medication 10 ML: at 22:45

## 2025-02-10 NOTE — PLAN OF CARE
Goal Outcome Evaluation:           Progress: no change  Outcome Evaluation: Pt alert and oriented X4. On 2L NC overnight. Fever present at 100.8, resolved with tylenol. NPO at 0000 for possivle pacemaker and ablation. Refusing Q2 turns. Purewick in place.

## 2025-02-10 NOTE — PLAN OF CARE
Problem: Adult Inpatient Plan of Care  Goal: Plan of Care Review  Outcome: Progressing  Goal: Patient-Specific Goal (Individualized)  Outcome: Progressing  Goal: Absence of Hospital-Acquired Illness or Injury  Outcome: Progressing  Intervention: Identify and Manage Fall Risk  Recent Flowsheet Documentation  Taken 2/10/2025 1600 by Rashmi Meza RN  Safety Promotion/Fall Prevention:   activity supervised   assistive device/personal items within reach  Taken 2/10/2025 1400 by Rashmi Meza RN  Safety Promotion/Fall Prevention:   activity supervised   assistive device/personal items within reach  Taken 2/10/2025 1200 by Rashmi Meza RN  Safety Promotion/Fall Prevention:   activity supervised   assistive device/personal items within reach  Taken 2/10/2025 1000 by Rashmi Meza RN  Safety Promotion/Fall Prevention:   activity supervised   assistive device/personal items within reach  Taken 2/10/2025 0855 by Rashmi Meza RN  Safety Promotion/Fall Prevention:   activity supervised   assistive device/personal items within reach  Intervention: Prevent Skin Injury  Recent Flowsheet Documentation  Taken 2/10/2025 1600 by Rashmi Meza RN  Body Position: position changed independently  Taken 2/10/2025 1400 by Rashmi Meza RN  Body Position: position changed independently  Taken 2/10/2025 1200 by Rashmi Meza RN  Body Position: position changed independently  Taken 2/10/2025 1000 by Rashmi Meza RN  Body Position:   position changed independently   weight shifting  Taken 2/10/2025 0855 by Rashmi Meza RN  Body Position:   weight shifting   position changed independently  Skin Protection:   incontinence pads utilized   transparent dressing maintained  Intervention: Prevent and Manage VTE (Venous Thromboembolism) Risk  Recent Flowsheet Documentation  Taken 2/10/2025 0855 by Rashmi Meza RN  VTE Prevention/Management: (pt on eliquis) other (see comments)  Intervention: Prevent Infection  Recent  Flowsheet Documentation  Taken 2/10/2025 1600 by Rashmi Meza RN  Infection Prevention: hand hygiene promoted  Taken 2/10/2025 1400 by Rashmi Meza RN  Infection Prevention: hand hygiene promoted  Taken 2/10/2025 1200 by Rashmi Meza RN  Infection Prevention: hand hygiene promoted  Taken 2/10/2025 1000 by Rashmi Meza RN  Infection Prevention: hand hygiene promoted  Taken 2/10/2025 0855 by Rashmi Meza RN  Infection Prevention: hand hygiene promoted  Goal: Optimal Comfort and Wellbeing  Outcome: Progressing  Intervention: Provide Person-Centered Care  Recent Flowsheet Documentation  Taken 2/10/2025 0855 by Rashmi Meza RN  Trust Relationship/Rapport:   care explained   choices provided   questions encouraged  Goal: Readiness for Transition of Care  Outcome: Progressing   Goal Outcome Evaluation:

## 2025-02-10 NOTE — PROGRESS NOTES
Hospital Follow Up    LOS:  LOS: 4 days   Patient Name: Dafne Mary  Age/Sex: 84 y.o. female  : 1940  MRN: [id number]    Day of Service: 02/10/25   Length of Stay: 4  Encounter Provider: ANTONELLA Shannon  Place of Service: [facility] CARDIOLOGY  Patient Care Team:  Jossy Sauceda MD as PCP - General (Urgent Care)  Javier Nolan MD as Consulting Physician (Pulmonary Disease)  Nae Norman MD as Consulting Physician (Cardiology)    Subjective:     Chief Complaint: afib RVR    Interval History: Resting comfortably in bed. No complaints of chest pain or SOA. Low grade fever overnight that improved with tylenol.     Objective:     Objective:  Temp:  [98 °F (36.7 °C)-100.8 °F (38.2 °C)] 98 °F (36.7 °C)  Heart Rate:  [] 113  Resp:  [17] 17  BP: (109-160)/(51-95) 144/78     Intake/Output Summary (Last 24 hours) at 2/10/2025 0820  Last data filed at 2025 1839  Gross per 24 hour   Intake 0 ml   Output 400 ml   Net -400 ml     Body mass index is 23.83 kg/m².      25  1344   Weight: 59.1 kg (130 lb 4.7 oz)     Weight change:     Physical Exam:   General Appearance:    Awake alert and oriented in no acute distress.   Color:  Skin:  Neuro:  HEENT:    Lungs:     Pink  Warm and dry  No focal, motor or sensory deficits  Neck supple, pupils equal, round and reactive. No JVD, No Bruit  Clear to auscultation,respirations regular, even and                  unlabored    Heart:    Irr/Irr, S1 and S2, no murmur, no gallop, no rub. No edema, DP/PT pulses are 2+   Chest Wall:    No abnormalities observed   Abdomen:     Normal bowel sounds, no masses, no organomegaly, soft        non-tender, non-distended, no guarding, no ascites noted   Extremities:   Moves all extremities well, no edema, no cyanosis, no redness       Lab Review:   Results from last 7 days   Lab Units 02/10/25  0322 25  0515 25  0449 25  0527 25  1152   SODIUM mmol/L 138 136   <  "> 136 138   POTASSIUM mmol/L 4.1 4.4   < > 3.8 5.6*   CHLORIDE mmol/L 95* 95*   < > 98 98   CO2 mmol/L 32.2* 29.9*   < > 27.0 29.0   BUN mg/dL 31* 37*   < > 36* 36*   CREATININE mg/dL 0.91 1.10*   < > 0.96 1.00   GLUCOSE mg/dL 103* 103*   < > 116* 110*   CALCIUM mg/dL 8.4* 8.9   < > 7.9* 9.1   AST (SGOT) U/L  --   --   --  25 39*   ALT (SGPT) U/L  --   --   --  18 26    < > = values in this interval not displayed.     Results from last 7 days   Lab Units 02/06/25  1508 02/06/25  1152   HSTROP T ng/L 38* 47*     Results from last 7 days   Lab Units 02/10/25  0322 02/09/25  0515   WBC 10*3/mm3 9.24 12.93*   HEMOGLOBIN g/dL 12.1 14.0   HEMATOCRIT % 37.9 41.0   PLATELETS 10*3/mm3 256 310                   Invalid input(s): \"LDLCALC\"  Results from last 7 days   Lab Units 02/06/25  1152   PROBNP pg/mL 9,933.0*         I reviewed the patient's new clinical results.  I personally viewed and interpreted the patient's EKG  Current Medications:   Scheduled Meds:[Held by provider] apixaban, 2.5 mg, Oral, Q12H  arformoterol, 15 mcg, Nebulization, BID - RT   And  budesonide, 0.5 mg, Nebulization, BID - RT   And  revefenacin, 175 mcg, Nebulization, Daily - RT  vitamin C, 500 mg, Oral, Daily  atenolol, 25 mg, Oral, Q24H  atorvastatin, 40 mg, Oral, Daily  cholecalciferol, 5,000 Units, Oral, Daily  [Held by provider] clopidogrel, 75 mg, Oral, Daily  furosemide, 20 mg, Oral, Daily  PARoxetine, 20 mg, Oral, Daily  sodium chloride, 10 mL, Intravenous, Q12H  thyroid, 15 mg, Oral, Daily  Thyroid, 30 mg, Oral, Daily  vitamin B-12, 500 mcg, Oral, Daily      Continuous Infusions:     Allergies:  Allergies   Allergen Reactions    Lansoprazole Nausea Only     NAUSEA  NAUSEA  NAUSEA    Albuterol Provider Review Needed     \"It causes me not to be able to breathe\"    Buspirone Other (See Comments)     agitation    Diphenhydramine Hcl (Sleep) Irritability    Lisinopril Cough       Assessment/Plan:      Atrial fibrillation with RVR: on atenolol 25 " mg BID. Anticoagulated with apixaban which is on hold for pacemaker and ablation today. Resume post op.  Acute on chronic diastolic CHF: BNP 9933 on admission. IV diuresed and now on furosemide 20 mg PO daily. Appears compensated on exam. Hopefully we can keep her out of heart failure with better rate control after pace/ablate.  Acute on chronic hypoxic respiratory failure/COPD: on supplemental O2. Management per primary team  Hypertension: with hypotension on admission. Amlodipine remains on hold. Atenolol has been resumed at a lower dose and BP is tolerating.  Elevated troponin: non-MI elevation. No symptoms suggestive of ACS and EKG does not show any ischemic changes.     -Plans for PPM placement and ablation today.  -low grade fever overnight. WBC normal. Afebrile this morning.  -continue current cardiac management.        ANTONELLA Shannon  02/10/25  08:20 EST  Electronically signed by ANTONELLA Shannon, 02/10/25, 8:20 AM EST.

## 2025-02-10 NOTE — TELEPHONE ENCOUNTER
Radiology History & Physical      SUBJECTIVE:     Chief Complaint: thyroid nodules, request for FNA    History of Present Illness:  Debbie Earl is a 89 y.o. female with ILD, HTN & high cholesterol. Thyroid US on 24 revealed increased perfusion of parenchyma suggestive of thyroiditis as well as several nodules includin) lateral right lower pole thyroid nodule measuring 2.4 x 1.7 x 1.8 cm  that demonstrates suspicious sonographic imaging features (TI-RADS 4)   2)  isthmus thyroid nodule measuring 2.4 x 0.9 x 1.7  that demonstrates suspicious sonographic imaging features (TI-RADS 4)   meeting criteria for recommendations for tissue-sampling.     New outpatient IR consult received for US-guided tissue-sampling of these nodules.    Past Medical History:   Diagnosis Date    Abnormal EKG     Glaucoma     Hypercholesterolemia     Hypertension     Nuclear sclerosis of both eyes 2022     Past Surgical History:   Procedure Laterality Date     SECTION  10/1980    FRACTURE SURGERY  1988    glaucoma laser  Bilateral     PI     HYSTERECTOMY         Home Meds:   Prior to Admission medications    Medication Sig Start Date End Date Taking? Authorizing Provider   aspirin (ECOTRIN) 81 MG EC tablet Take 81 mg by mouth once daily.    Provider, Historical   diclofenac sodium (VOLTAREN ARTHRITIS PAIN) 1 % Gel Apply 2 g topically once daily. 2/3/25   Pinky Orr DPM   diclofenac sodium (VOLTAREN) 1 % Gel Apply 4 gram to each knee QID when needed for pain 12/3/21   Vasiliy Oneill Jr., MD   furosemide (LASIX) 20 MG tablet Take 1 tablet by mouth every other day. 24  Provider, Historical   hydrALAZINE (APRESOLINE) 100 MG tablet TAKE 1 TABLET TWICE DAILY 24   Vasiliy Oneill Jr., MD   levocetirizine (XYZAL) 5 MG tablet Take 1 tablet (5 mg total) by mouth every evening. 23   Vasiliy Oneill Jr., MD   mirtazapine (REMERON) 7.5 MG Tab TAKE 1 TABLET EVERY EVENING 25   Vasiliy Oneill Jr.,  Sintia RDZ's office called us back today because she wants patient seen this week and does not want her to wait until June. I went and spoke with Dr. Hanson and he wanted me to let you know that its ok to double book him so he can see this patient this week. I left you a voice mail as well.    Thanks  Paras   "MD   nebivoloL (BYSTOLIC) 10 MG Tab Take by mouth once daily.    Provider, Historical   PROLIA 60 mg/mL Syrg  7/31/23   Provider, Historical   suvorexant (BELSOMRA) 10 mg Tab Take 10 mg by mouth every evening. Take within 30 minutes of bedtime without food 11/21/24   Vasiliy Oneill Jr., MD   triamcinolone acetonide 0.1% (KENALOG) 0.1 % ointment Apply topically 2 (two) times daily. 12/11/20   Cyndi Marie MD   valsartan-hydrochlorothiazide (DIOVAN-HCT) 160-12.5 mg per tablet Take 1 tablet by mouth once daily. 4/5/24   Vasiliy Oneill Jr., MD     Anticoagulants/Antiplatelets: aspirin    Allergies:   Review of patient's allergies indicates:   Allergen Reactions    Penicillins Itching, Shortness Of Breath and Swelling     Sedation History:  no adverse reactions    Review of Systems:   Hematological: no known coagulopathies  Respiratory: no shortness of breath  Cardiovascular: no chest pain  Gastrointestinal: no abdominal pain  Genito-Urinary: no dysuria  Musculoskeletal: negative  Neurological: no TIA or stroke symptoms         OBJECTIVE:     Vital Signs (Most Recent)       Physical Exam:  ASA: II  Mallampati: n/a    General: no acute distress  Mental Status: alert and oriented to person, place and time  HEENT: normocephalic, atraumatic  Chest: unlabored breathing  Heart: regular heart rate  Abdomen: nondistended  Extremity: moves all extremities    Laboratory  No results found for: "INR", "PT", "PTT"    Lab Results   Component Value Date    WBC 12.49 11/16/2024    HGB 13.9 11/16/2024    HCT 41.8 11/16/2024    MCV 97 11/16/2024     11/16/2024      Lab Results   Component Value Date    GLU 85 11/16/2024     11/16/2024    K 3.7 11/16/2024     11/16/2024    CO2 29 11/16/2024    BUN 22 11/16/2024    CREATININE 1.1 11/16/2024    CALCIUM 9.6 11/16/2024    ALT 21 11/16/2024    AST 25 11/16/2024    ALBUMIN 3.4 (L) 11/16/2024    BILITOT 0.9 11/16/2024       ASSESSMENT/PLAN:   89 yo F with multiple " medical problems noted to have several thyroid nodules on imaging, two of which, lateral right lower pole and isthmus, meet criteria for FNA.     Patient referred to IR for FNA of the above nodules.    Suspicious lateral right lower pole and isthmus thyroid nodules - Will attempt US-guided percutaneous 25-gauge FNA of the suspicious lateral right lower pole and isthmus thyroid nodules with local anesthetic only.      Marilyn Medellin, NP  Interventional Radiology

## 2025-02-10 NOTE — PROGRESS NOTES
Name: Dafne Mary ADMIT: 2025   : 1940  PCP: Jossy Sauceda MD    MRN: [id number] LOS: 4 days   AGE/SEX: 84 y.o. female  ROOM: [facility]/     Subjective   Subjective   2025  Patient seen and examined at bedside, she is without distress.  She is on 2 L nasal cannula.  Plan is for ablation and pacemaker tomorrow.    2/10/2025  Patient had a low-grade fever overnight that resolved with Tylenol, she is currently afebrile and without leukocytosis.  Plans for pacemaker placement today, timing of ablation to be determined by EP.       Objective   Objective   Vital Signs  Temp:  [97.7 °F (36.5 °C)-100.8 °F (38.2 °C)] 97.7 °F (36.5 °C)  Heart Rate:  [] 93  Resp:  [16-17] 16  BP: (109-160)/(51-99) 127/99  SpO2:  [98 %-100 %] 98 %  on  Flow (L/min) (Oxygen Therapy):  [2] 2;   Device (Oxygen Therapy): nasal cannula  Body mass index is 23.83 kg/m².  Physical Exam  Constitutional:       Appearance: She is not ill-appearing.   HENT:      Head: Normocephalic and atraumatic.      Nose: Nose normal. No congestion.      Mouth/Throat:      Pharynx: Oropharynx is clear. No oropharyngeal exudate.   Eyes:      General: No scleral icterus.  Cardiovascular:      Rate and Rhythm: Normal rate. Rhythm irregular.      Heart sounds: No murmur heard.     No friction rub. No gallop.   Pulmonary:      Effort: No respiratory distress.      Breath sounds: No wheezing or rales.   Abdominal:      General: There is no distension.      Tenderness: There is no abdominal tenderness. There is no guarding.   Musculoskeletal:         General: No swelling or deformity.      Cervical back: Normal range of motion. No rigidity.      Right lower leg: No edema.      Left lower leg: No edema.   Skin:     Coloration: Skin is not jaundiced.      Findings: No bruising or lesion.   Neurological:      General: No focal deficit present.      Mental Status: She is alert and oriented to person, place, and time.      Motor: Weakness present.  "      Results Review     I reviewed the patient's new clinical results.  Results from last 7 days   Lab Units 02/10/25  0322 02/09/25  0515 02/08/25  0449 02/07/25  0527   WBC 10*3/mm3 9.24 12.93* 20.18* 6.11   HEMOGLOBIN g/dL 12.1 14.0 12.9 11.9*   PLATELETS 10*3/mm3 256 310 294 270     Results from last 7 days   Lab Units 02/10/25  0322 02/09/25  0515 02/08/25  0449 02/07/25  0527   SODIUM mmol/L 138 136 137 136   POTASSIUM mmol/L 4.1 4.4 4.0 3.8   CHLORIDE mmol/L 95* 95* 97* 98   CO2 mmol/L 32.2* 29.9* 30.0* 27.0   BUN mg/dL 31* 37* 43* 36*   CREATININE mg/dL 0.91 1.10* 1.11* 0.96   GLUCOSE mg/dL 103* 103* 99 116*   EGFR mL/min/1.73 62.3 49.7* 49.1* 58.5*     Results from last 7 days   Lab Units 02/07/25 0527 02/06/25  1152   ALBUMIN g/dL 3.0* 3.7   BILIRUBIN mg/dL 0.4 0.5   ALK PHOS U/L 114 153*   AST (SGOT) U/L 25 39*   ALT (SGPT) U/L 18 26     Results from last 7 days   Lab Units 02/10/25  0322 02/09/25  0515 02/08/25  0449 02/07/25  0527 02/06/25  1152   CALCIUM mg/dL 8.4* 8.9 8.4* 7.9* 9.1   ALBUMIN g/dL  --   --   --  3.0* 3.7       No results found for: \"HGBA1C\", \"POCGLU\"    No radiology results for the last day    I have personally reviewed all medications:  Scheduled Medications  [Held by provider] apixaban, 2.5 mg, Oral, Q12H  arformoterol, 15 mcg, Nebulization, BID - RT   And  budesonide, 0.5 mg, Nebulization, BID - RT   And  revefenacin, 175 mcg, Nebulization, Daily - RT  vitamin C, 500 mg, Oral, Daily  atenolol, 25 mg, Oral, Q24H  atorvastatin, 40 mg, Oral, Daily  cholecalciferol, 5,000 Units, Oral, Daily  [Held by provider] clopidogrel, 75 mg, Oral, Daily  furosemide, 20 mg, Oral, Daily  PARoxetine, 20 mg, Oral, Daily  sodium chloride, 10 mL, Intravenous, Q12H  thyroid, 15 mg, Oral, Daily  Thyroid, 30 mg, Oral, Daily  vitamin B-12, 500 mcg, Oral, Daily    Infusions   Diet  NPO Diet NPO Type: Strict NPO    I have personally reviewed:  [x]  Laboratory   [x]  Microbiology   [x]  Radiology   [x]  " EKG/Telemetry  [x]  Cardiology/Vascular   []  Pathology    []  Records       Assessment/Plan     Active Hospital Problems    Diagnosis  POA    **Atrial fibrillation with rapid ventricular response [I48.91]  Yes    Hypoxia [R09.02]  Yes    Acute on chronic heart failure with preserved ejection fraction (HFpEF) [I50.33]  Yes    Stage 3a chronic kidney disease [N18.31]  Yes    Elevated troponin [R79.89]  Yes    PAF (paroxysmal atrial fibrillation) [I48.0]  Yes    Generalized anxiety disorder [F41.1]  Yes    Spinal stenosis of lumbar region [M48.061]  Yes    COPD (chronic obstructive pulmonary disease) [J44.9]  Yes    Hypothyroid [E03.9]  Yes    Chronic fatigue [R53.82]  Yes    Benign hypertension [I10]  Yes      Resolved Hospital Problems   No resolved problems to display.       84 y.o. female admitted with Atrial fibrillation with rapid ventricular response.    #A-fib with RVR    -Eliquis    -Plavix    -Rate was improved after digoxin load    -Atenolol    -EP plans for pacemaker today, timing of ablation to be determined    -No ischemic workup per cardiology    #HFpEF    -Patient euvolemic    -Continue Lasix 20 mg p.o. daily    #COPD    -Chronic    -Currently on 2 L nasal cannula, wean as tolerated    -Brovana twice daily    -Pulmicort twice daily    -Yupelri daily    #Fever    -Patient had low-grade fever overnight that responded to Tylenol    -Currently hemodynamically stable without leukocytosis    -RVP on 2/6/2025 was normal    -UA on 1/26/2025 without evidence of infection    -Monitor labs and vitals, no indication for antibiotic therapy at this time    #CKD    -Creatinine 1.1, appears near baseline    -Renally dose meds    #Anxiety  #Depression    -Paxil    #Hypothyroid    -Continue home supplementation    #Hypertension    -Continue home atenolol    #Hyperlipidemia    -Statin      Eliquis  for DVT prophylaxis.  Full code.  Discussed with patient and family.  Anticipate discharge home with HH vs SNU facility in  2-3 days.  Expected Discharge Date: 2/12/2025; Expected Discharge Time:       DO [facility]  02/10/25  10:38 EST

## 2025-02-10 NOTE — CASE MANAGEMENT/SOCIAL WORK
Continued Stay Note  [facility]     Patient Name: Dafne Mary  MRN: [id number]  Today's Date: 2/10/2025    Admit Date: 2/6/2025    Plan: Home vs [facility] pending pt's progress during hospitalization   Discharge Plan       Row Name 02/10/25 1547       Plan    Plan Home vs [facility] pending pt's progress during hospitalization    Patient/Family in Agreement with Plan yes    Plan Comments Clinicals reviewed. CCP met with pt & pt's daughter, Felicia Mary, at bedside. CCP obtained permission from pt to speak in front of daughter. Pt confirms DC plan is home, however, Felicia said no, it is to go back to [facility]. CCP SW [facility] who reports Felicia had toured facility for LTC bed. CCP updated pt & Felicia that [facility] was just asking to confirm DC plan for bed availability. Pt adamant DC home, but Felicia reports they will be further discussing. DC plan home vs [facility]. [facility] updated [facility] who reports she will continue to follow. Pt not yet medically ready for DC; CCP will follow. DC plan home vs [facility] pending pt's progress during hospitalization. DC barriers: pacemaker scheduled today & ablation scheduled Wednesday. TERRANCE Rodriguez/RANDI Dias RN

## 2025-02-11 PROBLEM — I48.91 ATRIAL FIBRILLATION WITH RVR: Status: ACTIVE | Noted: 2025-02-06

## 2025-02-11 LAB
ANION GAP SERPL CALCULATED.3IONS-SCNC: 9 MMOL/L (ref 5–15)
BASOPHILS # BLD AUTO: 0.04 10*3/MM3 (ref 0–0.2)
BASOPHILS NFR BLD AUTO: 0.3 % (ref 0–1.5)
BUN SERPL-MCNC: 34 MG/DL (ref 8–23)
BUN/CREAT SERPL: 50 (ref 7–25)
CALCIUM SPEC-SCNC: 8.4 MG/DL (ref 8.6–10.5)
CHLORIDE SERPL-SCNC: 99 MMOL/L (ref 98–107)
CO2 SERPL-SCNC: 31 MMOL/L (ref 22–29)
CREAT SERPL-MCNC: 0.68 MG/DL (ref 0.57–1)
DEPRECATED RDW RBC AUTO: 38.7 FL (ref 37–54)
EGFRCR SERPLBLD CKD-EPI 2021: 86 ML/MIN/1.73
EOSINOPHIL # BLD AUTO: 0.57 10*3/MM3 (ref 0–0.4)
EOSINOPHIL NFR BLD AUTO: 4.4 % (ref 0.3–6.2)
ERYTHROCYTE [DISTWIDTH] IN BLOOD BY AUTOMATED COUNT: 12 % (ref 12.3–15.4)
GLUCOSE SERPL-MCNC: 130 MG/DL (ref 65–99)
HCT VFR BLD AUTO: 37.6 % (ref 34–46.6)
HGB BLD-MCNC: 12.6 G/DL (ref 12–15.9)
IMM GRANULOCYTES # BLD AUTO: 0.15 10*3/MM3 (ref 0–0.05)
IMM GRANULOCYTES NFR BLD AUTO: 1.1 % (ref 0–0.5)
LYMPHOCYTES # BLD AUTO: 0.37 10*3/MM3 (ref 0.7–3.1)
LYMPHOCYTES NFR BLD AUTO: 2.8 % (ref 19.6–45.3)
MCH RBC QN AUTO: 30.1 PG (ref 26.6–33)
MCHC RBC AUTO-ENTMCNC: 33.5 G/DL (ref 31.5–35.7)
MCV RBC AUTO: 90 FL (ref 79–97)
MONOCYTES # BLD AUTO: 0.69 10*3/MM3 (ref 0.1–0.9)
MONOCYTES NFR BLD AUTO: 5.3 % (ref 5–12)
NEUTROPHILS NFR BLD AUTO: 11.28 10*3/MM3 (ref 1.7–7)
NEUTROPHILS NFR BLD AUTO: 86.1 % (ref 42.7–76)
NRBC BLD AUTO-RTO: 0 /100 WBC (ref 0–0.2)
PLATELET # BLD AUTO: 256 10*3/MM3 (ref 140–450)
PMV BLD AUTO: 9.8 FL (ref 6–12)
POTASSIUM SERPL-SCNC: 4.7 MMOL/L (ref 3.5–5.2)
QT INTERVAL: 332 MS
QTC INTERVAL: 457 MS
RBC # BLD AUTO: 4.18 10*6/MM3 (ref 3.77–5.28)
SODIUM SERPL-SCNC: 139 MMOL/L (ref 136–145)
WBC NRBC COR # BLD AUTO: 13.1 10*3/MM3 (ref 3.4–10.8)

## 2025-02-11 PROCEDURE — 99232 SBSQ HOSP IP/OBS MODERATE 35: CPT

## 2025-02-11 PROCEDURE — 80048 BASIC METABOLIC PNL TOTAL CA: CPT | Performed by: INTERNAL MEDICINE

## 2025-02-11 PROCEDURE — 63710000001 REVEFENACIN 175 MCG/3ML SOLUTION: Performed by: INTERNAL MEDICINE

## 2025-02-11 PROCEDURE — 97530 THERAPEUTIC ACTIVITIES: CPT

## 2025-02-11 PROCEDURE — 85025 COMPLETE CBC W/AUTO DIFF WBC: CPT | Performed by: INTERNAL MEDICINE

## 2025-02-11 PROCEDURE — 25010000002 ONDANSETRON PER 1 MG: Performed by: INTERNAL MEDICINE

## 2025-02-11 PROCEDURE — 94799 UNLISTED PULMONARY SVC/PX: CPT

## 2025-02-11 PROCEDURE — 99232 SBSQ HOSP IP/OBS MODERATE 35: CPT | Performed by: INTERNAL MEDICINE

## 2025-02-11 RX ORDER — ATENOLOL 25 MG/1
100 TABLET ORAL
Status: DISCONTINUED | OUTPATIENT
Start: 2025-02-11 | End: 2025-02-12

## 2025-02-11 RX ADMIN — REVEFENACIN 175 MCG: 175 SOLUTION RESPIRATORY (INHALATION) at 11:42

## 2025-02-11 RX ADMIN — LORAZEPAM 0.5 MG: 0.5 TABLET ORAL at 09:12

## 2025-02-11 RX ADMIN — FUROSEMIDE 20 MG: 20 TABLET ORAL at 09:12

## 2025-02-11 RX ADMIN — ATENOLOL 100 MG: 25 TABLET ORAL at 09:29

## 2025-02-11 RX ADMIN — ATORVASTATIN CALCIUM 40 MG: 20 TABLET, FILM COATED ORAL at 09:12

## 2025-02-11 RX ADMIN — Medication 5000 UNITS: at 09:12

## 2025-02-11 RX ADMIN — Medication 500 MCG: at 09:12

## 2025-02-11 RX ADMIN — OXYCODONE HYDROCHLORIDE AND ACETAMINOPHEN 500 MG: 500 TABLET ORAL at 09:12

## 2025-02-11 RX ADMIN — Medication 10 ML: at 09:13

## 2025-02-11 RX ADMIN — Medication 3 ML: at 09:13

## 2025-02-11 RX ADMIN — Medication 10 ML: at 20:56

## 2025-02-11 RX ADMIN — LEVOTHYROXINE, LIOTHYRONINE 30 MG: 19; 4.5 TABLET ORAL at 09:29

## 2025-02-11 RX ADMIN — LEVOTHYROXINE, LIOTHYRONINE 15 MG: 19; 4.5 TABLET ORAL at 09:29

## 2025-02-11 RX ADMIN — ONDANSETRON 4 MG: 2 INJECTION, SOLUTION INTRAMUSCULAR; INTRAVENOUS at 11:15

## 2025-02-11 RX ADMIN — ARFORMOTEROL TARTRATE 15 MCG: 15 SOLUTION RESPIRATORY (INHALATION) at 11:41

## 2025-02-11 RX ADMIN — BUDESONIDE 0.5 MG: 0.5 INHALANT RESPIRATORY (INHALATION) at 11:41

## 2025-02-11 RX ADMIN — LORAZEPAM 0.5 MG: 0.5 TABLET ORAL at 01:04

## 2025-02-11 RX ADMIN — PAROXETINE HYDROCHLORIDE HEMIHYDRATE 20 MG: 20 TABLET, FILM COATED ORAL at 09:12

## 2025-02-11 NOTE — PROGRESS NOTES
Electrophysiology Follow-Up Note      Patient Name: Dafne Mary  Age/Sex: 84 y.o. female  : 1940  MRN: [id number]      Day of Service: 25       Chief Complaint/Follow-up: Atrial fibrillation, status post pacemaker    S: She says she is anxious this morning.  She can feel her heart racing at times.  Her atenolol was increased last night due to periods of RVR.      Temp:  [97.1 °F (36.2 °C)-97.7 °F (36.5 °C)] 97.1 °F (36.2 °C)  Heart Rate:  [] 86  Resp:  [8-18] 17  BP: (122-147)/() 147/94     PHYSICAL EXAM:    General Appearance: No acute distress, well developed and well nourished.   Eyes: Conjunctiva and lids: No erythema, swelling, or discharge. Sclera non-icteric.   HENT: Atraumatic, normocephalic. External eyes, ears, and nose normal.   Respiratory: No signs of respiratory distress. Respiration rhythm and depth normal.   Clear to auscultation. No rales, crackles, rhonchi, or wheezing auscultated.   Cardiovascular:  Heart Rate and Rhythm: Normal, Heart Sounds: Normal S1 and S2. No S3 or S4 noted.  Murmurs: No murmurs noted. No rubs, thrills, or gallops.   Lower Extremities: No edema noted.  Gastrointestinal:  Abdomen soft, non-distended, non-tender.  Musculoskeletal: Normal movement of extremities  Skin: Warm and dry.   Psychiatric: Patient alert and oriented to person, place, and time. Speech and behavior appropriate. Normal mood and affect.       Results from last 7 days   Lab Units 25  0535 02/10/25  0322 25  0515   SODIUM mmol/L 139 138 136   POTASSIUM mmol/L 4.7 4.1 4.4   CHLORIDE mmol/L 99 95* 95*   CO2 mmol/L 31.0* 32.2* 29.9*   BUN mg/dL 34* 31* 37*   CREATININE mg/dL 0.68 0.91 1.10*   GLUCOSE mg/dL 130* 103* 103*   CALCIUM mg/dL 8.4* 8.4* 8.9     Results from last 7 days   Lab Units 25  0535 02/10/25  0322 25  0515   WBC 10*3/mm3 13.10* 9.24 12.93*   HEMOGLOBIN g/dL 12.6 12.1 14.0   HEMATOCRIT % 37.6 37.9 41.0   PLATELETS 10*3/mm3 256 256 310          Results from last 7 days   Lab Units 02/06/25  1508 02/06/25  1152   HSTROP T ng/L 38* 47*               Current Medications:   Scheduled Meds:[Held by provider] apixaban, 2.5 mg, Oral, Q12H  arformoterol, 15 mcg, Nebulization, BID - RT   And  budesonide, 0.5 mg, Nebulization, BID - RT   And  revefenacin, 175 mcg, Nebulization, Daily - RT  vitamin C, 500 mg, Oral, Daily  atenolol, 100 mg, Oral, Q24H  atorvastatin, 40 mg, Oral, Daily  cholecalciferol, 5,000 Units, Oral, Daily  [Held by provider] clopidogrel, 75 mg, Oral, Daily  furosemide, 20 mg, Oral, Daily  PARoxetine, 20 mg, Oral, Daily  sodium chloride, 10 mL, Intravenous, Q12H  sodium chloride, 3 mL, Intravenous, Q12H  thyroid, 15 mg, Oral, Daily  Thyroid, 30 mg, Oral, Daily  vitamin B-12, 500 mcg, Oral, Daily            Atrial fibrillation with rapid ventricular response    Benign hypertension    Chronic fatigue    Hypothyroid    COPD (chronic obstructive pulmonary disease)    Generalized anxiety disorder    Spinal stenosis of lumbar region    PAF (paroxysmal atrial fibrillation)    Elevated troponin    Stage 3a chronic kidney disease    Acute on chronic heart failure with preserved ejection fraction (HFpEF)    Hypoxia       Plan:   Atrial fibrillation----status post DC PPM (2/10)----- she is doing well this morning from pacemaker standpoint.  She says that she is a little more anxious than normal this morning.  Her incision has some slight bruising but no signs of infection or hematoma.    We discussed sending her home with rate control and bring her back for AV node ablation, however we are concerned that she may end up being readmitted.      Therefore we have elected to proceed with AV node ablation tomorrow while she is here and can look at discharge Thursday.    We discussed risk, benefit, and alternatives.  Will place orders for ablation tomorrow.      ANTONELLA Liu  02/11/25  09:40 EST

## 2025-02-11 NOTE — THERAPY TREATMENT NOTE
Patient Name: Dafne Mary  : 1940    MRN: [id number]                              Today's Date: 2025       Admit Date: 2025    Visit Dx:     ICD-10-CM ICD-9-CM   1. Atrial fibrillation with RVR  I48.91 427.31   2. Atrial fibrillation with rapid ventricular response  I48.91 427.31   3. Elevated troponin  R79.89 790.6   4. Elevated brain natriuretic peptide (BNP) level  R79.89 790.99   5. COPD with acute exacerbation  J44.1 491.21   6. Generalized weakness  R53.1 780.79   7. Acute hyperkalemia  E87.5 276.7     Patient Active Problem List   Diagnosis    Anxiety    Arteriosclerosis of both carotid arteries    Chronic interstitial cystitis    Cough    Pulmonary emphysema    Gastroesophageal reflux disease    Fibromyalgia    Headache    Hematuria    Hoarseness    Hyperlipidemia    Benign hypertension    Postoperative hypothyroidism    Muscle spasms of both lower extremities    Palpitations    Shortness of breath    Postmenopausal osteoporosis    Chronic fatigue    Hair loss disorder    Dysuria    Dry cough    Spondylolysis, lumbar region    Vocal cord paralysis    Abnormal finding on thyroid function test    Positional lightheadedness    COPD with acute exacerbation    Hypothyroid    COPD (chronic obstructive pulmonary disease)    COPD exacerbation    Morbid obesity with BMI of 70 and over, adult    RSV bronchiolitis    Vitamin D deficiency    B12 deficiency    Iron deficiency    Decreased hemoglobin    Generalized anxiety disorder    Chronic bilateral low back pain with left-sided sciatica    Facet arthropathy, lumbar    Spinal stenosis of lumbar region    Spondylolisthesis of lumbar region    Scoliosis of lumbar spine    History of non-ST elevation myocardial infarction (NSTEMI)    Chronic respiratory failure    Acute respiratory failure    PAF (paroxysmal atrial fibrillation)    Acute respiratory failure with hypercapnia    Acute hypoxemic respiratory failure    Chronic HFrEF (heart failure with  reduced ejection fraction)    Community acquired bacterial pneumonia    CAD (coronary artery disease)    Mitral valve regurgitation    Acute hypokalemia    Status post angioplasty with stent    Noncompliance    NSTEMI (non-ST elevated myocardial infarction)    Flash pulmonary edema    Elevated troponin    Stage 3a chronic kidney disease    Type 2 myocardial infarction    Acute on chronic heart failure with preserved ejection fraction (HFpEF)    Hypoxia    Generalized weakness    Atrial fibrillation with rapid ventricular response    Atrial fibrillation with RVR     Past Medical History:   Diagnosis Date    Atrial fibrillation with RVR 6/4/2023    CAD (coronary artery disease) 7/10/2023    Chronic diastolic congestive heart failure 11/17/2022    Depression     Emphysema lung     GERD (gastroesophageal reflux disease)     Heart failure     Hyperlipidemia     Hypertension     Hypothyroidism     Mitral valve regurgitation 7/10/2023    NSTEMI (non-ST elevated myocardial infarction) 10/25/2023     Past Surgical History:   Procedure Laterality Date    CARDIAC CATHETERIZATION N/A 7/7/2023    Procedure: Right and Left Heart Cath;  Surgeon: Ra Cole MD;  Location: [facility] CATH [facility]VASIVE LOC[facility];  Service: Cardiology;  Laterality: N/A;    CARDIAC CATHETERIZATION N/A 7/7/2023    Procedure: Percutaneous Coronary Intervention;  Surgeon: Ra Cole MD;  Location:  [facility] CATH INVASIVE LOCATION;  Service: Cardiology;  Laterality: N/A;    CARDIAC CATHETERIZATION N/A 7/7/2023    Procedure: Stent ANDRZEJ coronary;  Surgeon: Ra Cole MD;  Location:  [facility] CATH INVASIVE LOCATION;  Service: Cardiology;  Laterality: N/A;    CARDIAC CATHETERIZATION N/A 7/7/2023    Procedure: Coronary angiography;  Surgeon: Ra Cole MD;  Location:  [facility] CATH INVASIVE LOCATION;  Service: Cardiology;  Laterality: N/A;    CARDIAC CATHETERIZATION N/A 7/7/2023    Procedure: Left ventriculography;  Surgeon: Kelsey  Ra FORD MD;  Location:  [facility] CATH INVASIVE LOCATION;  Service: Cardiology;  Laterality: N/A;    CARDIAC CATHETERIZATION N/A 1/20/2025    Procedure: Left Heart Cath;  Surgeon: Nae Norman MD;  Location:  [facility] CATH INVASIVE LOCATION;  Service: Cardiovascular;  Laterality: N/A;    CARDIAC CATHETERIZATION N/A 1/20/2025    Procedure: Coronary angiography;  Surgeon: Nae Norman MD;  Location:  [facility] CATH INVASIVE LOCATION;  Service: Cardiovascular;  Laterality: N/A;    CARDIAC ELECTROPHYSIOLOGY PROCEDURE N/A 2/10/2025    Procedure: Pacemaker DC new;  Surgeon: Damián Way MD;  Location:  [facility] CATH INVASIVE LOCATION;  Service: Cardiovascular;  Laterality: N/A;    EYE SURGERY Left     INTUBATION  5/21/2023         PARATHYROIDECTOMY      Patient reports thyroidectomy partial not a para thyroidectomy.    THYROIDECTOMY, PARTIAL      1984      General Information       Row Name 02/11/25 1501          Physical Therapy Time and Intention    Document Type therapy note (daily note) (P)   -BB     Mode of Treatment physical therapy (P)   -BB       Row Name 02/11/25 1501          General Information    Existing Precautions/Restrictions fall;oxygen therapy device and L/min (P)   -BB               User Key  (r) = Recorded By, (t) = Taken By, (c) = Cosigned By      Initials Name Provider Type    BB Rubén Prince, PT Student PT Student                   Mobility       Row Name 02/11/25 1501          Bed Mobility    Bed Mobility supine-sit;sit-supine (P)   -BB     Supine-Sit [place] (Bed Mobility) minimum assist (75% patient effort);verbal cues (P)   -BB     Sit-Supine [place] (Bed Mobility) contact guard;verbal cues (P)   -BB     Assistive Device (Bed Mobility) bed rails;head of bed elevated (P)   -BB     Comment, (Bed Mobility) Cues for sequencing. (P)   -BB       Row Name 02/11/25 1501          Sit-Stand Transfer    Sit-Stand [place] (Transfers) minimum assist (75% patient effort);verbal  cues;contact guard (P)   -BB     Assistive Device (Sit-Stand Transfers) walker, front-wheeled (P)   -BB       Row Name 02/11/25 1501          Gait/Stairs (Locomotion)    [place] Level (Gait) contact guard;verbal cues (P)   -BB     Assistive Device (Gait) walker, front-wheeled (P)   -BB     Distance in Feet (Gait) 30 (P)   -BB     Deviations/Abnormal Patterns (Gait) gait speed decreased;foreign decreased;base of support, narrow (P)   -BB     Bilateral Gait Deviations forward flexed posture;heel strike decreased (P)   -BB     Comment, (Gait/Stairs) Pt demos slow pace with ambulation (P)   -BB               User Key  (r) = Recorded By, (t) = Taken By, (c) = Cosigned By      Initials Name Provider Type    Rubén Luther, PT Student PT Student                   Obj/Interventions    No documentation.                  Goals/Plan    No documentation.                  Clinical Impression       Row Name 02/11/25 1504          Pain    Pretreatment Pain Rating 0/10 - no pain (P)   -BB     Posttreatment Pain Rating 0/10 - no pain (P)   -BB       Row Name 02/11/25 1504          Plan of Care Review    Plan of Care Reviewed With patient (P)   -BB     Outcome Evaluation Pt seen for PT this afternoon. She is doing well but very tired today. She reports no pain. Pt performed supine to sit with min A. Pt completed STS w CGA/min A using Fww and then ambulated 30ft with CGA using Fww. She demos a slow steady pace. Pt then  returned to bed and performed sit to supine with CGA. Pt tolerated PT well today and will progress activity as tolerated. (P)   -BB       Row Name 02/11/25 1504          Positioning and Restraints    Pre-Treatment Position in bed (P)   -BB     Post Treatment Position bed (P)   -BB     In Bed notified nsg;call light within reach;encouraged to call for assist;exit alarm on;fowlers (P)   -BB               User Key  (r) = Recorded By, (t) = Taken By, (c) = Cosigned By      Initials Name Provider Type    MASOUD Prince  Rubén, PT Student PT Student                   Outcome Measures       Row Name 02/11/25 1513          How much help from another person do you currently need...    Turning from your back to your side while in flat bed without using bedrails? 3 (P)   -BB     Moving from lying on back to sitting on the side of a flat bed without bedrails? 3 (P)   -BB     Moving to and from a bed to a chair (including a wheelchair)? 3 (P)   -BB     Standing up from a chair using your arms (e.g., wheelchair, bedside chair)? 3 (P)   -BB     Climbing 3-5 steps with a railing? 2 (P)   -BB     To walk in hospital room? 3 (P)   -BB     AM-PAC 6 Clicks Score (PT) 17 (P)   -BB     Highest Level of Mobility Goal 5 --> Static standing (P)   -BB               User Key  (r) = Recorded By, (t) = Taken By, (c) = Cosigned By      Initials Name Provider Type    BB Rubén Prince, PT Student PT Student                                 Physical Therapy Education       Title: PT OT SLP Therapies (Done)       Topic: Physical Therapy (Done)       Point: Mobility training (Done)       Learning Progress Summary            Patient Acceptance, E, VU by  at 2/9/2025 1528                      Point: Home exercise program (Done)       Learning Progress Summary            Patient Acceptance, E, VU by DB at 2/9/2025 1528                      Point: Body mechanics (Done)       Learning Progress Summary            Patient Acceptance, E, VU by DB at 2/9/2025 1528                      Point: Precautions (Done)       Learning Progress Summary            Patient Acceptance, E, VU by DB at 2/9/2025 1528                                      User Key       Initials Effective Dates Name Provider Type Discipline     06/16/21 -  Mara Rosario, PT Physical Therapist PT                  PT Recommendation and Plan     Outcome Evaluation: (P) Pt seen for PT this afternoon. She is doing well but very tired today. She reports no pain. Pt performed supine to sit with min A. Pt  completed STS w CGA/min A using Fww and then ambulated 30ft with CGA using Fww. She demos a slow steady pace. Pt then  returned to bed and performed sit to supine with CGA. Pt tolerated PT well today and will progress activity as tolerated.     Time Calculation:         PT Charges       Row Name 02/11/25 1515             Time Calculation    Start Time 1440 (P)   -BB      Stop Time 1455 (P)   -BB      Time Calculation (min) 15 min (P)   -BB      PT Received On 02/11/25 (P)   -BB      PT - Next Appointment 02/12/25 (P)   -BB                User Key  (r) = Recorded By, (t) = Taken By, (c) = Cosigned By      Initials Name Provider Type    Rubén Luther, PT Student PT Student                      PT G-Codes  Outcome Measure Options: (P) AM-PAC 6 Clicks Basic Mobility (PT)  AM-PAC 6 Clicks Score (PT): (P) 17       Rubén Prince PT Student  2/11/2025

## 2025-02-11 NOTE — PLAN OF CARE
Goal Outcome Evaluation:  Plan of Care Reviewed With: (P) patient           Outcome Evaluation: (P) Pt seen for PT this afternoon. She is doing well but very tired today. She reports no pain. Pt performed supine to sit with min A. Pt completed STS w CGA/min A using Fww and then ambulated 30ft with CGA using Fww. She demos a slow steady pace. Pt then  returned to bed and performed sit to supine with CGA. Pt tolerated PT well today and will progress activity as tolerated.

## 2025-02-11 NOTE — PROGRESS NOTES
Name: Dafne Mary ADMIT: 2025   : 1940  PCP: Jossy Sauceda MD    MRN: [id number] LOS: 5 days   AGE/SEX: 84 y.o. female  ROOM: [facility]/     Subjective   Subjective   2025  Patient seen and examined at bedside, she is without distress.  She is on 2 L nasal cannula.  Plan is for ablation and pacemaker tomorrow.    2/10/2025  Patient had a low-grade fever overnight that resolved with Tylenol, she is currently afebrile and without leukocytosis.  Plans for pacemaker placement today, timing of ablation to be determined by EP.    2025  Patient underwent pacemaker placement yesterday without complication.  No fever overnight.  She is without distress.  Plans for ablation tomorrow.       Objective   Objective   Vital Signs  Temp:  [97.1 °F (36.2 °C)-97.7 °F (36.5 °C)] 97.1 °F (36.2 °C)  Heart Rate:  [] 86  Resp:  [8-18] 17  BP: (122-147)/() 147/94  SpO2:  [92 %-100 %] 100 %  on  Flow (L/min) (Oxygen Therapy):  [1-2] 1;   Device (Oxygen Therapy): nasal cannula  Body mass index is 23.83 kg/m².  Physical Exam  Constitutional:       Appearance: She is not ill-appearing.   HENT:      Head: Normocephalic and atraumatic.      Nose: Nose normal. No congestion.      Mouth/Throat:      Pharynx: Oropharynx is clear. No oropharyngeal exudate.   Eyes:      General: No scleral icterus.  Cardiovascular:      Rate and Rhythm: Normal rate. Rhythm irregular.      Heart sounds: No murmur heard.     No friction rub. No gallop.   Pulmonary:      Effort: No respiratory distress.      Breath sounds: No wheezing or rales.   Abdominal:      General: There is no distension.      Tenderness: There is no abdominal tenderness. There is no guarding.   Musculoskeletal:         General: No swelling or deformity.      Cervical back: Normal range of motion. No rigidity.      Right lower leg: No edema.      Left lower leg: No edema.   Skin:     Coloration: Skin is not jaundiced.      Findings: No bruising or lesion.       "Comments: Pacemaker pocket present with bruising   Neurological:      General: No focal deficit present.      Mental Status: She is alert and oriented to person, place, and time.      Motor: Weakness present.       Results Review     I reviewed the patient's new clinical results.  Results from last 7 days   Lab Units 02/11/25  0535 02/10/25  0322 02/09/25  0515 02/08/25  0449   WBC 10*3/mm3 13.10* 9.24 12.93* 20.18*   HEMOGLOBIN g/dL 12.6 12.1 14.0 12.9   PLATELETS 10*3/mm3 256 256 310 294     Results from last 7 days   Lab Units 02/11/25  0535 02/10/25  0322 02/09/25  0515 02/08/25  0449   SODIUM mmol/L 139 138 136 137   POTASSIUM mmol/L 4.7 4.1 4.4 4.0   CHLORIDE mmol/L 99 95* 95* 97*   CO2 mmol/L 31.0* 32.2* 29.9* 30.0*   BUN mg/dL 34* 31* 37* 43*   CREATININE mg/dL 0.68 0.91 1.10* 1.11*   GLUCOSE mg/dL 130* 103* 103* 99   EGFR mL/min/1.73 86.0 62.3 49.7* 49.1*     Results from last 7 days   Lab Units 02/07/25 0527 02/06/25  1152   ALBUMIN g/dL 3.0* 3.7   BILIRUBIN mg/dL 0.4 0.5   ALK PHOS U/L 114 153*   AST (SGOT) U/L 25 39*   ALT (SGPT) U/L 18 26     Results from last 7 days   Lab Units 02/11/25  0535 02/10/25  0322 02/09/25  0515 02/08/25  0449 02/07/25  0527 02/06/25  1152   CALCIUM mg/dL 8.4* 8.4* 8.9 8.4* 7.9* 9.1   ALBUMIN g/dL  --   --   --   --  3.0* 3.7       No results found for: \"HGBA1C\", \"POCGLU\"    XR Chest 1 View    Result Date: 2/10/2025   Interval insertion of a left subclavian approach dual lead pacemaker device.  The heart size is within normal limits.  The lungs are normally aerated. There is no pleural effusion or pneumothorax.    This report was finalized on 2/10/2025 8:37 PM by Dr. Sen Martinez M.D on Workstation: [id number]       I have personally reviewed all medications:  Scheduled Medications  [START ON 2/12/2025] apixaban, 2.5 mg, Oral, Q12H  arformoterol, 15 mcg, Nebulization, BID - RT   And  budesonide, 0.5 mg, Nebulization, BID - RT   And  revefenacin, 175 mcg, Nebulization, " Daily - RT  vitamin C, 500 mg, Oral, Daily  atenolol, 100 mg, Oral, Q24H  atorvastatin, 40 mg, Oral, Daily  cholecalciferol, 5,000 Units, Oral, Daily  clopidogrel, 75 mg, Oral, Daily  furosemide, 20 mg, Oral, Daily  PARoxetine, 20 mg, Oral, Daily  sodium chloride, 10 mL, Intravenous, Q12H  sodium chloride, 3 mL, Intravenous, Q12H  thyroid, 15 mg, Oral, Daily  Thyroid, 30 mg, Oral, Daily  vitamin B-12, 500 mcg, Oral, Daily    Infusions   Diet  Diet: Regular/House; Fluid Consistency: Thin (IDDSI 0)  NPO Diet NPO Type: Strict NPO    I have personally reviewed:  [x]  Laboratory   [x]  Microbiology   [x]  Radiology   [x]  EKG/Telemetry  [x]  Cardiology/Vascular   []  Pathology    []  Records       Assessment/Plan     Active Hospital Problems    Diagnosis  POA    **Atrial fibrillation with rapid ventricular response [I48.91]  Yes    Atrial fibrillation with RVR [I48.91]  Unknown    Hypoxia [R09.02]  Yes    Acute on chronic heart failure with preserved ejection fraction (HFpEF) [I50.33]  Yes    Stage 3a chronic kidney disease [N18.31]  Yes    Elevated troponin [R79.89]  Yes    PAF (paroxysmal atrial fibrillation) [I48.0]  Yes    Generalized anxiety disorder [F41.1]  Yes    Spinal stenosis of lumbar region [M48.061]  Yes    COPD (chronic obstructive pulmonary disease) [J44.9]  Yes    Hypothyroid [E03.9]  Yes    Chronic fatigue [R53.82]  Yes    Benign hypertension [I10]  Yes      Resolved Hospital Problems   No resolved problems to display.       84 y.o. female admitted with Atrial fibrillation with rapid ventricular response.    #A-fib with RVR    -Eliquis    -Plavix    -Rate was improved after digoxin load    -Atenolol    -Status post pacemaker placement on 2/10/2025    -EP plans of ablation tomorrow    -No ischemic workup per cardiology    #HFpEF    -Patient euvolemic    -Continue Lasix 20 mg p.o. daily    #COPD    -Chronic    -Currently on 2 L nasal cannula, wean as tolerated    -Brovana twice daily    -Pulmicort twice  daily    -Yupelri daily    #CKD    -Creatinine 1.1, appears near baseline    -Renally dose meds    #Anxiety  #Depression    -Paxil    #Leukocytosis    -White count 13.1, suspect elevation due to recent pacemaker placement and reactive    -She is afebrile without distress    #Hypothyroid    -Continue home supplementation    #Hypertension    -Continue home atenolol    #Hyperlipidemia    -Statin      Eliquis  for DVT prophylaxis.  Full code.  Discussed with patient and family.  Anticipate discharge home with HH vs SNU facility in 2-3 days.  Expected Discharge Date: 2/14/2025; Expected Discharge Time:       DO [facility]  02/11/25  10:38 EST

## 2025-02-11 NOTE — PLAN OF CARE
Goal Outcome Evaluation:  Plan of Care Reviewed With: patient        Progress: improving  Outcome Evaluation: VSS. S/p PPm implantation with Dr. Winslow. ALPESH site remain intact. Remains in afib. Atenolol increased to 50 d/t HR in the 110s-150s. Tolerated well. Notified Sharon Dao PA-C of ECG interpretation. No further actions needed per Sharon. Afebrile this shift. All questions answered with verbalized understanding. Care on going.

## 2025-02-11 NOTE — PLAN OF CARE
Goal Outcome Evaluation:  Plan of Care Reviewed With: patient, child      Pt HR in low 100s, other vss, no c/o pain but did c/o anxiety and feeling nauseous received meds for both. Poor oral intake, ambulate in room with PT. CTM

## 2025-02-11 NOTE — PROGRESS NOTES
[facility] Progress Note       Encounter Date:25  Patient:Dafne Mary  :1940  MRN:[id number]      Chief Complaint: Follow-up atrial fibrillation      Subjective:        Had pacemaker yesterday seems to be doing well    Review of Systems:  Review of Systems   Constitutional: Positive for malaise/fatigue.   Cardiovascular:  Positive for palpitations.   Respiratory:  Positive for cough and shortness of breath.        Medications:  Scheduled Meds:  [START ON 2025] apixaban, 2.5 mg, Oral, Q12H  arformoterol, 15 mcg, Nebulization, BID - RT   And  budesonide, 0.5 mg, Nebulization, BID - RT   And  revefenacin, 175 mcg, Nebulization, Daily - RT  vitamin C, 500 mg, Oral, Daily  atenolol, 100 mg, Oral, Q24H  atorvastatin, 40 mg, Oral, Daily  cholecalciferol, 5,000 Units, Oral, Daily  clopidogrel, 75 mg, Oral, Daily  furosemide, 20 mg, Oral, Daily  PARoxetine, 20 mg, Oral, Daily  sodium chloride, 10 mL, Intravenous, Q12H  sodium chloride, 3 mL, Intravenous, Q12H  thyroid, 15 mg, Oral, Daily  Thyroid, 30 mg, Oral, Daily  vitamin B-12, 500 mcg, Oral, Daily    Continuous Infusions:   PRN Meds:    acetaminophen **OR** acetaminophen **OR** acetaminophen    albuterol    artificial tears    Calcium Replacement - Follow Nurse / BPA Driven Protocol    docusate sodium    guaiFENesin    LORazepam    Magnesium Standard Dose Replacement - Follow Nurse / BPA Driven Protocol    metoprolol tartrate    nitroglycerin    ondansetron    ondansetron    Phosphorus Replacement - Follow Nurse / BPA Driven Protocol    polyethylene glycol    Potassium Replacement - Follow Nurse / BPA Driven Protocol    sodium chloride    sodium chloride    sodium chloride    sodium chloride    sodium chloride         Objective:       Vitals:    25 0400 25 1101 25 1142 25 1156   BP: 147/94 156/96  124/83   BP Location:  Right arm     Patient Position:  Lying     Pulse: 86  102 (!) 128   Resp:   18    Temp:  "   98.9 °F (37.2 °C)   TempSrc:       SpO2: 100%  93% 95%   Weight:       Height:               Physical Exam:  Constitutional: Well appearing, well developed, no acute distress   HENT: Oropharynx clear and membrane moist  Eyes: Normal conjunctiva, no sclera icterus.  Neck: Supple, no carotid bruit bilaterally.  Cardiovascular: Irregularly irregular rate and rhythm, No Murmur, No bilateral lower extremity edema.  Pulmonary: Normal respiratory effort, normal lung sounds, no wheezing.  Neurological: Alert and orient x 3.   Skin: Warm, dry, no ecchymosis, no rash.  Psych: Appropriate mood and affect. Normal judgment and insight.           Lab Review:   Results from last 7 days   Lab Units 02/11/25  0535 02/10/25  0322 02/09/25  0515 02/08/25  0449 02/07/25  0527 02/06/25  1152   SODIUM mmol/L 139 138 136 137 136 138   POTASSIUM mmol/L 4.7 4.1 4.4 4.0 3.8 5.6*   CHLORIDE mmol/L 99 95* 95* 97* 98 98   CO2 mmol/L 31.0* 32.2* 29.9* 30.0* 27.0 29.0   BUN mg/dL 34* 31* 37* 43* 36* 36*   CREATININE mg/dL 0.68 0.91 1.10* 1.11* 0.96 1.00   GLUCOSE mg/dL 130* 103* 103* 99 116* 110*   CALCIUM mg/dL 8.4* 8.4* 8.9 8.4* 7.9* 9.1   AST (SGOT) U/L  --   --   --   --  25 39*   ALT (SGPT) U/L  --   --   --   --  18 26     Results from last 7 days   Lab Units 02/06/25  1508 02/06/25  1152   HSTROP T ng/L 38* 47*     Results from last 7 days   Lab Units 02/11/25  0535 02/10/25  0322 02/09/25  0515 02/08/25  0449 02/07/25  0527 02/06/25  1152   WBC 10*3/mm3 13.10* 9.24 12.93* 20.18* 6.11 19.17*   HEMOGLOBIN g/dL 12.6 12.1 14.0 12.9 11.9* 13.4   HEMATOCRIT % 37.6 37.9 41.0 37.7 35.6 40.2   PLATELETS 10*3/mm3 256 256 310 294 270 358                   Invalid input(s): \"LDLCALC\"  Results from last 7 days   Lab Units 02/06/25  1152   PROBNP pg/mL 9,933.0*             Cardiac catheterization 1/20/2025:  Stable coronary artery disease:  Patent mid LAD stent with no in-stent restenosis  Stable 60 to 70% stenosis of the mid small caliber diagonal " branc  Patent proximal left circumflex to proximal first obtuse marginal branch stent with no in-stent restenosi  Stable 20 to 30% mid RCA stenosis and diffuse distal ectasia  Mildly elevated LVEDP of 15 to 19 mmHg    Echocardiogram 1/18/2025:  Left ventricular systolic function is normal. Calculated left ventricular EF = 51.2%  Septal wall motion is abnormal, consistent with a bundle branch block.  Left ventricular diastolic function is consistent with age.  No aortic valve regurgitation or stenosis is present.  There is moderate calcification of the aortic valve.  Mild mitral annular calcification is present. There is moderate, bileaflet mitral valve thickening present. Mild mitral valve regurgitation is present. No significant mitral valve stenosis is present.       Assessment:          Diagnosis Plan   1. Atrial fibrillation with RVR  Case Request EP Lab: AV node ablation    Case Request EP Lab: AV node ablation    EP/CRM Study    EP/CRM Study      2. Atrial fibrillation with rapid ventricular response  Case Request EP Lab: Pacemaker DC new    Case Request EP Lab: Pacemaker DC new    EP/CRM Study    EP/CRM Study      3. Elevated troponin        4. Elevated brain natriuretic peptide (BNP) level        5. COPD with acute exacerbation        6. Generalized weakness        7. Acute hyperkalemia               Plan:       Ms. Mary is an 84-year-old woman with past medical history notable for paroxysmal atrial fibrillation, coronary artery disease with prior percutaneous intervention, essential hypertension, mixed hyperlipidemia, chronic kidney disease, chronic diastolic congestive heart failure, COPD with vocal cord dysfunction, who presents to the emergency room after recent hospitalization for COPD which was complicated by atrial fibrillation with rapid ventricular response.  She has had a hard time with maintaining sinus rhythm on her current medical regimen send recurrent atrial fibrillation events with  recurrent hospitalizations.  She was just discharged from the hospital about a week ago before representing back to the hospital with return of shortness of breath weakness and rapid atrial fibrillation.  Rates are better after being dosed with digoxin.  EP was asked to see her and they offered her pace and ablate for which she had pacemaker placed yesterday.  Heart rates are okay but they are starting to creep up of increased her atenolol could always add digoxin initial plans were for her to go home but there was a concern that she might come back with rapid A-fib I will hold off on further titration of rate controlling agents given reasonable heart rate control and plan for ablation tomorrow      Paroxysmal atrial fibrillation:  Rate improved after digoxin load  Go back to home dose of atenolol  Status post pacemaker 2/10  Plans for ablation 2/11  Anticoagulation currently on hold    Acute on chronic diastolic congestive heart failure:    Thought to be rate related elevated proBNP level  Was received IV diuresis but seems to be improving now that rates better controlled  Continue oral diuretics today    Type II myocardial infarction:  Patient noted to have elevated troponin on arrival likely related to arrhythmia tachycardia  No ischemic workup needed at this time             Bogdan Denny MD  [facility]  02/11/25  13:20 EST

## 2025-02-11 NOTE — CASE MANAGEMENT/SOCIAL WORK
Continued Stay Note  [facility]     Patient Name: Dafne Mary  MRN: [id number]  Today's Date: 2/11/2025    Admit Date: 2/6/2025    Plan: Home vs [facility] pending pt's progress during hospitalization, Elaine/John  following   Discharge Plan       Row Name 02/11/25 1354       Plan    Plan Home vs [facility] pending pt's progress during hospitalization, Pavan GUTIERREZ following    Patient/Family in Agreement with Plan yes    Plan Comments Elaine/John GUTIERREZ called [facility]CP & reports pt is current with them & requests Epic referral. RANDI placed Epic referral. Elaine reports she will follow. TERRANCE Rodriguez/RANDI Dias RN

## 2025-02-12 ENCOUNTER — APPOINTMENT (OUTPATIENT)
Dept: CARDIOLOGY | Facility: HOSPITAL | Age: 85
End: 2025-02-12
Payer: MEDICARE

## 2025-02-12 LAB
ANION GAP SERPL CALCULATED.3IONS-SCNC: 7.3 MMOL/L (ref 5–15)
BASOPHILS # BLD AUTO: 0.05 10*3/MM3 (ref 0–0.2)
BASOPHILS NFR BLD AUTO: 0.5 % (ref 0–1.5)
BUN SERPL-MCNC: 31 MG/DL (ref 8–23)
BUN/CREAT SERPL: 45.6 (ref 7–25)
CALCIUM SPEC-SCNC: 8.6 MG/DL (ref 8.6–10.5)
CHLORIDE SERPL-SCNC: 97 MMOL/L (ref 98–107)
CO2 SERPL-SCNC: 34.7 MMOL/L (ref 22–29)
CREAT SERPL-MCNC: 0.68 MG/DL (ref 0.57–1)
DEPRECATED RDW RBC AUTO: 39.5 FL (ref 37–54)
EGFRCR SERPLBLD CKD-EPI 2021: 86 ML/MIN/1.73
EOSINOPHIL # BLD AUTO: 0.63 10*3/MM3 (ref 0–0.4)
EOSINOPHIL NFR BLD AUTO: 6.8 % (ref 0.3–6.2)
ERYTHROCYTE [DISTWIDTH] IN BLOOD BY AUTOMATED COUNT: 11.9 % (ref 12.3–15.4)
GLUCOSE SERPL-MCNC: 102 MG/DL (ref 65–99)
HCT VFR BLD AUTO: 37 % (ref 34–46.6)
HGB BLD-MCNC: 11.9 G/DL (ref 12–15.9)
IMM GRANULOCYTES # BLD AUTO: 0.11 10*3/MM3 (ref 0–0.05)
IMM GRANULOCYTES NFR BLD AUTO: 1.2 % (ref 0–0.5)
LYMPHOCYTES # BLD AUTO: 0.37 10*3/MM3 (ref 0.7–3.1)
LYMPHOCYTES NFR BLD AUTO: 4 % (ref 19.6–45.3)
MCH RBC QN AUTO: 29.4 PG (ref 26.6–33)
MCHC RBC AUTO-ENTMCNC: 32.2 G/DL (ref 31.5–35.7)
MCV RBC AUTO: 91.4 FL (ref 79–97)
MONOCYTES # BLD AUTO: 0.72 10*3/MM3 (ref 0.1–0.9)
MONOCYTES NFR BLD AUTO: 7.8 % (ref 5–12)
NEUTROPHILS NFR BLD AUTO: 7.4 10*3/MM3 (ref 1.7–7)
NEUTROPHILS NFR BLD AUTO: 79.7 % (ref 42.7–76)
NRBC BLD AUTO-RTO: 0 /100 WBC (ref 0–0.2)
PLATELET # BLD AUTO: 231 10*3/MM3 (ref 140–450)
PMV BLD AUTO: 9.9 FL (ref 6–12)
POTASSIUM SERPL-SCNC: 4.3 MMOL/L (ref 3.5–5.2)
QT INTERVAL: 354 MS
QTC INTERVAL: 408 MS
RBC # BLD AUTO: 4.05 10*6/MM3 (ref 3.77–5.28)
SODIUM SERPL-SCNC: 139 MMOL/L (ref 136–145)
WBC NRBC COR # BLD AUTO: 9.28 10*3/MM3 (ref 3.4–10.8)

## 2025-02-12 PROCEDURE — C1760 CLOSURE DEV, VASC: HCPCS | Performed by: INTERNAL MEDICINE

## 2025-02-12 PROCEDURE — C1732 CATH, EP, DIAG/ABL, 3D/VECT: HCPCS | Performed by: INTERNAL MEDICINE

## 2025-02-12 PROCEDURE — C1893 INTRO/SHEATH, FIXED,NON-PEEL: HCPCS | Performed by: INTERNAL MEDICINE

## 2025-02-12 PROCEDURE — C1894 INTRO/SHEATH, NON-LASER: HCPCS | Performed by: INTERNAL MEDICINE

## 2025-02-12 PROCEDURE — 93308 TTE F-UP OR LMTD: CPT | Performed by: STUDENT IN AN ORGANIZED HEALTH CARE EDUCATION/TRAINING PROGRAM

## 2025-02-12 PROCEDURE — 25010000002 LIDOCAINE-EPINEPHRINE (PF) 1 %-1:200000 SOLUTION: Performed by: INTERNAL MEDICINE

## 2025-02-12 PROCEDURE — 25010000002 FENTANYL CITRATE (PF) 50 MCG/ML SOLUTION: Performed by: INTERNAL MEDICINE

## 2025-02-12 PROCEDURE — 93321 DOPPLER ECHO F-UP/LMTD STD: CPT

## 2025-02-12 PROCEDURE — 85025 COMPLETE CBC W/AUTO DIFF WBC: CPT | Performed by: INTERNAL MEDICINE

## 2025-02-12 PROCEDURE — 93650 ICAR CATH ABLTJ AV NODE FUNC: CPT | Performed by: INTERNAL MEDICINE

## 2025-02-12 PROCEDURE — 93308 TTE F-UP OR LMTD: CPT

## 2025-02-12 PROCEDURE — 93010 ELECTROCARDIOGRAM REPORT: CPT | Performed by: INTERNAL MEDICINE

## 2025-02-12 PROCEDURE — 93005 ELECTROCARDIOGRAM TRACING: CPT

## 2025-02-12 PROCEDURE — 93321 DOPPLER ECHO F-UP/LMTD STD: CPT | Performed by: STUDENT IN AN ORGANIZED HEALTH CARE EDUCATION/TRAINING PROGRAM

## 2025-02-12 PROCEDURE — 02583ZZ DESTRUCTION OF CONDUCTION MECHANISM, PERCUTANEOUS APPROACH: ICD-10-PCS | Performed by: INTERNAL MEDICINE

## 2025-02-12 PROCEDURE — 80048 BASIC METABOLIC PNL TOTAL CA: CPT | Performed by: INTERNAL MEDICINE

## 2025-02-12 PROCEDURE — 25010000002 MIDAZOLAM PER 1 MG: Performed by: INTERNAL MEDICINE

## 2025-02-12 RX ORDER — MIDAZOLAM HYDROCHLORIDE 1 MG/ML
INJECTION, SOLUTION INTRAMUSCULAR; INTRAVENOUS
Status: DISCONTINUED | OUTPATIENT
Start: 2025-02-12 | End: 2025-02-12 | Stop reason: HOSPADM

## 2025-02-12 RX ORDER — ACETAMINOPHEN 325 MG/1
650 TABLET ORAL EVERY 4 HOURS PRN
Status: DISCONTINUED | OUTPATIENT
Start: 2025-02-12 | End: 2025-02-13 | Stop reason: HOSPADM

## 2025-02-12 RX ORDER — FENTANYL CITRATE 50 UG/ML
INJECTION, SOLUTION INTRAMUSCULAR; INTRAVENOUS
Status: DISCONTINUED | OUTPATIENT
Start: 2025-02-12 | End: 2025-02-12 | Stop reason: HOSPADM

## 2025-02-12 RX ORDER — ACETAMINOPHEN 650 MG/1
650 SUPPOSITORY RECTAL EVERY 4 HOURS PRN
Status: DISCONTINUED | OUTPATIENT
Start: 2025-02-12 | End: 2025-02-13 | Stop reason: HOSPADM

## 2025-02-12 RX ORDER — METHOHEXITAL IN WATER/PF 100MG/10ML
SYRINGE (ML) INTRAVENOUS
Status: DISCONTINUED | OUTPATIENT
Start: 2025-02-12 | End: 2025-02-12 | Stop reason: HOSPADM

## 2025-02-12 RX ADMIN — FUROSEMIDE 20 MG: 20 TABLET ORAL at 13:44

## 2025-02-12 RX ADMIN — PAROXETINE HYDROCHLORIDE HEMIHYDRATE 20 MG: 20 TABLET, FILM COATED ORAL at 13:44

## 2025-02-12 RX ADMIN — Medication 10 ML: at 20:56

## 2025-02-12 RX ADMIN — Medication 3 ML: at 20:57

## 2025-02-12 RX ADMIN — Medication 3 ML: at 13:47

## 2025-02-12 RX ADMIN — APIXABAN 2.5 MG: 2.5 TABLET, FILM COATED ORAL at 20:26

## 2025-02-12 RX ADMIN — ATENOLOL 100 MG: 25 TABLET ORAL at 09:11

## 2025-02-12 RX ADMIN — Medication 10 ML: at 13:48

## 2025-02-12 RX ADMIN — CLOPIDOGREL 75 MG: 75 TABLET, FILM COATED ORAL at 13:32

## 2025-02-12 RX ADMIN — APIXABAN 2.5 MG: 2.5 TABLET, FILM COATED ORAL at 09:11

## 2025-02-12 RX ADMIN — LORAZEPAM 0.5 MG: 0.5 TABLET ORAL at 00:29

## 2025-02-12 RX ADMIN — LORAZEPAM 0.5 MG: 0.5 TABLET ORAL at 13:32

## 2025-02-12 NOTE — PLAN OF CARE
Goal Outcome Evaluation:  Plan of Care Reviewed With: patient, child         Pt VSS, A&O x4 no c/o pain. C/o anxiety 1x today PRN meds given. Tolerated procedure, very weak. CTM

## 2025-02-12 NOTE — DISCHARGE PLACEMENT REQUEST
"Dafne Mary (84 y.o. Female)       Date of Birth   1940    Social Security Number       Address   [address] [address]    Home Phone   [phone]    MRN   [id number]       [employer]   [employer]    Marital Status                               Admission Date   2/6/25    Admission Type   Emergency    Admitting Provider   Benitez Donato MD    Attending Provider   Hugo Barney DO    Department, Room/Bed   [facility], 3113/1       Discharge Date       Discharge Disposition       Discharge Destination                                 Attending Provider: Hugo Barney DO    Allergies: Lansoprazole, Albuterol, Buspirone, Diphenhydramine Hcl (Sleep), Lisinopril    Isolation: None   Infection: None   Code Status: CPR    Ht: 157.5 cm (62\")   Wt: 59 kg (130 lb)    Admission Cmt: None   Principal Problem: Atrial fibrillation with rapid ventricular response [I48.91]                   Active Insurance as of 2/6/2025       Primary Coverage       Payor Plan Insurance Group Employer/Plan Group    HUMANA MEDICARE REPLACEMENT HUMANA MED ADV SNP HMO [id number]       Payor Plan Address Payor Plan Phone Number Payor Plan Fax Number Effective Dates    [address] [address] [phone]  1/1/2024 - None Entered    [facility] [id number]         Subscriber Name Subscriber Birth Date Member ID       DAFNE MARY 1940 [id number]                     Emergency Contacts        (Rel.) Home Phone Work Phone Mobile Phone    Felicia Mary (Daughter) [phone] -- [phone]    Balaji Mary (Son) [phone] -- [phone]    kristie gentile (Daughter) [phone] -- --            "

## 2025-02-12 NOTE — CASE MANAGEMENT/SOCIAL WORK
Continued Stay Note  [facility]     Patient Name: Dafne Mary  MRN: [id number]  Today's Date: 2/12/2025    Admit Date: 2/6/2025    Plan: Home with [facility] vs [facility] following   Discharge Plan       Row Name 02/12/25 1454       Plan    Plan Home with [facility] vs Elaine [facility]ervantes/[facility] following    Patient/Family in Agreement with Plan yes    Plan Comments Hillary/primary RN reports pt's daughter, Felicia Mary, asking for CCP to call to discuss DC plan. CCP called Felicia, however, no answer. CCP left HIPAA compliant VM asking for return call. CCP rounded on pt, however, pt sleeping soundly. DC plan home with [facility] vs Elaine Cervantes/[facility] following. TERRANCE Rodriguez/CCP               Expected Discharge Date and Time       Expected Discharge Date Expected Discharge Time    Feb 13, 2025    Helen Dias RN

## 2025-02-12 NOTE — SIGNIFICANT NOTE
02/12/25 0927   OTHER   Discipline physical therapy assistant   Rehab Time/Intention   Session Not Performed patient/family declined treatment  (Pt sleeping although awoken easily. Pt declined tx today 2/2 scheduled ablation later. PT will follow up tomorrow.)   Recommendation   PT - Next Appointment 02/13/25

## 2025-02-12 NOTE — PERIOPERATIVE NURSING NOTE
Dr. Way at bedside to assess.  Abdominal movement is not related to heart beat, not uncomfortable to patient, echocardiogram ordered.

## 2025-02-12 NOTE — PROGRESS NOTES
Name: Dafne Mary ADMIT: 2025   : 1940  PCP: Jossy Sauceda MD    MRN: [id number] LOS: 6 days   AGE/SEX: 84 y.o. female  ROOM: [facility]     Subjective   Subjective   2025  Patient seen and examined at bedside, she is without distress.  She is on 2 L nasal cannula.  Plan is for ablation and pacemaker tomorrow.    2/10/2025  Patient had a low-grade fever overnight that resolved with Tylenol, she is currently afebrile and without leukocytosis.  Plans for pacemaker placement today, timing of ablation to be determined by EP.    2025  Patient underwent pacemaker placement yesterday without complication.  No fever overnight.  She is without distress.  Plans for ablation tomorrow.    2025  Patient remains in A-fib, plan is for ablation today.  She has remained afebrile and without distress.       Objective   Objective   Vital Signs  Temp:  [98 °F (36.7 °C)-99 °F (37.2 °C)] 98.7 °F (37.1 °C)  Heart Rate:  [] 113  Resp:  [16-18] 16  BP: (120-156)/(80-96) 133/90  SpO2:  [93 %-100 %] 100 %  on  Flow (L/min) (Oxygen Therapy):  [1] 1;   Device (Oxygen Therapy): nasal cannula  Body mass index is 23.83 kg/m².  Physical Exam  Constitutional:       Appearance: She is not ill-appearing.   HENT:      Head: Normocephalic and atraumatic.      Nose: Nose normal. No congestion.      Mouth/Throat:      Pharynx: Oropharynx is clear. No oropharyngeal exudate.   Eyes:      General: No scleral icterus.  Cardiovascular:      Rate and Rhythm: Normal rate. Rhythm irregular.      Heart sounds: No murmur heard.     No friction rub. No gallop.   Pulmonary:      Effort: No respiratory distress.      Breath sounds: No wheezing or rales.   Abdominal:      General: There is no distension.      Tenderness: There is no abdominal tenderness. There is no guarding.   Musculoskeletal:         General: No swelling or deformity.      Cervical back: Normal range of motion. No rigidity.      Right lower leg: No edema.      Left  "lower leg: No edema.   Skin:     Coloration: Skin is not jaundiced.      Findings: No bruising or lesion.      Comments: Pacemaker pocket present with bruising   Neurological:      General: No focal deficit present.      Mental Status: She is alert and oriented to person, place, and time.      Motor: Weakness present.       Results Review     I reviewed the patient's new clinical results.  Results from last 7 days   Lab Units 02/12/25  0447 02/11/25  0535 02/10/25  0322 02/09/25  0515   WBC 10*3/mm3 9.28 13.10* 9.24 12.93*   HEMOGLOBIN g/dL 11.9* 12.6 12.1 14.0   PLATELETS 10*3/mm3 231 256 256 310     Results from last 7 days   Lab Units 02/12/25  0447 02/11/25  0535 02/10/25  0322 02/09/25  0515   SODIUM mmol/L 139 139 138 136   POTASSIUM mmol/L 4.3 4.7 4.1 4.4   CHLORIDE mmol/L 97* 99 95* 95*   CO2 mmol/L 34.7* 31.0* 32.2* 29.9*   BUN mg/dL 31* 34* 31* 37*   CREATININE mg/dL 0.68 0.68 0.91 1.10*   GLUCOSE mg/dL 102* 130* 103* 103*   EGFR mL/min/1.73 86.0 86.0 62.3 49.7*     Results from last 7 days   Lab Units 02/07/25 0527 02/06/25  1152   ALBUMIN g/dL 3.0* 3.7   BILIRUBIN mg/dL 0.4 0.5   ALK PHOS U/L 114 153*   AST (SGOT) U/L 25 39*   ALT (SGPT) U/L 18 26     Results from last 7 days   Lab Units 02/12/25  0447 02/11/25  0535 02/10/25  0322 02/09/25  0515 02/08/25 0449 02/07/25 0527 02/06/25  1152   CALCIUM mg/dL 8.6 8.4* 8.4* 8.9   < > 7.9* 9.1   ALBUMIN g/dL  --   --   --   --   --  3.0* 3.7    < > = values in this interval not displayed.       No results found for: \"HGBA1C\", \"POCGLU\"    XR Chest 1 View    Result Date: 2/10/2025   Interval insertion of a left subclavian approach dual lead pacemaker device.  The heart size is within normal limits.  The lungs are normally aerated. There is no pleural effusion or pneumothorax.    This report was finalized on 2/10/2025 8:37 PM by Dr. Sen Martinez M.D on Workstation: [id number]       I have personally reviewed all medications:  Scheduled " Medications  apixaban, 2.5 mg, Oral, Q12H  arformoterol, 15 mcg, Nebulization, BID - RT   And  budesonide, 0.5 mg, Nebulization, BID - RT   And  revefenacin, 175 mcg, Nebulization, Daily - RT  vitamin C, 500 mg, Oral, Daily  atenolol, 100 mg, Oral, Q24H  atorvastatin, 40 mg, Oral, Daily  cholecalciferol, 5,000 Units, Oral, Daily  clopidogrel, 75 mg, Oral, Daily  furosemide, 20 mg, Oral, Daily  PARoxetine, 20 mg, Oral, Daily  sodium chloride, 10 mL, Intravenous, Q12H  sodium chloride, 3 mL, Intravenous, Q12H  thyroid, 15 mg, Oral, Daily  Thyroid, 30 mg, Oral, Daily  vitamin B-12, 500 mcg, Oral, Daily    Infusions   Diet  NPO Diet NPO Type: Strict NPO    I have personally reviewed:  [x]  Laboratory   [x]  Microbiology   [x]  Radiology   [x]  EKG/Telemetry  [x]  Cardiology/Vascular   []  Pathology    []  Records       Assessment/Plan     Active Hospital Problems    Diagnosis  POA   • **Atrial fibrillation with rapid ventricular response [I48.91]  Yes   • Atrial fibrillation with RVR [I48.91]  Unknown   • Hypoxia [R09.02]  Yes   • Acute on chronic heart failure with preserved ejection fraction (HFpEF) [I50.33]  Yes   • Stage 3a chronic kidney disease [N18.31]  Yes   • Elevated troponin [R79.89]  Yes   • PAF (paroxysmal atrial fibrillation) [I48.0]  Yes   • Generalized anxiety disorder [F41.1]  Yes   • Spinal stenosis of lumbar region [M48.061]  Yes   • COPD (chronic obstructive pulmonary disease) [J44.9]  Yes   • Hypothyroid [E03.9]  Yes   • Chronic fatigue [R53.82]  Yes   • Benign hypertension [I10]  Yes      Resolved Hospital Problems   No resolved problems to display.       84 y.o. female admitted with Atrial fibrillation with rapid ventricular response.    #A-fib with RVR    -Eliquis currently on hold    -Plavix    -Rate was improved after digoxin load    -Atenolol 100 mg p.o. every 24 hours    -Status post pacemaker placement on 2/10/2025, appears to be healing well without sign of infection    -EP plans of  ablation today    -No ischemic workup per cardiology    #HFpEF    -Patient euvolemic    -Continue Lasix 20 mg p.o. daily    #COPD    -Chronic    -Currently on 1 L nasal cannula, wean as tolerated    - may need home oxygen    -Brovana twice daily    -Pulmicort twice daily    -Yupelri daily    #CKD    -Creatinine 0.68, appears near baseline    -Renally dose meds    #Anxiety  #Depression    -Paxil    #Leukocytosis    -White count 13.1 > 9.28, suspect elevation due to recent pacemaker placement and reactive    -She is afebrile without distress    -WBC improved    #Anemia    -Hemoglobin 11.9    -No overt bleeding, monitor labs    #Hypothyroid    -Continue home supplementation    #Hypertension    -Continue home atenolol    #Hyperlipidemia    -Statin      Eliquis  for DVT prophylaxis.  Full code.  Discussed with patient and family.  Anticipate discharge home with HH vs SNU facility in 2-3 days.  Expected Discharge Date: 2/13/2025; Expected Discharge Time:       Hugo Barney DO  [facility]  02/12/25  08:47 EST

## 2025-02-12 NOTE — PERIOPERATIVE NURSING NOTE
"Luis E with [employer] at bedside to adjust PPM settings as patient's abdomen is \"jumnping with every heart beat.  Dr. Way is aware per Ernestina BENITO RN.  When Luis E finished checking settings of PPM, he talked with Dr. Way and left settings the same, abdomen still \"jumping,\" patient not uncomfortable, Dr. Way with order echocardiogram.  "

## 2025-02-12 NOTE — CASE MANAGEMENT/SOCIAL WORK
Continued Stay Note  [facility]     Patient Name: Dafne Mary  MRN: [id number]  Today's Date: 2/12/2025    Admit Date: 2/6/2025    Plan: Home with [facility] , Elaine cambpell, [facility] supplying DME   Discharge Plan       Row Name 02/12/25 1559       Plan    Plan Home with [facility] , Elaine campbell, [facility] supplying D[facility]    Patient/Family in Agreement with Plan yes    Plan Comments Felicia Mary, pt's daughter, returned CCP call & reports DC plan is for pt to go to rehab. Felicia reports she is aware pt has told CCP that the plan is to DC home, however. Felicia wants pt to go back to rehab at [facility]. CCP explained that pt is alert & orientated x 4 per CCP assessment, Hillary/primary RN, & Dr. Barney. Felicia verbalized understanding that pt is not confused, however, reports pt cannot DC home. Felicia adamant that pt needs to DC back to [facility]. CCP reiterated that since pt is alert & orientated she is able to make her own decisions. CCP reviewed PT evaluation on 2/11 & pt increased ambulation to 30 ft. Felicia verbalized understanding but voiced frustration again & reports she will not be taking pt home. CCP talked to Hillary/primary RN who reports pt has been asleep awhile & ok to wake pt. CCP went back to bedside & woke pt. CCP discussed DC plan. Pt again reports plan to DC home. Pt agreeable to DC home with [facility] . Pt adamant that she does not want to return to rehab. CCP updated pt that Felicia called & told CCP that she really wants pt to go to rehab. CCP encouraged pt to discuss her DC plan with eFlicia. CCP reviewed PT evaluation 2/9. Pt reports she thinks she did ok with therapy. Pt reports 4 COLT her home & no steps inside she has to use. Pt reports current DME: [facility]. Pt reports she plans to call her daughter Nanette who lives in [place] to see if she might be able to come stay with her for a few days. Pt reports history of oxygen supplied by [facility] & requests referral to them again. DCP report  created. Epic referral placed. CCP notified Palomo/Noreen. CCP updated Amaya/[facility] manager regarding above. DC plan home with [facility], [facility] supplying D[facility]. TERRANCE Rodriguez/RANDI      Row Name 02/12/25 0979       Plan    Plan Home with [facility] vs Elaine Cervantes/[facility] camp[facility]    Patient/Family in Agreement with Plan yes    Plan Comments Hillary/primary RN reports pt's daughter, Felicia Mary, asking for CCP to call to discuss DC plan. CCP called Felicia, however, no answer. CCP left HIPAA compliant VM asking for return call. CCP rounded on pt, however, pt sleeping soundly. DC plan home with [facility] vs [facility]. TERRANCE Rodriguez/RANDI Dias RN

## 2025-02-12 NOTE — PROGRESS NOTES
Saw patient this morning. Plans for AV node ablation this AM.     Discussed risks, benefits, and alternatives. Answered all patient and family questions.     Suspects she will be able to be discharged 2/13 or 2/14.     Incision well healed. Mild bruising but no signs of infection or hematoma.

## 2025-02-12 NOTE — DISCHARGE INSTRUCTIONS
[facility]  Cardiology  [address] [place] [address]  [phone]    Coronary Ablation After Care    Refer to this sheet in the next few weeks. These instructions provide you with information on caring for yourself after your procedure. Your health care provider may also give you more specific instructions. Your treatment has been planned according to current medical practices, but problems sometimes occur. Call your health care provider if you have any problems or questions after your procedure.      What to Expect After the Procedure:  After your procedure, it is typical to have the following sensations:  Minor discomfort or tenderness and a small bump at the catheter insertion site(s). The bump(s) should usually decrease in size and tenderness within 1 to 2 weeks.  Any bruising will usually fade within 2 to 4 weeks.  Home Care Instructions:  Do not apply powder or lotion to the site.  Do not take baths, swim, or use a hot tub until your health care provider approves and the site is completely healed.  Do not bend, squat, or lift anything over 20 lb (9 kg) or as directed by your health care provider. However, we recommend lifting nothing heavier than a gallon of milk.    You may shower 24 hours after the procedure. Remove the bandage (dressing) and gently wash the site with plain soap and water. Gently pat the site dry. You may apply a band aid daily for 2 days if desired.    Inspect the site at least twice daily.  Increase your fluid intake for the next 2 days.    Limit your activity for the first 48 hours.   Avoid strenuous activity for 1 week or as advised by your physician.    Follow instructions about when you can drive or return to work as directed by your physician.    Hold direct pressure over the site when you cough, sneeze, laugh or change positions.  Do this for the next 2 days.    Call Your Doctor If:  You have drainage (other than a small amount of blood on the dressing).  You  have chills or a fever > 101.  You have redness, warmth, swelling (larger than a walnut), or pain at the insertion   You develop palpitations, chest pain or shortness of breath, feel faint, or pass out.  You develop pain, discoloration, coldness, numbness, tingling, or severe bruising in the leg that held the catheter.  You develop bleeding from any other place, such as the bowels.  You have heavy bleeding from the site.  If this happens, hold pressure on the site and call 911.        Make Sure You:  Understand these instructions.  Will watch your condition.  Will get help right away if you are not doing well or get worse.      #Discharge plan  -Follow-up with PCP in 3 to 5 days  -Follow-up with electrophysiology in 1 week for incision check in 1 month for device follow-up  -Stop digoxin and atenolol  -Continue Lasix 20 mg p.o. daily

## 2025-02-13 VITALS
WEIGHT: 130 LBS | OXYGEN SATURATION: 98 % | HEART RATE: 82 BPM | RESPIRATION RATE: 16 BRPM | BODY MASS INDEX: 23.92 KG/M2 | DIASTOLIC BLOOD PRESSURE: 98 MMHG | SYSTOLIC BLOOD PRESSURE: 125 MMHG | TEMPERATURE: 98.1 F | HEIGHT: 62 IN

## 2025-02-13 LAB
ANION GAP SERPL CALCULATED.3IONS-SCNC: 13 MMOL/L (ref 5–15)
BASOPHILS # BLD AUTO: 0.04 10*3/MM3 (ref 0–0.2)
BASOPHILS NFR BLD AUTO: 0.5 % (ref 0–1.5)
BUN SERPL-MCNC: 29 MG/DL (ref 8–23)
BUN/CREAT SERPL: 46 (ref 7–25)
CALCIUM SPEC-SCNC: 8.5 MG/DL (ref 8.6–10.5)
CHLORIDE SERPL-SCNC: 91 MMOL/L (ref 98–107)
CO2 SERPL-SCNC: 29 MMOL/L (ref 22–29)
CREAT SERPL-MCNC: 0.63 MG/DL (ref 0.57–1)
DEPRECATED RDW RBC AUTO: 39.2 FL (ref 37–54)
EGFRCR SERPLBLD CKD-EPI 2021: 87.6 ML/MIN/1.73
EOSINOPHIL # BLD AUTO: 0.6 10*3/MM3 (ref 0–0.4)
EOSINOPHIL NFR BLD AUTO: 7.8 % (ref 0.3–6.2)
ERYTHROCYTE [DISTWIDTH] IN BLOOD BY AUTOMATED COUNT: 11.8 % (ref 12.3–15.4)
GLUCOSE SERPL-MCNC: 68 MG/DL (ref 65–99)
HCT VFR BLD AUTO: 35.6 % (ref 34–46.6)
HGB BLD-MCNC: 11.4 G/DL (ref 12–15.9)
IMM GRANULOCYTES # BLD AUTO: 0.1 10*3/MM3 (ref 0–0.05)
IMM GRANULOCYTES NFR BLD AUTO: 1.3 % (ref 0–0.5)
LYMPHOCYTES # BLD AUTO: 0.31 10*3/MM3 (ref 0.7–3.1)
LYMPHOCYTES NFR BLD AUTO: 4 % (ref 19.6–45.3)
MCH RBC QN AUTO: 29.4 PG (ref 26.6–33)
MCHC RBC AUTO-ENTMCNC: 32 G/DL (ref 31.5–35.7)
MCV RBC AUTO: 91.8 FL (ref 79–97)
MONOCYTES # BLD AUTO: 0.6 10*3/MM3 (ref 0.1–0.9)
MONOCYTES NFR BLD AUTO: 7.8 % (ref 5–12)
NEUTROPHILS NFR BLD AUTO: 6.09 10*3/MM3 (ref 1.7–7)
NEUTROPHILS NFR BLD AUTO: 78.6 % (ref 42.7–76)
NRBC BLD AUTO-RTO: 0 /100 WBC (ref 0–0.2)
PLATELET # BLD AUTO: 193 10*3/MM3 (ref 140–450)
PMV BLD AUTO: 9.8 FL (ref 6–12)
POTASSIUM SERPL-SCNC: 4.3 MMOL/L (ref 3.5–5.2)
RBC # BLD AUTO: 3.88 10*6/MM3 (ref 3.77–5.28)
SODIUM SERPL-SCNC: 133 MMOL/L (ref 136–145)
WBC NRBC COR # BLD AUTO: 7.74 10*3/MM3 (ref 3.4–10.8)

## 2025-02-13 PROCEDURE — 80048 BASIC METABOLIC PNL TOTAL CA: CPT

## 2025-02-13 PROCEDURE — 85025 COMPLETE CBC W/AUTO DIFF WBC: CPT

## 2025-02-13 PROCEDURE — 99232 SBSQ HOSP IP/OBS MODERATE 35: CPT | Performed by: INTERNAL MEDICINE

## 2025-02-13 PROCEDURE — 97530 THERAPEUTIC ACTIVITIES: CPT

## 2025-02-13 RX ORDER — AMLODIPINE BESYLATE 5 MG/1
5 TABLET ORAL
Status: DISCONTINUED | OUTPATIENT
Start: 2025-02-13 | End: 2025-02-13 | Stop reason: HOSPADM

## 2025-02-13 RX ORDER — FUROSEMIDE 20 MG/1
20 TABLET ORAL DAILY
Qty: 30 TABLET | Refills: 0 | Status: SHIPPED | OUTPATIENT
Start: 2025-02-13

## 2025-02-13 RX ADMIN — CLOPIDOGREL 75 MG: 75 TABLET, FILM COATED ORAL at 09:00

## 2025-02-13 RX ADMIN — PAROXETINE HYDROCHLORIDE HEMIHYDRATE 20 MG: 20 TABLET, FILM COATED ORAL at 09:01

## 2025-02-13 RX ADMIN — ATORVASTATIN CALCIUM 40 MG: 20 TABLET, FILM COATED ORAL at 09:00

## 2025-02-13 RX ADMIN — LEVOTHYROXINE, LIOTHYRONINE 30 MG: 19; 4.5 TABLET ORAL at 09:01

## 2025-02-13 RX ADMIN — Medication 5000 UNITS: at 09:00

## 2025-02-13 RX ADMIN — FUROSEMIDE 20 MG: 20 TABLET ORAL at 09:01

## 2025-02-13 RX ADMIN — Medication 500 MCG: at 09:01

## 2025-02-13 RX ADMIN — LORAZEPAM 0.5 MG: 0.5 TABLET ORAL at 10:36

## 2025-02-13 RX ADMIN — OXYCODONE HYDROCHLORIDE AND ACETAMINOPHEN 500 MG: 500 TABLET ORAL at 09:00

## 2025-02-13 RX ADMIN — LORAZEPAM 0.5 MG: 0.5 TABLET ORAL at 17:13

## 2025-02-13 RX ADMIN — LORAZEPAM 0.5 MG: 0.5 TABLET ORAL at 03:11

## 2025-02-13 RX ADMIN — Medication 10 ML: at 09:01

## 2025-02-13 RX ADMIN — LEVOTHYROXINE, LIOTHYRONINE 15 MG: 19; 4.5 TABLET ORAL at 09:01

## 2025-02-13 RX ADMIN — AMLODIPINE BESYLATE 5 MG: 5 TABLET ORAL at 09:01

## 2025-02-13 RX ADMIN — APIXABAN 2.5 MG: 2.5 TABLET, FILM COATED ORAL at 09:00

## 2025-02-13 RX ADMIN — Medication 3 ML: at 09:01

## 2025-02-13 NOTE — DISCHARGE SUMMARY
Patient Name: Dafne Mary  : 1940  MRN: [id number]    Date of Admission: 2025  Date of Discharge:  2025  Primary Care Physician: Jossy Sauceda MD      Chief Complaint:   Shortness of Breath      Discharge Diagnoses     Active Hospital Problems    Diagnosis  POA    **Atrial fibrillation with rapid ventricular response [I48.91]  Yes    Atrial fibrillation with RVR [I48.91]  Unknown    Hypoxia [R09.02]  Yes    Acute on chronic heart failure with preserved ejection fraction (HFpEF) [I50.33]  Yes    Stage 3a chronic kidney disease [N18.31]  Yes    Elevated troponin [R79.89]  Yes    PAF (paroxysmal atrial fibrillation) [I48.0]  Yes    Generalized anxiety disorder [F41.1]  Yes    Spinal stenosis of lumbar region [M48.061]  Yes    COPD (chronic obstructive pulmonary disease) [J44.9]  Yes    Hypothyroid [E03.9]  Yes    Chronic fatigue [R53.82]  Yes    Benign hypertension [I10]  Yes      Resolved Hospital Problems   No resolved problems to display.        Hospital Course     Ms. Mary is a 84 y.o. female with a history of hypertension, hypothyroid, COPD, anxiety, A-fib, CKD, HFpEF who presented to [facility] initially complaining of weakness and dyspnea.  Please see the admitting history and physical for further details.  She was found to have A-fib with RVR complicated by acute on chronic HFpEF and was admitted to the hospital for further evaluation and treatment.  Cardiology and EP consulted.  She was treated with dig, atenolol was held due to hypotension.  She was also started on IV diuretics.  EP recommended pacemaker and/or ablation.  Patient required supplemental oxygen at times due to heart failure.  Rate improved after digoxin and she was transitioned to atenolol.  IV diuretics transition to oral diuretics.  Pacemaker placed on 2/10/2025 without complication.  She remained in A-fib with elevated rates even after pacemaker.  Ablation done on  and rates were much improved.   Pacemaker pocket without evidence of infection.  Atenolol stopped, and Norvasc restarted.  P.o. diuretics restarted.  She was cleared for discharge by EP and cardiology.  She was wearing 1 L nasal cannula at times.  She was agreeable to SNF and discharged to SNF on 2/13/2025 in agreement with plan below.    #Discharge plan  -Follow-up with PCP in 3 to 5 days  -Follow-up with electrophysiology in 1 week for incision check in 1 month for device follow-up  -Stop digoxin and atenolol  -Continue Lasix 20 mg p.o. daily    Day of Discharge     Subjective:  She is status post ablation yesterday, heart rate is stable.  She has been cleared for discharge by EP and cardiology.  Patient agreeable to SNF.  Will discharge to SNF.  Physical Exam:  Temp:  [97.4 °F (36.3 °C)-98.2 °F (36.8 °C)] 98.1 °F (36.7 °C)  Heart Rate:  [80-82] 82  Resp:  [16-20] 16  BP: (125-159)/(75-98) 125/98  Body mass index is 23.78 kg/m².  Physical Exam  Constitutional:       Appearance: She is not ill-appearing.   HENT:      Head: Normocephalic and atraumatic.      Nose: Nose normal. No congestion.      Mouth/Throat:      Pharynx: Oropharynx is clear. No oropharyngeal exudate.   Eyes:      General: No scleral icterus.  Cardiovascular:      Rate and Rhythm: Normal rate. Rhythm irregular.      Heart sounds: No murmur heard.     No friction rub. No gallop.   Pulmonary:      Effort: No respiratory distress.      Breath sounds: No wheezing or rales.      Comments: Patient on 1 L nasal cannula  Abdominal:      General: There is no distension.      Tenderness: There is no abdominal tenderness. There is no guarding.   Musculoskeletal:         General: No swelling or deformity.      Cervical back: Normal range of motion. No rigidity.      Right lower leg: No edema.      Left lower leg: No edema.   Skin:     Coloration: Skin is not jaundiced.      Findings: No bruising or lesion.      Comments: Pacemaker pocket present with bruising   Neurological:      General:  No focal deficit present.      Mental Status: She is alert and oriented to person, place, and time.      Motor: Weakness present.   Consultants     Consult Orders (all) (From admission, onward)       Start     Ordered    02/07/25 1649  Inpatient Cardiology Consult  Once        Specialty:  Cardiology  Provider:  Bogdan Denny MD    02/07/25 1648    02/06/25 2113  Inpatient Cardiology Consult  Once        Specialty:  Cardiology  Provider:  Nae Norman MD    02/06/25 2112 02/06/25 1528  [facility] (on-call MD unless specified) Details  Once        Specialty:  Hospitalist  Provider:  (Not yet assigned)    02/06/25 1527                  Procedures     AV node ablation- has Medt PPM    Imaging Results (All)       Procedure Component Value Units Date/Time    XR Chest 1 View [[id number]] Collected: 02/10/25 2037     Updated: 02/10/25 2040    Narrative:      CXR ONE VIEW      HISTORY: Post ICD / Pacer Implant; I48.91-Unspecified atrial  fibrillation; R79.89-Other specified abnormal findings of blood  chemistry; R79.89-Other specified abnormal findings of blood chemistry;  J44.1-Chronic obstructive pulmonary disease with (acute) exacerbation;  R53.1-Weakness; E87.5-Hyperkalemia     COMPARISON: 2/6/2025     TECHNIQUE: single portable AP       Impression:         Interval insertion of a left subclavian approach dual lead pacemaker  device.     The heart size is within normal limits.     The lungs are normally aerated. There is no pleural effusion or  pneumothorax.           This report was finalized on 2/10/2025 8:37 PM by Dr. Sen Martinez M.D on Workstation: [id number]       XR Chest 1 View [[id number]] Collected: 02/06/25 1235     Updated: 02/06/25 1239    Narrative:      XR CHEST 1 VW-2/6/2025     HISTORY: Shortness of breath.     Heart size is mildly enlarged. Lungs appear free of acute infiltrates.  There is some aortic calcification.       Impression:      1. Mild cardiomegaly        This report was finalized  "on 2/6/2025 12:36 PM by Dr. Chapin Abrams M.D on Workstation: [id number]             Results for orders placed during the hospital encounter of 02/06/25    Duplex Venous Upper Extremity - Left CAR    Interpretation Summary    Normal left upper extremity venous duplex scan.    Results for orders placed during the hospital encounter of 02/06/25    Adult Transthoracic Echo Limited W/ Cont if Necessary Per Protocol    Interpretation Summary    This was a limited echo to assess for pericardial effusion.  There are leads observed in the right atrium as well as right ventricle.  There is no evidence of pericardial effusion.  The IVC is small, and collapsible    Left ventricular ejection fraction appears to be 51 - 55%.    The left atrial cavity is dilated.    Pertinent Labs     Results from last 7 days   Lab Units 02/13/25  0534 02/12/25  0447 02/11/25  0535 02/10/25  0322   WBC 10*3/mm3 7.74 9.28 13.10* 9.24   HEMOGLOBIN g/dL 11.4* 11.9* 12.6 12.1   PLATELETS 10*3/mm3 193 231 256 256     Results from last 7 days   Lab Units 02/13/25 0534 02/12/25 0447 02/11/25  0535 02/10/25  0322   SODIUM mmol/L 133* 139 139 138   POTASSIUM mmol/L 4.3 4.3 4.7 4.1   CHLORIDE mmol/L 91* 97* 99 95*   CO2 mmol/L 29.0 34.7* 31.0* 32.2*   BUN mg/dL 29* 31* 34* 31*   CREATININE mg/dL 0.63 0.68 0.68 0.91   GLUCOSE mg/dL 68 102* 130* 103*   EGFR mL/min/1.73 87.6 86.0 86.0 62.3     Results from last 7 days   Lab Units 02/07/25  0527   ALBUMIN g/dL 3.0*   BILIRUBIN mg/dL 0.4   ALK PHOS U/L 114   AST (SGOT) U/L 25   ALT (SGPT) U/L 18     Results from last 7 days   Lab Units 02/13/25  0534 02/12/25 0447 02/11/25  0535 02/10/25  0322 02/08/25  0449 02/07/25  0527   CALCIUM mg/dL 8.5* 8.6 8.4* 8.4*   < > 7.9*   ALBUMIN g/dL  --   --   --   --   --  3.0*    < > = values in this interval not displayed.               Invalid input(s): \"LDLCALC\"          Test Results Pending at Discharge     Pending Results       None              Discharge Details "        Discharge Medications        Changes to Medications        Instructions Start Date   furosemide 20 MG tablet  Commonly known as: LASIX  What changed:   when to take this  reasons to take this   20 mg, Oral, Daily             Continue These Medications        Instructions Start Date   acetaminophen 325 MG tablet  Commonly known as: TYLENOL   650 mg, Oral, Every 6 Hours PRN      amLODIPine 5 MG tablet  Commonly known as: NORVASC   5 mg, Oral, Daily      apixaban 2.5 MG tablet tablet  Commonly known as: ELIQUIS   2.5 mg, Oral, Every 12 Hours Scheduled      Sidman Thyroid 15 MG tablet  Generic drug: thyroid   Take 1 tablet by mouth once daily      Sidman Thyroid 30 MG tablet  Generic drug: Thyroid   Take 1 tablet by mouth once daily      artificial tears ophthalmic ointment   1 application , Both Eyes, Every 1 Hour PRN      atorvastatin 40 MG tablet  Commonly known as: LIPITOR   40 mg, Oral, Daily      Breztri Aerosphere 160-9-4.8 MCG/ACT aerosol inhaler  Generic drug: Budeson-Glycopyrrol-Formoterol   2 puffs, 2 Times Daily      clopidogrel 75 MG tablet  Commonly known as: PLAVIX   75 mg, Oral, Daily      empagliflozin 10 MG tablet tablet  Commonly known as: Jardiance   10 mg, Oral, Daily      guaiFENesin 600 MG 12 hr tablet  Commonly known as: MUCINEX   600 mg, Oral, 2 Times Daily PRN      ipratropium-albuterol 0.5-2.5 mg/3 ml nebulizer  Commonly known as: DUO-NEB   3 mL, Every 4 Hours PRN      LORazepam 0.5 MG tablet  Commonly known as: ATIVAN   0.5 mg, Oral, Every 6 Hours PRN      PARoxetine 10 MG tablet  Commonly known as: PAXIL   20 mg, Oral, Daily      Ventolin  (90 Base) MCG/ACT inhaler  Generic drug: albuterol sulfate HFA   2 puffs, Inhalation, Every 4 Hours PRN      vitamin B-12 500 MCG tablet  Commonly known as: CYANOCOBALAMIN   500 mcg, Daily      vitamin C 250 MG tablet  Commonly known as: ASCORBIC ACID   500 mg, Daily      vitamin D3 125 MCG (5000 UT) capsule capsule   5,000 Units, Daily     "         Stop These Medications      atenolol 100 MG tablet  Commonly known as: TENORMIN     REFRESH DRY EYE THERAPY OP     TAGAMET PO              Allergies   Allergen Reactions    Lansoprazole Nausea Only     NAUSEA  NAUSEA  NAUSEA    Albuterol Provider Review Needed     \"It causes me not to be able to breathe\"    Buspirone Other (See Comments)     agitation    Diphenhydramine Hcl (Sleep) Irritability    Lisinopril Cough       Discharge Disposition:  Skilled Nursing Facility (DC - External)      Discharge Diet:  Diet Order   Procedures    Diet: Regular/House; Fluid Consistency: Thin (IDDSI 0)       Discharge Activity:       CODE STATUS:    Code Status and Medical Interventions: CPR (Attempt to Resuscitate); Full Support   Ordered at: 02/06/25 1823     Code Status (Patient has no pulse and is not breathing):    CPR (Attempt to Resuscitate)     Medical Interventions (Patient has pulse or is breathing):    Full Support       Future Appointments   Date Time Provider Department Center   2/21/2025  2:30 PM [facility] [place] DEVICE CHECK [id number] None   2/21/2025  3:00 PM Dana Andrew APRN [facility]      Contact information for follow-up providers       Damián Way MD. Schedule an appointment as soon as possible for a visit in 1 week(s).    Specialties: Cardiology, Cardiac Electrophysiology  Contact information:  [facility] [place] [address]  [phone]               Bogdan Denny MD. Schedule an appointment as soon as possible for a visit in 1 month(s).    Specialty: Cardiology  Contact information:  [address] [place] [address]  [phone]               Jossy Sauceda MD .    Specialties: Urgent Care, Emergency Medicine  Contact information:  [address] [place] [address] [phone]                       Contact information for after-discharge care       Destination       [facility] .    Service: Skilled Nursing  Contact " information:  [address] [facility] [address]  [phone]                                   Time Spent on Discharge:  35 minutes      DO [facility]  02/13/25  16:39 EST

## 2025-02-13 NOTE — CASE MANAGEMENT/SOCIAL WORK
Case Management Discharge Note      Final Note: Pt DC to [facility] via [facility]. TERRANCE Rodriguez/CCP    Provided Post Acute Provider List?: N/A  N/A Provider List Comment: Pt denied needs    Selected Continued Care - Admitted Since 2/6/2025       Destination Coordination complete.      Service Provider Services Address Phone Fax Patient Preferred    [facility] [address] [place] [address] [phone] --             Selected Continued Care - Prior Encounters Includes continued care and service providers with selected services from prior encounters from 11/8/2024 to 2/13/2025      Discharged on 2/1/2025 Admission date: 1/24/2025 - Discharge disposition: Skilled Nursing Facility (DC - External)      Destination       Service Provider Services Address Phone Fax Patient Preferred    [facility] [facility] [address] [phone] --                      Discharged on 1/23/2025 Admission date: 1/17/2025 - Discharge disposition: Home-Health Care [facility]      Home Medical Care       Service Provider Services Address Phone Fax Patient Preferred    [facility] [facility] [address] [phone] --                   Transportation Services  W[facility]C [facility]    Final Discharge Disposition Code: 03 - skilled nursing facility (SNF)

## 2025-02-13 NOTE — CASE MANAGEMENT/SOCIAL WORK
Post-Acute Authorization Submission      Post Acute Pre-Cert Documentation  Request Submitted by Facility - Type:: Hospital  Post-Acute Authorization Type Submitted:: SNF  Date Post Acute Pre-Cert Inititated per Facility: 02/13/25  Date Post Acute Pre-Cert Completed: 02/13/25  Accepting Facility: [facility] Discharge Date Requested: 02/13/25  All Clinicals Submitted?: Yes  Had Accepting Facility at Time of Submission: Yes  Response Received from Insurance?: Approval  Response Communicated to:: , Accepting Facility Liaison, Accepting Facility Auth Department  Authorization Number:: APPROVED [id number]  Post Acute Pre-Cert Initiated Comment: VALID TO ADMIT UPTO 2/18/25.              Mario Alberto Pemberton, PCT

## 2025-02-13 NOTE — PROGRESS NOTES
[facility] Progress Note       Encounter Date:25  Patient:Dafne Mary  :1940  MRN:[id number]      Chief Complaint: Follow-up atrial fibrillation      Subjective:        Had pacemaker 2/10 and AV node ablation  overall doing well    Review of Systems:  Review of Systems   Constitutional: Positive for malaise/fatigue.   Cardiovascular:  Positive for palpitations.   Respiratory:  Positive for cough and shortness of breath.        Medications:  Scheduled Meds:  apixaban, 2.5 mg, Oral, Q12H  arformoterol, 15 mcg, Nebulization, BID - RT   And  budesonide, 0.5 mg, Nebulization, BID - RT   And  revefenacin, 175 mcg, Nebulization, Daily - RT  vitamin C, 500 mg, Oral, Daily  atorvastatin, 40 mg, Oral, Daily  cholecalciferol, 5,000 Units, Oral, Daily  clopidogrel, 75 mg, Oral, Daily  furosemide, 20 mg, Oral, Daily  PARoxetine, 20 mg, Oral, Daily  sodium chloride, 10 mL, Intravenous, Q12H  sodium chloride, 3 mL, Intravenous, Q12H  thyroid, 15 mg, Oral, Daily  Thyroid, 30 mg, Oral, Daily  vitamin B-12, 500 mcg, Oral, Daily    Continuous Infusions:   PRN Meds:    acetaminophen **OR** acetaminophen    albuterol    artificial tears    Calcium Replacement - Follow Nurse / BPA Driven Protocol    docusate sodium    guaiFENesin    LORazepam    Magnesium Standard Dose Replacement - Follow Nurse / BPA Driven Protocol    nitroglycerin    ondansetron    ondansetron    Phosphorus Replacement - Follow Nurse / BPA Driven Protocol    polyethylene glycol    Potassium Replacement - Follow Nurse / BPA Driven Protocol    sodium chloride    sodium chloride    sodium chloride    sodium chloride    sodium chloride         Objective:       Vitals:    25 1930 25 2306 25 0308 25 0723   BP: 139/78 140/75 159/77 125/96   BP Location: Left arm Right arm Left arm    Patient Position: Lying Lying Lying    Pulse: 80 80 80 80   Resp: 20 16 16    Temp: 97.4 °F (36.3 °C) 97.9 °F (36.6 °C) 97.9 °F  "(36.6 °C) 98.2 °F (36.8 °C)   TempSrc: Oral Oral Oral    SpO2: 100% 100% 98% 99%   Weight:       Height:               Physical Exam:  Constitutional: Well appearing, well developed, no acute distress   HENT: Oropharynx clear and membrane moist  Eyes: Normal conjunctiva, no sclera icterus.  Neck: Supple, no carotid bruit bilaterally.  Cardiovascular: Regular rate and rhythm, No Murmur, No bilateral lower extremity edema.  Pulmonary: Normal respiratory effort, normal lung sounds, no wheezing.  Neurological: Alert and orient x 3.   Skin: Warm, dry, no ecchymosis, no rash.  Psych: Appropriate mood and affect. Normal judgment and insight.           Lab Review:   Results from last 7 days   Lab Units 02/13/25  0534 02/12/25  0447 02/11/25  0535 02/10/25  0322 02/09/25  0515 02/08/25  0449 02/07/25  0527 02/06/25  1152   SODIUM mmol/L 133* 139 139 138 136 137 136 138   POTASSIUM mmol/L 4.3 4.3 4.7 4.1 4.4 4.0 3.8 5.6*   CHLORIDE mmol/L 91* 97* 99 95* 95* 97* 98 98   CO2 mmol/L 29.0 34.7* 31.0* 32.2* 29.9* 30.0* 27.0 29.0   BUN mg/dL 29* 31* 34* 31* 37* 43* 36* 36*   CREATININE mg/dL 0.63 0.68 0.68 0.91 1.10* 1.11* 0.96 1.00   GLUCOSE mg/dL 68 102* 130* 103* 103* 99 116* 110*   CALCIUM mg/dL 8.5* 8.6 8.4* 8.4* 8.9 8.4* 7.9* 9.1   AST (SGOT) U/L  --   --   --   --   --   --  25 39*   ALT (SGPT) U/L  --   --   --   --   --   --  18 26     Results from last 7 days   Lab Units 02/06/25  1508 02/06/25  1152   HSTROP T ng/L 38* 47*     Results from last 7 days   Lab Units 02/13/25  0534 02/12/25  0447 02/11/25  0535 02/10/25  0322 02/09/25  0515 02/08/25  0449 02/07/25  0527   WBC 10*3/mm3 7.74 9.28 13.10* 9.24 12.93* 20.18* 6.11   HEMOGLOBIN g/dL 11.4* 11.9* 12.6 12.1 14.0 12.9 11.9*   HEMATOCRIT % 35.6 37.0 37.6 37.9 41.0 37.7 35.6   PLATELETS 10*3/mm3 193 231 256 256 310 294 270                   Invalid input(s): \"LDLCALC\"  Results from last 7 days   Lab Units 02/06/25  1152   PROBNP pg/mL 9,933.0*             Cardiac " catheterization 1/20/2025:  Stable coronary artery disease:  Patent mid LAD stent with no in-stent restenosis  Stable 60 to 70% stenosis of the mid small caliber diagonal branc  Patent proximal left circumflex to proximal first obtuse marginal branch stent with no in-stent restenosi  Stable 20 to 30% mid RCA stenosis and diffuse distal ectasia  Mildly elevated LVEDP of 15 to 19 mmHg    Echocardiogram 1/18/2025:  Left ventricular systolic function is normal. Calculated left ventricular EF = 51.2%  Septal wall motion is abnormal, consistent with a bundle branch block.  Left ventricular diastolic function is consistent with age.  No aortic valve regurgitation or stenosis is present.  There is moderate calcification of the aortic valve.  Mild mitral annular calcification is present. There is moderate, bileaflet mitral valve thickening present. Mild mitral valve regurgitation is present. No significant mitral valve stenosis is present.       Assessment:          Diagnosis Plan   1. Atrial fibrillation with RVR  Case Request EP Lab: AV node ablation    Case Request EP Lab: AV node ablation    EP/CRM Study    EP/CRM Study      2. Atrial fibrillation with rapid ventricular response  Case Request EP Lab: Pacemaker DC new    Case Request EP Lab: Pacemaker DC new    EP/CRM Study    EP/CRM Study      3. Elevated troponin        4. Elevated brain natriuretic peptide (BNP) level        5. COPD with acute exacerbation        6. Generalized weakness        7. Acute hyperkalemia               Plan:       Ms. Mary is an 84-year-old woman with past medical history notable for paroxysmal atrial fibrillation, coronary artery disease with prior percutaneous intervention, essential hypertension, mixed hyperlipidemia, chronic kidney disease, chronic diastolic congestive heart failure, COPD with vocal cord dysfunction, who presents to the emergency room after recent hospitalization for COPD which was complicated by atrial fibrillation  with rapid ventricular response.  She has had a hard time with maintaining sinus rhythm on her current medical regimen send recurrent atrial fibrillation events with recurrent hospitalizations.  She was just discharged from the hospital about a week ago before representing back to the hospital with return of shortness of breath weakness and rapid atrial fibrillation.  Rates are better after being dosed with digoxin.  But given recurrent issues with atrial flutter with rapid ventricular rate was taken for pacemaker and AV node ablation.  Overall doing well      Paroxysmal atrial fibrillation:  Rate improved after digoxin load  Go back to home dose of atenolol  Status post pacemaker 2/10  Plans for ablation 2/11  Anticoagulation currently on hold    Acute on chronic diastolic congestive heart failure:  Thought to be rate related elevated proBNP level  Was received IV diuresis but seems to be improving now that rates better controlled  Continue oral diuretics today  Can restart Jardiance on discharge    Type II myocardial infarction:  Patient noted to have elevated troponin on arrival likely related to arrhythmia tachycardia  No ischemic workup needed at this time    Essential hypertension:  Blood pressure is recovering would add back amlodipine today  And hold on atenolol now that rate can is controlled           Bogdan Denny MD  [facility]  02/13/25  08:28 EST

## 2025-02-13 NOTE — CASE MANAGEMENT/SOCIAL WORK
Continued Stay Note  [facility]     Patient Name: Dafne Mary  MRN: [id number]  Today's Date: 2/13/2025    Admit Date: 2/6/2025    Plan: [facility] w/c van scheduled for 1700   Discharge Plan       Row Name 02/13/25 1640       Plan    Plan SN[facility] w/c van scheduled for 1700    Patient/Family in Agreement with Plan yes    Plan Comments [facility] returned call & reports next available is at 2200 tonight. CCP updated Ana[facility] who prefers pt to come facility earlier. CCP updated Amaya/CCP manager & received approval to coordinate [facility] or [facility] transport for pt to be DC today. CCP called pt's daughter, Felicia Mary, & updated her regarding above. Felicia verbalized understanding & denies resources to pay for transport. CCP SW Deena/[facility] who arranged transport for 1700 tonight, [facility] to provide wheelchair & oxygen for pt. Confirmation # [id number], cost $82.50.  CCP updated Dr. Barney & Hillary/primary RN. CCP called Felicia Mary & updated transport has been arranged for 1700. CCP updated Ana who asks for report to please be called to [phone] or [phone]. Ana reports she will get DC summary from [facility]. DC plan SN[facility] w/c van scheduled for 1700.  TERRANCE Rodriguez/RANDI      Row Name 02/13/25 1620       Plan    Plan SN[facility], pre-cert approved, bed available, awaiting confirmation from [facility] if transport is available tonight    Plan Comments Ana/Helen updated CCP that pre-cert has been approved & bed available. CCP SW Dr. Barney  who will DC pt tonight if transport can be arranged. CCP called & left message with [facility] requesting transport today. DC plan [facility], pre-cert approved, bed available, awaiting confirmation from [facility] if transport is available tonight. Rx updated. Partial packet with pt's chart. TERRANCE Rodriguez/RANDI             Expected Discharge Date and Time        Expected Discharge Date Expected Discharge Time    Feb 13, 2025    Helen Dias RN

## 2025-02-13 NOTE — CASE MANAGEMENT/SOCIAL WORK
Continued Stay Note  [facility]     Patient Name: Dafne Mary  MRN: [id number]  Today's Date: 2/13/2025    Admit Date: 2/6/2025    Plan: SN[facility], pending pre-cert to be initiated today, bed available, pt will require transport due to new oxygen need.   Discharge Plan       Row Name 02/13/25 1029       Plan    Plan SN[facility], pending pre-cert to be initiated today, bed available, pt will require transport due to new oxygen need.    Patient/Family in Agreement with Plan yes    Plan Comments Dr. Barney updated [facility] that pt reported to him this morning agreeable to SNF & plans to DC today. [facility] met with pt, pt's daughter, Felicia Mary, & pt's grand-son at bedside. [facility] obtained permission from pt to speak in front of all visitors. Pt reports she is now agreeable to rehab & requests [place]. DCP report created. Epic referral placed. [facility] notified [facility]/Helen. Ana reports she has a bed available but pt will require pre-cert. [facility] updated Dr. Barney who is agreeable for pre-cert to be initiated. [facility] emailed Mario Alberto/RANDI authorization team. [facility] secure chatted Carla/PT update & she confirmed she will evaluate pt today. [facility] updated Hillary/mal RN. DC plan [facility], pending pre-cert to be initiated today, bed available, pt will require transport due to new oxygen need. Partial packet in CCP office.Rx updated. TERRANCE Rodriguez/RANDI             Expected Discharge Date and Time       Expected Discharge Date Expected Discharge Time    Feb 13, 2025    Helen Dias RN

## 2025-02-13 NOTE — PROGRESS NOTES
She is status post pacemaker and AV node ablation. She is doing well. Heart rate well controlled.     Will stop digoxin and atenolol.     Her apixaban and plavix were resumed yesterday.     Incision without signs of infection or hematoma.     She is okay for discharge from EP standpoint.     Will see her in one week for incision check, one month for device follow up.

## 2025-02-13 NOTE — PROGRESS NOTES
Name: Dafne Mary ADMIT: 2025   : 1940  PCP: Jossy Sauceda MD    MRN: [id number] LOS: 7 days   AGE/SEX: 84 y.o. female  ROOM: On license of [facility]     Subjective   Subjective   2025  Patient seen and examined at bedside, she is without distress.  She is on 2 L nasal cannula.  Plan is for ablation and pacemaker tomorrow.    2/10/2025  Patient had a low-grade fever overnight that resolved with Tylenol, she is currently afebrile and without leukocytosis.  Plans for pacemaker placement today, timing of ablation to be determined by EP.    2025  Patient underwent pacemaker placement yesterday without complication.  No fever overnight.  She is without distress.  Plans for ablation tomorrow.    2025  Patient remains in A-fib, plan is for ablation today.  She has remained afebrile and without distress.    2025  Patient underwent ablation yesterday without complication.  Heart rate is controlled.  Has been cleared by EP and cardiology.  She is now agreeable to go back to SNF.  Discussed with case management and they will see what they can arrange.       Objective   Objective   Vital Signs  Temp:  [96.6 °F (35.9 °C)-98.2 °F (36.8 °C)] 98.2 °F (36.8 °C)  Heart Rate:  [] 80  Resp:  [13-27] 16  BP: (125-159)/(63-98) 125/96  SpO2:  [97 %-100 %] 99 %  on  Flow (L/min) (Oxygen Therapy):  [1-2] 1;   Device (Oxygen Therapy): nasal cannula  Body mass index is 23.78 kg/m².  Physical Exam  Constitutional:       Appearance: She is not ill-appearing.   HENT:      Head: Normocephalic and atraumatic.      Nose: Nose normal. No congestion.      Mouth/Throat:      Pharynx: Oropharynx is clear. No oropharyngeal exudate.   Eyes:      General: No scleral icterus.  Cardiovascular:      Rate and Rhythm: Normal rate. Rhythm irregular.      Heart sounds: No murmur heard.     No friction rub. No gallop.   Pulmonary:      Effort: No respiratory distress.      Breath sounds: No wheezing or rales.      Comments: Patient on  "1 L nasal cannula  Abdominal:      General: There is no distension.      Tenderness: There is no abdominal tenderness. There is no guarding.   Musculoskeletal:         General: No swelling or deformity.      Cervical back: Normal range of motion. No rigidity.      Right lower leg: No edema.      Left lower leg: No edema.   Skin:     Coloration: Skin is not jaundiced.      Findings: No bruising or lesion.      Comments: Pacemaker pocket present with bruising   Neurological:      General: No focal deficit present.      Mental Status: She is alert and oriented to person, place, and time.      Motor: Weakness present.       Results Review     I reviewed the patient's new clinical results.  Results from last 7 days   Lab Units 02/13/25  0534 02/12/25  0447 02/11/25  0535 02/10/25  0322   WBC 10*3/mm3 7.74 9.28 13.10* 9.24   HEMOGLOBIN g/dL 11.4* 11.9* 12.6 12.1   PLATELETS 10*3/mm3 193 231 256 256     Results from last 7 days   Lab Units 02/13/25  0534 02/12/25  0447 02/11/25  0535 02/10/25  0322   SODIUM mmol/L 133* 139 139 138   POTASSIUM mmol/L 4.3 4.3 4.7 4.1   CHLORIDE mmol/L 91* 97* 99 95*   CO2 mmol/L 29.0 34.7* 31.0* 32.2*   BUN mg/dL 29* 31* 34* 31*   CREATININE mg/dL 0.63 0.68 0.68 0.91   GLUCOSE mg/dL 68 102* 130* 103*   EGFR mL/min/1.73 87.6 86.0 86.0 62.3     Results from last 7 days   Lab Units 02/07/25  0527 02/06/25  1152   ALBUMIN g/dL 3.0* 3.7   BILIRUBIN mg/dL 0.4 0.5   ALK PHOS U/L 114 153*   AST (SGOT) U/L 25 39*   ALT (SGPT) U/L 18 26     Results from last 7 days   Lab Units 02/13/25  0534 02/12/25 0447 02/11/25  0535 02/10/25  0322 02/08/25  0449 02/07/25  0527 02/06/25  1152   CALCIUM mg/dL 8.5* 8.6 8.4* 8.4*   < > 7.9* 9.1   ALBUMIN g/dL  --   --   --   --   --  3.0* 3.7    < > = values in this interval not displayed.       No results found for: \"HGBA1C\", \"POCGLU\"    No radiology results for the last day    I have personally reviewed all medications:  Scheduled Medications  amLODIPine, 5 mg, " Oral, Q24H  apixaban, 2.5 mg, Oral, Q12H  arformoterol, 15 mcg, Nebulization, BID - RT   And  budesonide, 0.5 mg, Nebulization, BID - RT   And  revefenacin, 175 mcg, Nebulization, Daily - RT  vitamin C, 500 mg, Oral, Daily  atorvastatin, 40 mg, Oral, Daily  cholecalciferol, 5,000 Units, Oral, Daily  clopidogrel, 75 mg, Oral, Daily  furosemide, 20 mg, Oral, Daily  PARoxetine, 20 mg, Oral, Daily  sodium chloride, 10 mL, Intravenous, Q12H  sodium chloride, 3 mL, Intravenous, Q12H  thyroid, 15 mg, Oral, Daily  Thyroid, 30 mg, Oral, Daily  vitamin B-12, 500 mcg, Oral, Daily    Infusions   Diet  Diet: Regular/House; Fluid Consistency: Thin (IDDSI 0)    I have personally reviewed:  [x]  Laboratory   [x]  Microbiology   [x]  Radiology   [x]  EKG/Telemetry  [x]  Cardiology/Vascular   []  Pathology    []  Records       Assessment/Plan     Active Hospital Problems    Diagnosis  POA   • **Atrial fibrillation with rapid ventricular response [I48.91]  Yes   • Atrial fibrillation with RVR [I48.91]  Unknown   • Hypoxia [R09.02]  Yes   • Acute on chronic heart failure with preserved ejection fraction (HFpEF) [I50.33]  Yes   • Stage 3a chronic kidney disease [N18.31]  Yes   • Elevated troponin [R79.89]  Yes   • PAF (paroxysmal atrial fibrillation) [I48.0]  Yes   • Generalized anxiety disorder [F41.1]  Yes   • Spinal stenosis of lumbar region [M48.061]  Yes   • COPD (chronic obstructive pulmonary disease) [J44.9]  Yes   • Hypothyroid [E03.9]  Yes   • Chronic fatigue [R53.82]  Yes   • Benign hypertension [I10]  Yes      Resolved Hospital Problems   No resolved problems to display.       84 y.o. female admitted with Atrial fibrillation with rapid ventricular response.    #A-fib with RVR    -Eliquis resumed    -Plavix resumed    -Digoxin and atenolol stopped by EP    -Status post pacemaker placement on 2/10/2025, appears to be healing well without sign of infection    -She underwent AV node ablation on 2/12/2025 without complication     -No ischemic workup per cardiology    -Follow-up with the EP in 1 month, cleared for discharge by EP    #HFpEF    -Patient euvolemic    -Continue Lasix 20 mg p.o. daily    #COPD    -Chronic    -Currently on 1 L nasal cannula, wean as tolerated    - may need home oxygen    -Brovana twice daily    -Pulmicort twice daily    -Yupelri daily    #CKD    -Creatinine 0.63, appears near baseline    -Renally dose meds    #Anxiety  #Depression    -Paxil    #Leukocytosis    -White count 13.1 > 9.28 > 7.74, suspect elevation due to recent pacemaker placement and reactive    -She is afebrile without distress    -WBC improved    #Anemia    -Hemoglobin 11.4    -No overt bleeding, monitor labs    #Hypothyroid    -Continue home supplementation    #Hypertension    -Atenolol stopped as heart rate stable    -BP stable    #Hyperlipidemia    -Statin      Eliquis  for DVT prophylaxis.  Full code.  Discussed with patient and family.  Anticipate discharge home with HH vs SNU facility in 2-3 days.  Expected Discharge Date: 2/14/2025; Expected Discharge Time:       Hugo Barney DO  [facility]  02/13/25  09:59 EST

## 2025-02-13 NOTE — PLAN OF CARE
Goal Outcome Evaluation:  Plan of Care Reviewed With: patient           Outcome Evaluation: Pt agreeable to PT this afternoon. She is resting in bed upon entry . She denies pain, but reports she is tired. Pt able to transition to EOB w CGA/min A and cues for sequencing. She stood w CGA/min A using rwx and was able to ambulate approx 30 ft. SHe demos slow, but steady pace. Limited distance due to fatigue. Pt returned to bed at end of session. Plans for SNU upon DC. Encouraged activity w nsg as well. Will continue to progress activity as tolerated.    Anticipated Discharge Disposition (PT): skilled nursing facility

## 2025-02-14 NOTE — PROGRESS NOTES
Enter Query Response Below      Query Response: Unable to determine             If applicable, please update the problem list.

## 2025-02-21 ENCOUNTER — TELEPHONE (OUTPATIENT)
Age: 85
End: 2025-02-21
Payer: MEDICARE

## 2025-02-21 ENCOUNTER — CLINICAL SUPPORT NO REQUIREMENTS (OUTPATIENT)
Age: 85
End: 2025-02-21
Payer: MEDICARE

## 2025-02-21 ENCOUNTER — OFFICE VISIT (OUTPATIENT)
Dept: CARDIOLOGY | Facility: CLINIC | Age: 85
End: 2025-02-21
Payer: MEDICARE

## 2025-02-21 VITALS
HEIGHT: 62 IN | DIASTOLIC BLOOD PRESSURE: 60 MMHG | WEIGHT: 114 LBS | BODY MASS INDEX: 20.98 KG/M2 | HEART RATE: 81 BPM | OXYGEN SATURATION: 96 % | SYSTOLIC BLOOD PRESSURE: 110 MMHG

## 2025-02-21 DIAGNOSIS — I50.22 CHRONIC SYSTOLIC HEART FAILURE: Primary | ICD-10-CM

## 2025-02-21 DIAGNOSIS — Z95.0 PRESENCE OF CARDIAC PACEMAKER: ICD-10-CM

## 2025-02-21 RX ORDER — SENNOSIDES A AND B 8.6 MG/1
TABLET, FILM COATED ORAL EVERY 12 HOURS SCHEDULED
COMMUNITY
Start: 2025-01-29

## 2025-02-21 NOTE — PROGRESS NOTES
Date of Office Visit: 2025  Encounter Provider: ANTONELLA Gudino  Place of Service: [facility] CARDIOLOGY  Patient Name: Dafne Mary  :1940    Chief complaint: Coronary artery disease and atrial fibrillation    HPI: Dafne Mary is a 84 y.o. female who is previously followed by cardiologist at the [facility] but is also seeing Dr. Norman she is new to me today.  She has a history of coronary artery disease, paroxysmal atrial fibrillation, hypertension, hyperlipidemia and hypothyroidism.  She was last seen in January by Dr. Luis Eduardo Bell and at [facility] she had some worsening dyspnea with exertion.  Stress test and echocardiogram were ordered.  However on  she presented to [facility] with worsening symptoms.  Her systolic blood pressure was over 200 her oxygen saturation was in the 70s she was given sublingual nitro her troponin was 80 and then increased to 218.  She had a negative D-dimer and proBNP she had some nonspecific ST depression in the lateral and inferior leads.    We plan for cardiac catheterization.  Coronary angiography revealed a patent mid LAD stent with no in-stent stenosis she had a small caliber diagonal 6070%.  He had a patent circumflex stent to the OM was stable with nonobstruction.  RCA had mild stenosis of 20 to 30% we resumed her medical therapy and apixaban.  During that hospitalization it was complicated by A-fib with RVR and acute diastolic heart failure.  We were reconsulted she was treated with digoxin and atenolol but that had to be held due to hypotension.  She was given IV diuretics.  She was offered to pace and ablate procedure by electrophysiology.  Pacemaker was placed on February 10, 2025 without complication.  She is here for follow-up today.    She comes in and she is very weak.  She has lost about 10 pounds since she has been in the hospital she has a significantly decreased appetite.  Her device  was interrogated today which her daughter reports was stable.  She has been getting dizzy especially with standing.  She remains on oxygen.  I did a set of orthostatics sitting down she was 120/62 and standing she was 84/40.  They did some labs on the 18th her BUN was 21 creatinine was 0.6 potassium 3.9 sodium 142.  Her CBC showed a hemoglobin of 8.6 and a hemoglobin of 10.6.  She has a cough but is not productive.  She has some trace swelling in her ankles.    Previous testing and notes have been reviewed by me.   ECHO 10/26/2023    Limited study    Left ventricular systolic function is normal. Calculated left ventricular EF = 52.7% Normal left ventricular cavity size and wall thickness noted. All left ventricular wall segments contract normally. Left ventricular diastolic function was indeterminate.    The left atrial cavity is mild to moderately dilated.     Stress Test 11/5/2021  Left ventricular ejection fraction is normal. (Calculated EF = 58%).  Myocardial perfusion imaging indicates a normal myocardial perfusion study with no evidence of ischemia.  Impressions are consistent with a low risk study.     Cardiac Catheterization 7/7/2023  Conclusions:   1. Left main: Normal.  2. LAD: Diffuse, calcified 80% mid vessel stenosis.  3. LCX: Tubular 90% ostial to proximal OM1 stenosis  4. RCA: Diffuse 30 to 40% mid vessel stenosis  5.  Normal right and left-sided filling pressures.  6.  Decreased cardiac index  7.  Successful PCI of the mid LAD with a 2.75 x 28 mm Xience [place] drug-eluting stent, postdilated to high-pressure with a 2.75 x 20 mm noncompliant trek balloon.  8.  Successful PCI of the proximal circumflex into proximal OM1 with a 2.25 x 23 mm Xience [place] drug-eluting stent.     Past Medical History:   Diagnosis Date    Atrial fibrillation with RVR 6/4/2023    CAD (coronary artery disease) 7/10/2023    Chronic diastolic congestive heart failure 11/17/2022    Depression     Emphysema lung     GERD  (gastroesophageal reflux disease)     Heart failure     Hyperlipidemia     Hypertension     Hypothyroidism     Mitral valve regurgitation 7/10/2023    NSTEMI (non-ST elevated myocardial infarction) 10/25/2023       Past Surgical History:   Procedure Laterality Date    CARDIAC CATHETERIZATION N/A 7/7/2023    Procedure: Right and Left Heart Cath;  Surgeon: Ra Cole MD;  Location: [facility] CATH INVASIVE LOCATION;  Service: Cardiology;  Laterality: N/A;    CARDIAC CATHETERIZATION N/A 7/7/2023    Procedure: Percutaneous Coronary Intervention;  Surgeon: Ra Cole MD;  [facility];  Service: Cardiology;  Laterality: N/A;    CARDIAC CATHETERIZATION N/A 7/7/2023    Procedure: Stent ANDRZEJ coronary;  Surgeon: Ra Cole MD;  [facility];  Service: Cardiology;  Laterality: N/A;    CARDIAC CATHETERIZATION N/A 7/7/2023    Procedure: Coronary angiography;  Surgeon: Ra Cole MD;  Location: [facility] CATH INVASIVE LOCATION;  Service: Cardiology;  Laterality: N/A;    CARDIAC CATHETERIZATION N/A 7/7/2023    Procedure: Left ventriculography;  Surgeon: Ra Cole MD;  Location: [facility] CATH INVASIVE LOCATION;  Service: Cardiology;  Laterality: N/A;    CARDIAC CATHETERIZATION N/A 1/20/2025    Procedure: Left Heart Cath;  Surgeon: Nae Norman MD;  Location: [facility] CATH INVASIVE LOCATION;  Service: Cardiovascular;  Laterality: N/A;    CARDIAC CATHETERIZATION N/A 1/20/2025    Procedure: Coronary angiography;  Surgeon: Nae Norman MD;  Location: [facility] CATH INVASIVE LOCATION;  Service: Cardiovascular;  Laterality: N/A;    CARDIAC ELECTROPHYSIOLOGY PROCEDURE N/A 2/10/2025    Procedure: Pacemaker DC new;  Surgeon: Damián Way MD;  Location: [facility] CATH INVASIVE LOCATION;  Service: Cardiovascular;  Laterality: N/A;    CARDIAC ELECTROPHYSIOLOGY PROCEDURE N/A 2/12/2025    Procedure: AV node ablation- has Medt PPM;   "Surgeon: Damián Way MD;  Location: [facility] INVASIVE LOCATION;  Service: Cardiovascular;  Laterality: N/A;    EYE SURGERY Left     INTUBATION  5/21/2023         PARATHYROIDECTOMY      Patient reports thyroidectomy partial not a para thyroidectomy.    THYROIDECTOMY, PARTIAL      1984       Social History     Socioeconomic History    Marital status:    Tobacco Use    Smoking status: Never     Passive exposure: Never    Smokeless tobacco: Never   Vaping Use    Vaping status: Never Used   Substance and Sexual Activity    Alcohol use: Yes     Comment: rare    Drug use: No    Sexual activity: Defer       Family History   Problem Relation Age of Onset    Alzheimer's disease Mother     Lung disease Father     Alcohol abuse Father     Heart disease Brother     Hypertension Brother     Lung disease Brother     Alcohol abuse Brother     Cancer Brother         LUNG  WAS A SMOKER    Thyroid disease Maternal Grandmother        Review of Systems   Constitutional: Positive for malaise/fatigue. Negative for diaphoresis.   Cardiovascular:  Negative for chest pain, claudication, dyspnea on exertion, irregular heartbeat, leg swelling, near-syncope, orthopnea, palpitations, paroxysmal nocturnal dyspnea and syncope.   Respiratory:  Positive for cough. Negative for shortness of breath and sleep disturbances due to breathing.    Musculoskeletal:  Negative for falls.   Neurological:  Positive for dizziness and weakness.   Psychiatric/Behavioral:  Negative for altered mental status and substance abuse.        Allergies   Allergen Reactions    Lansoprazole Nausea Only     NAUSEA  NAUSEA  NAUSEA    Albuterol Provider Review Needed     \"It causes me not to be able to breathe\"    Buspirone Other (See Comments)     agitation    Diphenhydramine Hcl (Sleep) Irritability    Lisinopril Cough         Current Outpatient Medications:     acetaminophen (TYLENOL) 325 MG tablet, Take 2 tablets by mouth Every 6 (Six) Hours As Needed " for Mild Pain., Disp: , Rfl:     apixaban (ELIQUIS) 2.5 MG tablet tablet, Take 1 tablet by mouth Every 12 (Twelve) Hours. Indications: Atrial Fibrillation, Disp: 180 tablet, Rfl: 1    [place] Thyroid 15 MG tablet, Take 1 tablet by mouth once daily, Disp: 90 tablet, Rfl: 0    [place] Thyroid 30 MG tablet, Take 1 tablet by mouth once daily, Disp: 90 tablet, Rfl: 0    Artificial Tear Ointment (artificial tears) ophthalmic ointment, Administer 1 application to both eyes Every 1 (One) Hour As Needed (dry eye)., Disp: , Rfl:     atorvastatin (LIPITOR) 40 MG tablet, Take 1 tablet by mouth Daily., Disp: 90 tablet, Rfl: 1    Breztri Aerosphere 160-9-4.8 MCG/ACT aerosol inhaler, 2 puffs 2 (Two) Times a Day., Disp: , Rfl:     clopidogrel (PLAVIX) 75 MG tablet, Take 1 tablet by mouth Daily., Disp: 90 tablet, Rfl: 3    empagliflozin (Jardiance) 10 MG tablet tablet, Take 1 tablet by mouth Daily., Disp: 90 tablet, Rfl: 1    furosemide (LASIX) 20 MG tablet, Take 1 tablet by mouth Daily., Disp: 30 tablet, Rfl: 0    guaiFENesin (MUCINEX) 600 MG 12 hr tablet, Take 1 tablet by mouth 2 (Two) Times a Day As Needed for Cough or Congestion., Disp: , Rfl:     ipratropium-albuterol (DUO-NEB) 0.5-2.5 mg/3 ml nebulizer, Take 3 mL by nebulization Every 4 (Four) Hours As Needed for Wheezing., Disp: , Rfl:     LORazepam (ATIVAN) 0.5 MG tablet, Take 1 tablet by mouth Every 6 (Six) Hours As Needed for Anxiety., Disp: 12 tablet, Rfl: 0    PARoxetine (PAXIL) 10 MG tablet, Take 2 tablets by mouth Daily., Disp: 180 tablet, Rfl: 0    senna (KP Senna) 8.6 MG tablet, Take  by mouth Every 12 (Twelve) Hours., Disp: , Rfl:     Ventolin  (90 Base) MCG/ACT inhaler, Inhale 2 puffs Every 4 (Four) Hours As Needed., Disp: , Rfl:     vitamin B-12 (CYANOCOBALAMIN) 500 MCG tablet, Take 1 tablet by mouth Daily., Disp: , Rfl:     vitamin C (ASCORBIC ACID) 250 MG tablet, Take 2 tablets by mouth Daily., Disp: , Rfl:     vitamin D3 125 MCG (5000 UT) capsule capsule,  "Take 1 capsule by mouth Daily., Disp: , Rfl:         Objective:     Vitals:    02/21/25 1437   BP: 110/60   BP Location: Left arm   Patient Position: Sitting   Pulse: 81   SpO2: 96%   Weight: 51.7 kg (114 lb)   Height: 157.5 cm (62\")     Body mass index is 20.85 kg/m².    PHYSICAL EXAM:    Constitutional:       General: Not in acute distress.     Appearance: Normal appearance. Well-developed.   Eyes:      Pupils: Pupils are equal, round, and reactive to light.   HENT:      Head: Normocephalic.   Neck:      Vascular: No carotid bruit or JVD.   Pulmonary:      Effort: Pulmonary effort is normal. No tachypnea.      Breath sounds: Normal breath sounds. No wheezing. No rales.      Comments: Anterior crackles posterior wheezes decreased air entry  Cardiovascular:      Normal rate. Irregularly irregular rhythm.      No gallop.    Pulses:     Intact distal pulses.   Edema:     Peripheral edema absent.   Abdominal:      General: Bowel sounds are normal.      Palpations: Abdomen is soft.      Tenderness: There is no abdominal tenderness.   Musculoskeletal: Normal range of motion.      Cervical back: Normal range of motion and neck supple. No edema. Skin:     General: Skin is warm and dry.   Neurological:      Mental Status: Alert and oriented to person, place, and time.           ECG 12 Lead    Date/Time: 2/21/2025 3:36 PM  Performed by: Dana Andrew APRN    Authorized by: Dana Andrew APRN  Comparison: compared with previous ECG from 2/6/2025  Similar to previous ECG  Rhythm: atrial fibrillation and paced  Rate: normal  QRS axis: normal    Clinical impression: abnormal EKG              Assessment/Plan:      1.  Coronary artery disease-continue atorvastatin 40 mg daily.  No angina symptoms.    2.  Paroxysmal atrial fibrillation status post ablation and pacemaker.  Continue Eliquis 2.5 mg twice a day no rate control.    3.  Acute diastolic heart failure-continue current dose of diuretic her blood pressure is " labile.  Will do daily weights and fluid restriction I requested that the rehab facility call if her weight goes up.    4.  Orthostatic hypotension we will discontinue amlodipine hopefully not being orthostatic will help give her more energy.    Follow-up in 2 weeks with myself or Shelbie.         Your medication list            Accurate as of February 21, 2025  3:37 PM. If you have any questions, ask your nurse or doctor.                CONTINUE taking these medications        Instructions Last Dose Given Next Dose Due   acetaminophen 325 MG tablet  Commonly known as: TYLENOL      Take 2 tablets by mouth Every 6 (Six) Hours As Needed for Mild Pain.       apixaban 2.5 MG tablet tablet  Commonly known as: ELIQUIS      Take 1 tablet by mouth Every 12 (Twelve) Hours. Indications: Atrial Fibrillation       [place] Thyroid 15 MG tablet  Generic drug: thyroid      Take 1 tablet by mouth once daily       [place] Thyroid 30 MG tablet  Generic drug: Thyroid      Take 1 tablet by mouth once daily       artificial tears ophthalmic ointment      Administer 1 application to both eyes Every 1 (One) Hour As Needed (dry eye).       atorvastatin 40 MG tablet  Commonly known as: LIPITOR      Take 1 tablet by mouth Daily.       Breztri Aerosphere 160-9-4.8 MCG/ACT aerosol inhaler  Generic drug: Budeson-Glycopyrrol-Formoterol      2 puffs 2 (Two) Times a Day.       clopidogrel 75 MG tablet  Commonly known as: PLAVIX      Take 1 tablet by mouth Daily.       empagliflozin 10 MG tablet tablet  Commonly known as: Jardiance      Take 1 tablet by mouth Daily.       furosemide 20 MG tablet  Commonly known as: LASIX      Take 1 tablet by mouth Daily.       guaiFENesin 600 MG 12 hr tablet  Commonly known as: MUCINEX      Take 1 tablet by mouth 2 (Two) Times a Day As Needed for Cough or Congestion.       ipratropium-albuterol 0.5-2.5 mg/3 ml nebulizer  Commonly known as: DUO-NEB      Take 3 mL by nebulization Every 4 (Four) Hours As Needed for  Wheezing.       KP Senna 8.6 MG tablet  Generic drug: senna      Take  by mouth Every 12 (Twelve) Hours.       LORazepam 0.5 MG tablet  Commonly known as: ATIVAN      Take 1 tablet by mouth Every 6 (Six) Hours As Needed for Anxiety.       PARoxetine 10 MG tablet  Commonly known as: PAXIL      Take 2 tablets by mouth Daily.       Ventolin  (90 Base) MCG/ACT inhaler  Generic drug: albuterol sulfate HFA      Inhale 2 puffs Every 4 (Four) Hours As Needed.       vitamin B-12 500 MCG tablet  Commonly known as: CYANOCOBALAMIN      Take 1 tablet by mouth Daily.       vitamin C 250 MG tablet  Commonly known as: ASCORBIC ACID      Take 2 tablets by mouth Daily.       vitamin D3 125 MCG (5000 UT) capsule capsule      Take 1 capsule by mouth Daily.              STOP taking these medications      amLODIPine 5 MG tablet  Commonly known as: NORVASC  Stopped by: Dana Andrew                   As always, it has been a pleasure to participate in your patient's care.      Sincerely,     Dana BERMAN

## 2025-04-29 NOTE — PROGRESS NOTES
[facility] INPATIENT PROGRESS NOTE         [address]    1/10/2023      PATIENT IDENTIFICATION:  Name: Dafne Mary ADMIT: 2023   : 1940  PCP: Sintia Chatterjee PA    MRN: [id number] LOS: 3 days   AGE/SEX: 82 y.o. female  ROOM: [facility]                     LOS 3    Reason for visit: COPD exacerbation      SUBJECTIVE:      Resting comfortably.  Says that she feels that her breathing is improving some.  Moderate coarse wheezing on auscultation.      Objective   OBJECTIVE:    Vital Sign Min/Max for last 24 hours  Temp  Min: 97.5 °F (36.4 °C)  Max: 98 °F (36.7 °C)   BP  Min: 154/80  Max: 169/88   Pulse  Min: 76  Max: 82   Resp  Min: 16  Max: 18   SpO2  Min: 94 %  Max: 97 %   No data recorded   No data recorded                         Body mass index is 25.96 kg/m².  No intake or output data in the 24 hours ending 01/10/23 1014      Exam:  GEN:  No distress, appears stated age  EYES:   PERRL, anicteric sclerae  ENT:    External ears/nose normal, OP clear  NECK:  No adenopathy, midline trachea  LUNGS: Normal chest on inspection, palpation and  wheezing bilaterally on auscultation  CV:  Normal S1S2, without murmur  ABD:  Nontender, nondistended, no hepatosplenomegaly, +BS  EXT:  No edema.  No cyanosis or clubbing.  No mottling and normal cap refill.    Assessment     Scheduled meds:  amLODIPine, 2.5 mg, Oral, Daily  aspirin, 81 mg, Oral, Daily  atenolol, 50 mg, Oral, Daily  atorvastatin, 20 mg, Oral, Nightly  budesonide-formoterol, 2 puff, Inhalation, BID - RT  fluticasone, 2 spray, Nasal, Daily  guaiFENesin, 1,200 mg, Oral, Q12H  insulin lispro, 0-7 Units, Subcutaneous, TID With Meals  ipratropium-albuterol, 3 mL, Nebulization, Q6H While Awake - RT  losartan, 100 mg, Oral, Daily  pantoprazole, 40 mg, Oral, QAM  PARoxetine, 10 mg, Oral, Daily  predniSONE, 40 mg, Oral, Daily With Breakfast  saccharomyces boulardii, 250 mg, Oral, BID  sodium chloride, 10 mL, Intravenous, Q12H  thyroid, 15 mg,  Oral, Daily  Thyroid, 30 mg, Oral, Daily      IV meds:                         Data Review:  Results from last 7 days   Lab Units 01/08/23  0700 01/07/23  0754   SODIUM mmol/L 138 138   POTASSIUM mmol/L 3.6 3.5   CHLORIDE mmol/L 101 102   CO2 mmol/L 24.7 25.3   BUN mg/dL 18 9   CREATININE mg/dL 0.76 0.70   GLUCOSE mg/dL 144* 151*   CALCIUM mg/dL 9.1 9.1         Estimated Creatinine Clearance: 46.3 mL/min (by C-G formula based on SCr of 0.76 mg/dL).  Results from last 7 days   Lab Units 01/09/23  0612 01/07/23  1144 01/07/23  0754 01/06/23  2047   WBC 10*3/mm3 8.94 2.58* 1.88* 4.41   HEMOGLOBIN g/dL 10.9* 11.6* 12.0 12.5   PLATELETS 10*3/mm3 332 324 319 322         Results from last 7 days   Lab Units 01/07/23  0754   ALT (SGPT) U/L 23   AST (SGOT) U/L 30     Results from last 7 days   Lab Units 01/07/23  0346   PH, ARTERIAL pH units 7.413   PO2 ART mm Hg 103.6*   PCO2, ARTERIAL mm Hg 45.1*   HCO3 ART mmol/L 28.8*     Results from last 7 days   Lab Units 01/07/23  0754   PROCALCITONIN ng/mL 0.06         Glucose   Date/Time Value Ref Range Status   01/09/2023 2206 134 (H) 70 - 130 mg/dL Final     Comment:     Meter: [id number] : [id number] Gil Terry NA   01/09/2023 1618 155 (H) 70 - 130 mg/dL Final     Comment:     Meter: [id number] : [id number] Jaison Ernestina NA   01/09/2023 1059 109 70 - 130 mg/dL Final     Comment:     Meter: [id number] : [id number] Jaison Leeyl NA   01/09/2023 0620 118 70 - 130 mg/dL Final     Comment:     Meter: [id number] : [id number] Ángel Addison NA   01/08/2023 2124 149 (H) 70 - 130 mg/dL Final     Comment:     Meter: [id number] : [id number] Ángel LIND   01/08/2023 1620 143 (H) 70 - 130 mg/dL Final     Comment:     Meter: [id number] : [id number] Nitish LIND   01/08/2023 1130 168 (H) 70 - 130 mg/dL Final     Comment:     Meter: [id number] : [id number] Nitish LIND       Chest x-ray 1/6 reviewed      Microbiology reviewed          Active  Hospital Problems    Diagnosis  POA   • **COPD with acute exacerbation (HCC) [J44.1]  Yes   • RSV bronchiolitis [J21.0]  Unknown   • Hypothyroid [E03.9]  Yes   • COPD exacerbation (HCC) [J44.1]  Yes   • Chronic diastolic congestive heart failure (HCC) [I50.32]  Yes   • Benign hypertension [I10]  Yes   • Anxiety [F41.9]  Yes   • Arteriosclerosis of both carotid arteries [I65.23]  Yes   • Fibromyalgia [M79.7]  Yes   • Gastroesophageal reflux disease [K21.9]  Yes      Resolved Hospital Problems   No resolved problems to display.         ASSESSMENT:    1. COPD with acute exacerbation  2. Acute on chronic hypoxemic respiratory failure  3. Acute RSV  4. Granulomatous lung disease  5. Chronic diastolic congestive heart failure  6. Hypothyroidism  7. Hypertension  8. GERD          PLAN:    Encourage pulmonary toilet.  Continue bronchodilators and steroid with taper.    Wean oxygen as able.        Javier Nolan MD  Pulmonary and Critical Care Medicine  [facility], [facility]  1/10/2023    10:14 EST      X Size Of Lesion In Cm (Optional): 0 Require Ndc Code?: No Concentration Of Kenalog Solution Injected (Mg/Ml): 15.0 Ndc# For Kenalog Only: [id number] Detail Level: Detailed Total Volume (Ccs): 0.3 Medical Necessity Clause: This procedure was medically necessary because the lesions that were treated were: Kenalog Preparation: Kenalog Validate Note Data When Using Inventory: Yes Administered By (Optional): Dr. Shook Show Inventory Tab: Hide Consent: The risks of atrophy were reviewed with the patient. Kenalog Type Of Vial: Multiple Dose

## (undated) DEVICE — GLIDESHEATH SLENDER STAINLESS STEEL KIT: Brand: GLIDESHEATH SLENDER

## (undated) DEVICE — Device: Brand: SMARTABLATE

## (undated) DEVICE — PERCLOSE™ PROSTYLE™ SUTURE-MEDIATED CLOSURE AND REPAIR SYSTEM: Brand: PERCLOSE™ PROSTYLE™

## (undated) DEVICE — KT MANIFLD CARDIAC

## (undated) DEVICE — PINNACLE INTRODUCER SHEATH: Brand: PINNACLE

## (undated) DEVICE — ADAPT SEAL SAFESHEATH

## (undated) DEVICE — DGW .035 FC J3MM 260CM TEF: Brand: EMERALD

## (undated) DEVICE — CATH VENT MIV RADL PIG ST TIP 5F 110CM

## (undated) DEVICE — Device: Brand: REFERENCE PATCH CARTO 3

## (undated) DEVICE — CATH DIAG IMPULSE FR4 5F 100CM

## (undated) DEVICE — Device

## (undated) DEVICE — LOU PACE DEFIB: Brand: MEDLINE INDUSTRIES, INC.

## (undated) DEVICE — LOU EP: Brand: MEDLINE INDUSTRIES, INC.

## (undated) DEVICE — Device: Brand: THERMOCOOL SMARTTOUCH SF

## (undated) DEVICE — INTRO SHEATH PRELUDE SNAP .038 9F 13CM W/SDPRT BLK

## (undated) DEVICE — CATH DEFLECT SELECTSITE 9F 43CM W/ART HNDL

## (undated) DEVICE — PK CATH CARD 40

## (undated) DEVICE — PENCL SMOKE/EVAC MEGADYNE TELESCP 10FT

## (undated) DEVICE — INTRO SHEATH PRELUDE SNAP .038 7F 13CM W/SDPRT

## (undated) DEVICE — CATH DIAG IMPULSE FL3.5 5F 100CM

## (undated) DEVICE — INTRO SHEATH TRNSEP .032 SL0 8.5F 63CM